# Patient Record
Sex: MALE | Race: WHITE | NOT HISPANIC OR LATINO | Employment: OTHER | URBAN - NONMETROPOLITAN AREA
[De-identification: names, ages, dates, MRNs, and addresses within clinical notes are randomized per-mention and may not be internally consistent; named-entity substitution may affect disease eponyms.]

---

## 2017-02-20 ENCOUNTER — DOCUMENTATION (OUTPATIENT)
Dept: GASTROENTEROLOGY | Facility: CLINIC | Age: 77
End: 2017-02-20

## 2017-02-20 RX ORDER — SULFASALAZINE 500 MG/1
1000 TABLET ORAL 3 TIMES DAILY
Qty: 180 TABLET | Refills: 3 | Status: SHIPPED | OUTPATIENT
Start: 2017-02-20 | End: 2017-05-30 | Stop reason: SDUPTHER

## 2017-02-20 NOTE — PROGRESS NOTES
Patient prior clincal appointment with Dr. Liu 11/29/2016 with 6 month clinical appointment scheduled Tuesday, May 30, 2017 at 1:45 p.m.  Patient requested prescription medication renewal e-scribed [facility] [place], [place], per this staff member - Azulfidine 500 mg, 2 tablets by mouth 3 times per day, #180, 3 refills.

## 2017-05-30 ENCOUNTER — OFFICE VISIT (OUTPATIENT)
Dept: GASTROENTEROLOGY | Facility: CLINIC | Age: 77
End: 2017-05-30

## 2017-05-30 VITALS
SYSTOLIC BLOOD PRESSURE: 179 MMHG | BODY MASS INDEX: 26.49 KG/M2 | DIASTOLIC BLOOD PRESSURE: 74 MMHG | HEIGHT: 67 IN | HEART RATE: 111 BPM | WEIGHT: 168.8 LBS

## 2017-05-30 DIAGNOSIS — K51.30 ULCERATIVE RECTOSIGMOIDITIS WITHOUT COMPLICATION (HCC): Primary | ICD-10-CM

## 2017-05-30 DIAGNOSIS — K21.9 GASTROESOPHAGEAL REFLUX DISEASE WITHOUT ESOPHAGITIS: ICD-10-CM

## 2017-05-30 PROCEDURE — 99213 OFFICE O/P EST LOW 20 MIN: CPT | Performed by: INTERNAL MEDICINE

## 2017-05-30 RX ORDER — OMEPRAZOLE 20 MG/1
20 CAPSULE, DELAYED RELEASE ORAL 2 TIMES DAILY
Qty: 30 CAPSULE | Refills: 5 | Status: SHIPPED | OUTPATIENT
Start: 2017-05-30 | End: 2017-06-29 | Stop reason: SDUPTHER

## 2017-05-30 RX ORDER — SULFASALAZINE 500 MG/1
1000 TABLET ORAL 3 TIMES DAILY
Qty: 180 TABLET | Refills: 3 | Status: SHIPPED | OUTPATIENT
Start: 2017-05-30 | End: 2017-11-30 | Stop reason: SDUPTHER

## 2017-05-30 RX ORDER — GLYBURIDE 2.5 MG/1
TABLET ORAL
COMMUNITY
End: 2017-05-30

## 2017-06-04 NOTE — PROGRESS NOTES
Chief Complaint   Patient presents with   • 6 Month Clinical Appointment   • Chronic, Ulcerative Colitis Without Complications        Gage Ken is a  76 y.o. male here for a follow up visit for Ulcerative colitis     HPI:  Patient returns for follow-up.  States early in the winter he had an upper respiratory infection was treated with antibiotics.  He had brief diarrhea following this, otherwise is had no further diarrhea.  Bowel movements are generally twice daily no rectal bleeding.  Reflux symptoms are controlled with use of omeprazole 20 mg at at bedtime.  Last colonoscopy September 2016.  States recent blood work was normal      Past Medical History:   Diagnosis Date   • Acute gastritis    • Blood in feces    • Colitis, ulcerative chronic     LEFT-SIDED   • Diverticular disease of colon    • Epigastric pain    • GERD (gastroesophageal reflux disease)    • History of colon polyps    • Nonspecific ulcerative proctitis    • Rectal hemorrhage        Current Outpatient Prescriptions   Medication Sig Dispense Refill   • aspirin 81 MG EC tablet Take 81 mg by mouth Daily.     • folic acid (FOLVITE) 1 MG tablet Take 1 mg by mouth Daily.     • glipiZIDE (GLUCOTROL) 10 MG tablet Take 1 tablet by mouth 2 (Two) Times a Day.  3   • lisinopril (PRINIVIL,ZESTRIL) 20 MG tablet Take 1 tablet by mouth 2 (Two) Times a Day.     • omeprazole (priLOSEC) 20 MG capsule Take 1 capsule by mouth 2 (Two) Times a Day. 30 capsule 5   • pravastatin (PRAVACHOL) 20 MG tablet Take 1 tablet by mouth Every Night.  2   • sulfaSALAzine (AZULFIDINE) 500 MG tablet Take 2 tablets by mouth 3 (Three) Times a Day. 180 tablet 3   • terazosin (HYTRIN) 10 MG capsule Take 1 capsule by mouth Every Night.  2   • metFORMIN (GLUCOPHAGE) 1000 MG tablet TAKE 1 TABLET BY MOUTH TWICE A DAY WITH MORNING AND EVENING MEALS  3     No current facility-administered medications for this visit.        PRN Meds:.    No Known Allergies    Social History     Social  History   • Marital status:      Spouse name: N/A   • Number of children: N/A   • Years of education: N/A     Occupational History   • Not on file.     Social History Main Topics   • Smoking status: Former Smoker   • Smokeless tobacco: Never Used      Comment: Patient States He Ceased Smoking 12 Years Prior To Clinical Appointment Date Of 11/29/2016.   • Alcohol use 2.4 oz/week     4 Cans of beer per week   • Drug use: No   • Sexual activity: Defer     Other Topics Concern   • Not on file     Social History Narrative       Family History   Problem Relation Age of Onset   • Diabetes Other    • Hypertension Other        Review of Systems   Constitutional: Negative for activity change, chills, diaphoresis and unexpected weight change.   Cardiovascular: Negative for chest pain.   Gastrointestinal: Negative for abdominal distention, abdominal pain, anal bleeding, blood in stool, constipation, diarrhea, nausea, rectal pain and vomiting.   Musculoskeletal: Negative for arthralgias and myalgias.       Vitals:    05/30/17 1426   BP: 179/74   Pulse: 111       Physical Exam   Constitutional: He appears well-developed and well-nourished.   Cardiovascular: Normal rate, regular rhythm and normal heart sounds.  Exam reveals no gallop and no friction rub.    No murmur heard.  Pulmonary/Chest: Effort normal and breath sounds normal. No respiratory distress. He has no rales.   Abdominal: Soft. Normal appearance and bowel sounds are normal. He exhibits no distension and no ascites. There is no hepatosplenomegaly, splenomegaly or hepatomegaly. There is no tenderness. No hernia.   Nursing note and vitals reviewed.      ASSESSMENT AND PLAN      ICD-10-CM ICD-9-CM   1. 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 Gastroesophageal reflux disease without esophagitis K21.9 530.81      Plan :  Continue current medical regimen   Return to clinic in 6 months   Medication refilled         This document has been  electronically signed by Shant Liu MD on June 4, 2017 2:00 PM                                   “EMR Dragon/Transcription disclaimer:   Much of this encounter note is an electronic transcription/translation of spoken language to printed text. The electronic translation of spoken language may permit erroneous, or at times, nonsensical words or phrases to be inadvertently transcribed; Although I have reviewed the note for such errors, some may still exist.”

## 2017-06-28 DIAGNOSIS — K21.9 GASTROESOPHAGEAL REFLUX DISEASE WITHOUT ESOPHAGITIS: ICD-10-CM

## 2017-06-28 RX ORDER — OMEPRAZOLE 20 MG/1
CAPSULE, DELAYED RELEASE ORAL
Qty: 60 CAPSULE | Refills: 0 | Status: CANCELLED | OUTPATIENT
Start: 2017-06-28

## 2017-06-29 DIAGNOSIS — K21.9 GASTROESOPHAGEAL REFLUX DISEASE WITHOUT ESOPHAGITIS: ICD-10-CM

## 2017-06-29 RX ORDER — OMEPRAZOLE 20 MG/1
20 CAPSULE, DELAYED RELEASE ORAL 2 TIMES DAILY
Qty: 60 CAPSULE | Refills: 5 | Status: SHIPPED | OUTPATIENT
Start: 2017-06-29 | End: 2017-07-26 | Stop reason: SDUPTHER

## 2017-07-26 DIAGNOSIS — K21.9 GASTROESOPHAGEAL REFLUX DISEASE WITHOUT ESOPHAGITIS: ICD-10-CM

## 2017-07-26 RX ORDER — OMEPRAZOLE 20 MG/1
20 CAPSULE, DELAYED RELEASE ORAL 2 TIMES DAILY
Qty: 60 CAPSULE | Refills: 5 | Status: SHIPPED | OUTPATIENT
Start: 2017-07-26 | End: 2017-12-18 | Stop reason: SDUPTHER

## 2017-09-06 ENCOUNTER — OFFICE VISIT (OUTPATIENT)
Dept: GASTROENTEROLOGY | Facility: CLINIC | Age: 77
End: 2017-09-06

## 2017-09-06 VITALS
BODY MASS INDEX: 26.71 KG/M2 | SYSTOLIC BLOOD PRESSURE: 180 MMHG | DIASTOLIC BLOOD PRESSURE: 83 MMHG | HEART RATE: 91 BPM | HEIGHT: 67 IN | WEIGHT: 170.2 LBS

## 2017-09-06 DIAGNOSIS — K51.90 COLITIS, CHRONIC, ULCERATIVE, WITHOUT COMPLICATIONS (HCC): Primary | ICD-10-CM

## 2017-09-06 PROCEDURE — 99213 OFFICE O/P EST LOW 20 MIN: CPT | Performed by: INTERNAL MEDICINE

## 2017-09-06 RX ORDER — DEXTROSE AND SODIUM CHLORIDE 5; .45 G/100ML; G/100ML
30 INJECTION, SOLUTION INTRAVENOUS CONTINUOUS PRN
Status: CANCELLED | OUTPATIENT
Start: 2017-10-30

## 2017-09-07 ENCOUNTER — DOCUMENTATION (OUTPATIENT)
Dept: GASTROENTEROLOGY | Facility: CLINIC | Age: 77
End: 2017-09-07

## 2017-09-07 NOTE — PROGRESS NOTES
09/07/2017, 0908 - Patient telephoned per this staff member [phone] with notification of Colonoscopy date/time - Monday, October 30, 2017 at 8:30 a.m.  Patient instructed to begin second portion of Suprep Bowel Preparation Monday, October 30, 2017 at 5:00 a.m. with completion time at 7:00 a.m.  Patient verbalized understanding.

## 2017-09-09 NOTE — PROGRESS NOTES
Chief Complaint   Patient presents with   • Colonoscopy Consultation     Prior Colonoscopy Performed 09/06/2016   • Ulcerative Retrosigmoiditis Without Complication   • Gastroesophageal Reflux Disease Without Esophagitis        Gage Ken is a  77 y.o. male here for a follow up visit for chronic ulcerative colitis    HPI :  The patient returns for follow-up.  He is doing well on Azulfidine for his colitis with no recent flare.  He denies rectal bleeding.  Presently is taking 2 Azulfidine tablets 3 times a day along with folic acid.  Reflux symptoms are controlled with use of omeprazole 20 mg once a twice daily.  His only complaint today is pain in his right leg states that he had a similar pain when his colitis flare last time.  Note that his last colon was done 1 year ago.  Previous EGD in 2013.    The patient is aware of my impending harbored in requests follow-up with Dr. Silveira.      Past Medical History:   Diagnosis Date   • Acute gastritis    • Blood in feces    • Colitis, ulcerative chronic     LEFT-SIDED   • Diverticular disease of colon    • Epigastric pain    • GERD (gastroesophageal reflux disease)    • History of colon polyps    • Nonspecific ulcerative proctitis    • Rectal hemorrhage        Current Outpatient Prescriptions   Medication Sig Dispense Refill   • aspirin 81 MG EC tablet Take 81 mg by mouth Daily.     • folic acid (FOLVITE) 1 MG tablet Take 1 mg by mouth Daily.     • glipiZIDE (GLUCOTROL) 10 MG tablet Take 1 tablet by mouth 2 (Two) Times a Day.  3   • lisinopril (PRINIVIL,ZESTRIL) 20 MG tablet Take 1 tablet by mouth 2 (Two) Times a Day.     • metFORMIN (GLUCOPHAGE) 1000 MG tablet TAKE 1 TABLET BY MOUTH TWICE A DAY WITH MORNING AND EVENING MEALS  3   • omeprazole (priLOSEC) 20 MG capsule Take 1 capsule by mouth 2 (Two) Times a Day. 60 capsule 5   • pravastatin (PRAVACHOL) 20 MG tablet Take 1 tablet by mouth Every Night.  2   • sulfaSALAzine (AZULFIDINE) 500 MG tablet Take 2 tablets  by mouth 3 (Three) Times a Day. 180 tablet 3   • terazosin (HYTRIN) 10 MG capsule Take 1 capsule by mouth Every Night.  2     No current facility-administered medications for this visit.        PRN Meds:.    No Known Allergies    Social History     Social History   • Marital status:      Spouse name: N/A   • Number of children: N/A   • Years of education: N/A     Occupational History   • Not on file.     Social History Main Topics   • Smoking status: Former Smoker   • Smokeless tobacco: Never Used      Comment: Patient States He Ceased Smoking 12 Years Prior To Clinical Appointment Date Of 11/29/2016.   • Alcohol use 2.4 oz/week     4 Cans of beer per week   • Drug use: No   • Sexual activity: Defer     Other Topics Concern   • Not on file     Social History Narrative       Family History   Problem Relation Age of Onset   • Diabetes Other    • Hypertension Other        Review of Systems   Constitutional: Negative for activity change, chills, diaphoresis and unexpected weight change.   Cardiovascular: Negative for chest pain.   Gastrointestinal: Negative for abdominal distention, abdominal pain, anal bleeding, blood in stool, constipation, diarrhea, nausea, rectal pain and vomiting.   Musculoskeletal: Negative for arthralgias and myalgias.       Vitals:    09/06/17 1132   BP: 180/83   Pulse: 91       Physical Exam   Constitutional: He appears well-developed and well-nourished.   Cardiovascular: Normal rate, regular rhythm and normal heart sounds.  Exam reveals no gallop and no friction rub.    No murmur heard.  Pulmonary/Chest: Effort normal and breath sounds normal. No respiratory distress. He has no rales.   Abdominal: Soft. Normal appearance and bowel sounds are normal. He exhibits no distension and no ascites. There is no hepatosplenomegaly, splenomegaly or hepatomegaly. There is no tenderness. No hernia.   Nursing note and vitals reviewed.      ASSESSMENT AND PLAN      ICD-10-CM ICD-9-CM   1. Colitis,  chronic, ulcerative, without complications K51.90 556.9      Plan :  Continue current medical regimen   Colonoscopy by Dr. Silveira in 3 months   Follow-up with Dr. Silveira following the above         This document has been electronically signed by Shant Liu MD on September 9, 2017 10:04 AM                                   “EMR Dragon/Transcription disclaimer:   Much of this encounter note is an electronic transcription/translation of spoken language to printed text. The electronic translation of spoken language may permit erroneous, or at times, nonsensical words or phrases to be inadvertently transcribed; Although I have reviewed the note for such errors, some may still exist.”

## 2017-10-30 ENCOUNTER — ANESTHESIA (OUTPATIENT)
Dept: GASTROENTEROLOGY | Facility: HOSPITAL | Age: 77
End: 2017-10-30

## 2017-10-30 ENCOUNTER — HOSPITAL ENCOUNTER (OUTPATIENT)
Facility: HOSPITAL | Age: 77
Setting detail: HOSPITAL OUTPATIENT SURGERY
Discharge: HOME | End: 2017-10-30
Attending: INTERNAL MEDICINE | Admitting: INTERNAL MEDICINE

## 2017-10-30 ENCOUNTER — ANESTHESIA EVENT (OUTPATIENT)
Dept: GASTROENTEROLOGY | Facility: HOSPITAL | Age: 77
End: 2017-10-30

## 2017-10-30 VITALS
HEART RATE: 77 BPM | OXYGEN SATURATION: 98 % | SYSTOLIC BLOOD PRESSURE: 167 MMHG | HEIGHT: 67 IN | DIASTOLIC BLOOD PRESSURE: 67 MMHG | BODY MASS INDEX: 26.06 KG/M2 | RESPIRATION RATE: 19 BRPM | TEMPERATURE: 98.3 F | WEIGHT: 166 LBS

## 2017-10-30 DIAGNOSIS — K51.90 COLITIS, CHRONIC, ULCERATIVE, WITHOUT COMPLICATIONS (HCC): ICD-10-CM

## 2017-10-30 LAB — GLUCOSE BLDC GLUCOMTR-MCNC: 220 MG/DL (ref 70–130)

## 2017-10-30 PROCEDURE — 88305 TISSUE EXAM BY PATHOLOGIST: CPT | Performed by: INTERNAL MEDICINE

## 2017-10-30 PROCEDURE — 45380 COLONOSCOPY AND BIOPSY: CPT | Performed by: INTERNAL MEDICINE

## 2017-10-30 PROCEDURE — 88305 TISSUE EXAM BY PATHOLOGIST: CPT | Performed by: PATHOLOGY

## 2017-10-30 PROCEDURE — 25010000002 PROPOFOL 10 MG/ML EMULSION: Performed by: NURSE ANESTHETIST, CERTIFIED REGISTERED

## 2017-10-30 PROCEDURE — 82962 GLUCOSE BLOOD TEST: CPT

## 2017-10-30 RX ORDER — LIDOCAINE HYDROCHLORIDE 10 MG/ML
INJECTION, SOLUTION INFILTRATION; PERINEURAL AS NEEDED
Status: DISCONTINUED | OUTPATIENT
Start: 2017-10-30 | End: 2017-10-30 | Stop reason: SURG

## 2017-10-30 RX ORDER — DEXTROSE AND SODIUM CHLORIDE 5; .45 G/100ML; G/100ML
30 INJECTION, SOLUTION INTRAVENOUS CONTINUOUS PRN
Status: DISCONTINUED | OUTPATIENT
Start: 2017-10-30 | End: 2017-10-30 | Stop reason: HOSPADM

## 2017-10-30 RX ORDER — PROPOFOL 10 MG/ML
VIAL (ML) INTRAVENOUS AS NEEDED
Status: DISCONTINUED | OUTPATIENT
Start: 2017-10-30 | End: 2017-10-30 | Stop reason: SURG

## 2017-10-30 RX ADMIN — PROPOFOL 20 MG: 10 INJECTION, EMULSION INTRAVENOUS at 10:04

## 2017-10-30 RX ADMIN — PROPOFOL 20 MG: 10 INJECTION, EMULSION INTRAVENOUS at 09:58

## 2017-10-30 RX ADMIN — LIDOCAINE HYDROCHLORIDE 50 MG: 10 INJECTION, SOLUTION INFILTRATION; PERINEURAL at 09:54

## 2017-10-30 RX ADMIN — PROPOFOL 70 MG: 10 INJECTION, EMULSION INTRAVENOUS at 09:54

## 2017-10-30 RX ADMIN — DEXTROSE AND SODIUM CHLORIDE 30 ML/HR: 5; 450 INJECTION, SOLUTION INTRAVENOUS at 08:44

## 2017-10-30 NOTE — ANESTHESIA PREPROCEDURE EVALUATION
Anesthesia Evaluation     Patient summary reviewed and Nursing notes reviewed   NPO Solid Status: > 8 hours  NPO Liquid Status: > 2 hours     Airway   Mallampati: II  TM distance: >3 FB  Neck ROM: full  no difficulty expected  Dental    (+) upper dentures and lower dentures    Pulmonary - negative pulmonary ROS and normal exam   Cardiovascular - normal exam    Rhythm: regular  Rate: abnormal    (+) hypertension well controlled,     ROS comment: Occluded right carotid per patient    Neuro/Psych- negative ROS  GI/Hepatic/Renal/Endo    (+)  diabetes mellitus type 1 well controlled,     Musculoskeletal (-) negative ROS    Abdominal    Substance History - negative use     OB/GYN          Other - negative ROS                                       Anesthesia Plan    ASA 3     MAC     intravenous induction   Anesthetic plan and risks discussed with patient.    Plan discussed with CRNA.

## 2017-10-30 NOTE — ANESTHESIA POSTPROCEDURE EVALUATION
Patient: Gage Ken    Procedure Summary     Date Anesthesia Start Anesthesia Stop Room / Location    10/30/17 0952 1010 [facility] ENDOSCOPY 1 / [facility] ENDOSCOPY       Procedure Diagnosis Surgeon Provider    COLONOSCOPY (N/A ) Colitis, chronic, ulcerative, without complications  (Colitis, chronic, ulcerative, without complications [K51.90]) MD Mychal Dong CRNA          Anesthesia Type: MAC  Last vitals  BP   (!) 188/90 (10/30/17 0822)   Temp   98.3 °F (36.8 °C) (10/30/17 0822)   Pulse   95 (10/30/17 0822)   Resp   18 (10/30/17 0822)     SpO2   96 % (10/30/17 0822)     Post Anesthesia Care and Evaluation    Patient location during evaluation: bedside  Patient participation: complete - patient participated  Level of consciousness: awake and alert  Pain score: 0  Pain management: adequate  Airway patency: patent  Anesthetic complications: No anesthetic complications  PONV Status: none  Cardiovascular status: acceptable  Respiratory status: acceptable  Hydration status: acceptable

## 2017-10-31 LAB
LAB AP CASE REPORT: NORMAL
Lab: NORMAL
PATH REPORT.FINAL DX SPEC: NORMAL
PATH REPORT.GROSS SPEC: NORMAL

## 2017-11-30 ENCOUNTER — OFFICE VISIT (OUTPATIENT)
Dept: GASTROENTEROLOGY | Facility: CLINIC | Age: 77
End: 2017-11-30

## 2017-11-30 VITALS
DIASTOLIC BLOOD PRESSURE: 84 MMHG | OXYGEN SATURATION: 98 % | WEIGHT: 170.2 LBS | SYSTOLIC BLOOD PRESSURE: 168 MMHG | HEART RATE: 83 BPM | BODY MASS INDEX: 26.71 KG/M2 | HEIGHT: 67 IN

## 2017-11-30 DIAGNOSIS — K51.019 CHRONIC ULCERATIVE PANCOLITIS WITH COMPLICATION (HCC): Primary | ICD-10-CM

## 2017-11-30 PROCEDURE — 99213 OFFICE O/P EST LOW 20 MIN: CPT | Performed by: NURSE PRACTITIONER

## 2017-11-30 RX ORDER — CLOTRIMAZOLE AND BETAMETHASONE DIPROPIONATE 10; .64 MG/G; MG/G
CREAM TOPICAL
Refills: 0 | COMMUNITY
Start: 2017-11-10 | End: 2019-10-24

## 2017-11-30 RX ORDER — SULFASALAZINE 500 MG/1
1000 TABLET ORAL 3 TIMES DAILY
Qty: 540 TABLET | Refills: 3 | Status: SHIPPED | OUTPATIENT
Start: 2017-11-30 | End: 2018-05-30 | Stop reason: SDUPTHER

## 2017-12-14 ENCOUNTER — HOSPITAL ENCOUNTER (OUTPATIENT)
Dept: VASCULAR LAB | Age: 77
Discharge: HOME | End: 2017-12-14
Payer: MEDICARE

## 2017-12-14 ENCOUNTER — OFFICE VISIT (OUTPATIENT)
Dept: VASCULAR SURGERY | Age: 77
End: 2017-12-14
Payer: MEDICARE

## 2017-12-14 VITALS
HEART RATE: 106 BPM | SYSTOLIC BLOOD PRESSURE: 138 MMHG | OXYGEN SATURATION: 98 % | RESPIRATION RATE: 18 BRPM | DIASTOLIC BLOOD PRESSURE: 70 MMHG

## 2017-12-14 DIAGNOSIS — I73.9 PVD (PERIPHERAL VASCULAR DISEASE) (HCC): Primary | ICD-10-CM

## 2017-12-14 DIAGNOSIS — I65.23 BILATERAL CAROTID ARTERY STENOSIS: ICD-10-CM

## 2017-12-14 PROCEDURE — 1036F TOBACCO NON-USER: CPT | Performed by: PHYSICIAN ASSISTANT

## 2017-12-14 PROCEDURE — 1123F ACP DISCUSS/DSCN MKR DOCD: CPT | Performed by: PHYSICIAN ASSISTANT

## 2017-12-14 PROCEDURE — G8427 DOCREV CUR MEDS BY ELIG CLIN: HCPCS | Performed by: PHYSICIAN ASSISTANT

## 2017-12-14 PROCEDURE — 3017F COLORECTAL CA SCREEN DOC REV: CPT | Performed by: PHYSICIAN ASSISTANT

## 2017-12-14 PROCEDURE — G8484 FLU IMMUNIZE NO ADMIN: HCPCS | Performed by: PHYSICIAN ASSISTANT

## 2017-12-14 PROCEDURE — 93880 EXTRACRANIAL BILAT STUDY: CPT

## 2017-12-14 PROCEDURE — 4040F PNEUMOC VAC/ADMIN/RCVD: CPT | Performed by: PHYSICIAN ASSISTANT

## 2017-12-14 PROCEDURE — 93923 UPR/LXTR ART STDY 3+ LVLS: CPT

## 2017-12-14 PROCEDURE — G8598 ASA/ANTIPLAT THER USED: HCPCS | Performed by: PHYSICIAN ASSISTANT

## 2017-12-14 PROCEDURE — 99213 OFFICE O/P EST LOW 20 MIN: CPT | Performed by: PHYSICIAN ASSISTANT

## 2017-12-14 PROCEDURE — G8421 BMI NOT CALCULATED: HCPCS | Performed by: PHYSICIAN ASSISTANT

## 2017-12-14 RX ORDER — GLIPIZIDE 10 MG/1
10 TABLET ORAL
COMMUNITY

## 2017-12-14 RX ORDER — FOLIC ACID 1 MG/1
1 TABLET ORAL DAILY
COMMUNITY

## 2017-12-14 NOTE — PROGRESS NOTES
Patient Care Team:  May Viveros MD as PCP - General  NO TEAM MEMBERS      Subjective    Mr. Jigna Rust  presents for follow-up of PVD and carotid artery stenosis. His current treatment includes ASA EC and a statin  daily. He denies a history of CVA. He reports no TIA's, episodes of amaurosis fugax and episodes of lateralizing weakness. He denies any claudication or wounds. Corinne Pitman is a 78 y.o. male with the following history reviewed and recorded in [facility]:  Patient Active Problem List    Diagnosis Date Noted    [facility]2016    Carotid artery stenosis 04/11/2012    Depression     Type II or unspecified type diabetes mellitus without mention of complication, not stated as uncontrolled     Hypertension      Current Outpatient Prescriptions   Medication Sig Dispense Refill    folic acid (FOLVITE) 1 MG tablet Take 1 mg by mouth daily      glipiZIDE (GLUCOTROL) 10 MG tablet Take 10 mg by mouth 2 times daily (before meals)      aspirin 81 MG tablet Take 81 mg by mouth daily      sulfADIAZINE 500 MG tablet Take 500 mg by mouth 3 times daily.  pravastatin (PRAVACHOL) 20 MG tablet Take 20 mg by mouth daily.  metformin (GLUCOPHAGE-XR) 500 MG XR tablet Take 1,000 mg by mouth 2 times daily       omeprazole (PRILOSEC) 20 MG capsule Take 20 mg by mouth daily.  terazosin (HYTRIN) 10 MG capsule Take 10 mg by mouth nightly.  lisinopril (PRINIVIL;ZESTRIL) 20 MG tablet Take 20 mg by mouth daily.  glyBURIDE (DIABETA) 2.5 MG tablet Take 2.5 mg by mouth daily (with breakfast). No current facility-administered medications for this visit. Current Outpatient Prescriptions on File Prior to Visit   Medication Sig Dispense Refill    aspirin 81 MG tablet Take 81 mg by mouth daily      sulfADIAZINE 500 MG tablet Take 500 mg by mouth 3 times daily.  pravastatin (PRAVACHOL) 20 MG tablet Take 20 mg by mouth daily.       metformin palpitations or significant leg swelling. No claudication. Gastrointestinal  no abdominal swelling or pain. No blood in stool. No severe constipation, diarrhea, nausea, or vomiting. Genitourinary  No difficulty urinating, dysuria, frequency, or urgency. No flank pain or hematuria. Musculoskeletal  no back pain, gait disturbance, or myalgia. Skin  no color change, rash, pallor, or new wound. Neurologic  no dizziness, facial asymmetry, or light headedness. No seizures. No speech difficulty or lateralizing weakness. Hematologic  no easy bruising or excessive bleeding. Psychiatric  no severe anxiety or nervousness. No confusion. All other review of systems are negative. Physical Exam    Vitals:    12/14/17 1328 12/14/17 1330   BP: (!) 142/70 138/70   Site: Right Arm Left Arm   Position: Sitting Sitting   Cuff Size: Medium Adult Medium Adult   Pulse: 106 106   Resp: 18    SpO2: 98%          Constitutional  well developed, well nourished. No diaphoresis or acute distress. HENT  head normocephalic. Right external ear canal appears normal.  Left external ear canal appears normal.  Septum appears midline. Eyes  conjunctiva normal.  EOMS normal.  No exudate. No icterus. Neck- ROM appears normal, no tracheal deviation. Cardiovascular  Regular rate and rhythm. Heart sounds are normal.  No murmur, rub, or gallop. Carotid pulses are 2+ to palpation bilaterally without bruit. Right DP and PT pulses are palpable. Left DP and PT pulses are NP. Feet are warm and well perfused. Pulmonary  effort appears normal.  No respiratory distress. Lungs - Breath sounds normal. No wheezes or rales. Extremities - Radial and brachial pulses are 2+ to palpation bilaterally. Neurologic  alert and oriented X 3. Physiologic. Tongue midline. Face symmetric. Skin  warm, dry, and intact. No rash, erythema, or pallor.   Psychiatric  mood, affect, and behavior appear normal.  Judgment and thought processes appear normal.    Risk factors for atherosclerosis of all vascular beds have been reviewed with the patient including:  Family history, tobacco abuse in all forms, elevated cholesterol, hyperlipidemia, and diabetes. Lower extremity arterial study:   Right BARBARA 1.02, Left BARBARA 0.83. Individual films reviewed: Yes. These results were reviewed with the patient. Disease process is stable    Doppler results:    Right CCA/ICA totally occluded (known)  Left CCA/ICA <50% stenotic  Right vertebral artery flow is antegrade  Left vertebral artery flow is antegrade  Individual velocities reviewed: Yes. Test results were reviewed with the patient. Disease process is stable      Options have been discussed with the patient including continued medical management. Patient has opted to proceed with continued medical management. Assessment    1. PVD (peripheral vascular disease) ([address]    2. Bilateral carotid artery stenosis        Plan    Continue ASA EC 81 mg daily  Strongly encourage start/continue statin therapy  Recommend no smoking  Patient instructed to call or proceed to the emergency room with any symptoms of lateralizing weakness, loss of vision in one eye, or episodes slurred speech.     Follow up in  12  Month(s) with a CDU and EDGAR

## 2017-12-18 DIAGNOSIS — K21.9 GASTROESOPHAGEAL REFLUX DISEASE WITHOUT ESOPHAGITIS: ICD-10-CM

## 2017-12-18 RX ORDER — OMEPRAZOLE 20 MG/1
20 CAPSULE, DELAYED RELEASE ORAL 2 TIMES DAILY
Qty: 60 CAPSULE | Refills: 5 | Status: SHIPPED | OUTPATIENT
Start: 2017-12-18 | End: 2018-05-30 | Stop reason: SDUPTHER

## 2017-12-19 ENCOUNTER — TELEPHONE (OUTPATIENT)
Dept: GASTROENTEROLOGY | Facility: CLINIC | Age: 77
End: 2017-12-19

## 2017-12-19 NOTE — TELEPHONE ENCOUNTER
----- Message from CUATE Michelle sent at 12/18/2017  3:48 PM CST -----  Prilosec went to [facility]. Please verify which pharmacy to send meds to. He may need sulfasalazine transferred to [facility].   ----- Message -----     From: Luann Love     Sent: 12/18/2017   3:35 PM       To: CUATE Michelle    Needs refills on omeprazole and sulfasalazine

## 2017-12-19 NOTE — TELEPHONE ENCOUNTER
I notified patient of who I was and were I was calling from. I also explained the reason for calling in regards to to medication that weren't at his pharmacy when he went to pick them up..Patient stated [facility] called [facility] in [place], [place]. [facility] stated that [facility] that both prescription were there and he would called back if he doesn't get prescriptions filled.

## 2018-05-30 ENCOUNTER — OFFICE VISIT (OUTPATIENT)
Dept: GASTROENTEROLOGY | Facility: CLINIC | Age: 78
End: 2018-05-30

## 2018-05-30 VITALS
DIASTOLIC BLOOD PRESSURE: 70 MMHG | WEIGHT: 158 LBS | HEART RATE: 76 BPM | BODY MASS INDEX: 24.8 KG/M2 | HEIGHT: 67 IN | SYSTOLIC BLOOD PRESSURE: 138 MMHG

## 2018-05-30 DIAGNOSIS — K21.9 GASTROESOPHAGEAL REFLUX DISEASE WITHOUT ESOPHAGITIS: ICD-10-CM

## 2018-05-30 DIAGNOSIS — K51.019 CHRONIC ULCERATIVE PANCOLITIS WITH COMPLICATION (HCC): Primary | ICD-10-CM

## 2018-05-30 PROCEDURE — 99214 OFFICE O/P EST MOD 30 MIN: CPT | Performed by: NURSE PRACTITIONER

## 2018-05-30 RX ORDER — SULFASALAZINE 500 MG/1
1000 TABLET ORAL 3 TIMES DAILY
Qty: 540 TABLET | Refills: 3 | Status: SHIPPED | OUTPATIENT
Start: 2018-05-30 | End: 2018-10-02 | Stop reason: SDUPTHER

## 2018-05-30 RX ORDER — SOTALOL HYDROCHLORIDE 80 MG/1
TABLET ORAL
Refills: 2 | Status: ON HOLD | COMMUNITY
Start: 2018-05-15 | End: 2020-01-01

## 2018-05-30 RX ORDER — FUROSEMIDE 40 MG/1
40 TABLET ORAL DAILY
Refills: 2 | COMMUNITY
Start: 2018-05-01 | End: 2021-01-01 | Stop reason: SDUPTHER

## 2018-05-30 RX ORDER — OMEPRAZOLE 20 MG/1
20 CAPSULE, DELAYED RELEASE ORAL DAILY
Qty: 60 CAPSULE | Refills: 5 | Status: SHIPPED | OUTPATIENT
Start: 2018-05-30 | End: 2018-08-27 | Stop reason: SDUPTHER

## 2018-05-30 RX ORDER — POTASSIUM CHLORIDE 20 MEQ/1
TABLET, EXTENDED RELEASE ORAL
COMMUNITY
End: 2019-10-24

## 2018-05-30 NOTE — PATIENT INSTRUCTIONS
Ulcerative Colitis, Adult  Ulcerative colitis is long-lasting (chronic) swelling (inflammation) of the large intestine (colon). Sores (ulcers) may also form on the colon.  Ulcerative colitis is closely related to another condition of inflammation of the intestines that is called Crohn disease. Together, they are frequently referred to as inflammatory bowel disease (IBD).  What are the causes?  Ulcerative colitis is caused by increased activity of the immune system in the intestines. The immune system is the system that protects the body against harmful bacteria, viruses, fungi, and other things that can make you sick. When the immune system overacts, it causes inflammation. The cause of the increased immune system activity is not known.  What increases the risk?  Risk factors of ulcerative colitis include:  · Age. This includes:  ¨ Being 15-30 years old.  ¨ Being older than 60 years old.  · Having a family history of ulcerative colitis.  · Being of [employer]ism descent.  What are the signs or symptoms?  Common symptoms of ulcerative colitis include rectal bleeding and diarrhea. There is a wide range of symptoms, and a person's symptoms depend on how severe the condition is. Additional symptoms may include:  · Pain or cramping in the belly (abdomen).  · Fever.  · Fatigue.  · Weight loss.  · Night sweats.  · Rectal pain.  · Feeling the immediate need to have a bowel movement.  · Nausea.  · Loss of appetite.  · Anemia.  · Joint pain or soreness.  · Eye irritation.  · Certain skin rashes.  How is this diagnosed?  Ulcerative colitis may be diagnosed by:  · Medical history and physical exam.  · Blood tests and stool tests.  · X-rays.  · CT scans.  · Colonoscopy. For this test, a flexible tube is inserted into your anus and your colon is examined.  · Examination of a tissue sample from your colon (biopsy).  How is this treated?  Treatment for ulcerative colitis may include medicines to:  · Decrease inflammation.  · Control your  immune system.  Surgery may also be necessary.  Follow these instructions at home:  Medicines and vitamins   · Take medicines only as directed by your doctor. Do not take aspirin.  · Ask your doctor if you should take any vitamins or supplements.  Lifestyle   · Exercise regularly.  · Limit alcohol intake to no more than 1 drink per day for nonpregnant women and 2 drinks per day for men. One drink equals 12 ounces of beer, 5 ounces of wine, or 1½ ounces of hard liquor.  Eating and drinking   · Drink enough fluid to keep your urine clear or pale yellow.  · Ask your health care provider about the best diet for you. Follow the diet as directed by your health care provider. This may include:  ¨ Avoiding carbonated drinks.  ¨ Avoiding popcorn, vegetable skins, nuts, and other high-fiber foods when you have symptoms of ulcerative colitis.  ¨ Eating smaller meals more often.  ¨ Keeping a food diary. This may help you to find and avoid any foods that make you feel not well.  · Limit your caffeine intake.  General instructions   · Keep all follow-up appointments as directed by your health care provider. This is important.  Contact a health care provider if:  · Your symptoms do not improve or get worse with treatment.  · You continue to lose weight.  · You have constant cramps or loose bowels.  · You develop a new skin rash, skin sores, or eye problems.  · You have a fever or chills.  Get help right away if:  · You have bloody diarrhea.  · You have severe pain in your abdomen.  · You vomit.  This information is not intended to replace advice given to you by your health care provider. Make sure you discuss any questions you have with your health care provider.  Document Released: 09/27/2006 Document Revised: 08/20/2017 Document Reviewed: 04/11/2016  [employer] Patient Education © 2017 [employer]

## 2018-05-30 NOTE — PROGRESS NOTES
Chief Complaint   Patient presents with   • Ulcerative Colitis       Subjective    Gage Ken is a 77 y.o. male. he is here today for follow-up.  77-year-old male presents for 6 month follow-up regarding ulcerative colitis.  States he has been doing very well recently.  Reports he had a flare in February after he was hospitalized January with the flu.  He was seen by his primary care provider and given Flagyl which significantly improved symptoms.  Overall he is well maintained with sulfasalazine.  He states his prior flare to February 2018 was in 2015 again improved with antibiotoc and steroids.  He states Eucerin seemed to worsen his symptoms and did well with prednisone.  Currently he denies any abdominal pain or blood within the stool.  States during the flare he had several bloody bowel movements per day however has returned to normal 1-2 times per day.  Over the years he has tried different regimens and sulfasalazine seems to be only thing that controls his symptoms he does not want to attempt treatment with biologic.  He reports reflux is well controlled PPI daily.    Ulcerative Colitis   This is a chronic problem. The current episode started more than 1 year ago. The problem occurs daily. The problem has been waxing and waning (Last flare in Feb after hospitalized for flu, Given Flagyl per PCP and doing much better now. ). Associated symptoms include abdominal pain (intermittent none today ). Pertinent negatives include no anorexia, arthralgias, change in bowel habit, chest pain, chills, congestion, coughing, diaphoresis, fatigue, fever, headaches, joint swelling, myalgias, nausea, neck pain, numbness, sore throat or vomiting. Treatments tried: Sulfasalazine  The treatment provided moderate relief.   Colonoscopy was completed 10/30/17 and a small polyp was removed from ascending colon. It was a tubular adenoma with mild dysplasia. Biopsy of the cecum, ascending, transverse, descending,sigmoid colon  and rectum showed no abnormality, negative for dysplasia.   Plan; continue PPI daily for GERD and sulfasalazine for CLIFF.  Follow-up in 4 months plan to schedule colonoscopy at that time due to polyp with dysplasia seen on colonoscopy last year.     The following portions of the patient's history were reviewed and updated as appropriate:   Past Medical History:   Diagnosis Date   • Acute gastritis    • Blood in feces    • Colitis, ulcerative chronic     LEFT-SIDED   • Diverticular disease of colon    • Epigastric pain    • GERD (gastroesophageal reflux disease)    • History of colon polyps    • [place] (hard of hearing)    • Nonspecific ulcerative proctitis    • Rectal hemorrhage      Past Surgical History:   Procedure Laterality Date   • COLONOSCOPY  12/02/2013    Hemorrhoids found.   • COLONOSCOPY  09/06/2016   • COLONOSCOPY N/A 10/30/2017    Procedure: COLONOSCOPY;  Surgeon: Alex Silveira MD;  Location: [facility] ENDOSCOPY;  Service:    • COLONOSCOPY W/ POLYPECTOMY  08/30/2015    Single polyp found in the colon;removed by cold snare polypectomy.Proctitis in the rectum.Diverticulosis found in the sigmoid colon.Hemorrhoids found.   • ENDOSCOPY  12/02/2013    Normal hypopharynx, esophagus, duodenum, symmetrical & patent pylorus. Hiatus hernia in GE junction. Normal stomach. Biopsy taken.   • HERNIA REPAIR      umbilical   • UPPER GASTROINTESTINAL ENDOSCOPY  12/02/2013     Family History   Problem Relation Age of Onset   • Diabetes Other    • Hypertension Other        Current Outpatient Prescriptions   Medication Sig Dispense Refill   • aspirin 81 MG EC tablet Take 81 mg by mouth Daily.     • clotrimazole-betamethasone (LOTRISONE) 1-0.05 % cream APPLY CREAM TO THE AFFECTED AND SURROUNDING AREAS OF SKIN IN THE MORNING AND EVENING 2 TIMES A DAY  0   • folic acid (FOLVITE) 1 MG tablet Take 1 mg by mouth Daily.     • furosemide (LASIX) 40 MG tablet Take 40 mg by mouth Daily.  2   • glipiZIDE (GLUCOTROL) 10 MG tablet Take 1  tablet by mouth 2 (Two) Times a Day.  3   • lisinopril (PRINIVIL,ZESTRIL) 20 MG tablet Take 1 tablet by mouth 2 (Two) Times a Day.     • metFORMIN (GLUCOPHAGE) 1000 MG tablet Take 1,000 mg by mouth 2 (Two) Times a Day With Meals.     • omeprazole (priLOSEC) 20 MG capsule Take 1 capsule by mouth Daily. 60 capsule 5   • potassium chloride (KLOR-CON) 20 MEQ CR tablet Klor-Con M20 mEq tablet,extended release   TAKE 1 TABLET (20 MEQ) BY ORAL ROUTE 2 TIMES PER DAY WITH FOOD     • pravastatin (PRAVACHOL) 20 MG tablet Take 1 tablet by mouth Every Night.  2   • sotalol (BETAPACE) 80 MG tablet TAKE 0.5 TABLET BY ORAL ROUTE 2 TIMES PER DAY  2   • sulfaSALAzine (AZULFIDINE) 500 MG tablet Take 2 tablets by mouth 3 (Three) Times a Day. 540 tablet 3   • terazosin (HYTRIN) 10 MG capsule Take 1 capsule by mouth Every Night.  2     No current facility-administered medications for this visit.      No Known Allergies  Social History     Social History   • Marital status: Single     Social History Main Topics   • Smoking status: Former Smoker     Types: Cigarettes   • Smokeless tobacco: Former User     Types: Snuff     Quit date: 2008      Comment: 11/30/2017 - Patient States He Ceased Smoking 13 Years Prior.   • Alcohol use 4.8 oz/week     8 Cans of beer per week      Comment: 11/30/2017 - Patient states he consumes 8 Beers  per week - (during viewing of sports events - Basketball)   • Drug use: No   • Sexual activity: Defer     Other Topics Concern   • Not on file       Review of Systems  Review of Systems   Constitutional: Positive for unexpected weight change (down 12 pounds since flare in February flare and now eating better ). Negative for activity change, appetite change, chills, diaphoresis, fatigue and fever.   HENT: Negative for congestion, sore throat and trouble swallowing.    Respiratory: Negative for cough and shortness of breath.    Cardiovascular: Negative for chest pain.   Gastrointestinal: Positive for abdominal pain  "(intermittent none today ). Negative for abdominal distention, anal bleeding, anorexia, blood in stool, change in bowel habit, constipation, diarrhea, nausea, rectal pain and vomiting.   Musculoskeletal: Negative for arthralgias, joint swelling, myalgias and neck pain.   Skin: Negative for pallor.   Neurological: Negative for light-headedness, numbness and headaches.        /70   Pulse 76   Ht 170.2 cm (67\")   Wt 71.7 kg (158 lb)   BMI 24.75 kg/m²     Objective    Physical Exam   Constitutional: He is oriented to person, place, and time. He appears well-developed and well-nourished. He is cooperative. No distress.   HENT:   Head: Normocephalic and atraumatic.   Neck: Normal range of motion. Neck supple. No thyromegaly present.   Cardiovascular: Normal rate, regular rhythm and normal heart sounds.    Pulmonary/Chest: Effort normal and breath sounds normal. He has no wheezes. He has no rhonchi. He has no rales.   Abdominal: Soft. Normal appearance and bowel sounds are normal. He exhibits no distension. There is no hepatosplenomegaly. There is no tenderness. There is no rigidity and no guarding. No hernia.   Lymphadenopathy:     He has no cervical adenopathy.   Neurological: He is alert and oriented to person, place, and time.   Skin: Skin is warm, dry and intact. No rash noted. No pallor.   Psychiatric: He has a normal mood and affect. His speech is normal.     Admission on 10/30/2017, Discharged on 10/30/2017   Component Date Value Ref Range Status   • Glucose 10/30/2017 220* 70 - 130 mg/dL Final    Meter: [id number]: [id number] AKIKO VARGHESE    • Case Report 10/30/2017    Final                    Value:Surgical Pathology Report                         Case: [id number]                                  Authorizing Provider:  Alex Silveira MD         Collected:           10/30/2017 10:11 AM          Ordering Location:     [facility]             Received:            10/30/2017 11:05 AM        "                          [facility]                                                     Pathologist:           Aldo Langley MD                                                          Specimens:   1) - Large Intestine, Cecum, cecum                                                                  2) - Large Intestine, Right / Ascending Colon                                                       3) - Large Intestine, ascending polyp                                                               4) - Large Intestine, Transverse Colon                                                              5) - Large Intestine, Left / Descending Colon                                                                                 6) - Large Intestine, Rectum                                                                        7) - Large Intestine, Sigmoid Colon                                                       • Final Diagnosis 10/30/2017    Final                    Value:This result contains rich text formatting which cannot be displayed here.   • Gross Description 10/30/2017    Final                    Value:This result contains rich text formatting which cannot be displayed here.     Assessment/Plan      1. Chronic ulcerative pancolitis with complication    2. Gastroesophageal reflux disease without esophagitis    .       Orders placed during this encounter include:  No orders of the defined types were placed in this encounter.      * Surgery not found *    Review and/or summary of lab tests, radiology, procedures, medications. Review and summary of old records and obtaining of history. The risks and benefits of my recommendations, as well as other treatment options were discussed with the patient today. Questions were answered.    New Medications Ordered This Visit   Medications   • omeprazole (priLOSEC) 20 MG capsule     Sig: Take 1 capsule by mouth Daily.     Dispense:  60 capsule     Refill:  5   •  sulfaSALAzine (AZULFIDINE) 500 MG tablet     Sig: Take 2 tablets by mouth 3 (Three) Times a Day.     Dispense:  540 tablet     Refill:  3       Follow-up: Return in about 4 months (around 9/30/2018) for Recheck Repeat colon due in Oct .        This document has been electronically signed by CUATE Allen on November 30, 2017 11:56 AM               Results for orders placed or performed during the hospital encounter of 10/30/17   Tissue Pathology Exam - Tissue, Large Intestine, Cecum   Result Value Ref Range    Case Report       Surgical Pathology Report                         Case: [id number]                                  Authorizing Provider:  Alex Silveira MD         Collected:           10/30/2017 10:11 AM          Ordering Location:     [facility]             Received:            10/30/2017 11:05 AM                                 [facility]                                                     Pathologist:           Aldo Langley MD                                                          Specimens:   1) - Large Intestine, Cecum, cecum                                                                  2) - Large Intestine, Right / Ascending Colon                                                       3) - Large Intestine, ascending polyp                                                               4) - Large Intestine, Transverse Colon                                                              5) - Large Intestine, Left / Descending Colon                                                        6) - Large Intestine, Rectum                                                                        7) - Large Intestine, Sigmoid Colon                                                        Final Diagnosis       1.  MUCOSA, CECUM:  NO SIGNIFICANT HISTOLOGIC ABNORMALITY.  NEGATIVE FOR DYSPLASIA.    2.  MUCOSA, ASCENDING COLON:  NO SIGNIFICANT HISTOLOGIC ABNORMALITY.  NEGATIVE FOR  DYSPLASIA.    3.  POLYP, ASCENDING COLON:  TUBULAR ADENOMA WITH MILD DYSPLASIA.    4.  MUCOSA, TRANSVERSE COLON:  NO SIGNIFICANT HISTOLOGIC ABNORMALITY.  NEGATIVE FOR DYSPLASIA.    5.  MUCOSA, DESCENDING COLON:  NO SIGNIFICANT HISTOLOGIC ABNORMALITY.  NEGATIVE FOR DYSPLASIA.    6.  MUCOSA, RECTUM:  NO SIGNIFICANT HISTOLOGIC ABNORMALITY.  NEGATIVE FOR DYSPLASIA.    7.  MUCOSA, SIGMOID COLON:  NO SIGNIFICANT HISTOLOGIC ABNORMALITY.  NEGATIVE FOR DYSPLASIA.        Gross Description       Received for examination are 7 containers, each of which have nodular bits of white soft tissue measuring 0.3-0.5 cc in aggregate.  All specimens are embedded as labeled:  1A cecum; 2A ascending colon; 3A polyp, ascending colon; 4A transverse colon; 5A descending colon; 6A rectum; 7A sigmoid colon.         Embedded Images     POC Glucose Fingerstick   Result Value Ref Range    Glucose 220 (H) 70 - 130 mg/dL   Results for orders placed or performed in visit on 10/12/16    COLONOSCOPY   Result Value Ref Range     Colonoscopy ORDERED BY DR TIMUR CIFUENTES    Results for orders placed or performed during the hospital encounter of 09/06/16   Converted Surgical Pathology   Result Value Ref Range    Spec Descr 1 SPECIMEN(S): A BIOPSY @ 15 CM     Specimen description 2 SPECIMEN(S): B BIOPSY @ 13 CM     Specimen description 3 SPECIMEN(S): C BIOPSY @ 10 CM    POCT glucose fingerstick   Result Value Ref Range    Glucose 199 (H) 60 - 100 mg/dl   Results for orders placed or performed during the hospital encounter of 08/20/15   Converted Surgical Pathology   Result Value Ref Range    Spec Descr 1 SPECIMEN(S): A POLYP @ 30 CM     Specimen description 2 SPECIMEN(S): B BIOPSY, RECTUM     Preoperative Diagnosis         PREOPERATIVE DIAGNOSIS:  Bleeding (578.9), surveillance of ulcerative colitis      Postoperative Diagnosis         POSTOPERATIVE DIAGNOSIS:  Polyp at 30 cm, proctitis (556.9), diverticulosis of sigmoid colon  (562.10), hemorrhoids    "   Gross Description      Final Diagnosis         FINAL DIAGNOSIS:  A.  COLON, 30 CM, BIOPSY:            TUBULAR ADENOMA.  B.  RECTUM, BIOPSY:            DIFFUSE ACTIVE COLITIS.            A HEAVY LYMPHOID INFILTRATE APPEARS REACTIVE.      Comment         COMMENT:  Dr. Langley has reviewed specimen B and agrees with the diagnosis.      CONVERTED (HISTORICAL) FINAL PATHOLOGIST       Diagnostician:  TRUDY YOON M.D.  Pathologist  Electronically Signed 08/24/2015      Diagnosis Code   DIAGNOSIS CODE:  8      POCT Glucose Fingerstick   Result Value Ref Range    Glucose 202 (H) 60 - 100 mg/dl   Results for orders placed or performed in visit on 03/19/12   Converted Surgical Pathology   Result Value Ref Range    Spec Descr 1 SPECIMEN(S): A RECTUM BIOPSY     Preoperative Diagnosis   PREOPERATIVE DIAGNOSIS:  None Given       Postoperative Diagnosis   POSTOPERATIVE DIAGNOSIS:  CLIFF       Gross Description         GROSS DESCRIPTION:  The specimen is labeled \"#1 rectum\" and consists of 2 tan fragments  measuring 0.5 x 0.5 x 0.2 cm together.  Totally submitted.      Final Diagnosis         FINAL DIAGNOSIS:  RECTUM, BIOPSY:       DIFFUSE ACTIVE COLITIS.      Comment         COMMENT:  The findings are consistent with the clinical history of chronic  ulcerative colitis.  Inflammation does extend into the submucosa and  granulomas are present, raising the question of Crohn's disease.  Clinical correlation is recommended.      CONVERTED (HISTORICAL) FINAL PATHOLOGIST       Diagnostician:  TRUDY YOON M.D.  Pathologist  Electronically Signed 03/20/2012      Diagnosis Code   DIAGNOSIS CODE:  4        "

## 2018-08-27 DIAGNOSIS — K21.9 GASTROESOPHAGEAL REFLUX DISEASE WITHOUT ESOPHAGITIS: ICD-10-CM

## 2018-08-27 RX ORDER — OMEPRAZOLE 20 MG/1
40 CAPSULE, DELAYED RELEASE ORAL DAILY
Qty: 60 CAPSULE | Refills: 5 | Status: SHIPPED | OUTPATIENT
Start: 2018-08-27 | End: 2018-12-12 | Stop reason: DRUGHIGH

## 2018-10-02 ENCOUNTER — OFFICE VISIT (OUTPATIENT)
Dept: GASTROENTEROLOGY | Facility: CLINIC | Age: 78
End: 2018-10-02

## 2018-10-02 VITALS
SYSTOLIC BLOOD PRESSURE: 170 MMHG | HEIGHT: 67 IN | BODY MASS INDEX: 25.55 KG/M2 | WEIGHT: 162.8 LBS | DIASTOLIC BLOOD PRESSURE: 82 MMHG | HEART RATE: 96 BPM

## 2018-10-02 DIAGNOSIS — K21.9 GASTROESOPHAGEAL REFLUX DISEASE WITHOUT ESOPHAGITIS: ICD-10-CM

## 2018-10-02 DIAGNOSIS — K51.00 ULCERATIVE CHRONIC PANCOLITIS WITHOUT COMPLICATIONS (HCC): Primary | ICD-10-CM

## 2018-10-02 PROCEDURE — 99214 OFFICE O/P EST MOD 30 MIN: CPT | Performed by: NURSE PRACTITIONER

## 2018-10-02 RX ORDER — SULFASALAZINE 500 MG/1
1000 TABLET ORAL 3 TIMES DAILY
Qty: 540 TABLET | Refills: 5 | Status: SHIPPED | OUTPATIENT
Start: 2018-10-02 | End: 2019-10-05 | Stop reason: SDUPTHER

## 2018-10-02 RX ORDER — SODIUM, POTASSIUM,MAG SULFATES 17.5-3.13G
1 SOLUTION, RECONSTITUTED, ORAL ORAL EVERY 12 HOURS
Qty: 2 BOTTLE | Refills: 0 | Status: ON HOLD | OUTPATIENT
Start: 2018-10-02 | End: 2018-11-06

## 2018-10-02 RX ORDER — DEXTROSE AND SODIUM CHLORIDE 5; .45 G/100ML; G/100ML
30 INJECTION, SOLUTION INTRAVENOUS CONTINUOUS PRN
Status: CANCELLED | OUTPATIENT
Start: 2018-11-06

## 2018-10-02 NOTE — PATIENT INSTRUCTIONS
Ulcerative Colitis, Adult  Ulcerative colitis is long-lasting (chronic) swelling (inflammation) of the large intestine (colon). Sores (ulcers) may also form on the colon.  Ulcerative colitis is closely related to another condition of inflammation of the intestines that is called Crohn disease. Together, they are frequently referred to as inflammatory bowel disease (IBD).  What are the causes?  Ulcerative colitis is caused by increased activity of the immune system in the intestines. The immune system is the system that protects the body against harmful bacteria, viruses, fungi, and other things that can make you sick. When the immune system overacts, it causes inflammation. The cause of the increased immune system activity is not known.  What increases the risk?  Risk factors of ulcerative colitis include:  · Age. This includes:  ? Being 15-30 years old.  ? Being older than 60 years old.  · Having a family history of ulcerative colitis.  · Being of Jainism descent.    What are the signs or symptoms?  Common symptoms of ulcerative colitis include rectal bleeding and diarrhea. There is a wide range of symptoms, and a person's symptoms depend on how severe the condition is. Additional symptoms may include:  · Pain or cramping in the belly (abdomen).  · Fever.  · Fatigue.  · Weight loss.  · Night sweats.  · Rectal pain.  · Feeling the immediate need to have a bowel movement.  · Nausea.  · Loss of appetite.  · Anemia.  · Joint pain or soreness.  · Eye irritation.  · Certain skin rashes.    How is this diagnosed?  Ulcerative colitis may be diagnosed by:  · Medical history and physical exam.  · Blood tests and stool tests.  · X-rays.  · CT scans.  · Colonoscopy. For this test, a flexible tube is inserted into your anus and your colon is examined.  · Examination of a tissue sample from your colon (biopsy).    How is this treated?  Treatment for ulcerative colitis may include medicines to:  · Decrease inflammation.  · Control  your immune system.    Surgery may also be necessary.  Follow these instructions at home:  Medicines and vitamins  · Take medicines only as directed by your doctor. Do not take aspirin.  · Ask your doctor if you should take any vitamins or supplements.  Lifestyle  · Exercise regularly.  · Limit alcohol intake to no more than 1 drink per day for nonpregnant women and 2 drinks per day for men. One drink equals 12 ounces of beer, 5 ounces of wine, or 1½ ounces of hard liquor.  Eating and drinking  · Drink enough fluid to keep your urine clear or pale yellow.  · Ask your health care provider about the best diet for you. Follow the diet as directed by your health care provider. This may include:  ? Avoiding carbonated drinks.  ? Avoiding popcorn, vegetable skins, nuts, and other high-fiber foods when you have symptoms of ulcerative colitis.  ? Eating smaller meals more often.  ? Keeping a food diary. This may help you to find and avoid any foods that make you feel not well.  · Limit your caffeine intake.  General instructions  · Keep all follow-up appointments as directed by your health care provider. This is important.  Contact a health care provider if:  · Your symptoms do not improve or get worse with treatment.  · You continue to lose weight.  · You have constant cramps or loose bowels.  · You develop a new skin rash, skin sores, or eye problems.  · You have a fever or chills.  Get help right away if:  · You have bloody diarrhea.  · You have severe pain in your abdomen.  · You vomit.  This information is not intended to replace advice given to you by your health care provider. Make sure you discuss any questions you have with your health care provider.  Document Released: 09/27/2006 Document Revised: 08/20/2017 Document Reviewed: 04/11/2016  [employer] Patient Education © 2018 [employer]

## 2018-10-02 NOTE — PROGRESS NOTES
Chief Complaint   Patient presents with   • Ulcerative Colitis   • Pancolitis       Subjective    Gage Ken is a 78 y.o. male. he is here today for follow-up.    History of Present Illness  78-year-old male presents for follow-up for ulcerative colitis.  States he  had no issues or problems in the last 4 months reports occasional very rare abdominal pain/inflammation abdomen. denies any recent blood in the stool.  States bowel movements are normal 1-2 times per day.  Prior flare was February 2018 following hospitalization he was treated with antibiotic and steroids.  Reports over the years he has tried different regimens however sulfasalazine seems to be only way to control his symptoms well.  He did not want to attempt treatment with a biologic.  States reflux is well controlled PPI daily.  His prior colonoscopy was completed 10/30/17 and a small polyp was removed from the ascending colon.  It was adenomatous  With  mild dysplasia.  Biopsy of cecum, ascending, transverse, descending, sigmoid and rectum showed no abnormalities and were negative for dysplasia.  Plan; PPI daily for GERD, sulfasalazine for CLIFF, schedule patient for screening colonoscopy due to chronic ulcerative colitis along with adenomatous polyp with mild dysplasia in ascending colon.       The following portions of the patient's history were reviewed and updated as appropriate:   Past Medical History:   Diagnosis Date   • Acute gastritis    • Blood in feces    • Colitis, ulcerative chronic (CMS/HCC)     LEFT-SIDED   • Diverticular disease of colon    • Epigastric pain    • GERD (gastroesophageal reflux disease)    • History of colon polyps    • [place]hard of hearing)    • Nonspecific ulcerative proctitis (CMS/HCC)    • Rectal hemorrhage      Past Surgical History:   Procedure Laterality Date   • COLONOSCOPY  12/02/2013    Hemorrhoids found.   • COLONOSCOPY  09/06/2016   • COLONOSCOPY N/A 10/30/2017    Procedure: COLONOSCOPY;  Surgeon: Alex  MARI Silveira MD;  Location: [facility] ENDOSCOPY;  Service:    • COLONOSCOPY W/ POLYPECTOMY  08/30/2015    Single polyp found in the colon;removed by cold snare polypectomy.Proctitis in the rectum.Diverticulosis found in the sigmoid colon.Hemorrhoids found.   • ENDOSCOPY  12/02/2013    Normal hypopharynx, esophagus, duodenum, symmetrical & patent pylorus. Hiatus hernia in GE junction. Normal stomach. Biopsy taken.   • HERNIA REPAIR      umbilical   • UPPER GASTROINTESTINAL ENDOSCOPY  12/02/2013     Family History   Problem Relation Age of Onset   • Diabetes Other    • Hypertension Other        Current Outpatient Prescriptions   Medication Sig Dispense Refill   • aspirin 81 MG EC tablet Take 81 mg by mouth Daily.     • clotrimazole-betamethasone (LOTRISONE) 1-0.05 % cream APPLY CREAM TO THE AFFECTED AND SURROUNDING AREAS OF SKIN IN THE MORNING AND EVENING 2 TIMES A DAY  0   • folic acid (FOLVITE) 1 MG tablet Take 1 mg by mouth Daily.     • furosemide (LASIX) 40 MG tablet Take 40 mg by mouth Daily.  2   • glipiZIDE (GLUCOTROL) 10 MG tablet Take 1 tablet by mouth 2 (Two) Times a Day.  3   • lisinopril (PRINIVIL,ZESTRIL) 20 MG tablet Take 1 tablet by mouth 2 (Two) Times a Day.     • metFORMIN (GLUCOPHAGE) 1000 MG tablet Take 1,000 mg by mouth 2 (Two) Times a Day With Meals.     • omeprazole (priLOSEC) 20 MG capsule Take 2 capsules by mouth Daily. 60 capsule 5   • potassium chloride (KLOR-CON) 20 MEQ CR tablet Klor-Con M20 mEq tablet,extended release   TAKE 1 TABLET (20 MEQ) BY ORAL ROUTE 2 TIMES PER DAY WITH FOOD     • pravastatin (PRAVACHOL) 20 MG tablet Take 1 tablet by mouth Every Night.  2   • sotalol (BETAPACE) 80 MG tablet TAKE 0.5 TABLET BY ORAL ROUTE 2 TIMES PER DAY  2   • sulfaSALAzine (AZULFIDINE) 500 MG tablet Take 2 tablets by mouth 3 (Three) Times a Day. 540 tablet 5   • terazosin (HYTRIN) 10 MG capsule Take 1 capsule by mouth Every Night.  2   • sodium-potassium-magnesium sulfates (SUPREP BOWEL PREP KIT)  "17.5-3.13-1.6 GM/180ML solution oral solution Take 1 bottle by mouth Every 12 (Twelve) Hours. 2 bottle 0     No current facility-administered medications for this visit.      No Known Allergies  Social History     Social History   • Marital status: Single     Social History Main Topics   • Smoking status: Former Smoker     Types: Cigarettes   • Smokeless tobacco: Former User     Types: Snuff     Quit date: 2008      Comment: 11/30/2017 - Patient States He Ceased Smoking 13 Years Prior.   • Alcohol use 4.8 oz/week     8 Cans of beer per week      Comment: 11/30/2017 - Patient states he consumes 8 Beers  per week - (during viewing of sports events - Basketball)   • Drug use: No   • Sexual activity: Defer     Other Topics Concern   • Not on file       Review of Systems  Review of Systems   Constitutional: Negative for activity change, appetite change, chills, diaphoresis, fatigue, fever and unexpected weight change.   HENT: Negative for sore throat and trouble swallowing.    Respiratory: Negative for shortness of breath.    Gastrointestinal: Positive for abdominal pain (intermittently no pain recently ). Negative for abdominal distention, anal bleeding, blood in stool, constipation, diarrhea, nausea, rectal pain and vomiting.   Musculoskeletal: Negative for arthralgias.   Skin: Negative for pallor.   Neurological: Negative for light-headedness.        /82 (BP Location: Left arm, Patient Position: Sitting)   Pulse 96   Ht 170.2 cm (67\")   Wt 73.8 kg (162 lb 12.8 oz)   BMI 25.50 kg/m²     Objective    Physical Exam   Constitutional: He is oriented to person, place, and time. He appears well-developed and well-nourished. He is cooperative. No distress.   HENT:   Head: Normocephalic and atraumatic.   Neck: Normal range of motion. Neck supple. No thyromegaly present.   Cardiovascular: Normal rate, regular rhythm and normal heart sounds.    Pulmonary/Chest: Effort normal and breath sounds normal. He has no wheezes. " He has no rhonchi. He has no rales.   Abdominal: Soft. Normal appearance and bowel sounds are normal. He exhibits no shifting dullness, no distension, no fluid wave and no ascites. There is no hepatosplenomegaly. There is no tenderness. There is no rigidity and no guarding. No hernia.   Lymphadenopathy:     He has no cervical adenopathy.   Neurological: He is alert and oriented to person, place, and time.   Skin: Skin is warm, dry and intact. No rash noted. No pallor.   Psychiatric: He has a normal mood and affect. His speech is normal.     Admission on 10/30/2017, Discharged on 10/30/2017   Component Date Value Ref Range Status   • Glucose 10/30/2017 220* 70 - 130 mg/dL Final    Meter: [id number]: [id number] AKIKO VARGHESE    • Case Report 10/30/2017    Final                    Value:Surgical Pathology Report                         Case: [id number]                                  Authorizing Provider:  Alex Silveira MD         Collected:           10/30/2017 10:11 AM          Ordering Location:     [facility]             Received:            10/30/2017 11:05 AM                                 [facility]                                                     Pathologist:           Aldo Langley MD                                                          Specimens:   1) - Large Intestine, Cecum, cecum                                                                  2) - Large Intestine, Right / Ascending Colon                                                       3) - Large Intestine, ascending polyp                                                               4) - Large Intestine, Transverse Colon                                                              5) - Large Intestine, Left / Descending Colon                                                                                 6) - Large Intestine, Rectum                                                                        7) -  Large Intestine, Sigmoid Colon                                                       • Final Diagnosis 10/30/2017    Final                    Value:This result contains rich text formatting which cannot be displayed here.   • Gross Description 10/30/2017    Final                    Value:This result contains rich text formatting which cannot be displayed here.     Assessment/Plan      1. Ulcerative chronic pancolitis without complications (CMS/HCC)    2. Gastroesophageal reflux disease without esophagitis    .       Orders placed during this encounter include:  Orders Placed This Encounter   Procedures   • Follow Anesthesia Guidelines / Standing Orders     Standing Status:   Future       COLONOSCOPY (N/A)    Review and/or summary of lab tests, radiology, procedures, medications. Review and summary of old records and obtaining of history. The risks and benefits of my recommendations, as well as other treatment options were discussed with the patient today. Questions were answered.    New Medications Ordered This Visit   Medications   • sulfaSALAzine (AZULFIDINE) 500 MG tablet     Sig: Take 2 tablets by mouth 3 (Three) Times a Day.     Dispense:  540 tablet     Refill:  5   • sodium-potassium-magnesium sulfates (SUPREP BOWEL PREP KIT) 17.5-3.13-1.6 GM/180ML solution oral solution     Sig: Take 1 bottle by mouth Every 12 (Twelve) Hours.     Dispense:  2 bottle     Refill:  0       Follow-up: Return for Recheck after procedure .          This document has been electronically signed by CUAET Allen on October 2, 2018 10:52 AM             Results for orders placed or performed during the hospital encounter of 10/30/17   Tissue Pathology Exam - Tissue, Large Intestine, Cecum   Result Value Ref Range    Case Report       Surgical Pathology Report                         Case: [id number]                                  Authorizing Provider:  Alex Silveira MD         Collected:           10/30/2017 10:11 AM           Ordering Location:     [facility]             Received:            10/30/2017 11:05 AM                                 [facility]                                                     Pathologist:           Aldo Langley MD                                                          Specimens:   1) - Large Intestine, Cecum, cecum                                                                  2) - Large Intestine, Right / Ascending Colon                                                       3) - Large Intestine, ascending polyp                                                               4) - Large Intestine, Transverse Colon                                                              5) - Large Intestine, Left / Descending Colon                                                        6) - Large Intestine, Rectum                                                                        7) - Large Intestine, Sigmoid Colon                                                        Final Diagnosis       1.  MUCOSA, CECUM:  NO SIGNIFICANT HISTOLOGIC ABNORMALITY.  NEGATIVE FOR DYSPLASIA.    2.  MUCOSA, ASCENDING COLON:  NO SIGNIFICANT HISTOLOGIC ABNORMALITY.  NEGATIVE FOR DYSPLASIA.    3.  POLYP, ASCENDING COLON:  TUBULAR ADENOMA WITH MILD DYSPLASIA.    4.  MUCOSA, TRANSVERSE COLON:  NO SIGNIFICANT HISTOLOGIC ABNORMALITY.  NEGATIVE FOR DYSPLASIA.    5.  MUCOSA, DESCENDING COLON:  NO SIGNIFICANT HISTOLOGIC ABNORMALITY.  NEGATIVE FOR DYSPLASIA.    6.  MUCOSA, RECTUM:  NO SIGNIFICANT HISTOLOGIC ABNORMALITY.  NEGATIVE FOR DYSPLASIA.    7.  MUCOSA, SIGMOID COLON:  NO SIGNIFICANT HISTOLOGIC ABNORMALITY.  NEGATIVE FOR DYSPLASIA.        Gross Description       Received for examination are 7 containers, each of which have nodular bits of white soft tissue measuring 0.3-0.5 cc in aggregate.  All specimens are embedded as labeled:  1A cecum; 2A ascending colon; 3A polyp, ascending colon; 4A transverse colon; 5A  descending colon; 6A rectum; 7A sigmoid colon.         Embedded Images     POC Glucose Fingerstick   Result Value Ref Range    Glucose 220 (H) 70 - 130 mg/dL   Results for orders placed or performed in visit on 10/12/16    COLONOSCOPY   Result Value Ref Range     Colonoscopy ORDERED BY DR TIMUR CIFUENTES    Results for orders placed or performed during the hospital encounter of 09/06/16   Converted Surgical Pathology   Result Value Ref Range    Spec Descr 1 SPECIMEN(S): A BIOPSY @ 15 CM     Specimen description 2 SPECIMEN(S): B BIOPSY @ 13 CM     Specimen description 3 SPECIMEN(S): C BIOPSY @ 10 CM    POCT glucose fingerstick   Result Value Ref Range    Glucose 199 (H) 60 - 100 mg/dl   Results for orders placed or performed during the hospital encounter of 08/20/15   Converted Surgical Pathology   Result Value Ref Range    Spec Descr 1 SPECIMEN(S): A POLYP @ 30 CM     Specimen description 2 SPECIMEN(S): B BIOPSY, RECTUM     Preoperative Diagnosis         PREOPERATIVE DIAGNOSIS:  Bleeding (578.9), surveillance of ulcerative colitis      Postoperative Diagnosis         POSTOPERATIVE DIAGNOSIS:  Polyp at 30 cm, proctitis (556.9), diverticulosis of sigmoid colon  (562.10), hemorrhoids      Gross Description      Final Diagnosis         FINAL DIAGNOSIS:  A.  COLON, 30 CM, BIOPSY:            TUBULAR ADENOMA.  B.  RECTUM, BIOPSY:            DIFFUSE ACTIVE COLITIS.            A HEAVY LYMPHOID INFILTRATE APPEARS REACTIVE.      Comment         COMMENT:  Dr. Langley has reviewed specimen B and agrees with the diagnosis.      CONVERTED (HISTORICAL) FINAL PATHOLOGIST       Diagnostician:  TRUDY YOON M.D.  Pathologist  Electronically Signed 08/24/2015      Diagnosis Code   DIAGNOSIS CODE:  8      POCT Glucose Fingerstick   Result Value Ref Range    Glucose 202 (H) 60 - 100 mg/dl   Results for orders placed or performed in visit on 03/19/12   Converted Surgical Pathology   Result Value Ref Range    Spec Descr 1 SPECIMEN(S):  "A RECTUM BIOPSY     Preoperative Diagnosis   PREOPERATIVE DIAGNOSIS:  None Given       Postoperative Diagnosis   POSTOPERATIVE DIAGNOSIS:  CLIFF       Gross Description         GROSS DESCRIPTION:  The specimen is labeled \"#1 rectum\" and consists of 2 tan fragments  measuring 0.5 x 0.5 x 0.2 cm together.  Totally submitted.      Final Diagnosis         FINAL DIAGNOSIS:  RECTUM, BIOPSY:       DIFFUSE ACTIVE COLITIS.      Comment         COMMENT:  The findings are consistent with the clinical history of chronic  ulcerative colitis.  Inflammation does extend into the submucosa and  granulomas are present, raising the question of Crohn's disease.  Clinical correlation is recommended.      CONVERTED (HISTORICAL) FINAL PATHOLOGIST       Diagnostician:  TRUDY YOON M.D.  Pathologist  Electronically Signed 03/20/2012      Diagnosis Code   DIAGNOSIS CODE:  4        "

## 2018-10-17 ENCOUNTER — OFFICE VISIT (OUTPATIENT)
Dept: OTOLARYNGOLOGY | Facility: CLINIC | Age: 78
End: 2018-10-17

## 2018-10-17 VITALS
DIASTOLIC BLOOD PRESSURE: 88 MMHG | RESPIRATION RATE: 20 BRPM | HEIGHT: 66 IN | BODY MASS INDEX: 25.71 KG/M2 | TEMPERATURE: 98 F | WEIGHT: 160 LBS | HEART RATE: 69 BPM | SYSTOLIC BLOOD PRESSURE: 142 MMHG

## 2018-10-17 DIAGNOSIS — D48.5 NEOPLASM OF UNCERTAIN BEHAVIOR OF SKIN: ICD-10-CM

## 2018-10-17 PROCEDURE — 11100 PR BIOPSY OF SKIN LESION: CPT | Performed by: OTOLARYNGOLOGY

## 2018-10-17 PROCEDURE — 99203 OFFICE O/P NEW LOW 30 MIN: CPT | Performed by: OTOLARYNGOLOGY

## 2018-10-17 PROCEDURE — 88305 TISSUE EXAM BY PATHOLOGIST: CPT | Performed by: OTOLARYNGOLOGY

## 2018-10-17 RX ORDER — VALSARTAN 80 MG/1
80 TABLET ORAL DAILY
COMMUNITY
End: 2018-12-10

## 2018-10-17 NOTE — PROGRESS NOTES
CC:   Chief Complaint   Patient presents with   • Skin Lesion     NOSE LESION       HPI: Gage Ken is a 78 y.o. male who reports a skin lesion on the nose that has been present for approximately 2 years.  This will occasionally scab and bleed.  It waxes and wanes.  No biopsy has been performed.  He denies any prior history of skin cancers.  He has seen Dr. Cheung in dermatology for bilateral foot issues related to his diabetes and venous stasis.  He denies any nasal airway obstruction.      Prior history of skin cancer: None    Dermatologist: Landen Cheung MD    On 81mg ASA.    PFSH:  Past Medical History:   Diagnosis Date   • Acute gastritis    • Blood in feces    • Colitis, ulcerative chronic (CMS/HCC)     LEFT-SIDED   • Diverticular disease of colon    • Epigastric pain    • GERD (gastroesophageal reflux disease)    • History of colon polyps    • [place] (hard of hearing)    • Lesion of nose    • Neoplasm of uncertain behavior    • Nonspecific ulcerative proctitis (CMS/HCC)    • Rectal hemorrhage    • Type 2 diabetes mellitus (CMS/HCC)    • Vitamin B12 deficiency      Past Surgical History:   Procedure Laterality Date   • COLONOSCOPY  12/02/2013    Hemorrhoids found.   • COLONOSCOPY  09/06/2016   • COLONOSCOPY N/A 10/30/2017    Procedure: COLONOSCOPY;  Surgeon: Alex Silveira MD;  Location: [facility] ENDOSCOPY;  Service:    • COLONOSCOPY W/ POLYPECTOMY  08/30/2015    Single polyp found in the colon;removed by cold snare polypectomy.Proctitis in the rectum.Diverticulosis found in the sigmoid colon.Hemorrhoids found.   • ENDOSCOPY  12/02/2013    Normal hypopharynx, esophagus, duodenum, symmetrical & patent pylorus. Hiatus hernia in GE junction. Normal stomach. Biopsy taken.   • HERNIA REPAIR      umbilical   • UPPER GASTROINTESTINAL ENDOSCOPY  12/02/2013     Family History   Problem Relation Age of Onset   • Diabetes Other    • Hypertension Other      Social History   Substance Use Topics   • Smoking status: Former  Smoker     Types: Cigarettes   • Smokeless tobacco: Former User     Types: Snuff     Quit date: 2008      Comment: 11/30/2017 - Patient States He Ceased Smoking 13 Years Prior.   • Alcohol use 4.8 oz/week     8 Cans of beer per week      Comment: 11/30/2017 - Patient states he consumes 8 Beers  per week - (during viewing of sports events - Basketball)     Allergies:  Patient has no known allergies.    Current Outpatient Prescriptions:   •  aspirin 81 MG EC tablet, Take 81 mg by mouth Daily., Disp: , Rfl:   •  clotrimazole-betamethasone (LOTRISONE) 1-0.05 % cream, APPLY CREAM TO THE AFFECTED AND SURROUNDING AREAS OF SKIN IN THE MORNING AND EVENING 2 TIMES A DAY, Disp: , Rfl: 0  •  folic acid (FOLVITE) 1 MG tablet, Take 1 mg by mouth Daily., Disp: , Rfl:   •  furosemide (LASIX) 40 MG tablet, Take 40 mg by mouth Daily., Disp: , Rfl: 2  •  glipiZIDE (GLUCOTROL) 10 MG tablet, Take 1 tablet by mouth 2 (Two) Times a Day., Disp: , Rfl: 3  •  lisinopril (PRINIVIL,ZESTRIL) 20 MG tablet, Take 1 tablet by mouth 2 (Two) Times a Day., Disp: , Rfl:   •  metFORMIN (GLUCOPHAGE) 1000 MG tablet, Take 1,000 mg by mouth 2 (Two) Times a Day With Meals., Disp: , Rfl:   •  omeprazole (priLOSEC) 20 MG capsule, Take 2 capsules by mouth Daily., Disp: 60 capsule, Rfl: 5  •  potassium chloride (KLOR-CON) 20 MEQ CR tablet, Klor-Con M20 mEq tablet,extended release  TAKE 1 TABLET (20 MEQ) BY ORAL ROUTE 2 TIMES PER DAY WITH FOOD, Disp: , Rfl:   •  pravastatin (PRAVACHOL) 20 MG tablet, Take 1 tablet by mouth Every Night., Disp: , Rfl: 2  •  sodium-potassium-magnesium sulfates (SUPREP BOWEL PREP KIT) 17.5-3.13-1.6 GM/180ML solution oral solution, Take 1 bottle by mouth Every 12 (Twelve) Hours., Disp: 2 bottle, Rfl: 0  •  sotalol (BETAPACE) 80 MG tablet, TAKE 0.5 TABLET BY ORAL ROUTE 2 TIMES PER DAY, Disp: , Rfl: 2  •  sulfaSALAzine (AZULFIDINE) 500 MG tablet, Take 2 tablets by mouth 3 (Three) Times a Day., Disp: 540 tablet, Rfl: 5  •  terazosin  "(HYTRIN) 10 MG capsule, Take 1 capsule by mouth Every Night., Disp: , Rfl: 2  •  valsartan (DIOVAN) 80 MG tablet, Take 80 mg by mouth Daily., Disp: , Rfl:     ROS:  Review of Systems   Constitutional: Negative for unexpected weight change.   HENT: Negative for congestion and nosebleeds.    Musculoskeletal: Positive for neck pain.   Hematological: Negative for adenopathy.   All other systems reviewed and are negative.      PE:  /88   Pulse 69   Temp 98 °F (36.7 °C)   Resp 20   Ht 167.6 cm (66\")   Wt 72.6 kg (160 lb)   BMI 25.82 kg/m²   Physical Exam   Constitutional: He is oriented to person, place, and time. He appears well-developed and well-nourished. He is cooperative. No distress.   HENT:   Head: Normocephalic and atraumatic.   Right Ear: External ear normal.   Left Ear: External ear normal.   Nose: Nose normal. No septal deviation.       Mouth/Throat: Oropharynx is clear and moist.   Eyes: Pupils are equal, round, and reactive to light. Conjunctivae and EOM are normal. Right eye exhibits no discharge. Left eye exhibits no discharge. No scleral icterus.   Neck: Normal range of motion. Neck supple. No JVD present. No tracheal deviation present. No thyromegaly present.   Pulmonary/Chest: Effort normal. No stridor.   Musculoskeletal: Normal range of motion. He exhibits no edema or deformity.   Lymphadenopathy:     He has no cervical adenopathy.   Neurological: He is alert and oriented to person, place, and time. He has normal strength. No cranial nerve deficit. Coordination normal.   Skin: Skin is warm and dry. No rash noted. He is not diaphoretic. No erythema. No pallor.   Psychiatric: He has a normal mood and affect. His speech is normal and behavior is normal. Judgment and thought content normal. Cognition and memory are normal.   Nursing note and vitals reviewed.    Assessment:  1. BMI 25.0-25.9,adult    2. Neoplasm of uncertain behavior of skin        Plan:    No orders of the defined types were " placed in this encounter.        1.  I have offered a biopsy.  I will call him with the results and plan.  May consider Mohs versus standard excision with frozen section analysis and likely linear closure.  He is on 81 mg of aspirin.    2. The risks and benefits of my recommendations, as well as other treatment options were discussed with the patient today.   3. Discussion of skin lesion. Discussed risks, benefits, alternatives, and possible complications of excision of the skin lesion with reconstruction utilizing local tissue rearrangement, full-thickness skin grafting, or local interpolated flaps. Risks include, but are not limited too: bleeding, infection, hematoma, recurrence, need for additional procedures, flap failure, cosmetic deformity. Patient understands risks and would like to proceed with surgery.     No Follow-up on file.      Alex Han MD   10/17/2018  11:18 AM

## 2018-10-17 NOTE — PROGRESS NOTES
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  The skin was cleansed with alcohol.  The skin was infiltrated with 1 mL of 1% lidocaine with 1-100,000 epinephrine.  After approximately 10 minutes, a 3 millimeter punch biopsy was taken by placing circular motion on the biopsy tool, the skin was elevated and clipped with iris scissors.  The specimen was sent in formalin.  The skin was closed using a simple 5-0 fast absorbing gut suture.  Antibiotic ointment was placed over the biopsy site.  The patient tolerated the procedure with minimal discomfort.    Alex Han MD  10/17/18  2:13 PM

## 2018-10-17 NOTE — PATIENT INSTRUCTIONS
MyPlate from [employer]  The general, healthful diet is based on the 2010 Dietary Guidelines for Americans. The amount of food you need to eat from each food group depends on your age, sex, and level of physical activity and can be individualized by a dietitian. Go to ChooseMyPlate.gov for more information.  What do I need to know about the MyPlate plan?  · Enjoy your food, but eat less.  · Avoid oversized portions.  ? ½ of your plate should include fruits and vegetables.  ? ¼ of your plate should be grains.  ? ¼ of your plate should be protein.  Grains  · Make at least half of your grains whole grains.  · For a 2,000 calorie daily food plan, eat 6 oz every day.  · 1 oz is about 1 slice bread, 1 cup cereal, or ½ cup cooked rice, cereal, or pasta.  Vegetables  · Make half your plate fruits and vegetables.  · For a 2,000 calorie daily food plan, eat 2½ cups every day.  · 1 cup is about 1 cup raw or cooked vegetables or vegetable juice or 2 cups raw leafy greens.  Fruits  · Make half your plate fruits and vegetables.  · For a 2,000 calorie daily food plan, eat 2 cups every day.  · 1 cup is about 1 cup fruit or 100% fruit juice or ½ cup dried fruit.  Protein  · For a 2,000 calorie daily food plan, eat 5½ oz every day.  · 1 oz is about 1 oz meat, poultry, or fish, ¼ cup cooked beans, 1 egg, 1 Tbsp peanut butter, or ½ oz nuts or seeds.  Dairy  · Switch to fat-free or low-fat (1%) milk.  · For a 2,000 calorie daily food plan, eat 3 cups every day.  · 1 cup is about 1 cup milk or yogurt or soy milk (soy beverage), 1½ oz natural cheese, or 2 oz processed cheese.  Fats, Oils, and Empty Calories  · Only small amounts of oils are recommended.  · Empty calories are calories from solid fats or added sugars.  · Compare sodium in foods like soup, bread, and frozen meals. Choose the foods with lower numbers.  · Drink water instead of sugary drinks.  What foods can I eat?  Grains  Whole grains such as whole wheat, quinoa, millet, and  bulgur. Bread, rolls, and pasta made from whole grains. Brown or wild rice. Hot or cold cereals made from whole grains and without added sugar.  Vegetables  All fresh vegetables, especially fresh red, dark green, or orange vegetables. Peas and beans. Low-sodium frozen or canned vegetables prepared without added salt. Low-sodium vegetable juices.  Fruits  All fresh, frozen, and dried fruits. Canned fruit packed in water or fruit juice without added sugar. Fruit juices without added sugar.  Meats and Other Protein Sources  Boiled, baked, or grilled lean meat trimmed of fat. Skinless poultry. Fresh seafood and shellfish. Canned seafood packed in water. Unsalted nuts and unsalted nut butters. Tofu. Dried beans and pea. Eggs.  Dairy  Low-fat or fat-free milk, yogurt, and cheeses.  Sweets and Desserts  Frozen desserts made from low-fat milk.  Fats and Oils  Olive, peanut, and canola oils and margarine. Salad dressing and mayonnaise made from these oils.  Other  Soups and casseroles made from allowed ingredients and without added fat or salt.  The items listed above may not be a complete list of recommended foods or beverages. Contact your dietitian for more options.  What foods are not recommended?  Grains  Sweetened, low-fiber cereals. Packaged baked goods. Snack crackers and chips. Cheese crackers, butter crackers, and biscuits. Frozen waffles, sweet breads, doughnuts, pastries, packaged baking mixes, pancakes, cakes, and cookies.  Vegetables  Regular canned or frozen vegetables or vegetables prepared with salt. Canned tomatoes. Canned tomato sauce. Fried vegetables. Vegetables in cream sauce or cheese sauce.  Fruits  Fruits packed in syrup or made with added sugar.  Meats and Other Protein Sources  Marbled or fatty meats such as ribs. Poultry with skin. Fried meats, poultry, eggs, or fish. Sausages, hot dogs, and deli meats such as pastrami, bologna, or salami.  Dairy  Whole milk, cream, cheeses made from whole milk,  sour cream. Ice cream or yogurt made from whole milk or with added sugar.  Beverages  For adults, no more than one alcoholic drink per day. Regular soft drinks or other sugary beverages. Juice drinks.  Sweets and Desserts  Sugary or fatty desserts, candy, and other sweets.  Fats and Oils  Solid shortening or partially hydrogenated oils. Solid margarine. Margarine that contains trans fats. Butter.  The items listed above may not be a complete list of foods and beverages to avoid. Contact your dietitian for more information.  This information is not intended to replace advice given to you by your health care provider. Make sure you discuss any questions you have with your health care provider.  Document Released: 01/06/2009 Document Revised: 05/25/2017 Document Reviewed: 11/26/2014  Elsevier Interactive Patient Education © 2018 [employer]

## 2018-10-19 LAB
CYTO UR: NORMAL
LAB AP CASE REPORT: NORMAL
PATH REPORT.FINAL DX SPEC: NORMAL
PATH REPORT.GROSS SPEC: NORMAL

## 2018-10-24 ENCOUNTER — TELEPHONE (OUTPATIENT)
Dept: OTOLARYNGOLOGY | Facility: CLINIC | Age: 78
End: 2018-10-24

## 2018-10-24 NOTE — TELEPHONE ENCOUNTER
I called and discussed the option of excision with frozen section analysis versus Mohs excision.  He is interested in Mohs excision due to the higher cure rate.  We will call and coordinate this with Dr. Ag.  We will likely perform this on a Monday, and I concluded the office under local to same day.  He wants to wait until after deer season-sometime in December    Alex Han MD

## 2018-11-06 ENCOUNTER — ANESTHESIA (OUTPATIENT)
Dept: GASTROENTEROLOGY | Facility: HOSPITAL | Age: 78
End: 2018-11-06

## 2018-11-06 ENCOUNTER — HOSPITAL ENCOUNTER (OUTPATIENT)
Facility: HOSPITAL | Age: 78
Setting detail: HOSPITAL OUTPATIENT SURGERY
Discharge: HOME | End: 2018-11-06
Attending: INTERNAL MEDICINE | Admitting: INTERNAL MEDICINE

## 2018-11-06 ENCOUNTER — ANESTHESIA EVENT (OUTPATIENT)
Dept: GASTROENTEROLOGY | Facility: HOSPITAL | Age: 78
End: 2018-11-06

## 2018-11-06 ENCOUNTER — TELEPHONE (OUTPATIENT)
Dept: OTOLARYNGOLOGY | Facility: CLINIC | Age: 78
End: 2018-11-06

## 2018-11-06 VITALS
HEIGHT: 66 IN | HEART RATE: 75 BPM | OXYGEN SATURATION: 97 % | TEMPERATURE: 97.7 F | RESPIRATION RATE: 16 BRPM | DIASTOLIC BLOOD PRESSURE: 54 MMHG | WEIGHT: 154.54 LBS | BODY MASS INDEX: 24.84 KG/M2 | SYSTOLIC BLOOD PRESSURE: 106 MMHG

## 2018-11-06 DIAGNOSIS — K51.00 ULCERATIVE CHRONIC PANCOLITIS WITHOUT COMPLICATIONS (HCC): ICD-10-CM

## 2018-11-06 LAB — GLUCOSE BLDC GLUCOMTR-MCNC: 199 MG/DL (ref 70–130)

## 2018-11-06 PROCEDURE — 88305 TISSUE EXAM BY PATHOLOGIST: CPT | Performed by: PATHOLOGY

## 2018-11-06 PROCEDURE — 45380 COLONOSCOPY AND BIOPSY: CPT | Performed by: INTERNAL MEDICINE

## 2018-11-06 PROCEDURE — 25010000002 PROPOFOL 10 MG/ML EMULSION: Performed by: NURSE ANESTHETIST, CERTIFIED REGISTERED

## 2018-11-06 PROCEDURE — 88305 TISSUE EXAM BY PATHOLOGIST: CPT | Performed by: INTERNAL MEDICINE

## 2018-11-06 PROCEDURE — 82962 GLUCOSE BLOOD TEST: CPT

## 2018-11-06 RX ORDER — PROPOFOL 10 MG/ML
VIAL (ML) INTRAVENOUS AS NEEDED
Status: DISCONTINUED | OUTPATIENT
Start: 2018-11-06 | End: 2018-11-06 | Stop reason: SURG

## 2018-11-06 RX ORDER — DEXTROSE AND SODIUM CHLORIDE 5; .45 G/100ML; G/100ML
30 INJECTION, SOLUTION INTRAVENOUS CONTINUOUS PRN
Status: DISCONTINUED | OUTPATIENT
Start: 2018-11-06 | End: 2018-11-06 | Stop reason: HOSPADM

## 2018-11-06 RX ADMIN — PROPOFOL 60 MG: 10 INJECTION, EMULSION INTRAVENOUS at 13:08

## 2018-11-06 RX ADMIN — DEXTROSE AND SODIUM CHLORIDE 30 ML/HR: 5; 450 INJECTION, SOLUTION INTRAVENOUS at 12:38

## 2018-11-06 RX ADMIN — PROPOFOL 20 MG: 10 INJECTION, EMULSION INTRAVENOUS at 13:16

## 2018-11-06 RX ADMIN — PROPOFOL 40 MG: 10 INJECTION, EMULSION INTRAVENOUS at 13:12

## 2018-11-06 NOTE — TELEPHONE ENCOUNTER
Left message for patient to call office regarding referral appointment to Dr. Ag for Mohs Procedure. Records faxed     Mohs scheduled for 12/10/2018 @ 8:00 am

## 2018-11-06 NOTE — ANESTHESIA PREPROCEDURE EVALUATION
Anesthesia Evaluation     Patient summary reviewed and Nursing notes reviewed   NPO Solid Status: > 8 hours  NPO Liquid Status: > 4 hours           Airway   Mallampati: II  TM distance: >3 FB  Neck ROM: full  no difficulty expected  Dental    (+) upper dentures and lower dentures    Pulmonary - normal exam   (+) a smoker Former,   Cardiovascular - normal exam    Rhythm: regular  Rate: abnormal    (+) hypertension well controlled,     ROS comment: Occluded right carotid per patient    Neuro/Psych- negative ROS  GI/Hepatic/Renal/Endo    (+)  GERD, GI bleeding, diabetes mellitus type 2 well controlled,     Musculoskeletal (-) negative ROS    Abdominal  - normal exam   Substance History   (+) alcohol use,      OB/GYN negative ob/gyn ROS         Other - negative ROS                         Anesthesia Plan    ASA 3     MAC     intravenous induction   Anesthetic plan, all risks, benefits, and alternatives have been provided, discussed and informed consent has been obtained with: patient.    Plan discussed with CRNA.

## 2018-11-06 NOTE — H&P
Progress Notes  Encounter Date:11/6/2018  Alex geronimo  Gastroenterology   Expand All Collapse All    []Manual[]Template  []Copied      Chief Complaint   Patient presents with   • Ulcerative Colitis   • Pancolitis            Subjective       Gage Ken is a 78 y.o. male. he is here today for follow-up.     History of Present Illness  78-year-old male presents for follow-up for ulcerative colitis.  States he  had no issues or problems in the last 4 months reports occasional very rare abdominal pain/inflammation abdomen. denies any recent blood in the stool.  States bowel movements are normal 1-2 times per day.  Prior flare was February 2018 following hospitalization he was treated with antibiotic and steroids.  Reports over the years he has tried different regimens however sulfasalazine seems to be only way to control his symptoms well.  He did not want to attempt treatment with a biologic.  States reflux is well controlled PPI daily.  His prior colonoscopy was completed 10/30/17 and a small polyp was removed from the ascending colon.  It was adenomatous  With  mild dysplasia.  Biopsy of cecum, ascending, transverse, descending, sigmoid and rectum showed no abnormalities and were negative for dysplasia.  Plan; PPI daily for GERD, sulfasalazine for CLIFF, schedule patient for screening colonoscopy due to chronic ulcerative colitis along with adenomatous polyp with mild dysplasia in ascending colon.        The following portions of the patient's history were reviewed and updated as appropriate:   Medical History        Past Medical History:   Diagnosis Date   • Acute gastritis     • Blood in feces     • Colitis, ulcerative chronic (CMS/HCC)       LEFT-SIDED   • Diverticular disease of colon     • Epigastric pain     • GERD (gastroesophageal reflux disease)     • History of colon polyps     • [place] (hard of hearing)     • Nonspecific ulcerative proctitis (CMS/HCC)     • Rectal hemorrhage           Surgical History          Past Surgical History:   Procedure Laterality Date   • COLONOSCOPY   12/02/2013     Hemorrhoids found.   • COLONOSCOPY   09/06/2016   • COLONOSCOPY N/A 10/30/2017     Procedure: COLONOSCOPY;  Surgeon: Alex Silveira MD;  Location: [facility] ENDOSCOPY;  Service:    • COLONOSCOPY W/ POLYPECTOMY   08/30/2015     Single polyp found in the colon;removed by cold snare polypectomy.Proctitis in the rectum.Diverticulosis found in the sigmoid colon.Hemorrhoids found.   • ENDOSCOPY   12/02/2013     Normal hypopharynx, esophagus, duodenum, symmetrical & patent pylorus. Hiatus hernia in GE junction. Normal stomach. Biopsy taken.   • HERNIA REPAIR         umbilical   • UPPER GASTROINTESTINAL ENDOSCOPY   12/02/2013               Family History   Problem Relation Age of Onset   • Diabetes Other     • Hypertension Other        Current Medications          Current Outpatient Prescriptions   Medication Sig Dispense Refill   • aspirin 81 MG EC tablet Take 81 mg by mouth Daily.       • clotrimazole-betamethasone (LOTRISONE) 1-0.05 % cream APPLY CREAM TO THE AFFECTED AND SURROUNDING AREAS OF SKIN IN THE MORNING AND EVENING 2 TIMES A DAY   0   • folic acid (FOLVITE) 1 MG tablet Take 1 mg by mouth Daily.       • furosemide (LASIX) 40 MG tablet Take 40 mg by mouth Daily.   2   • glipiZIDE (GLUCOTROL) 10 MG tablet Take 1 tablet by mouth 2 (Two) Times a Day.   3   • lisinopril (PRINIVIL,ZESTRIL) 20 MG tablet Take 1 tablet by mouth 2 (Two) Times a Day.       • metFORMIN (GLUCOPHAGE) 1000 MG tablet Take 1,000 mg by mouth 2 (Two) Times a Day With Meals.       • omeprazole (priLOSEC) 20 MG capsule Take 2 capsules by mouth Daily. 60 capsule 5   • potassium chloride (KLOR-CON) 20 MEQ CR tablet Klor-Con M20 mEq tablet,extended release   TAKE 1 TABLET (20 MEQ) BY ORAL ROUTE 2 TIMES PER DAY WITH FOOD       • pravastatin (PRAVACHOL) 20 MG tablet Take 1 tablet by mouth Every Night.   2   • sotalol (BETAPACE) 80 MG tablet TAKE 0.5 TABLET BY ORAL  "ROUTE 2 TIMES PER DAY   2   • sulfaSALAzine (AZULFIDINE) 500 MG tablet Take 2 tablets by mouth 3 (Three) Times a Day. 540 tablet 5   • terazosin (HYTRIN) 10 MG capsule Take 1 capsule by mouth Every Night.   2   • sodium-potassium-magnesium sulfates (SUPREP BOWEL PREP KIT) 17.5-3.13-1.6 GM/180ML solution oral solution Take 1 bottle by mouth Every 12 (Twelve) Hours. 2 bottle 0      No current facility-administered medications for this visit.          No Known Allergies  Social History   Social History           Social History   • Marital status: Single             Social History Main Topics   • Smoking status: Former Smoker       Types: Cigarettes   • Smokeless tobacco: Former User       Types: Snuff       Quit date: 2008         Comment: 11/30/2017 - Patient States He Ceased Smoking 13 Years Prior.   • Alcohol use 4.8 oz/week       8 Cans of beer per week         Comment: 11/30/2017 - Patient states he consumes 8 Beers  per week - (during viewing of sports events - Basketball)   • Drug use: No   • Sexual activity: Defer           Other Topics Concern   • Not on file            Review of Systems  Review of Systems   Constitutional: Negative for activity change, appetite change, chills, diaphoresis, fatigue, fever and unexpected weight change.   HENT: Negative for sore throat and trouble swallowing.    Respiratory: Negative for shortness of breath.    Gastrointestinal: Positive for abdominal pain (intermittently no pain recently ). Negative for abdominal distention, anal bleeding, blood in stool, constipation, diarrhea, nausea, rectal pain and vomiting.   Musculoskeletal: Negative for arthralgias.   Skin: Negative for pallor.   Neurological: Negative for light-headedness.                    /82 (BP Location: Left arm, Patient Position: Sitting)   Pulse 96   Ht 170.2 cm (67\")   Wt 73.8 kg (162 lb 12.8 oz)   BMI 25.50 kg/m²         Objective       Physical Exam   Constitutional: He is oriented to person, place, " and time. He appears well-developed and well-nourished. He is cooperative. No distress.   HENT:   Head: Normocephalic and atraumatic.   Neck: Normal range of motion. Neck supple. No thyromegaly present.   Cardiovascular: Normal rate, regular rhythm and normal heart sounds.    Pulmonary/Chest: Effort normal and breath sounds normal. He has no wheezes. He has no rhonchi. He has no rales.   Abdominal: Soft. Normal appearance and bowel sounds are normal. He exhibits no shifting dullness, no distension, no fluid wave and no ascites. There is no hepatosplenomegaly. There is no tenderness. There is no rigidity and no guarding. No hernia.   Lymphadenopathy:     He has no cervical adenopathy.   Neurological: He is alert and oriented to person, place, and time.   Skin: Skin is warm, dry and intact. No rash noted. No pallor.   Psychiatric: He has a normal mood and affect. His speech is normal.               Admission on 10/30/2017, Discharged on 10/30/2017   Component Date Value Ref Range Status    • Glucose 10/30/2017 220* 70 - 130 mg/dL Final     Meter: [id number]: [id number] AKIKO VARGHESE    • Case Report 10/30/2017     Final                    Value:Surgical Pathology Report                         Case: [id number]                                  Authorizing Provider:  Alex Silveira MD         Collected:           10/30/2017 10:11 AM          Ordering Location:     [facility]             Received:            10/30/2017 11:05 AM                                 [place] [facility]                                                     Pathologist:           Aldo Langley MD                                                          Specimens:   1) - Large Intestine, Cecum, cecum                                                                  2) - Large Intestine, Right / Ascending Colon                                                       3) - Large Intestine, ascending polyp                                                                4) - Large Intestine, Transverse Colon                                                              5) - Large Intestine, Left / Descending Colon                                                                                  6) - Large Intestine, Rectum                                                                        7) - Large Intestine, Sigmoid Colon                                                        • Final Diagnosis 10/30/2017     Final                    Value:This result contains rich text formatting which cannot be displayed here.   • Gross Description 10/30/2017     Final                    Value:This result contains rich text formatting which cannot be displayed here.         Assessment/Plan          1. Ulcerative chronic pancolitis without complications (CMS/HCC)    2. Gastroesophageal reflux disease without esophagitis    .         Orders placed during this encounter include:        Orders Placed This Encounter   Procedures   • Follow Anesthesia Guidelines / Standing Orders       Standing Status:   Future         COLONOSCOPY (N/A)     Review and/or summary of lab tests, radiology, procedures, medications. Review and summary of old records and obtaining of history. The risks and benefits of my recommendations, as well as other treatment options were discussed with the patient today. Questions were answered.          New Medications Ordered This Visit   Medications   • sulfaSALAzine (AZULFIDINE) 500 MG tablet       Sig: Take 2 tablets by mouth 3 (Three) Times a Day.       Dispense:  540 tablet       Refill:  5   • sodium-potassium-magnesium sulfates (SUPREP BOWEL PREP KIT) 17.5-3.13-1.6 GM/180ML solution oral solution       Sig: Take 1 bottle by mouth Every 12 (Twelve) Hours.       Dispense:  2 bottle       Refill:  0               This document has been electronically signed by Alex Silveira MD on November 6, 2018 12:18 PM                      Results for  orders placed or performed during the hospital encounter of 10/30/17   Tissue Pathology Exam - Tissue, Large Intestine, Cecum   Result Value Ref Range     Case Report           Surgical Pathology Report                         Case: [id number]                                  Authorizing Provider:  Alex Silveira MD         Collected:           10/30/2017 10:11 AM          Ordering Location:     [facility]             Received:            10/30/2017 11:05 AM                                 [place] [facility]                                                     Pathologist:           Aldo Langley MD                                                          Specimens:   1) - Large Intestine, Cecum, cecum                                                                  2) - Large Intestine, Right / Ascending Colon                                                       3) - Large Intestine, ascending polyp                                                               4) - Large Intestine, Transverse Colon                                                              5) - Large Intestine, Left / Descending Colon                                                        6) - Large Intestine, Rectum                                                                        7) - Large Intestine, Sigmoid Colon                                                          Final Diagnosis           1.  MUCOSA, CECUM:  NO SIGNIFICANT HISTOLOGIC ABNORMALITY.  NEGATIVE FOR DYSPLASIA.     2.  MUCOSA, ASCENDING COLON:  NO SIGNIFICANT HISTOLOGIC ABNORMALITY.  NEGATIVE FOR DYSPLASIA.     3.  POLYP, ASCENDING COLON:  TUBULAR ADENOMA WITH MILD DYSPLASIA.     4.  MUCOSA, TRANSVERSE COLON:  NO SIGNIFICANT HISTOLOGIC ABNORMALITY.  NEGATIVE FOR DYSPLASIA.     5.  MUCOSA, DESCENDING COLON:  NO SIGNIFICANT HISTOLOGIC ABNORMALITY.  NEGATIVE FOR DYSPLASIA.     6.  MUCOSA, RECTUM:  NO SIGNIFICANT HISTOLOGIC ABNORMALITY.  NEGATIVE FOR  DYSPLASIA.     7.  MUCOSA, SIGMOID COLON:  NO SIGNIFICANT HISTOLOGIC ABNORMALITY.  NEGATIVE FOR DYSPLASIA.           Gross Description           Received for examination are 7 containers, each of which have nodular bits of white soft tissue measuring 0.3-0.5 cc in aggregate.  All specimens are embedded as labeled:  1A cecum; 2A ascending colon; 3A polyp, ascending colon; 4A transverse colon; 5A descending colon; 6A rectum; 7A sigmoid colon.            Embedded Images       POC Glucose Fingerstick   Result Value Ref Range     Glucose 220 (H) 70 - 130 mg/dL   Results for orders placed or performed in visit on 10/12/16    COLONOSCOPY   Result Value Ref Range      Colonoscopy ORDERED BY DR TIMUR CIFUENTES     Results for orders placed or performed during the hospital encounter of 09/06/16   Converted Surgical Pathology   Result Value Ref Range     Spec Descr 1 SPECIMEN(S): A BIOPSY @ 15 CM       Specimen description 2 SPECIMEN(S): B BIOPSY @ 13 CM       Specimen description 3 SPECIMEN(S): C BIOPSY @ 10 CM     POCT glucose fingerstick   Result Value Ref Range     Glucose 199 (H) 60 - 100 mg/dl   Results for orders placed or performed during the hospital encounter of 08/20/15   Converted Surgical Pathology   Result Value Ref Range     Spec Descr 1 SPECIMEN(S): A POLYP @ 30 CM       Specimen description 2 SPECIMEN(S): B BIOPSY, RECTUM       Preoperative Diagnosis              PREOPERATIVE DIAGNOSIS:  Bleeding (578.9), surveillance of ulcerative colitis        Postoperative Diagnosis              POSTOPERATIVE DIAGNOSIS:  Polyp at 30 cm, proctitis (556.9), diverticulosis of sigmoid colon  (562.10), hemorrhoids        Gross Description         Final Diagnosis              FINAL DIAGNOSIS:  A.  COLON, 30 CM, BIOPSY:            TUBULAR ADENOMA.  B.  RECTUM, BIOPSY:            DIFFUSE ACTIVE COLITIS.            A HEAVY LYMPHOID INFILTRATE APPEARS REACTIVE.        Comment              COMMENT:  Dr. Langley has reviewed specimen  "B and agrees with the diagnosis.        CONVERTED (HISTORICAL) FINAL PATHOLOGIST           Diagnostician:  TRUDY YOON M.D.  Pathologist  Electronically Signed 08/24/2015        Diagnosis Code    DIAGNOSIS CODE:  8        POCT Glucose Fingerstick   Result Value Ref Range     Glucose 202 (H) 60 - 100 mg/dl   Results for orders placed or performed in visit on 03/19/12   Converted Surgical Pathology   Result Value Ref Range     Spec Descr 1 SPECIMEN(S): A RECTUM BIOPSY       Preoperative Diagnosis    PREOPERATIVE DIAGNOSIS:  None Given          Postoperative Diagnosis    POSTOPERATIVE DIAGNOSIS:  CLIFF          Gross Description              GROSS DESCRIPTION:  The specimen is labeled \"#1 rectum\" and consists of 2 tan fragments  measuring 0.5 x 0.5 x 0.2 cm together.  Totally submitted.        Final Diagnosis              FINAL DIAGNOSIS:  RECTUM, BIOPSY:       DIFFUSE ACTIVE COLITIS.        Comment              COMMENT:  The findings are consistent with the clinical history of chronic  ulcerative colitis.  Inflammation does extend into the submucosa and  granulomas are present, raising the question of Crohn's disease.  Clinical correlation is recommended.        CONVERTED (HISTORICAL) FINAL PATHOLOGIST           Diagnostician:  TRUDY YOON M.D.  Pathologist  Electronically Signed 03/20/2012        Diagnosis Code    DIAGNOSIS CODE:  4                   Office Visit on 10/2/2018            Detailed Report          "

## 2018-11-06 NOTE — ANESTHESIA POSTPROCEDURE EVALUATION
Patient: Gage Ken    Procedure Summary     Date:  11/06/18 Room / Location:  [facility] ENDOSCOPY 2 / [facility] ENDOSCOPY    Anesthesia Start:  1302 Anesthesia Stop:  1320    Procedure:  COLONOSCOPY (N/A ) Diagnosis:       Ulcerative chronic pancolitis without complications (CMS/HCC)      (Ulcerative chronic pancolitis without complications (CMS/HCC) [K51.00])    Surgeon:  Alex Silveira MD Provider:  Mariella Erickson CRNA    Anesthesia Type:  MAC ASA Status:  3          Anesthesia Type: MAC  Last vitals  BP   134/60 (11/06/18 1228)   Temp   97.2 °F (36.2 °C) (11/06/18 1228)   Pulse   97 (11/06/18 1228)   Resp   18 (11/06/18 1228)     SpO2   99 % (11/06/18 1228)     Post Anesthesia Care and Evaluation    Patient location during evaluation: bedside  Patient participation: complete - patient participated  Level of consciousness: awake and awake and alert  Pain score: 0  Pain management: satisfactory to patient  Airway patency: patent  Anesthetic complications: No anesthetic complications  PONV Status: none  Cardiovascular status: acceptable and stable  Respiratory status: acceptable, room air and spontaneous ventilation  Hydration status: acceptable

## 2018-11-07 LAB
LAB AP CASE REPORT: NORMAL
PATH REPORT.FINAL DX SPEC: NORMAL
PATH REPORT.GROSS SPEC: NORMAL

## 2018-11-16 ENCOUNTER — TELEPHONE (OUTPATIENT)
Dept: OTOLARYNGOLOGY | Facility: CLINIC | Age: 78
End: 2018-11-16

## 2018-11-20 ENCOUNTER — OFFICE VISIT (OUTPATIENT)
Dept: GASTROENTEROLOGY | Facility: CLINIC | Age: 78
End: 2018-11-20

## 2018-11-20 VITALS
HEART RATE: 75 BPM | HEIGHT: 66 IN | WEIGHT: 161.8 LBS | BODY MASS INDEX: 26 KG/M2 | OXYGEN SATURATION: 97 % | SYSTOLIC BLOOD PRESSURE: 162 MMHG | DIASTOLIC BLOOD PRESSURE: 82 MMHG

## 2018-11-20 DIAGNOSIS — K51.00 ULCERATIVE CHRONIC PANCOLITIS WITHOUT COMPLICATIONS (HCC): Primary | ICD-10-CM

## 2018-11-20 PROCEDURE — 99213 OFFICE O/P EST LOW 20 MIN: CPT | Performed by: NURSE PRACTITIONER

## 2018-11-20 NOTE — PROGRESS NOTES
Chief Complaint   Patient presents with   • Ulcerative Colitis   • pancolitis       Subjective    Gage Ken is a 78 y.o. male. he is here today for follow-up.    History of Present Illness  78-year-old male presents to discuss colonoscopy results.  History of ulcerative pancolitis however has never had significant problems with abdominal pain.  Reports bowel movements are typically daily to twice per day occasionally have bouts constipation denies any recent blood in stool states he last noted blood in stool February was treated with Flagyl and has had no problems since.  Colonoscopy was completed 11/60/13 which noted a poor prep.  Several biopsies were obtained throughout the colon, cecum, ascending, transverse, descending, sigmoid, rectum biopsies all noted no significant histologic abnormality negative for dysplasia.  Patient states he did a full prep with no issue.  Reports his last significant flare was in 2015 and he nearly tried biologic however was able to recover with antibiotic and steroid and would prefer not to try biological therapy if possible.  Plan; follow-up in 6 months for recheck continue sulfasalazine daily repeat colonoscopy in 1 year due to poor prep follow-up in GI office sooner if needed.     The following portions of the patient's history were reviewed and updated as appropriate:   Past Medical History:   Diagnosis Date   • Acute gastritis    • Blood in feces    • Colitis, ulcerative chronic (CMS/HCC)     LEFT-SIDED   • Diverticular disease of colon    • Epigastric pain    • GERD (gastroesophageal reflux disease)    • History of colon polyps    • [place] (hard of hearing)    • Hypertension    • Lesion of nose    • Neoplasm of uncertain behavior    • Nonspecific ulcerative proctitis (CMS/HCC)    • Rectal hemorrhage    • Type 2 diabetes mellitus (CMS/HCC)    • Vitamin B12 deficiency      Past Surgical History:   Procedure Laterality Date   • COLONOSCOPY  12/02/2013    Hemorrhoids found.    • COLONOSCOPY  09/06/2016   • COLONOSCOPY W/ POLYPECTOMY  08/30/2015    Single polyp found in the colon;removed by cold snare polypectomy.Proctitis in the rectum.Diverticulosis found in the sigmoid colon.Hemorrhoids found.   • ENDOSCOPY  12/02/2013    Normal hypopharynx, esophagus, duodenum, symmetrical & patent pylorus. Hiatus hernia in GE junction. Normal stomach. Biopsy taken.   • HERNIA REPAIR      umbilical   • UPPER GASTROINTESTINAL ENDOSCOPY  12/02/2013     Family History   Problem Relation Age of Onset   • Diabetes Other    • Hypertension Other        Current Outpatient Medications   Medication Sig Dispense Refill   • aspirin 81 MG EC tablet Take 81 mg by mouth Daily.     • clotrimazole-betamethasone (LOTRISONE) 1-0.05 % cream APPLY CREAM TO THE AFFECTED AND SURROUNDING AREAS OF SKIN IN THE MORNING AND EVENING 2 TIMES A DAY  0   • folic acid (FOLVITE) 1 MG tablet Take 1 mg by mouth Daily.     • furosemide (LASIX) 40 MG tablet Take 40 mg by mouth Daily.  2   • glipiZIDE (GLUCOTROL) 10 MG tablet Take 1 tablet by mouth 2 (Two) Times a Day.  3   • lisinopril (PRINIVIL,ZESTRIL) 20 MG tablet Take 1 tablet by mouth 2 (Two) Times a Day.     • metFORMIN (GLUCOPHAGE) 1000 MG tablet Take 1,000 mg by mouth 2 (Two) Times a Day With Meals.     • omeprazole (priLOSEC) 20 MG capsule Take 2 capsules by mouth Daily. 60 capsule 5   • potassium chloride (KLOR-CON) 20 MEQ CR tablet Klor-Con M20 mEq tablet,extended release   TAKE 1 TABLET (20 MEQ) BY ORAL ROUTE 2 TIMES PER DAY WITH FOOD     • pravastatin (PRAVACHOL) 20 MG tablet Take 1 tablet by mouth Every Night.  2   • sotalol (BETAPACE) 80 MG tablet TAKE 0.5 TABLET BY ORAL ROUTE 2 TIMES PER DAY  2   • sulfaSALAzine (AZULFIDINE) 500 MG tablet Take 2 tablets by mouth 3 (Three) Times a Day. 540 tablet 5   • terazosin (HYTRIN) 10 MG capsule Take 1 capsule by mouth Every Night.  2   • valsartan (DIOVAN) 80 MG tablet Take 80 mg by mouth Daily.       No current  "facility-administered medications for this visit.      No Known Allergies  Social History     Socioeconomic History   • Marital status: Single     Spouse name: Not on file   • Number of children: Not on file   • Years of education: Not on file   • Highest education level: Not on file   Tobacco Use   • Smoking status: Former Smoker     Types: Cigarettes   • Smokeless tobacco: Former User     Types: Snuff     Quit date: 2008   • Tobacco comment: 11/30/2017 - Patient States He Ceased Smoking 13 Years Prior.   Substance and Sexual Activity   • Alcohol use: Yes     Comment: occasional   • Drug use: No   • Sexual activity: Defer       Review of Systems  Review of Systems   Constitutional: Negative for activity change, appetite change, chills, diaphoresis, fatigue, fever and unexpected weight change.   HENT: Negative for sore throat and trouble swallowing.    Respiratory: Negative for shortness of breath.    Gastrointestinal: Positive for diarrhea. Negative for abdominal distention, abdominal pain, anal bleeding, blood in stool, constipation, nausea, rectal pain and vomiting.   Musculoskeletal: Negative for arthralgias.   Skin: Negative for pallor.   Neurological: Negative for light-headedness.        /82 (BP Location: Left arm)   Pulse 75   Ht 167.6 cm (66\")   Wt 73.4 kg (161 lb 12.8 oz)   SpO2 97%   BMI 26.12 kg/m²     Objective    Physical Exam   Constitutional: He is oriented to person, place, and time. He appears well-developed and well-nourished. He is cooperative. No distress.   HENT:   Head: Normocephalic and atraumatic.   Neck: Normal range of motion. Neck supple. No thyromegaly present.   Cardiovascular: Normal rate, regular rhythm and normal heart sounds.   Pulmonary/Chest: Effort normal and breath sounds normal. He has no wheezes. He has no rhonchi. He has no rales.   Abdominal: Soft. Normal appearance and bowel sounds are normal. He exhibits no distension. There is no hepatosplenomegaly. There is no " tenderness. There is no rigidity and no guarding. No hernia.   Lymphadenopathy:     He has no cervical adenopathy.   Neurological: He is alert and oriented to person, place, and time.   Skin: Skin is warm, dry and intact. No rash noted. No pallor.   Psychiatric: He has a normal mood and affect. His speech is normal.     Admission on 11/06/2018, Discharged on 11/06/2018   Component Date Value Ref Range Status   • Glucose 11/06/2018 199* 70 - 130 mg/dL Final    : [id number] TAYLOR DONNELLYMeter ID: [id number]   • Case Report 11/06/2018    Final                    Value:Surgical Pathology Report                         Case: [id number]                                  Authorizing Provider:  Alex Silveira MD        Collected:           11/06/2018 01:22 PM          Ordering Location:     [facility]             Received:            11/06/2018 02:58 PM                                 [facility]                                                     Pathologist:           Aldo Langley MD                                                         Specimens:   1) - Large Intestine, Cecum                                                                         2) - Large Intestine, Right / Ascending Colon                                                       3) - Large Intestine, Transverse Colon                                                              4) - Large Intestine, Left / Descending Colon                                                       5) - Large Intestine, Sigmoid Colon                                                                                           6) - Large Intestine, Rectum                                                              • Final Diagnosis 11/06/2018    Final                    Value:This result contains rich text formatting which cannot be displayed here.   • Gross Description 11/06/2018    Final                    Value:This result contains rich text  formatting which cannot be displayed here.     Assessment/Plan      1. Ulcerative chronic pancolitis without complications (CMS/HCC)    .       Orders placed during this encounter include:  No orders of the defined types were placed in this encounter.      * Surgery not found *    Review and/or summary of lab tests, radiology, procedures, medications. Review and summary of old records and obtaining of history. The risks and benefits of my recommendations, as well as other treatment options were discussed with the patient today. Questions were answered.    No orders of the defined types were placed in this encounter.      Follow-up: Return in about 6 months (around 5/20/2019).          This document has been electronically signed by CUATE Allen on November 20, 2018 9:45 AM             Results for orders placed or performed during the hospital encounter of 11/06/18   Tissue Pathology Exam   Result Value Ref Range    Case Report       Surgical Pathology Report                         Case: [id number]                                  Authorizing Provider:  Alex Silveira MD        Collected:           11/06/2018 01:22 PM          Ordering Location:     [facility]             Received:            11/06/2018 02:58 PM                                 [place] [facility]                                                     Pathologist:           Aldo Langley MD                                                         Specimens:   1) - Large Intestine, Cecum                                                                         2) - Large Intestine, Right / Ascending Colon                                                       3) - Large Intestine, Transverse Colon                                                              4) - Large Intestine, Left / Descending Colon                                                       5) - Large Intestine, Sigmoid Colon                                                                   6) - Large Intestine, Rectum                                                               Final Diagnosis       1.  MUCOSA, CECUM:  NO SIGNIFICANT HISTOLOGIC ABNORMALITY.  NEGATIVE FOR DYSPLASIA.    2.   MUCOSA, ASCENDING COLON:  NO SIGNIFICANT HISTOLOGIC ABNORMALITY.  NEGATIVE FOR DYSPLASIA.    3.  MUCOSA, TRANSVERSE COLON:  NO SIGNIFICANT HISTOLOGIC ABNORMALITY.  NEGATIVE FOR DYSPLASIA.     4.  MUCOSA, DESCENDING COLON:  NO SIGNIFICANT HISTOLOGIC ABNORMALITY.  NEGATIVE FOR DYSPLASIA.    5.  MUCOSA, SIGMOID COLON:  NO SIGNIFICANT HISTOLOGIC ABNORMALITY.  NEGATIVE FOR DYSPLASIA.    6.  MUCOSA, RECTUM:  NO SIGNIFICANT HISTOLOGIC ABNORMALITY.  NEGATIVE FOR DYSPLASIA.      Gross Description       Received for examination are 6 containers, each of which have nodular bits of white soft tissue measuring 0.3-0.5 cc in aggregate.  All specimens are embedded as labeled.  1A cecum; 2A ascending colon; 3A transverse colon; 4A descending colon; 5A sigmoid colon; 6A rectum.     POC Glucose Once   Result Value Ref Range    Glucose 199 (H) 70 - 130 mg/dL   Results for orders placed or performed in visit on 10/17/18   Tissue Pathology Exam   Result Value Ref Range    Case Report       Surgical Pathology Report                         Case: [id number]                                  Authorizing Provider:  Alex Han MD        Collected:           10/17/2018 02:10 PM          Ordering Location:     [facility]     Received:            10/18/2018 11:21 AM                                 [facility]                                                           Pathologist:           Jacob Live MD                                                        Specimen:    Nose, Punch biopsy - suspect basal cell carcinoma of nose                                  Final Diagnosis       Skin, nose, punch biopsy:  Basal cell carcinoma.      Gross Description       Specimen #1 is received in a formalin  "filled container, labeled with the patient's name, date of birth, and \"nose lesion possible BCC\".  The specimen consists of a round to oval punch biopsy measuring 0.2 x 0.2 cm and measures 0.3 cm in depth.  The skin surface has been inked blue by surgeon and appears raised.  The resection margin is inked black and the specimen is totally submitted, intact, in block 1A.      Microscopic Description       Histologic sections show a skin punch with nests of malignant basaloid cells with peripheral palisading.     Results for orders placed or performed during the hospital encounter of 10/30/17   Tissue Pathology Exam - Tissue, Large Intestine, Cecum   Result Value Ref Range    Case Report       Surgical Pathology Report                         Case: [id number]                                  Authorizing Provider:  Alex Silveira MD         Collected:           10/30/2017 10:11 AM          Ordering Location:     [facility]             Received:            10/30/2017 11:05 AM                                 [facility]                                                     Pathologist:           Aldo Langley MD                                                          Specimens:   1) - Large Intestine, Cecum, cecum                                                                  2) - Large Intestine, Right / Ascending Colon                                                       3) - Large Intestine, ascending polyp                                                               4) - Large Intestine, Transverse Colon                                                              5) - Large Intestine, Left / Descending Colon                                                        6) - Large Intestine, Rectum                                                                        7) - Large Intestine, Sigmoid Colon                                                        Final Diagnosis       1.  MUCOSA, CECUM:  NO " SIGNIFICANT HISTOLOGIC ABNORMALITY.  NEGATIVE FOR DYSPLASIA.    2.  MUCOSA, ASCENDING COLON:  NO SIGNIFICANT HISTOLOGIC ABNORMALITY.  NEGATIVE FOR DYSPLASIA.    3.  POLYP, ASCENDING COLON:  TUBULAR ADENOMA WITH MILD DYSPLASIA.    4.  MUCOSA, TRANSVERSE COLON:  NO SIGNIFICANT HISTOLOGIC ABNORMALITY.  NEGATIVE FOR DYSPLASIA.    5.  MUCOSA, DESCENDING COLON:  NO SIGNIFICANT HISTOLOGIC ABNORMALITY.  NEGATIVE FOR DYSPLASIA.    6.  MUCOSA, RECTUM:  NO SIGNIFICANT HISTOLOGIC ABNORMALITY.  NEGATIVE FOR DYSPLASIA.    7.  MUCOSA, SIGMOID COLON:  NO SIGNIFICANT HISTOLOGIC ABNORMALITY.  NEGATIVE FOR DYSPLASIA.        Gross Description       Received for examination are 7 containers, each of which have nodular bits of white soft tissue measuring 0.3-0.5 cc in aggregate.  All specimens are embedded as labeled:  1A cecum; 2A ascending colon; 3A polyp, ascending colon; 4A transverse colon; 5A descending colon; 6A rectum; 7A sigmoid colon.         Embedded Images     POC Glucose Fingerstick   Result Value Ref Range    Glucose 220 (H) 70 - 130 mg/dL   Results for orders placed or performed in visit on 10/12/16    COLONOSCOPY   Result Value Ref Range     Colonoscopy ORDERED BY DR TIMUR CIFUENTES    Results for orders placed or performed during the hospital encounter of 09/06/16   Converted Surgical Pathology   Result Value Ref Range    Spec Descr 1 SPECIMEN(S): A BIOPSY @ 15 CM     Specimen description 2 SPECIMEN(S): B BIOPSY @ 13 CM     Specimen description 3 SPECIMEN(S): C BIOPSY @ 10 CM    POCT glucose fingerstick   Result Value Ref Range    Glucose 199 (H) 60 - 100 mg/dl   Results for orders placed or performed during the hospital encounter of 08/20/15   Converted Surgical Pathology   Result Value Ref Range    Spec Descr 1 SPECIMEN(S): A POLYP @ 30 CM     Specimen description 2 SPECIMEN(S): B BIOPSY, RECTUM     Preoperative Diagnosis         PREOPERATIVE DIAGNOSIS:  Bleeding (578.9), surveillance of ulcerative colitis       "Postoperative Diagnosis         POSTOPERATIVE DIAGNOSIS:  Polyp at 30 cm, proctitis (556.9), diverticulosis of sigmoid colon  (562.10), hemorrhoids      Gross Description      Final Diagnosis         FINAL DIAGNOSIS:  A.  COLON, 30 CM, BIOPSY:            TUBULAR ADENOMA.  B.  RECTUM, BIOPSY:            DIFFUSE ACTIVE COLITIS.            A HEAVY LYMPHOID INFILTRATE APPEARS REACTIVE.      Comment         COMMENT:  Dr. Langley has reviewed specimen B and agrees with the diagnosis.      CONVERTED (HISTORICAL) FINAL PATHOLOGIST       Diagnostician:  TRUDY YOON M.D.  Pathologist  Electronically Signed 08/24/2015      Diagnosis Code   DIAGNOSIS CODE:  8      POCT Glucose Fingerstick   Result Value Ref Range    Glucose 202 (H) 60 - 100 mg/dl   Results for orders placed or performed in visit on 03/19/12   Converted Surgical Pathology   Result Value Ref Range    Spec Descr 1 SPECIMEN(S): A RECTUM BIOPSY     Preoperative Diagnosis   PREOPERATIVE DIAGNOSIS:  None Given       Postoperative Diagnosis   POSTOPERATIVE DIAGNOSIS:  CLIFF       Gross Description         GROSS DESCRIPTION:  The specimen is labeled \"#1 rectum\" and consists of 2 tan fragments  measuring 0.5 x 0.5 x 0.2 cm together.  Totally submitted.      Final Diagnosis         FINAL DIAGNOSIS:  RECTUM, BIOPSY:       DIFFUSE ACTIVE COLITIS.      Comment         COMMENT:  The findings are consistent with the clinical history of chronic  ulcerative colitis.  Inflammation does extend into the submucosa and  granulomas are present, raising the question of Crohn's disease.  Clinical correlation is recommended.      CONVERTED (HISTORICAL) FINAL PATHOLOGIST       Diagnostician:  TRUDY YOON M.D.  Pathologist  Electronically Signed 03/20/2012      Diagnosis Code   DIAGNOSIS CODE:  4        "

## 2018-11-20 NOTE — PATIENT INSTRUCTIONS
Ulcerative Colitis, Adult  Ulcerative colitis is long-lasting (chronic) swelling (inflammation) of the large intestine (colon). Sores (ulcers) may also form on the colon.  Ulcerative colitis is closely related to another condition of inflammation of the intestines that is called Crohn disease. Together, they are frequently referred to as inflammatory bowel disease (IBD).  What are the causes?  Ulcerative colitis is caused by increased activity of the immune system in the intestines. The immune system is the system that protects the body against harmful bacteria, viruses, fungi, and other things that can make you sick. When the immune system overacts, it causes inflammation. The cause of the increased immune system activity is not known.  What increases the risk?  Risk factors of ulcerative colitis include:  · Age. This includes:  ? Being 15-30 years old.  ? Being older than 60 years old.  · Having a family history of ulcerative colitis.  · Being of [place] descent.    What are the signs or symptoms?  Common symptoms of ulcerative colitis include rectal bleeding and diarrhea. There is a wide range of symptoms, and a person's symptoms depend on how severe the condition is. Additional symptoms may include:  · Pain or cramping in the belly (abdomen).  · Fever.  · Fatigue.  · Weight loss.  · Night sweats.  · Rectal pain.  · Feeling the immediate need to have a bowel movement.  · Nausea.  · Loss of appetite.  · Anemia.  · Joint pain or soreness.  · Eye irritation.  · Certain skin rashes.    How is this diagnosed?  Ulcerative colitis may be diagnosed by:  · Medical history and physical exam.  · Blood tests and stool tests.  · X-rays.  · CT scans.  · Colonoscopy. For this test, a flexible tube is inserted into your anus and your colon is examined.  · Examination of a tissue sample from your colon (biopsy).    How is this treated?  Treatment for ulcerative colitis may include medicines to:  · Decrease inflammation.  · Control  your immune system.    Surgery may also be necessary.  Follow these instructions at home:  Medicines and vitamins  · Take medicines only as directed by your doctor. Do not take aspirin.  · Ask your doctor if you should take any vitamins or supplements.  Lifestyle  · Exercise regularly.  · Limit alcohol intake to no more than 1 drink per day for nonpregnant women and 2 drinks per day for men. One drink equals 12 ounces of beer, 5 ounces of wine, or 1½ ounces of hard liquor.  Eating and drinking  · Drink enough fluid to keep your urine clear or pale yellow.  · Ask your health care provider about the best diet for you. Follow the diet as directed by your health care provider. This may include:  ? Avoiding carbonated drinks.  ? Avoiding popcorn, vegetable skins, nuts, and other high-fiber foods when you have symptoms of ulcerative colitis.  ? Eating smaller meals more often.  ? Keeping a food diary. This may help you to find and avoid any foods that make you feel not well.  · Limit your caffeine intake.  General instructions  · Keep all follow-up appointments as directed by your health care provider. This is important.  Contact a health care provider if:  · Your symptoms do not improve or get worse with treatment.  · You continue to lose weight.  · You have constant cramps or loose bowels.  · You develop a new skin rash, skin sores, or eye problems.  · You have a fever or chills.  Get help right away if:  · You have bloody diarrhea.  · You have severe pain in your abdomen.  · You vomit.  This information is not intended to replace advice given to you by your health care provider. Make sure you discuss any questions you have with your health care provider.  Document Released: 09/27/2006 Document Revised: 08/20/2017 Document Reviewed: 04/11/2016  [employer] Interactive Patient Education © 2018 [employer]

## 2018-12-10 ENCOUNTER — OFFICE VISIT (OUTPATIENT)
Dept: OTOLARYNGOLOGY | Facility: CLINIC | Age: 78
End: 2018-12-10

## 2018-12-10 VITALS
DIASTOLIC BLOOD PRESSURE: 78 MMHG | BODY MASS INDEX: 27.06 KG/M2 | WEIGHT: 168.4 LBS | SYSTOLIC BLOOD PRESSURE: 136 MMHG | TEMPERATURE: 97.8 F | HEIGHT: 66 IN

## 2018-12-10 DIAGNOSIS — S01.20XA OPEN WOUND OF NOSE, UNSPECIFIED OPEN WOUND TYPE, INITIAL ENCOUNTER: Primary | ICD-10-CM

## 2018-12-10 PROCEDURE — 13152 CMPLX RPR E/N/E/L 2.6-7.5 CM: CPT | Performed by: OTOLARYNGOLOGY

## 2018-12-10 PROCEDURE — 15004 WOUND PREP F/N/HF/G: CPT | Performed by: OTOLARYNGOLOGY

## 2018-12-10 NOTE — PROGRESS NOTES
Preprocedure diagnosis: Nasal Mohs defect     Post procedure diagnosis: Nasal Mohs defect    Procedure performed: 1) Preparation of wound of nose less than 100 cm2     2) complex closure of skin of nose, 3.5cm    Surgeon: Alex Han M.D.    Anesthesia: Local with 5 cc of 1% lidocaine with 1:100,000 epinephrine    Specimen:  None    Complications: None    Disposition: Home    Details: After patient verification and informed consent was obtained, the skin was prepped with alcohol and infiltrated with 1% lidocaine with 1:100,000 epinephrine.  After approximately 10-15 minutes the skin was tested for anesthesia.  The skin was then sterilely prepped with Hibiclens and the patient was sterilely draped.    The skin surrounding the Mohs defect was incised in fusiform fashion with a 15 blade beveling the incision laterally to facilitate wound eversion.  The skin was grasped and the lesion was removed from the underlying tissue utilizing sharp dissection with the 15 blade.  Hemostasis was obtained with bipolar cautery set at 12.      The surrounding skin was undermined in the subcutaneous plane for approximately 1.5 cm in each direction in the sub-SMAS plane utilizing a fresh 15 blade.  Hemostasis was again obtained with bipolar cautery set at 12.  The tissue was advanced and closed utilizing 4-0 undyed Vicryl suture in buried fashion, followed by 5-0 fast absorbing gut suture in running, locking fashion.  Antibiotic ointment was placed to the incision.    The patient tolerated the procedure without any complication.

## 2018-12-12 RX ORDER — OMEPRAZOLE 40 MG/1
40 CAPSULE, DELAYED RELEASE ORAL DAILY
Qty: 30 CAPSULE | Refills: 11 | Status: SHIPPED | OUTPATIENT
Start: 2018-12-12 | End: 2019-10-24 | Stop reason: DRUGHIGH

## 2018-12-17 ENCOUNTER — OFFICE VISIT (OUTPATIENT)
Dept: OTOLARYNGOLOGY | Facility: CLINIC | Age: 78
End: 2018-12-17

## 2018-12-17 VITALS
TEMPERATURE: 98 F | BODY MASS INDEX: 27 KG/M2 | WEIGHT: 168 LBS | SYSTOLIC BLOOD PRESSURE: 123 MMHG | DIASTOLIC BLOOD PRESSURE: 72 MMHG | HEIGHT: 66 IN | RESPIRATION RATE: 20 BRPM | HEART RATE: 80 BPM

## 2018-12-17 DIAGNOSIS — S01.20XD OPEN WOUND OF NOSE, UNSPECIFIED OPEN WOUND TYPE, SUBSEQUENT ENCOUNTER: Primary | ICD-10-CM

## 2018-12-17 PROCEDURE — 99024 POSTOP FOLLOW-UP VISIT: CPT | Performed by: OTOLARYNGOLOGY

## 2018-12-17 RX ORDER — ACYCLOVIR 400 MG/1
TABLET ORAL
Refills: 0 | COMMUNITY
Start: 2018-12-14 | End: 2019-10-24

## 2018-12-17 RX ORDER — VALSARTAN 80 MG/1
TABLET ORAL
COMMUNITY
End: 2018-12-17

## 2018-12-17 RX ORDER — OMEPRAZOLE 40 MG/1
CAPSULE, DELAYED RELEASE ORAL
COMMUNITY
End: 2018-12-17

## 2018-12-17 RX ORDER — ACYCLOVIR 400 MG/1
TABLET ORAL
COMMUNITY
End: 2018-12-17

## 2018-12-17 NOTE — PROGRESS NOTES
Gage Ken presents for a routine postoperative visit following repair of Mohs defect of the nasal dorsum with linear closure on 12/10/18.     Subjective: Since surgery, he is doing well without complaints.  Symptoms denied postoperatively include fever, chills, bleeding and drainage. The patient states the pain has decreased since surgery.     Objective:   Pathology review: Pathology is not applicable (Mohs reconstruction).    Physical Exam:  Nasal dorsum incision healing well with moderate erythema- sutures removed. No evidence of drainage or infection.    Assessment:  Gage was seen today for post-op.    Diagnoses and all orders for this visit:    Open wound of nose, unspecified open wound type, subsequent encounter      Plan:  Protect the incisions from sunlight. Sunlight to the incisions will cause permanent pigmentation to the incision line and make the incision more noticeable. After the incision has reepithelialized (typically 2-3 weeks after the procedure), you may begin to use sunscreen with an SPF of 15 or greater    I discussed the use of  Vitamin E, Mederma, a high-quality extra virgin olive oil, or a silicone-based wound cream to the incisions to optimize the end result. Apply topically twice daily, or as directed, to help optimize wound healing and decrease erythema.    We discussed the importance of routine skin checks with a dermatologist or your PCP every 6-12 months. He has an appointment with Dr. Ag in June. I will have him follow-up with me PRN. I offered a F/U in 3-6 months to check to make sure everything was healing well versus for him to call PRN - he would like to call PRN.      Return if symptoms worsen or fail to improve, for Recheck.      Alex Han MD  12/17/18  3:36 PM

## 2019-02-18 ENCOUNTER — HOSPITAL ENCOUNTER (OUTPATIENT)
Dept: VASCULAR LAB | Age: 79
Discharge: HOME | End: 2019-02-18
Payer: MEDICARE

## 2019-02-18 ENCOUNTER — OFFICE VISIT (OUTPATIENT)
Dept: VASCULAR SURGERY | Age: 79
End: 2019-02-18
Payer: MEDICARE

## 2019-02-18 VITALS
HEART RATE: 68 BPM | SYSTOLIC BLOOD PRESSURE: 130 MMHG | OXYGEN SATURATION: 98 % | DIASTOLIC BLOOD PRESSURE: 74 MMHG | RESPIRATION RATE: 18 BRPM

## 2019-02-18 DIAGNOSIS — I65.23 BILATERAL CAROTID ARTERY STENOSIS: ICD-10-CM

## 2019-02-18 DIAGNOSIS — I65.23 BILATERAL CAROTID ARTERY STENOSIS: Primary | ICD-10-CM

## 2019-02-18 DIAGNOSIS — I73.9 PVD (PERIPHERAL VASCULAR DISEASE) (HCC): ICD-10-CM

## 2019-02-18 PROCEDURE — 4040F PNEUMOC VAC/ADMIN/RCVD: CPT | Performed by: PHYSICIAN ASSISTANT

## 2019-02-18 PROCEDURE — G8598 ASA/ANTIPLAT THER USED: HCPCS | Performed by: PHYSICIAN ASSISTANT

## 2019-02-18 PROCEDURE — 1101F PT FALLS ASSESS-DOCD LE1/YR: CPT | Performed by: PHYSICIAN ASSISTANT

## 2019-02-18 PROCEDURE — G8421 BMI NOT CALCULATED: HCPCS | Performed by: PHYSICIAN ASSISTANT

## 2019-02-18 PROCEDURE — 99213 OFFICE O/P EST LOW 20 MIN: CPT | Performed by: PHYSICIAN ASSISTANT

## 2019-02-18 PROCEDURE — 93923 UPR/LXTR ART STDY 3+ LVLS: CPT

## 2019-02-18 PROCEDURE — G8484 FLU IMMUNIZE NO ADMIN: HCPCS | Performed by: PHYSICIAN ASSISTANT

## 2019-02-18 PROCEDURE — G8427 DOCREV CUR MEDS BY ELIG CLIN: HCPCS | Performed by: PHYSICIAN ASSISTANT

## 2019-02-18 PROCEDURE — 1123F ACP DISCUSS/DSCN MKR DOCD: CPT | Performed by: PHYSICIAN ASSISTANT

## 2019-02-18 PROCEDURE — 93880 EXTRACRANIAL BILAT STUDY: CPT

## 2019-02-18 PROCEDURE — 1036F TOBACCO NON-USER: CPT | Performed by: PHYSICIAN ASSISTANT

## 2019-02-18 RX ORDER — SOTALOL HYDROCHLORIDE 80 MG/1
80 TABLET ORAL DAILY
Status: ON HOLD | COMMUNITY
End: 2019-04-17 | Stop reason: HOSPADM

## 2019-02-18 RX ORDER — AMLODIPINE BESYLATE 5 MG/1
5 TABLET ORAL DAILY
Status: ON HOLD | COMMUNITY
End: 2019-07-04 | Stop reason: HOSPADM

## 2019-02-18 RX ORDER — POTASSIUM CHLORIDE 20 MEQ/1
20 TABLET, EXTENDED RELEASE ORAL 2 TIMES DAILY
Status: ON HOLD | COMMUNITY
End: 2019-07-04 | Stop reason: HOSPADM

## 2019-04-08 ENCOUNTER — APPOINTMENT (OUTPATIENT)
Dept: CT IMAGING | Age: 79
DRG: 308 | End: 2019-04-08
Attending: INTERNAL MEDICINE
Payer: MEDICARE

## 2019-04-08 ENCOUNTER — APPOINTMENT (OUTPATIENT)
Dept: GENERAL RADIOLOGY | Age: 79
DRG: 308 | End: 2019-04-08
Attending: INTERNAL MEDICINE
Payer: MEDICARE

## 2019-04-08 ENCOUNTER — HOSPITAL ENCOUNTER (INPATIENT)
Age: 79
LOS: 9 days | Discharge: HOME | DRG: 308 | End: 2019-04-17
Attending: INTERNAL MEDICINE | Admitting: INTERNAL MEDICINE
Payer: MEDICARE

## 2019-04-08 PROBLEM — I48.91 ATRIAL FIBRILLATION WITH RVR (HCC): Status: ACTIVE | Noted: 2019-04-08

## 2019-04-08 LAB
ALBUMIN SERPL-MCNC: 3.7 G/DL (ref 3.5–5.2)
ALP BLD-CCNC: 88 U/L (ref 40–130)
ALT SERPL-CCNC: 18 U/L (ref 5–41)
ANION GAP SERPL CALCULATED.3IONS-SCNC: 15 MMOL/L (ref 7–19)
AST SERPL-CCNC: 17 U/L (ref 5–40)
BASE EXCESS ARTERIAL: -3.6 MMOL/L (ref -2–2)
BILIRUB SERPL-MCNC: 0.9 MG/DL (ref 0.2–1.2)
BUN BLDV-MCNC: 21 MG/DL (ref 8–23)
CALCIUM SERPL-MCNC: 8.7 MG/DL (ref 8.8–10.2)
CARBOXYHEMOGLOBIN ARTERIAL: 1.9 % (ref 0–5)
CHLORIDE BLD-SCNC: 104 MMOL/L (ref 98–111)
CO2: 21 MMOL/L (ref 22–29)
CREAT SERPL-MCNC: 1.2 MG/DL (ref 0.5–1.2)
GFR NON-AFRICAN AMERICAN: 59
GLUCOSE BLD-MCNC: 266 MG/DL (ref 74–109)
GLUCOSE BLD-MCNC: 309 MG/DL (ref 70–99)
HBA1C MFR BLD: 10.2 % (ref 4–6)
HCO3 ARTERIAL: 19.2 MMOL/L (ref 22–26)
HEMOGLOBIN, ART, EXTENDED: 10.7 G/DL (ref 14–18)
METHEMOGLOBIN ARTERIAL: 1.1 %
O2 CONTENT ARTERIAL: 13.9 ML/DL
O2 SAT, ARTERIAL: 91.9 %
O2 THERAPY: ABNORMAL
PCO2 ARTERIAL: 27 MMHG (ref 35–45)
PERFORMED ON: ABNORMAL
PH ARTERIAL: 7.46 (ref 7.35–7.45)
PO2 ARTERIAL: 61 MMHG (ref 80–100)
POTASSIUM REFLEX MAGNESIUM: 4.5 MMOL/L (ref 3.5–5)
POTASSIUM, WHOLE BLOOD: 4.4
SODIUM BLD-SCNC: 140 MMOL/L (ref 136–145)
TOTAL PROTEIN: 6.7 G/DL (ref 6.6–8.7)

## 2019-04-08 PROCEDURE — 36600 WITHDRAWAL OF ARTERIAL BLOOD: CPT

## 2019-04-08 PROCEDURE — 99223 1ST HOSP IP/OBS HIGH 75: CPT | Performed by: INTERNAL MEDICINE

## 2019-04-08 PROCEDURE — 2500000003 HC RX 250 WO HCPCS: Performed by: INTERNAL MEDICINE

## 2019-04-08 PROCEDURE — 71045 X-RAY EXAM CHEST 1 VIEW: CPT

## 2019-04-08 PROCEDURE — 80053 COMPREHEN METABOLIC PANEL: CPT

## 2019-04-08 PROCEDURE — 6370000000 HC RX 637 (ALT 250 FOR IP): Performed by: INTERNAL MEDICINE

## 2019-04-08 PROCEDURE — 83036 HEMOGLOBIN GLYCOSYLATED A1C: CPT

## 2019-04-08 PROCEDURE — 6360000002 HC RX W HCPCS: Performed by: INTERNAL MEDICINE

## 2019-04-08 PROCEDURE — 36415 COLL VENOUS BLD VENIPUNCTURE: CPT

## 2019-04-08 PROCEDURE — 82948 REAGENT STRIP/BLOOD GLUCOSE: CPT

## 2019-04-08 PROCEDURE — 82803 BLOOD GASES ANY COMBINATION: CPT

## 2019-04-08 PROCEDURE — 84132 ASSAY OF SERUM POTASSIUM: CPT

## 2019-04-08 PROCEDURE — 2580000003 HC RX 258: Performed by: INTERNAL MEDICINE

## 2019-04-08 PROCEDURE — 71250 CT THORAX DX C-: CPT

## 2019-04-08 PROCEDURE — 2700000000 HC OXYGEN THERAPY PER DAY

## 2019-04-08 PROCEDURE — 99222 1ST HOSP IP/OBS MODERATE 55: CPT | Performed by: INTERNAL MEDICINE

## 2019-04-08 PROCEDURE — 2140000000 HC CCU INTERMEDIATE R&B

## 2019-04-08 PROCEDURE — 94660 CPAP INITIATION&MGMT: CPT

## 2019-04-08 RX ORDER — SODIUM CHLORIDE 0.9 % (FLUSH) 0.9 %
10 SYRINGE (ML) INJECTION PRN
Status: DISCONTINUED | OUTPATIENT
Start: 2019-04-08 | End: 2019-04-17 | Stop reason: HOSPADM

## 2019-04-08 RX ORDER — NICOTINE POLACRILEX 4 MG
15 LOZENGE BUCCAL PRN
Status: DISCONTINUED | OUTPATIENT
Start: 2019-04-08 | End: 2019-04-17 | Stop reason: HOSPADM

## 2019-04-08 RX ORDER — ASPIRIN 81 MG/1
81 TABLET ORAL DAILY
Status: DISCONTINUED | OUTPATIENT
Start: 2019-04-08 | End: 2019-04-17 | Stop reason: HOSPADM

## 2019-04-08 RX ORDER — DEXTROSE MONOHYDRATE 25 G/50ML
12.5 INJECTION, SOLUTION INTRAVENOUS PRN
Status: DISCONTINUED | OUTPATIENT
Start: 2019-04-08 | End: 2019-04-17 | Stop reason: HOSPADM

## 2019-04-08 RX ORDER — AMLODIPINE BESYLATE 5 MG/1
5 TABLET ORAL DAILY
Status: DISCONTINUED | OUTPATIENT
Start: 2019-04-08 | End: 2019-04-08

## 2019-04-08 RX ORDER — DILTIAZEM HYDROCHLORIDE 120 MG/1
120 CAPSULE, COATED, EXTENDED RELEASE ORAL 2 TIMES DAILY
Status: DISCONTINUED | OUTPATIENT
Start: 2019-04-08 | End: 2019-04-10

## 2019-04-08 RX ORDER — SOTALOL HYDROCHLORIDE 80 MG/1
80 TABLET ORAL 2 TIMES DAILY
Status: DISCONTINUED | OUTPATIENT
Start: 2019-04-08 | End: 2019-04-13

## 2019-04-08 RX ORDER — DILTIAZEM HCL/D5W 125 MG/125
5 PLASTIC BAG, INJECTION (ML) INTRAVENOUS CONTINUOUS
Status: DISCONTINUED | OUTPATIENT
Start: 2019-04-08 | End: 2019-04-08

## 2019-04-08 RX ORDER — DOXAZOSIN MESYLATE 4 MG/1
8 TABLET ORAL NIGHTLY
Status: DISCONTINUED | OUTPATIENT
Start: 2019-04-08 | End: 2019-04-08 | Stop reason: SDUPTHER

## 2019-04-08 RX ORDER — DEXTROSE MONOHYDRATE 50 MG/ML
100 INJECTION, SOLUTION INTRAVENOUS PRN
Status: DISCONTINUED | OUTPATIENT
Start: 2019-04-08 | End: 2019-04-17 | Stop reason: HOSPADM

## 2019-04-08 RX ORDER — PANTOPRAZOLE SODIUM 40 MG/1
40 TABLET, DELAYED RELEASE ORAL
Status: DISCONTINUED | OUTPATIENT
Start: 2019-04-09 | End: 2019-04-17 | Stop reason: HOSPADM

## 2019-04-08 RX ORDER — SODIUM CHLORIDE 0.9 % (FLUSH) 0.9 %
10 SYRINGE (ML) INJECTION EVERY 12 HOURS SCHEDULED
Status: DISCONTINUED | OUTPATIENT
Start: 2019-04-08 | End: 2019-04-17 | Stop reason: HOSPADM

## 2019-04-08 RX ORDER — LISINOPRIL 20 MG/1
20 TABLET ORAL DAILY
Status: DISCONTINUED | OUTPATIENT
Start: 2019-04-08 | End: 2019-04-17 | Stop reason: HOSPADM

## 2019-04-08 RX ORDER — INSULIN GLARGINE 100 [IU]/ML
20 INJECTION, SOLUTION SUBCUTANEOUS NIGHTLY
Status: DISCONTINUED | OUTPATIENT
Start: 2019-04-08 | End: 2019-04-17 | Stop reason: HOSPADM

## 2019-04-08 RX ORDER — DOXAZOSIN MESYLATE 4 MG/1
8 TABLET ORAL NIGHTLY
Status: DISCONTINUED | OUTPATIENT
Start: 2019-04-08 | End: 2019-04-17 | Stop reason: HOSPADM

## 2019-04-08 RX ORDER — PRAVASTATIN SODIUM 20 MG
20 TABLET ORAL DAILY
Status: DISCONTINUED | OUTPATIENT
Start: 2019-04-08 | End: 2019-04-17 | Stop reason: HOSPADM

## 2019-04-08 RX ADMIN — ENOXAPARIN SODIUM 80 MG: 80 INJECTION SUBCUTANEOUS at 18:01

## 2019-04-08 RX ADMIN — INSULIN LISPRO 2 UNITS: 100 INJECTION, SOLUTION INTRAVENOUS; SUBCUTANEOUS at 21:00

## 2019-04-08 RX ADMIN — SOTALOL HYDROCHLORIDE 80 MG: 80 TABLET ORAL at 20:59

## 2019-04-08 RX ADMIN — Medication 10 ML: at 21:02

## 2019-04-08 RX ADMIN — INSULIN GLARGINE 20 UNITS: 100 INJECTION, SOLUTION SUBCUTANEOUS at 21:00

## 2019-04-08 RX ADMIN — DOXAZOSIN 8 MG: 4 TABLET ORAL at 18:03

## 2019-04-08 RX ADMIN — DILTIAZEM HYDROCHLORIDE 5 MG/HR: 5 INJECTION INTRAVENOUS at 18:03

## 2019-04-08 RX ADMIN — DILTIAZEM HYDROCHLORIDE 120 MG: 120 CAPSULE, COATED, EXTENDED RELEASE ORAL at 20:59

## 2019-04-08 ASSESSMENT — ENCOUNTER SYMPTOMS
RESPIRATORY NEGATIVE: 1
DIARRHEA: 0
VOMITING: 0
SHORTNESS OF BREATH: 0
EYES NEGATIVE: 1
GASTROINTESTINAL NEGATIVE: 1
NAUSEA: 0

## 2019-04-08 NOTE — PROGRESS NOTES
Procedure Component Value Units Date/Time     Blood Gas, Arterial [[id number]] (Abnormal) Collected: 04/08/19 1449     Specimen: Blood gases Updated: 04/08/19 1451      pH, Arterial 7.460      pCO2, Arterial 27.0 mmHg       pO2, Arterial 61.0 mmHg       HCO3, Arterial 19.2 mmol/L       Base Excess, Arterial -3.6 mmol/L       Hemoglobin, Art, Extended 10.7 g/dL       O2 Sat, Arterial 91.9 %       Carboxyhgb, Arterial 1.9 %       Methemoglobin, Arterial 1.1 %       O2 Content, Arterial 13.9 mL/dL       O2 Therapy Unknown     Potassium, Whole Blood [[id number]] Collected: 04/08/19 1449      Updated: 04/08/19 1451      Potassium, Whole Blood 4.4         3 L/M NC RR16 RB POS ABY

## 2019-04-08 NOTE — CONSULTS
[id number] [facility]  Cardiology Consult      Requesting MD:  Tha Ely MD   Admit Status:  Inpatient [101]       History obtained from:   [] Patient  [] Other (specify):     Patient:  Anjali Quezada  [id number]     Chief Complaint: No chief complaint on file. HPI: Mr. Emilie Frankel is a 70 y.o. male with a history of hypertension diabetes and prior tobacco usage also known carotid artery stenosis size doctor on Friday feeling poorly recently. Apparently he is in atrial fibrillation at that time admitted to the hospital at [place] and evaluated over the weekend transferred here this morning. Echocardiogram at their hospital today showed a normal ejection fraction 56% and moderate mitral and tricuspid regurgitation. He was placed on BiPAP upon arrival here even though his CO2 was only 27. He looks comfortable and feels relaxed and does not appear to be particularly dyspneic at this time. The BiPAP was taken off to complete this interview and he had no difficulty whatsoever off of it on a nasal cannula. No other cardiac history may have had an episode of atrial fibrillation the year ago not really clear about this. There are no records if that help us to differentiate whether or not he's been in atrial fibrillation chronically are intermittently. Denies exertional angina or limiting dyspnea at home. Review of Systems:  Review of Systems   Constitutional: Negative. Negative for chills, fever and unexpected weight change. HENT: Negative. Eyes: Negative. Respiratory: Negative. Negative for shortness of breath. Cardiovascular: Negative. Negative for chest pain. Gastrointestinal: Negative. Negative for diarrhea, nausea and vomiting. Endocrine: Negative. Genitourinary: Negative. Musculoskeletal: Negative. Skin: Negative. Neurological: Negative. All other systems reviewed and are negative.       Cardiac Specific Data:  Specialty Problems        Cardiology Problems Hypertension        Carotid artery stenosis        PVD (peripheral vascular disease) ([facility])        * (Principal) Atrial fibrillation with RVR ([facility])        Carotid artery occlusion              Past Medical History:  Past Medical History:   Diagnosis Date    Atherosclerosis of native arteries of the extremities with intermittent claudication     Blood circulation, collateral     Carotid artery occlusion     Chronic kidney disease     Depression     GERD (gastroesophageal reflux disease)     Hypertension     Type II or unspecified type diabetes mellitus without mention of complication, not stated as uncontrolled         Past Surgical History:  Past Surgical History:   Procedure Laterality Date    HEMORRHOID SURGERY  1973    HERNIA REPAIR         Past Family History:  Family History   Problem Relation Age of Onset    High Blood Pressure Mother     Heart Disease Mother     High Blood Pressure Father     Heart Disease Father     Stroke Father        Past Social History:  Social History     Socioeconomic History    Marital status:      Spouse name: Not on file    Number of children: Not on file    Years of education: Not on file    Highest education level: Not on file   Occupational History    Not on file   Social Needs    Financial resource strain: Not on file    Food insecurity:     Worry: Not on file     Inability: Not on file    Transportation needs:     Medical: Not on file     Non-medical: Not on file   Tobacco Use    Smoking status: Former Smoker     Packs/day: 1.00     Years: 30.00     Pack years: 30.00     Last attempt to quit: 1/10/2005     Years since quittin.2    Smokeless tobacco: Former User     Types: Snuff     Quit date:     Tobacco comment: quit . Substance and Sexual Activity    Alcohol use: Yes     Comment: OCC. BEER    Drug use: No    Sexual activity: Not on file   Lifestyle    Physical activity:     Days per week: Not on file     Minutes per session: Not on file    Stress: Not on file   Relationships    Social connections:     Talks on phone: Not on file     Gets together: Not on file     Attends Alevism service: Not on file     Active member of club or organization: Not on file     Attends meetings of clubs or organizations: Not on file     Relationship status: Not on file    Intimate partner violence:     Fear of current or ex partner: Not on file     Emotionally abused: Not on file     Physically abused: Not on file     Forced sexual activity: Not on file   Other Topics Concern    Not on file   Social History Narrative    Worked on [employer] most of his life     twice and     He has 2 sons and one daughter    Never in the [employer] high school    Attends [address] one pack per day quit in 2006 denies alcohol consumption or substance usage    Physically sedentary       Allergies:  No Known Allergies    Home Meds:  Prior to Admission medications    Medication Sig Start Date End Date Taking? Authorizing Provider   amLODIPine (NORVASC) 5 MG tablet Take 5 mg by mouth daily    Historical Provider, MD   potassium chloride (KLOR-CON M) 20 MEQ extended release tablet Take 20 mEq by mouth daily    Historical Provider, MD   sotalol (BETAPACE) 80 MG tablet Take 80 mg by mouth daily    Historical Provider, MD   folic acid (FOLVITE) 1 MG tablet Take 1 mg by mouth daily    Historical Provider, MD   glipiZIDE (GLUCOTROL) 10 MG tablet Take 10 mg by mouth 2 times daily (before meals)    Historical Provider, MD   aspirin 81 MG tablet Take 81 mg by mouth daily    Historical Provider, MD   sulfADIAZINE 500 MG tablet Take 500 mg by mouth 3 times daily. Historical Provider, MD   pravastatin (PRAVACHOL) 20 MG tablet Take 20 mg by mouth daily.     Historical Provider, MD   metformin (GLUCOPHAGE-XR) 500 MG XR tablet Take 1,000 mg by mouth 2 times daily     Historical Provider, MD   omeprazole (PRILOSEC) 20 MG capsule Take 20 mg by mouth daily. Historical Provider, MD   terazosin (HYTRIN) 10 MG capsule Take 10 mg by mouth nightly. Historical Provider, MD   lisinopril (PRINIVIL;ZESTRIL) 20 MG tablet Take 20 mg by mouth daily. Historical Provider, MD       Current Meds:   sodium chloride flush  10 mL Intravenous 2 times per day    enoxaparin  1 mg/kg Subcutaneous BID    insulin glargine  20 Units Subcutaneous Nightly    insulin lispro  5 Units Subcutaneous TID WC    insulin lispro  0-6 Units Subcutaneous TID WC    insulin lispro  0-3 Units Subcutaneous Nightly    lisinopril  20 mg Oral Daily    [START ON 4/9/2019] pantoprazole  40 mg Oral QAM AC    pravastatin  20 mg Oral Daily    sotalol  80 mg Oral BID    doxazosin  8 mg Oral Nightly    aspirin  81 mg Oral Daily    diltiazem  120 mg Oral BID       Current Infused Meds:   dextrose      diltiazem         Physical Exam:  Vitals:    04/08/19 1425   BP: (!) 142/68   Pulse: 83   Resp: 18   Temp: 98.9 °F (37.2 °C)   SpO2: 93%     No intake or output data in the 24 hours ending 04/08/19 1739  Estimated body mass index is 26.28 kg/m² as calculated from the following:    Height as of this encounter: 5' 7\" (1.702 m). Weight as of this encounter: 167 lb 12.8 oz (76.1 kg). Physical Exam   Constitutional: He is oriented to person, place, and time. He appears well-developed and well-nourished. No distress. HENT:   Head: Normocephalic and atraumatic. Eyes: Pupils are equal, round, and reactive to light. EOM are normal. No scleral icterus. Neck: Normal range of motion. Neck supple. No JVD present. Carotid bruit is not present. No tracheal deviation present. No thyromegaly present. No carotid bruits auscultated   Cardiovascular: Normal rate and normal heart sounds. An irregular rhythm present. Exam reveals no gallop and no friction rub. No murmur heard. Pulmonary/Chest: Effort normal and breath sounds normal. No stridor. No respiratory distress. He has no wheezes. He has no rales. He exhibits no tenderness. Abdominal: Soft. Bowel sounds are normal. He exhibits no distension and no mass. There is no tenderness. There is no rebound and no guarding. No hernia. Musculoskeletal: He exhibits no edema. Lymphadenopathy:     He has no cervical adenopathy. Neurological: He is alert and oriented to person, place, and time. No cranial nerve deficit or sensory deficit. He exhibits normal muscle tone. Coordination normal.   Skin: Skin is warm and dry. He is not diaphoretic. Psychiatric: He has a normal mood and affect. His behavior is normal. Judgment and thought content normal.   Vitals reviewed. Labs:  No results for input(s): WBC, HGB, PLT in the last 72 hours. Recent Labs     04/08/19  1449 04/08/19  1505   NA  --  140   K 4.4 4.5   CL  --  104   CO2  --  21*   BUN  --  21   CREATININE  --  1.2   LABGLOM  --  59*   CALCIUM  --  8.7*       CK, CKMB, Troponin: @LABRCNT (CKTOTAL:3, CKMB:3, TROPONINI:3)@    Last 3 BNP:  No results for input(s): BNP in the last 72 hours. IMAGING:  Xr Chest Portable    Result Date: 4/8/2019  Exam:   XR CHEST PORTABLE  Date:  4/8/2019 History:  Male, age  70 years; shortness of breath COMPARISON:  None. Findings : Small to moderate left and trace right pleural effusion. Mild cardiomegaly and central pulmonary venous congestion. No measurable pneumothorax. No acute osseous pathology. Impression: Overall picture concerning for fluid overload. Clinical correlation to exclude superimposed infection is recommended given the asymmetry of the findings. Signed by Dr Segundo Kim on 4/8/2019 5:09 PM      Assessment:  1. Atrial fibrillation  2. Echocardiogram today normal ejection fraction 66% moderate mitral and tricuspid regurgitation  3. History of tobacco abuse discontinued 2006  4. Hypertension  5. Diabetes  6.  No carotid artery stenosis 100% occlusion on the right per patient  7. Depression        Recommendations:  1. Check a pro BNP level  2. Check a d-dimer  3. Obtain an echocardiogram  4. Recommend Cardizem  mg by mouth twice a day  5. Lovenox 80 mg subcutaneous every 12 hours ordered  6. Continue sotalol 80 by mouth twice a day  7.  Further comments to follow

## 2019-04-08 NOTE — H&P
[facility]      History & Physical    [id number]  PCP: Mello Fountain MD    Date of Admission:4/8/2019    Patient:  Holli Hernandez  MRN: [id number]    Date of Service: Pt seen/examined on4/8/2019 and Admitted to Inpatient with expected LOS greater than two midnights due to medical therapy. CHIEF COMPLAINT:  Shortness of Breath and Tachycardia    History Obtained From:  patient, electronic medical record  Primary Care Physician: Mello Fountain MD    HISTORY OF PRESENT ILLNESS: :    The patient is a 70 y.o. male with a history of HTN, DMT2, A-Fib, [facility] on account of persistent A-fib RVR (despite maximally dosed Cardizem ggt). Patient initially sent from his PCP's office and presented with worsening SOA and palpitation. Patient was reportedly admitted to the OSH from Friday (04/05/2019) and subsequently transferred to [facility] due to no improvement in his HR and reportedly remained in Afib. Started on therapeutic Enoxaparin. Echo from OSH. PAST MEDICAL & SURGICAL HISTORY    Past Medical History:      Diagnosis Date    Atherosclerosis of native arteries of the extremities with intermittent claudication     Carotid artery occlusion     Depression     GERD (gastroesophageal reflux disease)     Hypertension     Type II or unspecified type diabetes mellitus without mention of complication, not stated as uncontrolled          Past Surgical History:      Procedure Laterality Date    HERNIA REPAIR            SOCIAL & FAMILY HISTORY:    Social History:   TOBACCO:   reports that he quit smoking about 17 years ago. He has a 30.00 pack-year smoking history. He quit smokeless tobacco use about 12 years ago. His smokeless tobacco use included snuff. ETOH:   reports that he drinks alcohol.     Family History:       Problem Relation Age of Onset    High Blood Pressure Mother     Heart Disease Mother     High Blood Pressure Father     Heart Disease Father    Deandra Correa Stroke Father         MEDICATIONS:    Medications Prior to Admission:    Prior to Admission medications    Medication Sig Start Date End Date Taking? Authorizing Provider   amLODIPine (NORVASC) 5 MG tablet Take 5 mg by mouth daily    Historical Provider, MD   potassium chloride (KLOR-CON M) 20 MEQ extended release tablet Take 20 mEq by mouth daily    Historical Provider, MD   sotalol (BETAPACE) 80 MG tablet Take 80 mg by mouth daily    Historical Provider, MD   folic acid (FOLVITE) 1 MG tablet Take 1 mg by mouth daily    Historical Provider, MD   glipiZIDE (GLUCOTROL) 10 MG tablet Take 10 mg by mouth 2 times daily (before meals)    Historical Provider, MD   aspirin 81 MG tablet Take 81 mg by mouth daily    Historical Provider, MD   sulfADIAZINE 500 MG tablet Take 500 mg by mouth 3 times daily. Historical Provider, MD   pravastatin (PRAVACHOL) 20 MG tablet Take 20 mg by mouth daily. Historical Provider, MD   metformin (GLUCOPHAGE-XR) 500 MG XR tablet Take 1,000 mg by mouth 2 times daily     Historical Provider, MD   omeprazole (PRILOSEC) 20 MG capsule Take 20 mg by mouth daily. Historical Provider, MD   terazosin (HYTRIN) 10 MG capsule Take 10 mg by mouth nightly. Historical Provider, MD   lisinopril (PRINIVIL;ZESTRIL) 20 MG tablet Take 20 mg by mouth daily. Historical Provider, MD        ALLERGIES:    Allergies:  Patient has no known allergies. REVIEW OF SYSTEMS :    Review of Systems  14 point review of systems is negative except as specifically addressed above. PHYSICAL EXAM:    Physical Exam:    Vitals: There were no vitals taken for this visit. Physical Exam  General appearance: No apparent distress, appears stated age and cooperative. Well developed and well groomed. HEENT: atraumatic, eyes with clear conjunctiva and normal lids, pupils and irises normal, external ears and nose are normal, lips normal. Hearing Intact. Lips with No blisters.   Neck: Supple, with full range of motion. No jugular venous distention. Trachea midline. Thyroid no masses noted. No lympadenopathy or bruit. Lungs: Patient in no acute respiratory distress, speaking in full sentences, however, increased work of breathing with accessory muscle use, \"clear to auscultation bilaterally\" without rales, rhonchi or wheezes  Heart: irregularly irregular rhythm and S1, S2 normal  Abdomen: soft, non-tender; non-distended normal bowel sounds no masses, no organomegaly  Musculoskeletal: ROM Intact in B/L Upper and LE. No joint tenderness or Deformity throughout. Extremities: extremities normal, atraumatic, no cyanosis or edema  Neurologic: No focal neurologic deficits, normal sensation, CN: II-XII intact, grossly non-focal.  Skin: Skin color, texture, turgor normal. No rashes or lesions  Psychiatric: Alert and oriented, affect and mood appropriate, thought content appropriate, normal insight. Intact memory. DIAGNOSTIC STUDIES:    I have reviewedLaboratory and Imaging data report today. No results for input(s): WBC, HGB, PLT in the last 72 hours. Recent Labs     04/08/19  1449   K 4.4     Troponin T: No results for input(s): TROPONINI in the last 72 hours. Pro-BNP: No results found for: BNP  Lactate: No results found for: LACTA      ABGs:   Lab Results   Component Value Date    PHART 7.460 04/08/2019    PO2ART 61.0 04/08/2019    RFH3LQN 27.0 04/08/2019     INR: No results for input(s): INR in the last 72 hours. TSH: No results found for: TSH, TSHREFLEX  URINALYSIS:No results for input(s): NITRITE, COLORU, PHUR, LABCAST, WBCUA, RBCUA, MUCUS, TRICHOMONAS, YEAST, BACTERIA, CLARITYU, SPECGRAV, LEUKOCYTESUR, UROBILINOGEN, BILIRUBINUR, BLOODU, GLUCOSEU, AMORPHOUS in the last 72 hours. Invalid input(s): Francisco Dyers / IMAGING REPORTS:     No results found.              PATIENT SUMMARY      ASSESSMENT / IMPRESSION & PLAN:          Patient Active Problem List   Diagnosis Code    Depression F32.9    Type 2 diabetes mellitus ([facility]) E11.9    Hypertension I10    Carotid artery stenosis I65.29    PVD (peripheral vascular disease) ([facility]) I73.9    Carotid artery occlusion I65.29    Atrial fibrillation with RVR ([facility]) I48.91       Principal Problem:    Atrial fibrillation with RVR ([facility])  Active Problems:    Type 2 diabetes mellitus (HCC)    PVD (peripheral vascular disease) ([facility])    Carotid artery occlusion  Resolved Problems:    * No resolved hospital problems. *          SOB  Atrial fibrillation/flutter with RVR  · -Continue Diltiazem ggt  · --> Cardiology Consult  · --> Continue Therapeutic Enoxaparin     · ABG  · BiPAP to help with work of breathing  · Portable CXR        # History of Type 2 Diabetes Mellitus   · - Holding All Home PO Meds (Metformin and Glipizide) while inpatient   · Inpatient Regimens to include;  · - Insulin Glargine (Lantus) 20 units subcu nightly  · - Insulin Lispro (Humalog) 5 units subcu pre-meal 3 times a day  · - Insulin Lispro (Humalog) on a Low dose sliding scale  · - Monitor POC glucose, and adjusts insulin regimen accordingly based on daily insulin requirement. · - Hemoglobin A1C   ·               Continue management of other chronic medical conditions - Please see orders above         CONSULTS:    IP CONSULT TO SOCIAL WORK  IP CONSULT TO CARDIOLOGY        INPATIENT CHECKLIST:      Nutrition: DIET CARB CONTROL;    Prophylaxis Orders:   VTE - Therapeutic Enoxaparin     CODE STATUS: FULL  ISOLATION:       DISCHARGE PLAN: tbd          Electronically signed by   Clint Doran MD, MPH  Internal Medicine Hospitalist   4/8/2019 2:55 PM      EMR Dragon/Transcription disclaimer:   Much of this encounter note is an electronic transcription/translation of spoken language to printed text.  The electronic translation of spoken language may permit erroneous, or at times, nonsensical words or phrases to be inadvertently transcribed; although attempts have made to review the note for such errors, some may still exist.

## 2019-04-08 NOTE — LETTER
including skilled nursing facilities, home health agencies, inpatient rehabilitation facilities, and long term care hospitals. [facility] is working closely with the doctors and other health care providers that care for you during and following your hospital stay and for a period of time after you leave the hospital. By working together, the health care providers are trying to more efficiently provide well-managed, high quality, patient-centered care as you undergo treatment. Hospitals, doctors, and other health care providers that care for you following a hospital stay may receive an additional payment for providing better, more coordinated health care. Medicare will monitor your care to make sure you and others get high quality care. Your feedback is important     Medicare may also ask you to answer a survey about the services and care you received from [employer] will be mailed to you. Your feedback will improve care for all people with Medicare who receive care from [facility]. Completion of this survey is optional.     Get more information     For more information about the Bundled Payments for [facility], you can:    · Visit the CMS BPCI Advanced Website at http://worley-galvan.net/ initiatives/bpci-advanced   · Call the [facility] team at (814) 560-6885. · Call 1-800-MEDICARE (1-408.800.2023). TTY users can call 0-365.206.1189     If you have concerns or complaints about your care, talk to your health care provider, or contact your Beneficiary and Family Centered Quality Improvement Organization [facility]). To get your formerly [facility]'s phone number, visit Medicare.gov/contacts or call 1-800-MEDICARE. · To find a different hospital, visit www. hospitalcompare.[facility].gov or call 1-800[employer]op MEDICARE (1-128.145.5376). TTY users should call 2-629.589.9257.

## 2019-04-09 ENCOUNTER — APPOINTMENT (OUTPATIENT)
Dept: CT IMAGING | Age: 79
DRG: 308 | End: 2019-04-09
Attending: INTERNAL MEDICINE
Payer: MEDICARE

## 2019-04-09 ENCOUNTER — APPOINTMENT (OUTPATIENT)
Dept: NUCLEAR MEDICINE | Age: 79
DRG: 308 | End: 2019-04-09
Attending: INTERNAL MEDICINE
Payer: MEDICARE

## 2019-04-09 PROBLEM — E83.51 HYPOCALCEMIA: Status: ACTIVE | Noted: 2019-04-09

## 2019-04-09 PROBLEM — D53.9 MACROCYTIC ANEMIA: Status: ACTIVE | Noted: 2019-04-09

## 2019-04-09 PROBLEM — R79.89 ELEVATED D-DIMER: Status: ACTIVE | Noted: 2019-04-09

## 2019-04-09 PROBLEM — J18.9 PNEUMONIA DUE TO INFECTIOUS ORGANISM: Status: ACTIVE | Noted: 2019-04-09

## 2019-04-09 LAB
ANION GAP SERPL CALCULATED.3IONS-SCNC: 12 MMOL/L (ref 7–19)
BASOPHILS ABSOLUTE: 0.1 K/UL (ref 0–0.2)
BASOPHILS RELATIVE PERCENT: 0.4 % (ref 0–1)
BUN BLDV-MCNC: 18 MG/DL (ref 8–23)
CALCIUM SERPL-MCNC: 8.3 MG/DL (ref 8.8–10.2)
CHLORIDE BLD-SCNC: 110 MMOL/L (ref 98–111)
CO2: 20 MMOL/L (ref 22–29)
CREAT SERPL-MCNC: 1.2 MG/DL (ref 0.5–1.2)
D DIMER: 0.71 UG/ML FEU (ref 0–0.48)
EOSINOPHILS ABSOLUTE: 0.1 K/UL (ref 0–0.6)
EOSINOPHILS RELATIVE PERCENT: 0.7 % (ref 0–5)
GFR NON-AFRICAN AMERICAN: 59
GLUCOSE BLD-MCNC: 101 MG/DL (ref 70–99)
GLUCOSE BLD-MCNC: 132 MG/DL (ref 70–99)
GLUCOSE BLD-MCNC: 175 MG/DL (ref 70–99)
GLUCOSE BLD-MCNC: 207 MG/DL (ref 74–109)
HCT VFR BLD CALC: 28.7 % (ref 42–52)
HEMOGLOBIN: 9.5 G/DL (ref 14–18)
LYMPHOCYTES ABSOLUTE: 2.5 K/UL (ref 1.1–4.5)
LYMPHOCYTES RELATIVE PERCENT: 22.1 % (ref 20–40)
MAGNESIUM: 1.8 MG/DL (ref 1.6–2.4)
MCH RBC QN AUTO: 31.8 PG (ref 27–31)
MCHC RBC AUTO-ENTMCNC: 33.1 G/DL (ref 33–37)
MCV RBC AUTO: 96 FL (ref 80–94)
MONOCYTES ABSOLUTE: 1.1 K/UL (ref 0–0.9)
MONOCYTES RELATIVE PERCENT: 9.3 % (ref 0–10)
NEUTROPHILS ABSOLUTE: 7.6 K/UL (ref 1.5–7.5)
NEUTROPHILS RELATIVE PERCENT: 66.5 % (ref 50–65)
PDW BLD-RTO: 12.8 % (ref 11.5–14.5)
PERFORMED ON: ABNORMAL
PLATELET # BLD: 175 K/UL (ref 130–400)
PMV BLD AUTO: 11.6 FL (ref 9.4–12.4)
POTASSIUM REFLEX MAGNESIUM: 3.5 MMOL/L (ref 3.5–5)
RBC # BLD: 2.99 M/UL (ref 4.7–6.1)
SODIUM BLD-SCNC: 142 MMOL/L (ref 136–145)
TROPONIN: <0.01 NG/ML (ref 0–0.03)
TROPONIN: <0.01 NG/ML (ref 0–0.03)
WBC # BLD: 11.4 K/UL (ref 4.8–10.8)

## 2019-04-09 PROCEDURE — 2580000003 HC RX 258: Performed by: FAMILY MEDICINE

## 2019-04-09 PROCEDURE — 36415 COLL VENOUS BLD VENIPUNCTURE: CPT

## 2019-04-09 PROCEDURE — 84484 ASSAY OF TROPONIN QUANT: CPT

## 2019-04-09 PROCEDURE — 2580000003 HC RX 258: Performed by: INTERNAL MEDICINE

## 2019-04-09 PROCEDURE — 6360000004 HC RX CONTRAST MEDICATION: Performed by: INTERNAL MEDICINE

## 2019-04-09 PROCEDURE — 2500000003 HC RX 250 WO HCPCS: Performed by: INTERNAL MEDICINE

## 2019-04-09 PROCEDURE — 3430000000 HC RX DIAGNOSTIC RADIOPHARMACEUTICAL: Performed by: INTERNAL MEDICINE

## 2019-04-09 PROCEDURE — 6360000002 HC RX W HCPCS: Performed by: INTERNAL MEDICINE

## 2019-04-09 PROCEDURE — 93017 CV STRESS TEST TRACING ONLY: CPT

## 2019-04-09 PROCEDURE — 78452 HT MUSCLE IMAGE SPECT MULT: CPT

## 2019-04-09 PROCEDURE — 6370000000 HC RX 637 (ALT 250 FOR IP): Performed by: INTERNAL MEDICINE

## 2019-04-09 PROCEDURE — 83735 ASSAY OF MAGNESIUM: CPT

## 2019-04-09 PROCEDURE — 82948 REAGENT STRIP/BLOOD GLUCOSE: CPT

## 2019-04-09 PROCEDURE — 2140000000 HC CCU INTERMEDIATE R&B

## 2019-04-09 PROCEDURE — 71275 CT ANGIOGRAPHY CHEST: CPT

## 2019-04-09 PROCEDURE — 85025 COMPLETE CBC W/AUTO DIFF WBC: CPT

## 2019-04-09 PROCEDURE — 2700000000 HC OXYGEN THERAPY PER DAY

## 2019-04-09 PROCEDURE — 99231 SBSQ HOSP IP/OBS SF/LOW 25: CPT | Performed by: INTERNAL MEDICINE

## 2019-04-09 PROCEDURE — 99233 SBSQ HOSP IP/OBS HIGH 50: CPT | Performed by: FAMILY MEDICINE

## 2019-04-09 PROCEDURE — 6360000002 HC RX W HCPCS: Performed by: FAMILY MEDICINE

## 2019-04-09 PROCEDURE — A9500 TC99M SESTAMIBI: HCPCS | Performed by: INTERNAL MEDICINE

## 2019-04-09 PROCEDURE — 85379 FIBRIN DEGRADATION QUANT: CPT

## 2019-04-09 PROCEDURE — 2500000003 HC RX 250 WO HCPCS: Performed by: FAMILY MEDICINE

## 2019-04-09 PROCEDURE — 80048 BASIC METABOLIC PNL TOTAL CA: CPT

## 2019-04-09 RX ADMIN — REGADENOSON 0.4 MG: 0.08 INJECTION, SOLUTION INTRAVENOUS at 15:00

## 2019-04-09 RX ADMIN — DOXYCYCLINE 100 MG: 100 INJECTION, POWDER, LYOPHILIZED, FOR SOLUTION INTRAVENOUS at 18:32

## 2019-04-09 RX ADMIN — SOTALOL HYDROCHLORIDE 80 MG: 80 TABLET ORAL at 21:37

## 2019-04-09 RX ADMIN — PANTOPRAZOLE SODIUM 40 MG: 40 TABLET, DELAYED RELEASE ORAL at 06:15

## 2019-04-09 RX ADMIN — DILTIAZEM HYDROCHLORIDE 120 MG: 120 CAPSULE, COATED, EXTENDED RELEASE ORAL at 08:14

## 2019-04-09 RX ADMIN — DILTIAZEM HYDROCHLORIDE 5 MG/HR: 5 INJECTION INTRAVENOUS at 18:29

## 2019-04-09 RX ADMIN — LISINOPRIL 20 MG: 20 TABLET ORAL at 08:14

## 2019-04-09 RX ADMIN — ENOXAPARIN SODIUM 80 MG: 80 INJECTION SUBCUTANEOUS at 17:24

## 2019-04-09 RX ADMIN — TETRAKIS(2-METHOXYISOBUTYLISOCYANIDE)COPPER(I) TETRAFLUOROBORATE 10 MILLICURIE: 1 INJECTION, POWDER, LYOPHILIZED, FOR SOLUTION INTRAVENOUS at 15:50

## 2019-04-09 RX ADMIN — ASPIRIN 81 MG: 81 TABLET, COATED ORAL at 08:14

## 2019-04-09 RX ADMIN — TETRAKIS(2-METHOXYISOBUTYLISOCYANIDE)COPPER(I) TETRAFLUOROBORATE 30 MILLICURIE: 1 INJECTION, POWDER, LYOPHILIZED, FOR SOLUTION INTRAVENOUS at 15:50

## 2019-04-09 RX ADMIN — CEFTRIAXONE 1 G: 1 INJECTION, POWDER, FOR SOLUTION INTRAMUSCULAR; INTRAVENOUS at 17:32

## 2019-04-09 RX ADMIN — DOXAZOSIN 8 MG: 4 TABLET ORAL at 17:24

## 2019-04-09 RX ADMIN — IOPAMIDOL 90 ML: 755 INJECTION, SOLUTION INTRAVENOUS at 10:51

## 2019-04-09 RX ADMIN — PRAVASTATIN SODIUM 20 MG: 20 TABLET ORAL at 08:14

## 2019-04-09 RX ADMIN — Medication 10 ML: at 21:44

## 2019-04-09 RX ADMIN — ENOXAPARIN SODIUM 80 MG: 80 INJECTION SUBCUTANEOUS at 06:15

## 2019-04-09 RX ADMIN — INSULIN LISPRO 5 UNITS: 100 INJECTION, SOLUTION INTRAVENOUS; SUBCUTANEOUS at 18:05

## 2019-04-09 RX ADMIN — INSULIN LISPRO 1 UNITS: 100 INJECTION, SOLUTION INTRAVENOUS; SUBCUTANEOUS at 21:37

## 2019-04-09 RX ADMIN — INSULIN GLARGINE 20 UNITS: 100 INJECTION, SOLUTION SUBCUTANEOUS at 21:37

## 2019-04-09 RX ADMIN — DILTIAZEM HYDROCHLORIDE 120 MG: 120 CAPSULE, COATED, EXTENDED RELEASE ORAL at 21:37

## 2019-04-09 ASSESSMENT — PAIN SCALES - GENERAL
PAINLEVEL_OUTOF10: 0

## 2019-04-09 NOTE — PLAN OF CARE
Problem: Falls - Risk of:  Goal: Will remain free from falls  Description  Will remain free from falls  Outcome: Ongoing  Goal: Absence of physical injury  Description  Absence of physical injury  Outcome: Ongoing   Electronically signed by Audra Rogers RN on 4/9/2019 at 1:48 AM

## 2019-04-09 NOTE — PROGRESS NOTES
Cardiology Daily Note Cheryl Lozada MD      Patient:  Holli Hernandez  [id number]    Patient Active Problem List    Diagnosis Date Noted    Atrial fibrillation with RVR ([facility] 04/08/2019     Priority: Low    Carotid artery occlusion      Priority: Low    PVD (peripheral vascular disease) ([facility] 11/09/2016     Priority: Low    Carotid artery stenosis 04/11/2012     Priority: Low    Depression      Priority: Low    Type 2 diabetes mellitus ([facility]      Priority: Low    Hypertension      Priority: Low       Admit Date:  4/8/2019    Admission Problem List: Present on Admission:   Carotid artery occlusion   Atrial fibrillation with RVR ([facility]   Type 2 [facility] mellitus [facility]   PVD (peripheral vascular disease) ([facility]      Cardiac Specific Data:  Specialty Problems        Cardiology Problems    Hypertension        Carotid artery stenosis        PVD (peripheral vascular disease) ([facility])        * (Principal) Atrial fibrillation with RVR (H[facility])        Carotid artery occlusion              Subjective:  Mr. Benitez Schilling seen today remains in atrial fibrillation. Feels well. No new complaints. Troponins were negative. Objective:   /79   Pulse 96   Temp 98.3 °F (36.8 °C) (Temporal)   Resp 20   Ht 5' 7\" (1.702 m)   Wt 168 lb 2 oz (76.3 kg)   SpO2 92%   BMI 26.33 kg/m²       Intake/Output Summary (Last 24 hours) at 4/9/2019 1310  Last data filed at 4/9/2019 0811  Gross per 24 hour   Intake 390 ml   Output 825 ml   Net -435 ml       Prior to Admission medications    Medication Sig Start Date End Date Taking?  Authorizing Provider   amLODIPine (NORVASC) 5 MG tablet Take 5 mg by mouth daily    Historical Provider, MD   potassium chloride (KLOR-CON M) 20 MEQ extended release tablet Take 20 mEq by mouth daily    Historical Provider, MD   sotalol (BETAPACE) 80 MG tablet Take 80 mg by mouth daily    Historical Provider, MD   folic acid (FOLVITE) 1 MG tablet Take 1 mg by mouth daily    Historical Provider, MD   glipiZIDE (GLUCOTROL) 10 MG tablet Take 10 mg by mouth 2 times daily (before meals)    Historical Provider, MD   aspirin 81 MG tablet Take 81 mg by mouth daily    Historical Provider, MD   sulfADIAZINE 500 MG tablet Take 500 mg by mouth 3 times daily. Historical Provider, MD   pravastatin (PRAVACHOL) 20 MG tablet Take 20 mg by mouth daily. Historical Provider, MD   metformin (GLUCOPHAGE-XR) 500 MG XR tablet Take 1,000 mg by mouth 2 times daily     Historical Provider, MD   omeprazole (PRILOSEC) 20 MG capsule Take 20 mg by mouth daily. Historical Provider, MD   terazosin (HYTRIN) 10 MG capsule Take 10 mg by mouth nightly. Historical Provider, MD   lisinopril (PRINIVIL;ZESTRIL) 20 MG tablet Take 20 mg by mouth daily.     Historical Provider, MD        sodium chloride flush  10 mL Intravenous 2 times per day    enoxaparin  1 mg/kg Subcutaneous BID    insulin glargine  20 Units Subcutaneous Nightly    insulin lispro  5 Units Subcutaneous TID WC    insulin lispro  0-6 Units Subcutaneous TID WC    insulin lispro  0-3 Units Subcutaneous Nightly    lisinopril  20 mg Oral Daily    pantoprazole  40 mg Oral QAM AC    pravastatin  20 mg Oral Daily    sotalol  80 mg Oral BID    aspirin  81 mg Oral Daily    diltiazem  120 mg Oral BID    doxazosin  8 mg Oral Nightly       TELEMETRY: Atrial fibrillation     Physical Exam:      Physical Exam      General:  Awake, alert, NAD  Skin:  Warm and dry  Neck:  no jvd , no carotid bruits  Chest:  Clear to auscultation, no wheezing or rales  Cardiovascular:  iRRR Z2S2 no murmurs, clicks, gallups, or rubs  Abdomen:  Soft nontender, nondistended, bowel sounds present  Extremities:  Edema: none      Lab Data:  CBC:   Recent Labs     04/09/19 0134   WBC 11.4*   HGB 9.5*   HCT 28.7*   MCV 96.0*        BMP:   Recent Labs     04/08/19  1449 04/08/19  1505 04/09/19 0134   NA  --  140 142   K 4.4 4.5 3.5   CL  --  104 110   CO2  --  21* 20*   BUN  --  21 18   CREATININE  --  1.2 1. 2     LIVER PROFILE:   Recent Labs     04/08/19  1505   AST 17   ALT 18   BILITOT 0.9   ALKPHOS 88     PT/INR: No results for input(s): PROTIME, INR in the last 72 hours. APTT: No results for input(s): APTT in the last 72 hours. BNP:  No results for input(s): BNP in the last 72 hours. CK, CKMB, Troponin: @LABRCNT (CKTOTAL:3, CKMB:3, TROPONINI:3)@    IMAGING:  Ct Chest Wo Contrast    Result Date: 4/8/2019  CT CHEST WO CONTRAST 4/8/2019 6:00 PM HISTORY: Dyspnea. Shortness of breath. COMPARISON: None DLP: 847 mGy cm. Automated exposure control was utilized to diminish patient radiation dose. TECHNIQUE: Serial helical tomographic images of the chest were acquired. Bone and soft tissue algorithms were provided. Coronal reformatted images were also provided for review. FINDINGS: Neck base: The imaged portion of the neck and thyroid gland is unremarkable. Lungs: There is mild scarring within the apices. Moderate bilateral pleural effusions are present. There is some compressive atelectasis related to these effusions but there is consolidation within both lower lobes with extensive air bronchograms suggesting bilateral lower lobe pneumonia. Some patchy groundglass disease is noted scattered within both lungs suggesting some localized pneumonitis or interstitial edema. . . The trachea and bronchial tree are patent. Heart: There is mild cardiomegaly. No evidence of pericardial effusion. Coronary calcifications noted in the LAD and circumflex distributions. . . Great vessels: The great vessels are normal in caliber. Lymph nodes: There are multiple lymph nodes within the middle mediastinal hien groups. The majority of these are less than 1 cm in short axis. There is a subcarinal node measuring 16 mm in short axis. A pretracheal node measures 8 mm in short axis. AP window and prevascular nodes are also present all of which measure less than 10 mm short axis. These may be reactive in nature and would warrant follow-up. Sourav Walter Skeletal and soft tissues: The osseous structures of the thorax and surrounding soft tissues demonstrate no worrisome lesions or acute process. Upper abdomen: Multiple hepatic and splenic granulomas are present. . The adrenals are unremarkable. 1. Moderate bilateral pleural effusions are present. There is bilateral lower lobe pneumonia. Patchy groundglass disease is also noted scattered within both lungs suggesting an element of interstitial edema or pneumonitis. There is mild cardiomegaly. 2. Coronary calcifications in the LAD and circumflex distribution. 3. There are multiple lymph nodes within the middle mediastinal hien groups. The majority of these are less than 10 mm in short axis favoring benignity but a few measure more than 10 mm. These may be reactive in nature related to the ongoing bilateral lower lobe pneumonia but would warrant follow-up. There is evidence of remote granulomatous disease. . Signed by Dr Barbara Zhu on 4/8/2019 11:50 PM    Xr Chest Portable    Result Date: 4/8/2019  Exam:   XR CHEST PORTABLE  Date:  4/8/2019 History:  Male, age  70 years; shortness of breath COMPARISON:  None. Findings : Small to moderate left and trace right pleural effusion. Mild cardiomegaly and central pulmonary venous congestion. No measurable pneumothorax. No acute osseous pathology. Impression: Overall picture concerning for fluid overload. Clinical correlation to exclude superimposed infection is recommended given the asymmetry of the findings. Signed by Dr Delano Dawson on 4/8/2019 5:09 PM        Assessment:  1. Atrial fibrillation  2. Echocardiogram today normal ejection fraction 66% moderate mitral and tricuspid regurgitation  3. History of tobacco abuse discontinued 2006  4. Hypertension  5. Diabetes  6. No carotid artery stenosis 100% occlusion on the right per patient  7. Depression          Plan:  1. Continue current treatment  2.  Suggest Lexiscan pharmacologic stress test    Miley Johnson MD 4/9/2019 1:10 PM

## 2019-04-09 NOTE — PROGRESS NOTES
Hospitalist Progress Note  4/9/2019 5:20 PM  Subjective:   Admit Date: 4/8/2019  PCP: Madison Cooper MD    Chief Complaint: shortness of breath    Subjective: Patient is feeling well today, denies new problems. Mittie Habermann reported as showing no ischemia. Remains tachycardic in 120s on diltiazem drip 2.5 mg/hour, sotalol, PO diltiazem. History is otherwise unchanged. Cumulative Hospital History:   4-8: Transferred due to A fib with RVR and shortness of breath. Cardiology consult, placed on diltiazem drip.  4-9: Lexiscan negative. Still tachycardic. On review of CTA report, patient has infiltrates concerning for pneumonia. Azithromycin and doxycycline. ROS: 14 point review of systems is negative except as specifically addressed above. DIET CARB CONTROL; Carb Control: 3 carb choices (45 gms)/meal; No Added Salt (3-4 GM);  No Caffeine    Intake/Output Summary (Last 24 hours) at 4/9/2019 1720  Last data filed at 4/9/2019 2792  Gross per 24 hour   Intake 390 ml   Output 825 ml   Net -435 ml     Medications:   dextrose      diltiazem 2.5 mg/hr (04/09/19 1231)     Current Facility-Administered Medications   Medication Dose Route Frequency Provider Last Rate Last Dose    cefTRIAXone (ROCEPHIN) 1 g in sterile water 10 mL IV syringe  1 g Intravenous Q24H Brent Frost DO        doxycycline (VIBRAMYCIN) 100 mg in dextrose 5 % 100 mL IVPB  100 mg Intravenous Q12H Mickey Farrar DO        sodium chloride flush 0.9 % injection 10 mL  10 mL Intravenous 2 times per day Russel Casas MD   10 mL at 04/08/19 2102    sodium chloride flush 0.9 % injection 10 mL  10 mL Intravenous PRN Russel Casas MD        magnesium hydroxide (MILK OF MAGNESIA) 400 MG/5ML suspension 30 mL  30 mL Oral Daily PRN Russel Casas MD        glucose (GLUTOSE) 40 % oral gel 15 g  15 g Oral PRN Russel Casas MD        dextrose 50 % solution 12.5 g  12.5 g Intravenous PRN Russel Casas MD        glucagon (rDNA) injection 1 mg  1 mg Intramuscular PRN Leisa French MD        dextrose 5 % solution  100 mL/hr Intravenous PRN Leisa French MD        enoxaparin (LOVENOX) injection 80 mg  1 mg/kg Subcutaneous BID Leisa French MD   80 mg at 04/09/19 0615    insulin glargine (LANTUS) injection vial 20 Units  20 Units Subcutaneous Nightly Leisa French MD   20 Units at 04/08/19 2100    insulin lispro (HUMALOG) injection vial 5 Units  5 Units Subcutaneous TID  Leisa French MD        insulin lispro (HUMALOG) injection vial 0-6 Units  0-6 Units Subcutaneous TID  Leisa French MD        insulin lispro (HUMALOG) injection vial 0-3 Units  0-3 Units Subcutaneous Nightly Leisa French MD   2 Units at 04/08/19 2100    lisinopril (PRINIVIL;ZESTRIL) tablet 20 mg  20 mg Oral Daily Leisa French MD   20 mg at 04/09/19 0814    pantoprazole (PROTONIX) tablet 40 mg  40 mg Oral QAM AC Leisa French MD   40 mg at 04/09/19 0615    pravastatin (PRAVACHOL) tablet 20 mg  20 mg Oral Daily Leisa French MD   20 mg at 04/09/19 0814    sotalol (BETAPACE) tablet 80 mg  80 mg Oral BID Leisa French MD   Stopped at 04/09/19 1000    aspirin EC tablet 81 mg  81 mg Oral Daily Leisa French MD   81 mg at 04/09/19 1208    diltiazem (CARDIZEM CD) extended release capsule 120 mg  120 mg Oral BID Marcie Malone MD   120 mg at 04/09/19 0814    diltiazem 125 mg in dextrose 5 % 125 mL infusion  5 mg/hr Intravenous Continuous Marcie Malone MD 2.5 mL/hr at 04/09/19 1231 2.5 mg/hr at 04/09/19 1231    doxazosin (CARDURA) tablet 8 mg  8 mg Oral Nightly Leisa French MD   8 mg at 04/08/19 1803        Labs:     Recent Labs     04/09/19  0134   WBC 11.4*   RBC 2.99*   HGB 9.5*   HCT 28.7*   MCV 96.0*   MCH 31.8*   MCHC 33.1        Recent Labs     04/08/19  1449 04/08/19  1505 04/09/19  0134   NA  --  140 142   K 4.4 4.5 3.5   ANIONGAP  --  15 12   CL  --  104 110   CO2  -- 21* 20*   BUN  --  21 18   CREATININE  --  1.2 1.2   GLUCOSE  --  266* 207*   CALCIUM  --  8.7* 8.3*     Recent Labs     04/09/19  0134   MG 1.8     Recent Labs     04/08/19  1505   AST 17   ALT 18   BILITOT 0.9   ALKPHOS 88     ABGs:No results for input(s): PH, PO2, PCO2, HCO3, BE, O2SAT in the last 72 hours. Troponin T:   Recent Labs     04/09/19  0134 04/09/19  0750   TROPONINI <0.01 <0.01     INR: No results for input(s): INR in the last 72 hours. Lactic Acid: No results for input(s): LACTA in the last 72 hours. Objective:   Vitals: /79   Pulse 96   Temp 98.3 °F (36.8 °C) (Temporal)   Resp 20   Ht 5' 7\" (1.702 m)   Wt 168 lb 2 oz (76.3 kg)   SpO2 92%   BMI 26.33 kg/m²   24HR INTAKE/OUTPUT:      Intake/Output Summary (Last 24 hours) at 4/9/2019 1720  Last data filed at 4/9/2019 3490  Gross per 24 hour   Intake 390 ml   Output 825 ml   Net -435 ml     General appearance: alert and cooperative with exam  HEENT: atraumatic, eyes with clear conjunctiva and normal lids, pupils and irises normal, external ears and nose are normal, lips normal  Neck without masses, lympadenopathy, bruit, thyroid normal  Lungs: no increased work of breathing, diminished breath sounds bilaterally  Heart: irregularly irregular rhythm and tachycardic  Abdomen: soft, non-tender; bowel sounds normal; no masses,  no organomegaly  Extremities: extremities normal, atraumatic, no cyanosis or edema  Neurologic: no focal neurologic deficits, normal sensation, alert and oriented, affect and mood appropriate  Skin: no rashes, nodules    Assessment and Plan:   Principal Problem:    Atrial fibrillation with RVR ([facility])  Active Problems:    Type 2 diabetes mellitus ([facility])    PVD (peripheral vascular disease) ([facility])    Carotid artery occlusion    Elevated d-dimer    Hypocalcemia    Macrocytic anemia    Pneumonia due to infectious organism  Resolved Problems:    * No resolved hospital problems.  *    Day #1 ceftriaxone and doxycycline empiric treatment for pneumonia. Lexiscan negative per report. Still tachycardic on diltiazem drip, oral diltiazem, sotalol. Cardiology on the case, titrate antiarrhythmics to efficacy.     Advance Directive: Full Code    DVT prophylaxis: enoxaparin    Discharge planning: TBD      [facility] SERVICE, DO  Rounding Hospitalist

## 2019-04-10 LAB
ANION GAP SERPL CALCULATED.3IONS-SCNC: 11 MMOL/L (ref 7–19)
BASOPHILS ABSOLUTE: 0.1 K/UL (ref 0–0.2)
BASOPHILS RELATIVE PERCENT: 0.7 % (ref 0–1)
BUN BLDV-MCNC: 13 MG/DL (ref 8–23)
CALCIUM SERPL-MCNC: 8.5 MG/DL (ref 8.8–10.2)
CHLORIDE BLD-SCNC: 110 MMOL/L (ref 98–111)
CO2: 22 MMOL/L (ref 22–29)
CREAT SERPL-MCNC: 1.1 MG/DL (ref 0.5–1.2)
EOSINOPHILS ABSOLUTE: 0.2 K/UL (ref 0–0.6)
EOSINOPHILS RELATIVE PERCENT: 2.4 % (ref 0–5)
GFR NON-AFRICAN AMERICAN: >60
GLUCOSE BLD-MCNC: 257 MG/DL (ref 70–99)
GLUCOSE BLD-MCNC: 264 MG/DL (ref 70–99)
GLUCOSE BLD-MCNC: 285 MG/DL (ref 70–99)
GLUCOSE BLD-MCNC: 89 MG/DL (ref 70–99)
GLUCOSE BLD-MCNC: 94 MG/DL (ref 74–109)
HCT VFR BLD CALC: 27.2 % (ref 42–52)
HEMOGLOBIN: 9.1 G/DL (ref 14–18)
LV EF: 49 %
LVEF MODALITY: NORMAL
LYMPHOCYTES ABSOLUTE: 2.6 K/UL (ref 1.1–4.5)
LYMPHOCYTES RELATIVE PERCENT: 28.7 % (ref 20–40)
MAGNESIUM: 1.8 MG/DL (ref 1.6–2.4)
MCH RBC QN AUTO: 32.4 PG (ref 27–31)
MCHC RBC AUTO-ENTMCNC: 33.5 G/DL (ref 33–37)
MCV RBC AUTO: 96.8 FL (ref 80–94)
MONOCYTES ABSOLUTE: 0.9 K/UL (ref 0–0.9)
MONOCYTES RELATIVE PERCENT: 9.6 % (ref 0–10)
NEUTROPHILS ABSOLUTE: 5.3 K/UL (ref 1.5–7.5)
NEUTROPHILS RELATIVE PERCENT: 57.9 % (ref 50–65)
PDW BLD-RTO: 12.7 % (ref 11.5–14.5)
PERFORMED ON: ABNORMAL
PERFORMED ON: NORMAL
PLATELET # BLD: 182 K/UL (ref 130–400)
PMV BLD AUTO: 11.1 FL (ref 9.4–12.4)
POTASSIUM REFLEX MAGNESIUM: 3.3 MMOL/L (ref 3.5–5)
RBC # BLD: 2.81 M/UL (ref 4.7–6.1)
SODIUM BLD-SCNC: 143 MMOL/L (ref 136–145)
WBC # BLD: 9.1 K/UL (ref 4.8–10.8)

## 2019-04-10 PROCEDURE — 2140000000 HC CCU INTERMEDIATE R&B

## 2019-04-10 PROCEDURE — 82948 REAGENT STRIP/BLOOD GLUCOSE: CPT

## 2019-04-10 PROCEDURE — 6360000002 HC RX W HCPCS: Performed by: FAMILY MEDICINE

## 2019-04-10 PROCEDURE — 83735 ASSAY OF MAGNESIUM: CPT

## 2019-04-10 PROCEDURE — 36415 COLL VENOUS BLD VENIPUNCTURE: CPT

## 2019-04-10 PROCEDURE — 85025 COMPLETE CBC W/AUTO DIFF WBC: CPT

## 2019-04-10 PROCEDURE — 2580000003 HC RX 258: Performed by: FAMILY MEDICINE

## 2019-04-10 PROCEDURE — 99232 SBSQ HOSP IP/OBS MODERATE 35: CPT | Performed by: INTERNAL MEDICINE

## 2019-04-10 PROCEDURE — 6370000000 HC RX 637 (ALT 250 FOR IP): Performed by: INTERNAL MEDICINE

## 2019-04-10 PROCEDURE — 6360000002 HC RX W HCPCS: Performed by: INTERNAL MEDICINE

## 2019-04-10 PROCEDURE — 2500000003 HC RX 250 WO HCPCS: Performed by: FAMILY MEDICINE

## 2019-04-10 PROCEDURE — 80048 BASIC METABOLIC PNL TOTAL CA: CPT

## 2019-04-10 PROCEDURE — 2580000003 HC RX 258: Performed by: INTERNAL MEDICINE

## 2019-04-10 RX ORDER — DILTIAZEM HYDROCHLORIDE 180 MG/1
180 CAPSULE, COATED, EXTENDED RELEASE ORAL 2 TIMES DAILY
Status: DISCONTINUED | OUTPATIENT
Start: 2019-04-10 | End: 2019-04-13

## 2019-04-10 RX ADMIN — DOXYCYCLINE 100 MG: 100 INJECTION, POWDER, LYOPHILIZED, FOR SOLUTION INTRAVENOUS at 17:20

## 2019-04-10 RX ADMIN — METOPROLOL TARTRATE 12.5 MG: 25 TABLET ORAL at 11:04

## 2019-04-10 RX ADMIN — PANTOPRAZOLE SODIUM 40 MG: 40 TABLET, DELAYED RELEASE ORAL at 08:17

## 2019-04-10 RX ADMIN — INSULIN LISPRO 3 UNITS: 100 INJECTION, SOLUTION INTRAVENOUS; SUBCUTANEOUS at 11:02

## 2019-04-10 RX ADMIN — ENOXAPARIN SODIUM 80 MG: 80 INJECTION SUBCUTANEOUS at 08:18

## 2019-04-10 RX ADMIN — DILTIAZEM HYDROCHLORIDE 120 MG: 120 CAPSULE, COATED, EXTENDED RELEASE ORAL at 08:17

## 2019-04-10 RX ADMIN — INSULIN LISPRO 3 UNITS: 100 INJECTION, SOLUTION INTRAVENOUS; SUBCUTANEOUS at 16:20

## 2019-04-10 RX ADMIN — CEFTRIAXONE 1 G: 1 INJECTION, POWDER, FOR SOLUTION INTRAMUSCULAR; INTRAVENOUS at 17:20

## 2019-04-10 RX ADMIN — DOXYCYCLINE 100 MG: 100 INJECTION, POWDER, LYOPHILIZED, FOR SOLUTION INTRAVENOUS at 08:17

## 2019-04-10 RX ADMIN — Medication 10 ML: at 08:18

## 2019-04-10 RX ADMIN — INSULIN LISPRO 5 UNITS: 100 INJECTION, SOLUTION INTRAVENOUS; SUBCUTANEOUS at 11:02

## 2019-04-10 RX ADMIN — PRAVASTATIN SODIUM 20 MG: 20 TABLET ORAL at 08:17

## 2019-04-10 RX ADMIN — SOTALOL HYDROCHLORIDE 80 MG: 80 TABLET ORAL at 08:17

## 2019-04-10 RX ADMIN — LISINOPRIL 20 MG: 20 TABLET ORAL at 08:17

## 2019-04-10 RX ADMIN — DOXAZOSIN 8 MG: 4 TABLET ORAL at 17:19

## 2019-04-10 RX ADMIN — INSULIN LISPRO 5 UNITS: 100 INJECTION, SOLUTION INTRAVENOUS; SUBCUTANEOUS at 16:19

## 2019-04-10 RX ADMIN — ASPIRIN 81 MG: 81 TABLET, COATED ORAL at 08:17

## 2019-04-10 ASSESSMENT — PAIN SCALES - GENERAL
PAINLEVEL_OUTOF10: 0

## 2019-04-10 NOTE — PROGRESS NOTES
Cardiology Daily Note Fabricio Mcnally MD      Patient:  Neri Hudson  [id number]    Patient Active Problem List    Diagnosis Date Noted    Elevated d-dimer 04/09/2019     Priority: Low    Hypocalcemia 04/09/2019     Priority: Low    Macrocytic anemia 04/09/2019     Priority: Low    Pneumonia due to infectious organism 04/09/2019     Priority: Low    Atrial fibrillation with RVR ([facility] 04/08/2019     Priority: Low    Carotid artery occlusion      Priority: Low    PVD (peripheral vascular disease) ([facility] 11/09/2016     Priority: Low    Carotid artery stenosis 04/11/2012     Priority: Low    Depression      Priority: Low    Type 2 diabetes mellitus ([facility]      Priority: Low    Hypertension      Priority: Low       Admit Date:  4/8/2019    Admission Problem List: Present on Admission:   Carotid artery occlusion   Atrial fibrillation with RVR ([facility]   [facility]   [facility]VD [facility]peripheral [facility] disease) [facility]   Elevated d-dimer   Hypocalcemia   Macrocytic anemia   Pneumonia due to infectious organism      Cardiac Specific Data:  Specialty Problems        Cardiology Problems    Hypertension        Carotid artery stenosis        PVD (peripheral vascular disease) (HCC)        * (Principal) Atrial fibrillation with RVR (formerly [facility])        Carotid artery occlusion              Subjective:  Mr. Aquilino Shi seen today resting comfortably heart rate still elevated at times denies chest pain or dyspnea. No new complaints. Objective:   /68   Pulse 96   Temp 98 °F (36.7 °C)   Resp 18   Ht 5' 7\" (1.702 m)   Wt 164 lb 9.6 oz (74.7 kg)   SpO2 91%   BMI 25.78 kg/m²       Intake/Output Summary (Last 24 hours) at 4/10/2019 1314  Last data filed at 4/10/2019 1304  Gross per 24 hour   Intake 660 ml   Output 850 ml   Net -190 ml       Prior to Admission medications    Medication Sig Start Date End Date Taking?  Authorizing Provider   amLODIPine (NORVASC) 5 MG tablet Take 5 mg by mouth daily    Historical Provider, MD   potassium chloride (KLOR-CON M) 20 MEQ extended release tablet Take 20 mEq by mouth daily    Historical Provider, MD   sotalol (BETAPACE) 80 MG tablet Take 80 mg by mouth daily    Historical Provider, MD   folic acid (FOLVITE) 1 MG tablet Take 1 mg by mouth daily    Historical Provider, MD   glipiZIDE (GLUCOTROL) 10 MG tablet Take 10 mg by mouth 2 times daily (before meals)    Historical Provider, MD   aspirin 81 MG tablet Take 81 mg by mouth daily    Historical Provider, MD   sulfADIAZINE 500 MG tablet Take 500 mg by mouth 3 times daily. Historical Provider, MD   pravastatin (PRAVACHOL) 20 MG tablet Take 20 mg by mouth daily. Historical Provider, MD   metformin (GLUCOPHAGE-XR) 500 MG XR tablet Take 1,000 mg by mouth 2 times daily     Historical Provider, MD   omeprazole (PRILOSEC) 20 MG capsule Take 20 mg by mouth daily. Historical Provider, MD   terazosin (HYTRIN) 10 MG capsule Take 10 mg by mouth nightly. Historical Provider, MD   lisinopril (PRINIVIL;ZESTRIL) 20 MG tablet Take 20 mg by mouth daily.     Historical Provider, MD        apixaban  5 mg Oral BID    diltiazem  180 mg Oral BID    metoprolol tartrate  12.5 mg Oral BID    cefTRIAXone (ROCEPHIN) IV  1 g Intravenous Q24H    doxycycline (VIBRAMYCIN) IV  100 mg Intravenous Q12H    sodium chloride flush  10 mL Intravenous 2 times per day    insulin glargine  20 Units Subcutaneous Nightly    insulin lispro  5 Units Subcutaneous TID WC    insulin lispro  0-6 Units Subcutaneous TID WC    insulin lispro  0-3 Units Subcutaneous Nightly    lisinopril  20 mg Oral Daily    pantoprazole  40 mg Oral QAM AC    pravastatin  20 mg Oral Daily    sotalol  80 mg Oral BID    aspirin  81 mg Oral Daily    doxazosin  8 mg Oral Nightly       TELEMETRY: Atrial fibrillation     Physical Exam:      Physical Exam      General:  Awake, alert, NAD  Skin:  Warm and dry  Neck:  no jvd , no carotid bruits  Chest:  Clear to auscultation, no wheezing circumflex distributions. . . Great vessels: The great vessels are normal in caliber. Lymph nodes: There are multiple lymph nodes within the middle mediastinal hien groups. The majority of these are less than 1 cm in short axis. There is a subcarinal node measuring 16 mm in short axis. A pretracheal node measures 8 mm in short axis. AP window and prevascular nodes are also present all of which measure less than 10 mm short axis. These may be reactive in nature and would warrant follow-up. . Skeletal and soft tissues: The osseous structures of the thorax and surrounding soft tissues demonstrate no worrisome lesions or acute process. Upper abdomen: Multiple hepatic and splenic granulomas are present. . The adrenals are unremarkable. 1. Moderate bilateral pleural effusions are present. There is bilateral lower lobe pneumonia. Patchy groundglass disease is also noted scattered within both lungs suggesting an element of interstitial edema or pneumonitis. There is mild cardiomegaly. 2. Coronary calcifications in the LAD and circumflex distribution. 3. There are multiple lymph nodes within the middle mediastinal hien groups. The majority of these are less than 10 mm in short axis favoring benignity but a few measure more than 10 mm. These may be reactive in nature related to the ongoing bilateral lower lobe pneumonia but would warrant follow-up. There is evidence of remote granulomatous disease. . Signed by Dr Vini Garcia on 4/8/2019 11:50 PM    Xr Chest Portable    Result Date: 4/8/2019  Exam:   XR CHEST PORTABLE  Date:  4/8/2019 History:  Male, age  70 years; shortness of breath COMPARISON:  None. Findings : Small to moderate left and trace right pleural effusion. Mild cardiomegaly and central pulmonary venous congestion. No measurable pneumothorax. No acute osseous pathology. Impression: Overall picture concerning for fluid overload.  Clinical correlation to exclude superimposed infection is recommended given the asymmetry of the findings. Signed by Dr Amy Zaldivar on 4/8/2019 5:09 PM    Cta Pulmonary W Contrast    Result Date: 4/9/2019  CTA chest 4/9/2019 9:00 AM HISTORY: Chest pain TECHNIQUE: Axial images of the chest were obtained following IV contrast. . Coronal and sagittal reformatted images are reconstructed and reviewed. Maximal intensity projectional images also reconstructed and reviewed COMPARISON: 4/8/2019. DLP: 734 mGy cm Automated exposure control was utilized to minimize patient radiation dose. FINDINGS: No pulmonary emboli are identified. There is moderate vascular calcification with dense coronary calcification. There is cardiomegaly. Similar trace pericardial fluid. Small to moderate pleural effusions are similar to yesterday's exam. Subcarinal lymph node measures up to 1.6 cm. Remaining mediastinal lymph nodes measure less than a centimeter. There is no pathologic axillary lymphadenopathy. There is mild gynecomastia. Calcified granuloma seen within the liver and the spleen. There is a soft tissue prominence of the head of the pancreas worrisome for possible neoplasm with atrophy of the body and tail of the pancreas. Follow-up dedicated cross-sectional imaging of the abdomen recommended with the pancreatic protocol. There are calcified periportal and portacaval lymph nodes related to granulomatous exposure. Scattered groundglass opacities of the lungs with patchy bilateral lower lobe opacities. There is no pneumothorax. No endobronchial lesions are identified. 1. No pulmonary emboli. 2. Similar size small to moderate pleural effusions with only trace pericardial fluid. 3. Patchy bilateral lower lobe opacities worrisome for pneumonia. Scattered groundglass opacities may relate to inflammation or mild edema. 4. Soft tissue prominence of the head of the pancreas as described above. Follow-up CT abdomen pancreatic protocol recommended for further assessment.  5. Diffuse vascular calcifications with involvement of the coronary arteries. Signed by Dr Adelina Person on 4/9/2019 1:46 PM    Nm Myocardial Spect Rest Exercise Or Rx    Result Date: 4/10/2019  Lexiscan Nuclear Stress Test Report Procedure date: 4/19/2019 Indications: Atrial fibrillation Procedure: Stress was performed with injection of 0.4 mg Lexiscan. Vital signs and EKG were monitored. Technetium-99 sestamibi was injected in divided doses, approximately 10 mCi and 30 mCi respectively for rest and stress imaging. The patient was discharged in stable condition. Results: Patient had symptoms of dyspnea during infusion that resolved in recovery. Baseline EKG showed atrial fibrillation. During stress there were no significant EKG changes or rhythm changes. Baseline and peak blood pressures were 151/81, and 146/79 respectively. Baseline and peak heart rates were 99 and  122 respectively. Radiopharmaceuticals used: Rest images 10.76 mCi technetium 99m sestamibi Stress images 94.52 millicuries technetium 63X sestamibi Review of rest and stress images obtained in a SPECT acquisition protocol along with review of the polar plot revealed: 1. Ejection fraction 49% 2. Wall motion study demonstrates normal wall motion and thickening 3. Myocardial perfusion imaging demonstrates homogeneous uptake of the tracer in all visualized segments. No areas of ischemia or infarction are noted. The sum stress score was 0. Summary impression: Probably normal study borderline ejection fraction normal perfusion study Signed by Dr Regina Castellanos on 4/9/2019 4:27 PM        Assessment:  1. Atrial fibrillation  2. Echocardiogram today normal ejection fraction 66% moderate mitral and tricuspid regurgitation  3. History of tobacco abuse discontinued 2006  4. Hypertension  5. Diabetes  6. No carotid artery stenosis 100% occlusion on the right per patient  7. Depression          Plan:  1. Adjust medications will increase Cardizem CD to 180 mg by mouth twice a day  2.  Add metoprolol 12.5 mg by mouth twice a day  3. Monitor another 24 hours  4. Recommend eliquis 5 mg by mouth twice a day   5. Will arrange office follow-up in 2 weeks duration for cardioversion at that time if persistently in atrial fibrillation.     Jennifer Live MD 4/10/2019 1:14 PM

## 2019-04-10 NOTE — CARE COORDINATION
[address] Transitions Interview     4/10/2019    Patient: Annette Griffin Patient : 1940   MRN: [id number]  Reason for Admission: Aflutter  RARS: Readmission Risk Score: 12         Spoke with: Annette Griffin        Readmission Risk  Patient Active Problem List   Diagnosis    Depression    [facility] mellitus ([facility]    Hypertension    Carotid artery stenosis    PVD (peripheral vascular disease) ([facility]    Carotid artery occlusion    Atrial fibrillation with RVR (HCC)    Elevated d-dimer    Hypocalcemia    Macrocytic anemia    Pneumonia due to infectious organism       Inpatient Assessment  Care Transitions Summary    Care Transitions Inpatient Review  Medication Review  Are you able to afford your medications?:  Yes  How often do you have difficulty taking your medications?:  I always take them as prescribed. Housing Review  Who do you live with?:  Alone  Are you an active caregiver in your home?:  No  Social Support  Do you have a ?:  No  Do you have a [address] [place] [address]?:  No  Durable Medical Equipment  Functional Review  Ability to seek help/take action for Emergent/Urgent situations i.e. fire, crime, inclement weather or health crisis. :  Independent  Ability handle personal hygiene needs (bathing/dressing/grooming): Independent  Ability to manage medications: Independent  Ability to prepare food:  Independent  Ability to maintain home (clean home, laundry): Independent  Ability to drive and/or has transportation:  Independent  Ability to do shopping:  Independent  Ability to manage finances: Independent  Is patient able to live independently?:  Yes  Hearing and Vision  Visual Impairment:  Visual impairment (Glasses/contacts)  Hearing Impairment:  Hard of hearing  Care Transitions Interventions         Follow Up : Met at bedside with Mr. Reg Siddiqui, discussed BPCI-A process and presented the CMS Beneficiary Letter. Contact information given.   Cortez Rojas is agreeable with appropriate follow up after discharge. Patient lives at home alone. He states he is independent of ADL's, medications, and finances. He has no DME in the home and speaks of no immediate need at this time. Will follow as inpatient and at discharge. No future appointments. Health Maintenance  There are no preventive care reminders to display for this patient.     Sandra Pinzon RN

## 2019-04-10 NOTE — PROGRESS NOTES
[facility] Hospitalists Progress Note    Patient:  Rolando Mcelroy  YOB: 1940  Date of Service: 4/10/2019  MRN: [id number]   Acct: [de-identified]   Primary Care Physician: Zenaida Gamboa MD  Advance Directive: Full Code  Admit Date: 4/8/2019       Hospital Day: 2      CHIEF COMPLAINT:  Shortness of breath and tachycardia    4/10/2019 4:13 PM  Subjective / Interval History:   Shortness of breath improved. Denies any other acute complaints or distress at this time. No acute overnight events reported. Cumulative Hospital History:   As per Previous Documentation, quoted below;  \"4-8: Transferred due to A fib with RVR and shortness of breath. Cardiology consult, placed on diltiazem drip.  4-9: Lexiscan negative. Still tachycardic. On review of CTA report, patient has infiltrates concerning for pneumonia. Azithromycin and doxycycline. \"      Review of Systems:   Review of Systems  ROS: 14 point review of systems is negative except as specifically addressed above. DIET CARDIAC; No Added Salt (3-4 GM);  No Caffeine    Intake/Output Summary (Last 24 hours) at 4/10/2019 1613  Last data filed at 4/10/2019 1304  Gross per 24 hour   Intake 660 ml   Output 850 ml   Net -190 ml       Medications:   dextrose      diltiazem 5 mg/hr (04/09/19 1829)     Current Facility-Administered Medications   Medication Dose Route Frequency Provider Last Rate Last Dose    apixaban (ELIQUIS) tablet 5 mg  5 mg Oral BID Aneta Andre MD        diltiazem (CARDIZEM CD) extended release capsule 180 mg  180 mg Oral BID Aneta Andre MD        metoprolol tartrate (LOPRESSOR) tablet 12.5 mg  12.5 mg Oral BID Aneta Andre MD   12.5 mg at 04/10/19 1104    cefTRIAXone (ROCEPHIN) 1 g in sterile water 10 mL IV syringe  1 g Intravenous Q24H Mickey Farrar DO   1 g at 04/09/19 1732    doxycycline (VIBRAMYCIN) 100 mg in dextrose 5 % 100 mL IVPB  100 mg Intravenous Q12H Hector Garay DO   Stopped at 04/10/19 0944    sodium Ayesha Goodwin MD   8 mg at 04/09/19 1724         dextrose      diltiazem 5 mg/hr (04/09/19 1829)      apixaban  5 mg Oral BID    diltiazem  180 mg Oral BID    metoprolol tartrate  12.5 mg Oral BID    cefTRIAXone (ROCEPHIN) IV  1 g Intravenous Q24H    doxycycline (VIBRAMYCIN) IV  100 mg Intravenous Q12H    sodium chloride flush  10 mL Intravenous 2 times per day    insulin glargine  20 Units Subcutaneous Nightly    insulin lispro  5 Units Subcutaneous TID WC    insulin lispro  0-6 Units Subcutaneous TID WC    insulin lispro  0-3 Units Subcutaneous Nightly    lisinopril  20 mg Oral Daily    pantoprazole  40 mg Oral QAM AC    pravastatin  20 mg Oral Daily    sotalol  80 mg Oral BID    aspirin  81 mg Oral Daily    doxazosin  8 mg Oral Nightly     sodium chloride flush, magnesium hydroxide, glucose, dextrose, glucagon (rDNA), dextrose  DIET CARDIAC; No Added Salt (3-4 GM); No Caffeine       Labs:     Recent Labs     04/09/19  0134 04/10/19  0141   WBC 11.4* 9.1   RBC 2.99* 2.81*   HGB 9.5* 9.1*   HCT 28.7* 27.2*   MCV 96.0* 96.8*   MCH 31.8* 32.4*   MCHC 33.1 33.5    182     Recent Labs     04/08/19  1505 04/09/19  0134 04/10/19  0141    142 143   K 4.5 3.5 3.3*   ANIONGAP 15 12 11    110 110   CO2 21* 20* 22   BUN 21 18 13   CREATININE 1.2 1.2 1.1   GLUCOSE 266* 207* 94   CALCIUM 8.7* 8.3* 8.5*     Recent Labs     04/09/19  0134 04/10/19  0141   MG 1.8 1.8     Recent Labs     04/08/19  1505   AST 17   ALT 18   BILITOT 0.9   ALKPHOS 88     HgBA1c:  No components found for: HGBA1C  FLP:  No results found for: TRIG, HDL, LDLCALC, LDLDIRECT, LABVLDL  TSH:  No results found for: TSH  Troponin T:   Recent Labs     04/09/19  0134 04/09/19  0750   TROPONINI <0.01 <0.01     Pro-BNP: No results for input(s): BNP in the last 72 hours. INR: No results for input(s): INR in the last 72 hours.   ABGs:   Lab Results   Component Value Date    PHART 7.460 04/08/2019    PO2ART 61.0 04/08/2019 soft tissues demonstrate no worrisome lesions or acute process. Upper abdomen: Multiple hepatic and splenic granulomas are present. . The adrenals are unremarkable. 1. Moderate bilateral pleural effusions are present. There is bilateral lower lobe pneumonia. Patchy groundglass disease is also noted scattered within both lungs suggesting an element of interstitial edema or pneumonitis. There is mild cardiomegaly. 2. Coronary calcifications in the LAD and circumflex distribution. 3. There are multiple lymph nodes within the middle mediastinal hien groups. The majority of these are less than 10 mm in short axis favoring benignity but a few measure more than 10 mm. These may be reactive in nature related to the ongoing bilateral lower lobe pneumonia but would warrant follow-up. There is evidence of remote granulomatous disease. . Signed by Dr Annamarie Lan on 4/8/2019 11:50 PM    Xr Chest Portable    Result Date: 4/8/2019  Exam:   XR CHEST PORTABLE  Date:  4/8/2019 History:  Male, age  70 years; shortness of breath COMPARISON:  None. Findings : Small to moderate left and trace right pleural effusion. Mild cardiomegaly and central pulmonary venous congestion. No measurable pneumothorax. No acute osseous pathology. Impression: Overall picture concerning for fluid overload. Clinical correlation to exclude superimposed infection is recommended given the asymmetry of the findings. Signed by Dr Dominic Carpenter on 4/8/2019 5:09 PM    Cta Pulmonary W Contrast    Result Date: 4/9/2019  CTA chest 4/9/2019 9:00 AM HISTORY: Chest pain TECHNIQUE: Axial images of the chest were obtained following IV contrast. . Coronal and sagittal reformatted images are reconstructed and reviewed. Maximal intensity projectional images also reconstructed and reviewed COMPARISON: 4/8/2019. DLP: 734 mGy cm Automated exposure control was utilized to minimize patient radiation dose. FINDINGS: No pulmonary emboli are identified.  There is moderate vascular calcification with dense coronary calcification. There is cardiomegaly. Similar trace pericardial fluid. Small to moderate pleural effusions are similar to yesterday's exam. Subcarinal lymph node measures up to 1.6 cm. Remaining mediastinal lymph nodes measure less than a centimeter. There is no pathologic axillary lymphadenopathy. There is mild gynecomastia. Calcified granuloma seen within the liver and the spleen. There is a soft tissue prominence of the head of the pancreas worrisome for possible neoplasm with atrophy of the body and tail of the pancreas. Follow-up dedicated cross-sectional imaging of the abdomen recommended with the pancreatic protocol. There are calcified periportal and portacaval lymph nodes related to granulomatous exposure. Scattered groundglass opacities of the lungs with patchy bilateral lower lobe opacities. There is no pneumothorax. No endobronchial lesions are identified. 1. No pulmonary emboli. 2. Similar size small to moderate pleural effusions with only trace pericardial fluid. 3. Patchy bilateral lower lobe opacities worrisome for pneumonia. Scattered groundglass opacities may relate to inflammation or mild edema. 4. Soft tissue prominence of the head of the pancreas as described above. Follow-up CT abdomen pancreatic protocol recommended for further assessment. 5. Diffuse vascular calcifications with involvement of the coronary arteries. Signed by Dr Kyleigh Taylor on 4/9/2019 1:46 PM    Nm Myocardial Spect Rest Exercise Or Rx    Result Date: 4/10/2019  Lexiscan Nuclear Stress Test Report Procedure date: 4/19/2019 Indications: Atrial fibrillation Procedure: Stress was performed with injection of 0.4 mg Lexiscan. Vital signs and EKG were monitored. Technetium-99 sestamibi was injected in divided doses, approximately 10 mCi and 30 mCi respectively for rest and stress imaging. The patient was discharged in stable condition.  Results: Patient had symptoms of dyspnea during infusion that resolved in recovery. Baseline EKG showed atrial fibrillation. During stress there were no significant EKG changes or rhythm changes. Baseline and peak blood pressures were 151/81, and 146/79 respectively. Baseline and peak heart rates were 99 and  122 respectively. Radiopharmaceuticals used: Rest images 10.76 mCi technetium 99m sestamibi Stress images 38.99 millicuries technetium 62H sestamibi Review of rest and stress images obtained in a SPECT acquisition protocol along with review of the polar plot revealed: 1. Ejection fraction 49% 2. Wall motion study demonstrates normal wall motion and thickening 3. Myocardial perfusion imaging demonstrates homogeneous uptake of the tracer in all visualized segments. No areas of ischemia or infarction are noted. The sum stress score was 0.     Summary impression: Probably normal study borderline ejection fraction normal perfusion study Signed by Dr Valeria Mason on 4/9/2019 4:27 PM          Objective:   Vitals: /63   Pulse 87   Temp 98.1 °F (36.7 °C) (Temporal)   Resp 20   Ht 5' 7\" (1.702 m)   Wt 164 lb 9.6 oz (74.7 kg)   SpO2 93%   BMI 25.78 kg/m²   24HR INTAKE/OUTPUT:      Intake/Output Summary (Last 24 hours) at 4/10/2019 1613  Last data filed at 4/10/2019 1304  Gross per 24 hour   Intake 660 ml   Output 850 ml   Net -190 ml       Physical Exam  General appearance: alert and cooperative with exam  HEENT: atraumatic, eyes with clear conjunctiva and normal lids, pupils and irises normal, external ears and nose are normal, lips normal.  Neck without masses, lympadenopathy, bruit, thyroid normal  Lungs: Patient in no acute respiratory distress, no increased work of breathing, \"clear to auscultation bilaterally\" without rales, rhonchi or wheezes  Heart: regular rate and rhythm, S1, S2 normal, no murmur, click, rub or gallop  Abdomen: soft, non-tender; non-distended normal bowel sounds no masses, no organomegaly  Extremities: extremities normal, atraumatic, no cyanosis or edema  Neurologic: No focal neurologic deficits, normal sensation, alert and oriented, affect and mood appropriate. CN II-XII Intact  Skin: Skin color, texture, turgor normal. No rashes or lesions      Assessment/plan:         Principal Problem:    Atrial fibrillation with RVR ([facility])  Active Problems:    Type 2 diabetes mellitus (HCC)    PVD (peripheral vascular disease) ([facility])    Carotid artery occlusion    Elevated d-dimer    Hypocalcemia    Macrocytic anemia    Pneumonia due to infectious organism  Resolved Problems:    * No resolved hospital problems. *          Atrial fibrillation/flutter with RVR  · -Continue Diltiazem ggt as needed  · -->  cardiology following  · Continue sotalol 80 mg by mouth twice a day  · Continue diltiazem 180 mg by mouth twice a day  · Continue metoprolol Tartrate 12.5 mg by mouth twice a day  · -->  recommend apixaban 5 mg by mouth twice a day    CAP  · Continue antibiotics (ceftriaxone and doxycycline)           # History of Type 2 Diabetes Mellitus   · Uncontrolled  · Hemoglobin A1c (04/08/2019) 10.2%  ·   · - Holding All Home PO Meds (Metformin and Glipizide) while inpatient   · Inpatient Regimens to include;  · Continue Insulin Glargine (Lantus) 20 units subcu nightly  · Continue Insulin Lispro (Humalog) 5 units subcu pre-meal 3 times a day  · Continue Insulin Lispro (Humalog) on a Low dose sliding scale  · - Monitor POC glucose, and adjusts insulin regimen accordingly based on daily insulin requirement.              Continue management of other chronic medical conditions - see above and orders. Advance Directive: Full Code    DIET CARDIAC; No Added Salt (3-4 GM);  No Caffeine     DVT prophylaxis: Apixaban    Consults Made:   IP CONSULT TO SOCIAL WORK  IP CONSULT TO CARDIOLOGY    Discharge planning: tbd       Electronically signed by   Julia Gonzales MD, MPH,   Internal Medicine Hospitalist   4/10/2019 4:13 PM

## 2019-04-11 LAB
ANION GAP SERPL CALCULATED.3IONS-SCNC: 10 MMOL/L (ref 7–19)
BASOPHILS ABSOLUTE: 0.1 K/UL (ref 0–0.2)
BASOPHILS RELATIVE PERCENT: 0.8 % (ref 0–1)
BUN BLDV-MCNC: 18 MG/DL (ref 8–23)
CALCIUM SERPL-MCNC: 8.3 MG/DL (ref 8.8–10.2)
CHLORIDE BLD-SCNC: 107 MMOL/L (ref 98–111)
CO2: 21 MMOL/L (ref 22–29)
CREAT SERPL-MCNC: 1.1 MG/DL (ref 0.5–1.2)
EOSINOPHILS ABSOLUTE: 0.2 K/UL (ref 0–0.6)
EOSINOPHILS RELATIVE PERCENT: 2.2 % (ref 0–5)
GFR NON-AFRICAN AMERICAN: >60
GLUCOSE BLD-MCNC: 141 MG/DL (ref 70–99)
GLUCOSE BLD-MCNC: 170 MG/DL (ref 70–99)
GLUCOSE BLD-MCNC: 211 MG/DL (ref 74–109)
GLUCOSE BLD-MCNC: 241 MG/DL (ref 70–99)
GLUCOSE BLD-MCNC: 396 MG/DL (ref 70–99)
HCT VFR BLD CALC: 26.7 % (ref 42–52)
HEMOGLOBIN: 8.9 G/DL (ref 14–18)
LYMPHOCYTES ABSOLUTE: 2.6 K/UL (ref 1.1–4.5)
LYMPHOCYTES RELATIVE PERCENT: 34.2 % (ref 20–40)
MAGNESIUM: 1.7 MG/DL (ref 1.6–2.4)
MCH RBC QN AUTO: 32.7 PG (ref 27–31)
MCHC RBC AUTO-ENTMCNC: 33.3 G/DL (ref 33–37)
MCV RBC AUTO: 98.2 FL (ref 80–94)
MONOCYTES ABSOLUTE: 0.9 K/UL (ref 0–0.9)
MONOCYTES RELATIVE PERCENT: 11.1 % (ref 0–10)
NEUTROPHILS ABSOLUTE: 3.9 K/UL (ref 1.5–7.5)
NEUTROPHILS RELATIVE PERCENT: 51 % (ref 50–65)
PDW BLD-RTO: 12.6 % (ref 11.5–14.5)
PERFORMED ON: ABNORMAL
PLATELET # BLD: 198 K/UL (ref 130–400)
PMV BLD AUTO: 11 FL (ref 9.4–12.4)
POTASSIUM REFLEX MAGNESIUM: 3.4 MMOL/L (ref 3.5–5)
RBC # BLD: 2.72 M/UL (ref 4.7–6.1)
SODIUM BLD-SCNC: 138 MMOL/L (ref 136–145)
WBC # BLD: 7.7 K/UL (ref 4.8–10.8)

## 2019-04-11 PROCEDURE — 6370000000 HC RX 637 (ALT 250 FOR IP): Performed by: INTERNAL MEDICINE

## 2019-04-11 PROCEDURE — 2580000003 HC RX 258: Performed by: INTERNAL MEDICINE

## 2019-04-11 PROCEDURE — 36415 COLL VENOUS BLD VENIPUNCTURE: CPT

## 2019-04-11 PROCEDURE — 83735 ASSAY OF MAGNESIUM: CPT

## 2019-04-11 PROCEDURE — 6360000002 HC RX W HCPCS: Performed by: FAMILY MEDICINE

## 2019-04-11 PROCEDURE — 2140000000 HC CCU INTERMEDIATE R&B

## 2019-04-11 PROCEDURE — 2500000003 HC RX 250 WO HCPCS: Performed by: FAMILY MEDICINE

## 2019-04-11 PROCEDURE — 2580000003 HC RX 258: Performed by: FAMILY MEDICINE

## 2019-04-11 PROCEDURE — 99232 SBSQ HOSP IP/OBS MODERATE 35: CPT | Performed by: HOSPITALIST

## 2019-04-11 PROCEDURE — 80048 BASIC METABOLIC PNL TOTAL CA: CPT

## 2019-04-11 PROCEDURE — 85025 COMPLETE CBC W/AUTO DIFF WBC: CPT

## 2019-04-11 PROCEDURE — 99231 SBSQ HOSP IP/OBS SF/LOW 25: CPT | Performed by: INTERNAL MEDICINE

## 2019-04-11 PROCEDURE — 82948 REAGENT STRIP/BLOOD GLUCOSE: CPT

## 2019-04-11 RX ADMIN — DOXYCYCLINE 100 MG: 100 INJECTION, POWDER, LYOPHILIZED, FOR SOLUTION INTRAVENOUS at 17:52

## 2019-04-11 RX ADMIN — Medication 10 ML: at 22:34

## 2019-04-11 RX ADMIN — Medication 10 ML: at 09:00

## 2019-04-11 RX ADMIN — SOTALOL HYDROCHLORIDE 80 MG: 80 TABLET ORAL at 08:09

## 2019-04-11 RX ADMIN — METOPROLOL TARTRATE 12.5 MG: 25 TABLET ORAL at 22:15

## 2019-04-11 RX ADMIN — INSULIN LISPRO 2 UNITS: 100 INJECTION, SOLUTION INTRAVENOUS; SUBCUTANEOUS at 00:03

## 2019-04-11 RX ADMIN — PANTOPRAZOLE SODIUM 40 MG: 40 TABLET, DELAYED RELEASE ORAL at 06:02

## 2019-04-11 RX ADMIN — INSULIN GLARGINE 20 UNITS: 100 INJECTION, SOLUTION SUBCUTANEOUS at 22:15

## 2019-04-11 RX ADMIN — CEFTRIAXONE 1 G: 1 INJECTION, POWDER, FOR SOLUTION INTRAMUSCULAR; INTRAVENOUS at 17:52

## 2019-04-11 RX ADMIN — PRAVASTATIN SODIUM 20 MG: 20 TABLET ORAL at 08:08

## 2019-04-11 RX ADMIN — INSULIN GLARGINE 20 UNITS: 100 INJECTION, SOLUTION SUBCUTANEOUS at 00:03

## 2019-04-11 RX ADMIN — DOXYCYCLINE 100 MG: 100 INJECTION, POWDER, LYOPHILIZED, FOR SOLUTION INTRAVENOUS at 06:02

## 2019-04-11 RX ADMIN — INSULIN LISPRO 3 UNITS: 100 INJECTION, SOLUTION INTRAVENOUS; SUBCUTANEOUS at 22:15

## 2019-04-11 RX ADMIN — DILTIAZEM HYDROCHLORIDE 180 MG: 180 CAPSULE, COATED, EXTENDED RELEASE ORAL at 08:08

## 2019-04-11 RX ADMIN — INSULIN LISPRO 5 UNITS: 100 INJECTION, SOLUTION INTRAVENOUS; SUBCUTANEOUS at 09:00

## 2019-04-11 RX ADMIN — ASPIRIN 81 MG: 81 TABLET, COATED ORAL at 08:08

## 2019-04-11 RX ADMIN — APIXABAN 5 MG: 5 TABLET, FILM COATED ORAL at 22:14

## 2019-04-11 RX ADMIN — LISINOPRIL 20 MG: 20 TABLET ORAL at 08:09

## 2019-04-11 RX ADMIN — Medication 10 ML: at 00:10

## 2019-04-11 RX ADMIN — APIXABAN 5 MG: 5 TABLET, FILM COATED ORAL at 08:30

## 2019-04-11 RX ADMIN — INSULIN LISPRO 1 UNITS: 100 INJECTION, SOLUTION INTRAVENOUS; SUBCUTANEOUS at 09:00

## 2019-04-11 RX ADMIN — SOTALOL HYDROCHLORIDE 80 MG: 80 TABLET ORAL at 22:14

## 2019-04-11 RX ADMIN — METOPROLOL TARTRATE 12.5 MG: 25 TABLET ORAL at 00:05

## 2019-04-11 RX ADMIN — INSULIN LISPRO 2 UNITS: 100 INJECTION, SOLUTION INTRAVENOUS; SUBCUTANEOUS at 17:52

## 2019-04-11 RX ADMIN — SOTALOL HYDROCHLORIDE 80 MG: 80 TABLET ORAL at 00:05

## 2019-04-11 RX ADMIN — DILTIAZEM HYDROCHLORIDE 180 MG: 180 CAPSULE, COATED, EXTENDED RELEASE ORAL at 00:05

## 2019-04-11 RX ADMIN — APIXABAN 5 MG: 5 TABLET, FILM COATED ORAL at 00:06

## 2019-04-11 RX ADMIN — INSULIN LISPRO 5 UNITS: 100 INJECTION, SOLUTION INTRAVENOUS; SUBCUTANEOUS at 17:52

## 2019-04-11 RX ADMIN — DILTIAZEM HYDROCHLORIDE 180 MG: 180 CAPSULE, COATED, EXTENDED RELEASE ORAL at 22:14

## 2019-04-11 RX ADMIN — METOPROLOL TARTRATE 12.5 MG: 25 TABLET ORAL at 08:09

## 2019-04-11 RX ADMIN — DOXAZOSIN 8 MG: 4 TABLET ORAL at 17:51

## 2019-04-11 ASSESSMENT — PAIN SCALES - GENERAL
PAINLEVEL_OUTOF10: 0

## 2019-04-11 NOTE — CARE COORDINATION
Completed dc assessment and pt reports residing at home alone without supports and intends to dc to the same location. Pt denies any in home services and denies use/need of DME prior to admission. Pt reports having low copays for current meds and denies further dc needs at this time. SW will continue to follow and assist as necessary.

## 2019-04-11 NOTE — PLAN OF CARE
Problem: Falls - Risk of:  Goal: Will remain free from falls  Description  Will remain free from falls  Outcome: Ongoing  Goal: Absence of physical injury  Description  Absence of physical injury  Outcome: Ongoing     Problem:  Activity:  Goal: Ability to tolerate increased activity will improve  Description  Ability to tolerate increased activity will improve  Outcome: Ongoing  Goal: Expression of feelings of increased energy will increase  Description  Expression of feelings of increased energy will increase  Outcome: Ongoing     Problem: Cardiac:  Goal: Ability to maintain an adequate cardiac output will improve  Description  Ability to maintain an adequate cardiac output will improve  Outcome: Ongoing  Goal: Complications related to the disease process, condition or treatment will be avoided or minimized  Description  Complications related to the disease process, condition or treatment will be avoided or minimized  Outcome: Ongoing     Problem: Safety:  Goal: Ability to remain free from injury will improve  Description  Ability to remain free from injury will improve  Outcome: Ongoing  Goal: Will show no signs and symptoms of excessive bleeding  Description  Will show no signs and symptoms of excessive bleeding  Outcome: Ongoing     Problem: Airway Clearance - Ineffective:  Goal: Clear lung sounds  Description  Clear lung sounds  Outcome: Ongoing  Goal: Ability to maintain a clear airway will improve  Description  Ability to maintain a clear airway will improve  Outcome: Ongoing

## 2019-04-11 NOTE — PROGRESS NOTES
[facility]      Patient:  Alpesh Elam  YOB: 1940  Date of Service: 4/11/2019  MRN: [id number]   Acct: [de-identified]   Primary Care Physician: Heath Anguiano MD  Advance Directive: Full Code  Admit Date: 4/8/2019       Hospital Day: 3    Chief Complaint: No chief complaint on file. Subjective: feeling weak    Objective:   VITALS:  BP (!) 153/80   Pulse 97   Temp 98.7 °F (37.1 °C) (Temporal)   Resp 16   Ht 5' 7\" (1.702 m)   Wt 164 lb 9.6 oz (74.7 kg)   SpO2 91%   BMI 25.78 kg/m²   24HR INTAKE/OUTPUT:      Intake/Output Summary (Last 24 hours) at 4/11/2019 1555  Last data filed at 4/11/2019 0439  Gross per 24 hour   Intake 740 ml   Output 400 ml   Net 340 ml     Constitutional: Oriented to person, place, and time. Well-developed and well-nourished. No distress. HENT:    Head: Normocephalic and atraumatic. Mouth/Throat: Oropharynx is clear and moist. No oropharyngeal exudate. Eyes: Conjunctivae and EOM are normal. Pupils are equal, round, and reactive to light. No scleral icterus. Neck: Normal range of motion. Neck supple. No JVD present. No tracheal deviation present. No thyromegaly present. Cardiovascular: Normal rate, regular rhythm and normal heart sounds. Exam reveals no gallop and no friction rub. Pulmonary/Chest: Effort normal and breath sounds normal. No stridor. No respiratory distress. No wheezes. No rales. Abdominal: Soft. Bowel sounds are normal. No distension and no mass. There is no tenderness. There is no rebound and no guarding. Musculoskeletal: Normal range of motion. There is no edema or tenderness. Extremities: No edema  Neurological: Alert and oriented to person, place, and time. No cranial nerve deficit. Skin: Skin is warm and dry. No rash noted. Not diaphoretic. No erythema. No pallor. Psychiatric: Normal mood and affect.  Behavior is normal. Judgment and thought content normal.     Medications:      dextrose      diltiazem 5 mg/hr (04/11/19 1427)      apixaban  5 mg Oral BID    diltiazem  180 mg Oral BID    metoprolol tartrate  12.5 mg Oral BID    cefTRIAXone (ROCEPHIN) IV  1 g Intravenous Q24H    doxycycline (VIBRAMYCIN) IV  100 mg Intravenous Q12H    sodium chloride flush  10 mL Intravenous 2 times per day    insulin glargine  20 Units Subcutaneous Nightly    insulin lispro  5 Units Subcutaneous TID WC    insulin lispro  0-6 Units Subcutaneous TID WC    insulin lispro  0-3 Units Subcutaneous Nightly    lisinopril  20 mg Oral Daily    pantoprazole  40 mg Oral QAM AC    pravastatin  20 mg Oral Daily    sotalol  80 mg Oral BID    aspirin  81 mg Oral Daily    doxazosin  8 mg Oral Nightly     sodium chloride flush, magnesium hydroxide, glucose, dextrose, glucagon (rDNA), dextrose  DIET CARDIAC; No Added Salt (3-4 GM); No Caffeine         Lab and other Data:     Recent Labs     04/09/19  0134 04/10/19  0141 04/11/19  0148   WBC 11.4* 9.1 7.7   HGB 9.5* 9.1* 8.9*    182 198     Recent Labs     04/09/19  0134 04/10/19  0141 04/11/19  0148    143 138   K 3.5 3.3* 3.4*    110 107   CO2 20* 22 21*   BUN 18 13 18   CREATININE 1.2 1.1 1.1   GLUCOSE 207* 94 211*     No results for input(s): AST, ALT, ALB, BILITOT, ALKPHOS in the last 72 hours. Troponin T:   Recent Labs     04/09/19 0134 04/09/19  0750   TROPONINI <0.01 <0.01     Pro-BNP: No results for input(s): BNP in the last 72 hours. INR: No results for input(s): INR in the last 72 hours. ABGs:   Lab Results   Component Value Date    PHART 7.460 04/08/2019    PO2ART 61.0 04/08/2019    WQH7LTC 27.0 04/08/2019     UA:No results for input(s): NITRITE, COLORU, PHUR, LABCAST, WBCUA, RBCUA, MUCUS, TRICHOMONAS, YEAST, BACTERIA, CLARITYU, SPECGRAV, LEUKOCYTESUR, UROBILINOGEN, BILIRUBINUR, BLOODU, GLUCOSEU, AMORPHOUS in the last 72 hours.     Invalid input(s): KETONESU    Imaging:   NM MYOCARDIAL SPECT REST EXERCISE OR RX   Final Result   Summary impression:   Probably normal study borderline ejection fraction normal perfusion   study   Signed by Dr Robert Ortiz on 4/9/2019 4:27 PM      CTA PULMONARY W CONTRAST   Final Result   1. No pulmonary emboli. 2. Similar size small to moderate pleural effusions with only trace   pericardial fluid. 3. Patchy bilateral lower lobe opacities worrisome for pneumonia. Scattered groundglass opacities may relate to inflammation or mild   edema. 4. Soft tissue prominence of the head of the pancreas as described   above. Follow-up CT abdomen pancreatic protocol recommended for   further assessment. 5. Diffuse vascular calcifications with involvement of the coronary   arteries. Signed by Dr Isabell James on 4/9/2019 1:46 PM      CT Chest WO Contrast   Final Result   1. Moderate bilateral pleural effusions are present. There is   bilateral lower lobe pneumonia. Patchy groundglass disease is also   noted scattered within both lungs suggesting an element of   interstitial edema or pneumonitis. There is mild cardiomegaly. 2. Coronary calcifications in the LAD and circumflex distribution. 3. There are multiple lymph nodes within the middle mediastinal hien   groups. The majority of these are less than 10 mm in short axis   favoring benignity but a few measure more than 10 mm. These may be   reactive in nature related to the ongoing bilateral lower lobe   pneumonia but would warrant follow-up. There is evidence of remote   granulomatous disease. .   Signed by Dr Annamarie Carrillo on 4/8/2019 11:50 PM      XR CHEST PORTABLE   Final Result   Impression:   Overall picture concerning for fluid overload. Clinical correlation to   exclude superimposed infection is recommended given the asymmetry of   the findings.    Signed by Dr Tima Vaughn on 4/8/2019 5:09 PM        Micro: No results found for: BC, No components found for: LABURINE  Patient Active Problem List    Diagnosis Date Noted    Elevated d-dimer 04/09/2019    Hypocalcemia 04/09/2019    Macrocytic anemia 04/09/2019    Pneumonia due to infectious organism 04/09/2019    Atrial fibrillation with RVR ([facility] 04/08/2019    Carotid artery occlusion     PVD (peripheral vascular disease) ([facility] 11/09/2016    Carotid artery stenosis 04/11/2012    Depression     Type 2 diabetes mellitus (HCC)     Hypertension        Assessment/plan:   Principal Problem:    Atrial fibrillation with RVR ([facility])  Active Problems:    Type 2 diabetes mellitus (HCC)    PVD (peripheral vascular disease) ([facility])    Carotid artery occlusion    Elevated d-dimer    Hypocalcemia    Macrocytic anemia    Pneumonia due to infectious organism  Resolved Problems:    * No resolved hospital problems.  *      Plan:   - wean off of cardizem gtt, cont rate control meds per cardiology who are following, likely home tmrw if can tolerate being off of gtt for extended time  - cont IV abx  - cont Lantus and SSI  - trend labs, replace electrolytes    DVT Prophylaxis: best Eric MD  Hospitalist Service  4/11/2019  3:55 PM

## 2019-04-11 NOTE — PROGRESS NOTES
Cardiology Daily Note Cory Blackwood MD      Patient:  Annette Griffin  [id number]    Patient Active Problem List    Diagnosis Date Noted    Elevated d-dimer 04/09/2019     Priority: Low    Hypocalcemia 04/09/2019     Priority: Low    Macrocytic anemia 04/09/2019     Priority: Low    Pneumonia due to infectious organism 04/09/2019     Priority: Low    Atrial fibrillation with RVR ([facility] 04/08/2019     Priority: Low    Carotid artery occlusion      Priority: Low    PVD (peripheral vascular disease) ([facility] 11/09/2016     Priority: Low    Carotid artery stenosis 04/11/2012     Priority: Low    Depression      Priority: Low    Type 2 diabetes mellitus ([facility]      Priority: Low    Hypertension      Priority: Low       Admit Date:  4/8/2019    Admission Problem List: Present on Admission:   Carotid artery occlusion   Atrial fibrillation with RVR ([facility]   Type 2 diabetes mellitus ([facility]   PVD (peripheral vascular disease) ([facility]   Elevated d-dimer   Hypocalcemia   Macrocytic anemia   Pneumonia due to infectious organism      Cardiac Specific Data:  Specialty Problems        Cardiology Problems    Hypertension        Carotid artery stenosis        PVD (peripheral vascular disease) (HCC)        * (Principal) Atrial fibrillation with RVR ([facility])        Carotid artery occlusion              Subjective:  Mr. Reg Siddiqui seen today resting comfortably heart rate improved denies chest pain or dyspnea no new complaints. Objective:   BP (!) 145/83   Pulse 96   Temp 98.5 °F (36.9 °C) (Temporal)   Resp 16   Ht 5' 7\" (1.702 m)   Wt 164 lb 9.6 oz (74.7 kg)   SpO2 94%   BMI 25.78 kg/m²       Intake/Output Summary (Last 24 hours) at 4/11/2019 1231  Last data filed at 4/11/2019 0439  Gross per 24 hour   Intake 860 ml   Output 400 ml   Net 460 ml       Prior to Admission medications    Medication Sig Start Date End Date Taking?  Authorizing Provider   amLODIPine (NORVASC) 5 MG tablet Take 5 mg by mouth daily    Historical Provider, MD   potassium chloride (KLOR-CON M) 20 MEQ extended release tablet Take 20 mEq by mouth daily    Historical Provider, MD   sotalol (BETAPACE) 80 MG tablet Take 80 mg by mouth daily    Historical Provider, MD   folic acid (FOLVITE) 1 MG tablet Take 1 mg by mouth daily    Historical Provider, MD   glipiZIDE (GLUCOTROL) 10 MG tablet Take 10 mg by mouth 2 times daily (before meals)    Historical Provider, MD   aspirin 81 MG tablet Take 81 mg by mouth daily    Historical Provider, MD   sulfADIAZINE 500 MG tablet Take 500 mg by mouth 3 times daily. Historical Provider, MD   pravastatin (PRAVACHOL) 20 MG tablet Take 20 mg by mouth daily. Historical Provider, MD   metformin (GLUCOPHAGE-XR) 500 MG XR tablet Take 1,000 mg by mouth 2 times daily     Historical Provider, MD   omeprazole (PRILOSEC) 20 MG capsule Take 20 mg by mouth daily. Historical Provider, MD   terazosin (HYTRIN) 10 MG capsule Take 10 mg by mouth nightly. Historical Provider, MD   lisinopril (PRINIVIL;ZESTRIL) 20 MG tablet Take 20 mg by mouth daily.     Historical Provider, MD        apixaban  5 mg Oral BID    diltiazem  180 mg Oral BID    metoprolol tartrate  12.5 mg Oral BID    cefTRIAXone (ROCEPHIN) IV  1 g Intravenous Q24H    doxycycline (VIBRAMYCIN) IV  100 mg Intravenous Q12H    sodium chloride flush  10 mL Intravenous 2 times per day    insulin glargine  20 Units Subcutaneous Nightly    insulin lispro  5 Units Subcutaneous TID WC    insulin lispro  0-6 Units Subcutaneous TID WC    insulin lispro  0-3 Units Subcutaneous Nightly    lisinopril  20 mg Oral Daily    pantoprazole  40 mg Oral QAM AC    pravastatin  20 mg Oral Daily    sotalol  80 mg Oral BID    aspirin  81 mg Oral Daily    doxazosin  8 mg Oral Nightly       TELEMETRY: Atrial fibrillation     Physical Exam:      Physical Exam      General:  Awake, alert, NAD  Skin:  Warm and dry  Neck:  no jvd , no carotid bruits  Chest:  Clear to auscultation, no wheezing or rales  Cardiovascular:  IRRR X8V7 no murmurs, clicks, gallups, or rubs  Abdomen:  Soft nontender, nondistended, bowel sounds present  Extremities:  Edema: none        Lab Data:  CBC:   Recent Labs     04/09/19  0134 04/10/19  0141 04/11/19 0148   WBC 11.4* 9.1 7.7   HGB 9.5* 9.1* 8.9*   HCT 28.7* 27.2* 26.7*   MCV 96.0* 96.8* 98.2*    182 198     BMP:   Recent Labs     04/09/19  0134 04/10/19  0141 04/11/19  0148    143 138   K 3.5 3.3* 3.4*    110 107   CO2 20* 22 21*   BUN 18 13 18   CREATININE 1.2 1.1 1.1     LIVER PROFILE:   Recent Labs     04/08/19  1505   AST 17   ALT 18   BILITOT 0.9   ALKPHOS 88     PT/INR: No results for input(s): PROTIME, INR in the last 72 hours. APTT: No results for input(s): APTT in the last 72 hours. BNP:  No results for input(s): BNP in the last 72 hours. CK, CKMB, Troponin: @LABRCNT (CKTOTAL:3, CKMB:3, TROPONINI:3)@    IMAGING:  Ct Chest Wo Contrast    Result Date: 4/8/2019  CT CHEST WO CONTRAST 4/8/2019 6:00 PM HISTORY: Dyspnea. Shortness of breath. COMPARISON: None DLP: 847 mGy cm. Automated exposure control was utilized to diminish patient radiation dose. TECHNIQUE: Serial helical tomographic images of the chest were acquired. Bone and soft tissue algorithms were provided. Coronal reformatted images were also provided for review. FINDINGS: Neck base: The imaged portion of the neck and thyroid gland is unremarkable. Lungs: There is mild scarring within the apices. Moderate bilateral pleural effusions are present. There is some compressive atelectasis related to these effusions but there is consolidation within both lower lobes with extensive air bronchograms suggesting bilateral lower lobe pneumonia. Some patchy groundglass disease is noted scattered within both lungs suggesting some localized pneumonitis or interstitial edema. . . The trachea and bronchial tree are patent. Heart: There is mild cardiomegaly. No evidence of pericardial effusion.  Coronary calcifications noted in the LAD and circumflex distributions. . . Great vessels: The great vessels are normal in caliber. Lymph nodes: There are multiple lymph nodes within the middle mediastinal hien groups. The majority of these are less than 1 cm in short axis. There is a subcarinal node measuring 16 mm in short axis. A pretracheal node measures 8 mm in short axis. AP window and prevascular nodes are also present all of which measure less than 10 mm short axis. These may be reactive in nature and would warrant follow-up. . Skeletal and soft tissues: The osseous structures of the thorax and surrounding soft tissues demonstrate no worrisome lesions or acute process. Upper abdomen: Multiple hepatic and splenic granulomas are present. . The adrenals are unremarkable. 1. Moderate bilateral pleural effusions are present. There is bilateral lower lobe pneumonia. Patchy groundglass disease is also noted scattered within both lungs suggesting an element of interstitial edema or pneumonitis. There is mild cardiomegaly. 2. Coronary calcifications in the LAD and circumflex distribution. 3. There are multiple lymph nodes within the middle mediastinal hien groups. The majority of these are less than 10 mm in short axis favoring benignity but a few measure more than 10 mm. These may be reactive in nature related to the ongoing bilateral lower lobe pneumonia but would warrant follow-up. There is evidence of remote granulomatous disease. . Signed by Dr Barbara Zhu on 4/8/2019 11:50 PM    Xr Chest Portable    Result Date: 4/8/2019  Exam:   XR CHEST PORTABLE  Date:  4/8/2019 History:  Male, age  70 years; shortness of breath COMPARISON:  None. Findings : Small to moderate left and trace right pleural effusion. Mild cardiomegaly and central pulmonary venous congestion. No measurable pneumothorax. No acute osseous pathology. Impression: Overall picture concerning for fluid overload.  Clinical correlation to exclude superimposed infection is recommended given the asymmetry of the findings. Signed by Dr Tammy Mckenna on 4/8/2019 5:09 PM    Cta Pulmonary W Contrast    Result Date: 4/9/2019  CTA chest 4/9/2019 9:00 AM HISTORY: Chest pain TECHNIQUE: Axial images of the chest were obtained following IV contrast. . Coronal and sagittal reformatted images are reconstructed and reviewed. Maximal intensity projectional images also reconstructed and reviewed COMPARISON: 4/8/2019. DLP: 734 mGy cm Automated exposure control was utilized to minimize patient radiation dose. FINDINGS: No pulmonary emboli are identified. There is moderate vascular calcification with dense coronary calcification. There is cardiomegaly. Similar trace pericardial fluid. Small to moderate pleural effusions are similar to yesterday's exam. Subcarinal lymph node measures up to 1.6 cm. Remaining mediastinal lymph nodes measure less than a centimeter. There is no pathologic axillary lymphadenopathy. There is mild gynecomastia. Calcified granuloma seen within the liver and the spleen. There is a soft tissue prominence of the head of the pancreas worrisome for possible neoplasm with atrophy of the body and tail of the pancreas. Follow-up dedicated cross-sectional imaging of the abdomen recommended with the pancreatic protocol. There are calcified periportal and portacaval lymph nodes related to granulomatous exposure. Scattered groundglass opacities of the lungs with patchy bilateral lower lobe opacities. There is no pneumothorax. No endobronchial lesions are identified. 1. No pulmonary emboli. 2. Similar size small to moderate pleural effusions with only trace pericardial fluid. 3. Patchy bilateral lower lobe opacities worrisome for pneumonia. Scattered groundglass opacities may relate to inflammation or mild edema. 4. Soft tissue prominence of the head of the pancreas as described above. Follow-up CT abdomen pancreatic protocol recommended for further assessment.  5. Diffuse is improved today I believe he could be discharged today    Lula Waddell MD 4/11/2019 12:31 PM

## 2019-04-12 LAB
ANION GAP SERPL CALCULATED.3IONS-SCNC: 14 MMOL/L (ref 7–19)
BASOPHILS ABSOLUTE: 0.1 K/UL (ref 0–0.2)
BASOPHILS RELATIVE PERCENT: 0.7 % (ref 0–1)
BUN BLDV-MCNC: 17 MG/DL (ref 8–23)
CALCIUM SERPL-MCNC: 8.6 MG/DL (ref 8.8–10.2)
CHLORIDE BLD-SCNC: 107 MMOL/L (ref 98–111)
CO2: 21 MMOL/L (ref 22–29)
CREAT SERPL-MCNC: 1 MG/DL (ref 0.5–1.2)
EOSINOPHILS ABSOLUTE: 0.3 K/UL (ref 0–0.6)
EOSINOPHILS RELATIVE PERCENT: 3.5 % (ref 0–5)
GFR NON-AFRICAN AMERICAN: >60
GLUCOSE BLD-MCNC: 124 MG/DL (ref 74–109)
GLUCOSE BLD-MCNC: 195 MG/DL (ref 70–99)
GLUCOSE BLD-MCNC: 244 MG/DL (ref 70–99)
GLUCOSE BLD-MCNC: 283 MG/DL (ref 70–99)
GLUCOSE BLD-MCNC: 82 MG/DL (ref 70–99)
HCT VFR BLD CALC: 27.8 % (ref 42–52)
HEMOGLOBIN: 9 G/DL (ref 14–18)
LYMPHOCYTES ABSOLUTE: 2.3 K/UL (ref 1.1–4.5)
LYMPHOCYTES RELATIVE PERCENT: 31.1 % (ref 20–40)
MCH RBC QN AUTO: 31.4 PG (ref 27–31)
MCHC RBC AUTO-ENTMCNC: 32.4 G/DL (ref 33–37)
MCV RBC AUTO: 96.9 FL (ref 80–94)
MONOCYTES ABSOLUTE: 0.9 K/UL (ref 0–0.9)
MONOCYTES RELATIVE PERCENT: 12.4 % (ref 0–10)
NEUTROPHILS ABSOLUTE: 3.8 K/UL (ref 1.5–7.5)
NEUTROPHILS RELATIVE PERCENT: 51.3 % (ref 50–65)
PDW BLD-RTO: 12.7 % (ref 11.5–14.5)
PERFORMED ON: ABNORMAL
PERFORMED ON: NORMAL
PLATELET # BLD: 210 K/UL (ref 130–400)
PMV BLD AUTO: 10.9 FL (ref 9.4–12.4)
POTASSIUM SERPL-SCNC: 3.6 MMOL/L (ref 3.5–5)
RBC # BLD: 2.87 M/UL (ref 4.7–6.1)
SODIUM BLD-SCNC: 142 MMOL/L (ref 136–145)
WBC # BLD: 7.3 K/UL (ref 4.8–10.8)

## 2019-04-12 PROCEDURE — 6370000000 HC RX 637 (ALT 250 FOR IP): Performed by: INTERNAL MEDICINE

## 2019-04-12 PROCEDURE — 82948 REAGENT STRIP/BLOOD GLUCOSE: CPT

## 2019-04-12 PROCEDURE — 2500000003 HC RX 250 WO HCPCS: Performed by: FAMILY MEDICINE

## 2019-04-12 PROCEDURE — 2500000003 HC RX 250 WO HCPCS: Performed by: INTERNAL MEDICINE

## 2019-04-12 PROCEDURE — 6370000000 HC RX 637 (ALT 250 FOR IP): Performed by: HOSPITALIST

## 2019-04-12 PROCEDURE — 99232 SBSQ HOSP IP/OBS MODERATE 35: CPT | Performed by: HOSPITALIST

## 2019-04-12 PROCEDURE — 36415 COLL VENOUS BLD VENIPUNCTURE: CPT

## 2019-04-12 PROCEDURE — 6360000002 HC RX W HCPCS: Performed by: FAMILY MEDICINE

## 2019-04-12 PROCEDURE — 2580000003 HC RX 258: Performed by: INTERNAL MEDICINE

## 2019-04-12 PROCEDURE — 80048 BASIC METABOLIC PNL TOTAL CA: CPT

## 2019-04-12 PROCEDURE — 2580000003 HC RX 258: Performed by: FAMILY MEDICINE

## 2019-04-12 PROCEDURE — 2140000000 HC CCU INTERMEDIATE R&B

## 2019-04-12 PROCEDURE — 85025 COMPLETE CBC W/AUTO DIFF WBC: CPT

## 2019-04-12 RX ORDER — FUROSEMIDE 40 MG/1
40 TABLET ORAL DAILY
COMMUNITY

## 2019-04-12 RX ORDER — SULFASALAZINE 500 MG/1
500 TABLET ORAL 3 TIMES DAILY
Status: DISCONTINUED | OUTPATIENT
Start: 2019-04-12 | End: 2019-04-13

## 2019-04-12 RX ADMIN — PANTOPRAZOLE SODIUM 40 MG: 40 TABLET, DELAYED RELEASE ORAL at 07:53

## 2019-04-12 RX ADMIN — METOPROLOL TARTRATE 12.5 MG: 25 TABLET ORAL at 22:12

## 2019-04-12 RX ADMIN — DOXAZOSIN 8 MG: 4 TABLET ORAL at 18:12

## 2019-04-12 RX ADMIN — APIXABAN 5 MG: 5 TABLET, FILM COATED ORAL at 09:34

## 2019-04-12 RX ADMIN — SOTALOL HYDROCHLORIDE 80 MG: 80 TABLET ORAL at 09:34

## 2019-04-12 RX ADMIN — METOPROLOL TARTRATE 12.5 MG: 25 TABLET ORAL at 09:34

## 2019-04-12 RX ADMIN — INSULIN LISPRO 5 UNITS: 100 INJECTION, SOLUTION INTRAVENOUS; SUBCUTANEOUS at 09:48

## 2019-04-12 RX ADMIN — PRAVASTATIN SODIUM 20 MG: 20 TABLET ORAL at 09:34

## 2019-04-12 RX ADMIN — DOXYCYCLINE 100 MG: 100 INJECTION, POWDER, LYOPHILIZED, FOR SOLUTION INTRAVENOUS at 07:53

## 2019-04-12 RX ADMIN — INSULIN LISPRO 2 UNITS: 100 INJECTION, SOLUTION INTRAVENOUS; SUBCUTANEOUS at 18:15

## 2019-04-12 RX ADMIN — LISINOPRIL 20 MG: 20 TABLET ORAL at 09:34

## 2019-04-12 RX ADMIN — DILTIAZEM HYDROCHLORIDE 180 MG: 180 CAPSULE, COATED, EXTENDED RELEASE ORAL at 09:34

## 2019-04-12 RX ADMIN — SULFASALAZINE 500 MG: 500 TABLET ORAL at 22:12

## 2019-04-12 RX ADMIN — Medication 10 ML: at 22:23

## 2019-04-12 RX ADMIN — Medication 10 ML: at 09:27

## 2019-04-12 RX ADMIN — INSULIN LISPRO 2 UNITS: 100 INJECTION, SOLUTION INTRAVENOUS; SUBCUTANEOUS at 22:14

## 2019-04-12 RX ADMIN — APIXABAN 5 MG: 5 TABLET, FILM COATED ORAL at 22:12

## 2019-04-12 RX ADMIN — INSULIN GLARGINE 20 UNITS: 100 INJECTION, SOLUTION SUBCUTANEOUS at 22:14

## 2019-04-12 RX ADMIN — INSULIN LISPRO 1 UNITS: 100 INJECTION, SOLUTION INTRAVENOUS; SUBCUTANEOUS at 13:47

## 2019-04-12 RX ADMIN — CEFTRIAXONE 1 G: 1 INJECTION, POWDER, FOR SOLUTION INTRAMUSCULAR; INTRAVENOUS at 18:11

## 2019-04-12 RX ADMIN — INSULIN LISPRO 5 UNITS: 100 INJECTION, SOLUTION INTRAVENOUS; SUBCUTANEOUS at 18:14

## 2019-04-12 RX ADMIN — SOTALOL HYDROCHLORIDE 80 MG: 80 TABLET ORAL at 22:11

## 2019-04-12 RX ADMIN — INSULIN LISPRO 5 UNITS: 100 INJECTION, SOLUTION INTRAVENOUS; SUBCUTANEOUS at 13:44

## 2019-04-12 RX ADMIN — DILTIAZEM HYDROCHLORIDE 5 MG/HR: 5 INJECTION INTRAVENOUS at 00:17

## 2019-04-12 RX ADMIN — DOXYCYCLINE 100 MG: 100 INJECTION, POWDER, LYOPHILIZED, FOR SOLUTION INTRAVENOUS at 18:13

## 2019-04-12 RX ADMIN — DILTIAZEM HYDROCHLORIDE 180 MG: 180 CAPSULE, COATED, EXTENDED RELEASE ORAL at 22:13

## 2019-04-12 RX ADMIN — ASPIRIN 81 MG: 81 TABLET, COATED ORAL at 09:34

## 2019-04-12 ASSESSMENT — PAIN SCALES - GENERAL
PAINLEVEL_OUTOF10: 0
PAINLEVEL_OUTOF10: 0

## 2019-04-12 NOTE — PROGRESS NOTES
[facility] [place]      Patient:  Keegan Law  YOB: 1940  Date of Service: 4/12/2019  MRN: [id number]   Acct: [de-identified]   Primary Care Physician: Milton Bui MD  Advance Directive: Full Code  Admit Date: 4/8/2019       Hospital Day: 4    Chief Complaint: No chief complaint on file. Subjective: doing ok    Objective:   VITALS:  /74   Pulse 109   Temp 98.6 °F (37 °C) (Temporal)   Resp 18   Ht 5' 7\" (1.702 m)   Wt 170 lb 4 oz (77.2 kg)   SpO2 95%   BMI 26.66 kg/m²   24HR INTAKE/OUTPUT:      Intake/Output Summary (Last 24 hours) at 4/12/2019 1430  Last data filed at 4/12/2019 1305  Gross per 24 hour   Intake 1510 ml   Output --   Net 1510 ml     Constitutional: Oriented to person, place, and time. Well-developed and well-nourished. No distress. HENT:    Head: Normocephalic and atraumatic. Mouth/Throat: Oropharynx is clear and moist. No oropharyngeal exudate. Eyes: Conjunctivae and EOM are normal. Pupils are equal, round, and reactive to light. No scleral icterus. Neck: Normal range of motion. Neck supple. No JVD present. No tracheal deviation present. No thyromegaly present. Cardiovascular: Normal rate, regular rhythm and normal heart sounds. Exam reveals no gallop and no friction rub. Pulmonary/Chest: Effort normal and breath sounds normal. No stridor. No respiratory distress. No wheezes. No rales. Abdominal: Soft. Bowel sounds are normal. No distension and no mass. There is no tenderness. There is no rebound and no guarding. Musculoskeletal: Normal range of motion. There is no edema or tenderness. Extremities: No edema  Neurological: Alert and oriented to person, place, and time. No cranial nerve deficit. Skin: Skin is warm and dry. No rash noted. Not diaphoretic. No erythema. No pallor. Psychiatric: Normal mood and affect.  Behavior is normal. Judgment and thought content normal.     Medications:      dextrose      diltiazem 2.5 mg/hr (04/12/19 0234)  apixaban  5 mg Oral BID    diltiazem  180 mg Oral BID    metoprolol tartrate  12.5 mg Oral BID    cefTRIAXone (ROCEPHIN) IV  1 g Intravenous Q24H    doxycycline (VIBRAMYCIN) IV  100 mg Intravenous Q12H    sodium chloride flush  10 mL Intravenous 2 times per day    insulin glargine  20 Units Subcutaneous Nightly    insulin lispro  5 Units Subcutaneous TID WC    insulin lispro  0-6 Units Subcutaneous TID WC    insulin lispro  0-3 Units Subcutaneous Nightly    lisinopril  20 mg Oral Daily    pantoprazole  40 mg Oral QAM AC    pravastatin  20 mg Oral Daily    sotalol  80 mg Oral BID    aspirin  81 mg Oral Daily    doxazosin  8 mg Oral Nightly     sodium chloride flush, magnesium hydroxide, glucose, dextrose, glucagon (rDNA), dextrose  DIET CARDIAC; No Added Salt (3-4 GM); No Caffeine         Lab and other Data:     Recent Labs     04/10/19  0141 04/11/19  0148 04/12/19  0210   WBC 9.1 7.7 7.3   HGB 9.1* 8.9* 9.0*    198 210     Recent Labs     04/10/19  0141 04/11/19  0148 04/12/19  0210    138 142   K 3.3* 3.4* 3.6    107 107   CO2 22 21* 21*   BUN 13 18 17   CREATININE 1.1 1.1 1.0   GLUCOSE 94 211* 124*     No results for input(s): AST, ALT, ALB, BILITOT, ALKPHOS in the last 72 hours. Troponin T:   No results for input(s): TROPONINI in the last 72 hours. Pro-BNP: No results for input(s): BNP in the last 72 hours. INR: No results for input(s): INR in the last 72 hours. ABGs:   Lab Results   Component Value Date    PHART 7.460 04/08/2019    PO2ART 61.0 04/08/2019    JPP5BTU 27.0 04/08/2019     UA:No results for input(s): NITRITE, COLORU, PHUR, LABCAST, WBCUA, RBCUA, MUCUS, TRICHOMONAS, YEAST, BACTERIA, CLARITYU, SPECGRAV, LEUKOCYTESUR, UROBILINOGEN, BILIRUBINUR, BLOODU, GLUCOSEU, AMORPHOUS in the last 72 hours.     Invalid input(s): KETONESU    Imaging:   NM MYOCARDIAL SPECT REST EXERCISE OR RX   Final Result   Summary impression:   Probably normal study borderline ejection fraction normal perfusion   study   Signed by Dr Jong Mcmanus on 4/9/2019 4:27 PM      CTA PULMONARY W CONTRAST   Final Result   1. No pulmonary emboli. 2. Similar size small to moderate pleural effusions with only trace   pericardial fluid. 3. Patchy bilateral lower lobe opacities worrisome for pneumonia. Scattered groundglass opacities may relate to inflammation or mild   edema. 4. Soft tissue prominence of the head of the pancreas as described   above. Follow-up CT abdomen pancreatic protocol recommended for   further assessment. 5. Diffuse vascular calcifications with involvement of the coronary   arteries. Signed by Dr Alvaro Gloria on 4/9/2019 1:46 PM      CT Chest WO Contrast   Final Result   1. Moderate bilateral pleural effusions are present. There is   bilateral lower lobe pneumonia. Patchy groundglass disease is also   noted scattered within both lungs suggesting an element of   interstitial edema or pneumonitis. There is mild cardiomegaly. 2. Coronary calcifications in the LAD and circumflex distribution. 3. There are multiple lymph nodes within the middle mediastinal hien   groups. The majority of these are less than 10 mm in short axis   favoring benignity but a few measure more than 10 mm. These may be   reactive in nature related to the ongoing bilateral lower lobe   pneumonia but would warrant follow-up. There is evidence of remote   granulomatous disease. .   Signed by Dr Eb Tamez on 4/8/2019 11:50 PM      XR CHEST PORTABLE   Final Result   Impression:   Overall picture concerning for fluid overload. Clinical correlation to   exclude superimposed infection is recommended given the asymmetry of   the findings.    Signed by Dr Cortney Tolbert on 4/8/2019 5:09 PM        Micro: No results found for: BC, No components found for: Haily Hernandez  Patient Active Problem List    Diagnosis Date Noted    Elevated d-dimer 04/09/2019    Hypocalcemia 04/09/2019    Macrocytic anemia 04/09/2019    Pneumonia due to infectious organism 04/09/2019    At[facility] fibrillation [facility] ([facility] 04/08/2019    Carotid artery occlusion     PVD (peripheral vascular disease) ([facility] 11/09/2016    Carotid artery stenosis 04/11/2012    Depression     Type 2 diabetes mellitus ([facility])     Hypertension        Assessment/plan:   Principal Problem:    Atrial fibrillation with RVR ([facility])  Active Problems:    Type 2 diabetes mellitus (HCC)    PVD (peripheral vascular disease) ([facility])    Carotid artery occlusion    Elevated d-dimer    Hypocalcemia    Macrocytic anemia    Pneumonia due to infectious organism  Resolved Problems:    * No resolved hospital problems.  *      Plan:   - attempting wean off of cardizem gtt, cont rate control meds per cardiology who are following, can go home once off of gtt and on PO meds only  - cont IV abx  - cont Lantus and SSI  - trend labs, replace electrolytes    DVT Prophylaxis: Payton Glez MD  Hospitalist Service  4/12/2019  2:30 PM

## 2019-04-12 NOTE — PROGRESS NOTES
Monitor room reported heart rate in the low 70s. Decreased Cardizem gtt to 2.5. Will continue to monitor closely.  Electronically signed by Praveen Leyva RN on 4/12/2019 at 3:02 AM

## 2019-04-13 PROBLEM — I48.0 PAF (PAROXYSMAL ATRIAL FIBRILLATION) (HCC): Status: ACTIVE | Noted: 2019-04-13

## 2019-04-13 LAB
ANION GAP SERPL CALCULATED.3IONS-SCNC: 11 MMOL/L (ref 7–19)
BASOPHILS ABSOLUTE: 0.1 K/UL (ref 0–0.2)
BASOPHILS RELATIVE PERCENT: 0.5 % (ref 0–1)
BUN BLDV-MCNC: 17 MG/DL (ref 8–23)
CALCIUM SERPL-MCNC: 8.9 MG/DL (ref 8.8–10.2)
CHLORIDE BLD-SCNC: 109 MMOL/L (ref 98–111)
CO2: 24 MMOL/L (ref 22–29)
CREAT SERPL-MCNC: 1 MG/DL (ref 0.5–1.2)
EOSINOPHILS ABSOLUTE: 0.3 K/UL (ref 0–0.6)
EOSINOPHILS RELATIVE PERCENT: 3.3 % (ref 0–5)
GFR NON-AFRICAN AMERICAN: >60
GLUCOSE BLD-MCNC: 127 MG/DL (ref 74–109)
GLUCOSE BLD-MCNC: 215 MG/DL (ref 70–99)
GLUCOSE BLD-MCNC: 217 MG/DL (ref 70–99)
GLUCOSE BLD-MCNC: 242 MG/DL (ref 70–99)
GLUCOSE BLD-MCNC: 81 MG/DL (ref 70–99)
HCT VFR BLD CALC: 31.4 % (ref 42–52)
HEMOGLOBIN: 10 G/DL (ref 14–18)
LYMPHOCYTES ABSOLUTE: 2.6 K/UL (ref 1.1–4.5)
LYMPHOCYTES RELATIVE PERCENT: 24.5 % (ref 20–40)
MCH RBC QN AUTO: 31.5 PG (ref 27–31)
MCHC RBC AUTO-ENTMCNC: 31.8 G/DL (ref 33–37)
MCV RBC AUTO: 99.1 FL (ref 80–94)
MONOCYTES ABSOLUTE: 1.2 K/UL (ref 0–0.9)
MONOCYTES RELATIVE PERCENT: 11.8 % (ref 0–10)
NEUTROPHILS ABSOLUTE: 6.2 K/UL (ref 1.5–7.5)
NEUTROPHILS RELATIVE PERCENT: 59 % (ref 50–65)
PDW BLD-RTO: 12.6 % (ref 11.5–14.5)
PERFORMED ON: ABNORMAL
PERFORMED ON: NORMAL
PLATELET # BLD: 264 K/UL (ref 130–400)
PMV BLD AUTO: 10.7 FL (ref 9.4–12.4)
POTASSIUM SERPL-SCNC: 4 MMOL/L (ref 3.5–5)
RBC # BLD: 3.17 M/UL (ref 4.7–6.1)
SODIUM BLD-SCNC: 144 MMOL/L (ref 136–145)
WBC # BLD: 10.4 K/UL (ref 4.8–10.8)

## 2019-04-13 PROCEDURE — 2500000003 HC RX 250 WO HCPCS: Performed by: FAMILY MEDICINE

## 2019-04-13 PROCEDURE — 99233 SBSQ HOSP IP/OBS HIGH 50: CPT | Performed by: INTERNAL MEDICINE

## 2019-04-13 PROCEDURE — 6370000000 HC RX 637 (ALT 250 FOR IP): Performed by: INTERNAL MEDICINE

## 2019-04-13 PROCEDURE — 85025 COMPLETE CBC W/AUTO DIFF WBC: CPT

## 2019-04-13 PROCEDURE — 36415 COLL VENOUS BLD VENIPUNCTURE: CPT

## 2019-04-13 PROCEDURE — 2580000003 HC RX 258: Performed by: FAMILY MEDICINE

## 2019-04-13 PROCEDURE — 82948 REAGENT STRIP/BLOOD GLUCOSE: CPT

## 2019-04-13 PROCEDURE — 2140000000 HC CCU INTERMEDIATE R&B

## 2019-04-13 PROCEDURE — 2580000003 HC RX 258: Performed by: INTERNAL MEDICINE

## 2019-04-13 PROCEDURE — 80048 BASIC METABOLIC PNL TOTAL CA: CPT

## 2019-04-13 PROCEDURE — 6370000000 HC RX 637 (ALT 250 FOR IP): Performed by: HOSPITALIST

## 2019-04-13 PROCEDURE — 99232 SBSQ HOSP IP/OBS MODERATE 35: CPT | Performed by: HOSPITALIST

## 2019-04-13 RX ORDER — METOPROLOL TARTRATE 50 MG/1
50 TABLET, FILM COATED ORAL 2 TIMES DAILY
Status: DISCONTINUED | OUTPATIENT
Start: 2019-04-13 | End: 2019-04-14

## 2019-04-13 RX ORDER — SULFASALAZINE 500 MG/1
1000 TABLET ORAL 3 TIMES DAILY
COMMUNITY

## 2019-04-13 RX ORDER — ZOLPIDEM TARTRATE 5 MG/1
5 TABLET ORAL NIGHTLY PRN
Status: DISCONTINUED | OUTPATIENT
Start: 2019-04-13 | End: 2019-04-17 | Stop reason: HOSPADM

## 2019-04-13 RX ORDER — FUROSEMIDE 40 MG/1
40 TABLET ORAL DAILY
Status: DISCONTINUED | OUTPATIENT
Start: 2019-04-14 | End: 2019-04-17 | Stop reason: HOSPADM

## 2019-04-13 RX ORDER — DILTIAZEM HYDROCHLORIDE 240 MG/1
240 CAPSULE, COATED, EXTENDED RELEASE ORAL 2 TIMES DAILY
Status: DISCONTINUED | OUTPATIENT
Start: 2019-04-14 | End: 2019-04-15

## 2019-04-13 RX ORDER — SULFASALAZINE 500 MG/1
1000 TABLET ORAL 3 TIMES DAILY
Status: DISCONTINUED | OUTPATIENT
Start: 2019-04-13 | End: 2019-04-17 | Stop reason: HOSPADM

## 2019-04-13 RX ORDER — DIGOXIN 250 MCG
250 TABLET ORAL DAILY
Status: DISCONTINUED | OUTPATIENT
Start: 2019-04-14 | End: 2019-04-17 | Stop reason: HOSPADM

## 2019-04-13 RX ADMIN — INSULIN GLARGINE 20 UNITS: 100 INJECTION, SOLUTION SUBCUTANEOUS at 21:30

## 2019-04-13 RX ADMIN — METOPROLOL TARTRATE 12.5 MG: 25 TABLET ORAL at 08:46

## 2019-04-13 RX ADMIN — SULFASALAZINE 1000 MG: 500 TABLET ORAL at 21:29

## 2019-04-13 RX ADMIN — DOXYCYCLINE 100 MG: 100 INJECTION, POWDER, LYOPHILIZED, FOR SOLUTION INTRAVENOUS at 08:00

## 2019-04-13 RX ADMIN — APIXABAN 5 MG: 5 TABLET, FILM COATED ORAL at 21:30

## 2019-04-13 RX ADMIN — SULFASALAZINE 500 MG: 500 TABLET ORAL at 08:46

## 2019-04-13 RX ADMIN — METOPROLOL TARTRATE 50 MG: 50 TABLET ORAL at 21:30

## 2019-04-13 RX ADMIN — PRAVASTATIN SODIUM 20 MG: 20 TABLET ORAL at 08:46

## 2019-04-13 RX ADMIN — SULFASALAZINE 1000 MG: 500 TABLET ORAL at 15:03

## 2019-04-13 RX ADMIN — PANTOPRAZOLE SODIUM 40 MG: 40 TABLET, DELAYED RELEASE ORAL at 08:00

## 2019-04-13 RX ADMIN — SOTALOL HYDROCHLORIDE 80 MG: 80 TABLET ORAL at 08:46

## 2019-04-13 RX ADMIN — DILTIAZEM HYDROCHLORIDE 180 MG: 180 CAPSULE, COATED, EXTENDED RELEASE ORAL at 08:46

## 2019-04-13 RX ADMIN — DOXAZOSIN 8 MG: 4 TABLET ORAL at 21:30

## 2019-04-13 RX ADMIN — ASPIRIN 81 MG: 81 TABLET, COATED ORAL at 08:46

## 2019-04-13 RX ADMIN — DILTIAZEM HYDROCHLORIDE 180 MG: 180 CAPSULE, COATED, EXTENDED RELEASE ORAL at 21:30

## 2019-04-13 RX ADMIN — INSULIN LISPRO 2 UNITS: 100 INJECTION, SOLUTION INTRAVENOUS; SUBCUTANEOUS at 17:53

## 2019-04-13 RX ADMIN — INSULIN LISPRO 5 UNITS: 100 INJECTION, SOLUTION INTRAVENOUS; SUBCUTANEOUS at 12:00

## 2019-04-13 RX ADMIN — LISINOPRIL 20 MG: 20 TABLET ORAL at 08:46

## 2019-04-13 RX ADMIN — INSULIN LISPRO 2 UNITS: 100 INJECTION, SOLUTION INTRAVENOUS; SUBCUTANEOUS at 12:00

## 2019-04-13 RX ADMIN — INSULIN LISPRO 5 UNITS: 100 INJECTION, SOLUTION INTRAVENOUS; SUBCUTANEOUS at 17:53

## 2019-04-13 RX ADMIN — INSULIN LISPRO 1 UNITS: 100 INJECTION, SOLUTION INTRAVENOUS; SUBCUTANEOUS at 21:30

## 2019-04-13 RX ADMIN — Medication 10 ML: at 08:46

## 2019-04-13 RX ADMIN — Medication 10 ML: at 21:53

## 2019-04-13 RX ADMIN — APIXABAN 5 MG: 5 TABLET, FILM COATED ORAL at 08:46

## 2019-04-13 NOTE — PROGRESS NOTES
[facility]      Patient:  Luna Bhagat  YOB: 1940  Date of Service: 4/13/2019  MRN: [id number]   Acct: [de-identified]   Primary Care Physician: Peter Chavez MD  Advance Directive: Full Code  Admit Date: 4/8/2019       Hospital Day: 5    Chief Complaint: No chief complaint on file. Subjective: wants to go home    Objective:   VITALS:  BP (!) 151/85   Pulse 78   Temp 98.1 °F (36.7 °C) (Temporal)   Resp 16   Ht 5' 7\" (1.702 m)   Wt 168 lb 9.6 oz (76.5 kg)   SpO2 97%   BMI 26.41 kg/m²   24HR INTAKE/OUTPUT:      Intake/Output Summary (Last 24 hours) at 4/13/2019 1345  Last data filed at 4/13/2019 1214  Gross per 24 hour   Intake 810 ml   Output 600 ml   Net 210 ml     Constitutional: Oriented to person, place, and time. Well-developed and well-nourished. No distress. HENT:    Head: Normocephalic and atraumatic. Mouth/Throat: Oropharynx is clear and moist. No oropharyngeal exudate. Eyes: Conjunctivae and EOM are normal. Pupils are equal, round, and reactive to light. No scleral icterus. Neck: Normal range of motion. Neck supple. No JVD present. No tracheal deviation present. No thyromegaly present. Cardiovascular: tachycardic  Pulmonary/Chest: Effort normal and breath sounds normal. No stridor. No respiratory distress. No wheezes. No rales. Abdominal: Soft. Bowel sounds are normal. No distension and no mass. There is no tenderness. There is no rebound and no guarding. Musculoskeletal: Normal range of motion. There is no edema or tenderness. Extremities: No edema  Neurological: Alert and oriented to person, place, and time. No cranial nerve deficit. Skin: Skin is warm and dry. No rash noted. Not diaphoretic. No erythema. No pallor. Psychiatric: Normal mood and affect.  Behavior is normal. Judgment and thought content normal.     Medications:      dextrose      diltiazem Stopped (04/12/19 1910)      sulfaSALAzine  500 mg Oral TID    apixaban  5 mg Oral BID    Result   1. No pulmonary emboli. 2. Similar size small to moderate pleural effusions with only trace   pericardial fluid. 3. Patchy bilateral lower lobe opacities worrisome for pneumonia. Scattered groundglass opacities may relate to inflammation or mild   edema. 4. Soft tissue prominence of the head of the pancreas as described   above. Follow-up CT abdomen pancreatic protocol recommended for   further assessment. 5. Diffuse vascular calcifications with involvement of the coronary   arteries. Signed by Dr Humberto Byers on 4/9/2019 1:46 PM      CT Chest WO Contrast   Final Result   1. Moderate bilateral pleural effusions are present. There is   bilateral lower lobe pneumonia. Patchy groundglass disease is also   noted scattered within both lungs suggesting an element of   interstitial edema or pneumonitis. There is mild cardiomegaly. 2. Coronary calcifications in the LAD and circumflex distribution. 3. There are multiple lymph nodes within the middle mediastinal hien   groups. The majority of these are less than 10 mm in short axis   favoring benignity but a few measure more than 10 mm. These may be   reactive in nature related to the ongoing bilateral lower lobe   pneumonia but would warrant follow-up. There is evidence of remote   granulomatous disease. .   Signed by Dr Paco Bingham on 4/8/2019 11:50 PM      XR CHEST PORTABLE   Final Result   Impression:   Overall picture concerning for fluid overload. Clinical correlation to   exclude superimposed infection is recommended given the asymmetry of   the findings.    Signed by Dr Brendon Patrick on 4/8/2019 5:09 PM        Micro: No results found for: BC, No components found for: Esteban White  Patient Active Problem List    Diagnosis Date Noted    Elevated d-dimer 04/09/2019    Hypocalcemia 04/09/2019    Macrocytic anemia 04/09/2019    Pneumonia due to infectious organism 04/09/2019    Atrial fibrillation with RVR ([address] 04/08/2019    Carotid artery occlusion     PVD (peripheral vascular disease) ([facility] 11/09/2016    Carotid artery stenosis 04/11/2012    Depression     Type 2 diabetes mellitus (HCC)     Hypertension        Assessment/plan:   Principal Problem:    Atrial fibrillation with RVR ([facility])  Active Problems:    Type 2 diabetes mellitus (HCC)    PVD (peripheral vascular disease) ([facility])    Carotid artery occlusion    Elevated d-dimer    Hypocalcemia    Macrocytic anemia    Pneumonia due to infectious organism  Resolved Problems:    * No resolved hospital problems.  *      Plan:   - had to resume cardizem gtt, cont rate control meds per cardiology who are following, cannot discharge until off of gtt for a period of time showing rate control  - stop IV abx  - cont Lantus and SSI  - trend labs, replace electrolytes    DVT Prophylaxis: Lainey Black MD  Hospitalist Service  4/13/2019  1:45 PM

## 2019-04-14 LAB
ANION GAP SERPL CALCULATED.3IONS-SCNC: 14 MMOL/L (ref 7–19)
BASOPHILS ABSOLUTE: 0.1 K/UL (ref 0–0.2)
BASOPHILS RELATIVE PERCENT: 0.8 % (ref 0–1)
BUN BLDV-MCNC: 16 MG/DL (ref 8–23)
CALCIUM SERPL-MCNC: 8.9 MG/DL (ref 8.8–10.2)
CHLORIDE BLD-SCNC: 108 MMOL/L (ref 98–111)
CO2: 22 MMOL/L (ref 22–29)
CREAT SERPL-MCNC: 0.9 MG/DL (ref 0.5–1.2)
EOSINOPHILS ABSOLUTE: 0.3 K/UL (ref 0–0.6)
EOSINOPHILS RELATIVE PERCENT: 3.4 % (ref 0–5)
GFR NON-AFRICAN AMERICAN: >60
GLUCOSE BLD-MCNC: 229 MG/DL (ref 70–99)
GLUCOSE BLD-MCNC: 240 MG/DL (ref 70–99)
GLUCOSE BLD-MCNC: 346 MG/DL (ref 70–99)
GLUCOSE BLD-MCNC: 71 MG/DL (ref 74–109)
GLUCOSE BLD-MCNC: 93 MG/DL (ref 70–99)
HCT VFR BLD CALC: 29.6 % (ref 42–52)
HEMOGLOBIN: 9.5 G/DL (ref 14–18)
LYMPHOCYTES ABSOLUTE: 2.6 K/UL (ref 1.1–4.5)
LYMPHOCYTES RELATIVE PERCENT: 26 % (ref 20–40)
MCH RBC QN AUTO: 31.4 PG (ref 27–31)
MCHC RBC AUTO-ENTMCNC: 32.1 G/DL (ref 33–37)
MCV RBC AUTO: 97.7 FL (ref 80–94)
MONOCYTES ABSOLUTE: 1.2 K/UL (ref 0–0.9)
MONOCYTES RELATIVE PERCENT: 12.2 % (ref 0–10)
NEUTROPHILS ABSOLUTE: 5.7 K/UL (ref 1.5–7.5)
NEUTROPHILS RELATIVE PERCENT: 56.6 % (ref 50–65)
PDW BLD-RTO: 12.8 % (ref 11.5–14.5)
PERFORMED ON: ABNORMAL
PERFORMED ON: NORMAL
PLATELET # BLD: 249 K/UL (ref 130–400)
PMV BLD AUTO: 10.1 FL (ref 9.4–12.4)
POTASSIUM SERPL-SCNC: 3.6 MMOL/L (ref 3.5–5)
RBC # BLD: 3.03 M/UL (ref 4.7–6.1)
SODIUM BLD-SCNC: 144 MMOL/L (ref 136–145)
T4 TOTAL: 6.5 UG/DL (ref 4.5–11.7)
TSH SERPL DL<=0.05 MIU/L-ACNC: 4.06 UIU/ML (ref 0.27–4.2)
WBC # BLD: 10 K/UL (ref 4.8–10.8)

## 2019-04-14 PROCEDURE — 99232 SBSQ HOSP IP/OBS MODERATE 35: CPT | Performed by: HOSPITALIST

## 2019-04-14 PROCEDURE — 2580000003 HC RX 258: Performed by: INTERNAL MEDICINE

## 2019-04-14 PROCEDURE — 6370000000 HC RX 637 (ALT 250 FOR IP): Performed by: INTERNAL MEDICINE

## 2019-04-14 PROCEDURE — 36415 COLL VENOUS BLD VENIPUNCTURE: CPT

## 2019-04-14 PROCEDURE — 85025 COMPLETE CBC W/AUTO DIFF WBC: CPT

## 2019-04-14 PROCEDURE — 80048 BASIC METABOLIC PNL TOTAL CA: CPT

## 2019-04-14 PROCEDURE — 99233 SBSQ HOSP IP/OBS HIGH 50: CPT | Performed by: INTERNAL MEDICINE

## 2019-04-14 PROCEDURE — 82948 REAGENT STRIP/BLOOD GLUCOSE: CPT

## 2019-04-14 PROCEDURE — 84443 ASSAY THYROID STIM HORMONE: CPT

## 2019-04-14 PROCEDURE — 2140000000 HC CCU INTERMEDIATE R&B

## 2019-04-14 PROCEDURE — 84436 ASSAY OF TOTAL THYROXINE: CPT

## 2019-04-14 PROCEDURE — 6370000000 HC RX 637 (ALT 250 FOR IP): Performed by: HOSPITALIST

## 2019-04-14 RX ORDER — METOPROLOL TARTRATE 50 MG/1
100 TABLET, FILM COATED ORAL 2 TIMES DAILY
Status: DISCONTINUED | OUTPATIENT
Start: 2019-04-14 | End: 2019-04-14

## 2019-04-14 RX ORDER — METOPROLOL TARTRATE 50 MG/1
50 TABLET, FILM COATED ORAL ONCE
Status: COMPLETED | OUTPATIENT
Start: 2019-04-14 | End: 2019-04-14

## 2019-04-14 RX ORDER — METOPROLOL SUCCINATE 50 MG/1
200 TABLET, EXTENDED RELEASE ORAL 2 TIMES DAILY
Status: DISCONTINUED | OUTPATIENT
Start: 2019-04-14 | End: 2019-04-17 | Stop reason: HOSPADM

## 2019-04-14 RX ADMIN — SULFASALAZINE 1000 MG: 500 TABLET ORAL at 20:15

## 2019-04-14 RX ADMIN — INSULIN LISPRO 5 UNITS: 100 INJECTION, SOLUTION INTRAVENOUS; SUBCUTANEOUS at 08:18

## 2019-04-14 RX ADMIN — INSULIN LISPRO 2 UNITS: 100 INJECTION, SOLUTION INTRAVENOUS; SUBCUTANEOUS at 18:05

## 2019-04-14 RX ADMIN — METOPROLOL SUCCINATE 200 MG: 50 TABLET, EXTENDED RELEASE ORAL at 20:15

## 2019-04-14 RX ADMIN — PANTOPRAZOLE SODIUM 40 MG: 40 TABLET, DELAYED RELEASE ORAL at 07:31

## 2019-04-14 RX ADMIN — INSULIN LISPRO 2 UNITS: 100 INJECTION, SOLUTION INTRAVENOUS; SUBCUTANEOUS at 13:26

## 2019-04-14 RX ADMIN — ASPIRIN 81 MG: 81 TABLET, COATED ORAL at 07:30

## 2019-04-14 RX ADMIN — SULFASALAZINE 1000 MG: 500 TABLET ORAL at 07:31

## 2019-04-14 RX ADMIN — ZOLPIDEM TARTRATE 5 MG: 5 TABLET ORAL at 20:16

## 2019-04-14 RX ADMIN — PRAVASTATIN SODIUM 20 MG: 20 TABLET ORAL at 07:31

## 2019-04-14 RX ADMIN — INSULIN LISPRO 5 UNITS: 100 INJECTION, SOLUTION INTRAVENOUS; SUBCUTANEOUS at 18:04

## 2019-04-14 RX ADMIN — METOPROLOL TARTRATE 50 MG: 50 TABLET ORAL at 09:27

## 2019-04-14 RX ADMIN — INSULIN GLARGINE 20 UNITS: 100 INJECTION, SOLUTION SUBCUTANEOUS at 20:21

## 2019-04-14 RX ADMIN — DILTIAZEM HYDROCHLORIDE 240 MG: 240 CAPSULE, COATED, EXTENDED RELEASE ORAL at 07:30

## 2019-04-14 RX ADMIN — INSULIN LISPRO 5 UNITS: 100 INJECTION, SOLUTION INTRAVENOUS; SUBCUTANEOUS at 13:27

## 2019-04-14 RX ADMIN — FUROSEMIDE 40 MG: 40 TABLET ORAL at 07:30

## 2019-04-14 RX ADMIN — DOXAZOSIN 8 MG: 4 TABLET ORAL at 18:01

## 2019-04-14 RX ADMIN — APIXABAN 5 MG: 5 TABLET, FILM COATED ORAL at 07:31

## 2019-04-14 RX ADMIN — Medication 10 ML: at 07:31

## 2019-04-14 RX ADMIN — DILTIAZEM HYDROCHLORIDE 240 MG: 240 CAPSULE, COATED, EXTENDED RELEASE ORAL at 20:15

## 2019-04-14 RX ADMIN — LISINOPRIL 20 MG: 20 TABLET ORAL at 07:31

## 2019-04-14 RX ADMIN — INSULIN LISPRO 2 UNITS: 100 INJECTION, SOLUTION INTRAVENOUS; SUBCUTANEOUS at 20:21

## 2019-04-14 RX ADMIN — DIGOXIN 250 MCG: 250 TABLET ORAL at 07:31

## 2019-04-14 RX ADMIN — METOPROLOL TARTRATE 50 MG: 50 TABLET ORAL at 07:30

## 2019-04-14 RX ADMIN — Medication 10 ML: at 20:18

## 2019-04-14 RX ADMIN — ZOLPIDEM TARTRATE 5 MG: 5 TABLET ORAL at 00:19

## 2019-04-14 RX ADMIN — APIXABAN 5 MG: 5 TABLET, FILM COATED ORAL at 20:15

## 2019-04-14 RX ADMIN — SULFASALAZINE 1000 MG: 500 TABLET ORAL at 13:28

## 2019-04-14 NOTE — PROGRESS NOTES
 Hypocalcemia 04/09/2019    Macrocytic anemia 04/09/2019    Pneumonia due to infectious organism 04/09/2019    [facility] 04/08/2019    Carotid artery occlusion     PVD (peripheral vascular disease) ([facility] 11/09/2016    Carotid artery stenosis 04/11/2012    Depression     Type 2 diabetes mellitus (HCC)     Hypertension        Assessment/plan:   Principal Problem:    Atrial fibrillation with RVR ([facility])  Active Problems:    Type 2 diabetes mellitus (HCC)    PVD (peripheral vascular disease) ([facility])    Carotid artery occlusion    Elevated d-dimer    Hypocalcemia    Macrocytic anemia    Pneumonia due to infectious organism    PAF (paroxysmal atrial fibrillation) ([facility]  Resolved Problems:    * No resolved hospital problems.  *      Plan:   - again off of cardizem gtt, cont rate control meds per cardiology who are following and managing, cannot discharge until off of gtt for a period of time showing rate control, had long discussion with patient explaining we are adjusting meds as best we can in a safe manner, currently on cardizem/digoxin/metoprolol  - off of abx  - cont Lantus and SSI  - trend labs, replace electrolytes    DVT Prophylaxis: Randy Don MD  Hospitalist Service  4/14/2019  1:51 PM

## 2019-04-14 NOTE — PROGRESS NOTES
[address]    Daily HOSPITAL Progress Note            I personally saw the patient and rounded with:  Jodi SAENZ, on  4/13/19      The observations documented in this note, including the assessment and plan are mine                                  Date:  4/13/19  Patient: Rolando Mcelroy  Admission:  4/8/2019  1:50 PM  Admit DX: Atrial flutter with rapid ventricular response ([address] [I48.92]  Atrial fibrillation with RVR ([address] [I48.91]  Age:  70 y.o., 1940     LOS: 5 days       Reason for initial evaluation or the patient's initial complaint    Atrial fibrillation      SUBJECTIVE:      The patient was seen and examined. All daily progress notes were reviewed. Pertinent laboratory and imaging studies were critiqued. Family present and in room during examination:  No     There were no new complications over night. History of the Present Illness / Chief Complaint / current status today:       According to the patient:  \"ok\"    Subjective:  Mr. Rolando Mcelroy is:  unchanged. Anxious to go home. Complaint / Symptom Yes / No and Description if Yes       Chest Pain No   Shortness of Air No   Presyncope / Syncope No   Palpitations No       The patient has the following cardiac specific problems listed in the problem list:      Specialty Problems        Cardiology Problems    Hypertension        Carotid artery stenosis        PVD (peripheral vascular disease) ([facility])        * (Principal) Atrial fibrillation with RVR ([facility])        Carotid artery occlusion                PFSH:  New information:  no        Any Change: no          ROS:    System Any new information? Any change?         Cardiovascular No No   Pulmonary / Respiratory No No         OBJECTIVE:      BP (!) 159/88   Pulse 92   Temp 99 °F (37.2 °C) (Temporal)   Resp 16   Ht 5' 7\" (1.702 m)   Wt 168 lb 9.6 oz (76.5 kg)   SpO2 96%   BMI 26.41 kg/m²     Intake/Output Summary (Last 24 hours) at 4/13/2019 2120  Last data filed at 4/13/2019 2102  Gross per 24 hour   Intake 1040 ml   Output 600 ml   Net 440 ml       Physical Examination:    BP (!) 159/88   Pulse 92   Temp 99 °F (37.2 °C) (Temporal)   Resp 16   Ht 5' 7\" (1.702 m)   Wt 168 lb 9.6 oz (76.5 kg)   SpO2 96%   BMI 26.41 kg/m²     GENERAL - well developed and well nourished; is an active participant in this examination  HEENT -  PERRLA, Hearing appears normal, conjunctiva and lids are normal, ears and nose appear normal  NECK - no thyromegaly, no JVD, trachea is in the midline  CARDIOVASCULAR - PMI is in the left mid line clavicular position, Variable S1, normal S2, without obvious murmur or gallop   PULMONARY - No respiratory distress. scattered wheezes and rales. Breath sounds in both  lung fields are Decreased  ABDOMEN  - soft, non tender, no rebound, no hepatomegaly or splenomegaly  MUSCULOSKELETAL  - Prone/Supine, digitals and nails are without clubbing or cyanosis  EXTREMITIES - trace edema  NEUROLOGIC - cranial nerves, II-XII, are normal  SKIN - turgor is normal, no rash  PSYCHIATRIC - normal mood and affect, alert and orientated x 3, judgement and insight appear appropriate      LABORATORY EVALUATION & TESTING:    I have personally reviewed and interpreted the results of the following diagnostic endeavors     CBC:   Recent Labs     04/12/19 0210 04/13/19 0144   WBC 7.3 10.4   HGB 9.0* 10.0*   HCT 27.8* 31.4*    264     BMP:   Recent Labs     04/12/19 0210 04/13/19 0144    144   K 3.6 4.0   CO2 21* 24   BUN 17 17   CREATININE 1.0 1.0   LABGLOM >60 >60   GLUCOSE 124* 127*     BNP: No results for input(s): BNP in the last 72 hours. PT/INR: No results for input(s): PROTIME, INR in the last 72 hours. APTT:No results for input(s): APTT in the last 72 hours. CARDIAC ENZYMES:No results for input(s): CKTOTAL, CKMB, CKMBINDEX, TROPONINI in the last 72 hours.   FASTING LIPID PANEL:No results found for: HDL, LDLDIRECT, LDLCALC, TRIG  LIVER PROFILE:No

## 2019-04-15 LAB
ANION GAP SERPL CALCULATED.3IONS-SCNC: 14 MMOL/L (ref 7–19)
BACTERIA: NEGATIVE /HPF
BASOPHILS ABSOLUTE: 0.1 K/UL (ref 0–0.2)
BASOPHILS RELATIVE PERCENT: 0.7 % (ref 0–1)
BILIRUBIN URINE: NEGATIVE
BLOOD, URINE: NEGATIVE
BUN BLDV-MCNC: 17 MG/DL (ref 8–23)
CALCIUM SERPL-MCNC: 8.6 MG/DL (ref 8.8–10.2)
CHLORIDE BLD-SCNC: 105 MMOL/L (ref 98–111)
CLARITY: CLEAR
CO2: 24 MMOL/L (ref 22–29)
COLOR: YELLOW
CREAT SERPL-MCNC: 1 MG/DL (ref 0.5–1.2)
EOSINOPHILS ABSOLUTE: 0.3 K/UL (ref 0–0.6)
EOSINOPHILS RELATIVE PERCENT: 2.8 % (ref 0–5)
EPITHELIAL CELLS, UA: 0 /HPF (ref 0–5)
FOLATE: >20 NG/ML (ref 4.5–32.2)
GFR NON-AFRICAN AMERICAN: >60
GLUCOSE BLD-MCNC: 132 MG/DL (ref 70–99)
GLUCOSE BLD-MCNC: 137 MG/DL (ref 74–109)
GLUCOSE BLD-MCNC: 147 MG/DL (ref 70–99)
GLUCOSE BLD-MCNC: 345 MG/DL (ref 70–99)
GLUCOSE BLD-MCNC: 95 MG/DL (ref 70–99)
GLUCOSE URINE: >=1000 MG/DL
HAPTOGLOBIN: 189 MG/DL (ref 30–200)
HCT VFR BLD CALC: 29.1 % (ref 42–52)
HCT VFR BLD CALC: 32.3 % (ref 42–52)
HEMOGLOBIN: 9.6 G/DL (ref 14–18)
HYALINE CASTS: 0 /HPF (ref 0–8)
IRON SATURATION: 15 % (ref 14–50)
IRON: 38 UG/DL (ref 59–158)
KETONES, URINE: NEGATIVE MG/DL
LEUKOCYTE ESTERASE, URINE: NEGATIVE
LYMPHOCYTES ABSOLUTE: 2.5 K/UL (ref 1.1–4.5)
LYMPHOCYTES RELATIVE PERCENT: 25.3 % (ref 20–40)
MCH RBC QN AUTO: 32.1 PG (ref 27–31)
MCHC RBC AUTO-ENTMCNC: 33 G/DL (ref 33–37)
MCV RBC AUTO: 97.3 FL (ref 80–94)
MONOCYTES ABSOLUTE: 1.1 K/UL (ref 0–0.9)
MONOCYTES RELATIVE PERCENT: 11.1 % (ref 0–10)
NEUTROPHILS ABSOLUTE: 5.8 K/UL (ref 1.5–7.5)
NEUTROPHILS RELATIVE PERCENT: 59.1 % (ref 50–65)
NITRITE, URINE: NEGATIVE
PDW BLD-RTO: 12.5 % (ref 11.5–14.5)
PERFORMED ON: ABNORMAL
PERFORMED ON: NORMAL
PH UA: 5.5 (ref 5–8)
PLATELET # BLD: 265 K/UL (ref 130–400)
PMV BLD AUTO: 10.3 FL (ref 9.4–12.4)
POTASSIUM SERPL-SCNC: 3.5 MMOL/L (ref 3.5–5)
PROTEIN UA: 30 MG/DL
RBC # BLD: 2.99 M/UL (ref 4.7–6.1)
RBC UA: 0 /HPF (ref 0–4)
RETICULOCYTE ABSOLUTE COUNT: 0.11 M/UL (ref 0.03–0.12)
RETICULOCYTE COUNT PCT: 3.25 % (ref 0.5–1.5)
SODIUM BLD-SCNC: 143 MMOL/L (ref 136–145)
SPECIFIC GRAVITY UA: 1.03 (ref 1–1.03)
TOTAL IRON BINDING CAPACITY: 246 UG/DL (ref 250–400)
UROBILINOGEN, URINE: 0.2 E.U./DL
VITAMIN B-12: 1355 PG/ML (ref 211–946)
WBC # BLD: 9.8 K/UL (ref 4.8–10.8)
WBC UA: 1 /HPF (ref 0–5)

## 2019-04-15 PROCEDURE — 80048 BASIC METABOLIC PNL TOTAL CA: CPT

## 2019-04-15 PROCEDURE — 83010 ASSAY OF HAPTOGLOBIN QUANT: CPT

## 2019-04-15 PROCEDURE — 2140000000 HC CCU INTERMEDIATE R&B

## 2019-04-15 PROCEDURE — 82746 ASSAY OF FOLIC ACID SERUM: CPT

## 2019-04-15 PROCEDURE — 81001 URINALYSIS AUTO W/SCOPE: CPT

## 2019-04-15 PROCEDURE — 82607 VITAMIN B-12: CPT

## 2019-04-15 PROCEDURE — 85025 COMPLETE CBC W/AUTO DIFF WBC: CPT

## 2019-04-15 PROCEDURE — 99233 SBSQ HOSP IP/OBS HIGH 50: CPT | Performed by: INTERNAL MEDICINE

## 2019-04-15 PROCEDURE — 99232 SBSQ HOSP IP/OBS MODERATE 35: CPT | Performed by: INTERNAL MEDICINE

## 2019-04-15 PROCEDURE — 6370000000 HC RX 637 (ALT 250 FOR IP): Performed by: HOSPITALIST

## 2019-04-15 PROCEDURE — 83550 IRON BINDING TEST: CPT

## 2019-04-15 PROCEDURE — 2580000003 HC RX 258: Performed by: INTERNAL MEDICINE

## 2019-04-15 PROCEDURE — 83540 ASSAY OF IRON: CPT

## 2019-04-15 PROCEDURE — 6370000000 HC RX 637 (ALT 250 FOR IP): Performed by: INTERNAL MEDICINE

## 2019-04-15 PROCEDURE — 85045 AUTOMATED RETICULOCYTE COUNT: CPT

## 2019-04-15 PROCEDURE — 82948 REAGENT STRIP/BLOOD GLUCOSE: CPT

## 2019-04-15 PROCEDURE — 36415 COLL VENOUS BLD VENIPUNCTURE: CPT

## 2019-04-15 RX ADMIN — DILTIAZEM HYDROCHLORIDE 300 MG: 180 CAPSULE, COATED, EXTENDED RELEASE ORAL at 22:16

## 2019-04-15 RX ADMIN — Medication 10 ML: at 22:17

## 2019-04-15 RX ADMIN — ASPIRIN 81 MG: 81 TABLET, COATED ORAL at 10:27

## 2019-04-15 RX ADMIN — APIXABAN 5 MG: 5 TABLET, FILM COATED ORAL at 10:24

## 2019-04-15 RX ADMIN — INSULIN GLARGINE 20 UNITS: 100 INJECTION, SOLUTION SUBCUTANEOUS at 22:17

## 2019-04-15 RX ADMIN — INSULIN LISPRO 4 UNITS: 100 INJECTION, SOLUTION INTRAVENOUS; SUBCUTANEOUS at 13:17

## 2019-04-15 RX ADMIN — LISINOPRIL 20 MG: 20 TABLET ORAL at 10:24

## 2019-04-15 RX ADMIN — ZOLPIDEM TARTRATE 5 MG: 5 TABLET ORAL at 22:17

## 2019-04-15 RX ADMIN — PRAVASTATIN SODIUM 20 MG: 20 TABLET ORAL at 10:24

## 2019-04-15 RX ADMIN — PANTOPRAZOLE SODIUM 40 MG: 40 TABLET, DELAYED RELEASE ORAL at 07:00

## 2019-04-15 RX ADMIN — INSULIN LISPRO 5 UNITS: 100 INJECTION, SOLUTION INTRAVENOUS; SUBCUTANEOUS at 10:24

## 2019-04-15 RX ADMIN — APIXABAN 5 MG: 5 TABLET, FILM COATED ORAL at 22:16

## 2019-04-15 RX ADMIN — DILTIAZEM HYDROCHLORIDE 240 MG: 240 CAPSULE, COATED, EXTENDED RELEASE ORAL at 10:24

## 2019-04-15 RX ADMIN — INSULIN LISPRO 5 UNITS: 100 INJECTION, SOLUTION INTRAVENOUS; SUBCUTANEOUS at 18:15

## 2019-04-15 RX ADMIN — SULFASALAZINE 1000 MG: 500 TABLET ORAL at 10:26

## 2019-04-15 RX ADMIN — DOXAZOSIN 8 MG: 4 TABLET ORAL at 18:14

## 2019-04-15 RX ADMIN — FUROSEMIDE 40 MG: 40 TABLET ORAL at 10:27

## 2019-04-15 RX ADMIN — SULFASALAZINE 1000 MG: 500 TABLET ORAL at 22:16

## 2019-04-15 RX ADMIN — SULFASALAZINE 1000 MG: 500 TABLET ORAL at 13:16

## 2019-04-15 RX ADMIN — Medication 10 ML: at 10:27

## 2019-04-15 RX ADMIN — DIGOXIN 250 MCG: 250 TABLET ORAL at 10:24

## 2019-04-15 RX ADMIN — METOPROLOL SUCCINATE 200 MG: 50 TABLET, EXTENDED RELEASE ORAL at 22:16

## 2019-04-15 RX ADMIN — METOPROLOL SUCCINATE 200 MG: 50 TABLET, EXTENDED RELEASE ORAL at 10:27

## 2019-04-15 RX ADMIN — INSULIN LISPRO 5 UNITS: 100 INJECTION, SOLUTION INTRAVENOUS; SUBCUTANEOUS at 13:16

## 2019-04-15 ASSESSMENT — ENCOUNTER SYMPTOMS
BLOOD IN STOOL: 0
CHEST TIGHTNESS: 0
CONSTIPATION: 0
ABDOMINAL PAIN: 0
SHORTNESS OF BREATH: 0

## 2019-04-15 NOTE — PROGRESS NOTES
Nutrition Assessment    Type and Reason for Visit: Initial    Nutrition Recommendations: continue current POC    Nutrition Assessment: Following for LOS x 7 days. Modified current diet with addition of Carb Control due to hx=-DM, elevated accuchek's and insulin. Pt not at nutritional risk at this time    Malnutrition Assessment:  · Malnutrition Status: No malnutrition  · Context: Acute illness or injury  · Findings of the 6 clinical characteristics of malnutrition (Minimum of 2 out of 6 clinical characteristics is required to make the diagnosis of moderate or severe Protein Calorie Malnutrition based on AND/ASPEN Guidelines):  1. Energy Intake- , Unable to assess    2. Weight Loss-No significant weight loss,    3. Fat Loss-No significant subcutaneous fat loss,    4. Muscle Loss-No significant muscle mass loss,    5. Fluid Accumulation-No significant fluid accumulation,    6.  Strength-Not measured    Nutrition Risk Level: Low    Nutrition Diagnosis:   · Problem: Altered nutrition-related lab values  · Etiology: related to Endocrine dysfunction     Signs and symptoms:  as evidenced by Lab values    Objective Information:  · Nutrition-Focused Physical Findings:    · Wound Type: None  · Current Nutrition Therapies:  · Oral Diet Orders: Cardiac, No Added Salt (3-4gm)(No Caffeine)   · Oral Diet intake: %  · Oral Nutrition Supplement (ONS) Orders: None    · Anthropometric Measures:  · Ht: 5' 7\" (170.2 cm)   · Current Body Wt: 169 lb 14 oz (77.1 kg)  · Admission Body Wt: 167 lb 12 oz (76.1 kg)  · Ideal Body Wt: 148 lb (67.1 kg), % Ideal Body 114.2%  · BMI Classification: BMI 25.0 - 29.9 Overweight    Nutrition Interventions:   Modify current diet  Continued Inpatient Monitoring    Nutrition Evaluation:   · Evaluation: Goals set   · Goals: po intake 75% or greater.   Accuchek's 200 or less    · Monitoring: Meal Intake, Diet Tolerance, Skin Integrity, Weight, Pertinent Labs      Electronically signed by Lacey Marina Farida Perrin, MS, RD, LD on 4/15/19 at 1:49 PM    Contact Number: [phone]

## 2019-04-15 NOTE — PROGRESS NOTES
Cardiology Daily Note Cheryl Lozada MD      Patient:  Holli Hernandez  [id number]    Patient Active Problem List    Diagnosis Date Noted    PAF (paroxysmal atrial fibrillation) ([facility] 04/13/2019     Priority: Low    Elevated d-dimer 04/09/2019     Priority: Low    Hypocalcemia 04/09/2019     Priority: Low    Macrocytic anemia 04/09/2019     Priority: Low    Pneumonia due to infectious organism 04/09/2019     Priority: Low    Atrial fibrillation with RVR ([facility] 04/08/2019     Priority: Low    Carotid artery occlusion      Priority: Low    PVD (peripheral vascular disease) ([facility] 11/09/2016     Priority: Low    Carotid artery stenosis 04/11/2012     Priority: Low    Depression      Priority: Low    Type 2 diabetes mellitus ([facility]      Priority: Low    Hypertension      Priority: Low       Admit Date:  4/8/2019    Admission Problem List: Present on Admission:   Carotid artery occlusion   Atrial fibrillation with RVR ([facility])   Type 2 diabetes mellitus ([facility]   PVD (peripheral vascular disease) ([facility]   Elevated d-dimer   Hypocalcemia   Macrocytic anemia   Pneumonia due to infectious organism   PAF (paroxysmal atrial fibrillation) ([facility]      Cardiac Specific Data:  Specialty Problems        Cardiology Problems    Hypertension        Carotid artery stenosis        PVD (peripheral vascular disease) ([facility])        * (Principal) Atrial fibrillation with RVR ([facility])        Carotid artery occlusion        PAF (paroxysmal atrial fibrillation) ([facility])              Subjective:  Mr. Benitez Schilling seen today resting comfortably. Denies chest pain palpitations dizziness or dyspnea. He is quite frustrated because he can't get heart rate under control despite multiple medications. I had a lengthy conversation with him and will be increasing his diltiazem. He seems satisfied with that. No new complaints otherwise noted.     Objective:   BP (!) 151/82   Pulse 89   Temp 98 °F (36.7 °C) (Temporal)   Resp 20   Ht 5' 7\" (1.702 m)   Wt 169 lb 14.4 oz (77.1 kg)   SpO2 96%   BMI 26.61 kg/m²       Intake/Output Summary (Last 24 hours) at 4/15/2019 1152  Last data filed at 4/15/2019 0854  Gross per 24 hour   Intake 840 ml   Output --   Net 840 ml       Prior to Admission medications    Medication Sig Start Date End Date Taking? Authorizing Provider   sulfaSALAzine (AZULFIDINE) 500 MG tablet Take 1,000 mg by mouth 3 times daily   Yes Historical Provider, MD   furosemide (LASIX) 40 MG tablet Take 40 mg by mouth daily   Yes Historical Provider, MD   amLODIPine (NORVASC) 5 MG tablet Take 5 mg by mouth daily    Historical Provider, MD   potassium chloride (KLOR-CON M) 20 MEQ extended release tablet Take 20 mEq by mouth daily    Historical Provider, MD   sotalol (BETAPACE) 80 MG tablet Take 80 mg by mouth daily    Historical Provider, MD   folic acid (FOLVITE) 1 MG tablet Take 1 mg by mouth daily    Historical Provider, MD   glipiZIDE (GLUCOTROL) 10 MG tablet Take 10 mg by mouth 2 times daily (before meals)    Historical Provider, MD   aspirin 81 MG tablet Take 81 mg by mouth daily    Historical Provider, MD   sulfADIAZINE 500 MG tablet Take 1,000 mg by mouth 3 times daily     Historical Provider, MD   pravastatin (PRAVACHOL) 20 MG tablet Take 20 mg by mouth daily. Historical Provider, MD   metformin (GLUCOPHAGE-XR) 500 MG XR tablet Take 1,000 mg by mouth 2 times daily     Historical Provider, MD   omeprazole (PRILOSEC) 20 MG capsule Take 20 mg by mouth daily. Historical Provider, MD   terazosin (HYTRIN) 10 MG capsule Take 10 mg by mouth nightly. Historical Provider, MD   lisinopril (PRINIVIL;ZESTRIL) 20 MG tablet Take 20 mg by mouth daily.     Historical Provider, MD        diltiazem  300 mg Oral BID    metoprolol succinate  200 mg Oral BID    sulfaSALAzine  1,000 mg Oral TID    digoxin  250 mcg Oral Daily    furosemide  40 mg Oral Daily    apixaban  5 mg Oral BID    sodium chloride flush  10 mL Intravenous 2 times per day    insulin bilateral lower lobe pneumonia but would warrant follow-up. There is evidence of remote granulomatous disease. . Signed by Dr Ellie Parker on 4/8/2019 11:50 PM    Xr Chest Portable    Result Date: 4/8/2019  Exam:   XR CHEST PORTABLE  Date:  4/8/2019 History:  Male, age  70 years; shortness of breath COMPARISON:  None. Findings : Small to moderate left and trace right pleural effusion. Mild cardiomegaly and central pulmonary venous congestion. No measurable pneumothorax. No acute osseous pathology. Impression: Overall picture concerning for fluid overload. Clinical correlation to exclude superimposed infection is recommended given the asymmetry of the findings. Signed by Dr Demarco Golden on 4/8/2019 5:09 PM    Cta Pulmonary W Contrast    Result Date: 4/9/2019  CTA chest 4/9/2019 9:00 AM HISTORY: Chest pain TECHNIQUE: Axial images of the chest were obtained following IV contrast. . Coronal and sagittal reformatted images are reconstructed and reviewed. Maximal intensity projectional images also reconstructed and reviewed COMPARISON: 4/8/2019. DLP: 734 mGy cm Automated exposure control was utilized to minimize patient radiation dose. FINDINGS: No pulmonary emboli are identified. There is moderate vascular calcification with dense coronary calcification. There is cardiomegaly. Similar trace pericardial fluid. Small to moderate pleural effusions are similar to yesterday's exam. Subcarinal lymph node measures up to 1.6 cm. Remaining mediastinal lymph nodes measure less than a centimeter. There is no pathologic axillary lymphadenopathy. There is mild gynecomastia. Calcified granuloma seen within the liver and the spleen. There is a soft tissue prominence of the head of the pancreas worrisome for possible neoplasm with atrophy of the body and tail of the pancreas. Follow-up dedicated cross-sectional imaging of the abdomen recommended with the pancreatic protocol.  There are calcified periportal and portacaval lymph nodes related to granulomatous exposure. Scattered groundglass opacities of the lungs with patchy bilateral lower lobe opacities. There is no pneumothorax. No endobronchial lesions are identified. 1. No pulmonary emboli. 2. Similar size small to moderate pleural effusions with only trace pericardial fluid. 3. Patchy bilateral lower lobe opacities worrisome for pneumonia. Scattered groundglass opacities may relate to inflammation or mild edema. 4. Soft tissue prominence of the head of the pancreas as described above. Follow-up CT abdomen pancreatic protocol recommended for further assessment. 5. Diffuse vascular calcifications with involvement of the coronary arteries. Signed by Dr Lieutenant Hernandez on 4/9/2019 1:46 PM    Nm Myocardial Spect Rest Exercise Or Rx    Result Date: 4/10/2019  Lexiscan Nuclear Stress Test Report Procedure date: 4/19/2019 Indications: Atrial fibrillation Procedure: Stress was performed with injection of 0.4 mg Lexiscan. Vital signs and EKG were monitored. Technetium-99 sestamibi was injected in divided doses, approximately 10 mCi and 30 mCi respectively for rest and stress imaging. The patient was discharged in stable condition. Results: Patient had symptoms of dyspnea during infusion that resolved in recovery. Baseline EKG showed atrial fibrillation. During stress there were no significant EKG changes or rhythm changes. Baseline and peak blood pressures were 151/81, and 146/79 respectively. Baseline and peak heart rates were 99 and  122 respectively. Radiopharmaceuticals used: Rest images 10.76 mCi technetium 99m sestamibi Stress images 27.07 millicuries technetium 34W sestamibi Review of rest and stress images obtained in a SPECT acquisition protocol along with review of the polar plot revealed: 1. Ejection fraction 49% 2. Wall motion study demonstrates normal wall motion and thickening 3. Myocardial perfusion imaging demonstrates homogeneous uptake of the tracer in all visualized segments.  No areas of ischemia or infarction are noted. The sum stress score was 0. Summary impression: Probably normal study borderline ejection fraction normal perfusion study Signed by Dr Alex Watson on 4/9/2019 4:27 PM        Assessment:  1. Atrial fibrillation rate occasional heart rates up to 140s  2. Echocardiogram today normal ejection fraction 66% moderate mitral and tricuspid regurgitation  3. History of tobacco abuse discontinued 2006  4. Hypertension  5. Diabetes  6. No carotid artery stenosis 100% occlusion on the right per patient  7. Depression          Plan:  1. Recommend increasing Cardizem CD to 300 mg by mouth twice a day  2.  Recommend digoxin level    Alex Watson MD 4/15/2019 11:52 AM

## 2019-04-15 NOTE — PROGRESS NOTES
[address]    Daily HOSPITAL Progress Note                            Date:  4/14/19  Patient: Marcy Sanchez  Admission:  4/8/2019  1:50 PM  Admit DX: Atrial flutter with rapid ventricular response (HCC) [I48.92]  Atrial fibrillation with RVR ([facility] [I48.91]  Age:  70 y.o., 1940     LOS: 6 days           I personally saw the patient and rounded with:  Luis SAENZ on 4/14/19      The observations documented in this note, including the assessment and plan are mine         Reason for initial evaluation or the patient's initial complaint    PAF      SUBJECTIVE:      4/8/2019    Chief Complaint / Reason for the Visit   Follow up of:  PaF and vague chest discomfort and systemic arterial hypertension    Family present and in room during examination:  No      Specialty Problems        Cardiology Problems    PAF (paroxysmal atrial fibrillation) ([facility]        Hypertension        Carotid artery stenosis        PVD (peripheral vascular disease) ([facility]        * (Principal) Atrial fibrillation with RVR ([facility]        Carotid artery occlusion              Current Status Today According to the patient:  \"ok\"    Subjective:  Mr. Marcy Sanchez is generally feeling unchanged. Rate still goes up    Mr. Marcy Sanchez has the following cardiac complaints / symptoms today:    1. PAF, rate difficult to control, meds adjusted    2. Chest discomfort, probably related to palpitations    3.  Hypertension    The blood pressure for the lastr 36 hours has been:  Systolic (42NNJ), AQI:043 , Min:132 , JGD:507    Diastolic (54SUI), URQ:92, Min:72, Max:89        Marcy Sanchez is a 70 y.o. male with the [facility]:    Patient Active Problem List    Diagnosis Date Noted    PAF (paroxysmal atrial fibrillation) ([facility] 04/13/2019     Priority: High    Elevated d-dimer 04/09/2019     Priority: Low    Hypocalcemia 04/09/2019     Priority: Low    Macrocytic anemia 04/09/2019     Priority: Low  Pneumonia due to infectious organism 04/09/2019     Priority: Low    Atrial fibrillation with RVR ([facility] 04/08/2019     Priority: Low    Carotid artery occlusion      Priority: Low    PVD (peripheral vascular disease) ([facility] 11/09/2016     Priority: Low    Carotid artery stenosis 04/11/2012     Priority: Low    Depression      Priority: Low    Type 2 diabetes mellitus ([facility]      Priority: Low    Hypertension      Priority: Low     Current Facility-Administered Medications   Medication Dose Route Frequency Provider Last Rate Last Dose    metoprolol succinate (TOPROL XL) extended release tablet 200 mg  200 mg Oral BID Madison Cruz MD   200 mg at 04/14/19 2015    sodium chloride (OCEAN, BABY AYR) 0.65 % nasal spray 2 spray  2 spray Each Nare PRN Hayley Tobias MD        sulfaSALAzine (AZULFIDINE) tablet 1,000 mg  1,000 mg Oral TID Man Price MD   1,000 mg at 04/14/19 2015    diltiazem (CARDIZEM CD) extended release capsule 240 mg  240 mg Oral BID Madison Cruz MD   240 mg at 04/14/19 2015    digoxin (LANOXIN) tablet 250 mcg  250 mcg Oral Daily Madison Cruz MD   250 mcg at 04/14/19 0731    furosemide (LASIX) tablet 40 mg  40 mg Oral Daily Madison Cruz MD   40 mg at 04/14/19 0730    zolpidem (AMBIEN) tablet 5 mg  5 mg Oral Nightly PRN Madison Cruz MD   5 mg at 04/14/19 2016    apixaban (ELIQUIS) tablet 5 mg  5 mg Oral BID Hayley Tobias MD   5 mg at 04/14/19 2015    sodium chloride flush 0.9 % injection 10 mL  10 mL Intravenous 2 times per day Ofelia Buitrago MD   10 mL at 04/14/19 2018    sodium chloride flush 0.9 % injection 10 mL  10 mL Intravenous PRN Ofelia Buitrago MD        magnesium hydroxide (MILK OF MAGNESIA) 400 MG/5ML suspension 30 mL  30 mL Oral Daily PRN Ofelia Buitrago MD        glucose (GLUTOSE) 40 % oral gel 15 g  15 g Oral PRN Ofelia Buitrago MD        dextrose 50 % solution 12.5 g  12.5 g Intravenous PRN Ofelia Buitrago MD       [facility] glucagon (rDNA) injection 1 mg  1 mg Intramuscular PRN Jose Eduardo Martinez MD        dextrose 5 % solution  100 mL/hr Intravenous PRN Jose Eduardo Martinez MD        insulin glargine (LANTUS) injection vial 20 Units  20 Units Subcutaneous Nightly Jose Eduardo Martinez MD   20 Units at 04/14/19 2021    insulin lispro (HUMALOG) injection vial 5 Units  5 Units Subcutaneous TID  Jose Eduardo Martinez MD   5 Units at 04/14/19 1804    insulin lispro (HUMALOG) injection vial 0-6 Units  0-6 Units Subcutaneous TID  Jose Eduardo Martinez MD   2 Units at 04/14/19 1805    insulin lispro (HUMALOG) injection vial 0-3 Units  0-3 Units Subcutaneous Nightly Jose Eduardo Martinez MD   2 Units at 04/14/19 2021    lisinopril (PRINIVIL;ZESTRIL) tablet 20 mg  20 mg Oral Daily Jose Eduardo Martinez MD   20 mg at 04/14/19 0731    pantoprazole (PROTONIX) tablet 40 mg  40 mg Oral QAM AC Jose Eduardo Martinez MD   40 mg at 04/14/19 0731    pravastatin (PRAVACHOL) tablet 20 mg  20 mg Oral Daily Jose Eduardo Martinez MD   20 mg at 04/14/19 0731    aspirin EC tablet 81 mg  81 mg Oral Daily Jose Eduardo Martinez MD   81 mg at 04/14/19 0730    diltiazem 125 mg in dextrose 5 % 125 mL infusion  5 mg/hr Intravenous Continuous Rudi Rodriguez MD   Stopped at 04/12/19 1910    doxazosin (CARDURA) tablet 8 mg  8 mg Oral Nightly Jose Eduardo Martinez MD   8 mg at 04/14/19 1801     Allergies: Patient has no known allergies.   Past Medical History:   Diagnosis Date    Atherosclerosis of native arteries of the extremities with intermittent claudication     Blood circulation, collateral     Carotid artery occlusion     Chronic kidney disease     Depression     GERD (gastroesophageal reflux disease)     Hypertension     Pneumonia due to infectious organism 4/9/2019    Type II or unspecified type diabetes mellitus without mention of complication, not stated as uncontrolled      Past Surgical History:   Procedure Laterality Date    HEMORRHOID SURGERY 1973    HERNIA REPAIR       Family History   Problem Relation Age of Onset    High Blood Pressure Mother     Heart Disease Mother     High Blood Pressure Father     Heart Disease Father     Stroke Father      Social History     Tobacco Use    Smoking status: Former Smoker     Packs/day: 1.00     Years: 30.00     Pack years: 30.00     Last attempt to quit: 1/10/2005     Years since quittin.2    Smokeless tobacco: Former User     Types: Snuff     Quit date:     Tobacco comment: quit . Substance Use Topics    Alcohol use: Yes     Comment: OCC. BEER          Review of Systems:    General:      Complaint / Symptom Yes / No / Description if Yes       Fatigue Yes:  chronic   Weight gain NA   Insomnia NA       Respiratory:        Complaint / Symptom Yes / No / Description if Yes       Cough No   Horseness NA       Cardiovascular:    Complaint / Symptom Yes / No / Description if Yes       Chest Pain No   Shortness of Air / Orthopnea Yes: chronic and stable   Presyncope / Syncope No   Palpitations No         Objective:    BP (!) 162/84   Pulse 92   Temp 98.8 °F (37.1 °C) (Temporal)   Resp 16   Ht 5' 7\" (1.702 m)   Wt 169 lb 6.4 oz (76.8 kg)   SpO2 95%   BMI 26.53 kg/m² ,     Intake/Output Summary (Last 24 hours) at 2019  Last data filed at 2019 7261  Gross per 24 hour   Intake 720 ml   Output --   Net 720 ml       GENERAL - well developed and well nourished, is an active participant in this examination  HEENT -  PERRLA, Hearing appears normal, conjunctiva and lids are normal, ears and nose appear normal  NECK - no thyromegaly, no JVD, trachea is in the midline  CARDIOVASCULAR - PMI is in the left mid line clavicular position, Variable S1, normal S2, without obvious murmur or gallop   PULMONARY - No respiratory distress. scattered wheezes and rales.   Breath sounds in both  lung fields are Decreased  ABDOMEN  - soft, non tender, no rebound, no hepatomegaly or splenomegaly  MUSCULOSKELETAL  - Prone/Supine, digitals and nails are without clubbing or cyanosis  EXTREMITIES - trace edema  NEUROLOGIC - cranial nerves, II-XII, are normal  SKIN - turgor is normal, no rash  PSYCHIATRIC - normal mood and affect, alert and orientated x 3, judgement and insight appear appropriate      ASSESSMENT:    ALL THE CARDIOLOGY PROBLEMS ARE LISTED ABOVE; HOWEVER, THE FOLLOWING SPECIFIC CARDIAC PROBLEMS / CONDITIONS WERE ADDRESSED AND TREATED DURING THE OFFICE VISIT TODAY:                                                                                            MEDICAL DECISION MAKING                   Cardiac Specific Problem / Diagnosis   Discussion and Data Reviewed Diagnostic Procedures Ordered Management Options Selected                 1. Presenting problem / symptom     PAF  show no change    meds adjusted for rate control & anticoagulated with eliquis No Continue current medications:      Yes: with meds adjusted                 2. Vague chest discomfort Initial presentation during this evaluation    Review and summation of old records:     4/9/2019  lexiscan negative for myocardial ischemia, EF 49  %    No Continue current medications:     Yes:                  3. Systemic arterial hypertension Initial presentation during this evaluation The blood pressure for the lastr 36 hours has been:  Systolic (54CBD), VVU:838 , Min:132 , CCO:241    Diastolic (41QXB), MJF:16, Min:72, Max:89       No Continue current medications:        yes         CONSIDERATIONS, THOUGHTS, AND PLANS:    1. Continue present medications except for changes as noted above  2. Continue to monitor rhythm  3. Further orders per clinical course. 4. meds adjusted           Discussed with patient and nursing.     Electronically signed by Adis Jarrell MD on 4/14/19        [facility] [place]

## 2019-04-15 NOTE — PROGRESS NOTES
Hospitalist Progress Note    Patient:  Minh Langford  YOB: 1940  Date of Service: 4/15/2019  MRN: [id number]   Acct: [de-identified]   Primary Care Physician: Army Chucky MD  Advance Directive: Full Code  Admit Date: 4/8/2019       Hospital Day: 7  Referring Provider: Manuel Choudhury DO    Patient Seen, Chart, Consults, Notes, Labs, Radiology studies reviewed. Subjective:  Minh Langford is a 70 y.o. male  whom we were are following for atrial fibrillation with rapid ventricular response. I am taking over his care today on service. I have reviewed his chart extensively. He has now been off of his intravenous Cardizem greater than 24 hours. His base rate is still under 100. He remains in atrial fibrillation. He denies any dyspnea. He has been started back on sulfasalazine for his ulcerative colitis. He denies any bowel symptoms. In review of his records, he does show evidence of a macrocytic anemia. This has not been worked up as of yet. His blood pressure has been stable. I believe if he remains rate controlled, we should be able to anticipate discharge within the next 24-48 hours. Allergies:  Patient has no known allergies.     Medicines:  Current Facility-Administered Medications   Medication Dose Route Frequency Provider Last Rate Last Dose    metoprolol succinate (TOPROL XL) extended release tablet 200 mg  200 mg Oral BID Jojo San MD   200 mg at 04/14/19 2015    sodium chloride (OCEAN, BABY AYR) 0.65 % nasal spray 2 spray  2 spray Each Nare KATELYNN Bahena MD        sulfaSALAzine (AZULFIDINE) tablet 1,000 mg  1,000 mg Oral TID Aziza Escobar MD   1,000 mg at 04/14/19 2015    diltiazem (CARDIZEM CD) extended release capsule 240 mg  240 mg Oral BID Jojo San MD   240 mg at 04/14/19 2015    digoxin (LANOXIN) tablet 250 mcg  250 mcg Oral Daily Jojo San MD   250 mcg at 04/14/19 0731    furosemide (LASIX) tablet 40 mg  40 mg Oral Daily Jojo San Continuous Rudi Rodriguez MD   Stopped at 19 1910    doxazosin (CARDURA) tablet 8 mg  8 mg Oral Nightly Jose Eduardo Martinez MD   8 mg at 19 1801       Past Medical History:  Past Medical History:   Diagnosis Date    Atherosclerosis of native arteries of the extremities with intermittent claudication     Blood circulation, collateral     Carotid artery occlusion     Chronic kidney disease     Depression     GERD (gastroesophageal reflux disease)     Hypertension     Pneumonia due to infectious organism 2019    Type II or unspecified type diabetes mellitus without mention of complication, not stated as uncontrolled        Past Surgical History:  Past Surgical History:   Procedure Laterality Date    HEMORRHOID SURGERY  1973    HERNIA REPAIR         Family History  Family History   Problem Relation Age of Onset    High Blood Pressure Mother     Heart Disease Mother     High Blood Pressure Father     Heart Disease Father     Stroke Father        Social History  Social History     Socioeconomic History    Marital status:      Spouse name: Not on file    Number of children: Not on file    Years of education: Not on file    Highest education level: Not on file   Occupational History    Not on file   Social Needs    Financial resource strain: Not on file    Food insecurity:     Worry: Not on file     Inability: Not on file    Transportation needs:     Medical: Not on file     Non-medical: Not on file   Tobacco Use    Smoking status: Former Smoker     Packs/day: 1.00     Years: 30.00     Pack years: 30.00     Last attempt to quit: 1/10/2005     Years since quittin.2    Smokeless tobacco: Former User     Types: Snuff     Quit date:     Tobacco comment: quit . Substance and Sexual Activity    Alcohol use: Yes     Comment: OCC. BEER    Drug use: No    Sexual activity: Not on file   Lifestyle    Physical activity:     Days per week: Not on file Minutes per session: Not on file    Stress: Not on file   Relationships    Social connections:     Talks on phone: Not on file     Gets together: Not on file     Attends [employer] service: Not on file     Active member of club or organization: Not on file     Attends meetings of clubs or organizations: Not on file     Relationship status: Not on file    Intimate partner violence:     Fear of current or ex partner: Not on file     Emotionally abused: Not on file     Physically abused: Not on file     Forced sexual activity: Not on file   Other Topics Concern    Not on file   Social History Narrative    Worked on [employer] most of his life     twice and     He has 2 sons and one daughter    Never in the [employer]    Attends [address] one pack per day quit in 2006 denies alcohol consumption or substance usage    Physically sedentary         Review of Systems:    Review of Systems   Constitutional: Negative for activity change. Respiratory: Negative for chest tightness and shortness of breath. Cardiovascular: Positive for leg swelling. Negative for palpitations. Gastrointestinal: Negative for abdominal pain, blood in stool and constipation. Neurological: Negative for dizziness. Psychiatric/Behavioral: Negative for sleep disturbance. Objective:  Blood pressure (!) 154/78, pulse 93, temperature 97.8 °F (36.6 °C), temperature source Temporal, resp. rate 16, height 5' 7\" (1.702 m), weight 169 lb 14.4 oz (77.1 kg), SpO2 96 %. Intake/Output Summary (Last 24 hours) at 4/15/2019 0749  Last data filed at 4/15/2019 0050  Gross per 24 hour   Intake 820 ml   Output --   Net 820 ml       Physical Exam   Constitutional: He is oriented to person, place, and time. He appears well-developed and well-nourished. HENT:   Head: Normocephalic and atraumatic.    Mouth/Throat: Oropharynx is clear and moist.   Eyes: Pupils are equal, round, and reactive to light. Conjunctivae are normal.   Cardiovascular: Normal rate. Murmur (2/6 systolic murmur LUSB) heard. Irregular rhythm   Pulmonary/Chest: Effort normal and breath sounds normal.   Abdominal: Soft. There is no tenderness. Neurological: He is alert and oriented to person, place, and time. Skin: Skin is warm and dry. Vitals reviewed. Labs:  BMP:   Recent Labs     04/13/19  0144 04/14/19  0156 04/15/19  0306    144 143   K 4.0 3.6 3.5    108 105   CO2 24 22 24   BUN 17 16 17   CREATININE 1.0 0.9 1.0   CALCIUM 8.9 8.9 8.6*     CBC:   Recent Labs     04/13/19  0144 04/14/19  0156 04/15/19  0306   WBC 10.4 10.0 9.8   HGB 10.0* 9.5* 9.6*   HCT 31.4* 29.6* 29.1*   MCV 99.1* 97.7* 97.3*    249 265     LIVER PROFILE: No results for input(s): AST, ALT, LIPASE, BILIDIR, BILITOT, ALKPHOS in the last 72 hours. Invalid input(s): AMYLASE,  ALB  PT/INR: No results for input(s): PROTIME, INR in the last 72 hours. APTT: No results for input(s): APTT in the last 72 hours. BNP:  No results for input(s): BNP in the last 72 hours. Ionized Calcium:No results for input(s): IONCA in the last 72 hours. Magnesium:No results for input(s): MG in the last 72 hours. Phosphorus:No results for input(s): PHOS in the last 72 hours. HgbA1C: No results for input(s): LABA1C in the last 72 hours. Hepatic: No results for input(s): ALKPHOS, ALT, AST, PROT, BILITOT, BILIDIR, LABALBU in the last 72 hours. Lactic Acid: No results for input(s): LACTA in the last 72 hours. Troponin: No results for input(s): CKTOTAL, CKMB, TROPONINT in the last 72 hours. ABGs: No results for input(s): PH, PCO2, PO2, HCO3, O2SAT in the last 72 hours. CRP:  No results for input(s): CRP in the last 72 hours. Sed Rate:  No results for input(s): SEDRATE in the last 72 hours. Cultures:   No results for input(s): CULTURE in the last 72 hours. No results for input(s): BC, [place] Carnes in the last 72 hours.   No results for input(s): CXSURG in the last 72 hours. Radiology reports as per the Radiologist  Radiology: Ct Chest Wo Contrast    Result Date: 4/8/2019  CT CHEST WO CONTRAST 4/8/2019 6:00 PM HISTORY: Dyspnea. Shortness of breath. COMPARISON: None DLP: 847 mGy cm. Automated exposure control was utilized to diminish patient radiation dose. TECHNIQUE: Serial helical tomographic images of the chest were acquired. Bone and soft tissue algorithms were provided. Coronal reformatted images were also provided for review. FINDINGS: Neck base: The imaged portion of the neck and thyroid gland is unremarkable. Lungs: There is mild scarring within the apices. Moderate bilateral pleural effusions are present. There is some compressive atelectasis related to these effusions but there is consolidation within both lower lobes with extensive air bronchograms suggesting bilateral lower lobe pneumonia. Some patchy groundglass disease is noted scattered within both lungs suggesting some localized pneumonitis or interstitial edema. . . The trachea and bronchial tree are patent. Heart: There is mild cardiomegaly. No evidence of pericardial effusion. Coronary calcifications noted in the LAD and circumflex distributions. . . Great vessels: The great vessels are normal in caliber. Lymph nodes: There are multiple lymph nodes within the middle mediastinal hien groups. The majority of these are less than 1 cm in short axis. There is a subcarinal node measuring 16 mm in short axis. A pretracheal node measures 8 mm in short axis. AP window and prevascular nodes are also present all of which measure less than 10 mm short axis. These may be reactive in nature and would warrant follow-up. . Skeletal and soft tissues: The osseous structures of the thorax and surrounding soft tissues demonstrate no worrisome lesions or acute process. Upper abdomen: Multiple hepatic and splenic granulomas are present. . The adrenals are unremarkable.     1. Moderate bilateral pleural effusions are present. There is bilateral lower lobe pneumonia. Patchy groundglass disease is also noted scattered within both lungs suggesting an element of interstitial edema or pneumonitis. There is mild cardiomegaly. 2. Coronary calcifications in the LAD and circumflex distribution. 3. There are multiple lymph nodes within the middle mediastinal hien groups. The majority of these are less than 10 mm in short axis favoring benignity but a few measure more than 10 mm. These may be reactive in nature related to the ongoing bilateral lower lobe pneumonia but would warrant follow-up. There is evidence of remote granulomatous disease. . Signed by Dr Ellie Parker on 4/8/2019 11:50 PM    Xr Chest Portable    Result Date: 4/8/2019  Exam:   XR CHEST PORTABLE  Date:  4/8/2019 History:  Male, age  70 years; shortness of breath COMPARISON:  None. Findings : Small to moderate left and trace right pleural effusion. Mild cardiomegaly and central pulmonary venous congestion. No measurable pneumothorax. No acute osseous pathology. Impression: Overall picture concerning for fluid overload. Clinical correlation to exclude superimposed infection is recommended given the asymmetry of the findings. Signed by Dr Demarco Golden on 4/8/2019 5:09 PM    Cta Pulmonary W Contrast    Result Date: 4/9/2019  CTA chest 4/9/2019 9:00 AM HISTORY: Chest pain TECHNIQUE: Axial images of the chest were obtained following IV contrast. . Coronal and sagittal reformatted images are reconstructed and reviewed. Maximal intensity projectional images also reconstructed and reviewed COMPARISON: 4/8/2019. DLP: 734 mGy cm Automated exposure control was utilized to minimize patient radiation dose. FINDINGS: No pulmonary emboli are identified. There is moderate vascular calcification with dense coronary calcification. There is cardiomegaly. Similar trace pericardial fluid.  Small to moderate pleural effusions are similar to yesterday's exam. Subcarinal and 146/79 respectively. Baseline and peak heart rates were 99 and  122 respectively. Radiopharmaceuticals used: Rest images 10.76 mCi technetium 99m sestamibi Stress images 76.09 millicuries technetium 07P sestamibi Review of rest and stress images obtained in a SPECT acquisition protocol along with review of the polar plot revealed: 1. Ejection fraction 49% 2. Wall motion study demonstrates normal wall motion and thickening 3. Myocardial perfusion imaging demonstrates homogeneous uptake of the tracer in all visualized segments. No areas of ischemia or infarction are noted. The sum stress score was 0. Summary impression: Probably normal study borderline ejection fraction normal perfusion study Signed by Dr David Martinez on 4/9/2019 4:27 PM       Assessment   Atrial fibrillation with controlled ventricular response at this point. Ulcerative colitis. Macrocytic anemia. Hypertension      Plan:  Evaluation of anemia. Continue to monitor heart rate. Tentative discharge for 4/16.     Fabian Fernandez DO

## 2019-04-16 LAB
ANION GAP SERPL CALCULATED.3IONS-SCNC: 12 MMOL/L (ref 7–19)
BASOPHILS ABSOLUTE: 0.1 K/UL (ref 0–0.2)
BASOPHILS RELATIVE PERCENT: 0.6 % (ref 0–1)
BUN BLDV-MCNC: 12 MG/DL (ref 8–23)
CALCIUM SERPL-MCNC: 8.6 MG/DL (ref 8.8–10.2)
CHLORIDE BLD-SCNC: 103 MMOL/L (ref 98–111)
CO2: 28 MMOL/L (ref 22–29)
CREAT SERPL-MCNC: 1 MG/DL (ref 0.5–1.2)
DIGOXIN LEVEL: 0.5 NG/ML (ref 0.6–1.2)
DIGOXIN LEVEL: <0.3 NG/ML (ref 0.6–1.2)
EOSINOPHILS ABSOLUTE: 0.3 K/UL (ref 0–0.6)
EOSINOPHILS RELATIVE PERCENT: 2.6 % (ref 0–5)
GFR NON-AFRICAN AMERICAN: >60
GLUCOSE BLD-MCNC: 104 MG/DL (ref 74–109)
GLUCOSE BLD-MCNC: 159 MG/DL (ref 70–99)
GLUCOSE BLD-MCNC: 208 MG/DL (ref 70–99)
GLUCOSE BLD-MCNC: 215 MG/DL (ref 70–99)
GLUCOSE BLD-MCNC: 78 MG/DL (ref 70–99)
HCT VFR BLD CALC: 28.1 % (ref 42–52)
HEMOGLOBIN: 9.3 G/DL (ref 14–18)
LYMPHOCYTES ABSOLUTE: 2.1 K/UL (ref 1.1–4.5)
LYMPHOCYTES RELATIVE PERCENT: 19.6 % (ref 20–40)
MCH RBC QN AUTO: 32 PG (ref 27–31)
MCHC RBC AUTO-ENTMCNC: 33.1 G/DL (ref 33–37)
MCV RBC AUTO: 96.6 FL (ref 80–94)
MONOCYTES ABSOLUTE: 1.3 K/UL (ref 0–0.9)
MONOCYTES RELATIVE PERCENT: 12 % (ref 0–10)
NEUTROPHILS ABSOLUTE: 6.9 K/UL (ref 1.5–7.5)
NEUTROPHILS RELATIVE PERCENT: 64.6 % (ref 50–65)
PDW BLD-RTO: 12.5 % (ref 11.5–14.5)
PERFORMED ON: ABNORMAL
PERFORMED ON: NORMAL
PLATELET # BLD: 292 K/UL (ref 130–400)
PMV BLD AUTO: 10.2 FL (ref 9.4–12.4)
POTASSIUM SERPL-SCNC: 3.2 MMOL/L (ref 3.5–5)
RBC # BLD: 2.91 M/UL (ref 4.7–6.1)
SODIUM BLD-SCNC: 143 MMOL/L (ref 136–145)
WBC # BLD: 10.6 K/UL (ref 4.8–10.8)

## 2019-04-16 PROCEDURE — 2580000003 HC RX 258: Performed by: INTERNAL MEDICINE

## 2019-04-16 PROCEDURE — 99232 SBSQ HOSP IP/OBS MODERATE 35: CPT | Performed by: INTERNAL MEDICINE

## 2019-04-16 PROCEDURE — 82948 REAGENT STRIP/BLOOD GLUCOSE: CPT

## 2019-04-16 PROCEDURE — 6370000000 HC RX 637 (ALT 250 FOR IP): Performed by: INTERNAL MEDICINE

## 2019-04-16 PROCEDURE — 36415 COLL VENOUS BLD VENIPUNCTURE: CPT

## 2019-04-16 PROCEDURE — 80162 ASSAY OF DIGOXIN TOTAL: CPT

## 2019-04-16 PROCEDURE — 6360000002 HC RX W HCPCS: Performed by: INTERNAL MEDICINE

## 2019-04-16 PROCEDURE — 2140000000 HC CCU INTERMEDIATE R&B

## 2019-04-16 PROCEDURE — 85025 COMPLETE CBC W/AUTO DIFF WBC: CPT

## 2019-04-16 PROCEDURE — 80048 BASIC METABOLIC PNL TOTAL CA: CPT

## 2019-04-16 PROCEDURE — 6370000000 HC RX 637 (ALT 250 FOR IP): Performed by: HOSPITALIST

## 2019-04-16 RX ORDER — DILTIAZEM HYDROCHLORIDE 180 MG/1
360 CAPSULE, COATED, EXTENDED RELEASE ORAL 2 TIMES DAILY
Status: DISCONTINUED | OUTPATIENT
Start: 2019-04-16 | End: 2019-04-17 | Stop reason: HOSPADM

## 2019-04-16 RX ADMIN — APIXABAN 5 MG: 5 TABLET, FILM COATED ORAL at 21:38

## 2019-04-16 RX ADMIN — DILTIAZEM HYDROCHLORIDE 300 MG: 180 CAPSULE, COATED, EXTENDED RELEASE ORAL at 09:18

## 2019-04-16 RX ADMIN — SULFASALAZINE 1000 MG: 500 TABLET ORAL at 09:18

## 2019-04-16 RX ADMIN — SULFASALAZINE 1000 MG: 500 TABLET ORAL at 15:44

## 2019-04-16 RX ADMIN — Medication 10 ML: at 21:47

## 2019-04-16 RX ADMIN — ASPIRIN 81 MG: 81 TABLET, COATED ORAL at 09:18

## 2019-04-16 RX ADMIN — SULFASALAZINE 1000 MG: 500 TABLET ORAL at 21:39

## 2019-04-16 RX ADMIN — INSULIN LISPRO 5 UNITS: 100 INJECTION, SOLUTION INTRAVENOUS; SUBCUTANEOUS at 13:46

## 2019-04-16 RX ADMIN — LISINOPRIL 20 MG: 20 TABLET ORAL at 09:18

## 2019-04-16 RX ADMIN — INSULIN LISPRO 1 UNITS: 100 INJECTION, SOLUTION INTRAVENOUS; SUBCUTANEOUS at 21:39

## 2019-04-16 RX ADMIN — APIXABAN 5 MG: 5 TABLET, FILM COATED ORAL at 10:26

## 2019-04-16 RX ADMIN — Medication 10 ML: at 09:18

## 2019-04-16 RX ADMIN — INSULIN LISPRO 5 UNITS: 100 INJECTION, SOLUTION INTRAVENOUS; SUBCUTANEOUS at 17:29

## 2019-04-16 RX ADMIN — INSULIN LISPRO 5 UNITS: 100 INJECTION, SOLUTION INTRAVENOUS; SUBCUTANEOUS at 09:21

## 2019-04-16 RX ADMIN — METOPROLOL SUCCINATE 200 MG: 50 TABLET, EXTENDED RELEASE ORAL at 09:18

## 2019-04-16 RX ADMIN — INSULIN GLARGINE 20 UNITS: 100 INJECTION, SOLUTION SUBCUTANEOUS at 21:39

## 2019-04-16 RX ADMIN — DIGOXIN 250 MCG: 250 TABLET ORAL at 09:31

## 2019-04-16 RX ADMIN — PRAVASTATIN SODIUM 20 MG: 20 TABLET ORAL at 09:18

## 2019-04-16 RX ADMIN — PANTOPRAZOLE SODIUM 40 MG: 40 TABLET, DELAYED RELEASE ORAL at 09:24

## 2019-04-16 RX ADMIN — INSULIN LISPRO 2 UNITS: 100 INJECTION, SOLUTION INTRAVENOUS; SUBCUTANEOUS at 13:23

## 2019-04-16 RX ADMIN — DILTIAZEM HYDROCHLORIDE 360 MG: 180 CAPSULE, COATED, EXTENDED RELEASE ORAL at 21:38

## 2019-04-16 RX ADMIN — FERUMOXYTOL 510 MG: 510 INJECTION INTRAVENOUS at 10:27

## 2019-04-16 RX ADMIN — INSULIN LISPRO 2 UNITS: 100 INJECTION, SOLUTION INTRAVENOUS; SUBCUTANEOUS at 17:26

## 2019-04-16 RX ADMIN — ZOLPIDEM TARTRATE 5 MG: 5 TABLET ORAL at 21:47

## 2019-04-16 RX ADMIN — METOPROLOL SUCCINATE 200 MG: 50 TABLET, EXTENDED RELEASE ORAL at 21:38

## 2019-04-16 RX ADMIN — DOXAZOSIN 8 MG: 4 TABLET ORAL at 17:26

## 2019-04-16 RX ADMIN — FUROSEMIDE 40 MG: 40 TABLET ORAL at 09:18

## 2019-04-16 ASSESSMENT — ENCOUNTER SYMPTOMS
SHORTNESS OF BREATH: 0
CONSTIPATION: 0
BACK PAIN: 0
VOMITING: 0
NAUSEA: 0
DIARRHEA: 0

## 2019-04-16 ASSESSMENT — PAIN SCALES - GENERAL
PAINLEVEL_OUTOF10: 0
PAINLEVEL_OUTOF10: 0

## 2019-04-16 NOTE — PROGRESS NOTES
Hospitalist Progress Note    Patient:  Christopher Lord  YOB: 1940  Date of Service: 4/16/2019  MRN: [id number]   Acct: [de-identified]   Primary Care Physician: Madison Cooper MD  Advance Directive: Full Code  Admit Date: 4/8/2019       Hospital Day: 8  Referring Provider: Lorena Zapata DO    Patient Seen, Chart, Consults, Notes, Labs, Radiology studies reviewed. Subjective:  Christopher Lord is a 70 y.o. male  whom we are following for persistent atrial fibrillation with paroxysms of rapid ventricular response. I appreciate cardiology's input yesterday. His diltiazem was increased to 300 mg twice a day. Overnight, he had several runs where his heart rate got into the 150s. Blood pressure has remained stable. Digoxin level is pending. He is also shown to be iron deficient. Otherwise he denies any new complaints. Allergies:  Patient has no known allergies.     Medicines:  Current Facility-Administered Medications   Medication Dose Route Frequency Provider Last Rate Last Dose    diltiazem (CARDIZEM CD) extended release capsule 300 mg  300 mg Oral BID Arabella Dillard MD   300 mg at 04/15/19 2216    metoprolol succinate (TOPROL XL) extended release tablet 200 mg  200 mg Oral BID Leander Flowers MD   200 mg at 04/15/19 2216    sodium chloride (OCEAN, BABY AYR) 0.65 % nasal spray 2 spray  2 spray Each Nare PRN Arabella Dillard MD        sulfaSALAzine (AZULFIDINE) tablet 1,000 mg  1,000 mg Oral TID Perla Oneill MD   1,000 mg at 04/15/19 2216    digoxin (LANOXIN) tablet 250 mcg  250 mcg Oral Daily Leander Flowers MD   250 mcg at 04/15/19 1024    furosemide (LASIX) tablet 40 mg  40 mg Oral Daily Leander Flowers MD   40 mg at 04/15/19 1027    zolpidem (AMBIEN) tablet 5 mg  5 mg Oral Nightly PRN Leander Flowers MD   5 mg at 04/15/19 2217    apixaban (ELIQUIS) tablet 5 mg  5 mg Oral BID Arabella Dillard MD   5 mg at 04/15/19 2216    sodium chloride flush 0.9 % injection 10 mL  10 mL Intravenous 2 times per day Jean-Pierre Fowler MD   10 mL at 04/15/19 2217    sodium chloride flush 0.9 % injection 10 mL  10 mL Intravenous PRN Jean-Pierre Fowler MD        magnesium hydroxide (MILK OF MAGNESIA) 400 MG/5ML suspension 30 mL  30 mL Oral Daily PRN Jean-Pierre Fowler MD        glucose (GLUTOSE) 40 % oral gel 15 g  15 g Oral PRN Jean-Pierre Fowler MD        dextrose 50 % solution 12.5 g  12.5 g Intravenous PRN Jean-Pierre Fowler MD        glucagon (rDNA) injection 1 mg  1 mg Intramuscular PRN Jean-Pierre Fowler MD        dextrose 5 % solution  100 mL/hr Intravenous PRN Jean-Pierre Fowler MD        insulin glargine (LANTUS) injection vial 20 Units  20 Units Subcutaneous Nightly Jean-Pierre Fowler MD   20 Units at 04/15/19 2217    insulin lispro (HUMALOG) injection vial 5 Units  5 Units Subcutaneous TID  Jean-Pierre Fowler MD   5 Units at 04/15/19 1815    insulin lispro (HUMALOG) injection vial 0-6 Units  0-6 Units Subcutaneous TID  Jean-Pierre Fowler MD   4 Units at 04/15/19 1317    insulin lispro (HUMALOG) injection vial 0-3 Units  0-3 Units Subcutaneous Nightly Jean-Pierre Fowler MD   2 Units at 04/14/19 2021    lisinopril (PRINIVIL;ZESTRIL) tablet 20 mg  20 mg Oral Daily Jean-Pierre Fowler MD   20 mg at 04/15/19 1024    pantoprazole (PROTONIX) tablet 40 mg  40 mg Oral QAM AC Jean-Pierre Fwoler MD   40 mg at 04/15/19 0700    pravastatin (PRAVACHOL) tablet 20 mg  20 mg Oral Daily Jean-Pierre Fowler MD   20 mg at 04/15/19 1024    aspirin EC tablet 81 mg  81 mg Oral Daily Jean-Pierre Fowler MD   81 mg at 04/15/19 1027    diltiazem 125 mg in dextrose 5 % 125 mL infusion  5 mg/hr Intravenous Continuous Eric Harper MD   Stopped at 04/12/19 1910    doxazosin (CARDURA) tablet 8 mg  8 mg Oral Nightly Jean-Pierre Fowler MD   8 mg at 04/15/19 1814       Past Medical History:  Past Medical History:   Diagnosis Date    Atherosclerosis of native arteries of the extremities with intermittent claudication     Blood circulation, collateral     Carotid artery occlusion     Chronic kidney disease     Depression     GERD (gastroesophageal reflux disease)     Hypertension     Pneumonia due to infectious organism 2019    Type II or unspecified type diabetes mellitus without mention of complication, not stated as uncontrolled        Past Surgical History:  Past Surgical History:   Procedure Laterality Date    HEMORRHOID SURGERY  1973    HERNIA REPAIR         Family History  Family History   Problem Relation Age of Onset    High Blood Pressure Mother     Heart Disease Mother     High Blood Pressure Father     Heart Disease Father     Stroke Father        Social History  Social History     Socioeconomic History    Marital status:      Spouse name: Not on file    Number of children: Not on file    Years of education: Not on file    Highest education level: Not on file   Occupational History    Not on file   Social Needs    Financial resource strain: Not on file    Food insecurity:     Worry: Not on file     Inability: Not on file    Transportation needs:     Medical: Not on file     Non-medical: Not on file   Tobacco Use    Smoking status: Former Smoker     Packs/day: 1.00     Years: 30.00     Pack years: 30.00     Last attempt to quit: 1/10/2005     Years since quittin.2    Smokeless tobacco: Former User     Types: Snuff     Quit date:     Tobacco comment: quit . Substance and Sexual Activity    Alcohol use: Yes     Comment: OCC. BEER    Drug use: No    Sexual activity: Not on file   Lifestyle    Physical activity:     Days per week: Not on file     Minutes per session: Not on file    Stress: Not on file   Relationships    Social connections:     Talks on phone: Not on file     Gets together: Not on file     Attends Orthodox service: Not on file     Active member of club or organization: Not on file     Attends meetings of clubs or organizations: Not on file     Relationship status: Not on file    Intimate partner violence:     Fear of current or ex partner: Not on file     Emotionally abused: Not on file     Physically abused: Not on file     Forced sexual activity: Not on file   Other Topics Concern    Not on file   Social History Narrative    Worked on [employer] most of his life     twice and     He has 2 sons and one daughter    Never in the [employer]    Attends [employer]    Smoked one pack per day quit in 2006 denies alcohol consumption or substance usage    Physically sedentary         Review of Systems:    Review of Systems   Constitutional: Negative for activity change and fever. Respiratory: Negative for shortness of breath. Cardiovascular: Negative for chest pain and leg swelling. Gastrointestinal: Negative for constipation, diarrhea, nausea and vomiting. Genitourinary: Negative for difficulty urinating and dysuria. Musculoskeletal: Negative for back pain and neck pain. Neurological: Negative for dizziness and light-headedness. Objective:  Blood pressure 138/64, pulse 96, temperature 98.9 °F (37.2 °C), temperature source Temporal, resp. rate 18, height 5' 7\" (1.702 m), weight 162 lb 14.4 oz (73.9 kg), SpO2 93 %. Intake/Output Summary (Last 24 hours) at 4/16/2019 0750  Last data filed at 4/15/2019 1816  Gross per 24 hour   Intake 1100 ml   Output 1050 ml   Net 50 ml       Physical Exam   Constitutional: He is oriented to person, place, and time. He appears well-developed and well-nourished. HENT:   Head: Normocephalic and atraumatic. Mouth/Throat: Oropharynx is clear and moist.   Eyes: Pupils are equal, round, and reactive to light. Conjunctivae are normal.   Cardiovascular: Normal rate and regular rhythm. Murmur (2/6 systolic LUSB) heard.   Pulmonary/Chest: Effort normal and breath sounds normal.   Neurological: He is alert and oriented to person, place, and time. Skin: Skin is warm and dry. Vitals reviewed. Labs:  BMP:   Recent Labs     04/14/19  0156 04/15/19  0306 04/16/19 0224    143 143   K 3.6 3.5 3.2*    105 103   CO2 22 24 28   BUN 16 17 12   CREATININE 0.9 1.0 1.0   CALCIUM 8.9 8.6* 8.6*     CBC:   Recent Labs     04/14/19  0156 04/15/19  0306 04/15/19  0809 04/16/19 0224   WBC 10.0 9.8  --  10.6   HGB 9.5* 9.6*  --  9.3*   HCT 29.6* 29.1* 32.3* 28.1*   MCV 97.7* 97.3*  --  96.6*    265  --  292     LIVER PROFILE: No results for input(s): AST, ALT, LIPASE, BILIDIR, BILITOT, ALKPHOS in the last 72 hours. Invalid input(s): AMYLASE,  ALB  PT/INR: No results for input(s): PROTIME, INR in the last 72 hours. APTT: No results for input(s): APTT in the last 72 hours. BNP:  No results for input(s): BNP in the last 72 hours. Ionized Calcium:No results for input(s): IONCA in the last 72 hours. Magnesium:No results for input(s): MG in the last 72 hours. Phosphorus:No results for input(s): PHOS in the last 72 hours. HgbA1C: No results for input(s): LABA1C in the last 72 hours. Hepatic: No results for input(s): ALKPHOS, ALT, AST, PROT, BILITOT, BILIDIR, LABALBU in the last 72 hours. Lactic Acid: No results for input(s): LACTA in the last 72 hours. Troponin: No results for input(s): CKTOTAL, CKMB, TROPONINT in the last 72 hours. ABGs: No results for input(s): PH, PCO2, PO2, HCO3, O2SAT in the last 72 hours. CRP:  No results for input(s): CRP in the last 72 hours. Sed Rate:  No results for input(s): SEDRATE in the last 72 hours. Cultures:   No results for input(s): CULTURE in the last 72 hours. No results for input(s): BC, [place] Dural in the last 72 hours. No results for input(s): CXSURG in the last 72 hours. Radiology reports as per the Radiologist  Radiology: Ct Chest Wo Contrast    Result Date: 4/8/2019  CT CHEST WO CONTRAST 4/8/2019 6:00 PM HISTORY: Dyspnea. Shortness of breath. COMPARISON: None DLP: 847 mGy cm. Automated exposure control was utilized to diminish patient radiation dose. TECHNIQUE: Serial helical tomographic images of the chest were acquired. Bone and soft tissue algorithms were provided. Coronal reformatted images were also provided for review. FINDINGS: Neck base: The imaged portion of the neck and thyroid gland is unremarkable. Lungs: There is mild scarring within the apices. Moderate bilateral pleural effusions are present. There is some compressive atelectasis related to these effusions but there is consolidation within both lower lobes with extensive air bronchograms suggesting bilateral lower lobe pneumonia. Some patchy groundglass disease is noted scattered within both lungs suggesting some localized pneumonitis or interstitial edema. . . The trachea and bronchial tree are patent. Heart: There is mild cardiomegaly. No evidence of pericardial effusion. Coronary calcifications noted in the LAD and circumflex distributions. . . Great vessels: The great vessels are normal in caliber. Lymph nodes: There are multiple lymph nodes within the middle mediastinal hien groups. The majority of these are less than 1 cm in short axis. There is a subcarinal node measuring 16 mm in short axis. A pretracheal node measures 8 mm in short axis. AP window and prevascular nodes are also present all of which measure less than 10 mm short axis. These may be reactive in nature and would warrant follow-up. . Skeletal and soft tissues: The osseous structures of the thorax and surrounding soft tissues demonstrate no worrisome lesions or acute process. Upper abdomen: Multiple hepatic and splenic granulomas are present. . The adrenals are unremarkable. 1. Moderate bilateral pleural effusions are present. There is bilateral lower lobe pneumonia. Patchy groundglass disease is also noted scattered within both lungs suggesting an element of interstitial edema or pneumonitis.  There is mild cardiomegaly. 2. Coronary calcifications in the LAD and circumflex distribution. 3. There are multiple lymph nodes within the middle mediastinal hien groups. The majority of these are less than 10 mm in short axis favoring benignity but a few measure more than 10 mm. These may be reactive in nature related to the ongoing bilateral lower lobe pneumonia but would warrant follow-up. There is evidence of remote granulomatous disease. . Signed by Dr Olayinka Mckeon on 4/8/2019 11:50 PM    Xr Chest Portable    Result Date: 4/8/2019  Exam:   XR CHEST PORTABLE  Date:  4/8/2019 History:  Male, age  70 years; shortness of breath COMPARISON:  None. Findings : Small to moderate left and trace right pleural effusion. Mild cardiomegaly and central pulmonary venous congestion. No measurable pneumothorax. No acute osseous pathology. Impression: Overall picture concerning for fluid overload. Clinical correlation to exclude superimposed infection is recommended given the asymmetry of the findings. Signed by Dr Jose C Nguyen on 4/8/2019 5:09 PM    Cta Pulmonary W Contrast    Result Date: 4/9/2019  CTA chest 4/9/2019 9:00 AM HISTORY: Chest pain TECHNIQUE: Axial images of the chest were obtained following IV contrast. . Coronal and sagittal reformatted images are reconstructed and reviewed. Maximal intensity projectional images also reconstructed and reviewed COMPARISON: 4/8/2019. DLP: 734 mGy cm Automated exposure control was utilized to minimize patient radiation dose. FINDINGS: No pulmonary emboli are identified. There is moderate vascular calcification with dense coronary calcification. There is cardiomegaly. Similar trace pericardial fluid. Small to moderate pleural effusions are similar to yesterday's exam. Subcarinal lymph node measures up to 1.6 cm. Remaining mediastinal lymph nodes measure less than a centimeter. There is no pathologic axillary lymphadenopathy. There is mild gynecomastia.  Calcified granuloma seen within the sestamibi Review of rest and stress images obtained in a SPECT acquisition protocol along with review of the polar plot revealed: 1. Ejection fraction 49% 2. Wall motion study demonstrates normal wall motion and thickening 3. Myocardial perfusion imaging demonstrates homogeneous uptake of the tracer in all visualized segments. No areas of ischemia or infarction are noted. The sum stress score was 0. Summary impression: Probably normal study borderline ejection fraction normal perfusion study Signed by Dr Marietta Cranker on 4/9/2019 4:27 PM       Assessment   Persistent atrial fibrillation with paroxysms of rapid ventricular response. Iron deficiency anemia. Hypertension. Plan:  Ferriheme infusion. Continue current medications. ? Add Amiodarone? Will defer to cardiology.     Alexus Gracia, DO

## 2019-04-16 NOTE — PROGRESS NOTES
Cardiology Daily Note Roman Crawford MD      Patient:  Veronica Bella  [id number]    Patient Active Problem List    Diagnosis Date Noted    PAF (paroxysmal atrial fibrillation) ([facility] 04/13/2019     Priority: Low    Elevated d-dimer 04/09/2019     Priority: Low    Hypocalcemia 04/09/2019     Priority: Low    Macrocytic anemia 04/09/2019     Priority: Low    Pneumonia due to infectious organism 04/09/2019     Priority: Low    Atrial fibrillation with RVR ([facility] 04/08/2019     Priority: Low    Carotid artery occlusion      Priority: Low    PVD (peripheral vascular disease) ([facility] 11/09/2016     Priority: Low    Carotid artery stenosis 04/11/2012     Priority: Low    Depression      Priority: Low    Type 2 diabetes mellitus ([facility]      Priority: Low    Hypertension      Priority: Low       Admit Date:  4/8/2019    Admission Problem List: Present on Admission:   Carotid artery occlusion   Atrial fibrillation with RVR (HCC)   Type 2 diabetes mellitus ([facility]   PVD (peripheral vascular disease) ([facility]   Elevated d-dimer   Hypocalcemia   Macrocytic anemia   Pneumonia due to infectious organism   [facility]      Cardiac Specific Data:  Specialty Problems        Cardiology Problems    Hypertension        Carotid artery stenosis        PVD (peripheral vascular disease) ([facility])        * (Principal) Atrial fibrillation with RVR (HCC)        Carotid artery occlusion        PAF (paroxysmal atrial fibrillation) ([facility])              Subjective:  Mr. Anette Wolff seen today resting comfortably no specific complaints heart rate improved but still 100-105 at rest. No other complaints.     Objective:   /64   Pulse 96   Temp 98.9 °F (37.2 °C) (Temporal)   Resp 18   Ht 5' 7\" (1.702 m)   Wt 162 lb 14.4 oz (73.9 kg)   SpO2 93%   BMI 25.51 kg/m²       Intake/Output Summary (Last 24 hours) at 4/16/2019 1148  Last data filed at 4/16/2019 0800  Gross per 24 hour   Intake 1080 ml   Output 1050 ml Net 30 ml       Prior to Admission medications    Medication Sig Start Date End Date Taking? Authorizing Provider   sulfaSALAzine (AZULFIDINE) 500 MG tablet Take 1,000 mg by mouth 3 times daily   Yes Historical Provider, MD   furosemide (LASIX) 40 MG tablet Take 40 mg by mouth daily   Yes Historical Provider, MD   amLODIPine (NORVASC) 5 MG tablet Take 5 mg by mouth daily    Historical Provider, MD   potassium chloride (KLOR-CON M) 20 MEQ extended release tablet Take 20 mEq by mouth daily    Historical Provider, MD   sotalol (BETAPACE) 80 MG tablet Take 80 mg by mouth daily    Historical Provider, MD   folic acid (FOLVITE) 1 MG tablet Take 1 mg by mouth daily    Historical Provider, MD   glipiZIDE (GLUCOTROL) 10 MG tablet Take 10 mg by mouth 2 times daily (before meals)    Historical Provider, MD   aspirin 81 MG tablet Take 81 mg by mouth daily    Historical Provider, MD   sulfADIAZINE 500 MG tablet Take 1,000 mg by mouth 3 times daily     Historical Provider, MD   pravastatin (PRAVACHOL) 20 MG tablet Take 20 mg by mouth daily. Historical Provider, MD   metformin (GLUCOPHAGE-XR) 500 MG XR tablet Take 1,000 mg by mouth 2 times daily     Historical Provider, MD   omeprazole (PRILOSEC) 20 MG capsule Take 20 mg by mouth daily. Historical Provider, MD   terazosin (HYTRIN) 10 MG capsule Take 10 mg by mouth nightly. Historical Provider, MD   lisinopril (PRINIVIL;ZESTRIL) 20 MG tablet Take 20 mg by mouth daily.     Historical Provider, MD        diltiazem  360 mg Oral BID    metoprolol succinate  200 mg Oral BID    sulfaSALAzine  1,000 mg Oral TID    digoxin  250 mcg Oral Daily    furosemide  40 mg Oral Daily    apixaban  5 mg Oral BID    sodium chloride flush  10 mL Intravenous 2 times per day    insulin glargine  20 Units Subcutaneous Nightly    insulin lispro  5 Units Subcutaneous TID WC    insulin lispro  0-6 Units Subcutaneous TID WC    insulin lispro  0-3 Units Subcutaneous Nightly    lisinopril 20 mg Oral Daily    pantoprazole  40 mg Oral QAM AC    pravastatin  20 mg Oral Daily    aspirin  81 mg Oral Daily    doxazosin  8 mg Oral Nightly       TELEMETRY: Atrial fibrillation     Physical Exam:      Physical Exam      General:  Awake, alert, NAD  Skin:  Warm and dry  Neck:  no jvd , no carotid bruits  Chest:  Clear to auscultation, no wheezing or rales  Cardiovascular:  IRRR E7K3 no murmurs, clicks, gallups, or rubs  Abdomen:  Soft nontender, nondistended, bowel sounds present  Extremities:  Edema: none     Lab Data:  CBC:   Recent Labs     04/14/19  0156 04/15/19  0306 04/15/19  0809 04/16/19  0224   WBC 10.0 9.8  --  10.6   HGB 9.5* 9.6*  --  9.3*   HCT 29.6* 29.1* 32.3* 28.1*   MCV 97.7* 97.3*  --  96.6*    265  --  292     BMP:   Recent Labs     04/14/19  0156 04/15/19  0306 04/16/19 0224    143 143   K 3.6 3.5 3.2*    105 103   CO2 22 24 28   BUN 16 17 12   CREATININE 0.9 1.0 1.0     LIVER PROFILE: No results for input(s): AST, ALT, LIPASE, BILIDIR, BILITOT, ALKPHOS in the last 72 hours. Invalid input(s): AMYLASE,  ALB  PT/INR: No results for input(s): PROTIME, INR in the last 72 hours. APTT: No results for input(s): APTT in the last 72 hours. BNP:  No results for input(s): BNP in the last 72 hours. CK, CKMB, Troponin: @LABRCNT (CKTOTAL:3, CKMB:3, TROPONINI:3)@    IMAGING:  Ct Chest Wo Contrast    Result Date: 4/8/2019  CT CHEST WO CONTRAST 4/8/2019 6:00 PM HISTORY: Dyspnea. Shortness of breath. COMPARISON: None DLP: 847 mGy cm. Automated exposure control was utilized to diminish patient radiation dose. TECHNIQUE: Serial helical tomographic images of the chest were acquired. Bone and soft tissue algorithms were provided. Coronal reformatted images were also provided for review. FINDINGS: Neck base: The imaged portion of the neck and thyroid gland is unremarkable. Lungs: There is mild scarring within the apices. Moderate bilateral pleural effusions are present.  There is some compressive atelectasis related to these effusions but there is consolidation within both lower lobes with extensive air bronchograms suggesting bilateral lower lobe pneumonia. Some patchy groundglass disease is noted scattered within both lungs suggesting some localized pneumonitis or interstitial edema. . . The trachea and bronchial tree are patent. Heart: There is mild cardiomegaly. No evidence of pericardial effusion. Coronary calcifications noted in the LAD and circumflex distributions. . . Great vessels: The great vessels are normal in caliber. Lymph nodes: There are multiple lymph nodes within the middle mediastinal hien groups. The majority of these are less than 1 cm in short axis. There is a subcarinal node measuring 16 mm in short axis. A pretracheal node measures 8 mm in short axis. AP window and prevascular nodes are also present all of which measure less than 10 mm short axis. These may be reactive in nature and would warrant follow-up. . Skeletal and soft tissues: The osseous structures of the thorax and surrounding soft tissues demonstrate no worrisome lesions or acute process. Upper abdomen: Multiple hepatic and splenic granulomas are present. . The adrenals are unremarkable. 1. Moderate bilateral pleural effusions are present. There is bilateral lower lobe pneumonia. Patchy groundglass disease is also noted scattered within both lungs suggesting an element of interstitial edema or pneumonitis. There is mild cardiomegaly. 2. Coronary calcifications in the LAD and circumflex distribution. 3. There are multiple lymph nodes within the middle mediastinal hien groups. The majority of these are less than 10 mm in short axis favoring benignity but a few measure more than 10 mm. These may be reactive in nature related to the ongoing bilateral lower lobe pneumonia but would warrant follow-up. There is evidence of remote granulomatous disease. . Signed by Dr Irene Du on 4/8/2019 11:50 PM    Xr Chest Portable    Result Date: 4/8/2019  Exam:   XR CHEST PORTABLE  Date:  4/8/2019 History:  Male, age  70 years; shortness of breath COMPARISON:  None. Findings : Small to moderate left and trace right pleural effusion. Mild cardiomegaly and central pulmonary venous congestion. No measurable pneumothorax. No acute osseous pathology. Impression: Overall picture concerning for fluid overload. Clinical correlation to exclude superimposed infection is recommended given the asymmetry of the findings. Signed by Dr Kate Tobin on 4/8/2019 5:09 PM    Cta Pulmonary W Contrast    Result Date: 4/9/2019  CTA chest 4/9/2019 9:00 AM HISTORY: Chest pain TECHNIQUE: Axial images of the chest were obtained following IV contrast. . Coronal and sagittal reformatted images are reconstructed and reviewed. Maximal intensity projectional images also reconstructed and reviewed COMPARISON: 4/8/2019. DLP: 734 mGy cm Automated exposure control was utilized to minimize patient radiation dose. FINDINGS: No pulmonary emboli are identified. There is moderate vascular calcification with dense coronary calcification. There is cardiomegaly. Similar trace pericardial fluid. Small to moderate pleural effusions are similar to yesterday's exam. Subcarinal lymph node measures up to 1.6 cm. Remaining mediastinal lymph nodes measure less than a centimeter. There is no pathologic axillary lymphadenopathy. There is mild gynecomastia. Calcified granuloma seen within the liver and the spleen. There is a soft tissue prominence of the head of the pancreas worrisome for possible neoplasm with atrophy of the body and tail of the pancreas. Follow-up dedicated cross-sectional imaging of the abdomen recommended with the pancreatic protocol. There are calcified periportal and portacaval lymph nodes related to granulomatous exposure. Scattered groundglass opacities of the lungs with patchy bilateral lower lobe opacities. There is no pneumothorax.  No endobronchial lesions are identified. 1. No pulmonary emboli. 2. Similar size small to moderate pleural effusions with only trace pericardial fluid. 3. Patchy bilateral lower lobe opacities worrisome for pneumonia. Scattered groundglass opacities may relate to inflammation or mild edema. 4. Soft tissue prominence of the head of the pancreas as described above. Follow-up CT abdomen pancreatic protocol recommended for further assessment. 5. Diffuse vascular calcifications with involvement of the coronary arteries. Signed by Dr Bria Hernandez on 4/9/2019 1:46 PM    Nm Myocardial Spect Rest Exercise Or Rx    Result Date: 4/10/2019  Lexiscan Nuclear Stress Test Report Procedure date: 4/19/2019 Indications: Atrial fibrillation Procedure: Stress was performed with injection of 0.4 mg Lexiscan. Vital signs and EKG were monitored. Technetium-99 sestamibi was injected in divided doses, approximately 10 mCi and 30 mCi respectively for rest and stress imaging. The patient was discharged in stable condition. Results: Patient had symptoms of dyspnea during infusion that resolved in recovery. Baseline EKG showed atrial fibrillation. During stress there were no significant EKG changes or rhythm changes. Baseline and peak blood pressures were 151/81, and 146/79 respectively. Baseline and peak heart rates were 99 and  122 respectively. Radiopharmaceuticals used: Rest images 10.76 mCi technetium 99m sestamibi Stress images 52.52 millicuries technetium 28S sestamibi Review of rest and stress images obtained in a SPECT acquisition protocol along with review of the polar plot revealed: 1. Ejection fraction 49% 2. Wall motion study demonstrates normal wall motion and thickening 3. Myocardial perfusion imaging demonstrates homogeneous uptake of the tracer in all visualized segments. No areas of ischemia or infarction are noted. The sum stress score was 0.     Summary impression: Probably normal study borderline ejection fraction normal perfusion study Signed by Dr Fernanda Siddiqi on 4/9/2019 4:27 PM        Assessment:  1. Atrial fibrillation rate occasional heart rates up to 140s  2. Echocardiogram today normal ejection fraction 66% moderate mitral and tricuspid regurgitation  3. History of tobacco abuse discontinued 2006  4. Hypertension  5. Diabetes  6. No carotid artery stenosis 100% occlusion on the right per patient  7. Depression           Plan:  1.  Increase Cardizem to 360 mg by mouth twice a day and continue to monitor    Fernanda Siddiqi MD 4/16/2019 11:48 AM

## 2019-04-17 VITALS
WEIGHT: 162 LBS | HEART RATE: 97 BPM | HEIGHT: 67 IN | TEMPERATURE: 99.1 F | DIASTOLIC BLOOD PRESSURE: 68 MMHG | RESPIRATION RATE: 20 BRPM | SYSTOLIC BLOOD PRESSURE: 131 MMHG | OXYGEN SATURATION: 92 % | BODY MASS INDEX: 25.43 KG/M2

## 2019-04-17 LAB
ANION GAP SERPL CALCULATED.3IONS-SCNC: 13 MMOL/L (ref 7–19)
BASOPHILS ABSOLUTE: 0.1 K/UL (ref 0–0.2)
BASOPHILS RELATIVE PERCENT: 0.7 % (ref 0–1)
BUN BLDV-MCNC: 19 MG/DL (ref 8–23)
CALCIUM SERPL-MCNC: 8.5 MG/DL (ref 8.8–10.2)
CHLORIDE BLD-SCNC: 102 MMOL/L (ref 98–111)
CO2: 26 MMOL/L (ref 22–29)
CREAT SERPL-MCNC: 1.3 MG/DL (ref 0.5–1.2)
EOSINOPHILS ABSOLUTE: 0.6 K/UL (ref 0–0.6)
EOSINOPHILS RELATIVE PERCENT: 5.2 % (ref 0–5)
GFR NON-AFRICAN AMERICAN: 54
GLUCOSE BLD-MCNC: 322 MG/DL (ref 70–99)
GLUCOSE BLD-MCNC: 88 MG/DL (ref 70–99)
GLUCOSE BLD-MCNC: 94 MG/DL (ref 74–109)
HCT VFR BLD CALC: 27.6 % (ref 42–52)
HEMOGLOBIN: 9.2 G/DL (ref 14–18)
LYMPHOCYTES ABSOLUTE: 3 K/UL (ref 1.1–4.5)
LYMPHOCYTES RELATIVE PERCENT: 25.2 % (ref 20–40)
MCH RBC QN AUTO: 32.1 PG (ref 27–31)
MCHC RBC AUTO-ENTMCNC: 33.3 G/DL (ref 33–37)
MCV RBC AUTO: 96.2 FL (ref 80–94)
MONOCYTES ABSOLUTE: 1.3 K/UL (ref 0–0.9)
MONOCYTES RELATIVE PERCENT: 11 % (ref 0–10)
NEUTROPHILS ABSOLUTE: 6.8 K/UL (ref 1.5–7.5)
NEUTROPHILS RELATIVE PERCENT: 57.2 % (ref 50–65)
PDW BLD-RTO: 12.3 % (ref 11.5–14.5)
PERFORMED ON: ABNORMAL
PERFORMED ON: NORMAL
PLATELET # BLD: 310 K/UL (ref 130–400)
PMV BLD AUTO: 10.7 FL (ref 9.4–12.4)
POTASSIUM SERPL-SCNC: 3.2 MMOL/L (ref 3.5–5)
RBC # BLD: 2.87 M/UL (ref 4.7–6.1)
SODIUM BLD-SCNC: 141 MMOL/L (ref 136–145)
WBC # BLD: 11.9 K/UL (ref 4.8–10.8)

## 2019-04-17 PROCEDURE — 99232 SBSQ HOSP IP/OBS MODERATE 35: CPT | Performed by: INTERNAL MEDICINE

## 2019-04-17 PROCEDURE — 36415 COLL VENOUS BLD VENIPUNCTURE: CPT

## 2019-04-17 PROCEDURE — 85025 COMPLETE CBC W/AUTO DIFF WBC: CPT

## 2019-04-17 PROCEDURE — 99239 HOSP IP/OBS DSCHRG MGMT >30: CPT | Performed by: INTERNAL MEDICINE

## 2019-04-17 PROCEDURE — 6370000000 HC RX 637 (ALT 250 FOR IP): Performed by: INTERNAL MEDICINE

## 2019-04-17 PROCEDURE — 2580000003 HC RX 258: Performed by: INTERNAL MEDICINE

## 2019-04-17 PROCEDURE — 0296T PR EXT ECG > 48HR TO 21 DAY RCRD W/CONECT INTL RCRD: CPT | Performed by: INTERNAL MEDICINE

## 2019-04-17 PROCEDURE — 93229 REMOTE 30 DAY ECG TECH SUPP: CPT

## 2019-04-17 PROCEDURE — 6370000000 HC RX 637 (ALT 250 FOR IP): Performed by: HOSPITALIST

## 2019-04-17 PROCEDURE — 82948 REAGENT STRIP/BLOOD GLUCOSE: CPT

## 2019-04-17 PROCEDURE — 80048 BASIC METABOLIC PNL TOTAL CA: CPT

## 2019-04-17 RX ORDER — POTASSIUM CHLORIDE 20 MEQ/1
40 TABLET, EXTENDED RELEASE ORAL ONCE
Status: COMPLETED | OUTPATIENT
Start: 2019-04-17 | End: 2019-04-17

## 2019-04-17 RX ORDER — DIGOXIN 250 MCG
250 TABLET ORAL DAILY
Qty: 30 TABLET | Refills: 3 | Status: ON HOLD | OUTPATIENT
Start: 2019-04-18 | End: 2019-05-15 | Stop reason: SDUPTHER

## 2019-04-17 RX ORDER — DILTIAZEM HYDROCHLORIDE 360 MG/1
360 CAPSULE, EXTENDED RELEASE ORAL 2 TIMES DAILY
Qty: 30 CAPSULE | Refills: 3 | Status: SHIPPED | OUTPATIENT
Start: 2019-04-17 | End: 2019-04-19 | Stop reason: ALTCHOICE

## 2019-04-17 RX ORDER — METOPROLOL SUCCINATE 200 MG/1
200 TABLET, EXTENDED RELEASE ORAL 2 TIMES DAILY
Qty: 30 TABLET | Refills: 3 | Status: ON HOLD | OUTPATIENT
Start: 2019-04-17 | End: 2019-07-04 | Stop reason: HOSPADM

## 2019-04-17 RX ADMIN — INSULIN LISPRO 4 UNITS: 100 INJECTION, SOLUTION INTRAVENOUS; SUBCUTANEOUS at 12:56

## 2019-04-17 RX ADMIN — DILTIAZEM HYDROCHLORIDE 360 MG: 180 CAPSULE, COATED, EXTENDED RELEASE ORAL at 08:26

## 2019-04-17 RX ADMIN — PRAVASTATIN SODIUM 20 MG: 20 TABLET ORAL at 08:26

## 2019-04-17 RX ADMIN — DIGOXIN 250 MCG: 250 TABLET ORAL at 08:27

## 2019-04-17 RX ADMIN — LISINOPRIL 20 MG: 20 TABLET ORAL at 08:27

## 2019-04-17 RX ADMIN — PANTOPRAZOLE SODIUM 40 MG: 40 TABLET, DELAYED RELEASE ORAL at 06:21

## 2019-04-17 RX ADMIN — APIXABAN 5 MG: 5 TABLET, FILM COATED ORAL at 08:26

## 2019-04-17 RX ADMIN — Medication 10 ML: at 08:27

## 2019-04-17 RX ADMIN — SULFASALAZINE 1000 MG: 500 TABLET ORAL at 08:26

## 2019-04-17 RX ADMIN — FUROSEMIDE 40 MG: 40 TABLET ORAL at 08:26

## 2019-04-17 RX ADMIN — METOPROLOL SUCCINATE 200 MG: 50 TABLET, EXTENDED RELEASE ORAL at 08:26

## 2019-04-17 RX ADMIN — POTASSIUM CHLORIDE 40 MEQ: 20 TABLET, EXTENDED RELEASE ORAL at 12:52

## 2019-04-17 RX ADMIN — INSULIN LISPRO 5 UNITS: 100 INJECTION, SOLUTION INTRAVENOUS; SUBCUTANEOUS at 12:59

## 2019-04-17 RX ADMIN — ASPIRIN 81 MG: 81 TABLET, COATED ORAL at 08:26

## 2019-04-17 ASSESSMENT — PAIN SCALES - GENERAL: PAINLEVEL_OUTOF10: 0

## 2019-04-17 NOTE — DISCHARGE SUMMARY
Bennie Ordoñez  :  1940  MRN:  [id number]    Admit date:  2019  Discharge date:   2019       Admitting Physician:  Clint Doran MD    Advance Directive: Full Code    Consults Made:   IP CONSULT TO SOCIAL WORK  IP CONSULT TO CARDIOLOGY      Primary Care Physician:  Tracey White MD    Discharge Diagnoses:  Principal Problem:    Atrial fibrillation with RVR ([address]  Active Problems:    Type 2 diabetes mellitus ([address]    PVD (peripheral vascular disease) ([address]    Carotid artery occlusion    Elevated d-dimer    Hypocalcemia    Macrocytic anemia    Pneumonia due to infectious organism    PAF (paroxysmal atrial fibrillation) ([address]  Resolved Problems:    * No resolved hospital problems. *      Significant Diagnostic Studies:   Ct Chest Wo Contrast    Result Date: 2019  CT CHEST WO CONTRAST 2019 6:00 PM HISTORY: Dyspnea. Shortness of breath. COMPARISON: None DLP: 847 mGy cm. Automated exposure control was utilized to diminish patient radiation dose. TECHNIQUE: Serial helical tomographic images of the chest were acquired. Bone and soft tissue algorithms were provided. Coronal reformatted images were also provided for review. FINDINGS: Neck base: The imaged portion of the neck and thyroid gland is unremarkable. Lungs: There is mild scarring within the apices. Moderate bilateral pleural effusions are present. There is some compressive atelectasis related to these effusions but there is consolidation within both lower lobes with extensive air bronchograms suggesting bilateral lower lobe pneumonia. Some patchy groundglass disease is noted scattered within both lungs suggesting some localized pneumonitis or interstitial edema. . . The trachea and bronchial tree are patent. Heart: There is mild cardiomegaly. No evidence of pericardial effusion. Coronary calcifications noted in the LAD and circumflex distributions. . . Great vessels: The great vessels are normal in caliber. Lymph nodes:  There are multiple 4/9/2019  CTA chest 4/9/2019 9:00 AM HISTORY: Chest pain TECHNIQUE: Axial images of the chest were obtained following IV contrast. . Coronal and sagittal reformatted images are reconstructed and reviewed. Maximal intensity projectional images also reconstructed and reviewed COMPARISON: 4/8/2019. DLP: 734 mGy cm Automated exposure control was utilized to minimize patient radiation dose. FINDINGS: No pulmonary emboli are identified. There is moderate vascular calcification with dense coronary calcification. There is cardiomegaly. Similar trace pericardial fluid. Small to moderate pleural effusions are similar to yesterday's exam. Subcarinal lymph node measures up to 1.6 cm. Remaining mediastinal lymph nodes measure less than a centimeter. There is no pathologic axillary lymphadenopathy. There is mild gynecomastia. Calcified granuloma seen within the liver and the spleen. There is a soft tissue prominence of the head of the pancreas worrisome for possible neoplasm with atrophy of the body and tail of the pancreas. Follow-up dedicated cross-sectional imaging of the abdomen recommended with the pancreatic protocol. There are calcified periportal and portacaval lymph nodes related to granulomatous exposure. Scattered groundglass opacities of the lungs with patchy bilateral lower lobe opacities. There is no pneumothorax. No endobronchial lesions are identified. 1. No pulmonary emboli. 2. Similar size small to moderate pleural effusions with only trace pericardial fluid. 3. Patchy bilateral lower lobe opacities worrisome for pneumonia. Scattered groundglass opacities may relate to inflammation or mild edema. 4. Soft tissue prominence of the head of the pancreas as described above. Follow-up CT abdomen pancreatic protocol recommended for further assessment. 5. Diffuse vascular calcifications with involvement of the coronary arteries.  Signed by Dr Rafael Burden on 4/9/2019 1:46 PM    Nm Myocardial Spect Rest Exercise Or Rx    Result Date: 4/10/2019  Lexiscan Nuclear Stress Test Report Procedure date: 4/19/2019 Indications: Atrial fibrillation Procedure: Stress was performed with injection of 0.4 mg Lexiscan. Vital signs and EKG were monitored. Technetium-99 sestamibi was injected in divided doses, approximately 10 mCi and 30 mCi respectively for rest and stress imaging. The patient was discharged in stable condition. Results: Patient had symptoms of dyspnea during infusion that resolved in recovery. Baseline EKG showed atrial fibrillation. During stress there were no significant EKG changes or rhythm changes. Baseline and peak blood pressures were 151/81, and 146/79 respectively. Baseline and peak heart rates were 99 and  122 respectively. Radiopharmaceuticals used: Rest images 10.76 mCi technetium 99m sestamibi Stress images 74.27 millicuries technetium 58K sestamibi Review of rest and stress images obtained in a SPECT acquisition protocol along with review of the polar plot revealed: 1. Ejection fraction 49% 2. Wall motion study demonstrates normal wall motion and thickening 3. Myocardial perfusion imaging demonstrates homogeneous uptake of the tracer in all visualized segments. No areas of ischemia or infarction are noted. The sum stress score was 0. Summary impression: Probably normal study borderline ejection fraction normal perfusion study Signed by Dr Miley Johnson on 4/9/2019 4:27 PM      Pertinent Labs:   CBC:   Recent Labs     04/15/19  0306 04/16/19 0224 04/17/19  0125   WBC 9.8 10.6 11.9*   HGB 9.6* 9.3* 9.2*    292 310     BMP:    Recent Labs     04/15/19  0306 04/16/19  0224 04/17/19  0125    143 141   K 3.5 3.2* 3.2*    103 102   CO2 24 28 26   BUN 17 12 19   CREATININE 1.0 1.0 1.3*   GLUCOSE 137* 104 94     INR: No results for input(s): INR in the last 72 hours. Lipids: No results for input(s): CHOL, HDL in the last 72 hours.     Invalid input(s): LDLCALCU  ABGs:No results for input(s): extremities  Neurologic: Mental status: Alert, oriented, thought content appropriate  Skin: Skin Warm, dry with , color, texture, turgor normal. No rashes or lesions or nodules. Discharge Medications:       Jules Sprague   Home Medication Instructions [facility]:[id number]    Printed on:04/17/19 1300   Medication Information                      amLODIPine (NORVASC) 5 MG tablet  Take 5 mg by mouth daily             apixaban (ELIQUIS) 5 MG TABS tablet  Take 1 tablet by mouth 2 times daily             aspirin 81 MG tablet  Take 81 mg by mouth daily             digoxin (LANOXIN) 250 MCG tablet  Take 1 tablet by mouth daily             diltiazem (CARDIZEM CD) 360 MG extended release capsule  Take 1 capsule by mouth 2 times daily             folic acid (FOLVITE) 1 MG tablet  Take 1 mg by mouth daily             furosemide (LASIX) 40 MG tablet  Take 40 mg by mouth daily             glipiZIDE (GLUCOTROL) 10 MG tablet  Take 10 mg by mouth 2 times daily (before meals)             lisinopril (PRINIVIL;ZESTRIL) 20 MG tablet  Take 20 mg by mouth daily. metformin (GLUCOPHAGE-XR) 500 MG XR tablet  Take 1,000 mg by mouth 2 times daily              metoprolol succinate (TOPROL XL) 200 MG extended release tablet  Take 1 tablet by mouth 2 times daily             omeprazole (PRILOSEC) 20 MG capsule  Take 20 mg by mouth daily. potassium chloride (KLOR-CON M) 20 MEQ extended release tablet  Take 20 mEq by mouth daily             pravastatin (PRAVACHOL) 20 MG tablet  Take 20 mg by mouth daily. sulfaSALAzine (AZULFIDINE) 500 MG tablet  Take 1,000 mg by mouth 3 times daily             terazosin (HYTRIN) 10 MG capsule  Take 10 mg by mouth nightly. Discharge Instructions: Follow up with Ludivina Hoyos MD in 7 days. Take medications as directed. Resume activity as tolerated. Diet: DIET CARB CONTROL; No Added Salt (3-4 GM);  No Caffeine, Cardiac        DISCHARGE STATUS:    Condition: Good  Disposition: Patient is medically stable and will be discharged Home    Extended Emergency Contact Information  Primary Emergency Contact: [address] [place], [place] [address]e of [address] [place] [address] St Phone: [phone]  Relation: Child       Time Spent on discharge is more than 33 in the examination, evaluation, counseling and review of medications and discharge plan. Electronically signed by   Guanakito Jurado MD, MPH,   Internal Medicine Hospitalist   4/17/2019 1:00 PM      Thank you Mohit Thomason MD for the opportunity to be involved in this patient's care. If you have any questions or concerns please feel free to contact me at [phone]        EMR Dragon/Transcription disclaimer:   Much of this encounter note is an electronic transcription/translation of spoken language to printed text.  The electronic translation of spoken language may permit erroneous, or at times, nonsensical words or phrases to be inadvertently transcribed; although attempts have made to review the note for such errors, some may still exist.

## 2019-04-17 NOTE — PROGRESS NOTES
Nightly Meds passed Toprol 200 mg & diltiazem 360mg as well as Ambien, PT heart rate is still Afib in the 70s-90s.  PT is up ad elaine to and from to the bathroom using toilet and not urinal. Electronically signed by Ramirez Kilpatrick RN on 4/17/2019 at 1:57 AM

## 2019-04-17 NOTE — PLAN OF CARE
Problem:  Activity:  Goal: Ability to tolerate increased activity will improve  Description  Ability to tolerate increased activity will improve  Outcome: Ongoing  Goal: Expression of feelings of increased energy will increase  Description  Expression of feelings of increased energy will increase  Outcome: Ongoing     Problem: Cardiac:  Goal: Ability to maintain an adequate cardiac output will improve  Description  Ability to maintain an adequate cardiac output will improve  Outcome: Ongoing

## 2019-04-17 NOTE — PROGRESS NOTES
Cardiology Daily Note Zac Iniguez MD      Patient:  Bradly Iraheta  [id number]    Patient Active Problem List    Diagnosis Date Noted    PAF (paroxysmal atrial fibrillation) ([facility] 04/13/2019     Priority: Low    Elevated d-dimer 04/09/2019     Priority: Low    Hypocalcemia 04/09/2019     Priority: Low    Macrocytic anemia 04/09/2019     Priority: Low    Pneumonia due to infectious organism 04/09/2019     Priority: Low    Atrial fibrillation with RVR ([facility] 04/08/2019     Priority: Low    Carotid artery occlusion      Priority: Low    PVD (peripheral vascular disease) ([facility] 11/09/2016     Priority: Low    Carotid artery stenosis 04/11/2012     Priority: Low    Depression      Priority: Low    Type 2 diabetes mellitus ([facility]      Priority: Low    Hypertension      Priority: Low       Admit Date:  4/8/2019    Admission Problem List: Present on Admission:   Carotid artery occlusion   Atrial fibrillation with RVR (HCC)   Type 2 diabetes mellitus ([facility]   PVD (peripheral vascular disease) ([facility]   Elevated d-dimer   Hypocalcemia   Macrocytic anemia   Pneumonia due to infectious organism   PAF (paroxysmal atrial fibrillation) ([facility]      Cardiac Specific Data:  Specialty Problems        Cardiology Problems    Hypertension        Carotid artery stenosis        PVD (peripheral vascular disease) ([facility]        * (Principal) Atrial fibrillation with RVR (HCC)        Carotid artery occlusion        PAF (paroxysmal atrial fibrillation) ([facility])              Subjective:  Mr. Ihsan Armas seen today resting comfortably. Heart rate improved in the 90s no symptoms tolerating meds well. No other complaints.     Objective:   /68   Pulse 97   Temp 99.1 °F (37.3 °C) (Temporal)   Resp 20   Ht 5' 7\" (1.702 m)   Wt 162 lb (73.5 kg)   SpO2 92%   BMI 25.37 kg/m²       Intake/Output Summary (Last 24 hours) at 4/17/2019 1034  Last data filed at 4/16/2019 2035  Gross per 24 hour   Intake 850 ml   Output --   Net 850 ml Prior to Admission medications    Medication Sig Start Date End Date Taking? Authorizing Provider   sulfaSALAzine (AZULFIDINE) 500 MG tablet Take 1,000 mg by mouth 3 times daily   Yes Historical Provider, MD   furosemide (LASIX) 40 MG tablet Take 40 mg by mouth daily   Yes Historical Provider, MD   amLODIPine (NORVASC) 5 MG tablet Take 5 mg by mouth daily    Historical Provider, MD   potassium chloride (KLOR-CON M) 20 MEQ extended release tablet Take 20 mEq by mouth daily    Historical Provider, MD   sotalol (BETAPACE) 80 MG tablet Take 80 mg by mouth daily    Historical Provider, MD   folic acid (FOLVITE) 1 MG tablet Take 1 mg by mouth daily    Historical Provider, MD   glipiZIDE (GLUCOTROL) 10 MG tablet Take 10 mg by mouth 2 times daily (before meals)    Historical Provider, MD   aspirin 81 MG tablet Take 81 mg by mouth daily    Historical Provider, MD   sulfADIAZINE 500 MG tablet Take 1,000 mg by mouth 3 times daily     Historical Provider, MD   pravastatin (PRAVACHOL) 20 MG tablet Take 20 mg by mouth daily. Historical Provider, MD   metformin (GLUCOPHAGE-XR) 500 MG XR tablet Take 1,000 mg by mouth 2 times daily     Historical Provider, MD   omeprazole (PRILOSEC) 20 MG capsule Take 20 mg by mouth daily. Historical Provider, MD   terazosin (HYTRIN) 10 MG capsule Take 10 mg by mouth nightly. Historical Provider, MD   lisinopril (PRINIVIL;ZESTRIL) 20 MG tablet Take 20 mg by mouth daily.     Historical Provider, MD        diltiazem  360 mg Oral BID    metoprolol succinate  200 mg Oral BID    sulfaSALAzine  1,000 mg Oral TID    digoxin  250 mcg Oral Daily    furosemide  40 mg Oral Daily    apixaban  5 mg Oral BID    sodium chloride flush  10 mL Intravenous 2 times per day    insulin glargine  20 Units Subcutaneous Nightly    insulin lispro  5 Units Subcutaneous TID WC    insulin lispro  0-6 Units Subcutaneous TID WC    insulin lispro  0-3 Units Subcutaneous Nightly    lisinopril  20 mg Oral Daily    pantoprazole  40 mg Oral QAM AC    pravastatin  20 mg Oral Daily    aspirin  81 mg Oral Daily    doxazosin  8 mg Oral Nightly       TELEMETRY: Atrial fibrillation     Physical Exam:      Physical Exam      General:  Awake, alert, NAD  Skin:  Warm and dry  Neck:  no jvd , no carotid bruits  Chest:  Clear to auscultation, no wheezing or rales  Cardiovascular:  RRR B7O2 no murmurs, clicks, gallups, or rubs  Abdomen:  Soft nontender, nondistended, bowel sounds present  Extremities:  Edema: none     Lab Data:  CBC:   Recent Labs     04/15/19  0306 04/15/19  0809 04/16/19  0224 04/17/19  0125   WBC 9.8  --  10.6 11.9*   HGB 9.6*  --  9.3* 9.2*   HCT 29.1* 32.3* 28.1* 27.6*   MCV 97.3*  --  96.6* 96.2*     --  292 310     BMP:   Recent Labs     04/15/19  0306 04/16/19  0224 04/17/19  0125    143 141   K 3.5 3.2* 3.2*    103 102   CO2 24 28 26   BUN 17 12 19   CREATININE 1.0 1.0 1.3*     LIVER PROFILE: No results for input(s): AST, ALT, LIPASE, BILIDIR, BILITOT, ALKPHOS in the last 72 hours. Invalid input(s): AMYLASE,  ALB  PT/INR: No results for input(s): PROTIME, INR in the last 72 hours. APTT: No results for input(s): APTT in the last 72 hours. BNP:  No results for input(s): BNP in the last 72 hours. CK, CKMB, Troponin: @LABRCNT (CKTOTAL:3, CKMB:3, TROPONINI:3)@    IMAGING:  Ct Chest Wo Contrast    Result Date: 4/8/2019  CT CHEST WO CONTRAST 4/8/2019 6:00 PM HISTORY: Dyspnea. Shortness of breath. COMPARISON: None DLP: 847 mGy cm. Automated exposure control was utilized to diminish patient radiation dose. TECHNIQUE: Serial helical tomographic images of the chest were acquired. Bone and soft tissue algorithms were provided. Coronal reformatted images were also provided for review. FINDINGS: Neck base: The imaged portion of the neck and thyroid gland is unremarkable. Lungs: There is mild scarring within the apices. Moderate bilateral pleural effusions are present.  There is some Portable    Result Date: 4/8/2019  Exam:   XR CHEST PORTABLE  Date:  4/8/2019 History:  Male, age  70 years; shortness of breath COMPARISON:  None. Findings : Small to moderate left and trace right pleural effusion. Mild cardiomegaly and central pulmonary venous congestion. No measurable pneumothorax. No acute osseous pathology. Impression: Overall picture concerning for fluid overload. Clinical correlation to exclude superimposed infection is recommended given the asymmetry of the findings. Signed by Dr Kate Tobin on 4/8/2019 5:09 PM    Cta Pulmonary W Contrast    Result Date: 4/9/2019  CTA chest 4/9/2019 9:00 AM HISTORY: Chest pain TECHNIQUE: Axial images of the chest were obtained following IV contrast. . Coronal and sagittal reformatted images are reconstructed and reviewed. Maximal intensity projectional images also reconstructed and reviewed COMPARISON: 4/8/2019. DLP: 734 mGy cm Automated exposure control was utilized to minimize patient radiation dose. FINDINGS: No pulmonary emboli are identified. There is moderate vascular calcification with dense coronary calcification. There is cardiomegaly. Similar trace pericardial fluid. Small to moderate pleural effusions are similar to yesterday's exam. Subcarinal lymph node measures up to 1.6 cm. Remaining mediastinal lymph nodes measure less than a centimeter. There is no pathologic axillary lymphadenopathy. There is mild gynecomastia. Calcified granuloma seen within the liver and the spleen. There is a soft tissue prominence of the head of the pancreas worrisome for possible neoplasm with atrophy of the body and tail of the pancreas. Follow-up dedicated cross-sectional imaging of the abdomen recommended with the pancreatic protocol. There are calcified periportal and portacaval lymph nodes related to granulomatous exposure. Scattered groundglass opacities of the lungs with patchy bilateral lower lobe opacities. There is no pneumothorax.  No endobronchial lesions are identified. 1. No pulmonary emboli. 2. Similar size small to moderate pleural effusions with only trace pericardial fluid. 3. Patchy bilateral lower lobe opacities worrisome for pneumonia. Scattered groundglass opacities may relate to inflammation or mild edema. 4. Soft tissue prominence of the head of the pancreas as described above. Follow-up CT abdomen pancreatic protocol recommended for further assessment. 5. Diffuse vascular calcifications with involvement of the coronary arteries. Signed by Dr Maxim Huston on 4/9/2019 1:46 PM    Nm Myocardial Spect Rest Exercise Or Rx    Result Date: 4/10/2019  Lexiscan Nuclear Stress Test Report Procedure date: 4/19/2019 Indications: Atrial fibrillation Procedure: Stress was performed with injection of 0.4 mg Lexiscan. Vital signs and EKG were monitored. Technetium-99 sestamibi was injected in divided doses, approximately 10 mCi and 30 mCi respectively for rest and stress imaging. The patient was discharged in stable condition. Results: Patient had symptoms of dyspnea during infusion that resolved in recovery. Baseline EKG showed atrial fibrillation. During stress there were no significant EKG changes or rhythm changes. Baseline and peak blood pressures were 151/81, and 146/79 respectively. Baseline and peak heart rates were 99 and  122 respectively. Radiopharmaceuticals used: Rest images 10.76 mCi technetium 99m sestamibi Stress images 41.78 millicuries technetium 86K sestamibi Review of rest and stress images obtained in a SPECT acquisition protocol along with review of the polar plot revealed: 1. Ejection fraction 49% 2. Wall motion study demonstrates normal wall motion and thickening 3. Myocardial perfusion imaging demonstrates homogeneous uptake of the tracer in all visualized segments. No areas of ischemia or infarction are noted. The sum stress score was 0.     Summary impression: Probably normal study borderline ejection fraction normal perfusion study Signed by Dr Rosalino Davis on 4/9/2019 4:27 PM        Assessment:  1. Atrial fibrillation rate occasional heart rates up to 140s  2. Echocardiogram today normal ejection fraction 66% moderate mitral and tricuspid regurgitation  3. History of tobacco abuse discontinued 2006  4. Hypertension  5. Diabetes  6. No carotid artery stenosis 100% occlusion on the right per patient  7. Depression                 Plan:  1.  Continue current treatment heart rate appears to be adequately controlled at this time could be discharged from a cardiac perspective on current medications    Rosalino Davis MD 4/17/2019 10:34 AM

## 2019-04-18 ENCOUNTER — CARE COORDINATION (OUTPATIENT)
Dept: CASE MANAGEMENT | Age: 79
End: 2019-04-18

## 2019-04-18 NOTE — CARE COORDINATION
Spoke with LISANDRO Gutierrez to have her fax discharge summary on patient to Halle at Dr. Lolis Fowler office [phone]. She faxed it to her.

## 2019-04-18 NOTE — CARE COORDINATION
Spoke with Halle at Dr. Wade Garay office in [place] to check on status of referral to home care. She states she has not received the discharge summary yet. Will check with [facility] floor  and have her fax it to her at [phone].

## 2019-04-18 NOTE — CARE COORDINATION
[facility] Transitions Initial Follow Up Call    Call within 2 business days of discharge: Yes    Patient: Charlie Calvin Patient : 1940   MRN: [id number]  Reason for Admission:   Discharge Date: 19 RARS: Readmission Risk Score: 15      Last Discharge [address] [address]       Complaint Diagnosis Description Type Department Provider    19   Admission (Discharged) SUZY Jurado MD           Spoke with: Jakub Carter, patient's son    Facility: [facility]      Non-face-to-face services provided:  Reviewed encounter information for continuity of care prior to follow up phone call - chart notes, consults, progress notes, test results, med list, appointments, AVS, other information. Care Transitions 24 Hour Call    Do you have any ongoing symptoms?:  No  Do you have a copy of your discharge instructions?:  Yes  Do you have all of your prescriptions and are they filled?:  No  Have you been contacted by a [facility] Pharmacist?:  No  Have you scheduled your follow up appointment?:  Yes  How are you going to get to your appointment?:  Car - family or friend to transport  Were you discharged with any Home Care or Post Acute Services:  No  Do you have support at home?:  Alone  Do you feel like you have everything you need to keep you well at home?:  No  Are you an active caregiver in your home?:  No  Care Transitions Interventions         Follow Up : Spoke with patient's son for follow up after discharge. He states that his dad only got to get one of the four meds that were prescribed. He was not able to afford the Eliquis, Digoxin, or Diltiazem. He was able to get the Metoprolol. Advised son that these were important medicines, and patient needed to be on them. Patient is getting around poorly per son, and that he himself lived in [facility] [place] and was the closest family. Patient has a follow up with Dr. Fara Holland on  and with Dr. Javier Tobin on .  Patient's son states patient takes care of his own medications, and he did not know what they were for sure. Will follow up with Cardio, PCP, and pharmacy to see what can be done to obtain medicines and services.    Future Appointments   Date Time Provider Armin Cervantes   6/6/2019  3:45 PM Ophelia Cerda MD [facility]       Deondre Fagan RN

## 2019-04-18 NOTE — CARE COORDINATION
Spoke to Eb at Dr. Oly Egan office in [place], [place]. Advised her that patient was recently released from [facility] in [place] [facility], and he was weak, not back up to par. Stated that he would benefit from PT and [facility] [employer] from [address]. Nurse currently with a patient, and she took [facility] name and number for her to return call.

## 2019-04-18 NOTE — CARE COORDINATION
Spoke with Rebeca De L aRosa at [facility] to check on patient's medicines, and see how much they cost. Patient was able to get Metoprolol, but not the Eliquis, Digoxin, or Cardizem. Per Rebeca De La Rosa Digoxin needs PA but cash pay is $13, the Cardizem was out of stock, and should be in today, and he is filling both of them for patient. He states the Eliquis is $400+, and patient could not afford to fill.

## 2019-04-18 NOTE — CARE COORDINATION
[facility] Transitions Follow Up Call    2019    Patient: Vilma Smiley  Patient : 1940   MRN: [id number]  Reason for Admission:   Discharge Date: 19 RARS: Readmission Risk Score: 13         Spoke with: Rick Forrester, patient son    Care Transitions Subsequent and Final Call    Subsequent and Final Calls  Care Transitions Interventions  Other Interventions: Follow Up : Patient son returned call stating he had been to Dr. Faust's office, and they had picked up 2 weeks of Eliquis samples, and asked them to PA the Digoxin.    Future Appointments   Date Time Provider Armin Cervantes   2019  3:45 PM Ophelia Cerda MD [facility]       Deondre Fagan RN

## 2019-04-19 ENCOUNTER — CARE COORDINATION (OUTPATIENT)
Dept: CASE MANAGEMENT | Age: 79
End: 2019-04-19

## 2019-04-19 ENCOUNTER — TELEPHONE (OUTPATIENT)
Dept: CARDIOLOGY | Age: 79
End: 2019-04-19

## 2019-04-19 RX ORDER — DILTIAZEM HYDROCHLORIDE 120 MG/1
120 CAPSULE, COATED, EXTENDED RELEASE ORAL DAILY
Qty: 180 CAPSULE | Refills: 3 | Status: SHIPPED | OUTPATIENT
Start: 2019-04-19 | End: 2019-04-22 | Stop reason: DRUGHIGH

## 2019-04-19 NOTE — CARE COORDINATION
Spoke with Halle at Dr. Kiera Curtis office to make sure they received the discharge summary for home care order. She states they did receive it, and Dr. Dali Jon comes in at one today and she will discuss it with him. Did suggest to have Cedric Iniguez, PT, and SW to assess patient if possible.

## 2019-04-19 NOTE — TELEPHONE ENCOUNTER
Pt son called stating while pt was in hospital Dr. Fina Portillo put pt on eliquis and cardizem 360 mg. Pt can't afford either med. Can pt take Cardizem 120 mg take 3 pills in the am and 3 pills in the pm. Pt insurance will pay for the 120 mg dose but not the 360 mg. Pt wants to know if Eliquis can be changed. He lives in [place] and can't drive here to get samples. Tawanna Arzate will try to get him on a help program but that takes time. Pls advise what you would like to do. Fina Portillo is in [place] today and this pt really needs help with this, can you help pls.

## 2019-04-19 NOTE — CARE COORDINATION
Left message with [facility] nurses line, stating that patient was having trouble affording his Cardizem. It is over $200 at [facility]. Asked what they wanted to do, possibly change medication or something else. Left [facility] contact information.

## 2019-04-19 NOTE — TELEPHONE ENCOUNTER
That is a lot of Cardizem to take. Do 120 mg capsules. Take 2 in the morning, one at noon, 2 at bedtime- need to split up because it's not sustained release    As far as anticoagulation. We can get him some samples to hold him until we can get him on a plan. Switching him to one of the other medications will be probably just as costly and less they know for sure Xarelto is more affordable. Switching him to Coumadin will take days before his therapeutic.

## 2019-04-19 NOTE — CARE COORDINATION
[facility] Transitions Follow Up Call    2019    Patient: Neri Hudson  Patient : 1940   MRN: [id number]  Reason for Admission:   Discharge Date: 19 RARS: Readmission Risk Score: 15         Spoke with: [address] Transitions Subsequent and Final Call    Subsequent and Final Calls  Care Transitions Interventions  Other Interventions: Follow Up : Spoke with Judi Pineda, patient's son today about getting home care evaluation, and let him know it is in the works, and have spoken to Aleisha at Dr. Sun Douglass office. Also gave info about [address] and getting patient assistance started for patient. Told him about leaving a message with [facility] about the cost of his Cardizem, and to contact [facility] or patient with what the next step to do was. Son was very thankful for all the information, and calls to help his Dad. Will follow up with patient at a later time.   Future Appointments   Date Time Provider Armin Cervantes   2019  3:45 PM Gabrielle Maza MD [facility]       Ramone Wade RN

## 2019-04-19 NOTE — TELEPHONE ENCOUNTER
Patient son needs to speak to office about his dad medication, Patient said he came to the office yesterday and left a message about issue . Please Call

## 2019-04-22 RX ORDER — DILTIAZEM HYDROCHLORIDE 360 MG/1
360 CAPSULE, EXTENDED RELEASE ORAL 2 TIMES DAILY
Qty: 60 CAPSULE | Refills: 3 | Status: ON HOLD | OUTPATIENT
Start: 2019-04-22 | End: 2019-07-04 | Stop reason: HOSPADM

## 2019-04-22 NOTE — TELEPHONE ENCOUNTER
Patient was discharged from the hospital on 360 mg BID but his insurance would not cover this drug. We had to alter his script in the meantime where he was taking 120 mg 5 capsules a day. Now this is approved he needs script back to 360 BID.

## 2019-04-28 ENCOUNTER — CARE COORDINATION (OUTPATIENT)
Dept: CARE COORDINATION | Age: 79
End: 2019-04-28

## 2019-04-28 NOTE — CARE COORDINATION
[facility] Transitions Follow Up Call    2019    Patient: Jnaelle Mccall  Patient : 1940   MRN: [id number]  Reason for Admission:   Discharge Date: 19 RARS: Readmission Risk Score: 15         Spoke with: Patient. Patient state he is doing fair. Feeling weak and not able to do much. Gets SOB when his heart is in A-fib. Taking medications as directed. Will Call cardiologist for sooner appointment than . Seeing PCP next week. Patient has no needs or concerns for me at this time. Will Follow up at later time. Care Transitions Subsequent and Final Call    Subsequent and Final Calls  Do you have any ongoing symptoms?:  No  Have your medications changed?:  No  Do you have any questions related to your medications?:  No  Do you currently have any active services?:  No  Do you have any needs or concerns that I can assist you with?:  No  Identified Barriers:  None  Care Transitions Interventions  Other Interventions:             Follow Up  Future Appointments   Date Time Provider Armin Cervantes   2019  3:45 PM Roseanna Agarwal MD [facility]       Mika Olmedo LPN

## 2019-05-07 ENCOUNTER — TELEPHONE (OUTPATIENT)
Dept: CARDIOLOGY | Age: 79
End: 2019-05-07

## 2019-05-07 NOTE — TELEPHONE ENCOUNTER
Per Dr. Ada Luna patient needs to come into the office this Thursday to discuss zio report. Spoke with patient scheduled him for Thursday at 1:30PM with MD and advised him to bring all his medications in the original bottle so we know exactly what he is taking. He voiced understanding.

## 2019-05-08 ENCOUNTER — CARE COORDINATION (OUTPATIENT)
Dept: CASE MANAGEMENT | Age: 79
End: 2019-05-08

## 2019-05-08 NOTE — CARE COORDINATION
BPCI F/U . Spoke with patient. He states that he has not has energy since he got out of the hospital. He has no shortness of breath. Or other symptoms. He states that he has no appetite and he has had watery stools. He saw his PCP 5/7/19. He states that his PCP had patient get a stool sample and the MD has sent it off to have it tested. He states that he has ulcerative colitis and he was not given his Rx the whole time he was in the hospital. He has an appointment to see his heart MD 5/9/19. Patient was on a monitor at home for a week and is going in for the results of that. He had no other questions or concerns. Naveed GARCIAN RN.

## 2019-05-08 NOTE — PROCEDURES
[facility]    BLANCA Report      Roxannann Stefany    : 1940      Test Date:  2019    Analysis Date:  2019    Date Interpreted: 2019        1. Nearly 14 days and 0 hours of rhythm are processed. 2.  The underlying rhythm is atrial fibrillation at a mean heart rate of 73 bpm, with a range of 30 to 207 bpm.    3.  The were rarre extra ventricular beats. The majority of these were single isolated PVC's, there was no VT. 5.  Prolonged pauses or high degree AV block were noted. There were 19 pauses the longest of which was 3.5 seconds, heart rate of 17 beats per minue    6. Triggered Events or Diary Enteries were associated with atrial fibrillation / flutter and PVCs      Impression: This ZIO Patch shows continuous atrial fibrillation and flutter with prolonged pauses. Consider pacemaker therapy.        Electronically signed by Faith Lama MD on 19

## 2019-05-09 ENCOUNTER — TELEPHONE (OUTPATIENT)
Dept: CARDIOLOGY | Age: 79
End: 2019-05-09

## 2019-05-09 ENCOUNTER — OFFICE VISIT (OUTPATIENT)
Dept: CARDIOLOGY | Age: 79
End: 2019-05-09
Payer: MEDICARE

## 2019-05-09 VITALS
HEART RATE: 87 BPM | HEIGHT: 66 IN | WEIGHT: 154 LBS | BODY MASS INDEX: 24.75 KG/M2 | SYSTOLIC BLOOD PRESSURE: 108 MMHG | DIASTOLIC BLOOD PRESSURE: 68 MMHG

## 2019-05-09 DIAGNOSIS — I48.0 PAROXYSMAL ATRIAL FIBRILLATION (HCC): Primary | ICD-10-CM

## 2019-05-09 DIAGNOSIS — I10 ESSENTIAL HYPERTENSION: ICD-10-CM

## 2019-05-09 DIAGNOSIS — Z79.01 CHRONIC ANTICOAGULATION: ICD-10-CM

## 2019-05-09 DIAGNOSIS — I48.0 PAF (PAROXYSMAL ATRIAL FIBRILLATION) (HCC): ICD-10-CM

## 2019-05-09 PROCEDURE — 1111F DSCHRG MED/CURRENT MED MERGE: CPT | Performed by: INTERNAL MEDICINE

## 2019-05-09 PROCEDURE — 1123F ACP DISCUSS/DSCN MKR DOCD: CPT | Performed by: INTERNAL MEDICINE

## 2019-05-09 PROCEDURE — 99214 OFFICE O/P EST MOD 30 MIN: CPT | Performed by: INTERNAL MEDICINE

## 2019-05-09 PROCEDURE — 1036F TOBACCO NON-USER: CPT | Performed by: INTERNAL MEDICINE

## 2019-05-09 PROCEDURE — 93000 ELECTROCARDIOGRAM COMPLETE: CPT | Performed by: INTERNAL MEDICINE

## 2019-05-09 PROCEDURE — G8427 DOCREV CUR MEDS BY ELIG CLIN: HCPCS | Performed by: INTERNAL MEDICINE

## 2019-05-09 PROCEDURE — 4040F PNEUMOC VAC/ADMIN/RCVD: CPT | Performed by: INTERNAL MEDICINE

## 2019-05-09 PROCEDURE — G8598 ASA/ANTIPLAT THER USED: HCPCS | Performed by: INTERNAL MEDICINE

## 2019-05-09 PROCEDURE — G8420 CALC BMI NORM PARAMETERS: HCPCS | Performed by: INTERNAL MEDICINE

## 2019-05-09 RX ORDER — M-VIT,TX,IRON,MINS/CALC/FOLIC 27MG-0.4MG
1 TABLET ORAL DAILY
COMMUNITY

## 2019-05-09 RX ORDER — OMEPRAZOLE 40 MG/1
40 CAPSULE, DELAYED RELEASE ORAL DAILY
COMMUNITY
End: 2020-01-01

## 2019-05-09 ASSESSMENT — ENCOUNTER SYMPTOMS
SHORTNESS OF BREATH: 0
RESPIRATORY NEGATIVE: 1
GASTROINTESTINAL NEGATIVE: 1
NAUSEA: 0
EYES NEGATIVE: 1
DIARRHEA: 0
VOMITING: 0

## 2019-05-09 NOTE — PATIENT INSTRUCTIONS
[facility] [place] at the [facility] and [address] [facility] located on the first floor of [facility].       Patient's contact number:  [phone] (home)     Date/Time: 5/15/19 arrive at 9AM for 11AM procedure    Order:  BMP  (Basic metabolic profile). Out-Patient -  Cardioversion    Cardioversion is a procedure that uses an electrical current to help normalize the heart rhythm. You will be coming into our facility as an outpatient and will go home following this procedure. Cardioversion Instructions:  · Do not eat or drink anything after midnight the night before your procedure. May take morning medications with a sip of water unless otherwise directed not to. · You should arrange to have someone take you home rather than drive yourself. · Further plans will depend upon the result of your procedure. If for any reason you are unable to keep this appointment, please contact [facility], [phone], as soon as possible to reschedule.

## 2019-05-09 NOTE — PROGRESS NOTES
Past Medical History:   Diagnosis Date    Atherosclerosis of native arteries of the extremities with intermittent claudication     Blood circulation, collateral     Carotid artery occlusion     Chronic kidney disease     Depression     GERD (gastroesophageal reflux disease)     Hypertension     Pneumonia due to infectious organism 4/9/2019    Type II or unspecified type diabetes mellitus without mention of complication, not stated as uncontrolled      Past Surgical History:   Procedure Laterality Date    HEMORRHOID SURGERY  1973    HERNIA REPAIR       Family History   Problem Relation Age of Onset    High Blood Pressure Mother     Heart Disease Mother     High Blood Pressure Father     Heart Disease Father     Stroke Father      No Known Allergies  Current Outpatient Medications   Medication Sig Dispense Refill    omeprazole (PRILOSEC) 40 MG delayed release capsule Take 40 mg by mouth daily      Multiple Vitamins-Minerals (THERAPEUTIC MULTIVITAMIN-MINERALS) tablet Take 1 tablet by mouth daily      Multiple Vitamins-Minerals (PRESERVISION AREDS PO) Take by mouth daily      diltiazem (CARDIZEM CD) 360 MG extended release capsule Take 1 capsule by mouth 2 times daily (Patient taking differently: Take 120 mg by mouth 3 times daily Take 2 tablets in the morning, one tablet at noon, and one tablet at bedtime.) 60 capsule 3    apixaban (ELIQUIS) 5 MG TABS tablet Take 1 tablet by mouth 2 times daily 60 tablet 0    metoprolol succinate (TOPROL XL) 200 MG extended release tablet Take 1 tablet by mouth 2 times daily 30 tablet 3    digoxin (LANOXIN) 250 MCG tablet Take 1 tablet by mouth daily 30 tablet 3    sulfaSALAzine (AZULFIDINE) 500 MG tablet Take 1,000 mg by mouth 3 times daily      furosemide (LASIX) 40 MG tablet Take 40 mg by mouth daily      amLODIPine (NORVASC) 5 MG tablet Take 5 mg by mouth daily      potassium chloride (KLOR-CON M) 20 MEQ extended release tablet Take 20 mEq by mouth 2 1:50 PM    [id number] [facility]      Thisdictation was generated by voice recognition computer software. Although all attempts are made to edit the dictation for accuracy, there may be errors in the transcription that are not intended.

## 2019-05-09 NOTE — TELEPHONE ENCOUNTER
Patient in office with MD. He was scheduled for cardioversion on 5/15/19 he will arrive at [facility] for 11AM procedure. Instructions given in office verbally. Scheduled with Ryland Nichols. Referral made.

## 2019-05-13 NOTE — TELEPHONE ENCOUNTER
Spoke with son. Advised as of now his father is down for a cardioversion and how this goes all depends on how the procedure goes. He is wanting to try and get intermittent FMLA advised if he wanted to get it started to have them send me papers and we will fill out what we can the day of procedure. He voiced understanding.

## 2019-05-15 ENCOUNTER — HOSPITAL ENCOUNTER (OUTPATIENT)
Dept: CARDIAC CATH/INVASIVE PROCEDURES | Age: 79
Discharge: HOME | End: 2019-05-15
Attending: INTERNAL MEDICINE | Admitting: INTERNAL MEDICINE
Payer: MEDICARE

## 2019-05-15 VITALS
BODY MASS INDEX: 24.75 KG/M2 | TEMPERATURE: 98.2 F | HEART RATE: 70 BPM | RESPIRATION RATE: 10 BRPM | OXYGEN SATURATION: 97 % | HEIGHT: 66 IN | WEIGHT: 154 LBS | DIASTOLIC BLOOD PRESSURE: 62 MMHG | SYSTOLIC BLOOD PRESSURE: 131 MMHG

## 2019-05-15 LAB
ANION GAP SERPL CALCULATED.3IONS-SCNC: 14 MMOL/L (ref 7–19)
BUN BLDV-MCNC: 33 MG/DL (ref 8–23)
CALCIUM SERPL-MCNC: 8.6 MG/DL (ref 8.8–10.2)
CHLORIDE BLD-SCNC: 106 MMOL/L (ref 98–111)
CO2: 24 MMOL/L (ref 22–29)
CREAT SERPL-MCNC: 1.8 MG/DL (ref 0.5–1.2)
DIGOXIN LEVEL: 2.1 NG/ML (ref 0.6–1.2)
EKG P AXIS: NORMAL DEGREES
EKG P-R INTERVAL: NORMAL MS
EKG Q-T INTERVAL: 358 MS
EKG QRS DURATION: 106 MS
EKG QTC CALCULATION (BAZETT): 365 MS
EKG T AXIS: -161 DEGREES
GFR NON-AFRICAN AMERICAN: 37
GLUCOSE BLD-MCNC: 192 MG/DL (ref 74–109)
POTASSIUM REFLEX MAGNESIUM: 5.2 MMOL/L (ref 3.5–5)
SODIUM BLD-SCNC: 144 MMOL/L (ref 136–145)

## 2019-05-15 PROCEDURE — 93005 ELECTROCARDIOGRAM TRACING: CPT

## 2019-05-15 PROCEDURE — 2580000003 HC RX 258: Performed by: INTERNAL MEDICINE

## 2019-05-15 PROCEDURE — 6360000002 HC RX W HCPCS

## 2019-05-15 PROCEDURE — 80162 ASSAY OF DIGOXIN TOTAL: CPT

## 2019-05-15 PROCEDURE — 36415 COLL VENOUS BLD VENIPUNCTURE: CPT

## 2019-05-15 PROCEDURE — 80048 BASIC METABOLIC PNL TOTAL CA: CPT

## 2019-05-15 PROCEDURE — 92960 CARDIOVERSION ELECTRIC EXT: CPT | Performed by: INTERNAL MEDICINE

## 2019-05-15 PROCEDURE — 92960 CARDIOVERSION ELECTRIC EXT: CPT

## 2019-05-15 RX ORDER — SODIUM CHLORIDE 9 MG/ML
INJECTION, SOLUTION INTRAVENOUS CONTINUOUS
Status: DISCONTINUED | OUTPATIENT
Start: 2019-05-15 | End: 2019-05-15 | Stop reason: HOSPADM

## 2019-05-15 RX ORDER — SODIUM CHLORIDE 0.9 % (FLUSH) 0.9 %
10 SYRINGE (ML) INJECTION PRN
Status: DISCONTINUED | OUTPATIENT
Start: 2019-05-15 | End: 2019-05-15 | Stop reason: HOSPADM

## 2019-05-15 RX ORDER — DIGOXIN 250 MCG
250 TABLET ORAL EVERY OTHER DAY
Qty: 30 TABLET | Refills: 3 | Status: ON HOLD | OUTPATIENT
Start: 2019-05-15 | End: 2020-02-26

## 2019-05-15 RX ORDER — SODIUM CHLORIDE 0.9 % (FLUSH) 0.9 %
10 SYRINGE (ML) INJECTION EVERY 12 HOURS SCHEDULED
Status: DISCONTINUED | OUTPATIENT
Start: 2019-05-15 | End: 2019-05-15 | Stop reason: HOSPADM

## 2019-05-15 RX ADMIN — SODIUM CHLORIDE: 9 INJECTION, SOLUTION INTRAVENOUS at 09:54

## 2019-05-15 ASSESSMENT — PAIN SCALES - GENERAL: PAINLEVEL_OUTOF10: 0

## 2019-05-15 NOTE — PROCEDURES
Cardioversion Procedure Note    Patient name:  Eulalia Johnson   :  1940  Med Rec No:  [id number]   Room:  [facility] CATH POOL/NONE  Admission date:  5/15/2019  Physician:  Jennifer Live MD    Date of procedure:  05/15/19    Indication: atrial fibrillation    Postoperative diagnosis: same    Complications: none    Anesthesia: IV sedation provided by  cardiac Cath Lab nursing staff    Procedure:  Direct current cardioversion. Description:  After informed consent was obtained and after the patient was adequately sedated, the patient was cardioverted from atrial fibrillation to sinus rhythm with 360 joules, using the biphasic defibrillator. 1 shocks were delivered. The patient tolerated the procedure well and awakened from sedation without difficulty. The patient was continuously monitored with respect to blood pressure, oxygen saturation, and telemetry as well as direct observation by myself throughout the case. Anesthesia start time:  Anesthesia start time:      Impression:  Successful cardioversion from atrial fibrillation to sinus rhythm, as described above.     Jennifer Live MD5/15/872517:47 AM

## 2019-05-15 NOTE — PROGRESS NOTES
Consulted to do AD/LW for pt. Pt was ready to complete and made his wishes known for a healthcare surrogate.  provided two witnesses who watched pt sign and AD/LW was completed. Provided spiritual care with sustaining presence, nurtured hope, prayer, and assistance with AD/LW. Pt expressed gratitude for spiritual care.     Electronically signed by Darvin Leo on 5/15/2019 at 10:37 AM

## 2019-05-15 NOTE — H&P
Office Visit     5/9/2019  [facility] MD       Interventional Cardiology   Paroxysmal atrial fibrillation [facility]) +3 more       Dx   Follow-Up from Hospital   , Results ; Referred by Sudheer Haas MD       Reason for Visit        Progress Notes     Expand All Collapse All    [facility] Progress Note                              Date:                5/9/2019  Patient:            Florence Almaraz  Age:                 70 y.o., 1940        Reason for evaluation:            SUBJECTIVE:    Seen today recently hospitalized discharged about 2 weeks ago on anticoagulation since then. His Holter monitor showed continuous atrial fibrillation duration of his atrial fibrillation not entirely clear he thinks about a month. Complains of excessive fatigue denies chest pain does have mild dyspnea. Denies bleeding issues. Ultra monitor showed 19 pauses up to 3.5 seconds in duration not particularly having dizziness etc. He does drive an automobile also rides a motorcycle but hasn't ridden since he went home.     Review of Systems   Constitutional: Negative. Negative for chills, fever and unexpected weight change. HENT: Negative. Eyes: Negative. Respiratory: Negative. Negative for shortness of breath. Cardiovascular: Negative. Negative for chest pain. Gastrointestinal: Negative. Negative for diarrhea, nausea and vomiting. Endocrine: Negative. Genitourinary: Negative. Musculoskeletal: Negative. Skin: Negative. Neurological: Negative. All other systems reviewed and are negative.           OBJECTIVE:     /68   Pulse 87   Ht 5' 6\" (1.676 m)   Wt 154 lb (69.9 kg)   BMI 24.86 kg/m²      Labs:   CBC: No results for input(s): WBC, HGB, HCT, PLT in the last 72 hours. BMP:No results for input(s): NA, K, CO2, BUN, CREATININE, LABGLOM, GLUCOSE in the last 72 hours. BNP: No results for input(s): BNP in the last 72 hours.   PT/INR: No results for input(s): PROTIME, INR in the last 72 hours. APTT:No results for input(s): APTT in the last 72 hours. CARDIAC ENZYMES:No results for input(s): CKTOTAL, CKMB, CKMBINDEX, TROPONINI in the last 72 hours.   FASTING LIPID PANEL:No results found for: HDL, LDLDIRECT, LDLCALC, TRIG  LIVER PROFILE:No results for input(s): AST, ALT, LABALBU in the last 72 hours.         Past Medical History        Past Medical History:   Diagnosis Date    Atherosclerosis of native arteries of the extremities with intermittent claudication      Blood circulation, collateral      Carotid artery occlusion      Chronic kidney disease      Depression      GERD (gastroesophageal reflux disease)      Hypertension      Pneumonia due to infectious organism 4/9/2019    Type II or unspecified type diabetes mellitus without mention of complication, not stated as uncontrolled           Past Surgical History   Past Surgical History:   Procedure Laterality Date    HEMORRHOID SURGERY   1973    HERNIA REPAIR             Family History         Family History   Problem Relation Age of Onset    High Blood Pressure Mother      Heart Disease Mother      High Blood Pressure Father      Heart Disease Father      Stroke Father           No Known Allergies  Current Facility-Administered Medications          Current Outpatient Medications   Medication Sig Dispense Refill    omeprazole (PRILOSEC) 40 MG delayed release capsule Take 40 mg by mouth daily        Multiple Vitamins-Minerals (THERAPEUTIC MULTIVITAMIN-MINERALS) tablet Take 1 tablet by mouth daily        Multiple Vitamins-Minerals (PRESERVISION AREDS PO) Take by mouth daily        diltiazem (CARDIZEM CD) 360 MG extended release capsule Take 1 capsule by mouth 2 times daily (Patient taking differently: Take 120 mg by mouth 3 times daily Take 2 tablets in the morning, one tablet at noon, and one tablet at bedtime.) 60 capsule 3    apixaban (ELIQUIS) 5 MG TABS tablet Take 1 tablet by mouth 2 times daily 60 tablet 0    metoprolol succinate (TOPROL XL) 200 MG extended release tablet Take 1 tablet by mouth 2 times daily 30 tablet 3    digoxin (LANOXIN) 250 MCG tablet Take 1 tablet by mouth daily 30 tablet 3    sulfaSALAzine (AZULFIDINE) 500 MG tablet Take 1,000 mg by mouth 3 times daily        furosemide (LASIX) 40 MG tablet Take 40 mg by mouth daily        amLODIPine (NORVASC) 5 MG tablet Take 5 mg by mouth daily        potassium chloride (KLOR-CON M) 20 MEQ extended release tablet Take 20 mEq by mouth 2 times daily         folic acid (FOLVITE) 1 MG tablet Take 1 mg by mouth daily        glipiZIDE (GLUCOTROL) 10 MG tablet Take 10 mg by mouth 2 times daily (before meals)        pravastatin (PRAVACHOL) 20 MG tablet Take 20 mg by mouth daily.        metformin (GLUCOPHAGE-XR) 500 MG XR tablet Take 1,000 mg by mouth 2 times daily         terazosin (HYTRIN) 10 MG capsule Take 10 mg by mouth nightly.        lisinopril (PRINIVIL;ZESTRIL) 20 MG tablet Take 20 mg by mouth 2 times daily           No current facility-administered medications for this visit.          Social History               Socioeconomic History    Marital status:        Spouse name: Not on file    Number of children: Not on file    Years of education: Not on file    Highest education level: Not on file   Occupational History    Not on file   Social Needs    Financial resource strain: Not on file    Food insecurity:       Worry: Not on file       Inability: Not on file    Transportation needs:       Medical: Not on file       Non-medical: Not on file   Tobacco Use    Smoking status: Former Smoker       Packs/day: 1.00       Years: 30.00       Pack years: 30.00       Last attempt to quit: 1/10/2005       Years since quittin.3    Smokeless tobacco: Former User       Types: Snuff       Quit date:     Tobacco comment: quit . Substance and Sexual Activity    Alcohol use:  Yes       Comment: [email or web address]    Drug use: No    Sexual activity: Not on file   Lifestyle    Physical activity:       Days per week: Not on file       Minutes per session: Not on file    Stress: Not on file   Relationships    Social connections:       Talks on phone: Not on file       Gets together: Not on file       Attends Yarsani service: Not on file       Active member of club or organization: Not on file       Attends meetings of clubs or organizations: Not on file       Relationship status: Not on file    Intimate partner violence:       Fear of current or ex partner: Not on file       Emotionally abused: Not on file       Physically abused: Not on file       Forced sexual activity: Not on file   Other Topics Concern    Not on file   Social History Narrative     Worked on [employer] most of his life      twice and      He has 2 sons and one daughter     Never in the [employer]     Attends [employer] one pack per day quit in 2006 denies alcohol consumption or substance usage     Physically sedentary            Physical Examination:  /68   Pulse 87   Ht 5' 6\" (1.676 m)   Wt 154 lb (69.9 kg)   BMI 24.86 kg/m²   Physical Exam   Constitutional: He appears well-developed and well-nourished. Neck: No JVD present. Carotid bruit is not present. Cardiovascular: Normal rate, regular rhythm and normal heart sounds. Exam reveals no gallop and no friction rub. No murmur heard. Pulmonary/Chest: Effort normal and breath sounds normal. No respiratory distress. He has no wheezes. He has no rales. Abdominal: He exhibits no distension. There is no tenderness. Musculoskeletal: He exhibits no edema. Lymphadenopathy:     He has no cervical adenopathy. Skin: Skin is warm and dry. Vitals reviewed.              ASSESSMENT:       Diagnosis Orders   1. Paroxysmal atrial fibrillation (HCC)  EKG 12 lead   2. Essential hypertension      3.  PAF

## 2019-05-15 NOTE — PROGRESS NOTES
Pt discharged. Pt verbalized understanding of discharge instructions. IV DC'd. Pt ambulated out of hospital with steady gait with son at side. All belongings with patient.

## 2019-05-21 LAB
EKG P AXIS: 64 DEGREES
EKG P-R INTERVAL: 170 MS
EKG Q-T INTERVAL: 464 MS
EKG QRS DURATION: 76 MS
EKG QTC CALCULATION (BAZETT): 478 MS
EKG T AXIS: 114 DEGREES

## 2019-05-22 ENCOUNTER — CARE COORDINATION (OUTPATIENT)
Dept: CASE MANAGEMENT | Age: 79
End: 2019-05-22

## 2019-05-22 NOTE — CARE COORDINATION
[facility] Transitions Follow Up Call    2019    Patient: Charlie Calvin  Patient : 1940   MRN: <[id number]>  Reason for Admission:   Discharge Date: 5/15/19 RARS: Readmission Risk Score: 13         Spoke with: 3535 SMariaa Yipe. Transitions Subsequent and Final Call    Subsequent and Final Calls  Do you have any ongoing symptoms?:  Yes  Onset of Patient-reported symptoms:  Other  Patient-reported symptoms:  Other  Interventions for patient-reported symptoms:  Notified PCP/Physician  Do you have any questions related to your medications?:  No  Do you currently have any active services?:  No  Do you have any needs or concerns that I can assist you with?:  No  Identified Barriers:  None  Care Transitions Interventions  Other Interventions: Follow Up : Spoke with patient who said he is doing really well except still having loose stools about 2x per day. Pt said he got a stool sample the doctor asked for and he never heard back so he assumes everything is ok. Pt said he really thinks it is some of the new medicine he was started on in the hospital. Pt said he has a follow up with his pcp on Friday and he is definitely going to talk to the doctor about his loose stools. Pt said he has not had any heart issues and said the doctor told him his heart is back in rhythm. Will continue to follow patient status.    Future Appointments   Date Time Provider Armin Cervantes   2019  1:15 PM RED Valero [facility] Atkins RN

## 2019-05-22 NOTE — CARE COORDINATION
5/22/19  Patient states that he continues to have C/O \"diarrhea\" since hospital admission 5/8/19. Patient states that he has a appointment to see PCP this Friday. Writer looked up side effects of patients Rx .digoxin, diltiazem, Metformin, Sulfasalazine and Lanoxin can cause diarrhea. Educated Advised patient to talk with MD this Friday and tell him about the diarrhea and the back pain that is new since his hospital D/C on 5/8/19. Patient denies palpitations, chest pain shortness of breath or dizziness. He states  that he does not have as much energy but believes it is D/T the diarrhea. Educated patient that he could call the pharmacy and ask the pharmacist about drug interactions. Patient stated that it was pretty hard to get \"anything out of the pharmacist\"  Patient had no other  Concerns or questions. Will F/U at a later date. Latrice GARCIAN RN.

## 2019-06-03 RX ORDER — APIXABAN 5 MG/1
TABLET, FILM COATED ORAL
Qty: 60 TABLET | Refills: 5 | Status: ON HOLD | OUTPATIENT
Start: 2019-06-03 | End: 2020-02-27 | Stop reason: HOSPADM

## 2019-06-24 ENCOUNTER — CARE COORDINATION (OUTPATIENT)
Dept: CASE MANAGEMENT | Age: 79
End: 2019-06-24

## 2019-06-24 NOTE — CARE COORDINATION
[facility] Transitions Follow Up Call    2019    Patient: Mina Shelley  Patient : 1940   MRN: <[id number]>  Reason for Admission:   Discharge Date: 5/15/19 RARS: Readmission Risk Score: 13         Spoke with: [id number] SMariaa Bernard. Transitions Subsequent and Final Call    Subsequent and Final Calls  Do you have any ongoing symptoms?:  No  Have your medications changed?:  No  Do you have any questions related to your medications?:  No  Do you currently have any active services?:  No  Do you have any needs or concerns that I can assist you with?:  No  Identified Barriers:  None  Care Transitions Interventions  Other Interventions: Follow Up : Spoke with patient who said is doing well. Nic Leung he is feeling well. Pt said he has all his medications and is back to doing most everything he did before. Pt said he is planning to get out and start riding his motorcycle as soon as the weather clears. Pt denies any cardiac symptoms at all and said he has a follow up with his cardiologist next week.  Will continue to follow patient status   Future Appointments   Date Time Provider Armin Cervantes   2019  1:15 PM RED Shetty [facility]       Martha Carlson RN

## 2019-06-27 ENCOUNTER — OFFICE VISIT (OUTPATIENT)
Dept: CARDIOLOGY | Age: 79
End: 2019-06-27
Payer: MEDICARE

## 2019-06-27 VITALS
WEIGHT: 154 LBS | SYSTOLIC BLOOD PRESSURE: 132 MMHG | HEIGHT: 66 IN | BODY MASS INDEX: 24.75 KG/M2 | HEART RATE: 69 BPM | DIASTOLIC BLOOD PRESSURE: 80 MMHG

## 2019-06-27 DIAGNOSIS — R01.1 HEART MURMUR: ICD-10-CM

## 2019-06-27 DIAGNOSIS — I48.0 PAF (PAROXYSMAL ATRIAL FIBRILLATION) (HCC): Primary | ICD-10-CM

## 2019-06-27 DIAGNOSIS — I10 ESSENTIAL HYPERTENSION: ICD-10-CM

## 2019-06-27 DIAGNOSIS — R00.1 BRADYCARDIA: ICD-10-CM

## 2019-06-27 PROCEDURE — 4040F PNEUMOC VAC/ADMIN/RCVD: CPT | Performed by: CLINICAL NURSE SPECIALIST

## 2019-06-27 PROCEDURE — 1123F ACP DISCUSS/DSCN MKR DOCD: CPT | Performed by: CLINICAL NURSE SPECIALIST

## 2019-06-27 PROCEDURE — 0296T PR EXT ECG > 48HR TO 21 DAY RCRD W/CONECT INTL RCRD: CPT | Performed by: CLINICAL NURSE SPECIALIST

## 2019-06-27 PROCEDURE — G8420 CALC BMI NORM PARAMETERS: HCPCS | Performed by: CLINICAL NURSE SPECIALIST

## 2019-06-27 PROCEDURE — 93000 ELECTROCARDIOGRAM COMPLETE: CPT | Performed by: CLINICAL NURSE SPECIALIST

## 2019-06-27 PROCEDURE — G8427 DOCREV CUR MEDS BY ELIG CLIN: HCPCS | Performed by: CLINICAL NURSE SPECIALIST

## 2019-06-27 PROCEDURE — 99214 OFFICE O/P EST MOD 30 MIN: CPT | Performed by: CLINICAL NURSE SPECIALIST

## 2019-06-27 PROCEDURE — G8598 ASA/ANTIPLAT THER USED: HCPCS | Performed by: CLINICAL NURSE SPECIALIST

## 2019-06-27 PROCEDURE — 1036F TOBACCO NON-USER: CPT | Performed by: CLINICAL NURSE SPECIALIST

## 2019-06-27 RX ORDER — DILTIAZEM HYDROCHLORIDE 120 MG/1
CAPSULE, COATED, EXTENDED RELEASE ORAL
Qty: 180 CAPSULE | Refills: 3 | Status: SHIPPED | OUTPATIENT
Start: 2019-06-27 | End: 2019-06-27 | Stop reason: DRUGHIGH

## 2019-06-27 ASSESSMENT — ENCOUNTER SYMPTOMS
ABDOMINAL PAIN: 0
FACIAL SWELLING: 0
NAUSEA: 0
WHEEZING: 0
EYE REDNESS: 0
COUGH: 0
VOMITING: 0
CHEST TIGHTNESS: 0
SHORTNESS OF BREATH: 0

## 2019-06-27 NOTE — PROGRESS NOTES
[facility], [address], [address] [address]  Phone: [phone]  Fax: [phone]    OFFICE VISIT:  2019    [id number]    Reason For Visit:  Dank Dejesus is a 70 y.o. male who is here for Atrial Fibrillation (No cardiac symptoms today.)      HPI  Patient is here for follow-up after recent cardioversion for paroxysmal atrial fibrillation. A pacemaker in the future was also discussed due to pauses on a Holter monitor. He is feeling well overall. He states he was really unable to detect that he was in atrial fibrillation. He denies any palpitations or fast heart rate, chest pain, unusual dyspnea. Zenaida Gamboa MD is PCP.   Rolando Mcelroy has the following history as recorded in [facility]:    Patient Active Problem List    Diagnosis Date Noted    Chronic anticoagulation 2019     Priority: High    PAF (paroxysmal atrial fibrillation) (HCC) 2019    Elevated d-dimer 2019    Hypocalcemia 2019    Macrocytic anemia 2019    Pneumonia due to infectious organism 2019    Carotid artery occlusion     PVD [facility]peripheral vascular disease) ([facility] [address] 2016    Carotid artery stenosis 2012    Depression     Type 2 diabetes mellitus (HCC)     Hypertension      Past Medical History:   Diagnosis Date    Atherosclerosis of native arteries of the extremities with intermittent claudication     Blood circulation, collateral     Carotid artery occlusion     Chronic kidney disease     Depression     GERD (gastroesophageal reflux disease)     Hypertension     Pneumonia due to infectious organism 2019    Type II or unspecified type diabetes mellitus without mention of complication, not stated as uncontrolled      Past Surgical History:   Procedure Laterality Date    HEMORRHOID SURGERY  1973    HERNIA REPAIR       Family History   Problem Relation Age of Onset    High Blood Pressure Mother     Heart Disease Mother     High Blood Pressure Father     Heart Disease Father     Stroke Father      Social History     Tobacco Use    Smoking status: Former Smoker     Packs/day: 1.00     Years: 30.00     Pack years: 30.00     Last attempt to quit: 1/10/2005     Years since quittin.4    Smokeless tobacco: Former User     Types: Snuff     Quit date:     Tobacco comment: quit . Substance Use Topics    Alcohol use: Yes     Comment: OCC. BEER      Current Outpatient Medications   Medication Sig Dispense Refill    ELIQUIS 5 MG TABS tablet TAKE 1 TABLET BY MOUTH TWICE A DAY 60 tablet 5    digoxin (LANOXIN) 250 MCG tablet Take 1 tablet by mouth every other day 30 tablet 3    omeprazole (PRILOSEC) 40 MG delayed release capsule Take 40 mg by mouth daily      Multiple Vitamins-Minerals (THERAPEUTIC MULTIVITAMIN-MINERALS) tablet Take 1 tablet by mouth daily      Multiple Vitamins-Minerals (PRESERVISION AREDS PO) Take by mouth daily      diltiazem (CARDIZEM CD) 360 MG extended release capsule Take 1 capsule by mouth 2 times daily (Patient taking differently: Take 120 mg by mouth 3 times daily Take 2 tablets in the morning, one tablet at noon, and one tablet at bedtime.) 60 capsule 3    metoprolol succinate (TOPROL XL) 200 MG extended release tablet Take 1 tablet by mouth 2 times daily 30 tablet 3    sulfaSALAzine (AZULFIDINE) 500 MG tablet Take 1,000 mg by mouth 3 times daily      furosemide (LASIX) 40 MG tablet Take 40 mg by mouth daily      amLODIPine (NORVASC) 5 MG tablet Take 5 mg by mouth daily      potassium chloride (KLOR-CON M) 20 MEQ extended release tablet Take 20 mEq by mouth 2 times daily       folic acid (FOLVITE) 1 MG tablet Take 1 mg by mouth daily      glipiZIDE (GLUCOTROL) 10 MG tablet Take 10 mg by mouth 2 times daily (before meals)      pravastatin (PRAVACHOL) 20 MG tablet Take 20 mg by mouth nightly       metformin (GLUCOPHAGE-XR) 500 MG XR tablet Take 1,000 mg by mouth 2 times daily       terazosin (HYTRIN) 10 MG capsule Take 10 mg by mouth nightly.  lisinopril (PRINIVIL;ZESTRIL) 20 MG tablet Take 20 mg by mouth 2 times daily        No current facility-administered medications for this visit. Allergies: Patient has no known allergies. Review of Systems  Review of Systems   Constitutional: Negative for activity change, diaphoresis, fatigue, fever and unexpected weight change. HENT: Negative for facial swelling and nosebleeds. Eyes: Negative for redness and visual disturbance. Respiratory: Negative for cough, chest tightness, shortness of breath and wheezing. Cardiovascular: Negative for chest pain, palpitations and leg swelling. Gastrointestinal: Negative for abdominal pain, nausea and vomiting. Endocrine: Negative for cold intolerance and heat intolerance. Genitourinary: Negative for dysuria and hematuria. Musculoskeletal: Negative for arthralgias and myalgias. Skin: Negative for pallor and rash. Neurological: Negative for dizziness, seizures, syncope, weakness and light-headedness. Hematological: Does not bruise/bleed easily. Psychiatric/Behavioral: Negative for agitation. The patient is not nervous/anxious. Objective  Vital Signs - /80   Pulse 69   Ht 5' 6\" (1.676 m)   Wt 154 lb (69.9 kg)   BMI 24.86 kg/m²   Physical Exam   Constitutional: He is oriented to person, place, and time. He appears well-developed and well-nourished. HENT:   Head: Normocephalic and atraumatic. Right Ear: Hearing and external ear normal.   Left Ear: Hearing and external ear normal.   Nose: Nose normal.   Eyes: Pupils are equal, round, and reactive to light. Right eye exhibits no discharge. Left eye exhibits no discharge. Neck: No JVD present. No tracheal deviation present. No thyromegaly present. Cardiovascular: Normal rate, regular rhythm and intact distal pulses. Exam reveals no gallop and no friction rub. Murmur heard.   Left carotid bruit Pulmonary/Chest: Effort normal and breath sounds normal. No respiratory distress. He has no wheezes. He has no rales. Abdominal: Soft. There is no tenderness. Musculoskeletal: He exhibits no edema. Normal gait and station   Neurological: He is alert and oriented to person, place, and time. No cranial nerve deficit. Skin: Skin is warm and dry. No rash noted. Psychiatric: He has a normal mood and affect. His behavior is normal. Judgment normal.   Nursing note and vitals reviewed. Data:  EKG showed normal sinus rhythm at rate 69 QTC interval of 0.381ms    Lexiscan 4/9/19  Summary impression:   Probably normal study borderline ejection fraction normal perfusion   study       Assessment:     Diagnosis Orders   1. PAF (paroxysmal atrial fibrillation) ([facility])  EKG 12 lead    MS EXT ECG > 48HR TO 21 DAY RCRD W/CONECT INTL RCRD   2. Essential hypertension     3. Heart murmur  ECHO Complete 2D W Doppler W Color   4. Bradycardia  MS EXT ECG > 48HR TO 21 DAY RCRD W/CONECT INTL RCRD     Records reviewed    PAF-well-controlled and maintaining sinus rhythm with diltiazem, digoxin, metoprolol. Patient is anticoagulated. Hypertension-well-controlled on current regimen    Heart murmur-noted on exam check 2D echocardiogram    Bradycardia/ pauses-while patient was in A. fib. Repeat 48-hour heart monitor, pacemaker    Stable cardiovascular status. No evidence of overt heart failure,angina or dysrhythmia. Plan    Orders Placed This Encounter   Procedures    EKG 12 lead     Order Specific Question:   Reason for Exam?     Answer:   Irregular heart rate    ECHO Complete 2D W Doppler W Color     Standing Status:   Future     Standing Expiration Date:   6/27/2020     Order Specific Question:   Reason for exam:     Answer:   heart murmur    MS EXT ECG > 48HR TO 21 DAY RCRD W/CONECT INTL RCRD     Return in about 3 months (around 9/27/2019) for APRN.    Work on patient assistance for Eliquis  Repeat 48hr Zio to assess for slow heart rate and pauses  Echocardiogram  Call for palpitations or fast heart rates    Call with any questionsor concerns  Follow up with Jeana Ferrer MD for non cardiac problems  Report any new problems  Cardiovascular Fitness-Exercise as tolerated. Strive for 15 minutes of exercise most days of the week. Cardiac / HealthyDiet  Continue current medications as directed  Continue plan of treatment  It is always recommended that you bring your medicationsbottles with you to each visit - this is for your safety!        RED Madden

## 2019-06-30 ENCOUNTER — HOSPITAL ENCOUNTER (INPATIENT)
Age: 79
LOS: 4 days | Discharge: HOME | DRG: 309 | End: 2019-07-04
Attending: FAMILY MEDICINE | Admitting: INTERNAL MEDICINE
Payer: MEDICARE

## 2019-06-30 PROBLEM — R00.1 BRADYCARDIA: Status: ACTIVE | Noted: 2019-06-30

## 2019-06-30 PROBLEM — E87.5 HYPERKALEMIA: Status: ACTIVE | Noted: 2019-06-30

## 2019-06-30 PROBLEM — K51.90 ULCERATIVE COLITIS (HCC): Chronic | Status: ACTIVE | Noted: 2019-06-30

## 2019-06-30 PROBLEM — J18.9 PNEUMONIA DUE TO INFECTIOUS ORGANISM: Status: RESOLVED | Noted: 2019-04-09 | Resolved: 2019-06-30

## 2019-06-30 LAB
ALBUMIN SERPL-MCNC: 3.8 G/DL (ref 3.5–5.2)
ALP BLD-CCNC: 122 U/L (ref 40–130)
ALT SERPL-CCNC: 65 U/L (ref 5–41)
ANION GAP SERPL CALCULATED.3IONS-SCNC: 14 MMOL/L (ref 7–19)
ANION GAP SERPL CALCULATED.3IONS-SCNC: 15 MMOL/L (ref 7–19)
ANION GAP SERPL CALCULATED.3IONS-SCNC: 16 MMOL/L (ref 7–19)
APTT: 30.3 SEC (ref 26–36.2)
AST SERPL-CCNC: 90 U/L (ref 5–40)
BASOPHILS ABSOLUTE: 0.1 K/UL (ref 0–0.2)
BASOPHILS RELATIVE PERCENT: 0.7 % (ref 0–1)
BILIRUB SERPL-MCNC: 0.5 MG/DL (ref 0.2–1.2)
BUN BLDV-MCNC: 19 MG/DL (ref 8–23)
BUN BLDV-MCNC: 20 MG/DL (ref 8–23)
BUN BLDV-MCNC: 22 MG/DL (ref 8–23)
CALCIUM SERPL-MCNC: 9.4 MG/DL (ref 8.8–10.2)
CALCIUM SERPL-MCNC: 9.4 MG/DL (ref 8.8–10.2)
CALCIUM SERPL-MCNC: 9.5 MG/DL (ref 8.8–10.2)
CHLORIDE BLD-SCNC: 100 MMOL/L (ref 98–111)
CHLORIDE BLD-SCNC: 102 MMOL/L (ref 98–111)
CHLORIDE BLD-SCNC: 104 MMOL/L (ref 98–111)
CHOLESTEROL, TOTAL: 132 MG/DL (ref 160–199)
CO2: 22 MMOL/L (ref 22–29)
CO2: 23 MMOL/L (ref 22–29)
CO2: 24 MMOL/L (ref 22–29)
CREAT SERPL-MCNC: 1.6 MG/DL (ref 0.5–1.2)
CREAT SERPL-MCNC: 1.7 MG/DL (ref 0.5–1.2)
CREAT SERPL-MCNC: 2.1 MG/DL (ref 0.5–1.2)
DIGOXIN LEVEL: 1.5 NG/ML (ref 0.6–1.2)
EOSINOPHILS ABSOLUTE: 0.1 K/UL (ref 0–0.6)
EOSINOPHILS RELATIVE PERCENT: 0.7 % (ref 0–5)
GFR NON-AFRICAN AMERICAN: 31
GFR NON-AFRICAN AMERICAN: 39
GFR NON-AFRICAN AMERICAN: 42
GLUCOSE BLD-MCNC: 149 MG/DL (ref 70–99)
GLUCOSE BLD-MCNC: 301 MG/DL (ref 74–109)
GLUCOSE BLD-MCNC: 339 MG/DL (ref 70–99)
GLUCOSE BLD-MCNC: 387 MG/DL (ref 70–99)
GLUCOSE BLD-MCNC: 387 MG/DL (ref 70–99)
GLUCOSE BLD-MCNC: 423 MG/DL (ref 74–109)
GLUCOSE BLD-MCNC: 489 MG/DL (ref 70–99)
GLUCOSE BLD-MCNC: 92 MG/DL (ref 70–99)
GLUCOSE BLD-MCNC: 97 MG/DL (ref 74–109)
HBA1C MFR BLD: 8.7 % (ref 4–6)
HCT VFR BLD CALC: 28.2 % (ref 42–52)
HDLC SERPL-MCNC: 28 MG/DL (ref 55–121)
HEMOGLOBIN: 9.4 G/DL (ref 14–18)
INR BLD: 1.49 (ref 0.88–1.18)
LACTIC ACID: 2.5 MMOL/L (ref 0.5–1.9)
LACTIC ACID: 3.3 MMOL/L (ref 0.5–1.9)
LACTIC ACID: 4.5 MMOL/L (ref 0.5–1.9)
LDL CHOLESTEROL CALCULATED: 82 MG/DL
LYMPHOCYTES ABSOLUTE: 1.3 K/UL (ref 1.1–4.5)
LYMPHOCYTES RELATIVE PERCENT: 9.8 % (ref 20–40)
MAGNESIUM: 1.4 MG/DL (ref 1.6–2.4)
MCH RBC QN AUTO: 32 PG (ref 27–31)
MCHC RBC AUTO-ENTMCNC: 33.3 G/DL (ref 33–37)
MCV RBC AUTO: 95.9 FL (ref 80–94)
MONOCYTES ABSOLUTE: 0.5 K/UL (ref 0–0.9)
MONOCYTES RELATIVE PERCENT: 3.8 % (ref 0–10)
NEUTROPHILS ABSOLUTE: 10.7 K/UL (ref 1.5–7.5)
NEUTROPHILS RELATIVE PERCENT: 80.3 % (ref 50–65)
PDW BLD-RTO: 13.3 % (ref 11.5–14.5)
PERFORMED ON: ABNORMAL
PERFORMED ON: NORMAL
PHOSPHORUS: 2.3 MG/DL (ref 2.5–4.5)
PLATELET # BLD: 196 K/UL (ref 130–400)
PMV BLD AUTO: 10.9 FL (ref 9.4–12.4)
POTASSIUM REFLEX MAGNESIUM: 5.2 MMOL/L (ref 3.5–5)
POTASSIUM SERPL-SCNC: 4.8 MMOL/L (ref 3.5–5)
POTASSIUM SERPL-SCNC: 5.4 MMOL/L (ref 3.5–5)
PRO-BNP: 1862 PG/ML (ref 0–1800)
PROTHROMBIN TIME: 17.3 SEC (ref 12–14.6)
RBC # BLD: 2.94 M/UL (ref 4.7–6.1)
SODIUM BLD-SCNC: 139 MMOL/L (ref 136–145)
SODIUM BLD-SCNC: 139 MMOL/L (ref 136–145)
SODIUM BLD-SCNC: 142 MMOL/L (ref 136–145)
T4 FREE: 1.2 NG/DL (ref 0.9–1.7)
TOTAL PROTEIN: 6.6 G/DL (ref 6.6–8.7)
TRIGL SERPL-MCNC: 110 MG/DL (ref 0–149)
TROPONIN: <0.01 NG/ML (ref 0–0.03)
TSH REFLEX FT4: 5.14 UIU/ML (ref 0.35–5.5)
WBC # BLD: 13.3 K/UL (ref 4.8–10.8)

## 2019-06-30 PROCEDURE — 85025 COMPLETE CBC W/AUTO DIFF WBC: CPT

## 2019-06-30 PROCEDURE — 80053 COMPREHEN METABOLIC PANEL: CPT

## 2019-06-30 PROCEDURE — 84439 ASSAY OF FREE THYROXINE: CPT

## 2019-06-30 PROCEDURE — 80162 ASSAY OF DIGOXIN TOTAL: CPT

## 2019-06-30 PROCEDURE — 84443 ASSAY THYROID STIM HORMONE: CPT

## 2019-06-30 PROCEDURE — 99223 1ST HOSP IP/OBS HIGH 75: CPT | Performed by: INTERNAL MEDICINE

## 2019-06-30 PROCEDURE — 6370000000 HC RX 637 (ALT 250 FOR IP): Performed by: HOSPITALIST

## 2019-06-30 PROCEDURE — 84484 ASSAY OF TROPONIN QUANT: CPT

## 2019-06-30 PROCEDURE — 36415 COLL VENOUS BLD VENIPUNCTURE: CPT

## 2019-06-30 PROCEDURE — 83036 HEMOGLOBIN GLYCOSYLATED A1C: CPT

## 2019-06-30 PROCEDURE — 83880 ASSAY OF NATRIURETIC PEPTIDE: CPT

## 2019-06-30 PROCEDURE — 83735 ASSAY OF MAGNESIUM: CPT

## 2019-06-30 PROCEDURE — 99291 CRITICAL CARE FIRST HOUR: CPT | Performed by: HOSPITALIST

## 2019-06-30 PROCEDURE — 51798 US URINE CAPACITY MEASURE: CPT

## 2019-06-30 PROCEDURE — 6360000002 HC RX W HCPCS: Performed by: HOSPITALIST

## 2019-06-30 PROCEDURE — 85730 THROMBOPLASTIN TIME PARTIAL: CPT

## 2019-06-30 PROCEDURE — 51701 INSERT BLADDER CATHETER: CPT

## 2019-06-30 PROCEDURE — 2100000000 HC CCU R&B

## 2019-06-30 PROCEDURE — 84100 ASSAY OF PHOSPHORUS: CPT

## 2019-06-30 PROCEDURE — 83605 ASSAY OF LACTIC ACID: CPT

## 2019-06-30 PROCEDURE — 2580000003 HC RX 258: Performed by: HOSPITALIST

## 2019-06-30 PROCEDURE — 80061 LIPID PANEL: CPT

## 2019-06-30 PROCEDURE — 85610 PROTHROMBIN TIME: CPT

## 2019-06-30 PROCEDURE — 82948 REAGENT STRIP/BLOOD GLUCOSE: CPT

## 2019-06-30 RX ORDER — TAMSULOSIN HYDROCHLORIDE 0.4 MG/1
0.4 CAPSULE ORAL DAILY
Status: DISCONTINUED | OUTPATIENT
Start: 2019-06-30 | End: 2019-07-04 | Stop reason: HOSPADM

## 2019-06-30 RX ORDER — NICOTINE POLACRILEX 4 MG
15 LOZENGE BUCCAL PRN
Status: DISCONTINUED | OUTPATIENT
Start: 2019-06-30 | End: 2019-07-04 | Stop reason: HOSPADM

## 2019-06-30 RX ORDER — DEXTROSE MONOHYDRATE 25 G/50ML
12.5 INJECTION, SOLUTION INTRAVENOUS PRN
Status: DISCONTINUED | OUTPATIENT
Start: 2019-06-30 | End: 2019-07-04 | Stop reason: HOSPADM

## 2019-06-30 RX ORDER — M-VIT,TX,IRON,MINS/CALC/FOLIC 27MG-0.4MG
1 TABLET ORAL DAILY
Status: DISCONTINUED | OUTPATIENT
Start: 2019-06-30 | End: 2019-07-04 | Stop reason: HOSPADM

## 2019-06-30 RX ORDER — SODIUM CHLORIDE 0.9 % (FLUSH) 0.9 %
10 SYRINGE (ML) INJECTION PRN
Status: DISCONTINUED | OUTPATIENT
Start: 2019-06-30 | End: 2019-07-04 | Stop reason: HOSPADM

## 2019-06-30 RX ORDER — INSULIN GLARGINE 100 [IU]/ML
0.15 INJECTION, SOLUTION SUBCUTANEOUS NIGHTLY
Status: DISCONTINUED | OUTPATIENT
Start: 2019-06-30 | End: 2019-07-01

## 2019-06-30 RX ORDER — SULFASALAZINE 500 MG/1
1000 TABLET ORAL 3 TIMES DAILY
Status: DISCONTINUED | OUTPATIENT
Start: 2019-06-30 | End: 2019-07-04 | Stop reason: HOSPADM

## 2019-06-30 RX ORDER — ONDANSETRON 2 MG/ML
4 INJECTION INTRAMUSCULAR; INTRAVENOUS EVERY 6 HOURS PRN
Status: DISCONTINUED | OUTPATIENT
Start: 2019-06-30 | End: 2019-07-04 | Stop reason: HOSPADM

## 2019-06-30 RX ORDER — ATROPINE SULFATE 0.1 MG/ML
INJECTION INTRAVENOUS
Status: DISPENSED
Start: 2019-06-30 | End: 2019-07-01

## 2019-06-30 RX ORDER — HYDRALAZINE HYDROCHLORIDE 20 MG/ML
10 INJECTION INTRAMUSCULAR; INTRAVENOUS EVERY 4 HOURS PRN
Status: DISCONTINUED | OUTPATIENT
Start: 2019-06-30 | End: 2019-07-04 | Stop reason: HOSPADM

## 2019-06-30 RX ORDER — SODIUM CHLORIDE 9 MG/ML
INJECTION, SOLUTION INTRAVENOUS CONTINUOUS
Status: DISCONTINUED | OUTPATIENT
Start: 2019-06-30 | End: 2019-07-02

## 2019-06-30 RX ORDER — 0.9 % SODIUM CHLORIDE 0.9 %
1000 INTRAVENOUS SOLUTION INTRAVENOUS ONCE
Status: COMPLETED | OUTPATIENT
Start: 2019-06-30 | End: 2019-06-30

## 2019-06-30 RX ORDER — POTASSIUM CHLORIDE 20 MEQ/1
20 TABLET, EXTENDED RELEASE ORAL 2 TIMES DAILY
Status: DISCONTINUED | OUTPATIENT
Start: 2019-06-30 | End: 2019-06-30

## 2019-06-30 RX ORDER — DEXTROSE MONOHYDRATE 50 MG/ML
100 INJECTION, SOLUTION INTRAVENOUS PRN
Status: DISCONTINUED | OUTPATIENT
Start: 2019-06-30 | End: 2019-07-04 | Stop reason: HOSPADM

## 2019-06-30 RX ORDER — PANTOPRAZOLE SODIUM 40 MG/1
40 TABLET, DELAYED RELEASE ORAL
Status: DISCONTINUED | OUTPATIENT
Start: 2019-07-01 | End: 2019-07-02

## 2019-06-30 RX ORDER — VIT A/VIT C/VIT E/ZINC/COPPER 4296-226
1 CAPSULE ORAL DAILY
Status: DISCONTINUED | OUTPATIENT
Start: 2019-06-30 | End: 2019-06-30 | Stop reason: CLARIF

## 2019-06-30 RX ORDER — SODIUM CHLORIDE 0.9 % (FLUSH) 0.9 %
10 SYRINGE (ML) INJECTION EVERY 12 HOURS SCHEDULED
Status: DISCONTINUED | OUTPATIENT
Start: 2019-06-30 | End: 2019-07-04 | Stop reason: HOSPADM

## 2019-06-30 RX ORDER — FOLIC ACID 1 MG/1
1 TABLET ORAL DAILY
Status: DISCONTINUED | OUTPATIENT
Start: 2019-06-30 | End: 2019-07-04 | Stop reason: HOSPADM

## 2019-06-30 RX ORDER — SODIUM CHLORIDE 9 MG/ML
INJECTION, SOLUTION INTRAVENOUS CONTINUOUS
Status: DISCONTINUED | OUTPATIENT
Start: 2019-06-30 | End: 2019-06-30

## 2019-06-30 RX ORDER — DEXTROSE AND SODIUM CHLORIDE 5; .45 G/100ML; G/100ML
INJECTION, SOLUTION INTRAVENOUS CONTINUOUS PRN
Status: DISCONTINUED | OUTPATIENT
Start: 2019-06-30 | End: 2019-06-30

## 2019-06-30 RX ORDER — POLYETHYLENE GLYCOL 3350 17 G/17G
17 POWDER, FOR SOLUTION ORAL DAILY PRN
Status: DISCONTINUED | OUTPATIENT
Start: 2019-06-30 | End: 2019-07-04 | Stop reason: HOSPADM

## 2019-06-30 RX ORDER — FINASTERIDE 5 MG/1
5 TABLET, FILM COATED ORAL DAILY
Status: DISCONTINUED | OUTPATIENT
Start: 2019-06-30 | End: 2019-07-04 | Stop reason: HOSPADM

## 2019-06-30 RX ORDER — NITROGLYCERIN 0.4 MG/1
0.4 TABLET SUBLINGUAL EVERY 5 MIN PRN
Status: DISCONTINUED | OUTPATIENT
Start: 2019-06-30 | End: 2019-07-04 | Stop reason: HOSPADM

## 2019-06-30 RX ORDER — PRAVASTATIN SODIUM 20 MG
20 TABLET ORAL NIGHTLY
Status: DISCONTINUED | OUTPATIENT
Start: 2019-06-30 | End: 2019-07-04 | Stop reason: HOSPADM

## 2019-06-30 RX ADMIN — SULFASALAZINE 1000 MG: 500 TABLET ORAL at 20:05

## 2019-06-30 RX ADMIN — INSULIN GLARGINE 11 UNITS: 100 INJECTION, SOLUTION SUBCUTANEOUS at 20:04

## 2019-06-30 RX ADMIN — TAMSULOSIN HYDROCHLORIDE 0.4 MG: 0.4 CAPSULE ORAL at 18:04

## 2019-06-30 RX ADMIN — INSULIN HUMAN 5 UNITS: 100 INJECTION, SOLUTION PARENTERAL at 21:06

## 2019-06-30 RX ADMIN — SODIUM CHLORIDE 1000 ML: 9 INJECTION, SOLUTION INTRAVENOUS at 14:47

## 2019-06-30 RX ADMIN — PRAVASTATIN SODIUM 20 MG: 20 TABLET ORAL at 20:05

## 2019-06-30 RX ADMIN — Medication 10 UNITS: at 14:25

## 2019-06-30 RX ADMIN — APIXABAN 5 MG: 5 TABLET, FILM COATED ORAL at 20:40

## 2019-06-30 RX ADMIN — INSULIN LISPRO 10 UNITS: 100 INJECTION, SOLUTION INTRAVENOUS; SUBCUTANEOUS at 17:34

## 2019-06-30 RX ADMIN — SODIUM CHLORIDE: 9 INJECTION, SOLUTION INTRAVENOUS at 16:14

## 2019-06-30 RX ADMIN — SULFASALAZINE 1000 MG: 500 TABLET ORAL at 16:18

## 2019-06-30 RX ADMIN — HYDRALAZINE HYDROCHLORIDE 10 MG: 20 INJECTION INTRAMUSCULAR; INTRAVENOUS at 18:05

## 2019-06-30 RX ADMIN — MULTIPLE VITAMINS W/ MINERALS TAB 1 TABLET: TAB at 16:18

## 2019-06-30 RX ADMIN — FINASTERIDE 5 MG: 5 TABLET, FILM COATED ORAL at 18:04

## 2019-06-30 RX ADMIN — INSULIN LISPRO 4 UNITS: 100 INJECTION, SOLUTION INTRAVENOUS; SUBCUTANEOUS at 20:05

## 2019-06-30 RX ADMIN — FOLIC ACID 1 MG: 1 TABLET ORAL at 16:18

## 2019-06-30 ASSESSMENT — ENCOUNTER SYMPTOMS
NAUSEA: 0
SHORTNESS OF BREATH: 0
EYES NEGATIVE: 1
DIARRHEA: 0
GASTROINTESTINAL NEGATIVE: 1
VOMITING: 0
RESPIRATORY NEGATIVE: 1

## 2019-06-30 ASSESSMENT — PAIN SCALES - GENERAL: PAINLEVEL_OUTOF10: 0

## 2019-06-30 NOTE — CONSULTS
Physically abused: Not on file     Forced sexual activity: Not on file   Other Topics Concern    Not on file   Social History Narrative    Worked on a [employer] most of his life     twice and     He has 2 sons and one daughter    Never in the [employer]    Education high school    Attends [address] one pack per day quit in 2006 denies alcohol consumption or substance usage    Physically sedentary    /////////////////////////////////////////////////////////////////    CODE STATUS: Full Code    HEALTH CARE PROXY: His oldest son, Jassi Alvarez, [phone]    DOMICILED: Lives alone in a private home with one step in to home but no steps within, has no pets    AMBULATES: independantly       Allergies:  No Known Allergies    Home Meds:  Prior to Admission medications    Medication Sig Start Date End Date Taking? Authorizing Provider   ELIQUIS 5 MG TABS tablet TAKE 1 TABLET BY MOUTH TWICE A DAY 6/3/19   RED Iqabl NP   digoxin (LANOXIN) 250 MCG tablet Take 1 tablet by mouth every other day 5/15/19   Roberto Shannon MD   omeprazole (PRILOSEC) 40 MG delayed release capsule Take 40 mg by mouth daily    Historical Provider, MD   Multiple Vitamins-Minerals (THERAPEUTIC MULTIVITAMIN-MINERALS) tablet Take 1 tablet by mouth daily    Historical Provider, MD   Multiple Vitamins-Minerals (PRESERVISION AREDS PO) Take by mouth daily    Historical Provider, MD   diltiazem (CARDIZEM CD) 360 MG extended release capsule Take 1 capsule by mouth 2 times daily  Patient taking differently: Take 120 mg by mouth 3 times daily Take 2 tablets in the morning, one tablet at noon, and one tablet at bedtime.  4/22/19   RED Almeida   metoprolol succinate (TOPROL XL) 200 MG extended release tablet Take 1 tablet by mouth 2 times daily 4/17/19   Nikko Martino MD   sulfaSALAzine (AZULFIDINE) 500 MG tablet Take 1,000 mg by mouth 3 times daily    Historical Provider, MD

## 2019-07-01 LAB
ANION GAP SERPL CALCULATED.3IONS-SCNC: 16 MMOL/L (ref 7–19)
BASOPHILS ABSOLUTE: 0.1 K/UL (ref 0–0.2)
BASOPHILS RELATIVE PERCENT: 0.5 % (ref 0–1)
BUN BLDV-MCNC: 18 MG/DL (ref 8–23)
CALCIUM SERPL-MCNC: 8.9 MG/DL (ref 8.8–10.2)
CHLORIDE BLD-SCNC: 105 MMOL/L (ref 98–111)
CO2: 22 MMOL/L (ref 22–29)
CREAT SERPL-MCNC: 1.5 MG/DL (ref 0.5–1.2)
EKG P AXIS: NORMAL DEGREES
EKG P-R INTERVAL: NORMAL MS
EKG Q-T INTERVAL: 486 MS
EKG QRS DURATION: 88 MS
EKG QTC CALCULATION (BAZETT): 441 MS
EKG T AXIS: 50 DEGREES
EOSINOPHILS ABSOLUTE: 0.5 K/UL (ref 0–0.6)
EOSINOPHILS RELATIVE PERCENT: 3.1 % (ref 0–5)
GFR NON-AFRICAN AMERICAN: 45
GLUCOSE BLD-MCNC: 100 MG/DL (ref 70–99)
GLUCOSE BLD-MCNC: 158 MG/DL (ref 70–99)
GLUCOSE BLD-MCNC: 188 MG/DL (ref 74–109)
GLUCOSE BLD-MCNC: 230 MG/DL (ref 70–99)
GLUCOSE BLD-MCNC: 296 MG/DL (ref 70–99)
GLUCOSE BLD-MCNC: 334 MG/DL (ref 70–99)
GLUCOSE BLD-MCNC: 97 MG/DL (ref 70–99)
GLUCOSE BLD-MCNC: 98 MG/DL (ref 70–99)
HCT VFR BLD CALC: 26.8 % (ref 42–52)
HEMOGLOBIN: 8.9 G/DL (ref 14–18)
LYMPHOCYTES ABSOLUTE: 3.5 K/UL (ref 1.1–4.5)
LYMPHOCYTES RELATIVE PERCENT: 21 % (ref 20–40)
MCH RBC QN AUTO: 31.1 PG (ref 27–31)
MCHC RBC AUTO-ENTMCNC: 33.2 G/DL (ref 33–37)
MCV RBC AUTO: 93.7 FL (ref 80–94)
MONOCYTES ABSOLUTE: 1 K/UL (ref 0–0.9)
MONOCYTES RELATIVE PERCENT: 6.3 % (ref 0–10)
NEUTROPHILS ABSOLUTE: 11.1 K/UL (ref 1.5–7.5)
NEUTROPHILS RELATIVE PERCENT: 66.8 % (ref 50–65)
PDW BLD-RTO: 13.6 % (ref 11.5–14.5)
PERFORMED ON: ABNORMAL
PERFORMED ON: NORMAL
PERFORMED ON: NORMAL
PLATELET # BLD: 226 K/UL (ref 130–400)
PMV BLD AUTO: 11.3 FL (ref 9.4–12.4)
POTASSIUM REFLEX MAGNESIUM: 4.4 MMOL/L (ref 3.5–5)
RBC # BLD: 2.86 M/UL (ref 4.7–6.1)
SODIUM BLD-SCNC: 143 MMOL/L (ref 136–145)
TROPONIN: <0.01 NG/ML (ref 0–0.03)
WBC # BLD: 16.6 K/UL (ref 4.8–10.8)

## 2019-07-01 PROCEDURE — 82948 REAGENT STRIP/BLOOD GLUCOSE: CPT

## 2019-07-01 PROCEDURE — 6360000002 HC RX W HCPCS: Performed by: HOSPITALIST

## 2019-07-01 PROCEDURE — 99232 SBSQ HOSP IP/OBS MODERATE 35: CPT | Performed by: INTERNAL MEDICINE

## 2019-07-01 PROCEDURE — 36415 COLL VENOUS BLD VENIPUNCTURE: CPT

## 2019-07-01 PROCEDURE — 6370000000 HC RX 637 (ALT 250 FOR IP): Performed by: HOSPITALIST

## 2019-07-01 PROCEDURE — 99233 SBSQ HOSP IP/OBS HIGH 50: CPT | Performed by: INTERNAL MEDICINE

## 2019-07-01 PROCEDURE — 80048 BASIC METABOLIC PNL TOTAL CA: CPT

## 2019-07-01 PROCEDURE — 2100000000 HC CCU R&B

## 2019-07-01 PROCEDURE — 84484 ASSAY OF TROPONIN QUANT: CPT

## 2019-07-01 PROCEDURE — 2580000003 HC RX 258: Performed by: HOSPITALIST

## 2019-07-01 PROCEDURE — 83605 ASSAY OF LACTIC ACID: CPT

## 2019-07-01 PROCEDURE — 2580000003 HC RX 258: Performed by: INTERNAL MEDICINE

## 2019-07-01 PROCEDURE — 6370000000 HC RX 637 (ALT 250 FOR IP): Performed by: INTERNAL MEDICINE

## 2019-07-01 PROCEDURE — 93005 ELECTROCARDIOGRAM TRACING: CPT

## 2019-07-01 PROCEDURE — 2500000003 HC RX 250 WO HCPCS: Performed by: INTERNAL MEDICINE

## 2019-07-01 PROCEDURE — 85025 COMPLETE CBC W/AUTO DIFF WBC: CPT

## 2019-07-01 PROCEDURE — 51798 US URINE CAPACITY MEASURE: CPT

## 2019-07-01 RX ORDER — METOPROLOL SUCCINATE 50 MG/1
50 TABLET, EXTENDED RELEASE ORAL 2 TIMES DAILY
Status: DISCONTINUED | OUTPATIENT
Start: 2019-07-01 | End: 2019-07-03

## 2019-07-01 RX ORDER — CALCIUM CARBONATE 200(500)MG
500 TABLET,CHEWABLE ORAL 3 TIMES DAILY PRN
Status: DISCONTINUED | OUTPATIENT
Start: 2019-07-01 | End: 2019-07-04 | Stop reason: HOSPADM

## 2019-07-01 RX ORDER — DILTIAZEM HYDROCHLORIDE 5 MG/ML
10 INJECTION INTRAVENOUS ONCE
Status: COMPLETED | OUTPATIENT
Start: 2019-07-02 | End: 2019-07-01

## 2019-07-01 RX ORDER — INSULIN GLARGINE 100 [IU]/ML
20 INJECTION, SOLUTION SUBCUTANEOUS NIGHTLY
Status: DISCONTINUED | OUTPATIENT
Start: 2019-07-01 | End: 2019-07-04 | Stop reason: HOSPADM

## 2019-07-01 RX ORDER — AMLODIPINE BESYLATE 5 MG/1
5 TABLET ORAL DAILY
Status: DISCONTINUED | OUTPATIENT
Start: 2019-07-01 | End: 2019-07-02

## 2019-07-01 RX ORDER — DOXAZOSIN 2 MG/1
8 TABLET ORAL DAILY
Status: DISCONTINUED | OUTPATIENT
Start: 2019-07-01 | End: 2019-07-02

## 2019-07-01 RX ADMIN — SODIUM CHLORIDE: 9 INJECTION, SOLUTION INTRAVENOUS at 21:02

## 2019-07-01 RX ADMIN — TAMSULOSIN HYDROCHLORIDE 0.4 MG: 0.4 CAPSULE ORAL at 08:03

## 2019-07-01 RX ADMIN — HYDRALAZINE HYDROCHLORIDE 10 MG: 20 INJECTION INTRAMUSCULAR; INTRAVENOUS at 07:06

## 2019-07-01 RX ADMIN — AMLODIPINE BESYLATE 5 MG: 5 TABLET ORAL at 11:20

## 2019-07-01 RX ADMIN — ANTACID TABLETS 500 MG: 500 TABLET, CHEWABLE ORAL at 23:22

## 2019-07-01 RX ADMIN — Medication 20 UNITS: at 21:00

## 2019-07-01 RX ADMIN — MULTIPLE VITAMINS W/ MINERALS TAB 1 TABLET: TAB at 08:03

## 2019-07-01 RX ADMIN — PANTOPRAZOLE SODIUM 40 MG: 40 TABLET, DELAYED RELEASE ORAL at 06:04

## 2019-07-01 RX ADMIN — FOLIC ACID 1 MG: 1 TABLET ORAL at 08:03

## 2019-07-01 RX ADMIN — INSULIN LISPRO 8 UNITS: 100 INJECTION, SOLUTION INTRAVENOUS; SUBCUTANEOUS at 12:55

## 2019-07-01 RX ADMIN — INSULIN LISPRO 6 UNITS: 100 INJECTION, SOLUTION INTRAVENOUS; SUBCUTANEOUS at 18:17

## 2019-07-01 RX ADMIN — DILTIAZEM HYDROCHLORIDE 10 MG: 5 INJECTION INTRAVENOUS at 23:45

## 2019-07-01 RX ADMIN — DILTIAZEM HYDROCHLORIDE 5 MG/HR: 5 INJECTION INTRAVENOUS at 23:45

## 2019-07-01 RX ADMIN — METOPROLOL SUCCINATE 50 MG: 50 TABLET, EXTENDED RELEASE ORAL at 21:02

## 2019-07-01 RX ADMIN — Medication 10 ML: at 21:02

## 2019-07-01 RX ADMIN — INSULIN LISPRO 4 UNITS: 100 INJECTION, SOLUTION INTRAVENOUS; SUBCUTANEOUS at 08:04

## 2019-07-01 RX ADMIN — DOXAZOSIN 8 MG: 2 TABLET ORAL at 11:20

## 2019-07-01 RX ADMIN — APIXABAN 5 MG: 5 TABLET, FILM COATED ORAL at 21:02

## 2019-07-01 RX ADMIN — SULFASALAZINE 1000 MG: 500 TABLET ORAL at 08:03

## 2019-07-01 RX ADMIN — APIXABAN 5 MG: 5 TABLET, FILM COATED ORAL at 08:03

## 2019-07-01 RX ADMIN — SULFASALAZINE 1000 MG: 500 TABLET ORAL at 21:02

## 2019-07-01 RX ADMIN — SULFASALAZINE 1000 MG: 500 TABLET ORAL at 14:18

## 2019-07-01 RX ADMIN — PRAVASTATIN SODIUM 20 MG: 20 TABLET ORAL at 21:02

## 2019-07-01 RX ADMIN — METOPROLOL SUCCINATE 50 MG: 50 TABLET, EXTENDED RELEASE ORAL at 11:20

## 2019-07-01 RX ADMIN — HYDRALAZINE HYDROCHLORIDE 10 MG: 20 INJECTION INTRAMUSCULAR; INTRAVENOUS at 21:11

## 2019-07-01 RX ADMIN — INSULIN LISPRO 1 UNITS: 100 INJECTION, SOLUTION INTRAVENOUS; SUBCUTANEOUS at 21:01

## 2019-07-01 RX ADMIN — HYDRALAZINE HYDROCHLORIDE 10 MG: 20 INJECTION INTRAMUSCULAR; INTRAVENOUS at 17:06

## 2019-07-01 RX ADMIN — FINASTERIDE 5 MG: 5 TABLET, FILM COATED ORAL at 08:03

## 2019-07-01 ASSESSMENT — ENCOUNTER SYMPTOMS
BACK PAIN: 0
CONSTIPATION: 0
DIARRHEA: 0
NAUSEA: 0
VOMITING: 0
SHORTNESS OF BREATH: 0

## 2019-07-01 ASSESSMENT — PAIN SCALES - GENERAL: PAINLEVEL_OUTOF10: 0

## 2019-07-01 NOTE — PROGRESS NOTES
file   Social History Narrative    Worked on a [employer] most of his life     twice and     He has 2 sons and one daughter    Never in the [employer] high school    Attends [address] one pack per day quit in 2006 denies alcohol consumption or substance usage    Physically sedentary    /////////////////////////////////////////////////////////////////    CODE STATUS: Full Code    HEALTH CARE PROXY: His oldest son, Karel Gutiérrez, [phone]    DOMICILED: Lives alone in a private home with one step in to home but no steps within, has no pets    AMBULATES: independantly         Review of Systems:    Review of Systems   Constitutional: Negative for activity change and fever. Respiratory: Negative for shortness of breath. Cardiovascular: Negative for chest pain and leg swelling. Gastrointestinal: Negative for constipation, diarrhea, nausea and vomiting. Genitourinary: Negative for difficulty urinating and dysuria. Musculoskeletal: Negative for back pain and neck pain. Neurological: Negative for dizziness and light-headedness. Objective:  Blood pressure (!) 182/60, pulse 84, temperature 98.8 °F (37.1 °C), resp. rate 21, height 5' 6\" (1.676 m), weight 159 lb (72.1 kg), SpO2 95 %. Intake/Output Summary (Last 24 hours) at 7/1/2019 1058  Last data filed at 7/1/2019 0915  Gross per 24 hour   Intake 3212.5 ml   Output 2800 ml   Net 412.5 ml       Physical Exam   Constitutional: He is oriented to person, place, and time. He appears well-developed and well-nourished. HENT:   Head: Normocephalic and atraumatic. Mouth/Throat: Oropharynx is clear and moist.   Eyes: Pupils are equal, round, and reactive to light. Conjunctivae are normal.   Neck: No JVD present. Cardiovascular: Normal rate and regular rhythm. Murmur heard. Pulmonary/Chest: Effort normal and breath sounds normal.   Abdominal: Soft.    Musculoskeletal: He exhibits no edema. Neurological: He is alert and oriented to person, place, and time. Skin: Skin is warm and dry. Vitals reviewed. Labs:  BMP:   Recent Labs     06/30/19  1447 06/30/19 1958 06/30/19  2315 07/01/19  0602    139 142 143   K 4.8 5.4* 5.2* 4.4    100 104 105   CO2 22 23 24 22   PHOS 2.3*  --   --   --    BUN 22 20 19 18   CREATININE 2.1* 1.7* 1.6* 1.5*   CALCIUM 9.4 9.5 9.4 8.9     CBC:   Recent Labs     06/30/19  1447 07/01/19  0602   WBC 13.3* 16.6*   HGB 9.4* 8.9*   HCT 28.2* 26.8*   MCV 95.9* 93.7    226     LIVER PROFILE:   Recent Labs     06/30/19  1447   AST 90*   ALT 65*   BILITOT 0.5   ALKPHOS 122     PT/INR:   Recent Labs     06/30/19  1447   PROTIME 17.3*   INR 1.49*     APTT:   Recent Labs     06/30/19  1447   APTT 30.3     BNP:  No results for input(s): BNP in the last 72 hours. Ionized Calcium:No results for input(s): IONCA in the last 72 hours. Magnesium:  Recent Labs     06/30/19  1447   MG 1.4*     Phosphorus:  Recent Labs     06/30/19  1447   PHOS 2.3*     HgbA1C:   Recent Labs     06/30/19  1447   LABA1C 8.7*     Hepatic:   Recent Labs     06/30/19  1447   ALKPHOS 122   ALT 65*   AST 90*   PROT 6.6   BILITOT 0.5   LABALBU 3.8     Lactic Acid:   Recent Labs     06/30/19  2315   LACTA 2.5*     Troponin: No results for input(s): CKTOTAL, CKMB, TROPONINT in the last 72 hours. ABGs: No results for input(s): PH, PCO2, PO2, HCO3, O2SAT in the last 72 hours. CRP:  No results for input(s): CRP in the last 72 hours. Sed Rate:  No results for input(s): SEDRATE in the last 72 hours. Cultures:   No results for input(s): CULTURE in the last 72 hours. No results for input(s): BC, Nimisha Spindle in the last 72 hours. No results for input(s): CXSURG in the last 72 hours. Radiology reports as per the Radiologist  Radiology: No results found. Assessment   Hyperkalemia-resolved. Atrial fibrillation with controlled ventricular response.       Plan:  Continue supportive care.  Transfer to PCU.     Ryan Monique,

## 2019-07-01 NOTE — CARE COORDINATION
Independent  Is patient able to live independently?:  Yes  Hearing and Vision  Visual Impairment:  Visual impairment (Glasses/contacts)  Hearing Impairment:  Hard of hearing  Care Transitions Interventions         Follow Up: Met at bedside with patient and discussed BPCI-A process. Patient is known to the team from previous hospitalizations and CTN follow. He reported that he had just been to see his doctor on Thursday and heard from the [address] [place] [address] Drive from [place] on Friday. He said this all started acting up with his heart over the weekend out of the blue. Reported that he had been doing fine. He did say that his blood sugar was up and that was his \"own fault. \"  He is not aware of any needs at home at this time. Will follow along while here and after discharge as indicated. Future Appointments   Date Time Provider Armin Cervantes   10/1/2019  2:30 PM RED Arora [facility]       Health Maintenance  There are no preventive care reminders to display for this patient.     Yvonne Flynn RN

## 2019-07-01 NOTE — PROGRESS NOTES
06/30/19  1447   APTT 30.3     BNP:  No results for input(s): BNP in the last 72 hours. CK, CKMB, Troponin: @LABRCNT (CKTOTAL:3, CKMB:3, TROPONINI:3)@    IMAGING:  No results found. Assessment:  1. Atrial fibrillation recurrent  2. Bradycardia  3. Hyperkalemia  4. Cardioversion 5/15/2019  5. Lexiscan stress test 4/8/2019 probably normal perfusion study ejection fraction 49%  6. Chronic anticoagulation Eliquis  7. Chronic kidney disease BUN 22 creatinine 2.1  8. Leukocytosis 13.3  9. Anemia hemoglobin 9.4  10. Event recorder 4/8/2019 showed underlying atrial fibrillation rate of 73 ranging from 30-2 07 19 pauses longest of which was 3.5 seconds educations adjusted thereafter              Plan:  1. Continue ICU monitoring  2.  Continue to hold medications    Olesya Paulson MD 7/1/2019 11:55 AM

## 2019-07-02 LAB
ANION GAP SERPL CALCULATED.3IONS-SCNC: 17 MMOL/L (ref 7–19)
BACTERIA: NEGATIVE /HPF
BASOPHILS ABSOLUTE: 0.1 K/UL (ref 0–0.2)
BASOPHILS RELATIVE PERCENT: 0.5 % (ref 0–1)
BILIRUBIN URINE: NEGATIVE
BLOOD, URINE: NEGATIVE
BUN BLDV-MCNC: 19 MG/DL (ref 8–23)
CALCIUM SERPL-MCNC: 8.8 MG/DL (ref 8.8–10.2)
CHLORIDE BLD-SCNC: 102 MMOL/L (ref 98–111)
CLARITY: CLEAR
CO2: 22 MMOL/L (ref 22–29)
COLOR: YELLOW
CREAT SERPL-MCNC: 1.9 MG/DL (ref 0.5–1.2)
CREATININE URINE: 65.6 MG/DL (ref 4.2–622)
EKG P AXIS: NORMAL DEGREES
EKG P-R INTERVAL: NORMAL MS
EKG Q-T INTERVAL: 270 MS
EKG QRS DURATION: 76 MS
EKG QTC CALCULATION (BAZETT): 399 MS
EKG T AXIS: -171 DEGREES
EOSINOPHILS ABSOLUTE: 0.4 K/UL (ref 0–0.6)
EOSINOPHILS RELATIVE PERCENT: 2.5 % (ref 0–5)
EPITHELIAL CELLS, UA: 0 /HPF (ref 0–5)
FOLATE: 11.9 NG/ML (ref 4.5–32.2)
GFR NON-AFRICAN AMERICAN: 35
GLUCOSE BLD-MCNC: 286 MG/DL (ref 74–109)
GLUCOSE BLD-MCNC: 366 MG/DL (ref 70–99)
GLUCOSE BLD-MCNC: 381 MG/DL (ref 70–99)
GLUCOSE URINE: >=1000 MG/DL
HCT VFR BLD CALC: 28.2 % (ref 42–52)
HEMOGLOBIN: 9.5 G/DL (ref 14–18)
HYALINE CASTS: 0 /HPF (ref 0–8)
IRON SATURATION: 40 % (ref 14–50)
IRON: 66 UG/DL (ref 59–158)
KETONES, URINE: NEGATIVE MG/DL
LEUKOCYTE ESTERASE, URINE: NEGATIVE
LYMPHOCYTES ABSOLUTE: 2.8 K/UL (ref 1.1–4.5)
LYMPHOCYTES RELATIVE PERCENT: 17.7 % (ref 20–40)
MCH RBC QN AUTO: 31.4 PG (ref 27–31)
MCHC RBC AUTO-ENTMCNC: 33.7 G/DL (ref 33–37)
MCV RBC AUTO: 93.1 FL (ref 80–94)
MONOCYTES ABSOLUTE: 0.9 K/UL (ref 0–0.9)
MONOCYTES RELATIVE PERCENT: 6 % (ref 0–10)
NEUTROPHILS ABSOLUTE: 11.2 K/UL (ref 1.5–7.5)
NEUTROPHILS RELATIVE PERCENT: 71.3 % (ref 50–65)
NITRITE, URINE: NEGATIVE
PARATHYROID HORMONE INTACT: 41.6 PG/ML (ref 15–65)
PDW BLD-RTO: 13.6 % (ref 11.5–14.5)
PERFORMED ON: ABNORMAL
PERFORMED ON: ABNORMAL
PH UA: 6.5 (ref 5–8)
PLATELET # BLD: 239 K/UL (ref 130–400)
PMV BLD AUTO: 11.1 FL (ref 9.4–12.4)
POTASSIUM REFLEX MAGNESIUM: 3.7 MMOL/L (ref 3.5–5)
PROTEIN PROTEIN: 29 MG/DL (ref 15–45)
PROTEIN UA: 30 MG/DL
RBC # BLD: 3.03 M/UL (ref 4.7–6.1)
RBC UA: 1 /HPF (ref 0–4)
SODIUM BLD-SCNC: 141 MMOL/L (ref 136–145)
SODIUM URINE: 79 MMOL/L
SPECIFIC GRAVITY UA: 1.02 (ref 1–1.03)
TOTAL IRON BINDING CAPACITY: 164 UG/DL (ref 250–400)
URIC ACID, SERUM: 7.8 MG/DL (ref 3.4–7)
UROBILINOGEN, URINE: 0.2 E.U./DL
VITAMIN B-12: 479 PG/ML (ref 211–946)
VITAMIN D 25-HYDROXY: 27.7 NG/ML
WBC # BLD: 15.7 K/UL (ref 4.8–10.8)
WBC UA: 2 /HPF (ref 0–5)

## 2019-07-02 PROCEDURE — 85025 COMPLETE CBC W/AUTO DIFF WBC: CPT

## 2019-07-02 PROCEDURE — 36415 COLL VENOUS BLD VENIPUNCTURE: CPT

## 2019-07-02 PROCEDURE — 2100000000 HC CCU R&B

## 2019-07-02 PROCEDURE — 82607 VITAMIN B-12: CPT

## 2019-07-02 PROCEDURE — 83970 ASSAY OF PARATHORMONE: CPT

## 2019-07-02 PROCEDURE — 81001 URINALYSIS AUTO W/SCOPE: CPT

## 2019-07-02 PROCEDURE — 84300 ASSAY OF URINE SODIUM: CPT

## 2019-07-02 PROCEDURE — 2580000003 HC RX 258: Performed by: HOSPITALIST

## 2019-07-02 PROCEDURE — 80048 BASIC METABOLIC PNL TOTAL CA: CPT

## 2019-07-02 PROCEDURE — 83550 IRON BINDING TEST: CPT

## 2019-07-02 PROCEDURE — 6370000000 HC RX 637 (ALT 250 FOR IP): Performed by: INTERNAL MEDICINE

## 2019-07-02 PROCEDURE — 82570 ASSAY OF URINE CREATININE: CPT

## 2019-07-02 PROCEDURE — 99233 SBSQ HOSP IP/OBS HIGH 50: CPT | Performed by: INTERNAL MEDICINE

## 2019-07-02 PROCEDURE — 83540 ASSAY OF IRON: CPT

## 2019-07-02 PROCEDURE — 82746 ASSAY OF FOLIC ACID SERUM: CPT

## 2019-07-02 PROCEDURE — 82948 REAGENT STRIP/BLOOD GLUCOSE: CPT

## 2019-07-02 PROCEDURE — 84156 ASSAY OF PROTEIN URINE: CPT

## 2019-07-02 PROCEDURE — 99232 SBSQ HOSP IP/OBS MODERATE 35: CPT | Performed by: INTERNAL MEDICINE

## 2019-07-02 PROCEDURE — 6360000002 HC RX W HCPCS: Performed by: INTERNAL MEDICINE

## 2019-07-02 PROCEDURE — 84550 ASSAY OF BLOOD/URIC ACID: CPT

## 2019-07-02 PROCEDURE — 6370000000 HC RX 637 (ALT 250 FOR IP): Performed by: HOSPITALIST

## 2019-07-02 PROCEDURE — 82306 VITAMIN D 25 HYDROXY: CPT

## 2019-07-02 RX ORDER — SODIUM CHLORIDE, SODIUM LACTATE, POTASSIUM CHLORIDE, CALCIUM CHLORIDE 600; 310; 30; 20 MG/100ML; MG/100ML; MG/100ML; MG/100ML
INJECTION, SOLUTION INTRAVENOUS CONTINUOUS
Status: DISCONTINUED | OUTPATIENT
Start: 2019-07-02 | End: 2019-07-04 | Stop reason: HOSPADM

## 2019-07-02 RX ORDER — AMLODIPINE BESYLATE 10 MG/1
10 TABLET ORAL DAILY
Status: DISCONTINUED | OUTPATIENT
Start: 2019-07-02 | End: 2019-07-04 | Stop reason: HOSPADM

## 2019-07-02 RX ORDER — TERAZOSIN 10 MG/1
10 CAPSULE ORAL NIGHTLY
Status: DISCONTINUED | OUTPATIENT
Start: 2019-07-02 | End: 2019-07-04 | Stop reason: HOSPADM

## 2019-07-02 RX ORDER — MAGNESIUM SULFATE IN WATER 40 MG/ML
4 INJECTION, SOLUTION INTRAVENOUS ONCE
Status: COMPLETED | OUTPATIENT
Start: 2019-07-02 | End: 2019-07-02

## 2019-07-02 RX ORDER — DILTIAZEM HYDROCHLORIDE 180 MG/1
180 CAPSULE, COATED, EXTENDED RELEASE ORAL DAILY
Status: DISCONTINUED | OUTPATIENT
Start: 2019-07-02 | End: 2019-07-03

## 2019-07-02 RX ORDER — OMEPRAZOLE 40 MG/1
40 CAPSULE, DELAYED RELEASE ORAL NIGHTLY
Status: DISCONTINUED | OUTPATIENT
Start: 2019-07-02 | End: 2019-07-04 | Stop reason: HOSPADM

## 2019-07-02 RX ADMIN — OMEPRAZOLE 40 MG: 40 CAPSULE, DELAYED RELEASE ORAL at 00:44

## 2019-07-02 RX ADMIN — FINASTERIDE 5 MG: 5 TABLET, FILM COATED ORAL at 09:10

## 2019-07-02 RX ADMIN — SULFASALAZINE 1000 MG: 500 TABLET ORAL at 13:25

## 2019-07-02 RX ADMIN — AMLODIPINE BESYLATE 10 MG: 10 TABLET ORAL at 09:32

## 2019-07-02 RX ADMIN — SULFASALAZINE 1000 MG: 500 TABLET ORAL at 09:10

## 2019-07-02 RX ADMIN — FOLIC ACID 1 MG: 1 TABLET ORAL at 09:10

## 2019-07-02 RX ADMIN — METOPROLOL SUCCINATE 50 MG: 50 TABLET, EXTENDED RELEASE ORAL at 19:42

## 2019-07-02 RX ADMIN — INSULIN LISPRO 6 UNITS: 100 INJECTION, SOLUTION INTRAVENOUS; SUBCUTANEOUS at 13:25

## 2019-07-02 RX ADMIN — INSULIN LISPRO 4 UNITS: 100 INJECTION, SOLUTION INTRAVENOUS; SUBCUTANEOUS at 21:05

## 2019-07-02 RX ADMIN — SODIUM CHLORIDE, POTASSIUM CHLORIDE, SODIUM LACTATE AND CALCIUM CHLORIDE: 600; 310; 30; 20 INJECTION, SOLUTION INTRAVENOUS at 04:08

## 2019-07-02 RX ADMIN — MULTIPLE VITAMINS W/ MINERALS TAB 1 TABLET: TAB at 09:10

## 2019-07-02 RX ADMIN — MAGNESIUM SULFATE HEPTAHYDRATE 4 G: 40 INJECTION, SOLUTION INTRAVENOUS at 19:07

## 2019-07-02 RX ADMIN — SULFASALAZINE 1000 MG: 500 TABLET ORAL at 19:42

## 2019-07-02 RX ADMIN — APIXABAN 5 MG: 5 TABLET, FILM COATED ORAL at 19:42

## 2019-07-02 RX ADMIN — TAMSULOSIN HYDROCHLORIDE 0.4 MG: 0.4 CAPSULE ORAL at 19:52

## 2019-07-02 RX ADMIN — OMEPRAZOLE 40 MG: 40 CAPSULE, DELAYED RELEASE ORAL at 19:43

## 2019-07-02 RX ADMIN — Medication 20 UNITS: at 21:05

## 2019-07-02 RX ADMIN — METOPROLOL SUCCINATE 50 MG: 50 TABLET, EXTENDED RELEASE ORAL at 09:10

## 2019-07-02 RX ADMIN — APIXABAN 5 MG: 5 TABLET, FILM COATED ORAL at 09:14

## 2019-07-02 RX ADMIN — PRAVASTATIN SODIUM 20 MG: 20 TABLET ORAL at 19:42

## 2019-07-02 RX ADMIN — DILTIAZEM HYDROCHLORIDE 180 MG: 180 CAPSULE, COATED, EXTENDED RELEASE ORAL at 09:32

## 2019-07-02 ASSESSMENT — ENCOUNTER SYMPTOMS
SHORTNESS OF BREATH: 0
DIARRHEA: 0
BACK PAIN: 0
NAUSEA: 0
VOMITING: 0
CONSTIPATION: 0

## 2019-07-02 ASSESSMENT — PAIN SCALES - GENERAL
PAINLEVEL_OUTOF10: 0

## 2019-07-02 NOTE — PROGRESS NOTES
Oral TID PRN Mp Morejon MD   500 mg at 07/01/19 2322    diltiazem 125 mg in dextrose 5 % 125 mL infusion  5 mg/hr Intravenous Continuous Lisa Patterson MD 15 mL/hr at 07/02/19 0045 15 mg/hr at 07/02/19 0045    sodium chloride flush 0.9 % injection 10 mL  10 mL Intravenous 2 times per day Mp Morejon MD   10 mL at 07/01/19 2102    sodium chloride flush 0.9 % injection 10 mL  10 mL Intravenous PRN Mp Morejon MD        ondansetron [facility]) injection 4 mg  4 mg Intravenous Q6H PRN Mp Morejon MD        polyethylene glycol [facility]) packet 17 g  17 g Oral Daily PRN Mp Morejon MD        nitroGLYCERIN (NITROSTAT) SL tablet 0.4 mg  0.4 mg Sublingual Q5 Min PRN Mp Morejon MD        apixaban Lucinda Clutter) tablet 5 mg  5 mg Oral BID Mp Morejon MD   5 mg at 59/75/80 8345    folic acid (FOLVITE) tablet 1 mg  1 mg Oral Daily Mp Morejon MD   1 mg at 07/01/19 0803    pravastatin (PRAVACHOL) tablet 20 mg  20 mg Oral Nightly Mp Morejon MD   20 mg at 07/01/19 2102    sulfaSALAzine (AZULFIDINE) tablet 1,000 mg  1,000 mg Oral TID Mp Morejon MD   1,000 mg at 07/01/19 2102    therapeutic multivitamin-minerals 1 tablet  1 tablet Oral Daily Mp Morejon MD   1 tablet at 07/01/19 0803    dextrose 50 % IV solution  12.5 g Intravenous PRN Mp Morejon MD        insulin lispro (HUMALOG) injection vial 0-12 Units  0-12 Units Subcutaneous TID [facility] Mp Morejon MD   6 Units at 07/01/19 1817    insulin lispro (HUMALOG) injection vial 0-6 Units  0-6 Units Subcutaneous Nightly Mp Morejon MD   1 Units at 07/01/19 2101    glucose (GLUTOSE) 40 % oral gel 15 g  15 g Oral PRN Mp Morejon MD        dextrose 50 % IV solution  12.5 g Intravenous PRN Mp Morejon MD        glucagon (rDNA) injection 1 mg  1 mg Intramuscular PRN Mp Morejon MD        dextrose 5 % solution  100 mL/hr Intravenous PRN Mp Morejon MD        hydrALAZINE (APRESOLINE) injection 10 mg  10 mg Intravenous Q4H PRN Gladystine Diana Pauline Flanagan MD   10 mg at 07/01/19 2111    tamsulosin (FLOMAX) capsule 0.4 mg  0.4 mg Oral Daily Chelsy Duncan MD   0.4 mg at 07/01/19 4276    finasteride (PROSCAR) tablet 5 mg  5 mg Oral Daily Chelsy Duncan MD   5 mg at 07/01/19 0803       Past Medical History:  Past Medical History:   Diagnosis Date    Atherosclerosis of native arteries of the extremities with intermittent claudication     Blood circulation, collateral     Carotid artery occlusion, right     Chronic kidney disease     Depression     GERD (gastroesophageal reflux disease)     Hypertension     Pneumonia due to infectious organism 4/9/2019    Type II or unspecified type diabetes mellitus without mention of complication, not stated as uncontrolled     Ulcerative colitis ([facility] [address] 6/30/2019       Past Surgical History:  Past Surgical History:   Procedure Laterality Date    HEMORRHOID SURGERY  1973    HERNIA REPAIR      umbilical hernia       Family History  Family History   Problem Relation Age of Onset    High Blood Pressure Mother     Heart Disease Mother     Stroke Mother         at 48    High Blood Pressure Father     Heart Disease Father     Stroke Father         at 59    No Known Problems Brother     Alzheimer's Disease Brother         onset at 80, then rapidly progressed to death       Social History  Social History     Socioeconomic History    Marital status:      Spouse name: Not on file    Number of children: 3    Years of education: Not on file    Highest education level: Not on file   Occupational History    Occupation: Printer   Social Needs    Financial resource strain: Not on file    Food insecurity:     Worry: Not on file     Inability: Not on file    Transportation needs:     Medical: Not on file     Non-medical: Not on file   Tobacco Use    Smoking status: Former Smoker     Packs/day: 1.00     Years: 54.00     Pack years: 54.00     Types: Cigarettes     Last attempt to quit: 2006     Years since dysuria. Musculoskeletal: Negative for back pain and neck pain. Neurological: Negative for dizziness and light-headedness. Objective:  Blood pressure (!) 143/63, pulse 74, temperature 97.5 °F (36.4 °C), temperature source Temporal, resp. rate 11, height 5' 6\" (1.676 m), weight 159 lb 4.8 oz (72.3 kg), SpO2 94 %. Intake/Output Summary (Last 24 hours) at 7/2/2019 0906  Last data filed at 7/2/2019 0600  Gross per 24 hour   Intake 1417.08 ml   Output 2275 ml   Net -857.92 ml       Physical Exam   Constitutional: He is oriented to person, place, and time. He appears well-developed and well-nourished. HENT:   Head: Normocephalic and atraumatic. Mouth/Throat: Oropharynx is clear and moist.   Eyes: Pupils are equal, round, and reactive to light. Conjunctivae are normal.   Neck: No JVD present. Cardiovascular: Normal rate and normal heart sounds. Pulmonary/Chest: Effort normal and breath sounds normal.   Abdominal: Soft. Musculoskeletal: He exhibits no edema. Neurological: He is alert and oriented to person, place, and time. Skin: Skin is warm and dry. Vitals reviewed. Labs:  BMP:   Recent Labs     06/30/19  1447  06/30/19  2315 07/01/19  0602 07/02/19  0211      < > 142 143 141   K 4.8   < > 5.2* 4.4 3.7      < > 104 105 102   CO2 22   < > 24 22 22   PHOS 2.3*  --   --   --   --    BUN 22   < > 19 18 19   CREATININE 2.1*   < > 1.6* 1.5* 1.9*   CALCIUM 9.4   < > 9.4 8.9 8.8    < > = values in this interval not displayed.      CBC:   Recent Labs     06/30/19  1447 07/01/19  0602 07/02/19  0211   WBC 13.3* 16.6* 15.7*   HGB 9.4* 8.9* 9.5*   HCT 28.2* 26.8* 28.2*   MCV 95.9* 93.7 93.1    226 239     LIVER PROFILE:   Recent Labs     06/30/19  1447   AST 90*   ALT 65*   BILITOT 0.5   ALKPHOS 122     PT/INR:   Recent Labs     06/30/19  1447   PROTIME 17.3*   INR 1.49*     APTT:   Recent Labs     06/30/19  1447   APTT 30.3     BNP:  No results for input(s): BNP in the last 72

## 2019-07-02 NOTE — PROGRESS NOTES
Sitting on side of bed for breakfast. Dr. Germain Richardson and Dr. Lai Rosas both have been here. Will restart Cardizem and wean IV drip.

## 2019-07-02 NOTE — PROGRESS NOTES
Patient had complaints of heartburn around 2300, got a hold of  and got patient some Tums. Before I could administer Tums patient complained of chest tightness and was noted to be Afib RVR on monitor with heart rate between 140-160, an EKG was obtained. Spoke with  and a cardizem drip was started.

## 2019-07-02 NOTE — PROGRESS NOTES
Cardiology Daily Note Roseline Bah MD      Patient:  Jared Geller  [id number]    Patient Active Problem List    Diagnosis Date Noted    Chronic anticoagulation 05/09/2019     Priority: High    Bradycardia, 32bpm upon arrival 06/30/2019     Priority: Low    Hyperkalemia, 7.6 with tanscellular shift agents but NOT any binding agent adminstered at transport to  06/30/2019     Priority: Low    Ulcerative colitis ([facility] [address] 06/30/2019     Priority: Low    [facility] 04/13/2019     Priority: Low    Elevated d-dimer 04/09/2019     Priority: Low    Hypocalcemia 04/09/2019     Priority: Low    Macrocytic anemia 04/09/2019     Priority: Low    Carotid artery occlusion, right      Priority: Low    PVD (peripheral vascular disease) ([facility] [address] 11/09/2016     Priority: Low    Carotid artery stenosis 04/11/2012     Priority: Low    Depression      Priority: Low    Type 2 diabetes mellitus with ketoacidosis without coma, without long-term current use of insulin ([facility])      Priority: Low    Hypertension      Priority: Low       Admit Date:  6/30/2019    Admission Problem List: Present on Admission:   Bradycardia, 32bpm upon arrival   Type 2 diabetes mellitus with ketoacidosis without coma, without long-term current use of insulin ([facility])   Hyperkalemia, 7.6 with tanscellular shift agents but NOT any binding agent adminstered at transport to    PAF (paroxysmal atrial fibrillation) ([facility])   Chronic anticoagulation      Cardiac Specific Data:  Specialty Problems        Cardiology Problems    Hypertension        Carotid artery stenosis        PVD (peripheral vascular disease) ([facility])        Carotid artery occlusion, right        PAF (paroxysmal atrial fibrillation) ([facility])              Subjective:  Mr. Bravo Sees seen today became tachycardic again last evening placed back on intravenous diltiazem started on metoprolol 50 twice daily. Remains in atrial fibrillation no other complaints.     Objective: BUN 22   < > 19 18 19   CREATININE 2.1*   < > 1.6* 1.5* 1.9*    < > = values in this interval not displayed. LIVER PROFILE:   Recent Labs     06/30/19  1447   AST 90*   ALT 65*   BILITOT 0.5   ALKPHOS 122     PT/INR:   Recent Labs     06/30/19  1447   PROTIME 17.3*   INR 1.49*     APTT:   Recent Labs     06/30/19  1447   APTT 30.3     BNP:  No results for input(s): BNP in the last 72 hours. CK, CKMB, Troponin: @LABRCNT (CKTOTAL:3, CKMB:3, TROPONINI:3)@    IMAGING:  No results found. Assessment:  1. Atrial fibrillation recurrent  2. Bradycardia  3. Hyperkalemia  4. Cardioversion 5/15/2019  5. Lexiscan stress test 4/8/2019 probably normal perfusion study ejection fraction 49%  6. Chronic anticoagulation Eliquis  7. Chronic kidney disease BUN 22 creatinine 2.1  8. Leukocytosis 13.3  9. Anemia hemoglobin 9.4  10. Event recorder 4/8/2019 showed underlying atrial fibrillation rate of 73 ranging from 30-2 07 19 pauses longest of which was 3.5 seconds educations adjusted thereafter           Plan:  1.  Restart diltiazem 180 twice daily wean off drip continue to monitor closely    Yue Bailey MD 7/2/2019 9:53 AM

## 2019-07-03 LAB
ANION GAP SERPL CALCULATED.3IONS-SCNC: 13 MMOL/L (ref 7–19)
BASOPHILS ABSOLUTE: 0.1 K/UL (ref 0–0.2)
BASOPHILS RELATIVE PERCENT: 0.9 % (ref 0–1)
BUN BLDV-MCNC: 18 MG/DL (ref 8–23)
CALCIUM SERPL-MCNC: 8.5 MG/DL (ref 8.8–10.2)
CHLORIDE BLD-SCNC: 104 MMOL/L (ref 98–111)
CO2: 26 MMOL/L (ref 22–29)
CREAT SERPL-MCNC: 1.4 MG/DL (ref 0.5–1.2)
EOSINOPHILS ABSOLUTE: 0.3 K/UL (ref 0–0.6)
EOSINOPHILS RELATIVE PERCENT: 3 % (ref 0–5)
GFR NON-AFRICAN AMERICAN: 49
GLUCOSE BLD-MCNC: 194 MG/DL (ref 70–99)
GLUCOSE BLD-MCNC: 242 MG/DL (ref 74–109)
GLUCOSE BLD-MCNC: 263 MG/DL (ref 70–99)
GLUCOSE BLD-MCNC: 285 MG/DL (ref 70–99)
GLUCOSE BLD-MCNC: 295 MG/DL (ref 70–99)
HCT VFR BLD CALC: 26.9 % (ref 42–52)
HEMOGLOBIN: 9 G/DL (ref 14–18)
LYMPHOCYTES ABSOLUTE: 3.4 K/UL (ref 1.1–4.5)
LYMPHOCYTES RELATIVE PERCENT: 33.2 % (ref 20–40)
MAGNESIUM: 2.7 MG/DL (ref 1.6–2.4)
MCH RBC QN AUTO: 31.7 PG (ref 27–31)
MCHC RBC AUTO-ENTMCNC: 33.5 G/DL (ref 33–37)
MCV RBC AUTO: 94.7 FL (ref 80–94)
MONOCYTES ABSOLUTE: 0.9 K/UL (ref 0–0.9)
MONOCYTES RELATIVE PERCENT: 8.5 % (ref 0–10)
NEUTROPHILS ABSOLUTE: 5.3 K/UL (ref 1.5–7.5)
NEUTROPHILS RELATIVE PERCENT: 52.2 % (ref 50–65)
PDW BLD-RTO: 13.5 % (ref 11.5–14.5)
PERFORMED ON: ABNORMAL
PLATELET # BLD: 212 K/UL (ref 130–400)
PMV BLD AUTO: 10.9 FL (ref 9.4–12.4)
POTASSIUM REFLEX MAGNESIUM: 3.5 MMOL/L (ref 3.5–5)
RBC # BLD: 2.84 M/UL (ref 4.7–6.1)
SODIUM BLD-SCNC: 143 MMOL/L (ref 136–145)
WBC # BLD: 10.2 K/UL (ref 4.8–10.8)

## 2019-07-03 PROCEDURE — 6370000000 HC RX 637 (ALT 250 FOR IP): Performed by: INTERNAL MEDICINE

## 2019-07-03 PROCEDURE — 99232 SBSQ HOSP IP/OBS MODERATE 35: CPT | Performed by: INTERNAL MEDICINE

## 2019-07-03 PROCEDURE — 82948 REAGENT STRIP/BLOOD GLUCOSE: CPT

## 2019-07-03 PROCEDURE — 2580000003 HC RX 258: Performed by: HOSPITALIST

## 2019-07-03 PROCEDURE — 6370000000 HC RX 637 (ALT 250 FOR IP): Performed by: HOSPITALIST

## 2019-07-03 PROCEDURE — 80048 BASIC METABOLIC PNL TOTAL CA: CPT

## 2019-07-03 PROCEDURE — 2140000000 HC CCU INTERMEDIATE R&B

## 2019-07-03 PROCEDURE — 36415 COLL VENOUS BLD VENIPUNCTURE: CPT

## 2019-07-03 PROCEDURE — 99233 SBSQ HOSP IP/OBS HIGH 50: CPT | Performed by: INTERNAL MEDICINE

## 2019-07-03 PROCEDURE — 83735 ASSAY OF MAGNESIUM: CPT

## 2019-07-03 PROCEDURE — 85025 COMPLETE CBC W/AUTO DIFF WBC: CPT

## 2019-07-03 RX ORDER — LOSARTAN POTASSIUM 25 MG/1
25 TABLET ORAL DAILY
Status: DISCONTINUED | OUTPATIENT
Start: 2019-07-03 | End: 2019-07-04 | Stop reason: HOSPADM

## 2019-07-03 RX ORDER — DILTIAZEM HYDROCHLORIDE 120 MG/1
240 CAPSULE, COATED, EXTENDED RELEASE ORAL 2 TIMES DAILY
Status: DISCONTINUED | OUTPATIENT
Start: 2019-07-03 | End: 2019-07-04 | Stop reason: HOSPADM

## 2019-07-03 RX ORDER — DILTIAZEM HYDROCHLORIDE 120 MG/1
240 CAPSULE, COATED, EXTENDED RELEASE ORAL DAILY
Status: DISCONTINUED | OUTPATIENT
Start: 2019-07-03 | End: 2019-07-03

## 2019-07-03 RX ORDER — METOPROLOL SUCCINATE 50 MG/1
100 TABLET, EXTENDED RELEASE ORAL 2 TIMES DAILY
Status: DISCONTINUED | OUTPATIENT
Start: 2019-07-03 | End: 2019-07-04 | Stop reason: HOSPADM

## 2019-07-03 RX ADMIN — INSULIN LISPRO 6 UNITS: 100 INJECTION, SOLUTION INTRAVENOUS; SUBCUTANEOUS at 13:08

## 2019-07-03 RX ADMIN — AMLODIPINE BESYLATE 10 MG: 10 TABLET ORAL at 09:30

## 2019-07-03 RX ADMIN — SULFASALAZINE 1000 MG: 500 TABLET ORAL at 09:30

## 2019-07-03 RX ADMIN — TAMSULOSIN HYDROCHLORIDE 0.4 MG: 0.4 CAPSULE ORAL at 21:03

## 2019-07-03 RX ADMIN — TERAZOSIN 10 MG: 10 CAPSULE ORAL at 17:38

## 2019-07-03 RX ADMIN — INSULIN LISPRO 3 UNITS: 100 INJECTION, SOLUTION INTRAVENOUS; SUBCUTANEOUS at 21:00

## 2019-07-03 RX ADMIN — SULFASALAZINE 1000 MG: 500 TABLET ORAL at 13:08

## 2019-07-03 RX ADMIN — APIXABAN 5 MG: 5 TABLET, FILM COATED ORAL at 20:56

## 2019-07-03 RX ADMIN — FINASTERIDE 5 MG: 5 TABLET, FILM COATED ORAL at 09:30

## 2019-07-03 RX ADMIN — PRAVASTATIN SODIUM 20 MG: 20 TABLET ORAL at 20:56

## 2019-07-03 RX ADMIN — SULFASALAZINE 1000 MG: 500 TABLET ORAL at 20:55

## 2019-07-03 RX ADMIN — INSULIN LISPRO 6 UNITS: 100 INJECTION, SOLUTION INTRAVENOUS; SUBCUTANEOUS at 17:39

## 2019-07-03 RX ADMIN — DILTIAZEM HYDROCHLORIDE 240 MG: 120 CAPSULE, COATED, EXTENDED RELEASE ORAL at 21:03

## 2019-07-03 RX ADMIN — FOLIC ACID 1 MG: 1 TABLET ORAL at 09:30

## 2019-07-03 RX ADMIN — DILTIAZEM HYDROCHLORIDE 120 MG: 120 CAPSULE, COATED, EXTENDED RELEASE ORAL at 09:31

## 2019-07-03 RX ADMIN — Medication 20 UNITS: at 20:56

## 2019-07-03 RX ADMIN — LOSARTAN POTASSIUM 25 MG: 25 TABLET ORAL at 13:07

## 2019-07-03 RX ADMIN — Medication 10 ML: at 20:56

## 2019-07-03 RX ADMIN — MULTIPLE VITAMINS W/ MINERALS TAB 1 TABLET: TAB at 09:30

## 2019-07-03 RX ADMIN — OMEPRAZOLE 40 MG: 40 CAPSULE, DELAYED RELEASE ORAL at 17:38

## 2019-07-03 RX ADMIN — Medication 10 ML: at 09:31

## 2019-07-03 RX ADMIN — METOPROLOL SUCCINATE 100 MG: 50 TABLET, EXTENDED RELEASE ORAL at 09:31

## 2019-07-03 RX ADMIN — METOPROLOL SUCCINATE 100 MG: 50 TABLET, EXTENDED RELEASE ORAL at 20:56

## 2019-07-03 ASSESSMENT — ENCOUNTER SYMPTOMS
SHORTNESS OF BREATH: 0
CONSTIPATION: 0
BACK PAIN: 0
VOMITING: 0
NAUSEA: 0
DIARRHEA: 0

## 2019-07-03 ASSESSMENT — PAIN SCALES - GENERAL
PAINLEVEL_OUTOF10: 0
PAINLEVEL_OUTOF10: 0

## 2019-07-03 NOTE — PROGRESS NOTES
Report called to Corie Hernández in PCU. Tele monitor for 716-2 placed on patient. jennifer gathered at bedside.

## 2019-07-03 NOTE — PROGRESS NOTES
Talked with keyona SAENZ about pacemaker in am for patient. Talked with Dr. Real trinidad to restart Eliquist and let eat ing morning will do pacemaker on Monday.

## 2019-07-03 NOTE — PROGRESS NOTES
this interval not displayed. LIVER PROFILE:   Recent Labs     06/30/19  1447   AST 90*   ALT 65*   BILITOT 0.5   ALKPHOS 122     PT/INR:   Recent Labs     06/30/19  1447   PROTIME 17.3*   INR 1.49*     APTT:   Recent Labs     06/30/19  1447   APTT 30.3     BNP:  No results for input(s): BNP in the last 72 hours. CK, CKMB, Troponin: @LABRCNT (CKTOTAL:3, CKMB:3, TROPONINI:3)@    IMAGING:  No results found. Assessment:  1. Atrial fibrillation recurrent with tachybradycardia syndrome  2. Bradycardia  3. Hyperkalemia  4. Cardioversion 5/15/2019  5. Lexiscan stress test 4/8/2019 probably normal perfusion study ejection fraction 49%  6. Chronic anticoagulation Eliquis  7. Chronic kidney disease BUN 22 creatinine 2.1  8. Leukocytosis 13.3  9. Anemia hemoglobin 9.4  10. Event recorder 4/8/2019 showed underlying atrial fibrillation rate of 73 ranging from 30-2 07 19 pauses longest of which was 3.5 seconds educations adjusted thereafter          Plan:  1. Increase diltiazem to 240 mg p.o. twice daily  2. Increase metoprolol 200 mg p.o. twice daily  3. Continue to monitor on telemetry  4.  May possibly need permanent pacemaker implantation at some point further comments to follow    Nicole Sequeira MD 7/3/2019 9:46 AM

## 2019-07-03 NOTE — PROGRESS NOTES
Dr. Paulita Bence here examined pt increased Toprol XL and Cardizem cd.  Will hold eliquis for possible pacemaker in am.

## 2019-07-03 NOTE — PROGRESS NOTES
Smokeless tobacco: Former User     Types: Snuff     Quit date: 2007    Tobacco comment: age [age] (2006), smoked a PD on avrg for 54 years   Substance and Sexual Activity    Alcohol use: Yes     Comment: OCC. BEER    Drug use: Never    Sexual activity: Not on file     Comment: has kids   Lifestyle    Physical activity:     Days per week: Not on file     Minutes per session: Not on file    Stress: Not on file   Relationships    Social connections:     Talks on phone: Not on file     Gets together: Not on file     Attends Gnosticism service: Not on file     Active member of club or organization: Not on file     Attends meetings of clubs or organizations: Not on file     Relationship status: Not on file    Intimate partner violence:     Fear of current or ex partner: Not on file     Emotionally abused: Not on file     Physically abused: Not on file     Forced sexual activity: Not on file   Other Topics Concern    Not on file   Social History Narrative    Worked on [employer] most of his life     twice and     He has 2 sons and one daughter    Never in the [employer] high school    Attends [address] one pack per day quit in 2006 denies alcohol consumption or substance usage    Physically sedentary    /////////////////////////////////////////////////////////////////    CODE STATUS: Full Code    HEALTH CARE PROXY: His oldest son, Marilin Gamboa, [phone]    DOMICILED: Lives alone in a private home with one step in to home but no steps within, has no pets    AMBULATES: independantly         Review of Systems:    Review of Systems   Constitutional: Negative for activity change and fever. Respiratory: Negative for shortness of breath. Cardiovascular: Positive for palpitations. Negative for chest pain and leg swelling. Gastrointestinal: Negative for constipation, diarrhea, nausea and vomiting.    Genitourinary: Negative for difficulty urinating

## 2019-07-04 VITALS
BODY MASS INDEX: 24.49 KG/M2 | SYSTOLIC BLOOD PRESSURE: 108 MMHG | WEIGHT: 152.4 LBS | TEMPERATURE: 97 F | RESPIRATION RATE: 16 BRPM | DIASTOLIC BLOOD PRESSURE: 61 MMHG | HEART RATE: 91 BPM | OXYGEN SATURATION: 98 % | HEIGHT: 66 IN

## 2019-07-04 PROBLEM — R00.1 BRADYCARDIA: Status: RESOLVED | Noted: 2019-06-30 | Resolved: 2019-07-04

## 2019-07-04 PROBLEM — E87.5 HYPERKALEMIA: Status: RESOLVED | Noted: 2019-06-30 | Resolved: 2019-07-04

## 2019-07-04 LAB
ANION GAP SERPL CALCULATED.3IONS-SCNC: 12 MMOL/L (ref 7–19)
BASOPHILS ABSOLUTE: 0.1 K/UL (ref 0–0.2)
BASOPHILS RELATIVE PERCENT: 0.9 % (ref 0–1)
BUN BLDV-MCNC: 15 MG/DL (ref 8–23)
CALCIUM SERPL-MCNC: 8.5 MG/DL (ref 8.8–10.2)
CHLORIDE BLD-SCNC: 108 MMOL/L (ref 98–111)
CO2: 25 MMOL/L (ref 22–29)
CREAT SERPL-MCNC: 1.3 MG/DL (ref 0.5–1.2)
EOSINOPHILS ABSOLUTE: 0.4 K/UL (ref 0–0.6)
EOSINOPHILS RELATIVE PERCENT: 3.3 % (ref 0–5)
GFR NON-AFRICAN AMERICAN: 54
GLUCOSE BLD-MCNC: 109 MG/DL (ref 70–99)
GLUCOSE BLD-MCNC: 114 MG/DL (ref 74–109)
GLUCOSE BLD-MCNC: 303 MG/DL (ref 70–99)
HCT VFR BLD CALC: 27.6 % (ref 42–52)
HEMOGLOBIN: 9.1 G/DL (ref 14–18)
LYMPHOCYTES ABSOLUTE: 3 K/UL (ref 1.1–4.5)
LYMPHOCYTES RELATIVE PERCENT: 28.1 % (ref 20–40)
MAGNESIUM: 2.1 MG/DL (ref 1.6–2.4)
MCH RBC QN AUTO: 30.5 PG (ref 27–31)
MCHC RBC AUTO-ENTMCNC: 33 G/DL (ref 33–37)
MCV RBC AUTO: 92.6 FL (ref 80–94)
MONOCYTES ABSOLUTE: 0.8 K/UL (ref 0–0.9)
MONOCYTES RELATIVE PERCENT: 7.1 % (ref 0–10)
NEUTROPHILS ABSOLUTE: 6.3 K/UL (ref 1.5–7.5)
NEUTROPHILS RELATIVE PERCENT: 59.1 % (ref 50–65)
PDW BLD-RTO: 13.5 % (ref 11.5–14.5)
PERFORMED ON: ABNORMAL
PERFORMED ON: ABNORMAL
PLATELET # BLD: 214 K/UL (ref 130–400)
PMV BLD AUTO: 10.7 FL (ref 9.4–12.4)
POTASSIUM REFLEX MAGNESIUM: 3.5 MMOL/L (ref 3.5–5)
RBC # BLD: 2.98 M/UL (ref 4.7–6.1)
SODIUM BLD-SCNC: 145 MMOL/L (ref 136–145)
WBC # BLD: 10.6 K/UL (ref 4.8–10.8)

## 2019-07-04 PROCEDURE — 36415 COLL VENOUS BLD VENIPUNCTURE: CPT

## 2019-07-04 PROCEDURE — 80048 BASIC METABOLIC PNL TOTAL CA: CPT

## 2019-07-04 PROCEDURE — 2580000003 HC RX 258: Performed by: HOSPITALIST

## 2019-07-04 PROCEDURE — 83735 ASSAY OF MAGNESIUM: CPT

## 2019-07-04 PROCEDURE — 82948 REAGENT STRIP/BLOOD GLUCOSE: CPT

## 2019-07-04 PROCEDURE — 6370000000 HC RX 637 (ALT 250 FOR IP): Performed by: HOSPITALIST

## 2019-07-04 PROCEDURE — 6370000000 HC RX 637 (ALT 250 FOR IP): Performed by: INTERNAL MEDICINE

## 2019-07-04 PROCEDURE — 99239 HOSP IP/OBS DSCHRG MGMT >30: CPT | Performed by: INTERNAL MEDICINE

## 2019-07-04 PROCEDURE — 99232 SBSQ HOSP IP/OBS MODERATE 35: CPT | Performed by: INTERNAL MEDICINE

## 2019-07-04 PROCEDURE — 85025 COMPLETE CBC W/AUTO DIFF WBC: CPT

## 2019-07-04 PROCEDURE — 97161 PT EVAL LOW COMPLEX 20 MIN: CPT

## 2019-07-04 RX ORDER — AMLODIPINE BESYLATE 10 MG/1
10 TABLET ORAL DAILY
Qty: 30 TABLET | Refills: 3 | Status: SHIPPED | OUTPATIENT
Start: 2019-07-05 | End: 2019-07-18 | Stop reason: ALTCHOICE

## 2019-07-04 RX ORDER — DILTIAZEM HYDROCHLORIDE 240 MG/1
240 CAPSULE, COATED, EXTENDED RELEASE ORAL 2 TIMES DAILY
Qty: 30 CAPSULE | Refills: 3 | Status: SHIPPED | OUTPATIENT
Start: 2019-07-04 | End: 2019-08-12 | Stop reason: ALTCHOICE

## 2019-07-04 RX ORDER — METOPROLOL SUCCINATE 100 MG/1
100 TABLET, EXTENDED RELEASE ORAL 2 TIMES DAILY
Qty: 30 TABLET | Refills: 3 | Status: SHIPPED | OUTPATIENT
Start: 2019-07-04 | End: 2019-07-18

## 2019-07-04 RX ADMIN — MULTIPLE VITAMINS W/ MINERALS TAB 1 TABLET: TAB at 08:08

## 2019-07-04 RX ADMIN — FINASTERIDE 5 MG: 5 TABLET, FILM COATED ORAL at 08:07

## 2019-07-04 RX ADMIN — LOSARTAN POTASSIUM 25 MG: 25 TABLET ORAL at 08:08

## 2019-07-04 RX ADMIN — APIXABAN 5 MG: 5 TABLET, FILM COATED ORAL at 08:07

## 2019-07-04 RX ADMIN — AMLODIPINE BESYLATE 10 MG: 10 TABLET ORAL at 08:07

## 2019-07-04 RX ADMIN — FOLIC ACID 1 MG: 1 TABLET ORAL at 08:07

## 2019-07-04 RX ADMIN — METOPROLOL SUCCINATE 100 MG: 50 TABLET, EXTENDED RELEASE ORAL at 08:07

## 2019-07-04 RX ADMIN — Medication 10 ML: at 08:06

## 2019-07-04 RX ADMIN — SULFASALAZINE 1000 MG: 500 TABLET ORAL at 08:08

## 2019-07-04 RX ADMIN — INSULIN LISPRO 8 UNITS: 100 INJECTION, SOLUTION INTRAVENOUS; SUBCUTANEOUS at 13:32

## 2019-07-04 RX ADMIN — DILTIAZEM HYDROCHLORIDE 240 MG: 120 CAPSULE, COATED, EXTENDED RELEASE ORAL at 08:07

## 2019-07-04 RX ADMIN — SULFASALAZINE 1000 MG: 500 TABLET ORAL at 13:32

## 2019-07-04 NOTE — PROGRESS NOTES
Cardiology Daily Note Mitzi Adams MD      Patient:  Renette Runner  [id number]    Patient Active Problem List    Diagnosis Date Noted    Chronic anticoagulation 05/09/2019     Priority: High    Bradycardia, 32bpm upon arrival 06/30/2019     Priority: Low    Hyperkalemia, 7.6 with tanscellular shift agents but NOT any binding agent adminstered at transport to  06/30/2019     Priority: Low    Ulcerative colitis ([facility] [address] 06/30/2019     Priority: Low    PAF (paroxysmal atrial fibrillation) ([facility] 04/13/2019     Priority: Low    Elevated d-dimer 04/09/2019     Priority: Low    Hypocalcemia 04/09/2019     Priority: Low    Macrocytic anemia 04/09/2019     Priority: Low    Carotid artery occlusion, right      Priority: Low    PVD (peripheral vascular disease) ([facility] 11/09/2016     Priority: Low    Carotid artery stenosis 04/11/2012     Priority: Low    Depression      Priority: Low    Type 2 diabetes mellitus with ketoacidosis without coma, without long-term current use of insulin ([facility])      Priority: Low    Hypertension      Priority: Low       Admit Date:  6/30/2019    Admission Problem List: Present on Admission:   Bradycardia, 32bpm upon arrival   Type 2 diabetes mellitus with ketoacidosis without coma, without long-term current use of insulin ([facility])   Hyperkalemia, 7.6 with tanscellular shift agents but NOT any binding agent adminstered at transport to    PAF (paroxysmal atrial fibrillation) ([facility])   Chronic anticoagulation      Cardiac Specific Data:  Specialty Problems        Cardiology Problems    Hypertension        Carotid artery stenosis        PVD (peripheral vascular disease) ([facility])        Carotid artery occlusion, right        PAF (paroxysmal atrial fibrillation) ([facility])              Subjective:  Mr. Nikita De Santiago seen today now out on progressive care unit. Had a reasonably good night heart rate 70s and 80s occasionally 100-110 no complaints. Eliquis resumed.     Objective:   BP (!) 162/76   Pulse 105   Temp 97.8 °F (36.6 °C) (Temporal)   Resp 16   Ht 5' 6\" (1.676 m)   Wt 152 lb 6.4 oz (69.1 kg)   SpO2 96%   BMI 24.60 kg/m²       Intake/Output Summary (Last 24 hours) at 7/4/2019 5070  Last data filed at 7/4/2019 9785  Gross per 24 hour   Intake 875 ml   Output 1250 ml   Net -375 ml       Prior to Admission medications    Medication Sig Start Date End Date Taking? Authorizing Provider   ELIQUIS 5 MG TABS tablet TAKE 1 TABLET BY MOUTH TWICE A DAY 6/3/19   RED Leong - NP   digoxin (LANOXIN) 250 MCG tablet Take 1 tablet by mouth every other day 5/15/19   Cuauhtemoc Long MD   omeprazole (PRILOSEC) 40 MG delayed release capsule Take 40 mg by mouth daily    Historical Provider, MD   Multiple Vitamins-Minerals (THERAPEUTIC MULTIVITAMIN-MINERALS) tablet Take 1 tablet by mouth daily    Historical Provider, MD   Multiple Vitamins-Minerals (PRESERVISION AREDS PO) Take by mouth daily    Historical Provider, MD   diltiazem (CARDIZEM CD) 360 MG extended release capsule Take 1 capsule by mouth 2 times daily  Patient taking differently: Take 120 mg by mouth 3 times daily Take 2 tablets in the morning, one tablet at noon, and one tablet at bedtime.  4/22/19   RED Landrum   metoprolol succinate (TOPROL XL) 200 MG extended release tablet Take 1 tablet by mouth 2 times daily 4/17/19   Kendall Mcintyre MD   sulfaSALAzine (AZULFIDINE) 500 MG tablet Take 1,000 mg by mouth 3 times daily    Historical Provider, MD   furosemide (LASIX) 40 MG tablet Take 40 mg by mouth daily    Historical Provider, MD   amLODIPine (NORVASC) 5 MG tablet Take 5 mg by mouth daily    Historical Provider, MD   potassium chloride (KLOR-CON M) 20 MEQ extended release tablet Take 20 mEq by mouth 2 times daily     Historical Provider, MD   folic acid (FOLVITE) 1 MG tablet Take 1 mg by mouth daily    Historical Provider, MD   glipiZIDE (GLUCOTROL) 10 MG tablet Take 10 mg by mouth 2 times daily (before meals)    Historical

## 2019-07-04 NOTE — DISCHARGE SUMMARY
tablet  Take 20 mg by mouth nightly              sulfaSALAzine (AZULFIDINE) 500 MG tablet  Take 1,000 mg by mouth 3 times daily             terazosin (HYTRIN) 10 MG capsule  Take 10 mg by mouth nightly. Discharge Instructions: Follow up with Lilly Sepulveda MD in 3-5 days. Take medications as directed. Resume activity as tolerated. Diet: DIET CARDIAC; Carb Control: 4 carb choices (60 gms)/meal; No Caffeine     Disposition: Patient is medically stable and will be discharged to home. Time spent on discharge > 30 minutes.     Signed:  Antoni Jay DO

## 2019-07-04 NOTE — PROGRESS NOTES
Ambulated in matta with PT. HR briefly in the 140s, quickly recovered to 110s-120s. Telemetry strip saved. Patient asymptomatic.    Electronically signed by Gaurang Pop RN on 7/4/2019 at 8:01 AM

## 2019-07-05 ENCOUNTER — CARE COORDINATION (OUTPATIENT)
Dept: CASE MANAGEMENT | Age: 79
End: 2019-07-05

## 2019-07-05 NOTE — CARE COORDINATION
[facility] Transitions Initial Follow Up Call    Call within 2 business days of discharge: Yes    Patient: Alison Patton Patient : 1940   MRN: [id number]  Reason for Admission: PAF  Discharge Date: 19 RARS: Readmission Risk Score: 18      Last Discharge [facility]       Complaint Diagnosis Description Type Department Provider    19   Admission (Discharged) Topher 66, DO           Spoke with: N/A    Facility: [place] [id number]      Non-face-to-face services provided:  Reviewed encounter information for continuity of care prior to follow up phone call - chart notes, consults, progress notes, test results, med list, appointments, AVS, other information. Care Transitions 24 Hour Call    Do you have all of your prescriptions and are they filled?:  No  Do you have support at home?:  Alone  Are you an active caregiver in your home?:  No  Care Transitions Interventions         Follow Up : Attempted to contact patient for initial follow up after discharge call. No answer, unable to leave message or CTC contact information. Will try again next business day.   Future Appointments   Date Time Provider Armin Cervantes   10/1/2019  2:30 PM RED Whipple [id number]       Austin Martinez RN

## 2019-07-08 ENCOUNTER — CARE COORDINATION (OUTPATIENT)
Dept: CASE MANAGEMENT | Age: 79
End: 2019-07-08

## 2019-07-08 ENCOUNTER — TELEPHONE (OUTPATIENT)
Dept: CARDIOLOGY | Age: 79
End: 2019-07-08

## 2019-07-12 ENCOUNTER — TELEPHONE (OUTPATIENT)
Dept: CARDIOLOGY | Age: 79
End: 2019-07-12

## 2019-07-14 ENCOUNTER — CARE COORDINATION (OUTPATIENT)
Dept: CARE COORDINATION | Age: 79
End: 2019-07-14

## 2019-07-14 NOTE — CARE COORDINATION
[facility] Transitions Follow Up Call    2019    Patient: Teto Avila  Patient : 1940   MRN: [id number]  Reason for Admission: A-Fib  Discharge Date: 19 RARS: Readmission Risk Score: 18         Spoke with: Patient, Deann Painter. Patient states he is doing well. No further episodes of A-Fib at this time. Has appointment with cardiologist 19. He is C/O pain in his lower back. Taking medications as directed with no questions. Will Follow up at later time. Care Transitions Subsequent and Final Call    Subsequent and Final Calls  Do you have any ongoing symptoms?:  No  Have your medications changed?:  No  Do you have any questions related to your medications?:  No  Do you currently have any active services?:  No  Do you have any needs or concerns that I can assist you with?:  No  Care Transitions Interventions  Other Interventions:             Follow Up  Future Appointments   Date Time Provider Armin Cervantes   2019  2:00 PM RED [facility]   10/1/2019  2:30 PM RED [facility] Abigail Goldberg LPN

## 2019-07-18 ENCOUNTER — OFFICE VISIT (OUTPATIENT)
Dept: CARDIOLOGY | Age: 79
End: 2019-07-18
Payer: MEDICARE

## 2019-07-18 VITALS
WEIGHT: 159 LBS | HEART RATE: 59 BPM | DIASTOLIC BLOOD PRESSURE: 70 MMHG | BODY MASS INDEX: 25.55 KG/M2 | HEIGHT: 66 IN | SYSTOLIC BLOOD PRESSURE: 138 MMHG

## 2019-07-18 DIAGNOSIS — R00.1 BRADYCARDIA: ICD-10-CM

## 2019-07-18 DIAGNOSIS — R60.0 EDEMA EXTREMITIES: ICD-10-CM

## 2019-07-18 DIAGNOSIS — R01.1 HEART MURMUR: ICD-10-CM

## 2019-07-18 DIAGNOSIS — E87.5 HYPERKALEMIA: ICD-10-CM

## 2019-07-18 DIAGNOSIS — R19.7 DIARRHEA, UNSPECIFIED TYPE: ICD-10-CM

## 2019-07-18 DIAGNOSIS — I48.0 PAF (PAROXYSMAL ATRIAL FIBRILLATION) (HCC): ICD-10-CM

## 2019-07-18 DIAGNOSIS — I48.0 PAF (PAROXYSMAL ATRIAL FIBRILLATION) (HCC): Primary | ICD-10-CM

## 2019-07-18 LAB
ANION GAP SERPL CALCULATED.3IONS-SCNC: 13 MMOL/L (ref 7–19)
BUN BLDV-MCNC: 24 MG/DL (ref 8–23)
CALCIUM SERPL-MCNC: 9.1 MG/DL (ref 8.8–10.2)
CHLORIDE BLD-SCNC: 105 MMOL/L (ref 98–111)
CO2: 25 MMOL/L (ref 22–29)
CREAT SERPL-MCNC: 1.6 MG/DL (ref 0.5–1.2)
GFR NON-AFRICAN AMERICAN: 42
GLUCOSE BLD-MCNC: 176 MG/DL (ref 74–109)
POTASSIUM SERPL-SCNC: 4.2 MMOL/L (ref 3.5–5)
SODIUM BLD-SCNC: 143 MMOL/L (ref 136–145)

## 2019-07-18 PROCEDURE — 99214 OFFICE O/P EST MOD 30 MIN: CPT | Performed by: CLINICAL NURSE SPECIALIST

## 2019-07-18 PROCEDURE — 1111F DSCHRG MED/CURRENT MED MERGE: CPT | Performed by: CLINICAL NURSE SPECIALIST

## 2019-07-18 PROCEDURE — G8598 ASA/ANTIPLAT THER USED: HCPCS | Performed by: CLINICAL NURSE SPECIALIST

## 2019-07-18 PROCEDURE — G8427 DOCREV CUR MEDS BY ELIG CLIN: HCPCS | Performed by: CLINICAL NURSE SPECIALIST

## 2019-07-18 PROCEDURE — 93000 ELECTROCARDIOGRAM COMPLETE: CPT | Performed by: CLINICAL NURSE SPECIALIST

## 2019-07-18 PROCEDURE — G8419 CALC BMI OUT NRM PARAM NOF/U: HCPCS | Performed by: CLINICAL NURSE SPECIALIST

## 2019-07-18 PROCEDURE — 4040F PNEUMOC VAC/ADMIN/RCVD: CPT | Performed by: CLINICAL NURSE SPECIALIST

## 2019-07-18 PROCEDURE — 1036F TOBACCO NON-USER: CPT | Performed by: CLINICAL NURSE SPECIALIST

## 2019-07-18 PROCEDURE — 0296T PR EXT ECG > 48HR TO 21 DAY RCRD W/CONECT INTL RCRD: CPT | Performed by: CLINICAL NURSE SPECIALIST

## 2019-07-18 PROCEDURE — 1123F ACP DISCUSS/DSCN MKR DOCD: CPT | Performed by: CLINICAL NURSE SPECIALIST

## 2019-07-18 RX ORDER — METOPROLOL SUCCINATE 25 MG/1
25 TABLET, EXTENDED RELEASE ORAL 2 TIMES DAILY
Qty: 60 TABLET | Refills: 5 | Status: SHIPPED | OUTPATIENT
Start: 2019-07-18 | End: 2020-01-10

## 2019-07-18 RX ORDER — METOPROLOL SUCCINATE 100 MG/1
50 TABLET, EXTENDED RELEASE ORAL 2 TIMES DAILY
Qty: 30 TABLET | Refills: 3 | Status: SHIPPED | OUTPATIENT
Start: 2019-07-18 | End: 2019-07-18

## 2019-07-18 ASSESSMENT — ENCOUNTER SYMPTOMS
DIARRHEA: 1
CHEST TIGHTNESS: 0
VOMITING: 0
FACIAL SWELLING: 0
SHORTNESS OF BREATH: 0
WHEEZING: 0
NAUSEA: 0
COUGH: 0
EYE REDNESS: 0
ABDOMINAL PAIN: 0

## 2019-07-18 NOTE — PROGRESS NOTES
[facility], [address]  [id number]  Phone: [phone]  Fax: [phone]    OFFICE VISIT:  2019    [facility] 1940    Reason For Visit:  Gamaliel Danielle is a 70 y.o. male who is here for Follow-Up from Hospital (pt has leg edema, SOB at times) and Atrial Fibrillation      HPI  Patient is here for follow-up after recent cardioversion for paroxysmal atrial fibrillation. A pacemaker in the future was also discussed due to pauses on a Holter monitor in May. He was seen for hospital follow-up on 2019  and continued in normal sinus rhythm. A 48-hour Holter monitor was placed to assess for A. fib, pauses or heart block. Pt completed wearing the monitor and was admitted to the hospital due to hyperkalemia and bradycardia. Medications were adjusted. Pacemaker was again considered, but  Not recommended. He is here in the office today for follow-up. Pt reports significant edema in his legs and feet since hospital discharge. Denies orthopnea, PND, or unusual dyspnea. Denies dizziness, lightheadedness, or syncope. He is wondering if he needs a pacemaker     José Corrales MD is PCP.   Brown Salter has the following history as recorded in [facility]:    Patient Active Problem List    Diagnosis Date Noted    Chronic anticoagulation 2019     Priority: High    [facility]    [facility]    Elevated d-dimer 2019    Hypocalcemia 2019    Macrocytic anemia 2019    Carotid artery occlusion, right     PVD (peripheral vascular disease) ([facility] 2016    Carotid artery stenosis 2012    Depression     Hypertension      Past Medical History:   Diagnosis Date    Atherosclerosis of native arteries of the extremities with intermittent claudication     Blood circulation, collateral     Carotid artery occlusion, right     Chronic kidney disease     Depression     GERD (gastroesophageal reflux disease)     Hypertension     Pneumonia due to infectious organism 2019    Type II or unspecified type diabetes mellitus without mention of complication, not stated as uncontrolled     Ulcerative colitis ([facility] 2019     Past Surgical History:   Procedure Laterality Date    HEMORRHOID SURGERY  1973    HERNIA REPAIR      umbilical hernia     Family History   Problem Relation Age of Onset    High Blood Pressure Mother     Heart Disease Mother     Stroke Mother         at 48    High Blood Pressure Father     Heart Disease Father     Stroke Father         at 59    No Known Problems Brother     Alzheimer's Disease Brother         onset at 80, then rapidly progressed to death     Social History     Tobacco Use    Smoking status: Former Smoker     Packs/day: 1.00     Years: 54.00     Pack years: 54.00     Types: Cigarettes     Last attempt to quit:      Years since quittin.5    Smokeless tobacco: Former User     Types: Snuff     Quit date:     Tobacco comment: age [age] (), smoked a PD on avrg for 54 years   Substance Use Topics    Alcohol use: Yes     Comment: OCC. BEER      Current Outpatient Medications   Medication Sig Dispense Refill    metoprolol succinate (TOPROL XL) 25 MG extended release tablet Take 1 tablet by mouth 2 times daily 60 tablet 5    diltiazem (CARDIZEM CD) 240 MG extended release capsule Take 1 capsule by mouth 2 times daily 30 capsule 3    ELIQUIS 5 MG TABS tablet TAKE 1 TABLET BY MOUTH TWICE A DAY 60 tablet 5    digoxin (LANOXIN) 250 MCG tablet Take 1 tablet by mouth every other day 30 tablet 3    omeprazole (PRILOSEC) 40 MG delayed release capsule Take 40 mg by mouth daily      Multiple Vitamins-Minerals (THERAPEUTIC MULTIVITAMIN-MINERALS) tablet Take 1 tablet by mouth daily      Multiple Vitamins-Minerals (PRESERVISION AREDS PO) Take by mouth daily      sulfaSALAzine (AZULFIDINE) 500 MG tablet Take 1,000 plan of treatment  It is always recommended that you bring your medicationsbottles with you to each visit - this is for your safety!        Lawrence Null, APRN

## 2019-07-31 ENCOUNTER — TELEPHONE (OUTPATIENT)
Dept: CARDIOLOGY | Age: 79
End: 2019-07-31

## 2019-08-02 ENCOUNTER — TELEPHONE (OUTPATIENT)
Dept: CARDIOLOGY | Age: 79
End: 2019-08-02

## 2019-08-09 ENCOUNTER — HOSPITAL ENCOUNTER (OUTPATIENT)
Dept: NON INVASIVE DIAGNOSTICS | Age: 79
Discharge: HOME | End: 2019-08-09
Payer: MEDICARE

## 2019-08-09 DIAGNOSIS — R01.1 HEART MURMUR: ICD-10-CM

## 2019-08-09 DIAGNOSIS — I48.0 PAF (PAROXYSMAL ATRIAL FIBRILLATION) (HCC): ICD-10-CM

## 2019-08-09 LAB
LV EF: 63 %
LVEF MODALITY: NORMAL

## 2019-08-09 PROCEDURE — 93306 TTE W/DOPPLER COMPLETE: CPT

## 2019-08-12 ENCOUNTER — OFFICE VISIT (OUTPATIENT)
Dept: CARDIOLOGY | Age: 79
End: 2019-08-12
Payer: MEDICARE

## 2019-08-12 VITALS
SYSTOLIC BLOOD PRESSURE: 140 MMHG | DIASTOLIC BLOOD PRESSURE: 70 MMHG | HEART RATE: 63 BPM | BODY MASS INDEX: 24.11 KG/M2 | WEIGHT: 150 LBS | HEIGHT: 66 IN

## 2019-08-12 DIAGNOSIS — I49.5 SINUS NODE DYSFUNCTION (HCC): ICD-10-CM

## 2019-08-12 DIAGNOSIS — Z79.01 CHRONIC ANTICOAGULATION: ICD-10-CM

## 2019-08-12 DIAGNOSIS — I48.0 PAF (PAROXYSMAL ATRIAL FIBRILLATION) (HCC): Primary | ICD-10-CM

## 2019-08-12 DIAGNOSIS — I10 ESSENTIAL HYPERTENSION: ICD-10-CM

## 2019-08-12 PROCEDURE — 1123F ACP DISCUSS/DSCN MKR DOCD: CPT | Performed by: INTERNAL MEDICINE

## 2019-08-12 PROCEDURE — G8420 CALC BMI NORM PARAMETERS: HCPCS | Performed by: INTERNAL MEDICINE

## 2019-08-12 PROCEDURE — 99213 OFFICE O/P EST LOW 20 MIN: CPT | Performed by: INTERNAL MEDICINE

## 2019-08-12 PROCEDURE — 93000 ELECTROCARDIOGRAM COMPLETE: CPT | Performed by: INTERNAL MEDICINE

## 2019-08-12 PROCEDURE — 4040F PNEUMOC VAC/ADMIN/RCVD: CPT | Performed by: INTERNAL MEDICINE

## 2019-08-12 PROCEDURE — G8427 DOCREV CUR MEDS BY ELIG CLIN: HCPCS | Performed by: INTERNAL MEDICINE

## 2019-08-12 PROCEDURE — 1036F TOBACCO NON-USER: CPT | Performed by: INTERNAL MEDICINE

## 2019-08-12 PROCEDURE — G8598 ASA/ANTIPLAT THER USED: HCPCS | Performed by: INTERNAL MEDICINE

## 2019-08-12 RX ORDER — DILTIAZEM HYDROCHLORIDE 120 MG/1
CAPSULE, COATED, EXTENDED RELEASE ORAL
Qty: 180 CAPSULE | Refills: 3 | Status: SHIPPED | OUTPATIENT
Start: 2019-08-12 | End: 2019-11-15 | Stop reason: SDUPTHER

## 2019-08-12 ASSESSMENT — ENCOUNTER SYMPTOMS
EYES NEGATIVE: 1
DIARRHEA: 0
SHORTNESS OF BREATH: 0
GASTROINTESTINAL NEGATIVE: 1
NAUSEA: 0
RESPIRATORY NEGATIVE: 1
VOMITING: 0

## 2019-08-12 NOTE — PROGRESS NOTES
hours.  CARDIAC ENZYMES:No results for input(s): CKTOTAL, CKMB, CKMBINDEX, TROPONINI in the last 72 hours. FASTING LIPID PANEL:  Lab Results   Component Value Date    HDL 28 06/30/2019    LDLCALC 82 06/30/2019    TRIG 110 06/30/2019     LIVER PROFILE:No results for input(s): AST, ALT, LABALBU in the last 72 hours.         Past Medical History:   Diagnosis Date    Atherosclerosis of native arteries of the extremities with intermittent claudication     Blood circulation, collateral     Carotid artery occlusion, right     Chronic kidney disease     Depression     GERD (gastroesophageal reflux disease)     Hypertension     Pneumonia due to infectious organism 4/9/2019    Type II or unspecified type diabetes mellitus without mention of complication, not stated as uncontrolled     Ulcerative colitis ([facility] [address] 6/30/2019     Past Surgical History:   Procedure Laterality Date    HEMORRHOID SURGERY  1973    HERNIA REPAIR      umbilical hernia     Family History   Problem Relation Age of Onset    High Blood Pressure Mother     Heart Disease Mother     Stroke Mother         at 48    High Blood Pressure Father     Heart Disease Father     Stroke Father         at 59    No Known Problems Brother     Alzheimer's Disease Brother         onset at 80, then rapidly progressed to death     No Known Allergies  Current Outpatient Medications   Medication Sig Dispense Refill    diltiazem (CARDIZEM CD) 120 MG extended release capsule TAKE 2 TABLETS BY MOUTH IN THE MORNING, ONE TAB AT NOON, AND ONE TAB AT BEDTIME 180 capsule 3    metoprolol succinate (TOPROL XL) 25 MG extended release tablet Take 1 tablet by mouth 2 times daily 60 tablet 5    ELIQUIS 5 MG TABS tablet TAKE 1 TABLET BY MOUTH TWICE A DAY 60 tablet 5    digoxin (LANOXIN) 250 MCG tablet Take 1 tablet by mouth every other day 30 tablet 3    omeprazole (PRILOSEC) 40 MG delayed release capsule Take 40 mg by mouth daily      Multiple Vitamins-Minerals hypertension     3. Chronic anticoagulation     4. Sinus node dysfunction (HCC)         PLAN:  Orders Placed This Encounter   Procedures    EKG 12 lead     No orders of the defined types were placed in this encounter. 1. Continue present medications  2. Recommend follow-up assessment in 3 months  3. Recommend discontinuation of digoxin  4. Report any increase in palpitations to me    Return in about 3 months (around 11/12/2019) for return to Dr. Get Loo only. Roseline Bah MD 8/12/2019 2:45 PM    [id number] [facility]      Thisdictation was generated by voice recognition computer software. Although all attempts are made to edit the dictation for accuracy, there may be errors in the transcription that are not intended.

## 2019-10-14 RX ORDER — SULFASALAZINE 500 MG/1
1000 TABLET ORAL 3 TIMES DAILY
Qty: 540 TABLET | Refills: 5 | Status: SHIPPED | OUTPATIENT
Start: 2019-10-14 | End: 2019-11-11

## 2019-10-24 ENCOUNTER — OFFICE VISIT (OUTPATIENT)
Dept: GASTROENTEROLOGY | Facility: CLINIC | Age: 79
End: 2019-10-24

## 2019-10-24 VITALS
WEIGHT: 150.2 LBS | DIASTOLIC BLOOD PRESSURE: 44 MMHG | OXYGEN SATURATION: 98 % | BODY MASS INDEX: 24.14 KG/M2 | HEART RATE: 68 BPM | HEIGHT: 66 IN | SYSTOLIC BLOOD PRESSURE: 128 MMHG

## 2019-10-24 DIAGNOSIS — K21.00 GASTROESOPHAGEAL REFLUX DISEASE WITH ESOPHAGITIS: ICD-10-CM

## 2019-10-24 DIAGNOSIS — K51.00 ULCERATIVE CHRONIC PANCOLITIS WITHOUT COMPLICATIONS (HCC): Primary | ICD-10-CM

## 2019-10-24 PROCEDURE — 99213 OFFICE O/P EST LOW 20 MIN: CPT | Performed by: NURSE PRACTITIONER

## 2019-10-24 RX ORDER — OMEPRAZOLE 20 MG/1
20 CAPSULE, DELAYED RELEASE ORAL 2 TIMES DAILY
Qty: 60 CAPSULE | Refills: 11 | Status: SHIPPED | OUTPATIENT
Start: 2019-10-24 | End: 2019-10-24 | Stop reason: DRUGHIGH

## 2019-10-24 RX ORDER — SODIUM CHLORIDE 0.9 % (FLUSH) 0.9 %
3 SYRINGE (ML) INJECTION EVERY 12 HOURS SCHEDULED
Status: CANCELLED | OUTPATIENT
Start: 2019-11-21

## 2019-10-24 RX ORDER — OMEPRAZOLE 20 MG/1
20 CAPSULE, DELAYED RELEASE ORAL 2 TIMES DAILY
Qty: 180 CAPSULE | Refills: 3 | Status: ON HOLD | OUTPATIENT
Start: 2019-10-24 | End: 2020-01-01

## 2019-10-24 RX ORDER — SODIUM, POTASSIUM,MAG SULFATES 17.5-3.13G
1 SOLUTION, RECONSTITUTED, ORAL ORAL EVERY 12 HOURS
Qty: 2 BOTTLE | Refills: 0 | Status: ON HOLD | OUTPATIENT
Start: 2019-10-24 | End: 2019-11-21

## 2019-10-24 RX ORDER — DEXTROSE AND SODIUM CHLORIDE 5; .45 G/100ML; G/100ML
30 INJECTION, SOLUTION INTRAVENOUS CONTINUOUS PRN
Status: CANCELLED | OUTPATIENT
Start: 2019-11-21

## 2019-10-24 RX ORDER — DILTIAZEM HYDROCHLORIDE 120 MG/1
120 TABLET, FILM COATED ORAL 4 TIMES DAILY
Status: ON HOLD | COMMUNITY
End: 2020-01-01

## 2019-10-24 RX ORDER — SODIUM CHLORIDE 0.9 % (FLUSH) 0.9 %
10 SYRINGE (ML) INJECTION AS NEEDED
Status: CANCELLED | OUTPATIENT
Start: 2019-11-21

## 2019-10-24 NOTE — PROGRESS NOTES
Chief Complaint   Patient presents with   • Ulcerative Colitis   • Pancolitis       Subjective    Gage Ken is a 79 y.o. male. he is here today for follow-up.  79-year-old male presents for follow-up regarding chronic ulcerative colitis.  States in April and June he was hospitalized due to cardiac issues in [place] and has been cleared from cardiac standpoint.  He is down 16 pounds states anticoagulation was changed and he was placed on Eliquis and now cannot drink his beer daily.  Reports he has had no flare of ulcerative colitis with use of sulfasalazine last flare was around 2015 or 2016 was given Entocort and had great improvement in symptoms.    Ulcerative Colitis   This is a chronic problem. The current episode started more than 1 year ago. The problem occurs daily. The problem has been waxing and waning. Associated symptoms include abdominal pain and fatigue. Pertinent negatives include no anorexia, arthralgias, change in bowel habit, chest pain, chills, congestion, coughing, diaphoresis, fever, headaches, joint swelling, myalgias, nausea, neck pain, numbness or vomiting. The symptoms are aggravated by eating. The treatment provided moderate relief.   Colonoscopy 11/6/2018 noted poor prep biopsies were all normal with no dysplasia.  Plan; we will schedule patient for colonoscopy for surveillance of ulcerative colitis continue sulfasalazine.  Continue PPI daily for reflux     The following portions of the patient's history were reviewed and updated as appropriate:   Past Medical History:   Diagnosis Date   • Acute gastritis    • Blood in feces    • Colitis, ulcerative chronic (CMS/HCC)     LEFT-SIDED   • Diverticular disease of colon    • Epigastric pain    • GERD (gastroesophageal reflux disease)    • History of colon polyps    • [place] (hard of hearing)    • Hypertension    • Lesion of nose    • Neoplasm of uncertain behavior    • Nonspecific ulcerative proctitis (CMS/HCC)    • Rectal hemorrhage    •  Type 2 diabetes mellitus (CMS/HCC)    • Vitamin B12 deficiency      Past Surgical History:   Procedure Laterality Date   • COLONOSCOPY  12/02/2013    Hemorrhoids found.   • COLONOSCOPY  09/06/2016   • COLONOSCOPY N/A 10/30/2017    Procedure: COLONOSCOPY;  Surgeon: Alex Silveira MD;  Location: [facility] ENDOSCOPY;  Service:    • COLONOSCOPY N/A 11/6/2018    Procedure: COLONOSCOPY;  Surgeon: Alex Silveira MD;  Location: [facility] ENDOSCOPY;  Service: Gastroenterology   • COLONOSCOPY W/ POLYPECTOMY  08/30/2015    Single polyp found in the colon;removed by cold snare polypectomy.Proctitis in the rectum.Diverticulosis found in the sigmoid colon.Hemorrhoids found.   • ENDOSCOPY  12/02/2013    Normal hypopharynx, esophagus, duodenum, symmetrical & patent pylorus. Hiatus hernia in GE junction. Normal stomach. Biopsy taken.   • HERNIA REPAIR      umbilical   • UPPER GASTROINTESTINAL ENDOSCOPY  12/02/2013     Family History   Problem Relation Age of Onset   • Diabetes Other    • Hypertension Other        Prior to Admission medications    Medication Sig Start Date End Date Taking? Authorizing Provider   apixaban (ELIQUIS) 5 MG tablet tablet Take 5 mg by mouth 2 (Two) Times a Day.   Yes Geo Benítez MD   dilTIAZem (CARDIZEM) 120 MG tablet Take 120 mg by mouth 4 (Four) Times a Day. 2 tablets by mouth in A.M., 1 tablet by mouth at Noon, 1 tablet by mouth at bedtime   Yes Geo Benítez MD   folic acid (FOLVITE) 1 MG tablet Take 1 mg by mouth Daily.   Yes Geo Benítez MD   furosemide (LASIX) 40 MG tablet Take 40 mg by mouth Daily. 5/1/18  Yes Geo Benítez MD   glipiZIDE (GLUCOTROL) 10 MG tablet Take 1 tablet by mouth 2 (Two) Times a Day. 8/4/16  Yes Geo Benítez MD   lisinopril (PRINIVIL,ZESTRIL) 20 MG tablet Take 1 tablet by mouth 2 (Two) Times a Day. 9/4/16  Yes Geo Benítez MD   metFORMIN (GLUCOPHAGE) 1000 MG tablet Take 1,000 mg by mouth 2 (Two) Times a Day With Meals.   Yes  Geo Benítez MD   metoprolol tartrate (LOPRESSOR) 25 MG tablet Take 25 mg by mouth 2 (Two) Times a Day.   Yes Geo Benítez MD   omeprazole (priLOSEC) 40 MG capsule Take 1 capsule by mouth Daily. 12/12/18  Yes Haley Lane APRN   pravastatin (PRAVACHOL) 20 MG tablet Take 1 tablet by mouth Every Night. 8/13/16  Yes Geo Benítez MD   sulfaSALAzine (AZULFIDINE) 500 MG tablet TAKE 2 TABLETS BY MOUTH 3 (THREE) TIMES A DAY. 10/14/19  Yes Haley Lane APRN   terazosin (HYTRIN) 10 MG capsule Take 1 capsule by mouth Every Night. 8/30/16  Yes Geo Benítez MD   sotalol (BETAPACE) 80 MG tablet TAKE 0.5 TABLET BY ORAL ROUTE 2 TIMES PER DAY 5/15/18   Geo Benítez MD   acyclovir (ZOVIRAX) 400 MG tablet TAKE 1 TABLET(S) 5 TIMES A DAY BY ORAL ROUTE. 12/14/18 10/24/19  Geo Benítez MD   aspirin 81 MG EC tablet Take 81 mg by mouth Daily.  10/24/19  Geo Benítez MD   clotrimazole-betamethasone (LOTRISONE) 1-0.05 % cream APPLY CREAM TO THE AFFECTED AND SURROUNDING AREAS OF SKIN IN THE MORNING AND EVENING 2 TIMES A DAY 11/10/17 10/24/19  Geo Benítez MD   potassium chloride (KLOR-CON) 20 MEQ CR tablet Klor-Con M20 mEq tablet,extended release   TAKE 1 TABLET (20 MEQ) BY ORAL ROUTE 2 TIMES PER DAY WITH FOOD  10/24/19  Geo Benítez MD     No Known Allergies  Social History     Socioeconomic History   • Marital status: Single     Spouse name: Not on file   • Number of children: Not on file   • Years of education: Not on file   • Highest education level: Not on file   Tobacco Use   • Smoking status: Former Smoker     Types: Cigarettes   • Smokeless tobacco: Former User     Types: Snuff     Quit date: 2008   • Tobacco comment: 11/30/2017 - Patient States He Ceased Smoking 13 Years Prior.   Substance and Sexual Activity   • Alcohol use: Yes     Comment: occasional   • Drug use: No   • Sexual activity: Defer       Review of Systems  Review of Systems  "  Constitutional: Positive for fatigue. Negative for activity change, appetite change, chills, diaphoresis, fever and unexpected weight change.   HENT: Negative for congestion.    Respiratory: Negative for cough.    Cardiovascular: Negative for chest pain.   Gastrointestinal: Positive for abdominal pain. Negative for abdominal distention, anal bleeding, anorexia, blood in stool, change in bowel habit, constipation, diarrhea, nausea, rectal pain and vomiting.   Musculoskeletal: Negative for arthralgias, joint swelling, myalgias and neck pain.   Neurological: Negative for numbness and headaches.        /44 (BP Location: Left arm, Patient Position: Sitting, Cuff Size: Large Adult)   Pulse 68   Ht 167.6 cm (66\")   Wt 68.1 kg (150 lb 3.2 oz)   SpO2 98%   BMI 24.24 kg/m²     Objective    Physical Exam   Constitutional: Vital signs are normal. He appears well-developed and well-nourished. He is cooperative.   Cardiovascular: Normal rate and regular rhythm.   Pulmonary/Chest: Effort normal and breath sounds normal.   Abdominal: Normal appearance and bowel sounds are normal. There is no tenderness.     Admission on 11/06/2018, Discharged on 11/06/2018   Component Date Value Ref Range Status   • Glucose 11/06/2018 199* 70 - 130 mg/dL Final    : [id number] [place] TRACYMeter ID: [id number]   • Case Report 11/06/2018    Final                    Value:Surgical Pathology Report                         Case: [id number]                                  Authorizing Provider:  Alex Silveira MD        Collected:           11/06/2018 01:22 PM          Ordering Location:     [facility]             Received:            11/06/2018 02:58 PM                                 [facility]                                                     Pathologist:           Aldo Langley MD                                                         Specimens:   1) - Large Intestine, Cecum                            "                                              2) - Large Intestine, Right / Ascending Colon                                                       3) - Large Intestine, Transverse Colon                                                              4) - Large Intestine, Left / Descending Colon                                                       5) - Large Intestine, Sigmoid Colon                                                                                           6) - Large Intestine, Rectum                                                              • Final Diagnosis 11/06/2018    Final                    Value:This result contains rich text formatting which cannot be displayed here.   • Gross Description 11/06/2018    Final                    Value:This result contains rich text formatting which cannot be displayed here.     Assessment/Plan      1. Ulcerative chronic pancolitis without complications (CMS/HCC)    2. Gastroesophageal reflux disease with esophagitis    .       Orders placed during this encounter include:  Orders Placed This Encounter   Procedures   • Follow Anesthesia Guidelines / Standing Orders     Standing Status:   Future   • Obtain Informed Consent     Standing Status:   Future     Order Specific Question:   Informed Consent Given For     Answer:   COLONOSCOPY       COLONOSCOPY (N/A)    Review and/or summary of lab tests, radiology, procedures, medications. Review and summary of old records and obtaining of history. The risks and benefits of my recommendations, as well as other treatment options were discussed with the patient today. Questions were answered.    New Medications Ordered This Visit   Medications   • sodium-potassium-magnesium sulfates (SUPREP BOWEL PREP KIT) 17.5-3.13-1.6 GM/177ML solution oral solution     Sig: Take 1 bottle by mouth Every 12 (Twelve) Hours.     Dispense:  2 bottle     Refill:  0   • omeprazole (priLOSEC) 20 MG capsule     Sig: Take 1 capsule by mouth 2 (Two)  Times a Day.     Dispense:  180 capsule     Refill:  3       Follow-up: Return in about 4 weeks (around 11/21/2019) for After test.          This document has been electronically signed by CAUTE Allen on October 24, 2019 2:28 PM             Results for orders placed or performed during the hospital encounter of 11/06/18   Tissue Pathology Exam   Result Value Ref Range    Case Report       Surgical Pathology Report                         Case: [id number]                                  Authorizing Provider:  Alex Silveira MD        Collected:           11/06/2018 01:22 PM          Ordering Location:     [facility]             Received:            11/06/2018 02:58 PM                                 [facility]                                                     Pathologist:           Aldo Langley MD                                                         Specimens:   1) - Large Intestine, Cecum                                                                         2) - Large Intestine, Right / Ascending Colon                                                       3) - Large Intestine, Transverse Colon                                                              4) - Large Intestine, Left / Descending Colon                                                       5) - Large Intestine, Sigmoid Colon                                                                  6) - Large Intestine, Rectum                                                               Final Diagnosis       1.  MUCOSA, CECUM:  NO SIGNIFICANT HISTOLOGIC ABNORMALITY.  NEGATIVE FOR DYSPLASIA.    2.   MUCOSA, ASCENDING COLON:  NO SIGNIFICANT HISTOLOGIC ABNORMALITY.  NEGATIVE FOR DYSPLASIA.    3.  MUCOSA, TRANSVERSE COLON:  NO SIGNIFICANT HISTOLOGIC ABNORMALITY.  NEGATIVE FOR DYSPLASIA.     4.  MUCOSA, DESCENDING COLON:  NO SIGNIFICANT HISTOLOGIC ABNORMALITY.  NEGATIVE FOR DYSPLASIA.    5.  MUCOSA, SIGMOID COLON:  NO  "SIGNIFICANT HISTOLOGIC ABNORMALITY.  NEGATIVE FOR DYSPLASIA.    6.  MUCOSA, RECTUM:  NO SIGNIFICANT HISTOLOGIC ABNORMALITY.  NEGATIVE FOR DYSPLASIA.      Gross Description       Received for examination are 6 containers, each of which have nodular bits of white soft tissue measuring 0.3-0.5 cc in aggregate.  All specimens are embedded as labeled.  1A cecum; 2A ascending colon; 3A transverse colon; 4A descending colon; 5A sigmoid colon; 6A rectum.     POC Glucose Once   Result Value Ref Range    Glucose 199 (H) 70 - 130 mg/dL   Results for orders placed or performed in visit on 10/17/18   Tissue Pathology Exam   Result Value Ref Range    Case Report       Surgical Pathology Report                         Case: [id number]                                  Authorizing Provider:  Alex Han MD        Collected:           10/17/2018 02:10 PM          Ordering Location:     [facility]     Received:            10/18/2018 11:21 AM                                 [facility]                                                           Pathologist:           Jacob Live MD                                                        Specimen:    Nose, Punch biopsy - suspect basal cell carcinoma of nose                                  Final Diagnosis       Skin, nose, punch biopsy:  Basal cell carcinoma.      Gross Description       Specimen #1 is received in a formalin filled container, labeled with the patient's name, date of birth, and \"nose lesion possible BCC\".  The specimen consists of a round to oval punch biopsy measuring 0.2 x 0.2 cm and measures 0.3 cm in depth.  The skin surface has been inked blue by surgeon and appears raised.  The resection margin is inked black and the specimen is totally submitted, intact, in block 1A.      Microscopic Description       Histologic sections show a skin punch with nests of malignant basaloid cells with peripheral palisading.     Results for orders placed or " performed during the hospital encounter of 10/30/17   Tissue Pathology Exam - Tissue, Large Intestine, Cecum   Result Value Ref Range    Case Report       Surgical Pathology Report                         Case: [id number]                                  Authorizing Provider:  Alex Silveira MD         Collected:           10/30/2017 10:11 AM          Ordering Location:     [facility]             Received:            10/30/2017 11:05 AM                                 [facility]                                                     Pathologist:           Aldo Langley MD                                                          Specimens:   1) - Large Intestine, Cecum, cecum                                                                  2) - Large Intestine, Right / Ascending Colon                                                       3) - Large Intestine, ascending polyp                                                               4) - Large Intestine, Transverse Colon                                                              5) - Large Intestine, Left / Descending Colon                                                        6) - Large Intestine, Rectum                                                                        7) - Large Intestine, Sigmoid Colon                                                        Final Diagnosis       1.  MUCOSA, CECUM:  NO SIGNIFICANT HISTOLOGIC ABNORMALITY.  NEGATIVE FOR DYSPLASIA.    2.  MUCOSA, ASCENDING COLON:  NO SIGNIFICANT HISTOLOGIC ABNORMALITY.  NEGATIVE FOR DYSPLASIA.    3.  POLYP, ASCENDING COLON:  TUBULAR ADENOMA WITH MILD DYSPLASIA.    4.  MUCOSA, TRANSVERSE COLON:  NO SIGNIFICANT HISTOLOGIC ABNORMALITY.  NEGATIVE FOR DYSPLASIA.    5.  MUCOSA, DESCENDING COLON:  NO SIGNIFICANT HISTOLOGIC ABNORMALITY.  NEGATIVE FOR DYSPLASIA.    6.  MUCOSA, RECTUM:  NO SIGNIFICANT HISTOLOGIC ABNORMALITY.  NEGATIVE FOR DYSPLASIA.    7.  MUCOSA, SIGMOID COLON:  NO  SIGNIFICANT HISTOLOGIC ABNORMALITY.  NEGATIVE FOR DYSPLASIA.        Gross Description       Received for examination are 7 containers, each of which have nodular bits of white soft tissue measuring 0.3-0.5 cc in aggregate.  All specimens are embedded as labeled:  1A cecum; 2A ascending colon; 3A polyp, ascending colon; 4A transverse colon; 5A descending colon; 6A rectum; 7A sigmoid colon.         Embedded Images     POC Glucose Fingerstick   Result Value Ref Range    Glucose 220 (H) 70 - 130 mg/dL   Results for orders placed or performed in visit on 10/12/16    COLONOSCOPY   Result Value Ref Range     Colonoscopy ORDERED BY DR TIMUR CIFUENTES    Results for orders placed or performed during the hospital encounter of 09/06/16   Converted Surgical Pathology   Result Value Ref Range    Spec Descr 1 SPECIMEN(S): A BIOPSY @ 15 CM     Specimen description 2 SPECIMEN(S): B BIOPSY @ 13 CM     Specimen description 3 SPECIMEN(S): C BIOPSY @ 10 CM    POCT glucose fingerstick   Result Value Ref Range    Glucose 199 (H) 60 - 100 mg/dl   Results for orders placed or performed during the hospital encounter of 08/20/15   Converted Surgical Pathology   Result Value Ref Range    Spec Descr 1 SPECIMEN(S): A POLYP @ 30 CM     Specimen description 2 SPECIMEN(S): B BIOPSY, RECTUM     Preoperative Diagnosis         PREOPERATIVE DIAGNOSIS:  Bleeding (578.9), surveillance of ulcerative colitis      Postoperative Diagnosis         POSTOPERATIVE DIAGNOSIS:  Polyp at 30 cm, proctitis (556.9), diverticulosis of sigmoid colon  (562.10), hemorrhoids      Gross Description      Final Diagnosis         FINAL DIAGNOSIS:  A.  COLON, 30 CM, BIOPSY:            TUBULAR ADENOMA.  B.  RECTUM, BIOPSY:            DIFFUSE ACTIVE COLITIS.            A HEAVY LYMPHOID INFILTRATE APPEARS REACTIVE.      Comment         COMMENT:  Dr. Langley has reviewed specimen B and agrees with the diagnosis.      CONVERTED (HISTORICAL) FINAL PATHOLOGIST       Diagnostician:   "TRUDY YOON M.D.  Pathologist  Electronically Signed 08/24/2015      Diagnosis Code   DIAGNOSIS CODE:  8      POCT Glucose Fingerstick   Result Value Ref Range    Glucose 202 (H) 60 - 100 mg/dl   Results for orders placed or performed in visit on 03/19/12   Converted Surgical Pathology   Result Value Ref Range    Spec Descr 1 SPECIMEN(S): A RECTUM BIOPSY     Preoperative Diagnosis   PREOPERATIVE DIAGNOSIS:  None Given       Postoperative Diagnosis   POSTOPERATIVE DIAGNOSIS:  CLIFF       Gross Description         GROSS DESCRIPTION:  The specimen is labeled \"#1 rectum\" and consists of 2 tan fragments  measuring 0.5 x 0.5 x 0.2 cm together.  Totally submitted.      Final Diagnosis         FINAL DIAGNOSIS:  RECTUM, BIOPSY:       DIFFUSE ACTIVE COLITIS.      Comment         COMMENT:  The findings are consistent with the clinical history of chronic  ulcerative colitis.  Inflammation does extend into the submucosa and  granulomas are present, raising the question of Crohn's disease.  Clinical correlation is recommended.      CONVERTED (HISTORICAL) FINAL PATHOLOGIST       Diagnostician:  TRUDY YOON M.D.  Pathologist  Electronically Signed 03/20/2012      Diagnosis Code   DIAGNOSIS CODE:  4        "

## 2019-10-24 NOTE — PATIENT INSTRUCTIONS
Ulcerative Colitis, Adult    Ulcerative colitis is long-lasting (chronic) swelling (inflammation) of the large intestine (colon). Sores (ulcers) may also form on the colon.  Ulcerative colitis is closely related to another condition of inflammation of the intestines that is called Crohn disease. Together, they are frequently referred to as inflammatory bowel disease (IBD).  What are the causes?  Ulcerative colitis is caused by increased activity of the immune system in the intestines. The immune system is the system that protects the body against harmful bacteria, viruses, fungi, and other things that can make you sick. When the immune system overacts, it causes inflammation. The cause of the increased immune system activity is not known.  What increases the risk?  Risk factors of ulcerative colitis include:  · Age. This includes:  ? Being 15-30 years old.  ? Being older than 60 years old.  · Having a family history of ulcerative colitis.  · Being of Denominational descent.  What are the signs or symptoms?  Common symptoms of ulcerative colitis include rectal bleeding and diarrhea. There is a wide range of symptoms, and a person's symptoms depend on how severe the condition is. Additional symptoms may include:  · Pain or cramping in the belly (abdomen).  · Fever.  · Fatigue.  · Weight loss.  · Night sweats.  · Rectal pain.  · Feeling the immediate need to have a bowel movement.  · Nausea.  · Loss of appetite.  · Anemia.  · Joint pain or soreness.  · Eye irritation.  · Certain skin rashes.  How is this diagnosed?  Ulcerative colitis may be diagnosed by:  · Medical history and physical exam.  · Blood tests and stool tests.  · X-rays.  · CT scans.  · Colonoscopy. For this test, a flexible tube is inserted into your anus and your colon is examined.  · Examination of a tissue sample from your colon (biopsy).  How is this treated?  Treatment for ulcerative colitis may include medicines to:  · Decrease inflammation.  · Control  your immune system.  Surgery may also be necessary.  Follow these instructions at home:  Medicines and vitamins  · Take medicines only as directed by your doctor. Do not take aspirin.  · Ask your doctor if you should take any vitamins or supplements.  Lifestyle  · Exercise regularly.  · Limit alcohol intake to no more than 1 drink per day for nonpregnant women and 2 drinks per day for men. One drink equals 12 ounces of beer, 5 ounces of wine, or 1½ ounces of hard liquor.  Eating and drinking  · Drink enough fluid to keep your urine clear or pale yellow.  · Ask your health care provider about the best diet for you. Follow the diet as directed by your health care provider. This may include:  ? Avoiding carbonated drinks.  ? Avoiding popcorn, vegetable skins, nuts, and other high-fiber foods when you have symptoms of ulcerative colitis.  ? Eating smaller meals more often.  ? Keeping a food diary. This may help you to find and avoid any foods that make you feel not well.  · Limit your caffeine intake.  General instructions  · Keep all follow-up appointments as directed by your health care provider. This is important.  Contact a health care provider if:  · Your symptoms do not improve or get worse with treatment.  · You continue to lose weight.  · You have constant cramps or loose bowels.  · You develop a new skin rash, skin sores, or eye problems.  · You have a fever or chills.  Get help right away if:  · You have bloody diarrhea.  · You have severe pain in your abdomen.  · You vomit.  This information is not intended to replace advice given to you by your health care provider. Make sure you discuss any questions you have with your health care provider.  Document Released: 09/27/2006 Document Revised: 08/20/2017 Document Reviewed: 04/11/2016  [employer] Patient Education © 2019 [employer]

## 2019-11-11 ENCOUNTER — TELEPHONE (OUTPATIENT)
Dept: GASTROENTEROLOGY | Facility: CLINIC | Age: 79
End: 2019-11-11

## 2019-11-11 NOTE — TELEPHONE ENCOUNTER
11/11/2019, 1311 - Patient telephoned per this staff member [phone] with notification of verbal order received per Dr. Alex Matthew M.D. in regards to statement received per [facility] Same Day Surgery Department stating patient has not ceased utilization of Eliquis 5 MG Tablet, 1 tablet by mouth 2 times per day X 5 days.  Per Dr. Alex Matthew M.D. - Patient to cancel Colonoscopy scheduled for completion tomorrow, Tuesday, November 12, 2019 with an arrival time of 1330 and reschedule once patient has ceased utilization of prescription medication X 5 days.  Per Abby Godinez with [facility] Endoscopy Department, patient procedure rescheduled Thursday, November 21, 2019 with an arrival time of 2:00 P.M.  Patient in agreement with procedure rescheduled date/time.

## 2019-11-11 NOTE — TELEPHONE ENCOUNTER
----- Message from Adilene Gil MA sent at 11/11/2019 10:17 AM CST -----  Same Day Surgery called stating that they were making calls for endo tomorrow and the patient stated he had not stopped his Eliquis yet that he took a dose yesterday. Check with Dr. Silveira to see if he wants to reschedule his endo.

## 2019-11-15 RX ORDER — DILTIAZEM HYDROCHLORIDE 120 MG/1
CAPSULE, COATED, EXTENDED RELEASE ORAL
Qty: 180 CAPSULE | Refills: 3 | Status: ON HOLD | OUTPATIENT
Start: 2019-11-15 | End: 2020-02-27 | Stop reason: HOSPADM

## 2019-11-21 ENCOUNTER — ANESTHESIA (OUTPATIENT)
Dept: GASTROENTEROLOGY | Facility: HOSPITAL | Age: 79
End: 2019-11-21

## 2019-11-21 ENCOUNTER — ANESTHESIA EVENT (OUTPATIENT)
Dept: GASTROENTEROLOGY | Facility: HOSPITAL | Age: 79
End: 2019-11-21

## 2019-11-21 ENCOUNTER — HOSPITAL ENCOUNTER (OUTPATIENT)
Facility: HOSPITAL | Age: 79
Setting detail: HOSPITAL OUTPATIENT SURGERY
Discharge: HOME | End: 2019-11-21
Attending: INTERNAL MEDICINE | Admitting: INTERNAL MEDICINE

## 2019-11-21 VITALS
SYSTOLIC BLOOD PRESSURE: 145 MMHG | DIASTOLIC BLOOD PRESSURE: 63 MMHG | WEIGHT: 147.8 LBS | RESPIRATION RATE: 20 BRPM | TEMPERATURE: 97.2 F | HEIGHT: 66 IN | OXYGEN SATURATION: 95 % | HEART RATE: 80 BPM | BODY MASS INDEX: 23.75 KG/M2

## 2019-11-21 DIAGNOSIS — K51.00 ULCERATIVE CHRONIC PANCOLITIS WITHOUT COMPLICATIONS (HCC): ICD-10-CM

## 2019-11-21 LAB — GLUCOSE BLDC GLUCOMTR-MCNC: 119 MG/DL (ref 70–130)

## 2019-11-21 PROCEDURE — 45380 COLONOSCOPY AND BIOPSY: CPT | Performed by: INTERNAL MEDICINE

## 2019-11-21 PROCEDURE — 88305 TISSUE EXAM BY PATHOLOGIST: CPT | Performed by: PATHOLOGY

## 2019-11-21 PROCEDURE — 88305 TISSUE EXAM BY PATHOLOGIST: CPT | Performed by: INTERNAL MEDICINE

## 2019-11-21 PROCEDURE — 25010000002 PROPOFOL 10 MG/ML EMULSION: Performed by: NURSE ANESTHETIST, CERTIFIED REGISTERED

## 2019-11-21 PROCEDURE — 82962 GLUCOSE BLOOD TEST: CPT

## 2019-11-21 RX ORDER — PROPOFOL 10 MG/ML
VIAL (ML) INTRAVENOUS AS NEEDED
Status: DISCONTINUED | OUTPATIENT
Start: 2019-11-21 | End: 2019-11-21 | Stop reason: SURG

## 2019-11-21 RX ORDER — PROMETHAZINE HYDROCHLORIDE 25 MG/1
25 SUPPOSITORY RECTAL ONCE AS NEEDED
Status: DISCONTINUED | OUTPATIENT
Start: 2019-11-21 | End: 2019-11-21 | Stop reason: HOSPADM

## 2019-11-21 RX ORDER — DEXTROSE AND SODIUM CHLORIDE 5; .45 G/100ML; G/100ML
30 INJECTION, SOLUTION INTRAVENOUS CONTINUOUS PRN
Status: DISCONTINUED | OUTPATIENT
Start: 2019-11-21 | End: 2019-11-21 | Stop reason: HOSPADM

## 2019-11-21 RX ORDER — PROMETHAZINE HYDROCHLORIDE 25 MG/ML
12.5 INJECTION, SOLUTION INTRAMUSCULAR; INTRAVENOUS ONCE AS NEEDED
Status: DISCONTINUED | OUTPATIENT
Start: 2019-11-21 | End: 2019-11-21 | Stop reason: HOSPADM

## 2019-11-21 RX ORDER — ONDANSETRON 2 MG/ML
4 INJECTION INTRAMUSCULAR; INTRAVENOUS ONCE AS NEEDED
Status: DISCONTINUED | OUTPATIENT
Start: 2019-11-21 | End: 2019-11-21 | Stop reason: HOSPADM

## 2019-11-21 RX ORDER — PROMETHAZINE HYDROCHLORIDE 25 MG/1
25 TABLET ORAL ONCE AS NEEDED
Status: DISCONTINUED | OUTPATIENT
Start: 2019-11-21 | End: 2019-11-21 | Stop reason: HOSPADM

## 2019-11-21 RX ORDER — LIDOCAINE HYDROCHLORIDE 20 MG/ML
INJECTION, SOLUTION INTRAVENOUS AS NEEDED
Status: DISCONTINUED | OUTPATIENT
Start: 2019-11-21 | End: 2019-11-21 | Stop reason: SURG

## 2019-11-21 RX ADMIN — PROPOFOL 50 MG: 10 INJECTION, EMULSION INTRAVENOUS at 14:54

## 2019-11-21 RX ADMIN — LIDOCAINE HYDROCHLORIDE 50 MG: 20 INJECTION, SOLUTION INTRAVENOUS at 14:43

## 2019-11-21 RX ADMIN — DEXTROSE AND SODIUM CHLORIDE 30 ML/HR: 5; 450 INJECTION, SOLUTION INTRAVENOUS at 14:05

## 2019-11-21 RX ADMIN — PROPOFOL 50 MG: 10 INJECTION, EMULSION INTRAVENOUS at 14:49

## 2019-11-21 RX ADMIN — PROPOFOL 50 MG: 10 INJECTION, EMULSION INTRAVENOUS at 14:43

## 2019-11-21 NOTE — ANESTHESIA POSTPROCEDURE EVALUATION
Patient: Gage Ken    Procedure Summary     Date:  11/21/19 Room / Location:  [facility] ENDOSCOPY 2 / [facility] ENDOSCOPY    Anesthesia Start:  1442 Anesthesia Stop:  1459    Procedure:  COLONOSCOPY (N/A ) Diagnosis:       Ulcerative chronic pancolitis without complications (CMS/HCC)      (Ulcerative chronic pancolitis without complications (CMS/HCC) [K51.00])    Surgeon:  Alex Silveira MD Provider:  Mike Pearson CRNA    Anesthesia Type:  MAC ASA Status:  3          Anesthesia Type: MAC  Last vitals  BP   (!) 202/82 (11/21/19 1341)   Temp   98.3 °F (36.8 °C) (11/21/19 1341)   Pulse   96 (11/21/19 1341)   Resp   18 (11/21/19 1341)     SpO2   98 % (11/21/19 1341)     Post Anesthesia Care and Evaluation    Patient location during evaluation: bedside  Patient participation: complete - patient cannot participate  Level of consciousness: awake  Pain score: 0  Pain management: adequate  Airway patency: patent  Anesthetic complications: No anesthetic complications  PONV Status: none  Cardiovascular status: acceptable  Respiratory status: acceptable  Hydration status: acceptable

## 2019-11-25 ENCOUNTER — OFFICE VISIT (OUTPATIENT)
Dept: CARDIOLOGY | Age: 79
End: 2019-11-25
Payer: MEDICARE

## 2019-11-25 VITALS
SYSTOLIC BLOOD PRESSURE: 138 MMHG | DIASTOLIC BLOOD PRESSURE: 64 MMHG | WEIGHT: 157 LBS | BODY MASS INDEX: 25.23 KG/M2 | HEIGHT: 66 IN | HEART RATE: 72 BPM

## 2019-11-25 DIAGNOSIS — I48.0 PAF (PAROXYSMAL ATRIAL FIBRILLATION) (HCC): ICD-10-CM

## 2019-11-25 DIAGNOSIS — I49.5 SINUS NODE DYSFUNCTION (HCC): Primary | ICD-10-CM

## 2019-11-25 DIAGNOSIS — Z79.01 CHRONIC ANTICOAGULATION: ICD-10-CM

## 2019-11-25 DIAGNOSIS — I10 ESSENTIAL HYPERTENSION: ICD-10-CM

## 2019-11-25 LAB
LAB AP CASE REPORT: NORMAL
PATH REPORT.FINAL DX SPEC: NORMAL
PATH REPORT.GROSS SPEC: NORMAL

## 2019-11-25 PROCEDURE — 1036F TOBACCO NON-USER: CPT | Performed by: INTERNAL MEDICINE

## 2019-11-25 PROCEDURE — G8598 ASA/ANTIPLAT THER USED: HCPCS | Performed by: INTERNAL MEDICINE

## 2019-11-25 PROCEDURE — 4040F PNEUMOC VAC/ADMIN/RCVD: CPT | Performed by: INTERNAL MEDICINE

## 2019-11-25 PROCEDURE — G8427 DOCREV CUR MEDS BY ELIG CLIN: HCPCS | Performed by: INTERNAL MEDICINE

## 2019-11-25 PROCEDURE — 99213 OFFICE O/P EST LOW 20 MIN: CPT | Performed by: INTERNAL MEDICINE

## 2019-11-25 PROCEDURE — G8484 FLU IMMUNIZE NO ADMIN: HCPCS | Performed by: INTERNAL MEDICINE

## 2019-11-25 PROCEDURE — 1123F ACP DISCUSS/DSCN MKR DOCD: CPT | Performed by: INTERNAL MEDICINE

## 2019-11-25 PROCEDURE — G8417 CALC BMI ABV UP PARAM F/U: HCPCS | Performed by: INTERNAL MEDICINE

## 2019-11-25 ASSESSMENT — ENCOUNTER SYMPTOMS
RESPIRATORY NEGATIVE: 1
VOMITING: 0
DIARRHEA: 0
NAUSEA: 0
EYES NEGATIVE: 1
GASTROINTESTINAL NEGATIVE: 1
SHORTNESS OF BREATH: 0

## 2019-12-02 ENCOUNTER — OFFICE VISIT (OUTPATIENT)
Dept: GASTROENTEROLOGY | Facility: CLINIC | Age: 79
End: 2019-12-02

## 2019-12-02 VITALS
WEIGHT: 157.2 LBS | HEIGHT: 66 IN | DIASTOLIC BLOOD PRESSURE: 70 MMHG | SYSTOLIC BLOOD PRESSURE: 152 MMHG | HEART RATE: 82 BPM | BODY MASS INDEX: 25.26 KG/M2

## 2019-12-02 DIAGNOSIS — K51.00 ULCERATIVE CHRONIC PANCOLITIS WITHOUT COMPLICATIONS (HCC): Primary | ICD-10-CM

## 2019-12-02 DIAGNOSIS — K58.0 IRRITABLE BOWEL SYNDROME WITH DIARRHEA: ICD-10-CM

## 2019-12-02 PROBLEM — I10 HYPERTENSION: Status: ACTIVE | Noted: 2018-10-17

## 2019-12-02 PROBLEM — N40.0 BENIGN PROSTATIC HYPERPLASIA: Status: ACTIVE | Noted: 2018-07-27

## 2019-12-02 PROBLEM — Z79.01 CHRONIC ANTICOAGULATION: Status: ACTIVE | Noted: 2019-05-09

## 2019-12-02 PROBLEM — G47.00 INSOMNIA: Status: ACTIVE | Noted: 2019-03-08

## 2019-12-02 PROBLEM — E11.9 TYPE 2 DIABETES MELLITUS (HCC): Status: ACTIVE | Noted: 2018-05-08

## 2019-12-02 PROBLEM — E83.51 HYPOCALCEMIA: Status: ACTIVE | Noted: 2019-04-09

## 2019-12-02 PROBLEM — I65.29 CAROTID ARTERY OCCLUSION: Status: ACTIVE | Noted: 2019-12-02

## 2019-12-02 PROBLEM — F32.A DEPRESSION: Status: ACTIVE | Noted: 2019-12-02

## 2019-12-02 PROBLEM — N18.9 CHRONIC RENAL FAILURE: Status: ACTIVE | Noted: 2019-07-13

## 2019-12-02 PROBLEM — D53.9 MACROCYTIC ANEMIA: Status: ACTIVE | Noted: 2019-04-09

## 2019-12-02 PROBLEM — E53.9 VITAMIN B DEFICIENCY: Status: ACTIVE | Noted: 2019-02-08

## 2019-12-02 PROCEDURE — 99214 OFFICE O/P EST MOD 30 MIN: CPT | Performed by: NURSE PRACTITIONER

## 2019-12-02 RX ORDER — SULFASALAZINE 500 MG/1
1000 TABLET ORAL 3 TIMES DAILY
Qty: 360 TABLET | Refills: 1 | Status: SHIPPED | OUTPATIENT
Start: 2019-12-02 | End: 2020-01-01

## 2019-12-02 RX ORDER — DICYCLOMINE HYDROCHLORIDE 10 MG/1
10 CAPSULE ORAL
Qty: 120 CAPSULE | Refills: 3 | Status: ON HOLD | OUTPATIENT
Start: 2019-12-02 | End: 2020-01-01

## 2019-12-02 NOTE — PROGRESS NOTES
Chief Complaint   Patient presents with   • Ulcerative Colitis       Subjective    Gage Ken is a 79 y.o. male. he is here today for follow-up.  Patient reports he has been doing very well though has noted diarrhea intermittently over the last few months.  He was recently hospitalized and started on 3 different medications for his heart and feels like her bowel habits is related to medication changes.  States he will have days of diarrhea about 3-4 times per day with no blood or mucus within the stool and days of normal bowel movements which for him is about 1-2 times per day.  Denies any abdominal pain.  Has been well maintained on sulfasalazine for several years.    Ulcerative Colitis   This is a chronic problem. The current episode started more than 1 year ago. The problem occurs intermittently. The problem has been waxing and waning. Pertinent negatives include no abdominal pain, anorexia, arthralgias, change in bowel habit, chest pain, chills, congestion, coughing, diaphoresis, fatigue, fever, nausea, sore throat or vomiting.   Colonoscopy 11/21/2019 noted adequate prep normal perianal and digital rectal exam was noted.  Cecal, ascending, transverse, descending, sigmoid, rectal biopsy noted no significant histologic abnormality.  Will need repeat colonoscopy in 1 year for surveillance.         The following portions of the patient's history were reviewed and updated as appropriate:   Past Medical History:   Diagnosis Date   • Acute gastritis    • Blood in feces    • Carotid artery occlusion 12/2/2019   • Colitis, ulcerative chronic (CMS/HCC)     LEFT-SIDED   • Diverticular disease of colon    • Epigastric pain    • GERD (gastroesophageal reflux disease)    • History of colon polyps    • [place] (hard of hearing)    • Hypertension    • Lesion of nose    • Neoplasm of uncertain behavior    • Nonspecific ulcerative proctitis (CMS/HCC)    • Rectal hemorrhage    • Type 2 diabetes mellitus (CMS/HCC)    •  Vitamin B12 deficiency      Past Surgical History:   Procedure Laterality Date   • COLONOSCOPY  12/02/2013    Hemorrhoids found.   • COLONOSCOPY  09/06/2016   • COLONOSCOPY N/A 10/30/2017    Procedure: COLONOSCOPY;  Surgeon: Alex iSlveira MD;  Location: [facility] ENDOSCOPY;  Service:    • COLONOSCOPY N/A 11/6/2018    Procedure: COLONOSCOPY;  Surgeon: Alex Silveira MD;  Location: [facility] ENDOSCOPY;  Service: Gastroenterology   • COLONOSCOPY N/A 11/21/2019    Procedure: COLONOSCOPY;  Surgeon: Alex Silveira MD;  Location: [facility] ENDOSCOPY;  Service: Gastroenterology   • COLONOSCOPY W/ POLYPECTOMY  08/30/2015    Single polyp found in the colon;removed by cold snare polypectomy.Proctitis in the rectum.Diverticulosis found in the sigmoid colon.Hemorrhoids found.   • ENDOSCOPY  12/02/2013    Normal hypopharynx, esophagus, duodenum, symmetrical & patent pylorus. Hiatus hernia in GE junction. Normal stomach. Biopsy taken.   • HERNIA REPAIR      umbilical   • UPPER GASTROINTESTINAL ENDOSCOPY  12/02/2013     Family History   Problem Relation Age of Onset   • Diabetes Other    • Hypertension Other        Prior to Admission medications    Medication Sig Start Date End Date Taking? Authorizing Provider   apixaban (ELIQUIS) 5 MG tablet tablet Take 5 mg by mouth 2 (Two) Times a Day.   Yes Geo Benítez MD   dilTIAZem (CARDIZEM) 120 MG tablet Take 120 mg by mouth 4 (Four) Times a Day. 2 tablets by mouth in A.M., 1 tablet by mouth at Noon, 1 tablet by mouth at bedtime   Yes Geo Benítez MD   folic acid (FOLVITE) 1 MG tablet Take 1 mg by mouth Daily.   Yes Geo Benítez MD   furosemide (LASIX) 40 MG tablet Take 40 mg by mouth Daily. 5/1/18  Yes Geo Benítez MD   glipiZIDE (GLUCOTROL) 10 MG tablet Take 1 tablet by mouth 2 (Two) Times a Day. 8/4/16  Yes Geo Benítez MD   lisinopril (PRINIVIL,ZESTRIL) 20 MG tablet Take 1 tablet by mouth 2 (Two) Times a Day. 9/4/16  Yes Geo Benítez  MD   metFORMIN (GLUCOPHAGE) 1000 MG tablet Take 1,000 mg by mouth 2 (Two) Times a Day With Meals.   Yes Provider, MD Geo   metoprolol tartrate (LOPRESSOR) 25 MG tablet Take 25 mg by mouth 2 (Two) Times a Day.   Yes Provider, MD Geo   omeprazole (priLOSEC) 20 MG capsule Take 1 capsule by mouth 2 (Two) Times a Day. 10/24/19  Yes Haley Lane APRN   pravastatin (PRAVACHOL) 20 MG tablet Take 1 tablet by mouth Every Night. 8/13/16  Yes ProviderGeo MD   sotalol (BETAPACE) 80 MG tablet TAKE 0.5 TABLET BY ORAL ROUTE 2 TIMES PER DAY 5/15/18  Yes Geo Benítez MD   terazosin (HYTRIN) 10 MG capsule Take 1 capsule by mouth Every Night. 8/30/16  Yes ProviderGeo MD     No Known Allergies  Social History     Socioeconomic History   • Marital status: Single     Spouse name: Not on file   • Number of children: Not on file   • Years of education: Not on file   • Highest education level: Not on file   Tobacco Use   • Smoking status: Former Smoker     Types: Cigarettes   • Smokeless tobacco: Former User     Types: Snuff     Quit date: 2008   • Tobacco comment: 11/30/2017 - Patient States He Ceased Smoking 13 Years Prior.   Substance and Sexual Activity   • Alcohol use: Yes     Comment: occasional   • Drug use: No   • Sexual activity: Defer       Review of Systems  Review of Systems   Constitutional: Negative for activity change, appetite change, chills, diaphoresis, fatigue, fever and unexpected weight change.   HENT: Negative for congestion, sore throat and trouble swallowing.    Respiratory: Negative for cough and shortness of breath.    Cardiovascular: Negative for chest pain.   Gastrointestinal: Positive for diarrhea. Negative for abdominal distention, abdominal pain, anal bleeding, anorexia, blood in stool, change in bowel habit, constipation, nausea, rectal pain and vomiting.   Musculoskeletal: Negative for arthralgias.   Skin: Negative for pallor.   Neurological: Negative for  "light-headedness.        /70 (BP Location: Left arm)   Pulse 82   Ht 167.6 cm (66\")   Wt 71.3 kg (157 lb 3.2 oz)   BMI 25.37 kg/m²     Objective    Physical Exam   Constitutional: He is oriented to person, place, and time. He appears well-developed and well-nourished. He is cooperative. No distress.   HENT:   Head: Normocephalic and atraumatic.   Neck: Normal range of motion. Neck supple. No thyromegaly present.   Cardiovascular: Normal rate, regular rhythm and normal heart sounds.   Pulmonary/Chest: Effort normal and breath sounds normal. He has no wheezes. He has no rhonchi. He has no rales.   Abdominal: Soft. Normal appearance and bowel sounds are normal. He exhibits no distension. There is no hepatosplenomegaly. There is no tenderness. There is no rigidity and no guarding. No hernia.   Lymphadenopathy:     He has no cervical adenopathy.   Neurological: He is alert and oriented to person, place, and time.   Skin: Skin is warm, dry and intact. No rash noted. No pallor.   Psychiatric: He has a normal mood and affect. His speech is normal.     Admission on 11/21/2019, Discharged on 11/21/2019   Component Date Value Ref Range Status   • Glucose 11/21/2019 119  70 - 130 mg/dL Final    : [id number] SAMIA LOPEZMeter ID: [id number]   • Case Report 11/21/2019    Final                    Value:Surgical Pathology Report                         Case: [id number]                                  Authorizing Provider:  Alex Silveira MD        Collected:           11/21/2019 03:00 PM          [facility]             Received:            11/22/2019 08:16 AM                                 [place] [facility]                                                     Pathologist:           Sang Glover MD                                                           Specimens:   1) - Large Intestine, Cecum, cecum bx                                                               " 2) - Large Intestine, Right / Ascending Colon, ascending colon bx                                   3) - Large Intestine, Transverse Colon, transverse colon bx                                         4) - Large Intestine, Left / Descending Colon, descending colon bx                                  5) - Large Intestine, Sigmoid Colon, sigmoid colon bx                                                                         6) - Large Intestine, Rectum, rectum bx                                                   • Final Diagnosis 11/21/2019    Final                    Value:This result contains rich text formatting which cannot be displayed here.   • Gross Description 11/21/2019    Final                    Value:This result contains rich text formatting which cannot be displayed here.     Assessment/Plan      1. Ulcerative chronic pancolitis without complications (CMS/HCC)    2. Irritable bowel syndrome with diarrhea    .   We will add Bentyl for irritable bowel symptoms.  Monitor diet to see if intake is worsening symptoms started bentyl at 10 mg if able to tolerate we will increase to 20 mg if needed.  Continue sulfasalazine for ulcerative colitis will need repeat colonoscopy in 1 year will obtain lab work from primary care provider which was completed last month.  Follow-up in 6 months for recheck sooner if needed    Orders placed during this encounter include:  No orders of the defined types were placed in this encounter.      * Surgery not found *    Review and/or summary of lab tests, radiology, procedures, medications. Review and summary of old records and obtaining of history. The risks and benefits of my recommendations, as well as other treatment options were discussed with the patient today. Questions were answered.    New Medications Ordered This Visit   Medications   • dicyclomine (BENTYL) 10 MG capsule     Sig: Take 1 capsule by mouth 4 (Four) Times a Day Before Meals & at Bedtime.     Dispense:  120  capsule     Refill:  3   • sulfaSALAzine (AZULFIDINE) 500 MG tablet     Sig: Take 2 tablets by mouth 3 (Three) Times a Day.     Dispense:  360 tablet     Refill:  1       Follow-up: Return in about 6 months (around 6/2/2020).          This document has been electronically signed by CUATE Allen on December 2, 2019 10:53 AM             Results for orders placed or performed during the hospital encounter of 11/21/19   Tissue Pathology Exam   Result Value Ref Range    Case Report       Surgical Pathology Report                         Case: [id number]                                  Authorizing Provider:  Alex Silveira MD        Collected:           11/21/2019 03:00 PM          Ordering Location[facility]     [facility]             Received:            11/22/2019 08:16 AM                                 [place] [facility]                                                     Pathologist:           Sang Glover MD                                                           Specimens:   1) - Large Intestine, Cecum, cecum bx                                                               2) - Large Intestine, Right / Ascending Colon, ascending colon bx                                   3) - Large Intestine, Transverse Colon, transverse colon bx                                         4) - Large Intestine, Left / Descending Colon, descending colon bx                                  5) - Large Intestine, Sigmoid Colon, sigmoid colon bx                                                6) - Large Intestine, Rectum, rectum bx                                                    Final Diagnosis       1.  CECUM, BIOPSY:  NO SIGNIFICANT HISTOLOGIC ABNORMALITY.    2.  ASCENDING COLON, BIOPSY:  NO SIGNIFICANT HISTOLOGIC ABNORMALITY.    3.  TRANSVERSE COLON, BIOPSY:  NO SIGNIFICANT HISTOLOGIC ABNORMALITY.    4.  DESCENDING COLON, BIOPSY:  NO SIGNIFICANT HISTOLOGIC ABNORMALITY.    5.  SIGMOID COLON, BIOPSY:  NO SIGNIFICANT  "HISTOLOGIC ABNORMALITY.    6.  RECTUM, BIOPSY:  NO SIGNIFICANT HISTOLOGIC ABNORMALITY.      Gross Description       1.  Specimen #1 is labeled \"cecum\".  Two tan fragments measure 0.7 x 0.5 x 0.1 cm together.  Totally submitted.    2.  Specimen #2 is labeled \"A colon\".  Three tan fragments measure 0.5 x 0.6 x 0.2 cm together.  Totally submitted.    3.  Specimen #3 is labeled \"Transverse\".  Two tan fragments measure 0.8 x 0.4 x 0.2 cm together.  Totally submitted.    4.  Specimen #4 is labeled \"Christian colon\".  A tan fragment measures 0.6 x 0.4 x 0.2 cm.  Totally submitted.    5.  Specimen #5 is labeled \"sigmoid\".  A tan fragment measures 0.6 x 0.3 x 0.3 cm.  Totally submitted.    6.  Specimen #6 is labeled \"rectum\".  Two tan fragments measure 0.5 x 0.3 x 0.3 cm together.  Totally submitted.     POC Glucose Once   Result Value Ref Range    Glucose 119 70 - 130 mg/dL   Results for orders placed or performed during the hospital encounter of 11/06/18   Tissue Pathology Exam   Result Value Ref Range    Case Report       Surgical Pathology Report                         Case: [id number]                                  Authorizing Provider:  Alex Silveira MD        Collected:           11/06/2018 01:22 PM          Ordering Location:     [facility]             Received:            11/06/2018 02:58 PM                                 [place] [facility]                                                     Pathologist:           Aldo Langley MD                                                         Specimens:   1) - Large Intestine, Cecum                                                                         2) - Large Intestine, Right / Ascending Colon                                                       3) - Large Intestine, Transverse Colon                                                              4) - Large Intestine, Left / Descending Colon                                                       5) - Large " Intestine, Sigmoid Colon                                                                  6) - Large Intestine, Rectum                                                               Final Diagnosis       1.  MUCOSA, CECUM:  NO SIGNIFICANT HISTOLOGIC ABNORMALITY.  NEGATIVE FOR DYSPLASIA.    2.   MUCOSA, ASCENDING COLON:  NO SIGNIFICANT HISTOLOGIC ABNORMALITY.  NEGATIVE FOR DYSPLASIA.    3.  MUCOSA, TRANSVERSE COLON:  NO SIGNIFICANT HISTOLOGIC ABNORMALITY.  NEGATIVE FOR DYSPLASIA.     4.  MUCOSA, DESCENDING COLON:  NO SIGNIFICANT HISTOLOGIC ABNORMALITY.  NEGATIVE FOR DYSPLASIA.    5.  MUCOSA, SIGMOID COLON:  NO SIGNIFICANT HISTOLOGIC ABNORMALITY.  NEGATIVE FOR DYSPLASIA.    6.  MUCOSA, RECTUM:  NO SIGNIFICANT HISTOLOGIC ABNORMALITY.  NEGATIVE FOR DYSPLASIA.      Gross Description       Received for examination are 6 containers, each of which have nodular bits of white soft tissue measuring 0.3-0.5 cc in aggregate.  All specimens are embedded as labeled.  1A cecum; 2A ascending colon; 3A transverse colon; 4A descending colon; 5A sigmoid colon; 6A rectum.     POC Glucose Once   Result Value Ref Range    Glucose 199 (H) 70 - 130 mg/dL   Results for orders placed or performed in visit on 10/17/18   Tissue Pathology Exam   Result Value Ref Range    Case Report       Surgical Pathology Report                         Case: [id number]                                  Authorizing Provider:  Alex aHn MD        Collected:           10/17/2018 02:10 PM          Ordering Location:     [facility]     Received:            10/18/2018 11:21 AM                                 [facility]                                                           Pathologist:           Jacob Live MD                                                        Specimen:    Nose, Punch biopsy - suspect basal cell carcinoma of nose                                  Final Diagnosis       Skin, nose, punch biopsy:  Basal cell  "carcinoma.      Gross Description       Specimen #1 is received in a formalin filled container, labeled with the patient's name, date of birth, and \"nose lesion possible BCC\".  The specimen consists of a round to oval punch biopsy measuring 0.2 x 0.2 cm and measures 0.3 cm in depth.  The skin surface has been inked blue by surgeon and appears raised.  The resection margin is inked black and the specimen is totally submitted, intact, in block 1A.      Microscopic Description       Histologic sections show a skin punch with nests of malignant basaloid cells with peripheral palisading.     Results for orders placed or performed during the hospital encounter of 10/30/17   Tissue Pathology Exam - Tissue, Large Intestine, Cecum   Result Value Ref Range    Case Report       Surgical Pathology Report                         Case: [id number]                                  Authorizing Provider:  Alex Silveira MD         Collected:           10/30/2017 10:11 AM          Ordering Location:     [facility]             Received:            10/30/2017 11:05 AM                                 [place] [facility]                                                     Pathologist:           Aldo Langley MD                                                          Specimens:   1) - Large Intestine, Cecum, cecum                                                                  2) - Large Intestine, Right / Ascending Colon                                                       3) - Large Intestine, ascending polyp                                                               4) - Large Intestine, Transverse Colon                                                              5) - Large Intestine, Left / Descending Colon                                                        6) - Large Intestine, Rectum                                                                        7) - Large Intestine, Sigmoid Colon                       "                                  Final Diagnosis       1.  MUCOSA, CECUM:  NO SIGNIFICANT HISTOLOGIC ABNORMALITY.  NEGATIVE FOR DYSPLASIA.    2.  MUCOSA, ASCENDING COLON:  NO SIGNIFICANT HISTOLOGIC ABNORMALITY.  NEGATIVE FOR DYSPLASIA.    3.  POLYP, ASCENDING COLON:  TUBULAR ADENOMA WITH MILD DYSPLASIA.    4.  MUCOSA, TRANSVERSE COLON:  NO SIGNIFICANT HISTOLOGIC ABNORMALITY.  NEGATIVE FOR DYSPLASIA.    5.  MUCOSA, DESCENDING COLON:  NO SIGNIFICANT HISTOLOGIC ABNORMALITY.  NEGATIVE FOR DYSPLASIA.    6.  MUCOSA, RECTUM:  NO SIGNIFICANT HISTOLOGIC ABNORMALITY.  NEGATIVE FOR DYSPLASIA.    7.  MUCOSA, SIGMOID COLON:  NO SIGNIFICANT HISTOLOGIC ABNORMALITY.  NEGATIVE FOR DYSPLASIA.        Gross Description       Received for examination are 7 containers, each of which have nodular bits of white soft tissue measuring 0.3-0.5 cc in aggregate.  All specimens are embedded as labeled:  1A cecum; 2A ascending colon; 3A polyp, ascending colon; 4A transverse colon; 5A descending colon; 6A rectum; 7A sigmoid colon.         Embedded Images     POC Glucose Fingerstick   Result Value Ref Range    Glucose 220 (H) 70 - 130 mg/dL   Results for orders placed or performed in visit on 10/12/16    COLONOSCOPY   Result Value Ref Range     Colonoscopy ORDERED BY DR TIMUR CIFUENTES    Results for orders placed or performed during the hospital encounter of 09/06/16   Converted Surgical Pathology   Result Value Ref Range    Spec Descr 1 SPECIMEN(S): A BIOPSY @ 15 CM     Specimen description 2 SPECIMEN(S): B BIOPSY @ 13 CM     Specimen description 3 SPECIMEN(S): C BIOPSY @ 10 CM    POCT glucose fingerstick   Result Value Ref Range    Glucose 199 (H) 60 - 100 mg/dl   Results for orders placed or performed during the hospital encounter of 08/20/15   Converted Surgical Pathology   Result Value Ref Range    Spec Descr 1 SPECIMEN(S): A POLYP @ 30 CM     Specimen description 2 SPECIMEN(S): B BIOPSY, RECTUM     Preoperative Diagnosis        "  PREOPERATIVE DIAGNOSIS:  Bleeding (578.9), surveillance of ulcerative colitis      Postoperative Diagnosis         POSTOPERATIVE DIAGNOSIS:  Polyp at 30 cm, proctitis (556.9), diverticulosis of sigmoid colon  (562.10), hemorrhoids      Gross Description      Final Diagnosis         FINAL DIAGNOSIS:  A.  COLON, 30 CM, BIOPSY:            TUBULAR ADENOMA.  B.  RECTUM, BIOPSY:            DIFFUSE ACTIVE COLITIS.            A HEAVY LYMPHOID INFILTRATE APPEARS REACTIVE.      Comment         COMMENT:  Dr. Langley has reviewed specimen B and agrees with the diagnosis.      CONVERTED (HISTORICAL) FINAL PATHOLOGIST       Diagnostician:  TRUDY YOON M.D.  Pathologist  Electronically Signed 08/24/2015      Diagnosis Code   DIAGNOSIS CODE:  8      POCT Glucose Fingerstick   Result Value Ref Range    Glucose 202 (H) 60 - 100 mg/dl   Results for orders placed or performed in visit on 03/19/12   Converted Surgical Pathology   Result Value Ref Range    Spec Descr 1 SPECIMEN(S): A RECTUM BIOPSY     Preoperative Diagnosis   PREOPERATIVE DIAGNOSIS:  None Given       Postoperative Diagnosis   POSTOPERATIVE DIAGNOSIS:  CLIFF       Gross Description         GROSS DESCRIPTION:  The specimen is labeled \"#1 rectum\" and consists of 2 tan fragments  measuring 0.5 x 0.5 x 0.2 cm together.  Totally submitted.      Final Diagnosis         FINAL DIAGNOSIS:  RECTUM, BIOPSY:       DIFFUSE ACTIVE COLITIS.      Comment         COMMENT:  The findings are consistent with the clinical history of chronic  ulcerative colitis.  Inflammation does extend into the submucosa and  granulomas are present, raising the question of Crohn's disease.  Clinical correlation is recommended.      CONVERTED (HISTORICAL) FINAL PATHOLOGIST       Diagnostician:  TRUDY YOON M.D.  Pathologist  Electronically Signed 03/20/2012      Diagnosis Code   DIAGNOSIS CODE:  4        "

## 2019-12-02 NOTE — PATIENT INSTRUCTIONS

## 2020-01-01 ENCOUNTER — HOSPITAL ENCOUNTER (OUTPATIENT)
Facility: HOSPITAL | Age: 80
Setting detail: HOSPITAL OUTPATIENT SURGERY
End: 2020-01-01
Attending: INTERNAL MEDICINE | Admitting: INTERNAL MEDICINE

## 2020-01-01 ENCOUNTER — CLINICAL SUPPORT NO REQUIREMENTS (OUTPATIENT)
Dept: CARDIOLOGY | Facility: CLINIC | Age: 80
End: 2020-01-01

## 2020-01-01 ENCOUNTER — READMISSION MANAGEMENT (OUTPATIENT)
Dept: CALL CENTER | Facility: HOSPITAL | Age: 80
End: 2020-01-01

## 2020-01-01 ENCOUNTER — HOSPITAL ENCOUNTER (OUTPATIENT)
Dept: VASCULAR LAB | Age: 80
Discharge: HOME | End: 2020-09-15
Payer: MEDICARE

## 2020-01-01 ENCOUNTER — TELEPHONE (OUTPATIENT)
Dept: VASCULAR SURGERY | Age: 80
End: 2020-01-01

## 2020-01-01 ENCOUNTER — PREP FOR PROCEDURE (OUTPATIENT)
Dept: VASCULAR SURGERY | Age: 80
End: 2020-01-01

## 2020-01-01 ENCOUNTER — HOSPITAL ENCOUNTER (EMERGENCY)
Age: 80
Discharge: HOME | End: 2020-11-13
Attending: EMERGENCY MEDICINE
Payer: MEDICARE

## 2020-01-01 ENCOUNTER — APPOINTMENT (OUTPATIENT)
Dept: ULTRASOUND IMAGING | Age: 80
DRG: 640 | End: 2020-01-01
Payer: MEDICARE

## 2020-01-01 ENCOUNTER — OFFICE VISIT (OUTPATIENT)
Dept: VASCULAR SURGERY | Age: 80
End: 2020-01-01
Payer: MEDICARE

## 2020-01-01 ENCOUNTER — TELEPHONE (OUTPATIENT)
Dept: CARDIOLOGY | Age: 80
End: 2020-01-01

## 2020-01-01 ENCOUNTER — APPOINTMENT (OUTPATIENT)
Dept: CARDIOLOGY | Facility: HOSPITAL | Age: 80
End: 2020-01-01

## 2020-01-01 ENCOUNTER — HOSPITAL ENCOUNTER (OUTPATIENT)
Dept: INTERVENTIONAL RADIOLOGY/VASCULAR | Age: 80
Discharge: HOME | End: 2020-11-02
Payer: MEDICARE

## 2020-01-01 ENCOUNTER — ANESTHESIA (OUTPATIENT)
Dept: OPERATING ROOM | Age: 80
End: 2020-01-01
Payer: MEDICARE

## 2020-01-01 ENCOUNTER — HOSPITAL ENCOUNTER (OUTPATIENT)
Age: 80
Setting detail: OUTPATIENT SURGERY
Discharge: HOME | End: 2020-10-16
Attending: SURGERY | Admitting: SURGERY
Payer: MEDICARE

## 2020-01-01 ENCOUNTER — OFFICE VISIT (OUTPATIENT)
Dept: CARDIOLOGY | Facility: CLINIC | Age: 80
End: 2020-01-01

## 2020-01-01 ENCOUNTER — HOSPITAL ENCOUNTER (OUTPATIENT)
Dept: VASCULAR LAB | Age: 80
Discharge: HOME | End: 2020-10-12
Payer: MEDICARE

## 2020-01-01 ENCOUNTER — HOSPITAL ENCOUNTER (INPATIENT)
Age: 80
LOS: 5 days | Discharge: HOME | DRG: 640 | End: 2020-11-30
Attending: EMERGENCY MEDICINE | Admitting: FAMILY MEDICINE
Payer: MEDICARE

## 2020-01-01 ENCOUNTER — APPOINTMENT (OUTPATIENT)
Dept: GENERAL RADIOLOGY | Facility: HOSPITAL | Age: 80
End: 2020-01-01

## 2020-01-01 ENCOUNTER — APPOINTMENT (OUTPATIENT)
Dept: CT IMAGING | Facility: HOSPITAL | Age: 80
End: 2020-01-01

## 2020-01-01 ENCOUNTER — HOSPITAL ENCOUNTER (OUTPATIENT)
Facility: HOSPITAL | Age: 80
Setting detail: OBSERVATION
Discharge: HOME | End: 2020-11-09
Attending: EMERGENCY MEDICINE | Admitting: INTERNAL MEDICINE

## 2020-01-01 ENCOUNTER — HOSPITAL ENCOUNTER (OUTPATIENT)
Dept: CT IMAGING | Facility: HOSPITAL | Age: 80
Discharge: HOME | End: 2020-12-31

## 2020-01-01 ENCOUNTER — OFFICE VISIT (OUTPATIENT)
Dept: GASTROENTEROLOGY | Facility: CLINIC | Age: 80
End: 2020-01-01

## 2020-01-01 ENCOUNTER — HOSPITAL ENCOUNTER (OUTPATIENT)
Dept: PREADMISSION TESTING | Age: 80
Discharge: HOME | End: 2020-10-16
Payer: MEDICARE

## 2020-01-01 ENCOUNTER — APPOINTMENT (OUTPATIENT)
Dept: GENERAL RADIOLOGY | Age: 80
End: 2020-01-01
Payer: MEDICARE

## 2020-01-01 ENCOUNTER — APPOINTMENT (OUTPATIENT)
Dept: ULTRASOUND IMAGING | Facility: HOSPITAL | Age: 80
End: 2020-01-01

## 2020-01-01 ENCOUNTER — HOSPITAL ENCOUNTER (INPATIENT)
Facility: HOSPITAL | Age: 80
LOS: 7 days | Discharge: HOME | End: 2020-07-16
Attending: FAMILY MEDICINE | Admitting: FAMILY MEDICINE

## 2020-01-01 ENCOUNTER — APPOINTMENT (OUTPATIENT)
Dept: GENERAL RADIOLOGY | Age: 80
DRG: 640 | End: 2020-01-01
Payer: MEDICARE

## 2020-01-01 ENCOUNTER — HOSPITAL ENCOUNTER (OUTPATIENT)
Dept: GENERAL RADIOLOGY | Age: 80
Discharge: HOME | End: 2020-10-12
Payer: MEDICARE

## 2020-01-01 ENCOUNTER — ANESTHESIA EVENT (OUTPATIENT)
Dept: OPERATING ROOM | Age: 80
End: 2020-01-01
Payer: MEDICARE

## 2020-01-01 ENCOUNTER — TELEPHONE (OUTPATIENT)
Dept: CARDIOLOGY | Facility: CLINIC | Age: 80
End: 2020-01-01

## 2020-01-01 VITALS
DIASTOLIC BLOOD PRESSURE: 74 MMHG | SYSTOLIC BLOOD PRESSURE: 133 MMHG | BODY MASS INDEX: 25.49 KG/M2 | TEMPERATURE: 98.6 F | WEIGHT: 158.6 LBS | OXYGEN SATURATION: 96 % | HEART RATE: 90 BPM | HEIGHT: 66 IN | RESPIRATION RATE: 18 BRPM

## 2020-01-01 VITALS
HEIGHT: 67 IN | WEIGHT: 152.8 LBS | RESPIRATION RATE: 16 BRPM | BODY MASS INDEX: 23.98 KG/M2 | DIASTOLIC BLOOD PRESSURE: 57 MMHG | OXYGEN SATURATION: 97 % | SYSTOLIC BLOOD PRESSURE: 145 MMHG | HEART RATE: 62 BPM | TEMPERATURE: 97.4 F

## 2020-01-01 VITALS
OXYGEN SATURATION: 98 % | TEMPERATURE: 96.7 F | BODY MASS INDEX: 24.6 KG/M2 | RESPIRATION RATE: 16 BRPM | WEIGHT: 156.7 LBS | DIASTOLIC BLOOD PRESSURE: 70 MMHG | HEART RATE: 63 BPM | SYSTOLIC BLOOD PRESSURE: 157 MMHG | HEIGHT: 67 IN

## 2020-01-01 VITALS
BODY MASS INDEX: 23.54 KG/M2 | HEIGHT: 67 IN | DIASTOLIC BLOOD PRESSURE: 60 MMHG | HEART RATE: 64 BPM | OXYGEN SATURATION: 99 % | WEIGHT: 150 LBS | SYSTOLIC BLOOD PRESSURE: 100 MMHG

## 2020-01-01 VITALS
WEIGHT: 145 LBS | TEMPERATURE: 97.1 F | HEIGHT: 67 IN | HEART RATE: 62 BPM | RESPIRATION RATE: 19 BRPM | BODY MASS INDEX: 22.76 KG/M2 | DIASTOLIC BLOOD PRESSURE: 105 MMHG | SYSTOLIC BLOOD PRESSURE: 128 MMHG | OXYGEN SATURATION: 96 %

## 2020-01-01 VITALS
DIASTOLIC BLOOD PRESSURE: 53 MMHG | RESPIRATION RATE: 13 BRPM | SYSTOLIC BLOOD PRESSURE: 111 MMHG | OXYGEN SATURATION: 99 % | TEMPERATURE: 96.5 F

## 2020-01-01 VITALS
HEART RATE: 61 BPM | DIASTOLIC BLOOD PRESSURE: 58 MMHG | SYSTOLIC BLOOD PRESSURE: 122 MMHG | WEIGHT: 143 LBS | BODY MASS INDEX: 22.98 KG/M2 | HEIGHT: 66 IN

## 2020-01-01 VITALS
SYSTOLIC BLOOD PRESSURE: 137 MMHG | TEMPERATURE: 97.9 F | HEART RATE: 67 BPM | DIASTOLIC BLOOD PRESSURE: 64 MMHG | BODY MASS INDEX: 25.44 KG/M2 | WEIGHT: 162.4 LBS | OXYGEN SATURATION: 97 % | RESPIRATION RATE: 21 BRPM

## 2020-01-01 VITALS
DIASTOLIC BLOOD PRESSURE: 75 MMHG | RESPIRATION RATE: 18 BRPM | HEIGHT: 66 IN | BODY MASS INDEX: 24.11 KG/M2 | HEART RATE: 69 BPM | WEIGHT: 150 LBS | OXYGEN SATURATION: 98 % | SYSTOLIC BLOOD PRESSURE: 120 MMHG

## 2020-01-01 VITALS
SYSTOLIC BLOOD PRESSURE: 138 MMHG | HEART RATE: 61 BPM | RESPIRATION RATE: 16 BRPM | WEIGHT: 145 LBS | HEIGHT: 66 IN | DIASTOLIC BLOOD PRESSURE: 51 MMHG | OXYGEN SATURATION: 98 % | BODY MASS INDEX: 23.3 KG/M2 | TEMPERATURE: 96.8 F

## 2020-01-01 VITALS
SYSTOLIC BLOOD PRESSURE: 132 MMHG | RESPIRATION RATE: 16 BRPM | DIASTOLIC BLOOD PRESSURE: 66 MMHG | HEART RATE: 65 BPM | OXYGEN SATURATION: 98 % | WEIGHT: 150 LBS | BODY MASS INDEX: 23.54 KG/M2 | TEMPERATURE: 97.8 F | HEIGHT: 67 IN

## 2020-01-01 VITALS — WEIGHT: 150 LBS | BODY MASS INDEX: 23.54 KG/M2 | HEIGHT: 67 IN

## 2020-01-01 VITALS
HEART RATE: 72 BPM | HEIGHT: 66 IN | DIASTOLIC BLOOD PRESSURE: 65 MMHG | WEIGHT: 140.8 LBS | SYSTOLIC BLOOD PRESSURE: 100 MMHG | BODY MASS INDEX: 22.63 KG/M2

## 2020-01-01 DIAGNOSIS — I10 ESSENTIAL HYPERTENSION: Chronic | ICD-10-CM

## 2020-01-01 DIAGNOSIS — R07.9 CHEST PAIN, UNSPECIFIED TYPE: ICD-10-CM

## 2020-01-01 DIAGNOSIS — I73.9 PVD (PERIPHERAL VASCULAR DISEASE) (HCC): ICD-10-CM

## 2020-01-01 DIAGNOSIS — N18.5 CKD (CHRONIC KIDNEY DISEASE) STAGE 5, GFR LESS THAN 15 ML/MIN (HCC): Chronic | ICD-10-CM

## 2020-01-01 DIAGNOSIS — K51.019 ULCERATIVE PANCOLITIS WITH COMPLICATION (HCC): Primary | ICD-10-CM

## 2020-01-01 DIAGNOSIS — N18.5 CKD (CHRONIC KIDNEY DISEASE) STAGE 5, GFR LESS THAN 15 ML/MIN (HCC): ICD-10-CM

## 2020-01-01 DIAGNOSIS — N18.5 TYPE 2 DIABETES MELLITUS WITH STAGE 5 CHRONIC KIDNEY DISEASE NOT ON CHRONIC DIALYSIS, WITHOUT LONG-TERM CURRENT USE OF INSULIN (HCC): ICD-10-CM

## 2020-01-01 DIAGNOSIS — I10 ESSENTIAL HYPERTENSION: Primary | Chronic | ICD-10-CM

## 2020-01-01 DIAGNOSIS — I48.21 PERMANENT ATRIAL FIBRILLATION (HCC): Primary | ICD-10-CM

## 2020-01-01 DIAGNOSIS — I48.20 CHRONIC ATRIAL FIBRILLATION (HCC): Primary | ICD-10-CM

## 2020-01-01 DIAGNOSIS — Z95.0 S/P PLACEMENT OF CARDIAC PACEMAKER: ICD-10-CM

## 2020-01-01 DIAGNOSIS — I50.32 CHRONIC DIASTOLIC CONGESTIVE HEART FAILURE (HCC): ICD-10-CM

## 2020-01-01 DIAGNOSIS — I48.21 PERMANENT ATRIAL FIBRILLATION (HCC): ICD-10-CM

## 2020-01-01 DIAGNOSIS — Z95.0 S/P PLACEMENT OF CARDIAC PACEMAKER: Primary | ICD-10-CM

## 2020-01-01 DIAGNOSIS — E11.22 TYPE 2 DIABETES MELLITUS WITH STAGE 5 CHRONIC KIDNEY DISEASE NOT ON CHRONIC DIALYSIS, WITHOUT LONG-TERM CURRENT USE OF INSULIN (HCC): ICD-10-CM

## 2020-01-01 DIAGNOSIS — I35.0 AORTIC STENOSIS, MODERATE: ICD-10-CM

## 2020-01-01 DIAGNOSIS — I50.9 ACUTE ON CHRONIC CONGESTIVE HEART FAILURE, UNSPECIFIED HEART FAILURE TYPE (HCC): Primary | ICD-10-CM

## 2020-01-01 DIAGNOSIS — I50.32 CHRONIC DIASTOLIC CONGESTIVE HEART FAILURE (HCC): Chronic | ICD-10-CM

## 2020-01-01 DIAGNOSIS — I21.4 NSTEMI (NON-ST ELEVATED MYOCARDIAL INFARCTION) (HCC): ICD-10-CM

## 2020-01-01 DIAGNOSIS — Z74.09 IMPAIRED FUNCTIONAL MOBILITY AND ENDURANCE: ICD-10-CM

## 2020-01-01 DIAGNOSIS — I48.20 CHRONIC ATRIAL FIBRILLATION (HCC): Chronic | ICD-10-CM

## 2020-01-01 DIAGNOSIS — I10 ESSENTIAL HYPERTENSION: ICD-10-CM

## 2020-01-01 DIAGNOSIS — R03.0 FINDING OF ABOVE NORMAL BLOOD PRESSURE: ICD-10-CM

## 2020-01-01 DIAGNOSIS — K51.00 ULCERATIVE CHRONIC PANCOLITIS WITHOUT COMPLICATIONS (HCC): ICD-10-CM

## 2020-01-01 LAB
25(OH)D3 SERPL-MCNC: 31.1 NG/ML (ref 30–100)
ABO GROUP BLD: NORMAL
ALBUMIN SERPL-MCNC: 3.3 G/DL (ref 2.9–4.4)
ALBUMIN SERPL-MCNC: 3.5 G/DL (ref 3.5–5.2)
ALBUMIN SERPL-MCNC: 3.6 G/DL (ref 3.5–5.2)
ALBUMIN SERPL-MCNC: 3.7 G/DL (ref 3.5–5.2)
ALBUMIN SERPL-MCNC: 3.7 G/DL (ref 3.5–5.2)
ALBUMIN SERPL-MCNC: 3.8 G/DL (ref 3.5–5.2)
ALBUMIN SERPL-MCNC: 3.8 G/DL (ref 3.5–5.2)
ALBUMIN SERPL-MCNC: 3.9 G/DL (ref 3.5–5.2)
ALBUMIN SERPL-MCNC: 3.9 G/DL (ref 3.5–5.2)
ALBUMIN SERPL-MCNC: 4 G/DL (ref 3.5–5.2)
ALBUMIN SERPL-MCNC: 4.1 G/DL (ref 3.5–5.2)
ALBUMIN SERPL-MCNC: 4.1 G/DL (ref 3.5–5.2)
ALBUMIN SERPL-MCNC: 4.2 G/DL (ref 3.5–5.2)
ALBUMIN/GLOB SERPL: 1.3 {RATIO} (ref 0.7–1.7)
ALBUMIN/GLOB SERPL: 1.4 G/DL
ALBUMIN/GLOB SERPL: 1.4 G/DL
ALBUMIN/GLOB SERPL: 1.6 G/DL
ALBUMIN/GLOB SERPL: 1.7 G/DL
ALBUMIN/GLOB SERPL: 1.7 G/DL
ALP BLD-CCNC: 265 U/L (ref 40–130)
ALP BLD-CCNC: 289 U/L (ref 40–130)
ALP BLD-CCNC: 305 U/L (ref 40–130)
ALP SERPL-CCNC: 101 U/L (ref 39–117)
ALP SERPL-CCNC: 108 U/L (ref 39–117)
ALP SERPL-CCNC: 111 U/L (ref 39–117)
ALP SERPL-CCNC: 113 U/L (ref 39–117)
ALP SERPL-CCNC: 226 U/L (ref 39–117)
ALP SERPL-CCNC: 92 U/L (ref 39–117)
ALP SERPL-CCNC: 93 U/L (ref 39–117)
ALPHA1 GLOB FLD ELPH-MCNC: 0.2 G/DL (ref 0–0.4)
ALPHA2 GLOB SERPL ELPH-MCNC: 0.7 G/DL (ref 0.4–1)
ALT SERPL W P-5'-P-CCNC: 14 U/L (ref 1–41)
ALT SERPL W P-5'-P-CCNC: 14 U/L (ref 1–41)
ALT SERPL W P-5'-P-CCNC: 15 U/L (ref 1–41)
ALT SERPL W P-5'-P-CCNC: 17 U/L (ref 1–41)
ALT SERPL W P-5'-P-CCNC: 18 U/L (ref 1–41)
ALT SERPL W P-5'-P-CCNC: 21 U/L (ref 1–41)
ALT SERPL W P-5'-P-CCNC: 25 U/L (ref 1–41)
ALT SERPL W P-5'-P-CCNC: 26 U/L (ref 1–41)
ALT SERPL W P-5'-P-CCNC: 27 U/L (ref 1–41)
ALT SERPL-CCNC: 42 U/L (ref 5–41)
ALT SERPL-CCNC: 66 U/L (ref 5–41)
ALT SERPL-CCNC: 84 U/L (ref 5–41)
ANION GAP SERPL CALCULATED.3IONS-SCNC: 11 MMOL/L (ref 5–15)
ANION GAP SERPL CALCULATED.3IONS-SCNC: 11 MMOL/L (ref 5–15)
ANION GAP SERPL CALCULATED.3IONS-SCNC: 12 MMOL/L (ref 5–15)
ANION GAP SERPL CALCULATED.3IONS-SCNC: 12 MMOL/L (ref 7–19)
ANION GAP SERPL CALCULATED.3IONS-SCNC: 12 MMOL/L (ref 7–19)
ANION GAP SERPL CALCULATED.3IONS-SCNC: 13 MMOL/L (ref 5–15)
ANION GAP SERPL CALCULATED.3IONS-SCNC: 13 MMOL/L (ref 7–19)
ANION GAP SERPL CALCULATED.3IONS-SCNC: 14 MMOL/L (ref 5–15)
ANION GAP SERPL CALCULATED.3IONS-SCNC: 15 MMOL/L (ref 7–19)
ANION GAP SERPL CALCULATED.3IONS-SCNC: 16 MMOL/L (ref 5–15)
ANION GAP SERPL CALCULATED.3IONS-SCNC: 16 MMOL/L (ref 5–15)
ANION GAP SERPL CALCULATED.3IONS-SCNC: 17 MMOL/L (ref 5–15)
APTT PPP: 33.7 SECONDS (ref 24.1–35)
APTT: 32 SEC (ref 26–36.2)
APTT: 32.6 SEC (ref 26–36.2)
APTT: 35.1 SEC (ref 26–36.2)
AST SERPL-CCNC: 11 U/L (ref 1–40)
AST SERPL-CCNC: 11 U/L (ref 1–40)
AST SERPL-CCNC: 14 U/L (ref 1–40)
AST SERPL-CCNC: 15 U/L (ref 1–40)
AST SERPL-CCNC: 21 U/L (ref 1–40)
AST SERPL-CCNC: 21 U/L (ref 5–40)
AST SERPL-CCNC: 23 U/L (ref 1–40)
AST SERPL-CCNC: 24 U/L (ref 1–40)
AST SERPL-CCNC: 26 U/L (ref 1–40)
AST SERPL-CCNC: 26 U/L (ref 1–40)
AST SERPL-CCNC: 33 U/L (ref 5–40)
AST SERPL-CCNC: 60 U/L (ref 5–40)
B-GLOBULIN SERPL ELPH-MCNC: 0.7 G/DL (ref 0.7–1.3)
BACTERIA UR QL AUTO: ABNORMAL /HPF
BACTERIA: NEGATIVE /HPF
BASOPHILS # BLD AUTO: 0.03 10*3/MM3 (ref 0–0.2)
BASOPHILS # BLD AUTO: 0.04 10*3/MM3 (ref 0–0.2)
BASOPHILS # BLD AUTO: 0.04 10*3/MM3 (ref 0–0.2)
BASOPHILS # BLD AUTO: 0.05 10*3/MM3 (ref 0–0.2)
BASOPHILS # BLD AUTO: 0.06 10*3/MM3 (ref 0–0.2)
BASOPHILS ABSOLUTE: 0 K/UL (ref 0–0.2)
BASOPHILS ABSOLUTE: 0.1 K/UL (ref 0–0.2)
BASOPHILS NFR BLD AUTO: 0.4 % (ref 0–1.5)
BASOPHILS NFR BLD AUTO: 0.5 % (ref 0–1.5)
BASOPHILS NFR BLD AUTO: 0.6 % (ref 0–1.5)
BASOPHILS NFR BLD AUTO: 0.6 % (ref 0–1.5)
BASOPHILS NFR BLD AUTO: 0.7 % (ref 0–1.5)
BASOPHILS NFR BLD AUTO: 0.7 % (ref 0–1.5)
BASOPHILS NFR BLD AUTO: 0.8 % (ref 0–1.5)
BASOPHILS RELATIVE PERCENT: 0.6 % (ref 0–1)
BASOPHILS RELATIVE PERCENT: 0.8 % (ref 0–1)
BH BB BLOOD EXPIRATION DATE: NORMAL
BH BB BLOOD TYPE BARCODE: 9500
BH BB DISPENSE STATUS: NORMAL
BH BB PRODUCT CODE: NORMAL
BH BB UNIT NUMBER: NORMAL
BH CV ECHO MEAS - AO MAX PG (FULL): 27.7 MMHG
BH CV ECHO MEAS - AO MAX PG: 31.8 MMHG
BH CV ECHO MEAS - AO MEAN PG (FULL): 10 MMHG
BH CV ECHO MEAS - AO MEAN PG: 12 MMHG
BH CV ECHO MEAS - AO ROOT AREA (BSA CORRECTED): 1.6
BH CV ECHO MEAS - AO ROOT AREA: 6.2 CM^2
BH CV ECHO MEAS - AO ROOT DIAM: 2.8 CM
BH CV ECHO MEAS - AO V2 MAX: 282 CM/SEC
BH CV ECHO MEAS - AO V2 MEAN: 163.8 CM/SEC
BH CV ECHO MEAS - AO V2 VTI: 51.9 CM
BH CV ECHO MEAS - AVA(I,A): 1 CM^2
BH CV ECHO MEAS - AVA(I,D): 1 CM^2
BH CV ECHO MEAS - AVA(V,A): 1.1 CM^2
BH CV ECHO MEAS - AVA(V,D): 1.1 CM^2
BH CV ECHO MEAS - BSA(HAYCOCK): 1.8 M^2
BH CV ECHO MEAS - BSA: 1.8 M^2
BH CV ECHO MEAS - BZI_BMI: 25.2 KILOGRAMS/M^2
BH CV ECHO MEAS - BZI_METRIC_HEIGHT: 167.6 CM
BH CV ECHO MEAS - BZI_METRIC_WEIGHT: 70.8 KG
BH CV ECHO MEAS - EDV(CUBED): 128 ML
BH CV ECHO MEAS - EDV(MOD-SP4): 72.4 ML
BH CV ECHO MEAS - EDV(TEICH): 120.5 ML
BH CV ECHO MEAS - EF(CUBED): 65.1 %
BH CV ECHO MEAS - EF(MOD-SP4): 58 %
BH CV ECHO MEAS - EF(TEICH): 56.3 %
BH CV ECHO MEAS - ESV(CUBED): 44.7 ML
BH CV ECHO MEAS - ESV(MOD-SP4): 30.4 ML
BH CV ECHO MEAS - ESV(TEICH): 52.6 ML
BH CV ECHO MEAS - FS: 29.6 %
BH CV ECHO MEAS - IVS/LVPW: 1.6
BH CV ECHO MEAS - IVSD: 1.2 CM
BH CV ECHO MEAS - LA DIMENSION: 5 CM
BH CV ECHO MEAS - LA/AO: 1.8
BH CV ECHO MEAS - LAT PEAK E' VEL: 9.6 CM/SEC
BH CV ECHO MEAS - LV DIASTOLIC VOL/BSA (35-75): 40.2 ML/M^2
BH CV ECHO MEAS - LV MASS(C)D: 185.9 GRAMS
BH CV ECHO MEAS - LV MASS(C)DI: 103.3 GRAMS/M^2
BH CV ECHO MEAS - LV MAX PG: 4.1 MMHG
BH CV ECHO MEAS - LV MEAN PG: 2 MMHG
BH CV ECHO MEAS - LV SYSTOLIC VOL/BSA (12-30): 16.9 ML/M^2
BH CV ECHO MEAS - LV V1 MAX: 100.6 CM/SEC
BH CV ECHO MEAS - LV V1 MEAN: 59.4 CM/SEC
BH CV ECHO MEAS - LV V1 VTI: 17 CM
BH CV ECHO MEAS - LVIDD: 5 CM
BH CV ECHO MEAS - LVIDS: 3.6 CM
BH CV ECHO MEAS - LVLD AP4: 8.3 CM
BH CV ECHO MEAS - LVLS AP4: 6.5 CM
BH CV ECHO MEAS - LVOT AREA (M): 3.1 CM^2
BH CV ECHO MEAS - LVOT AREA: 3.1 CM^2
BH CV ECHO MEAS - LVOT DIAM: 2 CM
BH CV ECHO MEAS - LVPWD: 0.78 CM
BH CV ECHO MEAS - MED PEAK E' VEL: 6.64 CM/SEC
BH CV ECHO MEAS - MR MAX PG: 143 MMHG
BH CV ECHO MEAS - MR MAX VEL: 598 CM/SEC
BH CV ECHO MEAS - MR MEAN PG: 101 MMHG
BH CV ECHO MEAS - MR MEAN VEL: 479 CM/SEC
BH CV ECHO MEAS - MR VTI: 201 CM
BH CV ECHO MEAS - MV DEC TIME: 0.25 SEC
BH CV ECHO MEAS - MV E MAX VEL: 139.4 CM/SEC
BH CV ECHO MEAS - PA MAX PG: 2 MMHG
BH CV ECHO MEAS - PA V2 MAX: 70.8 CM/SEC
BH CV ECHO MEAS - RAP SYSTOLE: 10 MMHG
BH CV ECHO MEAS - RVSP: 52.8 MMHG
BH CV ECHO MEAS - SI(AO): 177.6 ML/M^2
BH CV ECHO MEAS - SI(CUBED): 46.3 ML/M^2
BH CV ECHO MEAS - SI(LVOT): 29.7 ML/M^2
BH CV ECHO MEAS - SI(MOD-SP4): 23.3 ML/M^2
BH CV ECHO MEAS - SI(TEICH): 37.7 ML/M^2
BH CV ECHO MEAS - SV(AO): 319.7 ML
BH CV ECHO MEAS - SV(CUBED): 83.3 ML
BH CV ECHO MEAS - SV(LVOT): 53.4 ML
BH CV ECHO MEAS - SV(MOD-SP4): 42 ML
BH CV ECHO MEAS - SV(TEICH): 67.8 ML
BH CV ECHO MEAS - TR MAX VEL: 327 CM/SEC
BH CV ECHO MEASUREMENTS AVERAGE E/E' RATIO: 17.17
BILIRUB SERPL-MCNC: 0.2 MG/DL (ref 0–1.2)
BILIRUB SERPL-MCNC: 0.2 MG/DL (ref 0–1.2)
BILIRUB SERPL-MCNC: 0.3 MG/DL (ref 0.2–1.2)
BILIRUB SERPL-MCNC: 0.3 MG/DL (ref 0–1.2)
BILIRUB SERPL-MCNC: 0.4 MG/DL (ref 0.2–1.2)
BILIRUB SERPL-MCNC: 0.4 MG/DL (ref 0–1.2)
BILIRUB SERPL-MCNC: 0.4 MG/DL (ref 0–1.2)
BILIRUB SERPL-MCNC: 0.5 MG/DL (ref 0.2–1.2)
BILIRUB SERPL-MCNC: 0.5 MG/DL (ref 0–1.2)
BILIRUB UR QL STRIP: NEGATIVE
BILIRUB UR QL STRIP: NEGATIVE
BILIRUBIN URINE: NEGATIVE
BLD GP AB SCN SERPL QL: NEGATIVE
BLOOD, URINE: NEGATIVE
BUN BLDV-MCNC: 26 MG/DL (ref 8–23)
BUN BLDV-MCNC: 32 MG/DL (ref 8–23)
BUN BLDV-MCNC: 35 MG/DL (ref 8–23)
BUN BLDV-MCNC: 42 MG/DL (ref 8–23)
BUN BLDV-MCNC: 52 MG/DL (ref 8–23)
BUN BLDV-MCNC: 54 MG/DL (ref 8–23)
BUN BLDV-MCNC: 55 MG/DL (ref 8–23)
BUN BLDV-MCNC: 56 MG/DL (ref 8–23)
BUN SERPL-MCNC: 32 MG/DL (ref 8–23)
BUN SERPL-MCNC: 33 MG/DL (ref 8–23)
BUN SERPL-MCNC: 35 MG/DL (ref 8–23)
BUN SERPL-MCNC: 36 MG/DL (ref 8–23)
BUN SERPL-MCNC: 36 MG/DL (ref 8–23)
BUN SERPL-MCNC: 37 MG/DL (ref 8–23)
BUN SERPL-MCNC: 42 MG/DL (ref 8–23)
BUN SERPL-MCNC: 43 MG/DL (ref 8–23)
BUN SERPL-MCNC: 44 MG/DL (ref 8–23)
BUN SERPL-MCNC: 46 MG/DL (ref 8–23)
BUN SERPL-MCNC: 48 MG/DL (ref 8–23)
BUN SERPL-MCNC: 49 MG/DL (ref 8–23)
BUN SERPL-MCNC: 50 MG/DL (ref 8–23)
BUN SERPL-MCNC: 51 MG/DL (ref 8–23)
BUN/CREAT SERPL: 10.6 (ref 7–25)
BUN/CREAT SERPL: 10.8 (ref 7–25)
BUN/CREAT SERPL: 10.9 (ref 7–25)
BUN/CREAT SERPL: 11.2 (ref 7–25)
BUN/CREAT SERPL: 11.3 (ref 7–25)
BUN/CREAT SERPL: 11.4 (ref 7–25)
BUN/CREAT SERPL: 11.5 (ref 7–25)
BUN/CREAT SERPL: 11.6 (ref 7–25)
BUN/CREAT SERPL: 11.6 (ref 7–25)
BUN/CREAT SERPL: 11.9 (ref 7–25)
BUN/CREAT SERPL: 8.8 (ref 7–25)
BUN/CREAT SERPL: 8.9 (ref 7–25)
CALCIUM SERPL-MCNC: 8.4 MG/DL (ref 8.8–10.2)
CALCIUM SERPL-MCNC: 8.4 MG/DL (ref 8.8–10.2)
CALCIUM SERPL-MCNC: 8.5 MG/DL (ref 8.8–10.2)
CALCIUM SERPL-MCNC: 8.7 MG/DL (ref 8.8–10.2)
CALCIUM SERPL-MCNC: 8.8 MG/DL (ref 8.8–10.2)
CALCIUM SERPL-MCNC: 9 MG/DL (ref 8.8–10.2)
CALCIUM SPEC-SCNC: 8.3 MG/DL (ref 8.6–10.5)
CALCIUM SPEC-SCNC: 8.4 MG/DL (ref 8.6–10.5)
CALCIUM SPEC-SCNC: 8.5 MG/DL (ref 8.6–10.5)
CALCIUM SPEC-SCNC: 8.6 MG/DL (ref 8.6–10.5)
CALCIUM SPEC-SCNC: 8.8 MG/DL (ref 8.6–10.5)
CALCIUM SPEC-SCNC: 8.9 MG/DL (ref 8.6–10.5)
CALCIUM SPEC-SCNC: 9 MG/DL (ref 8.6–10.5)
CHLORIDE BLD-SCNC: 100 MMOL/L (ref 98–111)
CHLORIDE BLD-SCNC: 103 MMOL/L (ref 98–111)
CHLORIDE BLD-SCNC: 105 MMOL/L (ref 98–111)
CHLORIDE BLD-SCNC: 97 MMOL/L (ref 98–111)
CHLORIDE BLD-SCNC: 98 MMOL/L (ref 98–111)
CHLORIDE BLD-SCNC: 99 MMOL/L (ref 98–111)
CHLORIDE SERPL-SCNC: 100 MMOL/L (ref 98–107)
CHLORIDE SERPL-SCNC: 101 MMOL/L (ref 98–107)
CHLORIDE SERPL-SCNC: 103 MMOL/L (ref 98–107)
CHLORIDE SERPL-SCNC: 104 MMOL/L (ref 98–107)
CHLORIDE SERPL-SCNC: 105 MMOL/L (ref 98–107)
CHLORIDE SERPL-SCNC: 106 MMOL/L (ref 98–107)
CHLORIDE SERPL-SCNC: 107 MMOL/L (ref 98–107)
CHLORIDE SERPL-SCNC: 108 MMOL/L (ref 98–107)
CHOLEST SERPL-MCNC: 122 MG/DL (ref 0–200)
CK SERPL-CCNC: 37 U/L (ref 20–200)
CLARITY UR: CLEAR
CLARITY UR: CLEAR
CLARITY: CLEAR
CO2 SERPL-SCNC: 21 MMOL/L (ref 22–29)
CO2 SERPL-SCNC: 22 MMOL/L (ref 22–29)
CO2 SERPL-SCNC: 23 MMOL/L (ref 22–29)
CO2 SERPL-SCNC: 24 MMOL/L (ref 22–29)
CO2 SERPL-SCNC: 25 MMOL/L (ref 22–29)
CO2 SERPL-SCNC: 25 MMOL/L (ref 22–29)
CO2 SERPL-SCNC: 26 MMOL/L (ref 22–29)
CO2 SERPL-SCNC: 26 MMOL/L (ref 22–29)
CO2: 22 MMOL/L (ref 22–29)
CO2: 23 MMOL/L (ref 22–29)
CO2: 24 MMOL/L (ref 22–29)
CO2: 24 MMOL/L (ref 22–29)
CO2: 25 MMOL/L (ref 22–29)
CO2: 26 MMOL/L (ref 22–29)
COLOR UR: YELLOW
COLOR UR: YELLOW
COLOR: YELLOW
CREAT BLDA-MCNC: 3 MG/DL (ref 0.6–1.3)
CREAT SERPL-MCNC: 2.6 MG/DL (ref 0.5–1.2)
CREAT SERPL-MCNC: 2.7 MG/DL (ref 0.5–1.2)
CREAT SERPL-MCNC: 3.1 MG/DL (ref 0.5–1.2)
CREAT SERPL-MCNC: 3.11 MG/DL (ref 0.76–1.27)
CREAT SERPL-MCNC: 3.19 MG/DL (ref 0.76–1.27)
CREAT SERPL-MCNC: 3.19 MG/DL (ref 0.76–1.27)
CREAT SERPL-MCNC: 3.33 MG/DL (ref 0.76–1.27)
CREAT SERPL-MCNC: 3.6 MG/DL (ref 0.76–1.27)
CREAT SERPL-MCNC: 3.68 MG/DL (ref 0.76–1.27)
CREAT SERPL-MCNC: 3.73 MG/DL (ref 0.76–1.27)
CREAT SERPL-MCNC: 3.8 MG/DL (ref 0.5–1.2)
CREAT SERPL-MCNC: 3.8 MG/DL (ref 0.76–1.27)
CREAT SERPL-MCNC: 3.84 MG/DL (ref 0.76–1.27)
CREAT SERPL-MCNC: 3.9 MG/DL (ref 0.5–1.2)
CREAT SERPL-MCNC: 4 MG/DL (ref 0.5–1.2)
CREAT SERPL-MCNC: 4.22 MG/DL (ref 0.76–1.27)
CREAT SERPL-MCNC: 4.3 MG/DL (ref 0.76–1.27)
CREAT SERPL-MCNC: 4.34 MG/DL (ref 0.76–1.27)
CREAT SERPL-MCNC: 4.4 MG/DL (ref 0.76–1.27)
CREAT SERPL-MCNC: 4.46 MG/DL (ref 0.76–1.27)
CREAT UR-MCNC: 48.3 MG/DL
CROSSMATCH INTERPRETATION: NORMAL
CRYSTALS, UA: ABNORMAL /HPF
DEPRECATED RDW RBC AUTO: 43.8 FL (ref 37–54)
DEPRECATED RDW RBC AUTO: 44.5 FL (ref 37–54)
DEPRECATED RDW RBC AUTO: 44.6 FL (ref 37–54)
DEPRECATED RDW RBC AUTO: 45.1 FL (ref 37–54)
DEPRECATED RDW RBC AUTO: 45.7 FL (ref 37–54)
DEPRECATED RDW RBC AUTO: 46.1 FL (ref 37–54)
DEPRECATED RDW RBC AUTO: 46.3 FL (ref 37–54)
DEPRECATED RDW RBC AUTO: 47 FL (ref 37–54)
DEPRECATED RDW RBC AUTO: 49.1 FL (ref 37–54)
DEPRECATED RDW RBC AUTO: 49.1 FL (ref 37–54)
DEPRECATED RDW RBC AUTO: 51.6 FL (ref 37–54)
DEPRECATED RDW RBC AUTO: 52 FL (ref 37–54)
DEPRECATED RDW RBC AUTO: 54.1 FL (ref 37–54)
DIGOXIN LEVEL: 1.4 NG/ML (ref 0.6–1.2)
DIGOXIN SERPL-MCNC: 0.9 NG/ML (ref 0.6–1.2)
DIGOXIN SERPL-MCNC: 1.6 NG/ML (ref 0.6–1.2)
DIGOXIN SERPL-MCNC: 1.9 NG/ML (ref 0.6–1.2)
EKG P AXIS: NORMAL DEGREES
EKG P-R INTERVAL: NORMAL MS
EKG Q-T INTERVAL: 366 MS
EKG Q-T INTERVAL: 372 MS
EKG Q-T INTERVAL: 390 MS
EKG QRS DURATION: 134 MS
EKG QRS DURATION: 66 MS
EKG QRS DURATION: 92 MS
EKG QTC CALCULATION (BAZETT): 363 MS
EKG QTC CALCULATION (BAZETT): 401 MS
EKG QTC CALCULATION (BAZETT): 423 MS
EKG T AXIS: -104 DEGREES
EKG T AXIS: -120 DEGREES
EKG T AXIS: 176 DEGREES
EOSINOPHIL # BLD AUTO: 0.03 10*3/MM3 (ref 0–0.4)
EOSINOPHIL # BLD AUTO: 0.05 10*3/MM3 (ref 0–0.4)
EOSINOPHIL # BLD AUTO: 0.19 10*3/MM3 (ref 0–0.4)
EOSINOPHIL # BLD AUTO: 0.21 10*3/MM3 (ref 0–0.4)
EOSINOPHIL # BLD AUTO: 0.22 10*3/MM3 (ref 0–0.4)
EOSINOPHIL # BLD AUTO: 0.26 10*3/MM3 (ref 0–0.4)
EOSINOPHIL # BLD AUTO: 0.27 10*3/MM3 (ref 0–0.4)
EOSINOPHIL # BLD AUTO: 0.27 10*3/MM3 (ref 0–0.4)
EOSINOPHIL # BLD AUTO: 0.28 10*3/MM3 (ref 0–0.4)
EOSINOPHIL # BLD AUTO: 0.31 10*3/MM3 (ref 0–0.4)
EOSINOPHIL NFR BLD AUTO: 0.3 % (ref 0.3–6.2)
EOSINOPHIL NFR BLD AUTO: 0.5 % (ref 0.3–6.2)
EOSINOPHIL NFR BLD AUTO: 2.6 % (ref 0.3–6.2)
EOSINOPHIL NFR BLD AUTO: 2.8 % (ref 0.3–6.2)
EOSINOPHIL NFR BLD AUTO: 3.1 % (ref 0.3–6.2)
EOSINOPHIL NFR BLD AUTO: 3.6 % (ref 0.3–6.2)
EOSINOPHIL NFR BLD AUTO: 3.8 % (ref 0.3–6.2)
EOSINOPHIL NFR BLD AUTO: 4.1 % (ref 0.3–6.2)
EOSINOPHIL NFR BLD AUTO: 4.5 % (ref 0.3–6.2)
EOSINOPHIL NFR BLD AUTO: 5.1 % (ref 0.3–6.2)
EOSINOPHIL SPEC QL MICRO: 0 % EOS/100 CELLS (ref 0–0)
EOSINOPHILS ABSOLUTE: 0.1 K/UL (ref 0–0.6)
EOSINOPHILS ABSOLUTE: 0.1 K/UL (ref 0–0.6)
EOSINOPHILS RELATIVE PERCENT: 1.7 % (ref 0–5)
EOSINOPHILS RELATIVE PERCENT: 1.8 % (ref 0–5)
EPITHELIAL CELLS, UA: 0 /HPF (ref 0–5)
ERYTHROCYTE [DISTWIDTH] IN BLOOD BY AUTOMATED COUNT: 13.2 % (ref 12.3–15.4)
ERYTHROCYTE [DISTWIDTH] IN BLOOD BY AUTOMATED COUNT: 13.3 % (ref 12.3–15.4)
ERYTHROCYTE [DISTWIDTH] IN BLOOD BY AUTOMATED COUNT: 13.5 % (ref 12.3–15.4)
ERYTHROCYTE [DISTWIDTH] IN BLOOD BY AUTOMATED COUNT: 13.5 % (ref 12.3–15.4)
ERYTHROCYTE [DISTWIDTH] IN BLOOD BY AUTOMATED COUNT: 13.6 % (ref 12.3–15.4)
ERYTHROCYTE [DISTWIDTH] IN BLOOD BY AUTOMATED COUNT: 13.6 % (ref 12.3–15.4)
ERYTHROCYTE [DISTWIDTH] IN BLOOD BY AUTOMATED COUNT: 13.7 % (ref 12.3–15.4)
ERYTHROCYTE [DISTWIDTH] IN BLOOD BY AUTOMATED COUNT: 13.7 % (ref 12.3–15.4)
ERYTHROCYTE [DISTWIDTH] IN BLOOD BY AUTOMATED COUNT: 13.9 % (ref 12.3–15.4)
ERYTHROCYTE [DISTWIDTH] IN BLOOD BY AUTOMATED COUNT: 13.9 % (ref 12.3–15.4)
ERYTHROCYTE [DISTWIDTH] IN BLOOD BY AUTOMATED COUNT: 14 % (ref 12.3–15.4)
ERYTHROCYTE [DISTWIDTH] IN BLOOD BY AUTOMATED COUNT: 14.7 % (ref 12.3–15.4)
ERYTHROCYTE [DISTWIDTH] IN BLOOD BY AUTOMATED COUNT: 15.1 % (ref 12.3–15.4)
FERRITIN: 592.7 NG/ML (ref 30–400)
GAMMA GLOB SERPL ELPH-MCNC: 0.9 G/DL (ref 0.4–1.8)
GFR AFRICAN AMERICAN: 18
GFR AFRICAN AMERICAN: 18
GFR AFRICAN AMERICAN: 19
GFR AFRICAN AMERICAN: 24
GFR AFRICAN AMERICAN: 28
GFR AFRICAN AMERICAN: 29
GFR NON-AFRICAN AMERICAN: 15
GFR NON-AFRICAN AMERICAN: 20
GFR NON-AFRICAN AMERICAN: 23
GFR NON-AFRICAN AMERICAN: 24
GFR SERPL CREATININE-BSD FRML MDRD: 13 ML/MIN/1.73
GFR SERPL CREATININE-BSD FRML MDRD: 14 ML/MIN/1.73
GFR SERPL CREATININE-BSD FRML MDRD: 15 ML/MIN/1.73
GFR SERPL CREATININE-BSD FRML MDRD: 16 ML/MIN/1.73
GFR SERPL CREATININE-BSD FRML MDRD: 18 ML/MIN/1.73
GFR SERPL CREATININE-BSD FRML MDRD: 19 ML/MIN/1.73
GFR SERPL CREATININE-BSD FRML MDRD: 19 ML/MIN/1.73
GFR SERPL CREATININE-BSD FRML MDRD: 20 ML/MIN/1.73
GFR SERPL CREATININE-BSD FRML MDRD: ABNORMAL ML/MIN/{1.73_M2}
GLOBULIN SER CALC-MCNC: 2.6 G/DL (ref 2.2–3.9)
GLOBULIN UR ELPH-MCNC: 2.2 GM/DL
GLOBULIN UR ELPH-MCNC: 2.3 GM/DL
GLOBULIN UR ELPH-MCNC: 2.4 GM/DL
GLOBULIN UR ELPH-MCNC: 2.5 GM/DL
GLOBULIN UR ELPH-MCNC: 2.7 GM/DL
GLUCOSE BLD-MCNC: 114 MG/DL (ref 74–109)
GLUCOSE BLD-MCNC: 118 MG/DL (ref 70–99)
GLUCOSE BLD-MCNC: 136 MG/DL (ref 70–99)
GLUCOSE BLD-MCNC: 137 MG/DL (ref 74–109)
GLUCOSE BLD-MCNC: 142 MG/DL (ref 70–99)
GLUCOSE BLD-MCNC: 147 MG/DL (ref 74–109)
GLUCOSE BLD-MCNC: 169 MG/DL (ref 70–99)
GLUCOSE BLD-MCNC: 174 MG/DL (ref 70–99)
GLUCOSE BLD-MCNC: 189 MG/DL (ref 70–99)
GLUCOSE BLD-MCNC: 191 MG/DL (ref 70–99)
GLUCOSE BLD-MCNC: 206 MG/DL (ref 74–109)
GLUCOSE BLD-MCNC: 214 MG/DL (ref 70–99)
GLUCOSE BLD-MCNC: 217 MG/DL (ref 70–99)
GLUCOSE BLD-MCNC: 231 MG/DL (ref 70–99)
GLUCOSE BLD-MCNC: 262 MG/DL (ref 70–99)
GLUCOSE BLD-MCNC: 270 MG/DL (ref 74–109)
GLUCOSE BLD-MCNC: 274 MG/DL (ref 74–109)
GLUCOSE BLD-MCNC: 277 MG/DL (ref 74–109)
GLUCOSE BLD-MCNC: 289 MG/DL (ref 70–99)
GLUCOSE BLD-MCNC: 331 MG/DL (ref 70–99)
GLUCOSE BLD-MCNC: 356 MG/DL (ref 70–99)
GLUCOSE BLD-MCNC: 356 MG/DL (ref 70–99)
GLUCOSE BLD-MCNC: 390 MG/DL (ref 70–99)
GLUCOSE BLD-MCNC: 86 MG/DL (ref 74–109)
GLUCOSE BLDC GLUCOMTR-MCNC: 121 MG/DL (ref 70–130)
GLUCOSE BLDC GLUCOMTR-MCNC: 122 MG/DL (ref 70–130)
GLUCOSE BLDC GLUCOMTR-MCNC: 128 MG/DL (ref 70–130)
GLUCOSE BLDC GLUCOMTR-MCNC: 132 MG/DL (ref 70–130)
GLUCOSE BLDC GLUCOMTR-MCNC: 147 MG/DL (ref 70–130)
GLUCOSE BLDC GLUCOMTR-MCNC: 159 MG/DL (ref 70–130)
GLUCOSE BLDC GLUCOMTR-MCNC: 167 MG/DL (ref 70–130)
GLUCOSE BLDC GLUCOMTR-MCNC: 176 MG/DL (ref 70–130)
GLUCOSE BLDC GLUCOMTR-MCNC: 178 MG/DL (ref 70–130)
GLUCOSE BLDC GLUCOMTR-MCNC: 179 MG/DL (ref 70–130)
GLUCOSE BLDC GLUCOMTR-MCNC: 186 MG/DL (ref 70–130)
GLUCOSE BLDC GLUCOMTR-MCNC: 190 MG/DL (ref 70–130)
GLUCOSE BLDC GLUCOMTR-MCNC: 194 MG/DL (ref 70–130)
GLUCOSE BLDC GLUCOMTR-MCNC: 195 MG/DL (ref 70–130)
GLUCOSE BLDC GLUCOMTR-MCNC: 197 MG/DL (ref 70–130)
GLUCOSE BLDC GLUCOMTR-MCNC: 207 MG/DL (ref 70–130)
GLUCOSE BLDC GLUCOMTR-MCNC: 210 MG/DL (ref 70–130)
GLUCOSE BLDC GLUCOMTR-MCNC: 212 MG/DL (ref 70–130)
GLUCOSE BLDC GLUCOMTR-MCNC: 213 MG/DL (ref 70–130)
GLUCOSE BLDC GLUCOMTR-MCNC: 213 MG/DL (ref 70–130)
GLUCOSE BLDC GLUCOMTR-MCNC: 220 MG/DL (ref 70–130)
GLUCOSE BLDC GLUCOMTR-MCNC: 240 MG/DL (ref 70–130)
GLUCOSE BLDC GLUCOMTR-MCNC: 243 MG/DL (ref 70–130)
GLUCOSE BLDC GLUCOMTR-MCNC: 249 MG/DL (ref 70–130)
GLUCOSE BLDC GLUCOMTR-MCNC: 249 MG/DL (ref 70–130)
GLUCOSE BLDC GLUCOMTR-MCNC: 252 MG/DL (ref 70–130)
GLUCOSE BLDC GLUCOMTR-MCNC: 257 MG/DL (ref 70–130)
GLUCOSE BLDC GLUCOMTR-MCNC: 261 MG/DL (ref 70–130)
GLUCOSE BLDC GLUCOMTR-MCNC: 281 MG/DL (ref 70–130)
GLUCOSE BLDC GLUCOMTR-MCNC: 283 MG/DL (ref 70–130)
GLUCOSE BLDC GLUCOMTR-MCNC: 61 MG/DL (ref 70–130)
GLUCOSE BLDC GLUCOMTR-MCNC: 63 MG/DL (ref 70–130)
GLUCOSE BLDC GLUCOMTR-MCNC: 64 MG/DL (ref 70–130)
GLUCOSE BLDC GLUCOMTR-MCNC: 65 MG/DL (ref 70–130)
GLUCOSE BLDC GLUCOMTR-MCNC: 67 MG/DL (ref 70–130)
GLUCOSE BLDC GLUCOMTR-MCNC: 71 MG/DL (ref 70–130)
GLUCOSE BLDC GLUCOMTR-MCNC: 83 MG/DL (ref 70–130)
GLUCOSE BLDC GLUCOMTR-MCNC: 90 MG/DL (ref 70–130)
GLUCOSE SERPL-MCNC: 126 MG/DL (ref 65–99)
GLUCOSE SERPL-MCNC: 128 MG/DL (ref 65–99)
GLUCOSE SERPL-MCNC: 162 MG/DL (ref 65–99)
GLUCOSE SERPL-MCNC: 185 MG/DL (ref 65–99)
GLUCOSE SERPL-MCNC: 193 MG/DL (ref 65–99)
GLUCOSE SERPL-MCNC: 210 MG/DL (ref 65–99)
GLUCOSE SERPL-MCNC: 242 MG/DL (ref 65–99)
GLUCOSE SERPL-MCNC: 253 MG/DL (ref 65–99)
GLUCOSE SERPL-MCNC: 275 MG/DL (ref 65–99)
GLUCOSE SERPL-MCNC: 56 MG/DL (ref 65–99)
GLUCOSE SERPL-MCNC: 63 MG/DL (ref 65–99)
GLUCOSE SERPL-MCNC: 65 MG/DL (ref 65–99)
GLUCOSE SERPL-MCNC: 76 MG/DL (ref 65–99)
GLUCOSE SERPL-MCNC: 94 MG/DL (ref 65–99)
GLUCOSE UR STRIP-MCNC: ABNORMAL MG/DL
GLUCOSE UR STRIP-MCNC: ABNORMAL MG/DL
GLUCOSE URINE: 250 MG/DL
HAV IGM SER IA-ACNC: NORMAL
HBA1C MFR BLD: 8.5 % (ref 4.8–5.6)
HBV SURFACE AB TITR SER: NORMAL {TITER}
HCT VFR BLD AUTO: 20.7 % (ref 37.5–51)
HCT VFR BLD AUTO: 21.3 % (ref 37.5–51)
HCT VFR BLD AUTO: 21.8 % (ref 37.5–51)
HCT VFR BLD AUTO: 21.8 % (ref 37.5–51)
HCT VFR BLD AUTO: 22 % (ref 37.5–51)
HCT VFR BLD AUTO: 22.2 % (ref 37.5–51)
HCT VFR BLD AUTO: 22.3 % (ref 37.5–51)
HCT VFR BLD AUTO: 22.8 % (ref 37.5–51)
HCT VFR BLD AUTO: 23.1 % (ref 37.5–51)
HCT VFR BLD AUTO: 23.2 % (ref 37.5–51)
HCT VFR BLD AUTO: 23.4 % (ref 37.5–51)
HCT VFR BLD AUTO: 23.6 % (ref 37.5–51)
HCT VFR BLD AUTO: 24.3 % (ref 37.5–51)
HCT VFR BLD AUTO: 24.6 % (ref 37.5–51)
HCT VFR BLD AUTO: 25.1 % (ref 37.5–51)
HCT VFR BLD AUTO: 25.4 % (ref 37.5–51)
HCT VFR BLD CALC: 25.7 % (ref 42–52)
HCT VFR BLD CALC: 26.1 % (ref 42–52)
HCT VFR BLD CALC: 26.4 % (ref 42–52)
HCT VFR BLD CALC: 27.2 % (ref 42–52)
HCT VFR BLD CALC: 27.4 % (ref 42–52)
HCT VFR BLD CALC: 28.1 % (ref 42–52)
HCT VFR BLD CALC: 28.3 % (ref 42–52)
HCT VFR BLD CALC: 29.1 % (ref 42–52)
HDLC SERPL-MCNC: 35 MG/DL (ref 40–60)
HEMOCCULT STL QL: NEGATIVE
HEMOGLOBIN: 8.4 G/DL (ref 14–18)
HEMOGLOBIN: 8.6 G/DL (ref 14–18)
HEMOGLOBIN: 8.8 G/DL (ref 14–18)
HEMOGLOBIN: 8.8 G/DL (ref 14–18)
HEMOGLOBIN: 8.9 G/DL (ref 14–18)
HEMOGLOBIN: 9.2 G/DL (ref 14–18)
HEMOGLOBIN: 9.3 G/DL (ref 14–18)
HEMOGLOBIN: 9.4 G/DL (ref 14–18)
HEPATITIS B CORE IGM ANTIBODY: NORMAL
HEPATITIS B SURFACE ANTIGEN INTERPRETATION: NORMAL
HEPATITIS C ANTIBODY INTERPRETATION: NORMAL
HGB BLD-MCNC: 6.6 G/DL (ref 13–17.7)
HGB BLD-MCNC: 7.4 G/DL (ref 13–17.7)
HGB BLD-MCNC: 7.5 G/DL (ref 13–17.7)
HGB BLD-MCNC: 7.6 G/DL (ref 13–17.7)
HGB BLD-MCNC: 7.8 G/DL (ref 13–17.7)
HGB BLD-MCNC: 8.1 G/DL (ref 13–17.7)
HGB BLD-MCNC: 8.3 G/DL (ref 13–17.7)
HGB BLD-MCNC: 8.3 G/DL (ref 13–17.7)
HGB BLD-MCNC: 8.4 G/DL (ref 13–17.7)
HGB BLD-MCNC: 8.5 G/DL (ref 13–17.7)
HGB UR QL STRIP.AUTO: NEGATIVE
HGB UR QL STRIP.AUTO: NEGATIVE
HOLD SPECIMEN: NORMAL
HOLD SPECIMEN: NORMAL
HYALINE CASTS UR QL AUTO: ABNORMAL /LPF
HYALINE CASTS: 3 /HPF (ref 0–8)
IMM GRANULOCYTES # BLD AUTO: 0.03 10*3/MM3 (ref 0–0.05)
IMM GRANULOCYTES # BLD AUTO: 0.04 10*3/MM3 (ref 0–0.05)
IMM GRANULOCYTES # BLD AUTO: 0.05 10*3/MM3 (ref 0–0.05)
IMM GRANULOCYTES # BLD AUTO: 0.07 10*3/MM3 (ref 0–0.05)
IMM GRANULOCYTES # BLD AUTO: 0.09 10*3/MM3 (ref 0–0.05)
IMM GRANULOCYTES NFR BLD AUTO: 0.4 % (ref 0–0.5)
IMM GRANULOCYTES NFR BLD AUTO: 0.4 % (ref 0–0.5)
IMM GRANULOCYTES NFR BLD AUTO: 0.5 % (ref 0–0.5)
IMM GRANULOCYTES NFR BLD AUTO: 0.5 % (ref 0–0.5)
IMM GRANULOCYTES NFR BLD AUTO: 0.6 % (ref 0–0.5)
IMM GRANULOCYTES NFR BLD AUTO: 0.7 % (ref 0–0.5)
IMM GRANULOCYTES NFR BLD AUTO: 0.7 % (ref 0–0.5)
IMM GRANULOCYTES NFR BLD AUTO: 0.9 % (ref 0–0.5)
IMMATURE GRANULOCYTES #: 0 K/UL
IMMATURE GRANULOCYTES #: 0.1 K/UL
INR BLD: 1.54 (ref 0.88–1.18)
INR BLD: 1.55 (ref 0.88–1.18)
INR BLD: 1.56 (ref 0.88–1.18)
INR PPP: 1.71 (ref 0.91–1.09)
IRON 24H UR-MRATE: 55 MCG/DL (ref 59–158)
IRON 24H UR-MRATE: 63 MCG/DL (ref 59–158)
IRON SATN MFR SERPL: 26 % (ref 20–50)
IRON SATN MFR SERPL: 32 % (ref 20–50)
IRON SATURATION: 39 % (ref 14–50)
IRON: 67 UG/DL (ref 59–158)
KETONES UR QL STRIP: NEGATIVE
KETONES UR QL STRIP: NEGATIVE
KETONES, URINE: NEGATIVE MG/DL
LDLC SERPL CALC-MCNC: 77 MG/DL (ref 0–100)
LDLC/HDLC SERPL: 2.2 {RATIO}
LEFT ATRIUM VOLUME INDEX: 39.8 ML/M2
LEFT ATRIUM VOLUME: 71.6 CM3
LEUKOCYTE ESTERASE UR QL STRIP.AUTO: NEGATIVE
LEUKOCYTE ESTERASE UR QL STRIP.AUTO: NEGATIVE
LEUKOCYTE ESTERASE, URINE: NEGATIVE
LV EF: 55 %
LVEF MODALITY: NORMAL
LYMPHOCYTES # BLD AUTO: 0.61 10*3/MM3 (ref 0.7–3.1)
LYMPHOCYTES # BLD AUTO: 1.19 10*3/MM3 (ref 0.7–3.1)
LYMPHOCYTES # BLD AUTO: 1.22 10*3/MM3 (ref 0.7–3.1)
LYMPHOCYTES # BLD AUTO: 1.52 10*3/MM3 (ref 0.7–3.1)
LYMPHOCYTES # BLD AUTO: 1.57 10*3/MM3 (ref 0.7–3.1)
LYMPHOCYTES # BLD AUTO: 1.63 10*3/MM3 (ref 0.7–3.1)
LYMPHOCYTES # BLD AUTO: 1.66 10*3/MM3 (ref 0.7–3.1)
LYMPHOCYTES # BLD AUTO: 1.77 10*3/MM3 (ref 0.7–3.1)
LYMPHOCYTES # BLD AUTO: 1.78 10*3/MM3 (ref 0.7–3.1)
LYMPHOCYTES # BLD AUTO: 1.94 10*3/MM3 (ref 0.7–3.1)
LYMPHOCYTES ABSOLUTE: 1.1 K/UL (ref 1.1–4.5)
LYMPHOCYTES ABSOLUTE: 1.2 K/UL (ref 1.1–4.5)
LYMPHOCYTES NFR BLD AUTO: 11.3 % (ref 19.6–45.3)
LYMPHOCYTES NFR BLD AUTO: 16.4 % (ref 19.6–45.3)
LYMPHOCYTES NFR BLD AUTO: 22.6 % (ref 19.6–45.3)
LYMPHOCYTES NFR BLD AUTO: 22.7 % (ref 19.6–45.3)
LYMPHOCYTES NFR BLD AUTO: 23.1 % (ref 19.6–45.3)
LYMPHOCYTES NFR BLD AUTO: 24.3 % (ref 19.6–45.3)
LYMPHOCYTES NFR BLD AUTO: 24.8 % (ref 19.6–45.3)
LYMPHOCYTES NFR BLD AUTO: 27.2 % (ref 19.6–45.3)
LYMPHOCYTES NFR BLD AUTO: 32 % (ref 19.6–45.3)
LYMPHOCYTES NFR BLD AUTO: 5.7 % (ref 19.6–45.3)
LYMPHOCYTES RELATIVE PERCENT: 15.5 % (ref 20–40)
LYMPHOCYTES RELATIVE PERCENT: 16.6 % (ref 20–40)
Lab: ABNORMAL
M-SPIKE: ABNORMAL G/DL
MAGNESIUM SERPL-MCNC: 1.3 MG/DL (ref 1.6–2.4)
MAGNESIUM SERPL-MCNC: 1.7 MG/DL (ref 1.6–2.4)
MAGNESIUM SERPL-MCNC: 1.7 MG/DL (ref 1.6–2.4)
MAGNESIUM SERPL-MCNC: 1.8 MG/DL (ref 1.6–2.4)
MAGNESIUM SERPL-MCNC: 2.4 MG/DL (ref 1.6–2.4)
MAGNESIUM SERPL-MCNC: 2.5 MG/DL (ref 1.6–2.4)
MAGNESIUM: 1.9 MG/DL (ref 1.6–2.4)
MAGNESIUM: 1.9 MG/DL (ref 1.6–2.4)
MAGNESIUM: 2 MG/DL (ref 1.6–2.4)
MAGNESIUM: 2.1 MG/DL (ref 1.6–2.4)
MAXIMAL PREDICTED HEART RATE: 143 BPM
MCH RBC QN AUTO: 30.7 PG (ref 26.6–33)
MCH RBC QN AUTO: 30.9 PG (ref 26.6–33)
MCH RBC QN AUTO: 31 PG (ref 26.6–33)
MCH RBC QN AUTO: 31.3 PG (ref 26.6–33)
MCH RBC QN AUTO: 31.3 PG (ref 26.6–33)
MCH RBC QN AUTO: 31.4 PG (ref 26.6–33)
MCH RBC QN AUTO: 31.5 PG (ref 26.6–33)
MCH RBC QN AUTO: 31.6 PG (ref 26.6–33)
MCH RBC QN AUTO: 31.6 PG (ref 26.6–33)
MCH RBC QN AUTO: 31.8 PG (ref 26.6–33)
MCH RBC QN AUTO: 32 PG (ref 26.6–33)
MCH RBC QN AUTO: 32.2 PG (ref 27–31)
MCH RBC QN AUTO: 32.3 PG (ref 27–31)
MCH RBC QN AUTO: 32.5 PG (ref 26.6–33)
MCH RBC QN AUTO: 32.5 PG (ref 27–31)
MCH RBC QN AUTO: 32.6 PG (ref 27–31)
MCH RBC QN AUTO: 32.6 PG (ref 27–31)
MCH RBC QN AUTO: 33 PG (ref 27–31)
MCH RBC QN AUTO: 33.1 PG (ref 27–31)
MCH RBC QN AUTO: 33.1 PG (ref 27–31)
MCH RBC QN AUTO: 33.2 PG (ref 26.6–33)
MCHC RBC AUTO-ENTMCNC: 31.9 G/DL (ref 31.5–35.7)
MCHC RBC AUTO-ENTMCNC: 32.1 G/DL (ref 33–37)
MCHC RBC AUTO-ENTMCNC: 32.3 G/DL (ref 33–37)
MCHC RBC AUTO-ENTMCNC: 32.5 G/DL (ref 33–37)
MCHC RBC AUTO-ENTMCNC: 32.7 G/DL (ref 33–37)
MCHC RBC AUTO-ENTMCNC: 32.7 G/DL (ref 33–37)
MCHC RBC AUTO-ENTMCNC: 32.9 G/DL (ref 31.5–35.7)
MCHC RBC AUTO-ENTMCNC: 33 G/DL (ref 33–37)
MCHC RBC AUTO-ENTMCNC: 33.1 G/DL (ref 31.5–35.7)
MCHC RBC AUTO-ENTMCNC: 33.1 G/DL (ref 31.5–35.7)
MCHC RBC AUTO-ENTMCNC: 33.1 G/DL (ref 33–37)
MCHC RBC AUTO-ENTMCNC: 33.3 G/DL (ref 31.5–35.7)
MCHC RBC AUTO-ENTMCNC: 33.3 G/DL (ref 33–37)
MCHC RBC AUTO-ENTMCNC: 33.6 G/DL (ref 31.5–35.7)
MCHC RBC AUTO-ENTMCNC: 33.8 G/DL (ref 31.5–35.7)
MCHC RBC AUTO-ENTMCNC: 33.8 G/DL (ref 31.5–35.7)
MCHC RBC AUTO-ENTMCNC: 33.9 G/DL (ref 31.5–35.7)
MCHC RBC AUTO-ENTMCNC: 34.1 G/DL (ref 31.5–35.7)
MCHC RBC AUTO-ENTMCNC: 34.2 G/DL (ref 31.5–35.7)
MCHC RBC AUTO-ENTMCNC: 34.4 G/DL (ref 31.5–35.7)
MCHC RBC AUTO-ENTMCNC: 34.7 G/DL (ref 31.5–35.7)
MCV RBC AUTO: 100 FL (ref 80–94)
MCV RBC AUTO: 100.4 FL (ref 80–94)
MCV RBC AUTO: 100.5 FL (ref 79–97)
MCV RBC AUTO: 100.7 FL (ref 80–94)
MCV RBC AUTO: 101.1 FL (ref 80–94)
MCV RBC AUTO: 101.5 FL (ref 80–94)
MCV RBC AUTO: 90.6 FL (ref 79–97)
MCV RBC AUTO: 91.2 FL (ref 79–97)
MCV RBC AUTO: 91.4 FL (ref 79–97)
MCV RBC AUTO: 91.7 FL (ref 79–97)
MCV RBC AUTO: 91.8 FL (ref 79–97)
MCV RBC AUTO: 92.1 FL (ref 79–97)
MCV RBC AUTO: 92.1 FL (ref 79–97)
MCV RBC AUTO: 93.2 FL (ref 79–97)
MCV RBC AUTO: 95.5 FL (ref 79–97)
MCV RBC AUTO: 96.2 FL (ref 79–97)
MCV RBC AUTO: 97.6 FL (ref 80–94)
MCV RBC AUTO: 98.3 FL (ref 79–97)
MCV RBC AUTO: 98.5 FL (ref 80–94)
MCV RBC AUTO: 98.7 FL (ref 79–97)
MCV RBC AUTO: 99.2 FL (ref 80–94)
MONOCYTES # BLD AUTO: 0.55 10*3/MM3 (ref 0.1–0.9)
MONOCYTES # BLD AUTO: 0.57 10*3/MM3 (ref 0.1–0.9)
MONOCYTES # BLD AUTO: 0.57 10*3/MM3 (ref 0.1–0.9)
MONOCYTES # BLD AUTO: 0.58 10*3/MM3 (ref 0.1–0.9)
MONOCYTES # BLD AUTO: 0.58 10*3/MM3 (ref 0.1–0.9)
MONOCYTES # BLD AUTO: 0.59 10*3/MM3 (ref 0.1–0.9)
MONOCYTES # BLD AUTO: 0.64 10*3/MM3 (ref 0.1–0.9)
MONOCYTES # BLD AUTO: 0.68 10*3/MM3 (ref 0.1–0.9)
MONOCYTES # BLD AUTO: 0.76 10*3/MM3 (ref 0.1–0.9)
MONOCYTES # BLD AUTO: 0.77 10*3/MM3 (ref 0.1–0.9)
MONOCYTES ABSOLUTE: 0.6 K/UL (ref 0–0.9)
MONOCYTES ABSOLUTE: 0.8 K/UL (ref 0–0.9)
MONOCYTES NFR BLD AUTO: 10 % (ref 5–12)
MONOCYTES NFR BLD AUTO: 11.1 % (ref 5–12)
MONOCYTES NFR BLD AUTO: 5.4 % (ref 5–12)
MONOCYTES NFR BLD AUTO: 7.3 % (ref 5–12)
MONOCYTES NFR BLD AUTO: 7.7 % (ref 5–12)
MONOCYTES NFR BLD AUTO: 8.1 % (ref 5–12)
MONOCYTES NFR BLD AUTO: 8.2 % (ref 5–12)
MONOCYTES NFR BLD AUTO: 8.8 % (ref 5–12)
MONOCYTES NFR BLD AUTO: 9.2 % (ref 5–12)
MONOCYTES NFR BLD AUTO: 9.4 % (ref 5–12)
MONOCYTES RELATIVE PERCENT: 10.5 % (ref 0–10)
MONOCYTES RELATIVE PERCENT: 8.5 % (ref 0–10)
MRSA CULTURE ONLY: NORMAL
NEUTROPHILS ABSOLUTE: 4.8 K/UL (ref 1.5–7.5)
NEUTROPHILS ABSOLUTE: 5.5 K/UL (ref 1.5–7.5)
NEUTROPHILS NFR BLD AUTO: 3.16 10*3/MM3 (ref 1.7–7)
NEUTROPHILS NFR BLD AUTO: 3.47 10*3/MM3 (ref 1.7–7)
NEUTROPHILS NFR BLD AUTO: 4.07 10*3/MM3 (ref 1.7–7)
NEUTROPHILS NFR BLD AUTO: 4.19 10*3/MM3 (ref 1.7–7)
NEUTROPHILS NFR BLD AUTO: 4.33 10*3/MM3 (ref 1.7–7)
NEUTROPHILS NFR BLD AUTO: 4.55 10*3/MM3 (ref 1.7–7)
NEUTROPHILS NFR BLD AUTO: 5.11 10*3/MM3 (ref 1.7–7)
NEUTROPHILS NFR BLD AUTO: 5.29 10*3/MM3 (ref 1.7–7)
NEUTROPHILS NFR BLD AUTO: 52.2 % (ref 42.7–76)
NEUTROPHILS NFR BLD AUTO: 57.7 % (ref 42.7–76)
NEUTROPHILS NFR BLD AUTO: 59.8 % (ref 42.7–76)
NEUTROPHILS NFR BLD AUTO: 61.5 % (ref 42.7–76)
NEUTROPHILS NFR BLD AUTO: 63.5 % (ref 42.7–76)
NEUTROPHILS NFR BLD AUTO: 64.6 % (ref 42.7–76)
NEUTROPHILS NFR BLD AUTO: 65.4 % (ref 42.7–76)
NEUTROPHILS NFR BLD AUTO: 71.4 % (ref 42.7–76)
NEUTROPHILS NFR BLD AUTO: 79.5 % (ref 42.7–76)
NEUTROPHILS NFR BLD AUTO: 8.35 10*3/MM3 (ref 1.7–7)
NEUTROPHILS NFR BLD AUTO: 87.3 % (ref 42.7–76)
NEUTROPHILS NFR BLD AUTO: 9.36 10*3/MM3 (ref 1.7–7)
NEUTROPHILS RELATIVE PERCENT: 70.5 % (ref 50–65)
NEUTROPHILS RELATIVE PERCENT: 72 % (ref 50–65)
NITRITE UR QL STRIP: NEGATIVE
NITRITE UR QL STRIP: NEGATIVE
NITRITE, URINE: NEGATIVE
NRBC BLD AUTO-RTO: 0 /100 WBC (ref 0–0.2)
NT-PROBNP SERPL-MCNC: ABNORMAL PG/ML (ref 0–1800)
PARATHYROID HORMONE INTACT: 180 PG/ML (ref 15–65)
PDW BLD-RTO: 13.2 % (ref 11.5–14.5)
PDW BLD-RTO: 13.4 % (ref 11.5–14.5)
PDW BLD-RTO: 13.5 % (ref 11.5–14.5)
PDW BLD-RTO: 13.6 % (ref 11.5–14.5)
PDW BLD-RTO: 15 % (ref 11.5–14.5)
PDW BLD-RTO: 15.8 % (ref 11.5–14.5)
PERFORMED ON: ABNORMAL
PH UA: 5 (ref 5–8)
PH UR STRIP.AUTO: <=5 [PH] (ref 5–8)
PH UR STRIP.AUTO: <=5 [PH] (ref 5–8)
PHOSPHATE SERPL-MCNC: 3.2 MG/DL (ref 2.5–4.5)
PHOSPHATE SERPL-MCNC: 3.6 MG/DL (ref 2.5–4.5)
PLATELET # BLD AUTO: 169 10*3/MM3 (ref 140–450)
PLATELET # BLD AUTO: 173 10*3/MM3 (ref 140–450)
PLATELET # BLD AUTO: 177 10*3/MM3 (ref 140–450)
PLATELET # BLD AUTO: 179 10*3/MM3 (ref 140–450)
PLATELET # BLD AUTO: 181 10*3/MM3 (ref 140–450)
PLATELET # BLD AUTO: 188 10*3/MM3 (ref 140–450)
PLATELET # BLD AUTO: 195 10*3/MM3 (ref 140–450)
PLATELET # BLD AUTO: 197 10*3/MM3 (ref 140–450)
PLATELET # BLD AUTO: 200 10*3/MM3 (ref 140–450)
PLATELET # BLD AUTO: 209 10*3/MM3 (ref 140–450)
PLATELET # BLD AUTO: 209 10*3/MM3 (ref 140–450)
PLATELET # BLD AUTO: 229 10*3/MM3 (ref 140–450)
PLATELET # BLD AUTO: 231 10*3/MM3 (ref 140–450)
PLATELET # BLD: 167 K/UL (ref 130–400)
PLATELET # BLD: 172 K/UL (ref 130–400)
PLATELET # BLD: 174 K/UL (ref 130–400)
PLATELET # BLD: 181 K/UL (ref 130–400)
PLATELET # BLD: 193 K/UL (ref 130–400)
PLATELET # BLD: 195 K/UL (ref 130–400)
PLATELET # BLD: 197 K/UL (ref 130–400)
PLATELET # BLD: 243 K/UL (ref 130–400)
PMV BLD AUTO: 10 FL (ref 6–12)
PMV BLD AUTO: 10.2 FL (ref 6–12)
PMV BLD AUTO: 10.3 FL (ref 6–12)
PMV BLD AUTO: 10.3 FL (ref 6–12)
PMV BLD AUTO: 10.5 FL (ref 6–12)
PMV BLD AUTO: 10.5 FL (ref 9.4–12.4)
PMV BLD AUTO: 10.6 FL (ref 9.4–12.4)
PMV BLD AUTO: 10.7 FL (ref 6–12)
PMV BLD AUTO: 10.7 FL (ref 6–12)
PMV BLD AUTO: 10.7 FL (ref 9.4–12.4)
PMV BLD AUTO: 10.8 FL (ref 6–12)
PMV BLD AUTO: 10.8 FL (ref 9.4–12.4)
PMV BLD AUTO: 10.9 FL (ref 9.4–12.4)
PMV BLD AUTO: 11 FL (ref 6–12)
PMV BLD AUTO: 11.2 FL (ref 6–12)
PMV BLD AUTO: 11.4 FL (ref 6–12)
PMV BLD AUTO: 11.5 FL (ref 9.4–12.4)
POTASSIUM REFLEX MAGNESIUM: 3.2 MMOL/L (ref 3.5–5)
POTASSIUM REFLEX MAGNESIUM: 3.3 MMOL/L (ref 3.5–5)
POTASSIUM REFLEX MAGNESIUM: 3.4 MMOL/L (ref 3.5–5)
POTASSIUM REFLEX MAGNESIUM: 3.4 MMOL/L (ref 3.5–5)
POTASSIUM REFLEX MAGNESIUM: 3.6 MMOL/L (ref 3.5–5)
POTASSIUM REFLEX MAGNESIUM: 3.8 MMOL/L (ref 3.5–5)
POTASSIUM SERPL-SCNC: 3.2 MMOL/L (ref 3.5–5.2)
POTASSIUM SERPL-SCNC: 3.2 MMOL/L (ref 3.5–5.2)
POTASSIUM SERPL-SCNC: 3.3 MMOL/L (ref 3.5–5)
POTASSIUM SERPL-SCNC: 3.4 MMOL/L (ref 3.5–5.2)
POTASSIUM SERPL-SCNC: 3.5 MMOL/L (ref 3.5–5.2)
POTASSIUM SERPL-SCNC: 3.5 MMOL/L (ref 3.5–5.2)
POTASSIUM SERPL-SCNC: 3.6 MMOL/L (ref 3.5–5)
POTASSIUM SERPL-SCNC: 3.6 MMOL/L (ref 3.5–5.2)
POTASSIUM SERPL-SCNC: 3.7 MMOL/L (ref 3.5–5.2)
POTASSIUM SERPL-SCNC: 3.8 MMOL/L (ref 3.5–5.2)
POTASSIUM SERPL-SCNC: 3.9 MMOL/L (ref 3.5–5.2)
POTASSIUM SERPL-SCNC: 4 MMOL/L (ref 3.5–5.2)
POTASSIUM SERPL-SCNC: 4.2 MMOL/L (ref 3.5–5.2)
POTASSIUM SERPL-SCNC: 4.4 MMOL/L (ref 3.5–5.2)
POTASSIUM SERPL-SCNC: 5 MMOL/L (ref 3.5–5)
PRO-BNP: ABNORMAL PG/ML (ref 0–1800)
PRO-BNP: ABNORMAL PG/ML (ref 0–1800)
PROT PATTERN SERPL ELPH-IMP: ABNORMAL
PROT SERPL-MCNC: 5.9 G/DL (ref 6–8.5)
PROT SERPL-MCNC: 5.9 G/DL (ref 6–8.5)
PROT SERPL-MCNC: 6 G/DL (ref 6–8.5)
PROT SERPL-MCNC: 6.1 G/DL (ref 6–8.5)
PROT SERPL-MCNC: 6.2 G/DL (ref 6–8.5)
PROT SERPL-MCNC: 6.3 G/DL (ref 6–8.5)
PROT SERPL-MCNC: 6.3 G/DL (ref 6–8.5)
PROT SERPL-MCNC: 6.5 G/DL (ref 6–8.5)
PROT SERPL-MCNC: 6.5 G/DL (ref 6–8.5)
PROT SERPL-MCNC: 6.7 G/DL (ref 6–8.5)
PROT UR QL STRIP: ABNORMAL
PROT UR QL STRIP: ABNORMAL
PROT UR-MCNC: 25.9 MG/DL
PROTEIN UA: 100 MG/DL
PROTHROMBIN TIME: 18.6 SEC (ref 12–14.6)
PROTHROMBIN TIME: 18.7 SEC (ref 12–14.6)
PROTHROMBIN TIME: 18.8 SEC (ref 12–14.6)
PROTHROMBIN TIME: 19.8 SECONDS (ref 11.9–14.6)
PTH-INTACT SERPL-MCNC: 100.9 PG/ML (ref 15–65)
PTH-INTACT SERPL-MCNC: 89.3 PG/ML (ref 15–65)
QT INTERVAL: 292 MS
QT INTERVAL: 392 MS
QTC INTERVAL: 385 MS
QTC INTERVAL: 420 MS
RBC # BLD AUTO: 2.06 10*6/MM3 (ref 4.14–5.8)
RBC # BLD AUTO: 2.31 10*6/MM3 (ref 4.14–5.8)
RBC # BLD AUTO: 2.34 10*6/MM3 (ref 4.14–5.8)
RBC # BLD AUTO: 2.35 10*6/MM3 (ref 4.14–5.8)
RBC # BLD AUTO: 2.35 10*6/MM3 (ref 4.14–5.8)
RBC # BLD AUTO: 2.39 10*6/MM3 (ref 4.14–5.8)
RBC # BLD AUTO: 2.39 10*6/MM3 (ref 4.14–5.8)
RBC # BLD AUTO: 2.43 10*6/MM3 (ref 4.14–5.8)
RBC # BLD AUTO: 2.43 10*6/MM3 (ref 4.14–5.8)
RBC # BLD AUTO: 2.47 10*6/MM3 (ref 4.14–5.8)
RBC # BLD AUTO: 2.54 10*6/MM3 (ref 4.14–5.8)
RBC # BLD AUTO: 2.61 10*6/MM3 (ref 4.14–5.8)
RBC # BLD AUTO: 2.65 10*6/MM3 (ref 4.14–5.8)
RBC # BLD: 2.61 M/UL (ref 4.7–6.1)
RBC # BLD: 2.61 M/UL (ref 4.7–6.1)
RBC # BLD: 2.66 M/UL (ref 4.7–6.1)
RBC # BLD: 2.69 M/UL (ref 4.7–6.1)
RBC # BLD: 2.7 M/UL (ref 4.7–6.1)
RBC # BLD: 2.82 M/UL (ref 4.7–6.1)
RBC # BLD: 2.88 M/UL (ref 4.7–6.1)
RBC # BLD: 2.89 M/UL (ref 4.7–6.1)
RBC # UR: ABNORMAL /HPF
RBC UA: 2 /HPF (ref 0–4)
REF LAB TEST METHOD: ABNORMAL
RH BLD: NEGATIVE
SARS-COV-2 RDRP RESP QL NAA+PROBE: NOT DETECTED
SARS-COV-2 RDRP RESP QL NAA+PROBE: NOT DETECTED
SARS-COV-2, NAA: NOT DETECTED
SODIUM BLD-SCNC: 135 MMOL/L (ref 136–145)
SODIUM BLD-SCNC: 136 MMOL/L (ref 136–145)
SODIUM BLD-SCNC: 138 MMOL/L (ref 136–145)
SODIUM BLD-SCNC: 139 MMOL/L (ref 136–145)
SODIUM BLD-SCNC: 140 MMOL/L (ref 136–145)
SODIUM BLD-SCNC: 140 MMOL/L (ref 136–145)
SODIUM BLD-SCNC: 141 MMOL/L (ref 136–145)
SODIUM BLD-SCNC: 143 MMOL/L (ref 136–145)
SODIUM SERPL-SCNC: 138 MMOL/L (ref 136–145)
SODIUM SERPL-SCNC: 139 MMOL/L (ref 136–145)
SODIUM SERPL-SCNC: 140 MMOL/L (ref 136–145)
SODIUM SERPL-SCNC: 140 MMOL/L (ref 136–145)
SODIUM SERPL-SCNC: 141 MMOL/L (ref 136–145)
SODIUM SERPL-SCNC: 142 MMOL/L (ref 136–145)
SODIUM SERPL-SCNC: 142 MMOL/L (ref 136–145)
SODIUM SERPL-SCNC: 144 MMOL/L (ref 136–145)
SODIUM SERPL-SCNC: 146 MMOL/L (ref 136–145)
SODIUM SERPL-SCNC: 146 MMOL/L (ref 136–145)
SODIUM UR-SCNC: 100 MMOL/L
SP GR UR STRIP: 1.01 (ref 1–1.03)
SP GR UR STRIP: 1.01 (ref 1–1.03)
SPECIFIC GRAVITY UA: 1.01 (ref 1–1.03)
SQUAMOUS #/AREA URNS HPF: ABNORMAL /HPF
STRESS TARGET HR: 122 BPM
T&S EXPIRATION DATE: NORMAL
T4 FREE SERPL-MCNC: 1.07 NG/DL (ref 0.93–1.7)
T4 FREE SERPL-MCNC: 1.09 NG/DL (ref 0.93–1.7)
TIBC SERPL-MCNC: 197 MCG/DL (ref 298–536)
TIBC SERPL-MCNC: 213 MCG/DL (ref 298–536)
TOTAL IRON BINDING CAPACITY: 171 UG/DL (ref 250–400)
TOTAL PROTEIN: 5.4 G/DL (ref 6.6–8.7)
TOTAL PROTEIN: 6.8 G/DL (ref 6.6–8.7)
TOTAL PROTEIN: 6.9 G/DL (ref 6.6–8.7)
TRANSFERRIN SERPL-MCNC: 132 MG/DL (ref 200–360)
TRANSFERRIN SERPL-MCNC: 143 MG/DL (ref 200–360)
TRIGL SERPL-MCNC: 50 MG/DL (ref 0–150)
TROPONIN T SERPL-MCNC: 0.04 NG/ML (ref 0–0.03)
TROPONIN T SERPL-MCNC: 0.07 NG/ML (ref 0–0.03)
TROPONIN T SERPL-MCNC: 0.17 NG/ML (ref 0–0.03)
TROPONIN T SERPL-MCNC: 0.3 NG/ML (ref 0–0.03)
TROPONIN T SERPL-MCNC: 0.31 NG/ML (ref 0–0.03)
TROPONIN T SERPL-MCNC: 0.32 NG/ML (ref 0–0.03)
TROPONIN: 0.14 NG/ML (ref 0–0.03)
TROPONIN: 0.14 NG/ML (ref 0–0.03)
TSH SERPL DL<=0.05 MIU/L-ACNC: 4.21 UIU/ML (ref 0.27–4.2)
TSH SERPL DL<=0.05 MIU/L-ACNC: 4.39 UIU/ML (ref 0.27–4.2)
UNIT  ABO: NORMAL
UNIT  RH: NORMAL
URATE SERPL-MCNC: 10.4 MG/DL (ref 3.4–7)
UROBILINOGEN UR QL STRIP: ABNORMAL
UROBILINOGEN UR QL STRIP: ABNORMAL
UROBILINOGEN, URINE: 0.2 E.U./DL
VITAMIN D 25-HYDROXY: 30.9 NG/ML
VLDLC SERPL-MCNC: 10 MG/DL
WBC # BLD AUTO: 10.5 10*3/MM3 (ref 3.4–10.8)
WBC # BLD AUTO: 10.71 10*3/MM3 (ref 3.4–10.8)
WBC # BLD AUTO: 5.44 10*3/MM3 (ref 3.4–10.8)
WBC # BLD AUTO: 5.79 10*3/MM3 (ref 3.4–10.8)
WBC # BLD AUTO: 6 10*3/MM3 (ref 3.4–10.8)
WBC # BLD AUTO: 6.06 10*3/MM3 (ref 3.4–10.8)
WBC # BLD AUTO: 6.71 10*3/MM3 (ref 3.4–10.8)
WBC # BLD AUTO: 6.81 10*3/MM3 (ref 3.4–10.8)
WBC # BLD AUTO: 6.82 10*3/MM3 (ref 3.4–10.8)
WBC # BLD AUTO: 7 10*3/MM3 (ref 3.4–10.8)
WBC # BLD AUTO: 7.17 10*3/MM3 (ref 3.4–10.8)
WBC # BLD AUTO: 7.42 10*3/MM3 (ref 3.4–10.8)
WBC # BLD AUTO: 7.82 10*3/MM3 (ref 3.4–10.8)
WBC # BLD: 5.1 K/UL (ref 4.8–10.8)
WBC # BLD: 6.6 K/UL (ref 4.8–10.8)
WBC # BLD: 6.6 K/UL (ref 4.8–10.8)
WBC # BLD: 6.7 K/UL (ref 4.8–10.8)
WBC # BLD: 7.5 K/UL (ref 4.8–10.8)
WBC # BLD: 7.8 K/UL (ref 4.8–10.8)
WBC # BLD: 7.9 K/UL (ref 4.8–10.8)
WBC # BLD: 9.4 K/UL (ref 4.8–10.8)
WBC UA: 0 /HPF (ref 0–5)
WBC UR QL AUTO: ABNORMAL /HPF
WHOLE BLOOD HOLD SPECIMEN: NORMAL
WHOLE BLOOD HOLD SPECIMEN: NORMAL

## 2020-01-01 PROCEDURE — 85025 COMPLETE CBC W/AUTO DIFF WBC: CPT | Performed by: FAMILY MEDICINE

## 2020-01-01 PROCEDURE — 96374 THER/PROPH/DIAG INJ IV PUSH: CPT

## 2020-01-01 PROCEDURE — 99214 OFFICE O/P EST MOD 30 MIN: CPT | Performed by: PHYSICIAN ASSISTANT

## 2020-01-01 PROCEDURE — 82947 ASSAY GLUCOSE BLOOD QUANT: CPT

## 2020-01-01 PROCEDURE — 82570 ASSAY OF URINE CREATININE: CPT | Performed by: NURSE PRACTITIONER

## 2020-01-01 PROCEDURE — 80048 BASIC METABOLIC PNL TOTAL CA: CPT | Performed by: INTERNAL MEDICINE

## 2020-01-01 PROCEDURE — 83880 ASSAY OF NATRIURETIC PEPTIDE: CPT

## 2020-01-01 PROCEDURE — 7100000000 HC PACU RECOVERY - FIRST 15 MIN: Performed by: SURGERY

## 2020-01-01 PROCEDURE — 93000 ELECTROCARDIOGRAM COMPLETE: CPT | Performed by: INTERNAL MEDICINE

## 2020-01-01 PROCEDURE — 99222 1ST HOSP IP/OBS MODERATE 55: CPT | Performed by: INTERNAL MEDICINE

## 2020-01-01 PROCEDURE — 85027 COMPLETE CBC AUTOMATED: CPT

## 2020-01-01 PROCEDURE — 93306 TTE W/DOPPLER COMPLETE: CPT | Performed by: INTERNAL MEDICINE

## 2020-01-01 PROCEDURE — 63710000001 INSULIN LISPRO (HUMAN) PER 5 UNITS: Performed by: FAMILY MEDICINE

## 2020-01-01 PROCEDURE — 2580000003 HC RX 258: Performed by: FAMILY MEDICINE

## 2020-01-01 PROCEDURE — 86706 HEP B SURFACE ANTIBODY: CPT

## 2020-01-01 PROCEDURE — 36415 COLL VENOUS BLD VENIPUNCTURE: CPT

## 2020-01-01 PROCEDURE — 83735 ASSAY OF MAGNESIUM: CPT

## 2020-01-01 PROCEDURE — 6360000002 HC RX W HCPCS: Performed by: FAMILY MEDICINE

## 2020-01-01 PROCEDURE — 1210000000 HC MED SURG R&B

## 2020-01-01 PROCEDURE — 87635 SARS-COV-2 COVID-19 AMP PRB: CPT | Performed by: FAMILY MEDICINE

## 2020-01-01 PROCEDURE — 83970 ASSAY OF PARATHORMONE: CPT

## 2020-01-01 PROCEDURE — 7100000001 HC PACU RECOVERY - ADDTL 15 MIN: Performed by: SURGERY

## 2020-01-01 PROCEDURE — 81001 URINALYSIS AUTO W/SCOPE: CPT | Performed by: INTERNAL MEDICINE

## 2020-01-01 PROCEDURE — 7100000011 HC PHASE II RECOVERY - ADDTL 15 MIN: Performed by: SURGERY

## 2020-01-01 PROCEDURE — 3600000014 HC SURGERY LEVEL 4 ADDTL 15MIN: Performed by: SURGERY

## 2020-01-01 PROCEDURE — 85018 HEMOGLOBIN: CPT | Performed by: INTERNAL MEDICINE

## 2020-01-01 PROCEDURE — 93010 ELECTROCARDIOGRAM REPORT: CPT | Performed by: INTERNAL MEDICINE

## 2020-01-01 PROCEDURE — 83540 ASSAY OF IRON: CPT

## 2020-01-01 PROCEDURE — 87635 SARS-COV-2 COVID-19 AMP PRB: CPT | Performed by: EMERGENCY MEDICINE

## 2020-01-01 PROCEDURE — 80162 ASSAY OF DIGOXIN TOTAL: CPT | Performed by: NURSE PRACTITIONER

## 2020-01-01 PROCEDURE — 93005 ELECTROCARDIOGRAM TRACING: CPT | Performed by: EMERGENCY MEDICINE

## 2020-01-01 PROCEDURE — 93005 ELECTROCARDIOGRAM TRACING: CPT | Performed by: PHYSICIAN ASSISTANT

## 2020-01-01 PROCEDURE — 86901 BLOOD TYPING SEROLOGIC RH(D): CPT | Performed by: INTERNAL MEDICINE

## 2020-01-01 PROCEDURE — 85027 COMPLETE CBC AUTOMATED: CPT | Performed by: INTERNAL MEDICINE

## 2020-01-01 PROCEDURE — 83970 ASSAY OF PARATHORMONE: CPT | Performed by: INTERNAL MEDICINE

## 2020-01-01 PROCEDURE — 82962 GLUCOSE BLOOD TEST: CPT

## 2020-01-01 PROCEDURE — G0378 HOSPITAL OBSERVATION PER HR: HCPCS

## 2020-01-01 PROCEDURE — 99214 OFFICE O/P EST MOD 30 MIN: CPT | Performed by: NURSE PRACTITIONER

## 2020-01-01 PROCEDURE — 3600000004 HC SURGERY LEVEL 4 BASE: Performed by: SURGERY

## 2020-01-01 PROCEDURE — 80053 COMPREHEN METABOLIC PANEL: CPT | Performed by: EMERGENCY MEDICINE

## 2020-01-01 PROCEDURE — 84443 ASSAY THYROID STIM HORMONE: CPT | Performed by: FAMILY MEDICINE

## 2020-01-01 PROCEDURE — 36415 COLL VENOUS BLD VENIPUNCTURE: CPT | Performed by: EMERGENCY MEDICINE

## 2020-01-01 PROCEDURE — 97161 PT EVAL LOW COMPLEX 20 MIN: CPT

## 2020-01-01 PROCEDURE — 2709999900 IR GUIDED INTRO CATH DIALYSIS CIRCUIT

## 2020-01-01 PROCEDURE — 96376 TX/PRO/DX INJ SAME DRUG ADON: CPT

## 2020-01-01 PROCEDURE — 80074 ACUTE HEPATITIS PANEL: CPT

## 2020-01-01 PROCEDURE — 80162 ASSAY OF DIGOXIN TOTAL: CPT | Performed by: EMERGENCY MEDICINE

## 2020-01-01 PROCEDURE — 6370000000 HC RX 637 (ALT 250 FOR IP): Performed by: INTERNAL MEDICINE

## 2020-01-01 PROCEDURE — 99285 EMERGENCY DEPT VISIT HI MDM: CPT

## 2020-01-01 PROCEDURE — 71046 X-RAY EXAM CHEST 2 VIEWS: CPT

## 2020-01-01 PROCEDURE — 6360000002 HC RX W HCPCS: Performed by: ANESTHESIOLOGY

## 2020-01-01 PROCEDURE — 80048 BASIC METABOLIC PNL TOTAL CA: CPT

## 2020-01-01 PROCEDURE — 94761 N-INVAS EAR/PLS OXIMETRY MLT: CPT

## 2020-01-01 PROCEDURE — 84439 ASSAY OF FREE THYROXINE: CPT | Performed by: FAMILY MEDICINE

## 2020-01-01 PROCEDURE — 85025 COMPLETE CBC W/AUTO DIFF WBC: CPT

## 2020-01-01 PROCEDURE — 80053 COMPREHEN METABOLIC PANEL: CPT | Performed by: FAMILY MEDICINE

## 2020-01-01 PROCEDURE — 82306 VITAMIN D 25 HYDROXY: CPT | Performed by: INTERNAL MEDICINE

## 2020-01-01 PROCEDURE — 25010000002 DIGOXIN PER 500 MCG: Performed by: PHYSICIAN ASSISTANT

## 2020-01-01 PROCEDURE — 6370000000 HC RX 637 (ALT 250 FOR IP): Performed by: FAMILY MEDICINE

## 2020-01-01 PROCEDURE — 85014 HEMATOCRIT: CPT | Performed by: INTERNAL MEDICINE

## 2020-01-01 PROCEDURE — 99214 OFFICE O/P EST MOD 30 MIN: CPT | Performed by: INTERNAL MEDICINE

## 2020-01-01 PROCEDURE — 85730 THROMBOPLASTIN TIME PARTIAL: CPT

## 2020-01-01 PROCEDURE — 80061 LIPID PANEL: CPT | Performed by: FAMILY MEDICINE

## 2020-01-01 PROCEDURE — 25010000002 IRON SUCROSE PER 1 MG: Performed by: INTERNAL MEDICINE

## 2020-01-01 PROCEDURE — 84484 ASSAY OF TROPONIN QUANT: CPT | Performed by: INTERNAL MEDICINE

## 2020-01-01 PROCEDURE — 85610 PROTHROMBIN TIME: CPT

## 2020-01-01 PROCEDURE — 75572 CT HRT W/3D IMAGE: CPT

## 2020-01-01 PROCEDURE — 93880 EXTRACRANIAL BILAT STUDY: CPT

## 2020-01-01 PROCEDURE — 84484 ASSAY OF TROPONIN QUANT: CPT

## 2020-01-01 PROCEDURE — 83880 ASSAY OF NATRIURETIC PEPTIDE: CPT | Performed by: EMERGENCY MEDICINE

## 2020-01-01 PROCEDURE — 93306 TTE W/DOPPLER COMPLETE: CPT

## 2020-01-01 PROCEDURE — 97110 THERAPEUTIC EXERCISES: CPT

## 2020-01-01 PROCEDURE — 93005 ELECTROCARDIOGRAM TRACING: CPT | Performed by: INTERNAL MEDICINE

## 2020-01-01 PROCEDURE — 86850 RBC ANTIBODY SCREEN: CPT | Performed by: INTERNAL MEDICINE

## 2020-01-01 PROCEDURE — 81003 URINALYSIS AUTO W/O SCOPE: CPT | Performed by: FAMILY MEDICINE

## 2020-01-01 PROCEDURE — 74176 CT ABD & PELVIS W/O CONTRAST: CPT

## 2020-01-01 PROCEDURE — 83735 ASSAY OF MAGNESIUM: CPT | Performed by: NURSE PRACTITIONER

## 2020-01-01 PROCEDURE — 83036 HEMOGLOBIN GLYCOSYLATED A1C: CPT | Performed by: INTERNAL MEDICINE

## 2020-01-01 PROCEDURE — 83735 ASSAY OF MAGNESIUM: CPT | Performed by: FAMILY MEDICINE

## 2020-01-01 PROCEDURE — 99232 SBSQ HOSP IP/OBS MODERATE 35: CPT | Performed by: INTERNAL MEDICINE

## 2020-01-01 PROCEDURE — 81001 URINALYSIS AUTO W/SCOPE: CPT

## 2020-01-01 PROCEDURE — 93005 ELECTROCARDIOGRAM TRACING: CPT

## 2020-01-01 PROCEDURE — 2580000003 HC RX 258: Performed by: SURGERY

## 2020-01-01 PROCEDURE — 84550 ASSAY OF BLOOD/URIC ACID: CPT | Performed by: NURSE PRACTITIONER

## 2020-01-01 PROCEDURE — 8010000000 HC HEMODIALYSIS ACUTE INPT

## 2020-01-01 PROCEDURE — 25010000002 EPOETIN ALFA-EPBX 2000 UNIT/ML SOLUTION 1 ML VIAL: Performed by: INTERNAL MEDICINE

## 2020-01-01 PROCEDURE — 97116 GAIT TRAINING THERAPY: CPT

## 2020-01-01 PROCEDURE — 83550 IRON BINDING TEST: CPT

## 2020-01-01 PROCEDURE — P9016 RBC LEUKOCYTES REDUCED: HCPCS

## 2020-01-01 PROCEDURE — 71045 X-RAY EXAM CHEST 1 VIEW: CPT

## 2020-01-01 PROCEDURE — 96372 THER/PROPH/DIAG INJ SC/IM: CPT

## 2020-01-01 PROCEDURE — 83540 ASSAY OF IRON: CPT | Performed by: INTERNAL MEDICINE

## 2020-01-01 PROCEDURE — 99213 OFFICE O/P EST LOW 20 MIN: CPT | Performed by: NURSE PRACTITIONER

## 2020-01-01 PROCEDURE — 99284 EMERGENCY DEPT VISIT MOD MDM: CPT

## 2020-01-01 PROCEDURE — 0 IOPAMIDOL PER 1 ML: Performed by: INTERNAL MEDICINE

## 2020-01-01 PROCEDURE — 6360000002 HC RX W HCPCS: Performed by: PHYSICIAN ASSISTANT

## 2020-01-01 PROCEDURE — 99153 MOD SED SAME PHYS/QHP EA: CPT | Performed by: SURGERY

## 2020-01-01 PROCEDURE — 63710000001 INSULIN LISPRO (HUMAN) PER 5 UNITS: Performed by: INTERNAL MEDICINE

## 2020-01-01 PROCEDURE — 36902 INTRO CATH DIALYSIS CIRCUIT: CPT | Performed by: SURGERY

## 2020-01-01 PROCEDURE — 84484 ASSAY OF TROPONIN QUANT: CPT | Performed by: FAMILY MEDICINE

## 2020-01-01 PROCEDURE — 85730 THROMBOPLASTIN TIME PARTIAL: CPT | Performed by: EMERGENCY MEDICINE

## 2020-01-01 PROCEDURE — 85025 COMPLETE CBC W/AUTO DIFF WBC: CPT | Performed by: EMERGENCY MEDICINE

## 2020-01-01 PROCEDURE — 2500000003 HC RX 250 WO HCPCS: Performed by: NURSE ANESTHETIST, CERTIFIED REGISTERED

## 2020-01-01 PROCEDURE — 2709999900 HC NON-CHARGEABLE SUPPLY: Performed by: SURGERY

## 2020-01-01 PROCEDURE — 99999 PR OFFICE/OUTPT VISIT,PROCEDURE ONLY: CPT | Performed by: PHYSICIAN ASSISTANT

## 2020-01-01 PROCEDURE — 6360000004 HC RX CONTRAST MEDICATION: Performed by: SURGERY

## 2020-01-01 PROCEDURE — 80053 COMPREHEN METABOLIC PANEL: CPT

## 2020-01-01 PROCEDURE — G8427 DOCREV CUR MEDS BY ELIG CLIN: HCPCS | Performed by: PHYSICIAN ASSISTANT

## 2020-01-01 PROCEDURE — 3700000001 HC ADD 15 MINUTES (ANESTHESIA): Performed by: SURGERY

## 2020-01-01 PROCEDURE — 64415 NJX AA&/STRD BRCH PLXS IMG: CPT | Performed by: NURSE ANESTHETIST, CERTIFIED REGISTERED

## 2020-01-01 PROCEDURE — 93294 REM INTERROG EVL PM/LDLS PM: CPT | Performed by: CLINICAL NURSE SPECIALIST

## 2020-01-01 PROCEDURE — 82306 VITAMIN D 25 HYDROXY: CPT

## 2020-01-01 PROCEDURE — 82272 OCCULT BLD FECES 1-3 TESTS: CPT | Performed by: FAMILY MEDICINE

## 2020-01-01 PROCEDURE — 93296 REM INTERROG EVL PM/IDS: CPT | Performed by: CLINICAL NURSE SPECIALIST

## 2020-01-01 PROCEDURE — 85025 COMPLETE CBC W/AUTO DIFF WBC: CPT | Performed by: INTERNAL MEDICINE

## 2020-01-01 PROCEDURE — 6360000002 HC RX W HCPCS: Performed by: SURGERY

## 2020-01-01 PROCEDURE — 36430 TRANSFUSION BLD/BLD COMPNT: CPT

## 2020-01-01 PROCEDURE — 93000 ELECTROCARDIOGRAM COMPLETE: CPT | Performed by: NURSE PRACTITIONER

## 2020-01-01 PROCEDURE — 82565 ASSAY OF CREATININE: CPT

## 2020-01-01 PROCEDURE — 6370000000 HC RX 637 (ALT 250 FOR IP): Performed by: PHYSICIAN ASSISTANT

## 2020-01-01 PROCEDURE — 97165 OT EVAL LOW COMPLEX 30 MIN: CPT | Performed by: OCCUPATIONAL THERAPIST

## 2020-01-01 PROCEDURE — 36821 AV FUSION DIRECT ANY SITE: CPT | Performed by: SURGERY

## 2020-01-01 PROCEDURE — 25010000002 EPOETIN ALFA-EPBX 10000 UNIT/ML SOLUTION: Performed by: INTERNAL MEDICINE

## 2020-01-01 PROCEDURE — 84466 ASSAY OF TRANSFERRIN: CPT | Performed by: INTERNAL MEDICINE

## 2020-01-01 PROCEDURE — 86920 COMPATIBILITY TEST SPIN: CPT

## 2020-01-01 PROCEDURE — 25010000002 EPOETIN ALFA-EPBX 3000 UNIT/ML SOLUTION 1 ML VIAL: Performed by: INTERNAL MEDICINE

## 2020-01-01 PROCEDURE — 2500000003 HC RX 250 WO HCPCS: Performed by: ANESTHESIOLOGY

## 2020-01-01 PROCEDURE — 76775 US EXAM ABDO BACK WALL LIM: CPT

## 2020-01-01 PROCEDURE — 71250 CT THORAX DX C-: CPT

## 2020-01-01 PROCEDURE — 83735 ASSAY OF MAGNESIUM: CPT | Performed by: INTERNAL MEDICINE

## 2020-01-01 PROCEDURE — 84300 ASSAY OF URINE SODIUM: CPT | Performed by: NURSE PRACTITIONER

## 2020-01-01 PROCEDURE — 85610 PROTHROMBIN TIME: CPT | Performed by: EMERGENCY MEDICINE

## 2020-01-01 PROCEDURE — 93279 PRGRMG DEV EVAL PM/LDLS PM: CPT | Performed by: INTERNAL MEDICINE

## 2020-01-01 PROCEDURE — 5A1D70Z PERFORMANCE OF URINARY FILTRATION, INTERMITTENT, LESS THAN 6 HOURS PER DAY: ICD-10-PCS | Performed by: INTERNAL MEDICINE

## 2020-01-01 PROCEDURE — 99232 SBSQ HOSP IP/OBS MODERATE 35: CPT | Performed by: NURSE PRACTITIONER

## 2020-01-01 PROCEDURE — 4040F PNEUMOC VAC/ADMIN/RCVD: CPT | Performed by: PHYSICIAN ASSISTANT

## 2020-01-01 PROCEDURE — 82728 ASSAY OF FERRITIN: CPT

## 2020-01-01 PROCEDURE — 2500000003 HC RX 250 WO HCPCS: Performed by: SURGERY

## 2020-01-01 PROCEDURE — 80162 ASSAY OF DIGOXIN TOTAL: CPT

## 2020-01-01 PROCEDURE — 1123F ACP DISCUSS/DSCN MKR DOCD: CPT | Performed by: PHYSICIAN ASSISTANT

## 2020-01-01 PROCEDURE — 2580000003 HC RX 258: Performed by: NURSE ANESTHETIST, CERTIFIED REGISTERED

## 2020-01-01 PROCEDURE — 84156 ASSAY OF PROTEIN URINE: CPT | Performed by: NURSE PRACTITIONER

## 2020-01-01 PROCEDURE — G8420 CALC BMI NORM PARAMETERS: HCPCS | Performed by: PHYSICIAN ASSISTANT

## 2020-01-01 PROCEDURE — 84100 ASSAY OF PHOSPHORUS: CPT | Performed by: INTERNAL MEDICINE

## 2020-01-01 PROCEDURE — 25010000002 FUROSEMIDE PER 20 MG: Performed by: EMERGENCY MEDICINE

## 2020-01-01 PROCEDURE — 84165 PROTEIN E-PHORESIS SERUM: CPT | Performed by: INTERNAL MEDICINE

## 2020-01-01 PROCEDURE — 99152 MOD SED SAME PHYS/QHP 5/>YRS: CPT | Performed by: SURGERY

## 2020-01-01 PROCEDURE — 51798 US URINE CAPACITY MEASURE: CPT

## 2020-01-01 PROCEDURE — 1036F TOBACCO NON-USER: CPT | Performed by: PHYSICIAN ASSISTANT

## 2020-01-01 PROCEDURE — 6360000002 HC RX W HCPCS: Performed by: NURSE ANESTHETIST, CERTIFIED REGISTERED

## 2020-01-01 PROCEDURE — 80162 ASSAY OF DIGOXIN TOTAL: CPT | Performed by: INTERNAL MEDICINE

## 2020-01-01 PROCEDURE — 84484 ASSAY OF TROPONIN QUANT: CPT | Performed by: EMERGENCY MEDICINE

## 2020-01-01 PROCEDURE — 93985 DUP-SCAN HEMO COMPL BI STD: CPT

## 2020-01-01 PROCEDURE — 87205 SMEAR GRAM STAIN: CPT | Performed by: INTERNAL MEDICINE

## 2020-01-01 PROCEDURE — 82550 ASSAY OF CK (CPK): CPT | Performed by: INTERNAL MEDICINE

## 2020-01-01 PROCEDURE — 76770 US EXAM ABDO BACK WALL COMP: CPT

## 2020-01-01 PROCEDURE — 36909 DIALYSIS CIRCUIT EMBOLJ: CPT | Performed by: SURGERY

## 2020-01-01 PROCEDURE — 7100000010 HC PHASE II RECOVERY - FIRST 15 MIN: Performed by: SURGERY

## 2020-01-01 PROCEDURE — 3700000000 HC ANESTHESIA ATTENDED CARE: Performed by: SURGERY

## 2020-01-01 PROCEDURE — 75572 CT HRT W/3D IMAGE: CPT | Performed by: INTERNAL MEDICINE

## 2020-01-01 PROCEDURE — 99283 EMERGENCY DEPT VISIT LOW MDM: CPT

## 2020-01-01 PROCEDURE — 84443 ASSAY THYROID STIM HORMONE: CPT | Performed by: INTERNAL MEDICINE

## 2020-01-01 PROCEDURE — 25010000002 FUROSEMIDE PER 20 MG: Performed by: INTERNAL MEDICINE

## 2020-01-01 PROCEDURE — 94799 UNLISTED PULMONARY SVC/PX: CPT

## 2020-01-01 PROCEDURE — 86900 BLOOD TYPING SEROLOGIC ABO: CPT | Performed by: INTERNAL MEDICINE

## 2020-01-01 PROCEDURE — 6360000002 HC RX W HCPCS: Performed by: INTERNAL MEDICINE

## 2020-01-01 PROCEDURE — 2580000003 HC RX 258: Performed by: PHYSICIAN ASSISTANT

## 2020-01-01 PROCEDURE — 86900 BLOOD TYPING SEROLOGIC ABO: CPT

## 2020-01-01 PROCEDURE — 84439 ASSAY OF FREE THYROXINE: CPT | Performed by: INTERNAL MEDICINE

## 2020-01-01 PROCEDURE — C9803 HOPD COVID-19 SPEC COLLECT: HCPCS

## 2020-01-01 PROCEDURE — 25010000002 MAGNESIUM SULFATE 2 GM/50ML SOLUTION: Performed by: NURSE PRACTITIONER

## 2020-01-01 RX ORDER — SODIUM CHLORIDE 0.9 % (FLUSH) 0.9 %
10 SYRINGE (ML) INJECTION EVERY 12 HOURS SCHEDULED
Status: CANCELLED | OUTPATIENT
Start: 2020-01-01

## 2020-01-01 RX ORDER — HEPARIN SODIUM 1000 [USP'U]/ML
INJECTION, SOLUTION INTRAVENOUS; SUBCUTANEOUS PRN
Status: DISCONTINUED | OUTPATIENT
Start: 2020-01-01 | End: 2020-01-01 | Stop reason: SDUPTHER

## 2020-01-01 RX ORDER — ISOSORBIDE MONONITRATE 30 MG/1
30 TABLET, EXTENDED RELEASE ORAL
Qty: 30 TABLET | Refills: 0 | Status: SHIPPED | OUTPATIENT
Start: 2020-01-01 | End: 2020-01-01 | Stop reason: SDDI

## 2020-01-01 RX ORDER — ACETAMINOPHEN 650 MG/1
650 SUPPOSITORY RECTAL EVERY 6 HOURS PRN
Status: DISCONTINUED | OUTPATIENT
Start: 2020-01-01 | End: 2020-01-01 | Stop reason: HOSPADM

## 2020-01-01 RX ORDER — HEPARIN SODIUM 5000 [USP'U]/ML
INJECTION, SOLUTION INTRAVENOUS; SUBCUTANEOUS
Status: COMPLETED | OUTPATIENT
Start: 2020-01-01 | End: 2020-01-01

## 2020-01-01 RX ORDER — PROMETHAZINE HYDROCHLORIDE 25 MG/1
12.5 TABLET ORAL EVERY 6 HOURS PRN
Status: DISCONTINUED | OUTPATIENT
Start: 2020-01-01 | End: 2020-01-01 | Stop reason: HOSPADM

## 2020-01-01 RX ORDER — ASPIRIN 81 MG/1
81 TABLET ORAL ONCE
Status: COMPLETED | OUTPATIENT
Start: 2020-01-01 | End: 2020-01-01

## 2020-01-01 RX ORDER — SODIUM CHLORIDE 0.9 % (FLUSH) 0.9 %
10 SYRINGE (ML) INJECTION PRN
Status: CANCELLED | OUTPATIENT
Start: 2020-01-01

## 2020-01-01 RX ORDER — NICOTINE POLACRILEX 4 MG
15 LOZENGE BUCCAL
Status: DISCONTINUED | OUTPATIENT
Start: 2020-01-01 | End: 2020-01-01 | Stop reason: HOSPADM

## 2020-01-01 RX ORDER — DEXAMETHASONE SODIUM PHOSPHATE 4 MG/ML
INJECTION, SOLUTION INTRA-ARTICULAR; INTRALESIONAL; INTRAMUSCULAR; INTRAVENOUS; SOFT TISSUE PRN
Status: DISCONTINUED | OUTPATIENT
Start: 2020-01-01 | End: 2020-01-01 | Stop reason: SDUPTHER

## 2020-01-01 RX ORDER — HYDROCODONE BITARTRATE AND ACETAMINOPHEN 5; 325 MG/1; MG/1
1 TABLET ORAL EVERY 6 HOURS PRN
Status: DISCONTINUED | OUTPATIENT
Start: 2020-01-01 | End: 2020-01-01 | Stop reason: HOSPADM

## 2020-01-01 RX ORDER — ACETAMINOPHEN 650 MG/1
650 SUPPOSITORY RECTAL EVERY 4 HOURS PRN
Status: DISCONTINUED | OUTPATIENT
Start: 2020-01-01 | End: 2020-01-01 | Stop reason: HOSPADM

## 2020-01-01 RX ORDER — SODIUM CHLORIDE 9 MG/ML
75 INJECTION, SOLUTION INTRAVENOUS CONTINUOUS
Status: DISCONTINUED | OUTPATIENT
Start: 2020-01-01 | End: 2020-01-01

## 2020-01-01 RX ORDER — LORAZEPAM 0.5 MG/1
0.5 TABLET ORAL NIGHTLY PRN
Status: DISCONTINUED | OUTPATIENT
Start: 2020-01-01 | End: 2020-01-01 | Stop reason: HOSPADM

## 2020-01-01 RX ORDER — SITAGLIPTIN 50 MG/1
50 TABLET, FILM COATED ORAL DAILY
Status: ON HOLD | COMMUNITY
Start: 2020-01-01 | End: 2020-01-01

## 2020-01-01 RX ORDER — ACETAMINOPHEN 325 MG/1
650 TABLET ORAL EVERY 4 HOURS PRN
Status: DISCONTINUED | OUTPATIENT
Start: 2020-01-01 | End: 2020-01-01 | Stop reason: HOSPADM

## 2020-01-01 RX ORDER — DIGOXIN 125 MCG
125 TABLET ORAL DAILY
Status: DISCONTINUED | OUTPATIENT
Start: 2020-01-01 | End: 2020-01-01

## 2020-01-01 RX ORDER — ONDANSETRON 2 MG/ML
4 INJECTION INTRAMUSCULAR; INTRAVENOUS EVERY 6 HOURS PRN
Status: DISCONTINUED | OUTPATIENT
Start: 2020-01-01 | End: 2020-01-01 | Stop reason: SDUPTHER

## 2020-01-01 RX ORDER — HYDRALAZINE HYDROCHLORIDE 50 MG/1
50 TABLET, FILM COATED ORAL EVERY 8 HOURS SCHEDULED
Status: DISCONTINUED | OUTPATIENT
Start: 2020-01-01 | End: 2020-01-01

## 2020-01-01 RX ORDER — SODIUM CHLORIDE 0.9 % (FLUSH) 0.9 %
10 SYRINGE (ML) INJECTION EVERY 12 HOURS SCHEDULED
Status: DISCONTINUED | OUTPATIENT
Start: 2020-01-01 | End: 2020-01-01 | Stop reason: HOSPADM

## 2020-01-01 RX ORDER — FUROSEMIDE 10 MG/ML
60 INJECTION INTRAMUSCULAR; INTRAVENOUS ONCE
Status: DISCONTINUED | OUTPATIENT
Start: 2020-01-01 | End: 2020-01-01

## 2020-01-01 RX ORDER — MAGNESIUM SULFATE HEPTAHYDRATE 40 MG/ML
2 INJECTION, SOLUTION INTRAVENOUS ONCE
Status: COMPLETED | OUTPATIENT
Start: 2020-01-01 | End: 2020-01-01

## 2020-01-01 RX ORDER — FINASTERIDE 5 MG/1
5 TABLET, FILM COATED ORAL DAILY
Status: DISCONTINUED | OUTPATIENT
Start: 2020-01-01 | End: 2020-01-01 | Stop reason: HOSPADM

## 2020-01-01 RX ORDER — DIGOXIN 125 MCG
125 TABLET ORAL
Status: DISCONTINUED | OUTPATIENT
Start: 2020-01-01 | End: 2020-01-01 | Stop reason: HOSPADM

## 2020-01-01 RX ORDER — ACETAMINOPHEN 160 MG/5ML
650 SOLUTION ORAL EVERY 4 HOURS PRN
Status: DISCONTINUED | OUTPATIENT
Start: 2020-01-01 | End: 2020-01-01 | Stop reason: HOSPADM

## 2020-01-01 RX ORDER — OMEPRAZOLE 20 MG/1
CAPSULE, DELAYED RELEASE ORAL
Qty: 180 CAPSULE | Refills: 3 | Status: SHIPPED | OUTPATIENT
Start: 2020-01-01

## 2020-01-01 RX ORDER — ONDANSETRON 4 MG/1
4 TABLET, FILM COATED ORAL EVERY 6 HOURS PRN
Status: DISCONTINUED | OUTPATIENT
Start: 2020-01-01 | End: 2020-01-01 | Stop reason: HOSPADM

## 2020-01-01 RX ORDER — HYDRALAZINE HYDROCHLORIDE 50 MG/1
50 TABLET, FILM COATED ORAL 3 TIMES DAILY
COMMUNITY

## 2020-01-01 RX ORDER — DILTIAZEM HYDROCHLORIDE 240 MG/1
240 CAPSULE, COATED, EXTENDED RELEASE ORAL DAILY
Status: DISCONTINUED | OUTPATIENT
Start: 2020-01-01 | End: 2020-01-01

## 2020-01-01 RX ORDER — METOPROLOL SUCCINATE 50 MG/1
100 TABLET, EXTENDED RELEASE ORAL 2 TIMES DAILY
Status: DISCONTINUED | OUTPATIENT
Start: 2020-01-01 | End: 2020-01-01 | Stop reason: HOSPADM

## 2020-01-01 RX ORDER — HYDROCODONE BITARTRATE AND ACETAMINOPHEN 5; 325 MG/1; MG/1
1 TABLET ORAL EVERY 4 HOURS PRN
Status: DISCONTINUED | OUTPATIENT
Start: 2020-01-01 | End: 2020-01-01 | Stop reason: HOSPADM

## 2020-01-01 RX ORDER — NITROGLYCERIN 0.4 MG/1
0.4 TABLET SUBLINGUAL
Qty: 30 TABLET | Refills: 0 | Status: SHIPPED | OUTPATIENT
Start: 2020-01-01

## 2020-01-01 RX ORDER — HYDRALAZINE HYDROCHLORIDE 50 MG/1
50 TABLET, FILM COATED ORAL 3 TIMES DAILY
Status: DISCONTINUED | OUTPATIENT
Start: 2020-01-01 | End: 2020-01-01 | Stop reason: HOSPADM

## 2020-01-01 RX ORDER — OMEPRAZOLE 20 MG/1
20 CAPSULE, DELAYED RELEASE ORAL 2 TIMES DAILY
COMMUNITY

## 2020-01-01 RX ORDER — NICOTINE POLACRILEX 4 MG
15 LOZENGE BUCCAL PRN
Status: DISCONTINUED | OUTPATIENT
Start: 2020-01-01 | End: 2020-01-01 | Stop reason: HOSPADM

## 2020-01-01 RX ORDER — ALLOPURINOL 100 MG/1
200 TABLET ORAL DAILY
Qty: 30 TABLET | Refills: 0 | Status: SHIPPED | OUTPATIENT
Start: 2020-01-01 | End: 2020-01-01

## 2020-01-01 RX ORDER — HYDRALAZINE HYDROCHLORIDE 25 MG/1
25 TABLET, FILM COATED ORAL EVERY 8 HOURS SCHEDULED
Status: DISCONTINUED | OUTPATIENT
Start: 2020-01-01 | End: 2020-01-01

## 2020-01-01 RX ORDER — DEXTROSE MONOHYDRATE 50 MG/ML
100 INJECTION, SOLUTION INTRAVENOUS PRN
Status: DISCONTINUED | OUTPATIENT
Start: 2020-01-01 | End: 2020-01-01 | Stop reason: HOSPADM

## 2020-01-01 RX ORDER — HEPARIN SODIUM 5000 [USP'U]/ML
5000 INJECTION, SOLUTION INTRAVENOUS; SUBCUTANEOUS EVERY 8 HOURS SCHEDULED
Status: DISCONTINUED | OUTPATIENT
Start: 2020-01-01 | End: 2020-01-01

## 2020-01-01 RX ORDER — CALCITRIOL 0.25 UG/1
0.25 CAPSULE, LIQUID FILLED ORAL DAILY
Qty: 30 CAPSULE | Refills: 0 | Status: SHIPPED | OUTPATIENT
Start: 2020-01-01

## 2020-01-01 RX ORDER — ALLOPURINOL 100 MG/1
100 TABLET ORAL DAILY
Status: DISCONTINUED | OUTPATIENT
Start: 2020-01-01 | End: 2020-01-01

## 2020-01-01 RX ORDER — FENTANYL CITRATE 50 UG/ML
INJECTION, SOLUTION INTRAMUSCULAR; INTRAVENOUS PRN
Status: DISCONTINUED | OUTPATIENT
Start: 2020-01-01 | End: 2020-01-01 | Stop reason: SDUPTHER

## 2020-01-01 RX ORDER — HYDRALAZINE HYDROCHLORIDE 50 MG/1
50 TABLET, FILM COATED ORAL EVERY 8 HOURS SCHEDULED
Status: DISCONTINUED | OUTPATIENT
Start: 2020-01-01 | End: 2020-01-01 | Stop reason: HOSPADM

## 2020-01-01 RX ORDER — ASPIRIN 81 MG/1
81 TABLET ORAL DAILY
Status: DISCONTINUED | OUTPATIENT
Start: 2020-01-01 | End: 2020-01-01 | Stop reason: HOSPADM

## 2020-01-01 RX ORDER — FUROSEMIDE 10 MG/ML
60 INJECTION INTRAMUSCULAR; INTRAVENOUS ONCE
Status: COMPLETED | OUTPATIENT
Start: 2020-01-01 | End: 2020-01-01

## 2020-01-01 RX ORDER — ONDANSETRON 2 MG/ML
4 INJECTION INTRAMUSCULAR; INTRAVENOUS EVERY 8 HOURS PRN
Status: DISCONTINUED | OUTPATIENT
Start: 2020-01-01 | End: 2020-01-01 | Stop reason: HOSPADM

## 2020-01-01 RX ORDER — SODIUM CHLORIDE 0.9 % (FLUSH) 0.9 %
10 SYRINGE (ML) INJECTION PRN
Status: DISCONTINUED | OUTPATIENT
Start: 2020-01-01 | End: 2020-01-01 | Stop reason: HOSPADM

## 2020-01-01 RX ORDER — TERAZOSIN 5 MG/1
10 CAPSULE ORAL NIGHTLY
Status: DISCONTINUED | OUTPATIENT
Start: 2020-01-01 | End: 2020-01-01 | Stop reason: HOSPADM

## 2020-01-01 RX ORDER — ONDANSETRON 2 MG/ML
4 INJECTION INTRAMUSCULAR; INTRAVENOUS EVERY 6 HOURS PRN
Status: DISCONTINUED | OUTPATIENT
Start: 2020-01-01 | End: 2020-01-01 | Stop reason: HOSPADM

## 2020-01-01 RX ORDER — HYDRALAZINE HYDROCHLORIDE 10 MG/1
10 TABLET, FILM COATED ORAL EVERY 8 HOURS SCHEDULED
Status: DISCONTINUED | OUTPATIENT
Start: 2020-01-01 | End: 2020-01-01

## 2020-01-01 RX ORDER — ROPIVACAINE HYDROCHLORIDE 5 MG/ML
INJECTION, SOLUTION EPIDURAL; INFILTRATION; PERINEURAL
Status: COMPLETED | OUTPATIENT
Start: 2020-01-01 | End: 2020-01-01

## 2020-01-01 RX ORDER — DILTIAZEM HYDROCHLORIDE 240 MG/1
240 CAPSULE, COATED, EXTENDED RELEASE ORAL 2 TIMES DAILY
COMMUNITY

## 2020-01-01 RX ORDER — GLIPIZIDE 10 MG/1
5 TABLET ORAL
Qty: 30 TABLET | Refills: 0 | Status: SHIPPED | OUTPATIENT
Start: 2020-01-01

## 2020-01-01 RX ORDER — TEMAZEPAM 15 MG/1
CAPSULE ORAL NIGHTLY
COMMUNITY
Start: 2020-01-01

## 2020-01-01 RX ORDER — MIDAZOLAM HYDROCHLORIDE 1 MG/ML
INJECTION INTRAMUSCULAR; INTRAVENOUS
Status: COMPLETED | OUTPATIENT
Start: 2020-01-01 | End: 2020-01-01

## 2020-01-01 RX ORDER — OMEPRAZOLE 20 MG/1
20 CAPSULE, DELAYED RELEASE ORAL 2 TIMES DAILY
COMMUNITY
End: 2020-01-01

## 2020-01-01 RX ORDER — SODIUM CHLORIDE, SODIUM LACTATE, POTASSIUM CHLORIDE, CALCIUM CHLORIDE 600; 310; 30; 20 MG/100ML; MG/100ML; MG/100ML; MG/100ML
INJECTION, SOLUTION INTRAVENOUS CONTINUOUS PRN
Status: DISCONTINUED | OUTPATIENT
Start: 2020-01-01 | End: 2020-01-01 | Stop reason: SDUPTHER

## 2020-01-01 RX ORDER — METFORMIN HYDROCHLORIDE 500 MG/1
500 TABLET, EXTENDED RELEASE ORAL 2 TIMES DAILY
COMMUNITY
End: 2020-01-01 | Stop reason: HOSPADM

## 2020-01-01 RX ORDER — ASPIRIN 81 MG/1
81 TABLET ORAL DAILY
Qty: 30 TABLET | Refills: 0 | Status: SHIPPED | OUTPATIENT
Start: 2020-01-01 | End: 2021-01-01

## 2020-01-01 RX ORDER — CALCITRIOL 0.25 UG/1
0.25 CAPSULE, LIQUID FILLED ORAL DAILY
Status: DISCONTINUED | OUTPATIENT
Start: 2020-01-01 | End: 2020-01-01 | Stop reason: HOSPADM

## 2020-01-01 RX ORDER — PROPOFOL 10 MG/ML
INJECTION, EMULSION INTRAVENOUS PRN
Status: DISCONTINUED | OUTPATIENT
Start: 2020-01-01 | End: 2020-01-01 | Stop reason: SDUPTHER

## 2020-01-01 RX ORDER — BETAMETHASONE DIPROPIONATE 0.5 MG/G
CREAM TOPICAL
Qty: 15 G | Refills: 0 | Status: SHIPPED | OUTPATIENT
Start: 2020-01-01

## 2020-01-01 RX ORDER — SODIUM CHLORIDE 0.9 % (FLUSH) 0.9 %
3 SYRINGE (ML) INJECTION EVERY 12 HOURS SCHEDULED
Status: CANCELLED | OUTPATIENT
Start: 2020-01-01

## 2020-01-01 RX ORDER — MIDAZOLAM HYDROCHLORIDE 1 MG/ML
2 INJECTION INTRAMUSCULAR; INTRAVENOUS
Status: COMPLETED | OUTPATIENT
Start: 2020-01-01 | End: 2020-01-01

## 2020-01-01 RX ORDER — FINASTERIDE 5 MG/1
5 TABLET, FILM COATED ORAL DAILY
Qty: 30 TABLET | Refills: 0 | Status: SHIPPED | OUTPATIENT
Start: 2020-01-01 | End: 2020-01-01

## 2020-01-01 RX ORDER — FUROSEMIDE 10 MG/ML
80 INJECTION INTRAMUSCULAR; INTRAVENOUS EVERY 12 HOURS
Status: DISCONTINUED | OUTPATIENT
Start: 2020-01-01 | End: 2020-01-01

## 2020-01-01 RX ORDER — SPIRONOLACTONE 25 MG/1
25 TABLET ORAL DAILY
COMMUNITY
Start: 2020-01-01 | End: 2020-01-01 | Stop reason: HOSPADM

## 2020-01-01 RX ORDER — PANTOPRAZOLE SODIUM 40 MG/1
40 TABLET, DELAYED RELEASE ORAL EVERY MORNING
Status: DISCONTINUED | OUTPATIENT
Start: 2020-01-01 | End: 2020-01-01 | Stop reason: HOSPADM

## 2020-01-01 RX ORDER — ASPIRIN 81 MG/1
81 TABLET ORAL ONCE
Status: CANCELLED | OUTPATIENT
Start: 2020-01-01 | End: 2020-01-01

## 2020-01-01 RX ORDER — DIGOXIN 0.25 MG/ML
500 INJECTION INTRAMUSCULAR; INTRAVENOUS ONCE
Status: COMPLETED | OUTPATIENT
Start: 2020-01-01 | End: 2020-01-01

## 2020-01-01 RX ORDER — FUROSEMIDE 10 MG/ML
40 INJECTION INTRAMUSCULAR; INTRAVENOUS DAILY
Status: DISCONTINUED | OUTPATIENT
Start: 2020-01-01 | End: 2020-01-01

## 2020-01-01 RX ORDER — SODIUM CHLORIDE 9 MG/ML
INJECTION, SOLUTION INTRAVENOUS CONTINUOUS
Status: CANCELLED | OUTPATIENT
Start: 2020-01-01

## 2020-01-01 RX ORDER — GLIPIZIDE 10 MG/1
10 TABLET ORAL
Status: DISCONTINUED | OUTPATIENT
Start: 2020-01-01 | End: 2020-01-01 | Stop reason: HOSPADM

## 2020-01-01 RX ORDER — POLYETHYLENE GLYCOL 3350, SODIUM CHLORIDE, POTASSIUM CHLORIDE, SODIUM BICARBONATE, AND SODIUM SULFATE 240; 5.84; 2.98; 6.72; 22.72 G/4L; G/4L; G/4L; G/4L; G/4L
240 POWDER, FOR SOLUTION ORAL
Qty: 240 ML | Refills: 0 | Status: SHIPPED | OUTPATIENT
Start: 2020-01-01 | End: 2020-01-01

## 2020-01-01 RX ORDER — PRAVASTATIN SODIUM 20 MG
20 TABLET ORAL NIGHTLY
Status: DISCONTINUED | OUTPATIENT
Start: 2020-01-01 | End: 2020-01-01 | Stop reason: HOSPADM

## 2020-01-01 RX ORDER — TAMSULOSIN HYDROCHLORIDE 0.4 MG/1
0.4 CAPSULE ORAL DAILY
Status: DISCONTINUED | OUTPATIENT
Start: 2020-01-01 | End: 2020-01-01 | Stop reason: HOSPADM

## 2020-01-01 RX ORDER — HYDROCODONE BITARTRATE AND ACETAMINOPHEN 5; 325 MG/1; MG/1
1 TABLET ORAL EVERY 8 HOURS PRN
Qty: 20 TABLET | Refills: 0 | Status: SHIPPED | OUTPATIENT
Start: 2020-01-01 | End: 2020-01-01

## 2020-01-01 RX ORDER — MORPHINE SULFATE 4 MG/ML
4 INJECTION, SOLUTION INTRAMUSCULAR; INTRAVENOUS
Status: DISCONTINUED | OUTPATIENT
Start: 2020-01-01 | End: 2020-01-01 | Stop reason: HOSPADM

## 2020-01-01 RX ORDER — ONDANSETRON 2 MG/ML
4 INJECTION INTRAMUSCULAR; INTRAVENOUS EVERY 6 HOURS PRN
Status: CANCELLED | OUTPATIENT
Start: 2020-01-01

## 2020-01-01 RX ORDER — METOPROLOL SUCCINATE 100 MG/1
100 TABLET, EXTENDED RELEASE ORAL 2 TIMES DAILY
COMMUNITY

## 2020-01-01 RX ORDER — DIGOXIN 125 MCG
125 TABLET ORAL
Qty: 30 TABLET | Refills: 0 | Status: SHIPPED | OUTPATIENT
Start: 2020-01-01 | End: 2020-01-01 | Stop reason: HOSPADM

## 2020-01-01 RX ORDER — SULFASALAZINE 500 MG/1
1000 TABLET ORAL 3 TIMES DAILY
Status: DISCONTINUED | OUTPATIENT
Start: 2020-01-01 | End: 2020-01-01 | Stop reason: HOSPADM

## 2020-01-01 RX ORDER — METOPROLOL SUCCINATE 100 MG/1
100 TABLET, EXTENDED RELEASE ORAL DAILY
Status: DISCONTINUED | OUTPATIENT
Start: 2020-01-01 | End: 2020-01-01

## 2020-01-01 RX ORDER — ALLOPURINOL 100 MG/1
200 TABLET ORAL DAILY
Status: DISCONTINUED | OUTPATIENT
Start: 2020-01-01 | End: 2020-01-01 | Stop reason: HOSPADM

## 2020-01-01 RX ORDER — DILTIAZEM HYDROCHLORIDE 240 MG/1
240 CAPSULE, COATED, EXTENDED RELEASE ORAL 2 TIMES DAILY
Status: DISCONTINUED | OUTPATIENT
Start: 2020-01-01 | End: 2020-01-01 | Stop reason: HOSPADM

## 2020-01-01 RX ORDER — DEXTROSE MONOHYDRATE 25 G/50ML
12.5 INJECTION, SOLUTION INTRAVENOUS PRN
Status: DISCONTINUED | OUTPATIENT
Start: 2020-01-01 | End: 2020-01-01 | Stop reason: HOSPADM

## 2020-01-01 RX ORDER — LIDOCAINE HYDROCHLORIDE 10 MG/ML
INJECTION, SOLUTION EPIDURAL; INFILTRATION; INTRACAUDAL; PERINEURAL PRN
Status: DISCONTINUED | OUTPATIENT
Start: 2020-01-01 | End: 2020-01-01 | Stop reason: SDUPTHER

## 2020-01-01 RX ORDER — SODIUM CHLORIDE 0.9 % (FLUSH) 0.9 %
10 SYRINGE (ML) INJECTION AS NEEDED
Status: DISCONTINUED | OUTPATIENT
Start: 2020-01-01 | End: 2020-01-01 | Stop reason: HOSPADM

## 2020-01-01 RX ORDER — NITROGLYCERIN 0.4 MG/1
0.4 TABLET SUBLINGUAL
Status: DISCONTINUED | OUTPATIENT
Start: 2020-01-01 | End: 2020-01-01 | Stop reason: HOSPADM

## 2020-01-01 RX ORDER — PANTOPRAZOLE SODIUM 40 MG/1
40 TABLET, DELAYED RELEASE ORAL
Status: DISCONTINUED | OUTPATIENT
Start: 2020-01-01 | End: 2020-01-01 | Stop reason: HOSPADM

## 2020-01-01 RX ORDER — DEXTROSE MONOHYDRATE 25 G/50ML
25 INJECTION, SOLUTION INTRAVENOUS
Status: DISCONTINUED | OUTPATIENT
Start: 2020-01-01 | End: 2020-01-01 | Stop reason: HOSPADM

## 2020-01-01 RX ORDER — SCOLOPAMINE TRANSDERMAL SYSTEM 1 MG/1
1 PATCH, EXTENDED RELEASE TRANSDERMAL ONCE
Status: DISCONTINUED | OUTPATIENT
Start: 2020-01-01 | End: 2020-01-01 | Stop reason: HOSPADM

## 2020-01-01 RX ORDER — HYDROCODONE BITARTRATE AND ACETAMINOPHEN 5; 325 MG/1; MG/1
2 TABLET ORAL EVERY 4 HOURS PRN
Status: DISCONTINUED | OUTPATIENT
Start: 2020-01-01 | End: 2020-01-01 | Stop reason: HOSPADM

## 2020-01-01 RX ORDER — FENTANYL CITRATE 50 UG/ML
50 INJECTION, SOLUTION INTRAMUSCULAR; INTRAVENOUS
Status: DISCONTINUED | OUTPATIENT
Start: 2020-01-01 | End: 2020-01-01 | Stop reason: HOSPADM

## 2020-01-01 RX ORDER — LIDOCAINE HYDROCHLORIDE 10 MG/ML
1 INJECTION, SOLUTION EPIDURAL; INFILTRATION; INTRACAUDAL; PERINEURAL
Status: DISCONTINUED | OUTPATIENT
Start: 2020-01-01 | End: 2020-01-01 | Stop reason: HOSPADM

## 2020-01-01 RX ORDER — POTASSIUM CHLORIDE 750 MG/1
40 CAPSULE, EXTENDED RELEASE ORAL ONCE
Status: COMPLETED | OUTPATIENT
Start: 2020-01-01 | End: 2020-01-01

## 2020-01-01 RX ORDER — M-VIT,TX,IRON,MINS/CALC/FOLIC 27MG-0.4MG
1 TABLET ORAL DAILY
Status: DISCONTINUED | OUTPATIENT
Start: 2020-01-01 | End: 2020-01-01 | Stop reason: HOSPADM

## 2020-01-01 RX ORDER — ACETAMINOPHEN 325 MG/1
650 TABLET ORAL EVERY 6 HOURS PRN
Status: DISCONTINUED | OUTPATIENT
Start: 2020-01-01 | End: 2020-01-01 | Stop reason: HOSPADM

## 2020-01-01 RX ORDER — HYDRALAZINE HYDROCHLORIDE 50 MG/1
50 TABLET, FILM COATED ORAL 2 TIMES DAILY
COMMUNITY

## 2020-01-01 RX ORDER — METOPROLOL SUCCINATE 100 MG/1
100 TABLET, EXTENDED RELEASE ORAL 2 TIMES DAILY
Status: DISCONTINUED | OUTPATIENT
Start: 2020-01-01 | End: 2020-01-01 | Stop reason: HOSPADM

## 2020-01-01 RX ORDER — SODIUM CHLORIDE, SODIUM LACTATE, POTASSIUM CHLORIDE, CALCIUM CHLORIDE 600; 310; 30; 20 MG/100ML; MG/100ML; MG/100ML; MG/100ML
INJECTION, SOLUTION INTRAVENOUS CONTINUOUS
Status: DISCONTINUED | OUTPATIENT
Start: 2020-01-01 | End: 2020-01-01 | Stop reason: HOSPADM

## 2020-01-01 RX ORDER — SODIUM CHLORIDE 0.9 % (FLUSH) 0.9 %
10 SYRINGE (ML) INJECTION AS NEEDED
Status: CANCELLED | OUTPATIENT
Start: 2020-01-01

## 2020-01-01 RX ORDER — HYDRALAZINE HYDROCHLORIDE 50 MG/1
50 TABLET, FILM COATED ORAL EVERY 8 HOURS SCHEDULED
Qty: 30 TABLET | Refills: 0 | Status: ON HOLD | OUTPATIENT
Start: 2020-01-01 | End: 2020-01-01

## 2020-01-01 RX ORDER — FUROSEMIDE 40 MG/1
40 TABLET ORAL DAILY
Status: DISCONTINUED | OUTPATIENT
Start: 2020-01-01 | End: 2020-01-01 | Stop reason: HOSPADM

## 2020-01-01 RX ORDER — ASPIRIN 81 MG/1
81 TABLET, CHEWABLE ORAL DAILY
Status: DISCONTINUED | OUTPATIENT
Start: 2020-01-01 | End: 2020-01-01

## 2020-01-01 RX ORDER — FOLIC ACID 1 MG/1
1 TABLET ORAL DAILY
Status: DISCONTINUED | OUTPATIENT
Start: 2020-01-01 | End: 2020-01-01 | Stop reason: HOSPADM

## 2020-01-01 RX ORDER — SODIUM CHLORIDE 450 MG/100ML
75 INJECTION, SOLUTION INTRAVENOUS CONTINUOUS
Status: DISCONTINUED | OUTPATIENT
Start: 2020-01-01 | End: 2020-01-01

## 2020-01-01 RX ORDER — BUMETANIDE 1 MG/1
1 TABLET ORAL DAILY
Status: DISCONTINUED | OUTPATIENT
Start: 2020-01-01 | End: 2020-01-01

## 2020-01-01 RX ORDER — ASPIRIN 81 MG/1
81 TABLET, CHEWABLE ORAL DAILY
Status: ON HOLD | COMMUNITY
End: 2020-01-01

## 2020-01-01 RX ORDER — DEXTROSE AND SODIUM CHLORIDE 5; .45 G/100ML; G/100ML
30 INJECTION, SOLUTION INTRAVENOUS CONTINUOUS PRN
Status: CANCELLED | OUTPATIENT
Start: 2020-01-01

## 2020-01-01 RX ORDER — LIDOCAINE HYDROCHLORIDE 20 MG/ML
INJECTION, SOLUTION INFILTRATION; PERINEURAL
Status: COMPLETED | OUTPATIENT
Start: 2020-01-01 | End: 2020-01-01

## 2020-01-01 RX ORDER — HYDRALAZINE HYDROCHLORIDE 50 MG/1
50 TABLET, FILM COATED ORAL EVERY 12 HOURS SCHEDULED
Status: DISCONTINUED | OUTPATIENT
Start: 2020-01-01 | End: 2020-01-01 | Stop reason: HOSPADM

## 2020-01-01 RX ORDER — DOXAZOSIN MESYLATE 4 MG/1
4 TABLET ORAL DAILY
Status: DISCONTINUED | OUTPATIENT
Start: 2020-01-01 | End: 2020-01-01 | Stop reason: HOSPADM

## 2020-01-01 RX ORDER — FUROSEMIDE 10 MG/ML
80 INJECTION INTRAMUSCULAR; INTRAVENOUS ONCE
Status: COMPLETED | OUTPATIENT
Start: 2020-01-01 | End: 2020-01-01

## 2020-01-01 RX ORDER — ONDANSETRON 4 MG/1
4 TABLET, ORALLY DISINTEGRATING ORAL EVERY 6 HOURS PRN
COMMUNITY
End: 2020-01-01 | Stop reason: HOSPADM

## 2020-01-01 RX ORDER — POTASSIUM CHLORIDE 750 MG/1
40 CAPSULE, EXTENDED RELEASE ORAL 2 TIMES DAILY WITH MEALS
Status: COMPLETED | OUTPATIENT
Start: 2020-01-01 | End: 2020-01-01

## 2020-01-01 RX ORDER — DIGOXIN 125 MCG
125 TABLET ORAL
COMMUNITY

## 2020-01-01 RX ORDER — HYDROCODONE BITARTRATE AND ACETAMINOPHEN 5; 325 MG/1; MG/1
TABLET ORAL
Status: ON HOLD | COMMUNITY
End: 2020-01-01

## 2020-01-01 RX ORDER — FENTANYL CITRATE 50 UG/ML
INJECTION, SOLUTION INTRAMUSCULAR; INTRAVENOUS
Status: COMPLETED | OUTPATIENT
Start: 2020-01-01 | End: 2020-01-01

## 2020-01-01 RX ORDER — DIGOXIN 125 MCG
125 TABLET ORAL DAILY
COMMUNITY

## 2020-01-01 RX ORDER — ISOSORBIDE MONONITRATE 30 MG/1
30 TABLET, EXTENDED RELEASE ORAL
Status: DISCONTINUED | OUTPATIENT
Start: 2020-01-01 | End: 2020-01-01 | Stop reason: HOSPADM

## 2020-01-01 RX ORDER — NICOTINE POLACRILEX 4 MG
15 LOZENGE BUCCAL
Qty: 3 EACH | Refills: 0 | Status: SHIPPED | OUTPATIENT
Start: 2020-01-01 | End: 2021-01-01

## 2020-01-01 RX ORDER — METOPROLOL SUCCINATE 100 MG/1
TABLET, EXTENDED RELEASE ORAL
Qty: 180 TABLET | Refills: 3 | Status: SHIPPED | OUTPATIENT
Start: 2020-01-01

## 2020-01-01 RX ORDER — BETAMETHASONE DIPROPIONATE 0.5 MG/G
CREAM TOPICAL 2 TIMES DAILY
Status: DISCONTINUED | OUTPATIENT
Start: 2020-01-01 | End: 2020-01-01 | Stop reason: HOSPADM

## 2020-01-01 RX ORDER — SODIUM CHLORIDE 9 MG/ML
INJECTION, SOLUTION INTRAVENOUS CONTINUOUS
Status: DISCONTINUED | OUTPATIENT
Start: 2020-01-01 | End: 2020-01-01 | Stop reason: HOSPADM

## 2020-01-01 RX ORDER — SULFASALAZINE 500 MG/1
1000 TABLET ORAL 3 TIMES DAILY
Qty: 540 TABLET | Refills: 1 | Status: SHIPPED | OUTPATIENT
Start: 2020-01-01

## 2020-01-01 RX ORDER — LIDOCAINE HYDROCHLORIDE 10 MG/ML
INJECTION, SOLUTION INFILTRATION; PERINEURAL
Status: COMPLETED | OUTPATIENT
Start: 2020-01-01 | End: 2020-01-01

## 2020-01-01 RX ORDER — ONDANSETRON 4 MG/1
TABLET, ORALLY DISINTEGRATING ORAL
COMMUNITY

## 2020-01-01 RX ORDER — ONDANSETRON 2 MG/ML
INJECTION INTRAMUSCULAR; INTRAVENOUS PRN
Status: DISCONTINUED | OUTPATIENT
Start: 2020-01-01 | End: 2020-01-01 | Stop reason: SDUPTHER

## 2020-01-01 RX ORDER — ZOLPIDEM TARTRATE 5 MG/1
5 TABLET ORAL NIGHTLY PRN
Status: DISCONTINUED | OUTPATIENT
Start: 2020-01-01 | End: 2020-01-01 | Stop reason: HOSPADM

## 2020-01-01 RX ADMIN — APIXABAN 5 MG: 5 TABLET, FILM COATED ORAL at 08:23

## 2020-01-01 RX ADMIN — INSULIN LISPRO 3 UNITS: 100 INJECTION, SOLUTION INTRAVENOUS; SUBCUTANEOUS at 21:06

## 2020-01-01 RX ADMIN — TERAZOSIN HYDROCHLORIDE 10 MG: 5 CAPSULE ORAL at 17:01

## 2020-01-01 RX ADMIN — GLIPIZIDE 10 MG: 10 TABLET ORAL at 09:11

## 2020-01-01 RX ADMIN — HYDRALAZINE HYDROCHLORIDE 50 MG: 50 TABLET, FILM COATED ORAL at 16:19

## 2020-01-01 RX ADMIN — INSULIN LISPRO 3 UNITS: 100 INJECTION, SOLUTION INTRAVENOUS; SUBCUTANEOUS at 13:20

## 2020-01-01 RX ADMIN — SULFASALAZINE 1000 MG: 500 TABLET ORAL at 09:40

## 2020-01-01 RX ADMIN — HYDRALAZINE HYDROCHLORIDE 10 MG: 10 TABLET, FILM COATED ORAL at 06:31

## 2020-01-01 RX ADMIN — SODIUM CHLORIDE, PRESERVATIVE FREE 10 ML: 5 INJECTION INTRAVENOUS at 08:38

## 2020-01-01 RX ADMIN — DILTIAZEM HYDROCHLORIDE 240 MG: 240 CAPSULE, EXTENDED RELEASE ORAL at 08:34

## 2020-01-01 RX ADMIN — PRAVASTATIN SODIUM 20 MG: 20 TABLET ORAL at 20:16

## 2020-01-01 RX ADMIN — INSULIN LISPRO 3 UNITS: 100 INJECTION, SOLUTION INTRAVENOUS; SUBCUTANEOUS at 11:43

## 2020-01-01 RX ADMIN — PANTOPRAZOLE SODIUM 40 MG: 40 TABLET, DELAYED RELEASE ORAL at 06:18

## 2020-01-01 RX ADMIN — METOPROLOL SUCCINATE 100 MG: 100 TABLET, EXTENDED RELEASE ORAL at 20:08

## 2020-01-01 RX ADMIN — DILTIAZEM HYDROCHLORIDE 240 MG: 240 CAPSULE, COATED, EXTENDED RELEASE ORAL at 22:33

## 2020-01-01 RX ADMIN — SODIUM CHLORIDE, PRESERVATIVE FREE 10 ML: 5 INJECTION INTRAVENOUS at 19:50

## 2020-01-01 RX ADMIN — TERAZOSIN HYDROCHLORIDE 10 MG: 5 CAPSULE ORAL at 06:30

## 2020-01-01 RX ADMIN — SODIUM CHLORIDE, PRESERVATIVE FREE 10 ML: 5 INJECTION INTRAVENOUS at 11:58

## 2020-01-01 RX ADMIN — TERAZOSIN HYDROCHLORIDE 10 MG: 5 CAPSULE ORAL at 22:38

## 2020-01-01 RX ADMIN — EPOETIN ALFA-EPBX 5000 UNITS: 3000 INJECTION, SOLUTION INTRAVENOUS; SUBCUTANEOUS at 09:12

## 2020-01-01 RX ADMIN — ASPIRIN 81 MG: 81 TABLET ORAL at 09:11

## 2020-01-01 RX ADMIN — GLIPIZIDE 10 MG: 10 TABLET ORAL at 09:27

## 2020-01-01 RX ADMIN — TERAZOSIN HYDROCHLORIDE 10 MG: 5 CAPSULE ORAL at 17:19

## 2020-01-01 RX ADMIN — FOLIC ACID 1 MG: 1 TABLET ORAL at 11:38

## 2020-01-01 RX ADMIN — PANTOPRAZOLE SODIUM 40 MG: 40 TABLET, DELAYED RELEASE ORAL at 09:30

## 2020-01-01 RX ADMIN — METOPROLOL SUCCINATE 100 MG: 50 TABLET, EXTENDED RELEASE ORAL at 21:28

## 2020-01-01 RX ADMIN — PANTOPRAZOLE SODIUM 40 MG: 40 TABLET, DELAYED RELEASE ORAL at 05:48

## 2020-01-01 RX ADMIN — DIGOXIN 125 MCG: 125 TABLET ORAL at 12:36

## 2020-01-01 RX ADMIN — SODIUM CHLORIDE, PRESERVATIVE FREE 10 ML: 5 INJECTION INTRAVENOUS at 09:28

## 2020-01-01 RX ADMIN — FUROSEMIDE 80 MG: 10 INJECTION, SOLUTION INTRAMUSCULAR; INTRAVENOUS at 17:26

## 2020-01-01 RX ADMIN — SODIUM CHLORIDE, PRESERVATIVE FREE 10 ML: 5 INJECTION INTRAVENOUS at 09:12

## 2020-01-01 RX ADMIN — GLIPIZIDE 10 MG: 10 TABLET ORAL at 08:47

## 2020-01-01 RX ADMIN — DILTIAZEM HYDROCHLORIDE 240 MG: 240 CAPSULE, EXTENDED RELEASE ORAL at 20:43

## 2020-01-01 RX ADMIN — GLIPIZIDE 10 MG: 10 TABLET ORAL at 08:38

## 2020-01-01 RX ADMIN — SODIUM CHLORIDE, PRESERVATIVE FREE 10 ML: 5 INJECTION INTRAVENOUS at 08:59

## 2020-01-01 RX ADMIN — SODIUM CHLORIDE, PRESERVATIVE FREE 10 ML: 5 INJECTION INTRAVENOUS at 21:52

## 2020-01-01 RX ADMIN — HEPARIN SODIUM 3000 UNITS: 1000 INJECTION, SOLUTION INTRAVENOUS; SUBCUTANEOUS at 12:16

## 2020-01-01 RX ADMIN — LORAZEPAM 0.5 MG: 0.5 TABLET ORAL at 21:38

## 2020-01-01 RX ADMIN — FUROSEMIDE 80 MG: 10 INJECTION, SOLUTION INTRAMUSCULAR; INTRAVENOUS at 05:49

## 2020-01-01 RX ADMIN — ALLOPURINOL 200 MG: 100 TABLET ORAL at 08:47

## 2020-01-01 RX ADMIN — DILTIAZEM HYDROCHLORIDE 240 MG: 240 CAPSULE, EXTENDED RELEASE ORAL at 08:23

## 2020-01-01 RX ADMIN — PRAVASTATIN SODIUM 20 MG: 20 TABLET ORAL at 22:33

## 2020-01-01 RX ADMIN — FINASTERIDE 5 MG: 5 TABLET, FILM COATED ORAL at 08:23

## 2020-01-01 RX ADMIN — SULFASALAZINE 1000 MG: 500 TABLET ORAL at 16:57

## 2020-01-01 RX ADMIN — APIXABAN 5 MG: 5 TABLET, FILM COATED ORAL at 20:54

## 2020-01-01 RX ADMIN — GLIPIZIDE 10 MG: 10 TABLET ORAL at 16:43

## 2020-01-01 RX ADMIN — METOPROLOL SUCCINATE 100 MG: 100 TABLET, EXTENDED RELEASE ORAL at 08:47

## 2020-01-01 RX ADMIN — HYDRALAZINE HYDROCHLORIDE 50 MG: 50 TABLET ORAL at 14:24

## 2020-01-01 RX ADMIN — HEPARIN SODIUM 5000 UNITS: 5000 INJECTION INTRAVENOUS; SUBCUTANEOUS at 06:39

## 2020-01-01 RX ADMIN — CEFAZOLIN 1 G: 1 INJECTION, POWDER, FOR SOLUTION INTRAMUSCULAR; INTRAVENOUS; PARENTERAL at 10:36

## 2020-01-01 RX ADMIN — SULFASALAZINE 1000 MG: 500 TABLET ORAL at 08:23

## 2020-01-01 RX ADMIN — ALLOPURINOL 200 MG: 100 TABLET ORAL at 09:29

## 2020-01-01 RX ADMIN — GLIPIZIDE 10 MG: 10 TABLET ORAL at 09:35

## 2020-01-01 RX ADMIN — METOPROLOL SUCCINATE 100 MG: 100 TABLET, EXTENDED RELEASE ORAL at 08:58

## 2020-01-01 RX ADMIN — HYDRALAZINE HYDROCHLORIDE 25 MG: 25 TABLET, FILM COATED ORAL at 20:43

## 2020-01-01 RX ADMIN — DOXAZOSIN 4 MG: 4 TABLET ORAL at 11:57

## 2020-01-01 RX ADMIN — ISOSORBIDE MONONITRATE 30 MG: 30 TABLET, EXTENDED RELEASE ORAL at 09:08

## 2020-01-01 RX ADMIN — POTASSIUM CHLORIDE 40 MEQ: 750 CAPSULE, EXTENDED RELEASE ORAL at 12:36

## 2020-01-01 RX ADMIN — FINASTERIDE 5 MG: 5 TABLET, FILM COATED ORAL at 09:07

## 2020-01-01 RX ADMIN — TERAZOSIN HYDROCHLORIDE 10 MG: 5 CAPSULE ORAL at 17:07

## 2020-01-01 RX ADMIN — TERAZOSIN HYDROCHLORIDE 10 MG: 5 CAPSULE ORAL at 17:46

## 2020-01-01 RX ADMIN — PANTOPRAZOLE SODIUM 40 MG: 40 TABLET, DELAYED RELEASE ORAL at 05:38

## 2020-01-01 RX ADMIN — SODIUM CHLORIDE, SODIUM LACTATE, POTASSIUM CHLORIDE, AND CALCIUM CHLORIDE: 600; 310; 30; 20 INJECTION, SOLUTION INTRAVENOUS at 12:48

## 2020-01-01 RX ADMIN — PANTOPRAZOLE SODIUM 40 MG: 40 TABLET, DELAYED RELEASE ORAL at 06:07

## 2020-01-01 RX ADMIN — ALLOPURINOL 200 MG: 100 TABLET ORAL at 09:07

## 2020-01-01 RX ADMIN — FENTANYL CITRATE 50 MCG: 50 INJECTION INTRAMUSCULAR; INTRAVENOUS at 11:44

## 2020-01-01 RX ADMIN — METOPROLOL SUCCINATE 100 MG: 100 TABLET, EXTENDED RELEASE ORAL at 20:56

## 2020-01-01 RX ADMIN — DILTIAZEM HYDROCHLORIDE 240 MG: 240 CAPSULE, EXTENDED RELEASE ORAL at 20:51

## 2020-01-01 RX ADMIN — FUROSEMIDE 40 MG: 10 INJECTION, SOLUTION INTRAMUSCULAR; INTRAVENOUS at 22:32

## 2020-01-01 RX ADMIN — INSULIN LISPRO 10 UNITS: 100 INJECTION, SOLUTION INTRAVENOUS; SUBCUTANEOUS at 17:34

## 2020-01-01 RX ADMIN — CEFAZOLIN SODIUM 1 G: 1 INJECTION, POWDER, FOR SOLUTION INTRAMUSCULAR; INTRAVENOUS at 11:53

## 2020-01-01 RX ADMIN — BETAMETHASONE DIPROPIONATE: 0.5 CREAM TOPICAL at 19:52

## 2020-01-01 RX ADMIN — SULFASALAZINE 1000 MG: 500 TABLET ORAL at 17:19

## 2020-01-01 RX ADMIN — PRAVASTATIN SODIUM 20 MG: 20 TABLET ORAL at 21:05

## 2020-01-01 RX ADMIN — PANTOPRAZOLE SODIUM 40 MG: 40 TABLET, DELAYED RELEASE ORAL at 15:33

## 2020-01-01 RX ADMIN — METOPROLOL SUCCINATE 100 MG: 100 TABLET, EXTENDED RELEASE ORAL at 09:29

## 2020-01-01 RX ADMIN — SULFASALAZINE 1000 MG: 500 TABLET ORAL at 11:38

## 2020-01-01 RX ADMIN — DILTIAZEM HYDROCHLORIDE 240 MG: 240 CAPSULE, EXTENDED RELEASE ORAL at 09:30

## 2020-01-01 RX ADMIN — INSULIN LISPRO 3 UNITS: 100 INJECTION, SOLUTION INTRAVENOUS; SUBCUTANEOUS at 09:30

## 2020-01-01 RX ADMIN — DILTIAZEM HYDROCHLORIDE 240 MG: 240 CAPSULE, EXTENDED RELEASE ORAL at 21:52

## 2020-01-01 RX ADMIN — ASPIRIN 81 MG: 81 TABLET, COATED ORAL at 08:00

## 2020-01-01 RX ADMIN — FUROSEMIDE 80 MG: 10 INJECTION, SOLUTION INTRAMUSCULAR; INTRAVENOUS at 02:14

## 2020-01-01 RX ADMIN — SULFASALAZINE 1000 MG: 500 TABLET ORAL at 08:37

## 2020-01-01 RX ADMIN — FINASTERIDE 5 MG: 5 TABLET, FILM COATED ORAL at 09:11

## 2020-01-01 RX ADMIN — DIGOXIN 500 MCG: 250 INJECTION, SOLUTION INTRAMUSCULAR; INTRAVENOUS; PARENTERAL at 11:01

## 2020-01-01 RX ADMIN — SODIUM CHLORIDE 75 ML/HR: 4.5 INJECTION, SOLUTION INTRAVENOUS at 20:07

## 2020-01-01 RX ADMIN — PRAVASTATIN SODIUM 20 MG: 20 TABLET ORAL at 21:29

## 2020-01-01 RX ADMIN — SULFASALAZINE 1000 MG: 500 TABLET ORAL at 17:12

## 2020-01-01 RX ADMIN — SULFASALAZINE 1000 MG: 500 TABLET ORAL at 19:49

## 2020-01-01 RX ADMIN — BETAMETHASONE DIPROPIONATE: 0.5 CREAM TOPICAL at 12:00

## 2020-01-01 RX ADMIN — METOPROLOL SUCCINATE 100 MG: 100 TABLET, EXTENDED RELEASE ORAL at 20:26

## 2020-01-01 RX ADMIN — SULFASALAZINE 1000 MG: 500 TABLET ORAL at 21:17

## 2020-01-01 RX ADMIN — SODIUM CHLORIDE, PRESERVATIVE FREE 10 ML: 5 INJECTION INTRAVENOUS at 00:28

## 2020-01-01 RX ADMIN — SODIUM CHLORIDE 75 ML/HR: 4.5 INJECTION, SOLUTION INTRAVENOUS at 23:33

## 2020-01-01 RX ADMIN — PRAVASTATIN SODIUM 20 MG: 20 TABLET ORAL at 21:14

## 2020-01-01 RX ADMIN — GLIPIZIDE 10 MG: 10 TABLET ORAL at 08:23

## 2020-01-01 RX ADMIN — HYDRALAZINE HYDROCHLORIDE 50 MG: 50 TABLET, FILM COATED ORAL at 11:37

## 2020-01-01 RX ADMIN — HEPARIN SODIUM 5000 UNITS: 5000 INJECTION INTRAVENOUS; SUBCUTANEOUS at 22:35

## 2020-01-01 RX ADMIN — HYDRALAZINE HYDROCHLORIDE 50 MG: 50 TABLET ORAL at 20:14

## 2020-01-01 RX ADMIN — METOPROLOL SUCCINATE 100 MG: 100 TABLET, EXTENDED RELEASE ORAL at 08:23

## 2020-01-01 RX ADMIN — ALLOPURINOL 200 MG: 100 TABLET ORAL at 08:58

## 2020-01-01 RX ADMIN — PANTOPRAZOLE SODIUM 40 MG: 40 TABLET, DELAYED RELEASE ORAL at 05:29

## 2020-01-01 RX ADMIN — SULFASALAZINE 1000 MG: 500 TABLET ORAL at 09:53

## 2020-01-01 RX ADMIN — TERAZOSIN HYDROCHLORIDE 10 MG: 5 CAPSULE ORAL at 20:16

## 2020-01-01 RX ADMIN — FUROSEMIDE 40 MG: 10 INJECTION, SOLUTION INTRAMUSCULAR; INTRAVENOUS at 09:42

## 2020-01-01 RX ADMIN — DEXAMETHASONE SODIUM PHOSPHATE 4 MG: 4 INJECTION, SOLUTION INTRAMUSCULAR; INTRAVENOUS at 11:44

## 2020-01-01 RX ADMIN — INSULIN LISPRO 2 UNITS: 100 INJECTION, SOLUTION INTRAVENOUS; SUBCUTANEOUS at 20:50

## 2020-01-01 RX ADMIN — DILTIAZEM HYDROCHLORIDE 240 MG: 240 CAPSULE, COATED, EXTENDED RELEASE ORAL at 21:29

## 2020-01-01 RX ADMIN — PROPOFOL 100 MG: 10 INJECTION, EMULSION INTRAVENOUS at 11:38

## 2020-01-01 RX ADMIN — SULFASALAZINE 1000 MG: 500 TABLET ORAL at 16:44

## 2020-01-01 RX ADMIN — METOPROLOL SUCCINATE 100 MG: 100 TABLET, EXTENDED RELEASE ORAL at 20:43

## 2020-01-01 RX ADMIN — EPOETIN ALFA-EPBX 5000 UNITS: 10000 INJECTION, SOLUTION INTRAVENOUS; SUBCUTANEOUS at 16:44

## 2020-01-01 RX ADMIN — BUMETANIDE 1 MG: 1 TABLET ORAL at 17:07

## 2020-01-01 RX ADMIN — INSULIN LISPRO 2 UNITS: 100 INJECTION, SOLUTION INTRAVENOUS; SUBCUTANEOUS at 08:47

## 2020-01-01 RX ADMIN — ASPIRIN 81 MG: 81 TABLET ORAL at 09:07

## 2020-01-01 RX ADMIN — METOPROLOL SUCCINATE 100 MG: 50 TABLET, EXTENDED RELEASE ORAL at 09:41

## 2020-01-01 RX ADMIN — METOPROLOL SUCCINATE 100 MG: 100 TABLET, EXTENDED RELEASE ORAL at 08:35

## 2020-01-01 RX ADMIN — HYDRALAZINE HYDROCHLORIDE 50 MG: 50 TABLET, FILM COATED ORAL at 09:40

## 2020-01-01 RX ADMIN — DIGOXIN 125 MCG: 125 TABLET ORAL at 11:37

## 2020-01-01 RX ADMIN — DILTIAZEM HYDROCHLORIDE 240 MG: 240 CAPSULE, COATED, EXTENDED RELEASE ORAL at 11:38

## 2020-01-01 RX ADMIN — PRAVASTATIN SODIUM 20 MG: 20 TABLET ORAL at 20:58

## 2020-01-01 RX ADMIN — PANTOPRAZOLE SODIUM 40 MG: 40 TABLET, DELAYED RELEASE ORAL at 11:27

## 2020-01-01 RX ADMIN — INSULIN LISPRO 3 UNITS: 100 INJECTION, SOLUTION INTRAVENOUS; SUBCUTANEOUS at 13:15

## 2020-01-01 RX ADMIN — SULFASALAZINE 1000 MG: 500 TABLET ORAL at 22:32

## 2020-01-01 RX ADMIN — HYDRALAZINE HYDROCHLORIDE 50 MG: 50 TABLET ORAL at 22:41

## 2020-01-01 RX ADMIN — SODIUM CHLORIDE 75 ML/HR: 4.5 INJECTION, SOLUTION INTRAVENOUS at 11:31

## 2020-01-01 RX ADMIN — SODIUM CHLORIDE, PRESERVATIVE FREE 10 ML: 5 INJECTION INTRAVENOUS at 22:33

## 2020-01-01 RX ADMIN — DILTIAZEM HYDROCHLORIDE 240 MG: 240 CAPSULE, EXTENDED RELEASE ORAL at 20:08

## 2020-01-01 RX ADMIN — INSULIN LISPRO 10 UNITS: 100 INJECTION, SOLUTION INTRAVENOUS; SUBCUTANEOUS at 12:47

## 2020-01-01 RX ADMIN — PRAVASTATIN SODIUM 20 MG: 20 TABLET ORAL at 22:39

## 2020-01-01 RX ADMIN — METOPROLOL SUCCINATE 100 MG: 100 TABLET, EXTENDED RELEASE ORAL at 09:27

## 2020-01-01 RX ADMIN — SODIUM CHLORIDE, PRESERVATIVE FREE 10 ML: 5 INJECTION INTRAVENOUS at 21:03

## 2020-01-01 RX ADMIN — DILTIAZEM HYDROCHLORIDE 240 MG: 240 CAPSULE, EXTENDED RELEASE ORAL at 08:58

## 2020-01-01 RX ADMIN — INSULIN LISPRO 6 UNITS: 100 INJECTION, SOLUTION INTRAVENOUS; SUBCUTANEOUS at 13:01

## 2020-01-01 RX ADMIN — ISOSORBIDE MONONITRATE 30 MG: 30 TABLET, EXTENDED RELEASE ORAL at 09:11

## 2020-01-01 RX ADMIN — METOPROLOL SUCCINATE 100 MG: 100 TABLET, EXTENDED RELEASE ORAL at 21:15

## 2020-01-01 RX ADMIN — ALLOPURINOL 100 MG: 100 TABLET ORAL at 09:27

## 2020-01-01 RX ADMIN — HEPARIN SODIUM 5000 UNITS: 5000 INJECTION, SOLUTION INTRAVENOUS; SUBCUTANEOUS at 10:54

## 2020-01-01 RX ADMIN — HEPARIN SODIUM 5000 UNITS: 5000 INJECTION INTRAVENOUS; SUBCUTANEOUS at 16:19

## 2020-01-01 RX ADMIN — SULFASALAZINE 1000 MG: 500 TABLET ORAL at 17:39

## 2020-01-01 RX ADMIN — CALCITRIOL CAPSULES 0.25 MCG 0.25 MCG: 0.25 CAPSULE ORAL at 11:57

## 2020-01-01 RX ADMIN — SODIUM CHLORIDE, PRESERVATIVE FREE 10 ML: 5 INJECTION INTRAVENOUS at 09:08

## 2020-01-01 RX ADMIN — SULFASALAZINE 1000 MG: 500 TABLET ORAL at 17:06

## 2020-01-01 RX ADMIN — SULFASALAZINE 1000 MG: 500 TABLET ORAL at 09:41

## 2020-01-01 RX ADMIN — DILTIAZEM HYDROCHLORIDE 240 MG: 240 CAPSULE, EXTENDED RELEASE ORAL at 20:07

## 2020-01-01 RX ADMIN — METOPROLOL SUCCINATE 100 MG: 100 TABLET, EXTENDED RELEASE ORAL at 20:51

## 2020-01-01 RX ADMIN — METOPROLOL SUCCINATE 100 MG: 100 TABLET, EXTENDED RELEASE ORAL at 08:38

## 2020-01-01 RX ADMIN — INSULIN LISPRO 2 UNITS: 100 INJECTION, SOLUTION INTRAVENOUS; SUBCUTANEOUS at 13:15

## 2020-01-01 RX ADMIN — FUROSEMIDE 60 MG: 20 INJECTION, SOLUTION INTRAMUSCULAR; INTRAVENOUS at 17:22

## 2020-01-01 RX ADMIN — PANTOPRAZOLE SODIUM 40 MG: 40 TABLET, DELAYED RELEASE ORAL at 07:25

## 2020-01-01 RX ADMIN — SULFASALAZINE 1000 MG: 500 TABLET ORAL at 16:43

## 2020-01-01 RX ADMIN — LIDOCAINE HYDROCHLORIDE 1 ML: 10 INJECTION, SOLUTION INFILTRATION; PERINEURAL at 10:04

## 2020-01-01 RX ADMIN — ONDANSETRON HYDROCHLORIDE 4 MG: 2 INJECTION, SOLUTION INTRAMUSCULAR; INTRAVENOUS at 12:45

## 2020-01-01 RX ADMIN — DILTIAZEM HYDROCHLORIDE 240 MG: 240 CAPSULE, EXTENDED RELEASE ORAL at 09:27

## 2020-01-01 RX ADMIN — DILTIAZEM HYDROCHLORIDE 240 MG: 240 CAPSULE, EXTENDED RELEASE ORAL at 09:35

## 2020-01-01 RX ADMIN — METOPROLOL SUCCINATE 100 MG: 50 TABLET, EXTENDED RELEASE ORAL at 21:05

## 2020-01-01 RX ADMIN — FUROSEMIDE 40 MG: 40 TABLET ORAL at 11:57

## 2020-01-01 RX ADMIN — SULFASALAZINE 1000 MG: 500 TABLET ORAL at 22:37

## 2020-01-01 RX ADMIN — HYDRALAZINE HYDROCHLORIDE 25 MG: 25 TABLET, FILM COATED ORAL at 05:02

## 2020-01-01 RX ADMIN — SODIUM CHLORIDE: 9 INJECTION, SOLUTION INTRAVENOUS at 09:29

## 2020-01-01 RX ADMIN — GLIPIZIDE 10 MG: 10 TABLET ORAL at 08:34

## 2020-01-01 RX ADMIN — ZOLPIDEM TARTRATE 5 MG: 5 TABLET ORAL at 23:41

## 2020-01-01 RX ADMIN — FOLIC ACID 1 MG: 1 TABLET ORAL at 08:35

## 2020-01-01 RX ADMIN — METOPROLOL SUCCINATE 100 MG: 100 TABLET, EXTENDED RELEASE ORAL at 22:39

## 2020-01-01 RX ADMIN — FOLIC ACID 1 MG: 1 TABLET ORAL at 08:36

## 2020-01-01 RX ADMIN — PANTOPRAZOLE SODIUM 40 MG: 40 TABLET, DELAYED RELEASE ORAL at 05:20

## 2020-01-01 RX ADMIN — MAGNESIUM SULFATE HEPTAHYDRATE 2 G: 40 INJECTION, SOLUTION INTRAVENOUS at 11:32

## 2020-01-01 RX ADMIN — FUROSEMIDE 60 MG: 40 TABLET ORAL at 09:11

## 2020-01-01 RX ADMIN — PRAVASTATIN SODIUM 20 MG: 20 TABLET ORAL at 21:52

## 2020-01-01 RX ADMIN — APIXABAN 5 MG: 5 TABLET, FILM COATED ORAL at 08:47

## 2020-01-01 RX ADMIN — LINAGLIPTIN 5 MG: 5 TABLET, FILM COATED ORAL at 09:07

## 2020-01-01 RX ADMIN — METOPROLOL SUCCINATE 100 MG: 50 TABLET, EXTENDED RELEASE ORAL at 22:32

## 2020-01-01 RX ADMIN — CALCITRIOL CAPSULES 0.25 MCG 0.25 MCG: 0.25 CAPSULE ORAL at 11:38

## 2020-01-01 RX ADMIN — DILTIAZEM HYDROCHLORIDE 240 MG: 240 CAPSULE, COATED, EXTENDED RELEASE ORAL at 19:49

## 2020-01-01 RX ADMIN — HYDRALAZINE HYDROCHLORIDE 50 MG: 50 TABLET ORAL at 13:26

## 2020-01-01 RX ADMIN — APIXABAN 5 MG: 5 TABLET, FILM COATED ORAL at 09:54

## 2020-01-01 RX ADMIN — ALLOPURINOL 200 MG: 100 TABLET ORAL at 09:35

## 2020-01-01 RX ADMIN — ALLOPURINOL 200 MG: 100 TABLET ORAL at 08:37

## 2020-01-01 RX ADMIN — LIDOCAINE HYDROCHLORIDE 10 ML: 20 INJECTION, SOLUTION INFILTRATION; PERINEURAL at 10:54

## 2020-01-01 RX ADMIN — FENTANYL CITRATE 25 MCG: 50 INJECTION, SOLUTION INTRAMUSCULAR; INTRAVENOUS at 10:54

## 2020-01-01 RX ADMIN — HYDRALAZINE HYDROCHLORIDE 50 MG: 50 TABLET ORAL at 20:53

## 2020-01-01 RX ADMIN — DARBEPOETIN ALFA 60 MCG: 60 SOLUTION INTRAVENOUS; SUBCUTANEOUS at 11:39

## 2020-01-01 RX ADMIN — PROPOFOL 50 MG: 10 INJECTION, EMULSION INTRAVENOUS at 11:39

## 2020-01-01 RX ADMIN — SODIUM CHLORIDE, PRESERVATIVE FREE 10 ML: 5 INJECTION INTRAVENOUS at 09:54

## 2020-01-01 RX ADMIN — IOPAMIDOL 35 ML: 612 INJECTION, SOLUTION INTRATHECAL at 11:27

## 2020-01-01 RX ADMIN — MULTIPLE VITAMINS W/ MINERALS TAB 1 TABLET: TAB at 09:41

## 2020-01-01 RX ADMIN — DIGOXIN 125 MCG: 125 TABLET ORAL at 10:54

## 2020-01-01 RX ADMIN — METOPROLOL SUCCINATE 100 MG: 100 TABLET, EXTENDED RELEASE ORAL at 20:07

## 2020-01-01 RX ADMIN — SULFASALAZINE 1000 MG: 500 TABLET ORAL at 08:59

## 2020-01-01 RX ADMIN — INSULIN LISPRO 3 UNITS: 100 INJECTION, SOLUTION INTRAVENOUS; SUBCUTANEOUS at 10:54

## 2020-01-01 RX ADMIN — INSULIN LISPRO 2 UNITS: 100 INJECTION, SOLUTION INTRAVENOUS; SUBCUTANEOUS at 10:59

## 2020-01-01 RX ADMIN — FOLIC ACID 1 MG: 1 TABLET ORAL at 11:57

## 2020-01-01 RX ADMIN — METOPROLOL SUCCINATE 100 MG: 100 TABLET, EXTENDED RELEASE ORAL at 11:47

## 2020-01-01 RX ADMIN — SULFASALAZINE 1000 MG: 500 TABLET ORAL at 20:06

## 2020-01-01 RX ADMIN — MIDAZOLAM 1 MG: 1 INJECTION INTRAMUSCULAR; INTRAVENOUS at 09:54

## 2020-01-01 RX ADMIN — SODIUM CHLORIDE 75 ML/HR: 4.5 INJECTION, SOLUTION INTRAVENOUS at 09:36

## 2020-01-01 RX ADMIN — FINASTERIDE 5 MG: 5 TABLET, FILM COATED ORAL at 08:34

## 2020-01-01 RX ADMIN — SULFASALAZINE 1000 MG: 500 TABLET ORAL at 08:36

## 2020-01-01 RX ADMIN — TAMSULOSIN HYDROCHLORIDE 0.4 MG: 0.4 CAPSULE ORAL at 11:57

## 2020-01-01 RX ADMIN — IRON SUCROSE 200 MG: 20 INJECTION, SOLUTION INTRAVENOUS at 17:06

## 2020-01-01 RX ADMIN — LIDOCAINE HYDROCHLORIDE 50 MG: 10 INJECTION, SOLUTION EPIDURAL; INFILTRATION; INTRACAUDAL; PERINEURAL at 11:38

## 2020-01-01 RX ADMIN — DOXAZOSIN 4 MG: 4 TABLET ORAL at 09:40

## 2020-01-01 RX ADMIN — HEPARIN SODIUM 5000 UNITS: 5000 INJECTION INTRAVENOUS; SUBCUTANEOUS at 21:28

## 2020-01-01 RX ADMIN — TERAZOSIN HYDROCHLORIDE 10 MG: 5 CAPSULE ORAL at 16:44

## 2020-01-01 RX ADMIN — DILTIAZEM HYDROCHLORIDE 240 MG: 240 CAPSULE, EXTENDED RELEASE ORAL at 09:11

## 2020-01-01 RX ADMIN — FOLIC ACID 1 MG: 1 TABLET ORAL at 08:23

## 2020-01-01 RX ADMIN — SODIUM CHLORIDE, PRESERVATIVE FREE 10 ML: 5 INJECTION INTRAVENOUS at 21:16

## 2020-01-01 RX ADMIN — INSULIN LISPRO 2 UNITS: 100 INJECTION, SOLUTION INTRAVENOUS; SUBCUTANEOUS at 09:35

## 2020-01-01 RX ADMIN — SULFASALAZINE 1000 MG: 500 TABLET ORAL at 20:49

## 2020-01-01 RX ADMIN — NITROGLYCERIN 0.4 MG: 0.4 TABLET SUBLINGUAL at 03:53

## 2020-01-01 RX ADMIN — DILTIAZEM HYDROCHLORIDE 240 MG: 240 CAPSULE, COATED, EXTENDED RELEASE ORAL at 09:41

## 2020-01-01 RX ADMIN — SODIUM CHLORIDE, PRESERVATIVE FREE 10 ML: 5 INJECTION INTRAVENOUS at 09:36

## 2020-01-01 RX ADMIN — DOXAZOSIN 4 MG: 4 TABLET ORAL at 22:32

## 2020-01-01 RX ADMIN — SODIUM CHLORIDE, PRESERVATIVE FREE 10 ML: 5 INJECTION INTRAVENOUS at 20:46

## 2020-01-01 RX ADMIN — PANTOPRAZOLE SODIUM 40 MG: 40 TABLET, DELAYED RELEASE ORAL at 07:39

## 2020-01-01 RX ADMIN — METOPROLOL SUCCINATE 100 MG: 100 TABLET, EXTENDED RELEASE ORAL at 21:03

## 2020-01-01 RX ADMIN — ALLOPURINOL 100 MG: 100 TABLET ORAL at 16:57

## 2020-01-01 RX ADMIN — GLIPIZIDE 10 MG: 10 TABLET ORAL at 17:02

## 2020-01-01 RX ADMIN — SODIUM CHLORIDE 75 ML/HR: 9 INJECTION, SOLUTION INTRAVENOUS at 04:22

## 2020-01-01 RX ADMIN — ZOLPIDEM TARTRATE 5 MG: 5 TABLET ORAL at 22:37

## 2020-01-01 RX ADMIN — SULFASALAZINE 1000 MG: 500 TABLET ORAL at 20:43

## 2020-01-01 RX ADMIN — SULFASALAZINE 1000 MG: 500 TABLET ORAL at 20:16

## 2020-01-01 RX ADMIN — HYDRALAZINE HYDROCHLORIDE 50 MG: 50 TABLET ORAL at 05:48

## 2020-01-01 RX ADMIN — APIXABAN 5 MG: 5 TABLET, FILM COATED ORAL at 08:58

## 2020-01-01 RX ADMIN — TERAZOSIN HYDROCHLORIDE 10 MG: 5 CAPSULE ORAL at 21:52

## 2020-01-01 RX ADMIN — BUMETANIDE 1 MG: 1 TABLET ORAL at 08:58

## 2020-01-01 RX ADMIN — FOLIC ACID 1 MG: 1 TABLET ORAL at 09:08

## 2020-01-01 RX ADMIN — INSULIN LISPRO 3 UNITS: 100 INJECTION, SOLUTION INTRAVENOUS; SUBCUTANEOUS at 16:44

## 2020-01-01 RX ADMIN — POTASSIUM CHLORIDE 40 MEQ: 750 CAPSULE, EXTENDED RELEASE ORAL at 21:47

## 2020-01-01 RX ADMIN — FUROSEMIDE 80 MG: 10 INJECTION, SOLUTION INTRAMUSCULAR; INTRAVENOUS at 07:25

## 2020-01-01 RX ADMIN — SULFASALAZINE 1000 MG: 500 TABLET ORAL at 16:19

## 2020-01-01 RX ADMIN — INSULIN LISPRO 2 UNITS: 100 INJECTION, SOLUTION INTRAVENOUS; SUBCUTANEOUS at 09:27

## 2020-01-01 RX ADMIN — HYDRALAZINE HYDROCHLORIDE 50 MG: 50 TABLET, FILM COATED ORAL at 08:38

## 2020-01-01 RX ADMIN — DIGOXIN 125 MCG: 125 TABLET ORAL at 11:43

## 2020-01-01 RX ADMIN — DILTIAZEM HYDROCHLORIDE 240 MG: 240 CAPSULE, EXTENDED RELEASE ORAL at 20:16

## 2020-01-01 RX ADMIN — HYDRALAZINE HYDROCHLORIDE 50 MG: 50 TABLET ORAL at 09:11

## 2020-01-01 RX ADMIN — SULFASALAZINE 1000 MG: 500 TABLET ORAL at 09:27

## 2020-01-01 RX ADMIN — HEPARIN SODIUM 5000 UNITS: 5000 INJECTION INTRAVENOUS; SUBCUTANEOUS at 07:39

## 2020-01-01 RX ADMIN — BETAMETHASONE DIPROPIONATE: 0.5 CREAM TOPICAL at 11:40

## 2020-01-01 RX ADMIN — HYDRALAZINE HYDROCHLORIDE 25 MG: 25 TABLET, FILM COATED ORAL at 05:01

## 2020-01-01 RX ADMIN — SULFASALAZINE 1000 MG: 500 TABLET ORAL at 09:11

## 2020-01-01 RX ADMIN — DILTIAZEM HYDROCHLORIDE 240 MG: 240 CAPSULE, COATED, EXTENDED RELEASE ORAL at 21:05

## 2020-01-01 RX ADMIN — METOPROLOL SUCCINATE 100 MG: 50 TABLET, EXTENDED RELEASE ORAL at 21:17

## 2020-01-01 RX ADMIN — HYDRALAZINE HYDROCHLORIDE 10 MG: 10 TABLET, FILM COATED ORAL at 13:21

## 2020-01-01 RX ADMIN — DILTIAZEM HYDROCHLORIDE 240 MG: 240 CAPSULE, EXTENDED RELEASE ORAL at 09:08

## 2020-01-01 RX ADMIN — GLIPIZIDE 10 MG: 10 TABLET ORAL at 09:30

## 2020-01-01 RX ADMIN — INSULIN LISPRO 4 UNITS: 100 INJECTION, SOLUTION INTRAVENOUS; SUBCUTANEOUS at 12:07

## 2020-01-01 RX ADMIN — PRAVASTATIN SODIUM 20 MG: 20 TABLET ORAL at 21:03

## 2020-01-01 RX ADMIN — GLIPIZIDE 10 MG: 10 TABLET ORAL at 09:53

## 2020-01-01 RX ADMIN — PANTOPRAZOLE SODIUM 40 MG: 40 TABLET, DELAYED RELEASE ORAL at 16:19

## 2020-01-01 RX ADMIN — APIXABAN 5 MG: 5 TABLET, FILM COATED ORAL at 19:50

## 2020-01-01 RX ADMIN — SODIUM CHLORIDE, PRESERVATIVE FREE 10 ML: 5 INJECTION INTRAVENOUS at 20:51

## 2020-01-01 RX ADMIN — PANTOPRAZOLE SODIUM 40 MG: 40 TABLET, DELAYED RELEASE ORAL at 06:28

## 2020-01-01 RX ADMIN — INSULIN LISPRO 8 UNITS: 100 INJECTION, SOLUTION INTRAVENOUS; SUBCUTANEOUS at 17:32

## 2020-01-01 RX ADMIN — METOPROLOL SUCCINATE 100 MG: 100 TABLET, EXTENDED RELEASE ORAL at 21:52

## 2020-01-01 RX ADMIN — INSULIN LISPRO 2 UNITS: 100 INJECTION, SOLUTION INTRAVENOUS; SUBCUTANEOUS at 08:38

## 2020-01-01 RX ADMIN — HYDRALAZINE HYDROCHLORIDE 50 MG: 50 TABLET ORAL at 05:20

## 2020-01-01 RX ADMIN — SODIUM CHLORIDE, PRESERVATIVE FREE 10 ML: 5 INJECTION INTRAVENOUS at 08:48

## 2020-01-01 RX ADMIN — SULFASALAZINE 1000 MG: 500 TABLET ORAL at 21:05

## 2020-01-01 RX ADMIN — SODIUM CHLORIDE 75 ML/HR: 9 INJECTION, SOLUTION INTRAVENOUS at 19:31

## 2020-01-01 RX ADMIN — INSULIN LISPRO 10 UNITS: 100 INJECTION, SOLUTION INTRAVENOUS; SUBCUTANEOUS at 12:00

## 2020-01-01 RX ADMIN — HYDRALAZINE HYDROCHLORIDE 10 MG: 10 TABLET, FILM COATED ORAL at 20:07

## 2020-01-01 RX ADMIN — SODIUM CHLORIDE, SODIUM LACTATE, POTASSIUM CHLORIDE, AND CALCIUM CHLORIDE: 600; 310; 30; 20 INJECTION, SOLUTION INTRAVENOUS at 11:31

## 2020-01-01 RX ADMIN — INSULIN LISPRO 2 UNITS: 100 INJECTION, SOLUTION INTRAVENOUS; SUBCUTANEOUS at 11:29

## 2020-01-01 RX ADMIN — APIXABAN 5 MG: 5 TABLET, FILM COATED ORAL at 20:51

## 2020-01-01 RX ADMIN — INSULIN LISPRO 4 UNITS: 100 INJECTION, SOLUTION INTRAVENOUS; SUBCUTANEOUS at 17:38

## 2020-01-01 RX ADMIN — INSULIN LISPRO 2 UNITS: 100 INJECTION, SOLUTION INTRAVENOUS; SUBCUTANEOUS at 08:57

## 2020-01-01 RX ADMIN — SODIUM CHLORIDE, PRESERVATIVE FREE 10 ML: 5 INJECTION INTRAVENOUS at 21:36

## 2020-01-01 RX ADMIN — METOPROLOL SUCCINATE 100 MG: 100 TABLET, EXTENDED RELEASE ORAL at 09:11

## 2020-01-01 RX ADMIN — LINAGLIPTIN 5 MG: 5 TABLET, FILM COATED ORAL at 11:31

## 2020-01-01 RX ADMIN — INSULIN LISPRO 1 UNITS: 100 INJECTION, SOLUTION INTRAVENOUS; SUBCUTANEOUS at 21:27

## 2020-01-01 RX ADMIN — POTASSIUM CHLORIDE 40 MEQ: 750 CAPSULE, EXTENDED RELEASE ORAL at 17:38

## 2020-01-01 RX ADMIN — FUROSEMIDE 40 MG: 40 TABLET ORAL at 08:36

## 2020-01-01 RX ADMIN — DILTIAZEM HYDROCHLORIDE 240 MG: 240 CAPSULE, EXTENDED RELEASE ORAL at 08:47

## 2020-01-01 RX ADMIN — SODIUM CHLORIDE, PRESERVATIVE FREE 10 ML: 5 INJECTION INTRAVENOUS at 08:39

## 2020-01-01 RX ADMIN — PANTOPRAZOLE SODIUM 40 MG: 40 TABLET, DELAYED RELEASE ORAL at 05:49

## 2020-01-01 RX ADMIN — HYDRALAZINE HYDROCHLORIDE 50 MG: 50 TABLET, FILM COATED ORAL at 15:08

## 2020-01-01 RX ADMIN — HYDRALAZINE HYDROCHLORIDE 50 MG: 50 TABLET ORAL at 08:35

## 2020-01-01 RX ADMIN — SULFASALAZINE 1000 MG: 500 TABLET ORAL at 20:08

## 2020-01-01 RX ADMIN — ALLOPURINOL 200 MG: 100 TABLET ORAL at 08:34

## 2020-01-01 RX ADMIN — TAMSULOSIN HYDROCHLORIDE 0.4 MG: 0.4 CAPSULE ORAL at 11:28

## 2020-01-01 RX ADMIN — INSULIN LISPRO 2 UNITS: 100 INJECTION, SOLUTION INTRAVENOUS; SUBCUTANEOUS at 20:54

## 2020-01-01 RX ADMIN — APIXABAN 5 MG: 5 TABLET, FILM COATED ORAL at 11:37

## 2020-01-01 RX ADMIN — DILTIAZEM HYDROCHLORIDE 240 MG: 240 CAPSULE, COATED, EXTENDED RELEASE ORAL at 11:56

## 2020-01-01 RX ADMIN — SULFASALAZINE 1000 MG: 500 TABLET ORAL at 09:30

## 2020-01-01 RX ADMIN — INSULIN LISPRO 6 UNITS: 100 INJECTION, SOLUTION INTRAVENOUS; SUBCUTANEOUS at 12:00

## 2020-01-01 RX ADMIN — MULTIPLE VITAMINS W/ MINERALS TAB 1 TABLET: TAB at 11:39

## 2020-01-01 RX ADMIN — SULFASALAZINE 1000 MG: 500 TABLET ORAL at 21:52

## 2020-01-01 RX ADMIN — HYDRALAZINE HYDROCHLORIDE 50 MG: 50 TABLET, FILM COATED ORAL at 19:49

## 2020-01-01 RX ADMIN — SODIUM CHLORIDE, PRESERVATIVE FREE 10 ML: 5 INJECTION INTRAVENOUS at 11:39

## 2020-01-01 RX ADMIN — METOPROLOL SUCCINATE 100 MG: 100 TABLET, EXTENDED RELEASE ORAL at 09:35

## 2020-01-01 RX ADMIN — PANTOPRAZOLE SODIUM 40 MG: 40 TABLET, DELAYED RELEASE ORAL at 15:08

## 2020-01-01 RX ADMIN — FOLIC ACID 1 MG: 1 TABLET ORAL at 09:41

## 2020-01-01 RX ADMIN — DILTIAZEM HYDROCHLORIDE 240 MG: 240 CAPSULE, COATED, EXTENDED RELEASE ORAL at 21:17

## 2020-01-01 RX ADMIN — PANTOPRAZOLE SODIUM 40 MG: 40 TABLET, DELAYED RELEASE ORAL at 05:01

## 2020-01-01 RX ADMIN — HEPARIN SODIUM 5000 UNITS: 5000 INJECTION INTRAVENOUS; SUBCUTANEOUS at 21:05

## 2020-01-01 RX ADMIN — SULFASALAZINE 1000 MG: 500 TABLET ORAL at 09:07

## 2020-01-01 RX ADMIN — METOPROLOL SUCCINATE 100 MG: 50 TABLET, EXTENDED RELEASE ORAL at 19:50

## 2020-01-01 RX ADMIN — PRAVASTATIN SODIUM 20 MG: 20 TABLET ORAL at 20:43

## 2020-01-01 RX ADMIN — LINAGLIPTIN 5 MG: 5 TABLET, FILM COATED ORAL at 09:11

## 2020-01-01 RX ADMIN — DILTIAZEM HYDROCHLORIDE 240 MG: 240 CAPSULE, EXTENDED RELEASE ORAL at 11:47

## 2020-01-01 RX ADMIN — INSULIN LISPRO 6 UNITS: 100 INJECTION, SOLUTION INTRAVENOUS; SUBCUTANEOUS at 17:02

## 2020-01-01 RX ADMIN — DILTIAZEM HYDROCHLORIDE 240 MG: 240 CAPSULE, EXTENDED RELEASE ORAL at 22:39

## 2020-01-01 RX ADMIN — DOXAZOSIN 4 MG: 4 TABLET ORAL at 08:36

## 2020-01-01 RX ADMIN — SODIUM CHLORIDE, PRESERVATIVE FREE 10 ML: 5 INJECTION INTRAVENOUS at 21:29

## 2020-01-01 RX ADMIN — ALLOPURINOL 200 MG: 100 TABLET ORAL at 09:11

## 2020-01-01 RX ADMIN — SULFASALAZINE 1000 MG: 500 TABLET ORAL at 21:03

## 2020-01-01 RX ADMIN — EPOETIN ALFA-EPBX 5000 UNITS: 3000 INJECTION, SOLUTION INTRAVENOUS; SUBCUTANEOUS at 08:24

## 2020-01-01 RX ADMIN — SULFASALAZINE 1000 MG: 500 TABLET ORAL at 08:38

## 2020-01-01 RX ADMIN — HYDRALAZINE HYDROCHLORIDE 10 MG: 10 TABLET, FILM COATED ORAL at 13:01

## 2020-01-01 RX ADMIN — PRAVASTATIN SODIUM 20 MG: 20 TABLET ORAL at 19:50

## 2020-01-01 RX ADMIN — SULFASALAZINE 1000 MG: 500 TABLET ORAL at 15:33

## 2020-01-01 RX ADMIN — PRAVASTATIN SODIUM 20 MG: 20 TABLET ORAL at 20:08

## 2020-01-01 RX ADMIN — TAMSULOSIN HYDROCHLORIDE 0.4 MG: 0.4 CAPSULE ORAL at 08:38

## 2020-01-01 RX ADMIN — TAMSULOSIN HYDROCHLORIDE 0.4 MG: 0.4 CAPSULE ORAL at 11:38

## 2020-01-01 RX ADMIN — HEPARIN SODIUM 5000 UNITS: 5000 INJECTION INTRAVENOUS; SUBCUTANEOUS at 21:29

## 2020-01-01 RX ADMIN — DIGOXIN 125 MCG: 125 TABLET ORAL at 09:41

## 2020-01-01 RX ADMIN — GLIPIZIDE 10 MG: 10 TABLET ORAL at 08:59

## 2020-01-01 RX ADMIN — SULFASALAZINE 1000 MG: 500 TABLET ORAL at 17:02

## 2020-01-01 RX ADMIN — INSULIN LISPRO 2 UNITS: 100 INJECTION, SOLUTION INTRAVENOUS; SUBCUTANEOUS at 13:01

## 2020-01-01 RX ADMIN — HYDRALAZINE HYDROCHLORIDE 50 MG: 50 TABLET ORAL at 09:08

## 2020-01-01 RX ADMIN — PRAVASTATIN SODIUM 20 MG: 20 TABLET ORAL at 20:06

## 2020-01-01 RX ADMIN — DILTIAZEM HYDROCHLORIDE 240 MG: 240 CAPSULE, COATED, EXTENDED RELEASE ORAL at 08:38

## 2020-01-01 RX ADMIN — PANTOPRAZOLE SODIUM 40 MG: 40 TABLET, DELAYED RELEASE ORAL at 09:27

## 2020-01-01 RX ADMIN — SULFASALAZINE 1000 MG: 500 TABLET ORAL at 11:00

## 2020-01-01 RX ADMIN — GLIPIZIDE 10 MG: 10 TABLET ORAL at 17:11

## 2020-01-01 RX ADMIN — DILTIAZEM HYDROCHLORIDE 240 MG: 240 CAPSULE, EXTENDED RELEASE ORAL at 21:03

## 2020-01-01 RX ADMIN — SULFASALAZINE 1000 MG: 500 TABLET ORAL at 21:28

## 2020-01-01 RX ADMIN — GLIPIZIDE 10 MG: 10 TABLET ORAL at 09:07

## 2020-01-01 RX ADMIN — HYDRALAZINE HYDROCHLORIDE 50 MG: 50 TABLET ORAL at 13:08

## 2020-01-01 RX ADMIN — IRON SUCROSE 200 MG: 20 INJECTION, SOLUTION INTRAVENOUS at 16:45

## 2020-01-01 RX ADMIN — SULFASALAZINE 1000 MG: 500 TABLET ORAL at 17:01

## 2020-01-01 RX ADMIN — APIXABAN 5 MG: 5 TABLET, FILM COATED ORAL at 20:08

## 2020-01-01 RX ADMIN — DOXAZOSIN 4 MG: 4 TABLET ORAL at 11:38

## 2020-01-01 RX ADMIN — HYDRALAZINE HYDROCHLORIDE 50 MG: 50 TABLET ORAL at 13:27

## 2020-01-01 RX ADMIN — HEPARIN SODIUM 5000 UNITS: 5000 INJECTION INTRAVENOUS; SUBCUTANEOUS at 15:08

## 2020-01-01 RX ADMIN — SULFASALAZINE 1000 MG: 500 TABLET ORAL at 08:48

## 2020-01-01 RX ADMIN — INSULIN LISPRO 5 UNITS: 100 INJECTION, SOLUTION INTRAVENOUS; SUBCUTANEOUS at 12:04

## 2020-01-01 RX ADMIN — ROPIVACAINE HYDROCHLORIDE 20 ML: 5 INJECTION, SOLUTION EPIDURAL; INFILTRATION; PERINEURAL at 10:04

## 2020-01-01 RX ADMIN — DILTIAZEM HYDROCHLORIDE 240 MG: 240 CAPSULE, EXTENDED RELEASE ORAL at 20:53

## 2020-01-01 RX ADMIN — DILTIAZEM HYDROCHLORIDE 240 MG: 240 CAPSULE, EXTENDED RELEASE ORAL at 08:38

## 2020-01-01 RX ADMIN — IOPAMIDOL 125 ML: 755 INJECTION, SOLUTION INTRAVENOUS at 11:36

## 2020-01-01 RX ADMIN — INSULIN LISPRO 1 UNITS: 100 INJECTION, SOLUTION INTRAVENOUS; SUBCUTANEOUS at 09:45

## 2020-01-01 RX ADMIN — SODIUM CHLORIDE 75 ML/HR: 4.5 INJECTION, SOLUTION INTRAVENOUS at 05:54

## 2020-01-01 RX ADMIN — LORAZEPAM 0.5 MG: 0.5 TABLET ORAL at 20:50

## 2020-01-01 RX ADMIN — INSULIN LISPRO 2 UNITS: 100 INJECTION, SOLUTION INTRAVENOUS; SUBCUTANEOUS at 16:43

## 2020-01-01 RX ADMIN — HYDRALAZINE HYDROCHLORIDE 50 MG: 50 TABLET, FILM COATED ORAL at 09:41

## 2020-01-01 RX ADMIN — FUROSEMIDE 40 MG: 10 INJECTION, SOLUTION INTRAMUSCULAR; INTRAVENOUS at 09:41

## 2020-01-01 RX ADMIN — FUROSEMIDE 40 MG: 40 TABLET ORAL at 11:37

## 2020-01-01 RX ADMIN — ISOSORBIDE MONONITRATE 30 MG: 30 TABLET, EXTENDED RELEASE ORAL at 08:35

## 2020-01-01 RX ADMIN — ISOSORBIDE MONONITRATE 30 MG: 30 TABLET, EXTENDED RELEASE ORAL at 11:49

## 2020-01-01 RX ADMIN — SODIUM CHLORIDE, PRESERVATIVE FREE 10 ML: 5 INJECTION INTRAVENOUS at 22:39

## 2020-01-01 RX ADMIN — HYDRALAZINE HYDROCHLORIDE 50 MG: 50 TABLET, FILM COATED ORAL at 22:33

## 2020-01-01 RX ADMIN — METOPROLOL SUCCINATE 100 MG: 50 TABLET, EXTENDED RELEASE ORAL at 08:37

## 2020-01-01 RX ADMIN — SULFASALAZINE 1000 MG: 500 TABLET ORAL at 15:08

## 2020-01-01 RX ADMIN — LINAGLIPTIN 5 MG: 5 TABLET, FILM COATED ORAL at 08:23

## 2020-01-01 RX ADMIN — ASPIRIN 81 MG 81 MG: 81 TABLET ORAL at 21:02

## 2020-01-01 RX ADMIN — SODIUM CHLORIDE, PRESERVATIVE FREE 10 ML: 5 INJECTION INTRAVENOUS at 20:08

## 2020-01-01 RX ADMIN — MULTIPLE VITAMINS W/ MINERALS TAB 1 TABLET: TAB at 08:37

## 2020-01-01 RX ADMIN — HYDRALAZINE HYDROCHLORIDE 50 MG: 50 TABLET ORAL at 21:52

## 2020-01-01 RX ADMIN — ALLOPURINOL 200 MG: 100 TABLET ORAL at 08:23

## 2020-01-01 RX ADMIN — ZOLPIDEM TARTRATE 5 MG: 5 TABLET ORAL at 20:26

## 2020-01-01 RX ADMIN — METOPROLOL SUCCINATE 100 MG: 50 TABLET, EXTENDED RELEASE ORAL at 11:57

## 2020-01-01 RX ADMIN — HYDRALAZINE HYDROCHLORIDE 50 MG: 50 TABLET, FILM COATED ORAL at 21:17

## 2020-01-01 RX ADMIN — PANTOPRAZOLE SODIUM 40 MG: 40 TABLET, DELAYED RELEASE ORAL at 06:39

## 2020-01-01 RX ADMIN — BUMETANIDE 1 MG: 1 TABLET ORAL at 08:38

## 2020-01-01 RX ADMIN — HYDRALAZINE HYDROCHLORIDE 50 MG: 50 TABLET, FILM COATED ORAL at 21:05

## 2020-01-01 RX ADMIN — SODIUM CHLORIDE, PRESERVATIVE FREE 10 ML: 5 INJECTION INTRAVENOUS at 20:16

## 2020-01-01 RX ADMIN — APIXABAN 5 MG: 5 TABLET, FILM COATED ORAL at 12:00

## 2020-01-01 RX ADMIN — IRON SUCROSE 200 MG: 20 INJECTION, SOLUTION INTRAVENOUS at 17:46

## 2020-01-01 RX ADMIN — FOLIC ACID 1 MG: 1 TABLET ORAL at 22:33

## 2020-01-01 RX ADMIN — CALCITRIOL CAPSULES 0.25 MCG 0.25 MCG: 0.25 CAPSULE ORAL at 08:37

## 2020-01-01 RX ADMIN — CALCITRIOL CAPSULES 0.25 MCG 0.25 MCG: 0.25 CAPSULE ORAL at 11:28

## 2020-01-01 RX ADMIN — INSULIN LISPRO 3 UNITS: 100 INJECTION, SOLUTION INTRAVENOUS; SUBCUTANEOUS at 12:36

## 2020-01-01 RX ADMIN — SODIUM CHLORIDE, PRESERVATIVE FREE 10 ML: 5 INJECTION INTRAVENOUS at 09:30

## 2020-01-01 RX ADMIN — MIDAZOLAM 0.5 MG: 1 INJECTION INTRAMUSCULAR; INTRAVENOUS at 10:54

## 2020-01-01 RX ADMIN — FOLIC ACID 1 MG: 1 TABLET ORAL at 09:11

## 2020-01-01 RX ADMIN — ASPIRIN 81 MG: 81 TABLET, COATED ORAL at 09:49

## 2020-01-01 RX ADMIN — INSULIN LISPRO 2 UNITS: 100 INJECTION, SOLUTION INTRAVENOUS; SUBCUTANEOUS at 08:24

## 2020-01-01 RX ADMIN — ASPIRIN 81 MG: 81 TABLET ORAL at 11:34

## 2020-01-01 RX ADMIN — NITROGLYCERIN 0.4 MG: 0.4 TABLET SUBLINGUAL at 00:57

## 2020-01-01 RX ADMIN — METOPROLOL SUCCINATE 100 MG: 50 TABLET, EXTENDED RELEASE ORAL at 11:37

## 2020-01-01 RX ADMIN — DOXAZOSIN 4 MG: 4 TABLET ORAL at 09:41

## 2020-01-01 RX ADMIN — SULFASALAZINE 1000 MG: 500 TABLET ORAL at 21:15

## 2020-01-01 RX ADMIN — INSULIN LISPRO 2 UNITS: 100 INJECTION, SOLUTION INTRAVENOUS; SUBCUTANEOUS at 17:17

## 2020-01-01 RX ADMIN — FUROSEMIDE 60 MG: 40 TABLET ORAL at 09:08

## 2020-01-01 RX ADMIN — HYDRALAZINE HYDROCHLORIDE 25 MG: 25 TABLET, FILM COATED ORAL at 13:15

## 2020-01-01 RX ADMIN — PANTOPRAZOLE SODIUM 40 MG: 40 TABLET, DELAYED RELEASE ORAL at 09:35

## 2020-01-01 RX ADMIN — TERAZOSIN HYDROCHLORIDE 10 MG: 5 CAPSULE ORAL at 16:57

## 2020-01-01 RX ADMIN — MULTIPLE VITAMINS W/ MINERALS TAB 1 TABLET: TAB at 09:40

## 2020-01-01 RX ADMIN — HYDRALAZINE HYDROCHLORIDE 50 MG: 50 TABLET ORAL at 05:49

## 2020-01-01 RX ADMIN — HEPARIN SODIUM 5000 UNITS: 5000 INJECTION INTRAVENOUS; SUBCUTANEOUS at 06:28

## 2020-01-01 RX ADMIN — APIXABAN 5 MG: 5 TABLET, FILM COATED ORAL at 21:51

## 2020-01-01 RX ADMIN — PRAVASTATIN SODIUM 20 MG: 20 TABLET ORAL at 20:49

## 2020-01-01 RX ADMIN — MULTIPLE VITAMINS W/ MINERALS TAB 1 TABLET: TAB at 11:57

## 2020-01-01 RX ADMIN — DIGOXIN 125 MCG: 125 TABLET ORAL at 13:15

## 2020-01-01 RX ADMIN — PRAVASTATIN SODIUM 20 MG: 20 TABLET ORAL at 21:17

## 2020-01-01 RX ADMIN — DILTIAZEM HYDROCHLORIDE 240 MG: 240 CAPSULE, EXTENDED RELEASE ORAL at 21:14

## 2020-01-01 RX ADMIN — METOPROLOL SUCCINATE 100 MG: 50 TABLET, EXTENDED RELEASE ORAL at 09:40

## 2020-01-01 RX ADMIN — TERAZOSIN HYDROCHLORIDE 10 MG: 5 CAPSULE ORAL at 21:03

## 2020-01-01 RX ADMIN — HYDRALAZINE HYDROCHLORIDE 25 MG: 25 TABLET, FILM COATED ORAL at 21:14

## 2020-01-01 RX ADMIN — METOPROLOL SUCCINATE 100 MG: 100 TABLET, EXTENDED RELEASE ORAL at 09:07

## 2020-01-01 RX ADMIN — HYDRALAZINE HYDROCHLORIDE 50 MG: 50 TABLET ORAL at 20:08

## 2020-01-01 RX ADMIN — FOLIC ACID 1 MG: 1 TABLET ORAL at 09:40

## 2020-01-01 ASSESSMENT — ENCOUNTER SYMPTOMS
EYE DISCHARGE: 0
SHORTNESS OF BREATH: 0
ABDOMINAL PAIN: 0
VOMITING: 0
APNEA: 0
COUGH: 0
BLOOD IN STOOL: 0
RHINORRHEA: 0
NAUSEA: 0
BACK PAIN: 0
VOMITING: 0
COUGH: 0
BACK PAIN: 0
BACK PAIN: 0
SHORTNESS OF BREATH: 0
ABDOMINAL PAIN: 0
SHORTNESS OF BREATH: 0
PHOTOPHOBIA: 0
COLOR CHANGE: 0
DIARRHEA: 0
EYE ITCHING: 0
ABDOMINAL DISTENTION: 1
SORE THROAT: 0
COUGH: 0
WHEEZING: 0
DIARRHEA: 0
ABDOMINAL DISTENTION: 0

## 2020-01-01 ASSESSMENT — PAIN SCALES - GENERAL
PAINLEVEL_OUTOF10: 0
PAINLEVEL_OUTOF10: 3
PAINLEVEL_OUTOF10: 0

## 2020-01-01 ASSESSMENT — PAIN DESCRIPTION - LOCATION: LOCATION: ABDOMEN

## 2020-01-01 ASSESSMENT — LIFESTYLE VARIABLES: SMOKING_STATUS: 0

## 2020-01-01 ASSESSMENT — PAIN - FUNCTIONAL ASSESSMENT: PAIN_FUNCTIONAL_ASSESSMENT: 0-10

## 2020-01-01 ASSESSMENT — PAIN DESCRIPTION - DESCRIPTORS: DESCRIPTORS: SORE

## 2020-01-10 RX ORDER — METOPROLOL SUCCINATE 25 MG/1
TABLET, EXTENDED RELEASE ORAL
Qty: 180 TABLET | Refills: 3 | Status: SHIPPED | OUTPATIENT
Start: 2020-01-10 | End: 2020-04-06

## 2020-02-20 ENCOUNTER — OFFICE VISIT (OUTPATIENT)
Dept: CARDIOLOGY | Age: 80
End: 2020-02-20
Payer: MEDICARE

## 2020-02-20 ENCOUNTER — TELEPHONE (OUTPATIENT)
Dept: CARDIOLOGY | Age: 80
End: 2020-02-20

## 2020-02-20 VITALS
SYSTOLIC BLOOD PRESSURE: 128 MMHG | BODY MASS INDEX: 25.88 KG/M2 | WEIGHT: 161 LBS | DIASTOLIC BLOOD PRESSURE: 60 MMHG | HEART RATE: 63 BPM | HEIGHT: 66 IN

## 2020-02-20 PROCEDURE — 93000 ELECTROCARDIOGRAM COMPLETE: CPT | Performed by: INTERNAL MEDICINE

## 2020-02-20 PROCEDURE — 1036F TOBACCO NON-USER: CPT | Performed by: INTERNAL MEDICINE

## 2020-02-20 PROCEDURE — 1123F ACP DISCUSS/DSCN MKR DOCD: CPT | Performed by: INTERNAL MEDICINE

## 2020-02-20 PROCEDURE — G8484 FLU IMMUNIZE NO ADMIN: HCPCS | Performed by: INTERNAL MEDICINE

## 2020-02-20 PROCEDURE — G8427 DOCREV CUR MEDS BY ELIG CLIN: HCPCS | Performed by: INTERNAL MEDICINE

## 2020-02-20 PROCEDURE — 4040F PNEUMOC VAC/ADMIN/RCVD: CPT | Performed by: INTERNAL MEDICINE

## 2020-02-20 PROCEDURE — G8417 CALC BMI ABV UP PARAM F/U: HCPCS | Performed by: INTERNAL MEDICINE

## 2020-02-20 PROCEDURE — 99214 OFFICE O/P EST MOD 30 MIN: CPT | Performed by: INTERNAL MEDICINE

## 2020-02-20 RX ORDER — DILTIAZEM HYDROCHLORIDE 120 MG/1
120 CAPSULE, COATED, EXTENDED RELEASE ORAL 3 TIMES DAILY
Status: ON HOLD | COMMUNITY
End: 2020-02-26

## 2020-02-20 RX ORDER — DIGOXIN 125 MCG
125 TABLET ORAL DAILY
Status: ON HOLD | COMMUNITY
End: 2020-02-27 | Stop reason: HOSPADM

## 2020-02-20 ASSESSMENT — ENCOUNTER SYMPTOMS
GASTROINTESTINAL NEGATIVE: 1
NAUSEA: 0
SHORTNESS OF BREATH: 0
EYES NEGATIVE: 1
RESPIRATORY NEGATIVE: 1
VOMITING: 0
DIARRHEA: 0

## 2020-02-20 NOTE — PROGRESS NOTES
input(s): AST, ALT, LABALBU in the last 72 hours.         Past Medical History:   Diagnosis Date    Atherosclerosis of native arteries of the extremities with intermittent claudication     Blood circulation, collateral     Carotid artery occlusion, right     Chronic kidney disease     Depression     GERD (gastroesophageal reflux disease)     Hypertension     Pneumonia due to infectious organism 4/9/2019    Type II or unspecified type diabetes mellitus without mention of complication, not stated as uncontrolled     Ulcerative colitis ([facility] [address] 6/30/2019     Past Surgical History:   Procedure Laterality Date    HEMORRHOID SURGERY  1973    HERNIA REPAIR      umbilical hernia     Family History   Problem Relation Age of Onset    High Blood Pressure Mother     Heart Disease Mother     Stroke Mother         at 48    High Blood Pressure Father     Heart Disease Father     Stroke Father         at 59    No Known Problems Brother     Alzheimer's Disease Brother         onset at 80, then rapidly progressed to death     No Known Allergies  Current Outpatient Medications   Medication Sig Dispense Refill    digoxin (LANOXIN) 125 MCG tablet Take 125 mcg by mouth daily      dilTIAZem (CARDIZEM CD) 120 MG extended release capsule Take 120 mg by mouth 3 times daily      metoprolol succinate (TOPROL XL) 25 MG extended release tablet TAKE 1 TABLET BY MOUTH TWICE A  tablet 3    ELIQUIS 5 MG TABS tablet TAKE 1 TABLET BY MOUTH TWICE A DAY 60 tablet 5    omeprazole (PRILOSEC) 40 MG delayed release capsule Take 40 mg by mouth daily      Multiple Vitamins-Minerals (THERAPEUTIC MULTIVITAMIN-MINERALS) tablet Take 1 tablet by mouth daily      Multiple Vitamins-Minerals (PRESERVISION AREDS PO) Take by mouth daily      sulfaSALAzine (AZULFIDINE) 500 MG tablet Take 1,000 mg by mouth 3 times daily       furosemide (LASIX) 40 MG tablet Take 40 mg by mouth daily      folic acid (FOLVITE) 1 MG tablet Take 1 mg by mouth daily      glipiZIDE (GLUCOTROL) 10 MG tablet Take 10 mg by mouth 2 times daily (before meals)      pravastatin (PRAVACHOL) 20 MG tablet Take 20 mg by mouth nightly       metformin (GLUCOPHAGE-XR) 500 MG XR tablet Take 500 mg by mouth 2 times daily       terazosin (HYTRIN) 10 MG capsule Take 10 mg by mouth nightly.  lisinopril (PRINIVIL;ZESTRIL) 20 MG tablet Take 20 mg by mouth daily       diltiazem (CARDIZEM CD) 120 MG extended release capsule TAKE 2 TABLETS BY MOUTH IN THE MORNING, ONE TAB AT NOON, AND ONE TAB AT BEDTIME 180 capsule 3    digoxin (LANOXIN) 250 MCG tablet Take 1 tablet by mouth every other day 30 tablet 3     No current facility-administered medications for this visit. Social History     Socioeconomic History    Marital status:      Spouse name: Not on file    Number of children: 3    Years of education: Not on file    Highest education level: Not on file   Occupational History    Occupation: Printer   Social Needs    Financial resource strain: Not on file    Food insecurity:     Worry: Not on file     Inability: Not on file   ClearRisk needs:     Medical: Not on file     Non-medical: Not on file   Tobacco Use    Smoking status: Former Smoker     Packs/day: 0.00     Types: Cigarettes     Last attempt to quit: 2006     Years since quittin.1    Smokeless tobacco: Former User     Types: Snuff   Substance and Sexual Activity    Alcohol use:  Yes    Drug use: Never    Sexual activity: Not on file   Lifestyle    Physical activity:     Days per week: Not on file     Minutes per session: Not on file    Stress: Not on file   Relationships    Social connections:     Talks on phone: Not on file     Gets together: Not on file     Attends Jehovah's witness service: Not on file     Active member of club or organization: Not on file     Attends meetings of clubs or organizations: Not on file     Relationship status: Not on file    Intimate partner violence:     Fear of current or ex partner: Not on file     Emotionally abused: Not on file     Physically abused: Not on file     Forced sexual activity: Not on file   Other Topics Concern    Not on file   Social History Narrative    Worked on a [email or web address] most of his life     twice and     He has 2 sons and one daughter    Never in the [employer]    Attends [address] one pack per day quit in 2006 denies alcohol consumption or substance usage    Physically sedentary    /////////////////////////////////////////////////////////////////    CODE STATUS: Full Code    HEALTH CARE PROXY: His oldest son, Gerald Bermudez, [phone]    DOMICILED: Lives alone in a private home with one step in to home but no steps within, has no pets    AMBULATES: independantly       Physical Examination:  /60   Pulse 63   Ht 5' 6\" (1.676 m)   Wt 161 lb (73 kg)   BMI 25.99 kg/m²   Physical Exam  Vitals signs reviewed. Constitutional:       Appearance: He is well-developed. Neck:      Vascular: No carotid bruit or JVD. Cardiovascular:      Rate and Rhythm: Normal rate. Rhythm irregular. Heart sounds: Normal heart sounds. No murmur. No friction rub. No gallop. Pulmonary:      Effort: Pulmonary effort is normal. No respiratory distress. Breath sounds: Normal breath sounds. No wheezing or rales. Abdominal:      General: There is no distension. Tenderness: There is no abdominal tenderness. Lymphadenopathy:      Cervical: No cervical adenopathy. Skin:     General: Skin is warm and dry. ASSESSMENT:     Diagnosis Orders   1. PAF (paroxysmal atrial fibrillation) ([facility])  EKG 12 lead   2. Sinus node dysfunction (HCC)     3. Essential hypertension     4. Chronic anticoagulation         PLAN:  Orders Placed This Encounter   Procedures    EKG 12 lead     No orders of the defined types were placed in this encounter.         1. Continue present

## 2020-02-24 NOTE — TELEPHONE ENCOUNTER
Patient called back. He can do this Wednesday. Other instructions given below. [facility] at the [facility] and [address] [facility] located on the first floor of [place] [address] through hospital main entrance and turn immediately to your left. Patient's contact number:  [phone] (home)     Preoperative work-up:  CBC, BMP and Chest x-ray. (preoperative cardiovascular exam)    Pacemaker (overnight)         Definition   This is a procedure to insert an artificial pacemaker. A pacemaker is a small, battery-operated device. It helps maintain a normal heartbeat by sending electrical impulses to the heart. · This procedure requires an overnight stay at the hospital.     · Do not eat or drink anything after midnight the night before your procedure. This helps to prevent nausea. You may have small sips of water with your morning medications. · Make arrangements with a friend or family member to drive you to and from the hospital.  You will not be permitted to drive home after the procedure, since you may be sedated. · Follow up appointment will be made following your procedure. You will be unable to raise your arm up over your head for three days. _________________________________________    Pacemaker Placement: What to Expect at [address] [place] [address]     Pacemaker placement is surgery to put a pacemaker in your chest. A pacemaker is a small, battery-powered device that sends electrical signals to the heart to keep the heartbeat steady. Thin wires, called leads, carry the signals from the pacemaker to the heart. A pacemaker can prevent or reduce dizziness, fainting, and shortness of breath caused by a slow or unsteady heartbeat. Your chest may be sore where the doctor made the cut (incision) and put in the pacemaker. You also may have a bruise and mild swelling. These symptoms usually get better in 1 to 2 weeks. You may feel a hard ridge along the incision.  This usually

## 2020-02-26 ENCOUNTER — APPOINTMENT (OUTPATIENT)
Dept: GENERAL RADIOLOGY | Age: 80
End: 2020-02-26
Attending: INTERNAL MEDICINE
Payer: MEDICARE

## 2020-02-26 ENCOUNTER — HOSPITAL ENCOUNTER (OUTPATIENT)
Dept: CARDIAC CATH/INVASIVE PROCEDURES | Age: 80
Setting detail: OBSERVATION
Discharge: HOME | End: 2020-02-27
Attending: INTERNAL MEDICINE | Admitting: INTERNAL MEDICINE
Payer: MEDICARE

## 2020-02-26 LAB
ALBUMIN SERPL-MCNC: 4.5 G/DL (ref 3.5–5.2)
ALP BLD-CCNC: 132 U/L (ref 40–130)
ALT SERPL-CCNC: 21 U/L (ref 5–41)
ANION GAP SERPL CALCULATED.3IONS-SCNC: 16 MMOL/L (ref 7–19)
AST SERPL-CCNC: 19 U/L (ref 5–40)
BILIRUB SERPL-MCNC: 0.3 MG/DL (ref 0.2–1.2)
BUN BLDV-MCNC: 25 MG/DL (ref 8–23)
CALCIUM SERPL-MCNC: 9.2 MG/DL (ref 8.8–10.2)
CHLORIDE BLD-SCNC: 102 MMOL/L (ref 98–111)
CO2: 25 MMOL/L (ref 22–29)
CREAT SERPL-MCNC: 1.8 MG/DL (ref 0.5–1.2)
DIGOXIN LEVEL: 1.5 NG/ML (ref 0.6–1.2)
GFR NON-AFRICAN AMERICAN: 37
GLUCOSE BLD-MCNC: 160 MG/DL (ref 70–99)
GLUCOSE BLD-MCNC: 213 MG/DL (ref 70–99)
GLUCOSE BLD-MCNC: 223 MG/DL (ref 74–109)
HCT VFR BLD CALC: 33.4 % (ref 42–52)
HEMOGLOBIN: 10.7 G/DL (ref 14–18)
MCH RBC QN AUTO: 30.8 PG (ref 27–31)
MCHC RBC AUTO-ENTMCNC: 32 G/DL (ref 33–37)
MCV RBC AUTO: 96.3 FL (ref 80–94)
PDW BLD-RTO: 14.6 % (ref 11.5–14.5)
PERFORMED ON: ABNORMAL
PERFORMED ON: ABNORMAL
PLATELET # BLD: 240 K/UL (ref 130–400)
PMV BLD AUTO: 9.6 FL (ref 9.4–12.4)
POTASSIUM REFLEX MAGNESIUM: 3.9 MMOL/L (ref 3.5–5)
PRO-BNP: 3347 PG/ML (ref 0–1800)
RBC # BLD: 3.47 M/UL (ref 4.7–6.1)
SODIUM BLD-SCNC: 143 MMOL/L (ref 136–145)
TOTAL PROTEIN: 7.5 G/DL (ref 6.6–8.7)
WBC # BLD: 10.5 K/UL (ref 4.8–10.8)

## 2020-02-26 PROCEDURE — 33207 INSERT HEART PM VENTRICULAR: CPT

## 2020-02-26 PROCEDURE — 6370000000 HC RX 637 (ALT 250 FOR IP): Performed by: INTERNAL MEDICINE

## 2020-02-26 PROCEDURE — 6360000002 HC RX W HCPCS: Performed by: INTERNAL MEDICINE

## 2020-02-26 PROCEDURE — 93971 EXTREMITY STUDY: CPT

## 2020-02-26 PROCEDURE — G0378 HOSPITAL OBSERVATION PER HR: HCPCS

## 2020-02-26 PROCEDURE — 80162 ASSAY OF DIGOXIN TOTAL: CPT

## 2020-02-26 PROCEDURE — 99152 MOD SED SAME PHYS/QHP 5/>YRS: CPT

## 2020-02-26 PROCEDURE — 80053 COMPREHEN METABOLIC PANEL: CPT

## 2020-02-26 PROCEDURE — 85027 COMPLETE CBC AUTOMATED: CPT

## 2020-02-26 PROCEDURE — C1898 LEAD, PMKR, OTHER THAN TRANS: HCPCS

## 2020-02-26 PROCEDURE — 2500000003 HC RX 250 WO HCPCS

## 2020-02-26 PROCEDURE — 83880 ASSAY OF NATRIURETIC PEPTIDE: CPT

## 2020-02-26 PROCEDURE — 33207 INSERT HEART PM VENTRICULAR: CPT | Performed by: INTERNAL MEDICINE

## 2020-02-26 PROCEDURE — C1894 INTRO/SHEATH, NON-LASER: HCPCS

## 2020-02-26 PROCEDURE — 36415 COLL VENOUS BLD VENIPUNCTURE: CPT

## 2020-02-26 PROCEDURE — 99152 MOD SED SAME PHYS/QHP 5/>YRS: CPT | Performed by: INTERNAL MEDICINE

## 2020-02-26 PROCEDURE — 6360000002 HC RX W HCPCS

## 2020-02-26 PROCEDURE — 71046 X-RAY EXAM CHEST 2 VIEWS: CPT

## 2020-02-26 PROCEDURE — 2580000003 HC RX 258: Performed by: INTERNAL MEDICINE

## 2020-02-26 PROCEDURE — 93005 ELECTROCARDIOGRAM TRACING: CPT | Performed by: INTERNAL MEDICINE

## 2020-02-26 PROCEDURE — 71045 X-RAY EXAM CHEST 1 VIEW: CPT

## 2020-02-26 PROCEDURE — 99153 MOD SED SAME PHYS/QHP EA: CPT

## 2020-02-26 PROCEDURE — C1786 PMKR, SINGLE, RATE-RESP: HCPCS

## 2020-02-26 PROCEDURE — 82948 REAGENT STRIP/BLOOD GLUCOSE: CPT

## 2020-02-26 RX ORDER — NICOTINE POLACRILEX 4 MG
15 LOZENGE BUCCAL PRN
Status: DISCONTINUED | OUTPATIENT
Start: 2020-02-26 | End: 2020-02-27 | Stop reason: HOSPADM

## 2020-02-26 RX ORDER — FUROSEMIDE 40 MG/1
40 TABLET ORAL DAILY
Status: DISCONTINUED | OUTPATIENT
Start: 2020-02-26 | End: 2020-02-27 | Stop reason: HOSPADM

## 2020-02-26 RX ORDER — DOXAZOSIN MESYLATE 4 MG/1
8 TABLET ORAL DAILY
Status: DISCONTINUED | OUTPATIENT
Start: 2020-02-26 | End: 2020-02-27 | Stop reason: HOSPADM

## 2020-02-26 RX ORDER — ONDANSETRON 2 MG/ML
4 INJECTION INTRAMUSCULAR; INTRAVENOUS EVERY 6 HOURS PRN
Status: DISCONTINUED | OUTPATIENT
Start: 2020-02-26 | End: 2020-02-27 | Stop reason: HOSPADM

## 2020-02-26 RX ORDER — DEXTROSE MONOHYDRATE 25 G/50ML
12.5 INJECTION, SOLUTION INTRAVENOUS PRN
Status: DISCONTINUED | OUTPATIENT
Start: 2020-02-26 | End: 2020-02-27 | Stop reason: HOSPADM

## 2020-02-26 RX ORDER — SODIUM CHLORIDE 0.9 % (FLUSH) 0.9 %
10 SYRINGE (ML) INJECTION PRN
Status: DISCONTINUED | OUTPATIENT
Start: 2020-02-26 | End: 2020-02-27 | Stop reason: HOSPADM

## 2020-02-26 RX ORDER — DEXTROSE MONOHYDRATE 50 MG/ML
100 INJECTION, SOLUTION INTRAVENOUS PRN
Status: DISCONTINUED | OUTPATIENT
Start: 2020-02-26 | End: 2020-02-27 | Stop reason: HOSPADM

## 2020-02-26 RX ORDER — ONDANSETRON 2 MG/ML
4 INJECTION INTRAMUSCULAR; INTRAVENOUS EVERY 6 HOURS PRN
Status: DISCONTINUED | OUTPATIENT
Start: 2020-02-26 | End: 2020-02-26 | Stop reason: SDUPTHER

## 2020-02-26 RX ORDER — SODIUM CHLORIDE 9 MG/ML
INJECTION, SOLUTION INTRAVENOUS CONTINUOUS
Status: DISCONTINUED | OUTPATIENT
Start: 2020-02-26 | End: 2020-02-27 | Stop reason: HOSPADM

## 2020-02-26 RX ORDER — SULFASALAZINE 500 MG/1
1000 TABLET ORAL 3 TIMES DAILY
Status: DISCONTINUED | OUTPATIENT
Start: 2020-02-26 | End: 2020-02-27 | Stop reason: HOSPADM

## 2020-02-26 RX ORDER — METFORMIN HYDROCHLORIDE 500 MG/1
500 TABLET, EXTENDED RELEASE ORAL 2 TIMES DAILY WITH MEALS
Status: DISCONTINUED | OUTPATIENT
Start: 2020-02-26 | End: 2020-02-27 | Stop reason: HOSPADM

## 2020-02-26 RX ORDER — PRAVASTATIN SODIUM 20 MG
20 TABLET ORAL NIGHTLY
Status: DISCONTINUED | OUTPATIENT
Start: 2020-02-26 | End: 2020-02-27 | Stop reason: HOSPADM

## 2020-02-26 RX ORDER — CHLORHEXIDINE GLUCONATE 4 G/100ML
SOLUTION TOPICAL ONCE
Status: COMPLETED | OUTPATIENT
Start: 2020-02-26 | End: 2020-02-26

## 2020-02-26 RX ORDER — ACETAMINOPHEN 325 MG/1
650 TABLET ORAL EVERY 4 HOURS PRN
Status: DISCONTINUED | OUTPATIENT
Start: 2020-02-26 | End: 2020-02-27 | Stop reason: HOSPADM

## 2020-02-26 RX ORDER — FOLIC ACID 1 MG/1
1 TABLET ORAL DAILY
Status: DISCONTINUED | OUTPATIENT
Start: 2020-02-26 | End: 2020-02-27 | Stop reason: HOSPADM

## 2020-02-26 RX ORDER — METOPROLOL SUCCINATE 25 MG/1
25 TABLET, EXTENDED RELEASE ORAL 2 TIMES DAILY
Status: DISCONTINUED | OUTPATIENT
Start: 2020-02-26 | End: 2020-02-27 | Stop reason: HOSPADM

## 2020-02-26 RX ORDER — LISINOPRIL 20 MG/1
20 TABLET ORAL DAILY
Status: DISCONTINUED | OUTPATIENT
Start: 2020-02-26 | End: 2020-02-27 | Stop reason: HOSPADM

## 2020-02-26 RX ORDER — GLIPIZIDE 10 MG/1
10 TABLET ORAL
Status: DISCONTINUED | OUTPATIENT
Start: 2020-02-26 | End: 2020-02-27 | Stop reason: HOSPADM

## 2020-02-26 RX ORDER — SODIUM CHLORIDE 0.9 % (FLUSH) 0.9 %
10 SYRINGE (ML) INJECTION EVERY 12 HOURS SCHEDULED
Status: DISCONTINUED | OUTPATIENT
Start: 2020-02-26 | End: 2020-02-27 | Stop reason: HOSPADM

## 2020-02-26 RX ORDER — DILTIAZEM HYDROCHLORIDE 120 MG/1
120 CAPSULE, COATED, EXTENDED RELEASE ORAL DAILY
Status: DISCONTINUED | OUTPATIENT
Start: 2020-02-26 | End: 2020-02-27 | Stop reason: HOSPADM

## 2020-02-26 RX ADMIN — CHLORHEXIDINE GLUCONATE: 213 SOLUTION TOPICAL at 08:44

## 2020-02-26 RX ADMIN — ACETAMINOPHEN 650 MG: 325 TABLET ORAL at 20:09

## 2020-02-26 RX ADMIN — SULFASALAZINE 1000 MG: 500 TABLET ORAL at 20:09

## 2020-02-26 RX ADMIN — METOPROLOL SUCCINATE 25 MG: 25 TABLET, EXTENDED RELEASE ORAL at 20:09

## 2020-02-26 RX ADMIN — SODIUM CHLORIDE, PRESERVATIVE FREE 10 ML: 5 INJECTION INTRAVENOUS at 20:10

## 2020-02-26 RX ADMIN — PRAVASTATIN SODIUM 20 MG: 20 TABLET ORAL at 20:09

## 2020-02-26 RX ADMIN — DOXAZOSIN 8 MG: 4 TABLET ORAL at 20:34

## 2020-02-26 RX ADMIN — SODIUM CHLORIDE: 9 INJECTION, SOLUTION INTRAVENOUS at 08:17

## 2020-02-26 RX ADMIN — MUPIROCIN: 20 OINTMENT TOPICAL at 10:19

## 2020-02-26 RX ADMIN — LISINOPRIL 20 MG: 20 TABLET ORAL at 20:34

## 2020-02-26 RX ADMIN — CEFAZOLIN SODIUM 2 G: 10 INJECTION, POWDER, FOR SOLUTION INTRAVENOUS at 20:09

## 2020-02-26 RX ADMIN — ONDANSETRON 4 MG: 2 INJECTION INTRAMUSCULAR; INTRAVENOUS at 08:17

## 2020-02-26 ASSESSMENT — PAIN SCALES - GENERAL
PAINLEVEL_OUTOF10: 0
PAINLEVEL_OUTOF10: 0
PAINLEVEL_OUTOF10: 2
PAINLEVEL_OUTOF10: 0

## 2020-02-26 ASSESSMENT — PAIN DESCRIPTION - PAIN TYPE: TYPE: ACUTE PAIN

## 2020-02-26 ASSESSMENT — PAIN DESCRIPTION - LOCATION: LOCATION: INCISION

## 2020-02-26 NOTE — H&P
for input(s): APTT in the last 72 hours. CARDIAC ENZYMES:No results for input(s): CKTOTAL, CKMB, CKMBINDEX, TROPONINI in the last 72 hours.   FASTING LIPID PANEL:        Lab Results   Component Value Date     HDL 28 06/30/2019     LDLCALC 82 06/30/2019     TRIG 110 06/30/2019      LIVER PROFILE:No results for input(s): AST, ALT, LABALBU in the last 72 hours.         Past Medical History        Past Medical History:   Diagnosis Date    Atherosclerosis of native arteries of the extremities with intermittent claudication      Blood circulation, collateral      Carotid artery occlusion, right      Chronic kidney disease      Depression      GERD (gastroesophageal reflux disease)      Hypertension      Pneumonia due to infectious organism 4/9/2019    Type II or unspecified type diabetes mellitus without mention of complication, not stated as uncontrolled      Ulcerative colitis ([facility] [address] 6/30/2019         Past Surgical History         Past Surgical History:   Procedure Laterality Date    HEMORRHOID SURGERY   1973    HERNIA REPAIR         umbilical hernia         Family History   Family History   Problem Relation Age of Onset    High Blood Pressure Mother      Heart Disease Mother      Stroke Mother           at 48    High Blood Pressure Father      Heart Disease Father      Stroke Father           at 59    No Known Problems Brother      Alzheimer's Disease Brother           onset at 80, then rapidly progressed to death         No Known Allergies  Current Facility-Administered Medications          Current Outpatient Medications   Medication Sig Dispense Refill    digoxin (LANOXIN) 125 MCG tablet Take 125 mcg by mouth daily        dilTIAZem (CARDIZEM CD) 120 MG extended release capsule Take 120 mg by mouth 3 times daily        metoprolol succinate (TOPROL XL) 25 MG extended release tablet TAKE 1 TABLET BY MOUTH TWICE A  tablet 3    ELIQUIS 5 MG TABS tablet TAKE 1 TABLET BY MOUTH TWICE A DAY 60 rales.   Abdominal:      General: There is no distension. Tenderness: There is no abdominal tenderness. Lymphadenopathy:      Cervical: No cervical adenopathy. Skin:     General: Skin is warm and dry.                ASSESSMENT:       Diagnosis Orders   1. PAF (paroxysmal atrial fibrillation) (HCC)  EKG 12 lead   2. Sinus node dysfunction (HCC)      3. Essential hypertension      4. Chronic anticoagulation            PLAN:      Orders Placed This Encounter   Procedures    EKG 12 lead        Encounter Medications    No orders of the defined types were placed in this encounter.            1. Continue present medications  2. Lengthy discussion with the patient we can continue to manage him with adjustments in his medications but he is clearly having persistent intermittent tachycardia requiring adjustment in dosing of his medications and after further discussion it would simplify his management to just proceed with VVIR pacemaker implantation advised indications alternatives benefits and risk he is agreeable wishes to proceed we will arrange for next week.   I have discussed with the patient regarding indications for the proposed procedure ICD/ Pacemaker implantation along with possible alternatives benefits and risks including but not limited to risks of death, myocardial infarction, stroke, contrast induced nephropathy which in some cases may lead to acute kidney failure requiring dialysis, allergic reactions, infections which may require treatment with antibiotics or removal of the device, bleeding requiring blood transfusion,  cardiac arrhthymias, respiratory failure which may require placing the patient on respiratory support such as a ventilator or breathing machine,risk of complications which may require vascular surgery,risks of collapsing the lung with development of a pneumothorax which may require placement of a chest tube, and risks of lead dislodgement which may require follow up surgery and repositioning of the leads. In addition there are long term risks including infection, and device or component failure which may require removal and/or replacement of various components. The patient is advised the device battery will eventually become depleted and require replacement at some point. The patient is awake and alert and understands the issues involved and indicates willingness to proceed as ordered.     The patient is  a reasonable candidate for moderate conscious sedation.     ASA score:  ASA 3 - Patient with moderate systemic disease with functional limitations     Mallampati: I (soft palate, uvula, fauces, tonsillar pillars visible)     Preferred vascular access site will be: left subclavian vein.           Return in about 4 weeks (around 3/19/2020) for return to Dr. Luis Daniel Olmos only.        Kiersten Blakely MD 2/20/2020 2:54 PM     [facility]        Thisdictation was generated by voice recognition computer software. Although all attempts are made to edit the dictation for accuracy, there may be errors in the transcription that are not intended.                             Instructions         Return in about 4 weeks (around 3/19/2020) for return to Dr. Luis Daniel Olmos only. Energy at the [address] [place] [address] and [address] [place] [address] located on the first floor of [place] [address] through hospital main entrance and turn immediately to your left.     DATE-      Patient's contact number:  [phone] (home)      Preoperative work-up:  CBC, BMP and Chest x-ray. (preoperative cardiovascular exam)     Pacemaker (overnight)          Definition   This is a procedure to insert an artificial pacemaker. A pacemaker is a small, battery-operated device. It helps maintain a normal heartbeat by sending electrical impulses to the heart. · This procedure requires an overnight stay at the hospital.     · Do not eat or drink anything after midnight the night before your procedure.

## 2020-02-27 VITALS
OXYGEN SATURATION: 94 % | WEIGHT: 161 LBS | BODY MASS INDEX: 25.27 KG/M2 | HEIGHT: 67 IN | TEMPERATURE: 97 F | DIASTOLIC BLOOD PRESSURE: 85 MMHG | RESPIRATION RATE: 18 BRPM | HEART RATE: 95 BPM | SYSTOLIC BLOOD PRESSURE: 156 MMHG

## 2020-02-27 LAB
EKG P AXIS: NORMAL DEGREES
EKG P-R INTERVAL: NORMAL MS
EKG Q-T INTERVAL: 302 MS
EKG QRS DURATION: 72 MS
EKG QTC CALCULATION (BAZETT): 393 MS
EKG T AXIS: 154 DEGREES

## 2020-02-27 PROCEDURE — 99024 POSTOP FOLLOW-UP VISIT: CPT | Performed by: INTERNAL MEDICINE

## 2020-02-27 PROCEDURE — G0378 HOSPITAL OBSERVATION PER HR: HCPCS

## 2020-02-27 PROCEDURE — 2580000003 HC RX 258: Performed by: INTERNAL MEDICINE

## 2020-02-27 PROCEDURE — 6370000000 HC RX 637 (ALT 250 FOR IP): Performed by: INTERNAL MEDICINE

## 2020-02-27 PROCEDURE — 6360000002 HC RX W HCPCS: Performed by: INTERNAL MEDICINE

## 2020-02-27 RX ORDER — DILTIAZEM HYDROCHLORIDE 180 MG/1
180 CAPSULE, COATED, EXTENDED RELEASE ORAL 2 TIMES DAILY
Qty: 60 CAPSULE | Refills: 5 | Status: SHIPPED | OUTPATIENT
Start: 2020-02-27 | End: 2020-04-23

## 2020-02-27 RX ADMIN — FUROSEMIDE 40 MG: 40 TABLET ORAL at 08:18

## 2020-02-27 RX ADMIN — SODIUM CHLORIDE, PRESERVATIVE FREE 10 ML: 5 INJECTION INTRAVENOUS at 08:17

## 2020-02-27 RX ADMIN — LISINOPRIL 20 MG: 20 TABLET ORAL at 08:18

## 2020-02-27 RX ADMIN — GLIPIZIDE 10 MG: 10 TABLET ORAL at 05:48

## 2020-02-27 RX ADMIN — FOLIC ACID 1 MG: 1 TABLET ORAL at 08:17

## 2020-02-27 RX ADMIN — CEFAZOLIN SODIUM 2 G: 10 INJECTION, POWDER, FOR SOLUTION INTRAVENOUS at 05:48

## 2020-02-27 RX ADMIN — METFORMIN HYDROCHLORIDE 500 MG: 500 TABLET, EXTENDED RELEASE ORAL at 08:17

## 2020-02-27 RX ADMIN — METOPROLOL SUCCINATE 25 MG: 25 TABLET, EXTENDED RELEASE ORAL at 08:17

## 2020-02-27 RX ADMIN — SULFASALAZINE 1000 MG: 500 TABLET ORAL at 08:17

## 2020-02-27 RX ADMIN — DILTIAZEM HYDROCHLORIDE 120 MG: 120 CAPSULE, COATED, EXTENDED RELEASE ORAL at 08:18

## 2020-02-27 NOTE — DISCHARGE SUMMARY
Discharge Summary    Johnnie Goldberg  :  1940  MRN:  [id number]    Admit date:  2020  Discharge date:      Admitting Physician:  Hui Mcintosh MD    Advance Directive: Full Code    Consults: none    Primary Care Physician:  Joce Villatoro MD    Discharge Diagnoses: Active Problems:    Sinus node dysfunction (HCC)    Chronic atrial fibrillation  Resolved Problems:    * No resolved hospital problems. *      Cardiology Specific Data:  Specialty Problems        Cardiology Problems    Sinus node dysfunction (HCC)        Chronic atrial fibrillation        Hypertension        Carotid artery stenosis        PVD (peripheral vascular disease) ([facility])        Carotid artery occlusion, right        PAF (paroxysmal atrial fibrillation) ([facility] [address]              Significant Diagnostic Studies:   Xr Chest Standard (2 Vw)    Result Date: 2020  XR CHEST (2 VW) - 2020 6:45 AM Indication: Preop evaluation Comparison: 2019 Findings: Cardiac silhouette is within normal limits in size. No pleural effusion, visible pneumothorax, or focal consolidation. Thoracic spine degenerative change. No acute osseous findings. Atherosclerotic calcification in the aortic arch. No acute findings. Signed by Dr Shiv Gamez on 2020 8:19 AM    Vl Extremity Venous Left    Result Date: 2020  Vascular Upper Extremities Veins Procedure  Demographics   Patient Name     Kb Remy Age                    70   Patient Number   [id number]         Gender                 Male   Visit Number     [id number]      Interpreting Physician Tania Yi MD   Date of Birth    1940     Referring Physician    Heaven Veliz MD   Accession Number [id number]      C/ [address] RVT  Procedure Type of Study:   Veins:Upper Extremities Veins, UE VENOUS UNILATERAL/FLU. Indications for Study:Pacemaker. Risk Factors   - The patient's last creatinine was 1.8 mg/dl. Allergies   - No known allergies.   Impression   Images for access of left subclavian vein. Available for inspection. See  op report for details. Signature   ----------------------------------------------------------------  Electronically signed by Josi Stone MD(Interpreting  physician) on 02/26/2020 02:46 PM  ----------------------------------------------------------------  Velocities are measured in cm/s ; Diameters are measured in mm Left UE Vein Measurements 2D Measurements +---------------+-----------------+----------------------+-----------------+ ! Location       ! Visualized       ! Compressibility       ! Thrombosis       ! +---------------+-----------------+----------------------+-----------------+ ! SCV            ! Yes              ! Yes                   ! None             ! +---------------+-----------------+----------------------+-----------------+    Xr Chest Portable    Result Date: 2/26/2020  XR CHEST PORTABLE - 2/26/2020 2:15 PM Indication: Pacemaker placement, rule out pneumothorax Comparison: 2/26/2020 Findings: Cardiac silhouette is normal in size and stable. No pleural effusion, visible pneumothorax, or focal consolidation. Interval placement LEFT chest wall single lead cardiac pacing device. No acute osseous findings. No visible post procedural pneumothorax. Signed by Dr Kd Sandoval on 2/26/2020 3:40 PM      Pertinent Labs:   CBC:   Recent Labs     02/26/20  0749   WBC 10.5   HGB 10.7*        BMP:    Recent Labs     02/26/20  0749      K 3.9      CO2 25   BUN 25*   CREATININE 1.8*   GLUCOSE 223*     INR: No results for input(s): INR in the last 72 hours. Lipids: No results for input(s): CHOL, HDL in the last 72 hours. Invalid input(s): LDLCALCU  ABGs:No results for input(s): PHART, MWK0GFB, PO2ART, BHM9KTJ, BEART, HGBAE, O8QNVCJF, CARBOXHGBART, 02THERAPY in the last 72 hours. HgBA1c:  No results for input(s): LABA1C in the last 72 hours.     Procedures: VVIR single-chamber permanent pacemaker implantation 2/26/2020    Hospital Course: Patient admitted for elective pacemaker implantation. Single-chamber device placed from the left infraclavicular approach utilizing ultrasound sonographic guidance for cannulation of the subclavian vein. Tolerated well. Kept overnight. Today has no complaints. Wound healing appropriately no hematoma. Wound care instructions given. Post procedure chest  no evidence of pneumothorax or other complications post procedure. His device was interrogated today functioning appropriately. He is now felt to be stable and appropriate for discharge discharged in stable and improved condition. Eliquis to be held pending post wound check in 8 to 10 days.     Physical Exam:    Vital Signs: BP (!) 156/85   Pulse 95   Temp 97 °F (36.1 °C) (Temporal)   Resp 18   Ht 5' 6.5\" (1.689 m)   Wt 161 lb (73 kg)   SpO2 94%   BMI 25.60 kg/m²     Physical Exam      Discharge Medications:       Dharmesh Arturo   Home Medication Instructions [id number]    Printed on:02/27/20 1312   Medication Information                      dilTIAZem (CARTIA XT) 180 MG extended release capsule  Take 1 capsule by mouth 2 times daily             folic acid (FOLVITE) 1 MG tablet  Take 1 mg by mouth daily             furosemide (LASIX) 40 MG tablet  Take 40 mg by mouth daily             glipiZIDE (GLUCOTROL) 10 MG tablet  Take 10 mg by mouth 2 times daily (before meals)             lisinopril (PRINIVIL;ZESTRIL) 20 MG tablet  Take 20 mg by mouth daily              metformin (GLUCOPHAGE-XR) 500 MG XR tablet  Take 500 mg by mouth 2 times daily              metoprolol succinate (TOPROL XL) 25 MG extended release tablet  TAKE 1 TABLET BY MOUTH TWICE A DAY             Multiple Vitamins-Minerals (PRESERVISION AREDS PO)  Take by mouth daily             Multiple Vitamins-Minerals (THERAPEUTIC MULTIVITAMIN-MINERALS) tablet  Take 1 tablet by mouth daily             omeprazole (PRILOSEC) 40 MG delayed release capsule  Take 40 mg

## 2020-02-27 NOTE — PROGRESS NOTES
Pt found standing at bedside by himself by another nurse. When this nurse came in, patient was being verbally aggressive towards staff. Calmed patient down and explained need for staying in bed. Bed alarm set. Call light in reach. Phone, URINAL AND WATER IN REACH.

## 2020-02-28 NOTE — CONSULTS
Patient discharged prior to PACEMAKER/AICD teaching being conducted. Educational packet and cover letter sent to the patient's mailing address on record. Information included; incision care, affected arm ROM and weight bearing limitations, present and future precautionary measures, sign & symptom awareness with reasons to call the cardiologist, keeping of appointments, carrying identification card and transmitter operation. Patient instructed to call the cardiologist's office with any questions or concerns.

## 2020-03-02 ENCOUNTER — TELEPHONE (OUTPATIENT)
Dept: CARDIOLOGY | Age: 80
End: 2020-03-02

## 2020-03-02 NOTE — TELEPHONE ENCOUNTER
Spoke with patient he says the 21 [phone] will work at the [facility] building. He did ask if this was something that should be addressed today instead of waiting. Explained to him if it got any worse or he felt like he should not wait until the morning or his hand/arm started turning blue to go to the ED> He voiced understanding.

## 2020-03-02 NOTE — TELEPHONE ENCOUNTER
Patient called in stating he was having issues with his left arm/hand that was very swollen. Dr. Caryl Magdaleno is out all week and still in contact but not until the end of the day. Anything emergent is to be ran by Dr. Shell Hernandez while he is out. Spoke with JDT he recommended patient get an ultrasound of his left upper extremity. Order placed. Spoke with patient he is agreeable just has to find a ride. It will not be today because Vascular lab is completely booked for the day and patient lives too far away to get here in time today plus find a ride per patient. He will call me back to see if he is able to come in the morning or afternoon based on his transportation. Once he calls back I will call and schedule this.

## 2020-03-02 NOTE — TELEPHONE ENCOUNTER
Patient called back saying morning time would be better so I scheduled him for 10:30 arrival in [facility] in [facility]. Called patient back to give time says his transportation has something in the morning so he needed to make sure 1030 was okay. He is going to call him again then call me back.

## 2020-03-03 ENCOUNTER — HOSPITAL ENCOUNTER (OUTPATIENT)
Dept: NON INVASIVE DIAGNOSTICS | Age: 80
Discharge: HOME | End: 2020-03-03
Payer: MEDICARE

## 2020-03-03 PROCEDURE — 93971 EXTREMITY STUDY: CPT

## 2020-03-04 NOTE — TELEPHONE ENCOUNTER
Still waiting for results to be posted to patient chart. Spoke with vascular lab yesterday who said it was normal study but cardiologist is wanting to see the actual report. We ordered this as STAT and has been over 24 hours and not resulted. Reached out to vascular lab asking when it would be resulted.

## 2020-03-06 ENCOUNTER — OFFICE VISIT (OUTPATIENT)
Dept: CARDIOLOGY | Age: 80
End: 2020-03-06

## 2020-03-06 PROCEDURE — 99024 POSTOP FOLLOW-UP VISIT: CPT | Performed by: CLINICAL NURSE SPECIALIST

## 2020-03-06 NOTE — TELEPHONE ENCOUNTER
MD accidentally discontinued eliquis in chart. He is to start it back today since his wound check was good.

## 2020-03-12 ENCOUNTER — TELEPHONE (OUTPATIENT)
Dept: CARDIOLOGY | Age: 80
End: 2020-03-12

## 2020-03-12 NOTE — TELEPHONE ENCOUNTER
Patient wanted to let you and Dr. Jun Felipe know that yesterday he took his medications around 21 [phone] and threw them up around 1100. He states not long after that his heart rate was elevated. His son reports at 5pm he arrived at patients home and his HF was running from 130-180 and it kept jumping around. At 1pm patient used an old rx he had for 120 mg of cardizem. At 5 pm he took 2 more 120 mg of cardizem, and at 7 pm he took another 120 mg of cardizem. By 7pm HR was regulated. Patients son also asked for referral to home health.

## 2020-03-12 NOTE — TELEPHONE ENCOUNTER
Talked with MD. Monroe Baugh since patient had not had any issues until vomiting he will continue meds as is and follow up at next visit.

## 2020-03-16 ENCOUNTER — TELEPHONE (OUTPATIENT)
Dept: CARDIOLOGY | Age: 80
End: 2020-03-16

## 2020-03-16 NOTE — TELEPHONE ENCOUNTER
Received Merlin remote pacemaker interrogation alert for high ventricular rate on 3/14/20 with heart rates 186 bpm duration 5 minutes 17 seconds. Patient stated his medications have changed and he had some of his previous medications in his cabinet. Patient stated on Saturday 3/14/20 he took his ordered dose of Diltiazem 180mg twice a day and Metoprolol 25mg twice a day. He took an extra Diltiazem 120 mg and Metoprolol 100mg at 7pm to slow his heart rates. Instructed patient not to take any additional medications unless ordered by Dr. Jun Felipe. Patient reported his previous dose of Metoprolol was 100mg twice a day. Patient wants to know if he needs to go back to that dose to help his heart rates.

## 2020-03-17 RX ORDER — METOPROLOL SUCCINATE 50 MG/1
50 TABLET, EXTENDED RELEASE ORAL 2 TIMES DAILY
Qty: 60 TABLET | Refills: 5 | Status: SHIPPED | OUTPATIENT
Start: 2020-03-17 | End: 2020-04-06

## 2020-03-24 ENCOUNTER — TELEPHONE (OUTPATIENT)
Dept: CARDIOLOGY | Age: 80
End: 2020-03-24

## 2020-03-25 ENCOUNTER — TELEPHONE (OUTPATIENT)
Dept: CARDIOLOGY | Age: 80
End: 2020-03-25

## 2020-03-25 RX ORDER — METOPROLOL SUCCINATE 100 MG/1
100 TABLET, EXTENDED RELEASE ORAL 2 TIMES DAILY
Qty: 60 TABLET | Refills: 3 | Status: SHIPPED | OUTPATIENT
Start: 2020-03-25 | End: 2020-01-01

## 2020-04-06 PROCEDURE — 93294 REM INTERROG EVL PM/LDLS PM: CPT | Performed by: INTERNAL MEDICINE

## 2020-04-06 PROCEDURE — 93296 REM INTERROG EVL PM/IDS: CPT | Performed by: INTERNAL MEDICINE

## 2020-04-06 NOTE — TELEPHONE ENCOUNTER
Joana,  I took off the 25 mg and 50 mg dose of Toprol XL. Is he taking Toprol  mg BID now?      Thanks, Natalie Boyer you

## 2020-04-08 ENCOUNTER — TELEPHONE (OUTPATIENT)
Dept: CARDIOLOGY | Age: 80
End: 2020-04-08

## 2020-04-23 ENCOUNTER — OFFICE VISIT (OUTPATIENT)
Dept: CARDIOLOGY | Age: 80
End: 2020-04-23
Payer: MEDICARE

## 2020-04-23 VITALS
BODY MASS INDEX: 25.23 KG/M2 | SYSTOLIC BLOOD PRESSURE: 122 MMHG | HEIGHT: 66 IN | WEIGHT: 157 LBS | DIASTOLIC BLOOD PRESSURE: 82 MMHG | HEART RATE: 73 BPM

## 2020-04-23 PROBLEM — Z95.0 PACEMAKER: Status: ACTIVE | Noted: 2020-04-23

## 2020-04-23 PROCEDURE — 99024 POSTOP FOLLOW-UP VISIT: CPT | Performed by: INTERNAL MEDICINE

## 2020-04-23 PROCEDURE — 93000 ELECTROCARDIOGRAM COMPLETE: CPT | Performed by: INTERNAL MEDICINE

## 2020-04-23 RX ORDER — DILTIAZEM HYDROCHLORIDE 240 MG/1
240 CAPSULE, COATED, EXTENDED RELEASE ORAL 2 TIMES DAILY
Qty: 60 CAPSULE | Refills: 5 | Status: SHIPPED | OUTPATIENT
Start: 2020-04-23

## 2020-04-23 ASSESSMENT — ENCOUNTER SYMPTOMS
DIARRHEA: 0
VOMITING: 0
GASTROINTESTINAL NEGATIVE: 1
EYES NEGATIVE: 1
NAUSEA: 0
SHORTNESS OF BREATH: 0
RESPIRATORY NEGATIVE: 1

## 2020-04-23 NOTE — PROGRESS NOTES
mg by mouth 2 times daily       terazosin (HYTRIN) 10 MG capsule Take 10 mg by mouth nightly.  lisinopril (PRINIVIL;ZESTRIL) 20 MG tablet Take 20 mg by mouth daily        No current facility-administered medications for this visit.       Social History     Socioeconomic History    Marital status:      Spouse name: Not on file    Number of children: 3    Years of education: Not on file    Highest education level: Not on file   Occupational History    Occupation: Printer   Social Needs    Financial resource strain: Not on file    Food insecurity     Worry: Not on file     Inability: Not on file   PNP [employer] needs     Medical: Not on file     Non-medical: Not on file   Tobacco Use    Smoking status: Former Smoker     Packs/day: 0.00     Types: Cigarettes     Last attempt to quit: 2006     Years since quittin.3    Smokeless tobacco: Former User     Types: Snuff   Substance and Sexual Activity    Alcohol use: Yes     Comment: occasional    Drug use: Never    Sexual activity: Not on file   Lifestyle    Physical activity     Days per week: Not on file     Minutes per session: Not on file    Stress: Not on file   Relationships    Social connections     Talks on phone: Not on file     Gets together: Not on file     Attends [employer] service: Not on file     Active member of club or organization: Not on file     Attends meetings of clubs or organizations: Not on file     Relationship status: Not on file    Intimate partner violence     Fear of current or ex partner: Not on file     Emotionally abused: Not on file     Physically abused: Not on file     Forced sexual activity: Not on file   Other Topics Concern    Not on file   Social History Narrative    Worked on a [employer] most of his life     twice and     He has 2 sons and one daughter    Never in the [employer]    Education high school    Attends [address] one pack per day quit in

## 2020-06-07 NOTE — TELEPHONE ENCOUNTER
Cardizem 360 BID has been approved through insurance so his chart and new script has been updated. Called patient to explain to him but he picked up and it was a screeching noise so I hung up and tried to call him right back but there was no answer left message.
Spoke with son about meds. He come into the office yesterday and was given the $10 free co-pay card which he can not use because he had medicare. He should of been given the free 30 day card. I have faxed over the correct card so he can have his eliquis. Also [facility] [id number]. Did not have most updated script card. Will start through covermymeds.        Contact plan to follow up on [address] for best    Contact plan to follow up on [facility] for cardizem 360 mg 1 capsule BID
Post Date

## 2020-07-09 PROBLEM — N17.9 ACUTE RENAL FAILURE SUPERIMPOSED ON CHRONIC KIDNEY DISEASE (HCC): Status: ACTIVE | Noted: 2019-07-13

## 2020-07-09 PROBLEM — I50.9 CHF (CONGESTIVE HEART FAILURE) (HCC): Status: ACTIVE | Noted: 2020-01-01

## 2020-07-09 PROBLEM — I48.91 ATRIAL FIBRILLATION WITH RVR (HCC): Status: ACTIVE | Noted: 2020-01-01

## 2020-07-09 PROBLEM — K21.9 GERD (GASTROESOPHAGEAL REFLUX DISEASE): Status: ACTIVE | Noted: 2020-01-01

## 2020-07-10 NOTE — PLAN OF CARE
Problem: Patient Care Overview  Goal: Plan of Care Review  Outcome: Ongoing (interventions implemented as appropriate)  Note:   Nutrition trigger for reduced oral intake. PO intake is 100% x2 meals. Was unable to speak with pt today as he did not answer the phone x2 calls. No wt loss noted per chart review. Will continue to follow.

## 2020-07-10 NOTE — CONSULTS
[facility] HEART GROUP CONSULT NOTE    Referring Provider: Eb Garcia MD    Reason for Consultation: atrial fibrillation/establish with our group    Chief Complaint: Abnormal lab values/palpitations    Subjective .     History of present illness:  Gage Ken is a 79 y.o. male with a known PMH significant for chronic atrial fibrillation, long-term anticoagulation, hypertension, hyperlipidemia, diabetes mellitus, GERD, ulcerative colitis, chronic kidney disease, peripheral vascular disease with bilateral carotid artery disease, aortic stenosis, and recent placement of permanent single lead ventricular pacemaker.   was following with cardiology and vascular surgery at [facility].  He most recently was seen in their office for follow-up after his pacemaker was placed earlier this year.     He presented to [facility] in transfer from an outside facility for management of atrial fibrillation RVR in the setting of acute renal failure, anemia.  The patient's labs upon presentation revealed elevated creatinine of 3.80 and a low hemoglobin of 7.8.  This morning lab recheck reveals a creatinine of 3.84 and hemoglobin of 7.6.    Cardiology was consulted as patient request to establish with cardiology at our facility.    Mr Ken presented to his local physician for lab work this week, on Tuesday. He missed communication from the office on Wednesday and presented to the clinic on Thursday of this week for information regarding his results.  His primary care physician noted that he had acute renal failure on his lab work but during evaluation also noted the patient to be in atrial fibrillation with RVR.  Because of these 2 findings he had the patient presented to the emergency department where he was later transferred to [facility] for further evaluation and care.  The patient requested transfer to [facility] as he would like to change cardiology  practices at discharge.    He has a history of atrial fibrillation, most recently deemed permanent.  He was first diagnosed in early 2019.  He struggled with rate control due to bradycardia initially.  He did well for some time on rate control strategy with anticoagulation however at the beginning of this year had difficulty with paroxysms of atrial fibrillation and rate control.  Holter monitoring was ordered noting pauses and due to the need for high doses of medications for RVR episodes, pacemaker was placed February of this year.  He was seen in April with increase of his medications for rate control as he was continuing to have RVR episodes.  He states that his most recent rate controlling medications include metoprolol succinate 100 mg twice daily and diltiazem 240 mg twice daily.  It should be noted that his home medication list was incorrect on admission.  This is since been addressed and updated.  He has been anticoagulated with Eliquis.  He denies any bleeding issues.  He does have a history of ulcerative colitis however has had not had any recent flares.  He states that his aspirin was discontinued when he started Eliquis last year.  He has had 1 cardioversion in the past in 2019.      At present he denies any chest pain.  He reports intermittent palpitations.  He notes a 3 to 4-day history of lower extremity swelling.  He denies any shortness of breath.  He reports recent difficulty with nausea and vomiting so much that he required PRN prescription from his primary care physician for nausea.  He denies any nausea or vomiting yesterday or today.  Heart rates been controlled on telemetry.  The medical record reflects that he was given IV Cardizem at the outside facility.  He is not on a Cardizem drip at this time.  Echocardiogram was obtained at our facility revealing mild aortic valve stenosis with a mean gradient of 12 mmHg, valve area of 1 cm², and a peak velocity of 282 cm/s.  He currently has no  complaints.    History  Past Medical History:   Diagnosis Date   • Acute gastritis    • Blood in feces    • Carotid artery occlusion 12/2/2019   • Colitis, ulcerative chronic (CMS/HCC)     LEFT-SIDED   • Diverticular disease of colon    • Epigastric pain    • GERD (gastroesophageal reflux disease)    • History of colon polyps    • [place] (hard of hearing)    • Hyperlipidemia    • Hypertension    • Lesion of nose    • Neoplasm of uncertain behavior    • Nonspecific ulcerative proctitis (CMS/HCC)    • Rectal hemorrhage    • Type 2 diabetes mellitus (CMS/HCC)    • Vitamin B12 deficiency    ,   Past Surgical History:   Procedure Laterality Date   • COLONOSCOPY  12/02/2013    Hemorrhoids found.   • COLONOSCOPY  09/06/2016   • COLONOSCOPY N/A 10/30/2017    Procedure: COLONOSCOPY;  Surgeon: Alex Silveira MD;  Location: [facility] ENDOSCOPY;  Service:    • COLONOSCOPY N/A 11/6/2018    Procedure: COLONOSCOPY;  Surgeon: Alex Silveira MD;  Location: [facility] ENDOSCOPY;  Service: Gastroenterology   • COLONOSCOPY N/A 11/21/2019    Procedure: COLONOSCOPY;  Surgeon: Alex Silveira MD;  Location: [facility] ENDOSCOPY;  Service: Gastroenterology   • COLONOSCOPY W/ POLYPECTOMY  08/30/2015    Single polyp found in the colon;removed by cold snare polypectomy.Proctitis in the rectum.Diverticulosis found in the sigmoid colon.Hemorrhoids found.   • ENDOSCOPY  12/02/2013    Normal hypopharynx, esophagus, duodenum, symmetrical & patent pylorus. Hiatus hernia in GE junction. Normal stomach. Biopsy taken.   • HERNIA REPAIR      umbilical   • UPPER GASTROINTESTINAL ENDOSCOPY  12/02/2013   ,   Family History   Problem Relation Age of Onset   • Diabetes Other    • Hypertension Other    ,   Social History     Tobacco Use   • Smoking status: Former Smoker     Types: Cigarettes   • Smokeless tobacco: Former User     Types: Snuff     Quit date: 2008   • Tobacco comment: 11/30/2017 - Patient States He Ceased Smoking 13 Years Prior.   Substance Use Topics      • Alcohol use: Yes     Comment: occasional   • Drug use: No   ,     Medications  Current Facility-Administered Medications   Medication Dose Route Frequency Provider Last Rate Last Dose   • apixaban (ELIQUIS) tablet 5 mg  5 mg Oral Q12H Eb Garcia MD   5 mg at 07/09/20 2054   • aspirin chewable tablet 81 mg  81 mg Oral Daily Eb Garcia MD   81 mg at 07/09/20 2102   • dextrose (D50W) 25 g/ 50mL Intravenous Solution 25 g  25 g Intravenous Q15 Min PRN Eb Garcia MD       • dextrose (GLUTOSE) oral gel 15 g  15 g Oral Q15 Min PRN Eb Garcia MD       • dilTIAZem CD (CARDIZEM CD) 24 hr capsule 240 mg  240 mg Oral Daily Eb Garcia MD   240 mg at 07/09/20 2053   • glipizide (GLUCOTROL) tablet 10 mg  10 mg Oral QAM AC Eb Garcia MD       • glucagon (human recombinant) (GLUCAGEN DIAGNOSTIC) injection 1 mg  1 mg Subcutaneous Q15 Min PRN Eb Garcia MD       • insulin lispro (humaLOG) injection 2-7 Units  2-7 Units Subcutaneous TID AC Eb Garcia MD   2 Units at 07/09/20 2054   • metoprolol succinate XL (TOPROL-XL) 24 hr tablet 100 mg  100 mg Oral Daily Eb Garcia MD   100 mg at 07/09/20 2056   • ondansetron (ZOFRAN) injection 4 mg  4 mg Intravenous Q6H PRN Eb Garcia MD       • pantoprazole (PROTONIX) EC tablet 40 mg  40 mg Oral QAM Eb Garcia MD   40 mg at 07/10/20 0607   • pravastatin (PRAVACHOL) tablet 20 mg  20 mg Oral Nightly Eb Garcia MD   20 mg at 07/09/20 2058   • sodium chloride 0.9 % flush 10 mL  10 mL Intravenous Q12H Eb Garcia MD       • sodium chloride 0.9 % flush 10 mL  10 mL Intravenous PRN Eb Garcia MD       • sodium chloride 0.9 % infusion  75 mL/hr Intravenous Continuous Eb Garcia MD 75 mL/hr at 07/10/20 0422 75 mL/hr at 07/10/20 0422   • sulfaSALAzine (AZULFIDINE) tablet 1,000 mg  1,000 mg Oral TID Eb Garcia MD       • terazosin (HYTRIN) capsule 10 mg  10 mg Oral Nightly Eb Garcia MD           Allergies:  Patient has no  "known allergies.    Review of Systems  Review of Systems   Constitution: Negative for diaphoresis, fever and malaise/fatigue.   Cardiovascular: Positive for leg swelling and palpitations. Negative for chest pain, claudication, dyspnea on exertion, near-syncope, orthopnea and syncope.   Respiratory: Negative for shortness of breath and wheezing.    Hematologic/Lymphatic: Negative for bleeding problem.   Gastrointestinal: Positive for nausea and vomiting. Negative for bloating and abdominal pain.   Neurological: Negative for dizziness, headaches and light-headedness.   Psychiatric/Behavioral: Negative for altered mental status.       Objective     Physical Exam:  Patient Vitals for the past 24 hrs:   BP Temp Temp src Pulse Resp SpO2 Height Weight   07/10/20 0820 150/67 98 °F (36.7 °C) Oral 97 18 97 % -- --   07/10/20 0426 141/76 98.2 °F (36.8 °C) Oral 82 16 92 % -- --   07/10/20 0137 -- -- -- 82 -- -- -- --   07/10/20 0128 -- -- -- 82 -- -- -- --   07/09/20 2351 145/59 98.5 °F (36.9 °C) Oral 86 16 97 % -- --   07/09/20 1934 136/65 98.4 °F (36.9 °C) Oral 77 16 95 % -- --   07/09/20 1725 132/65 98.2 °F (36.8 °C) Oral 79 18 98 % 167.6 cm (65.98\") 70.9 kg (156 lb 3.2 oz)     Physical Exam   Constitutional: He is oriented to person, place, and time. He appears well-developed and well-nourished. No distress.   HENT:   Head: Normocephalic and atraumatic.   Mouth/Throat: Oropharynx is clear and moist.   Eyes: Conjunctivae are normal.   Neck: Normal range of motion. Neck supple. No JVD present.   Cardiovascular: Normal rate. An irregularly irregular rhythm present.   Murmur heard.  Pulmonary/Chest: Effort normal and breath sounds normal. No respiratory distress. He has no wheezes. He has no rales.   Abdominal: Soft. Normal appearance. He exhibits no distension. There is no tenderness.   Musculoskeletal: He exhibits edema (2+).   Neurological: He is alert and oriented to person, place, and time.   Skin: Skin is warm, dry and " intact. No rash noted. He is not diaphoretic. No erythema. No pallor.   Psychiatric: He has a normal mood and affect. His behavior is normal.   Vitals reviewed.      Results Review:   I reviewed the patient's new clinical results.    Lab Results (last 72 hours)     Procedure Component Value Units Date/Time    T4, Free [[id number]]  (Normal) Collected:  07/10/20 0523    Specimen:  Blood Updated:  07/10/20 0822     Free T4 1.07 ng/dL     Narrative:       Results may be falsely increased if patient taking Biotin.      Troponin [[id number]]  (Abnormal) Collected:  07/10/20 0523    Specimen:  Blood Updated:  07/10/20 0607     Troponin T 0.037 ng/mL     Narrative:       Troponin T Reference Range:  <= 0.03 ng/mL-   Negative for AMI  >0.03 ng/mL-     Abnormal for myocardial necrosis.  Clinicians would have to utilize clinical acumen, EKG, Troponin and serial changes to determine if it is an Acute Myocardial Infarction or myocardial injury due to an underlying chronic condition.       Results may be falsely decreased if patient taking Biotin.      Troponin [[id number]]  (Abnormal) Collected:  07/10/20 0001    Specimen:  Blood Updated:  07/10/20 0050     Troponin T 0.035 ng/mL     Narrative:       Troponin T Reference Range:  <= 0.03 ng/mL-   Negative for AMI  >0.03 ng/mL-     Abnormal for myocardial necrosis.  Clinicians would have to utilize clinical acumen, EKG, Troponin and serial changes to determine if it is an Acute Myocardial Infarction or myocardial injury due to an underlying chronic condition.       Results may be falsely decreased if patient taking Biotin.      Comprehensive Metabolic Panel [[id number]]  (Abnormal) Collected:  07/10/20 0001    Specimen:  Blood Updated:  07/10/20 0049     Glucose 63 mg/dL      BUN 44 mg/dL      Creatinine 3.84 mg/dL      Sodium 146 mmol/L      Potassium 3.4 mmol/L      Chloride 105 mmol/L      CO2 25.0 mmol/L      Calcium 8.8 mg/dL      Total Protein 6.5 g/dL      Albumin 4.00 g/dL       ALT (SGPT) 15 U/L      AST (SGOT) 11 U/L      Alkaline Phosphatase 92 U/L      Total Bilirubin 0.2 mg/dL      eGFR Non African Amer 15 mL/min/1.73      Globulin 2.5 gm/dL      A/G Ratio 1.6 g/dL      BUN/Creatinine Ratio 11.5     Anion Gap 16.0 mmol/L     Narrative:       GFR Normal >60  Chronic Kidney Disease <60  Kidney Failure <15      Lipid Panel [[id number]]  (Abnormal) Collected:  07/10/20 0001    Specimen:  Blood Updated:  07/10/20 0049     Total Cholesterol 122 mg/dL      Triglycerides 50 mg/dL      HDL Cholesterol 35 mg/dL      LDL Cholesterol  77 mg/dL      VLDL Cholesterol 10 mg/dL      LDL/HDL Ratio 2.20    Narrative:       Cholesterol Reference Ranges  (U.S. Department of Health and Human Services ATP III Classifications)    Desirable          <200 mg/dL  Borderline High    200-239 mg/dL  High Risk          >240 mg/dL      Triglyceride Reference Ranges  (U.S. Department of Health and Human Services ATP III Classifications)    Normal           <150 mg/dL  Borderline High  150-199 mg/dL  High             200-499 mg/dL  Very High        >500 mg/dL    HDL Reference Ranges  (U.S. Department of Health and Human Services ATP III Classifcations)    Low     <40 mg/dl (major risk factor for CHD)  High    >60 mg/dl ('negative' risk factor for CHD)        LDL Reference Ranges  (U.S. Department of Health and Human Services ATP III Classifcations)    Optimal          <100 mg/dL  Near Optimal     100-129 mg/dL  Borderline High  130-159 mg/dL  High             160-189 mg/dL  Very High        >189 mg/dL    TSH [[id number]]  (Abnormal) Collected:  07/10/20 0001    Specimen:  Blood Updated:  07/10/20 0049     TSH 4.390 uIU/mL     CBC Auto Differential [[id number]]  (Abnormal) Collected:  07/10/20 0001    Specimen:  Blood Updated:  07/10/20 0008     WBC 6.06 10*3/mm3      RBC 2.43 10*6/mm3      Hemoglobin 7.6 g/dL      Hematocrit 22.3 %      MCV 91.8 fL      MCH 31.3 pg      MCHC 34.1 g/dL      RDW 13.6 %      RDW-SD 46.1  fl      MPV 10.2 fL      Platelets 188 10*3/mm3      Neutrophil % 52.2 %      Lymphocyte % 32.0 %      Monocyte % 9.4 %      Eosinophil % 5.1 %      Basophil % 0.8 %      Immature Grans % 0.5 %      Neutrophils, Absolute 3.16 10*3/mm3      Lymphocytes, Absolute 1.94 10*3/mm3      Monocytes, Absolute 0.57 10*3/mm3      Eosinophils, Absolute 0.31 10*3/mm3      Basophils, Absolute 0.05 10*3/mm3      Immature Grans, Absolute 0.03 10*3/mm3      nRBC 0.0 /100 WBC     Troponin [[id number]]  (Abnormal) Collected:  07/09/20 1958    Specimen:  Blood Updated:  07/09/20 2033     Troponin T 0.043 ng/mL     Narrative:       Troponin T Reference Range:  <= 0.03 ng/mL-   Negative for AMI  >0.03 ng/mL-     Abnormal for myocardial necrosis.  Clinicians would have to utilize clinical acumen, EKG, Troponin and serial changes to determine if it is an Acute Myocardial Infarction or myocardial injury due to an underlying chronic condition.       Results may be falsely decreased if patient taking Biotin.      Comprehensive Metabolic Panel [[id number]]  (Abnormal) Collected:  07/09/20 1958    Specimen:  Blood Updated:  07/09/20 2029     Glucose 193 mg/dL      BUN 43 mg/dL      Creatinine 3.80 mg/dL      Sodium 144 mmol/L      Potassium 3.9 mmol/L      Chloride 104 mmol/L      CO2 24.0 mmol/L      Calcium 8.5 mg/dL      Total Protein 6.7 g/dL      Albumin 4.20 g/dL      ALT (SGPT) 17 U/L      AST (SGOT) 14 U/L      Alkaline Phosphatase 101 U/L      Total Bilirubin 0.3 mg/dL      eGFR Non African Amer 16 mL/min/1.73      Globulin 2.5 gm/dL      A/G Ratio 1.7 g/dL      BUN/Creatinine Ratio 11.3     Anion Gap 16.0 mmol/L     Narrative:       GFR Normal >60  Chronic Kidney Disease <60  Kidney Failure <15      CBC Auto Differential [[id number]]  (Abnormal) Collected:  07/09/20 1958    Specimen:  Blood Updated:  07/09/20 2010     WBC 6.00 10*3/mm3      RBC 2.54 10*6/mm3      Hemoglobin 7.8 g/dL      Hematocrit 23.4 %      MCV 92.1 fL      MCH 30.7 pg       MCHC 33.3 g/dL      RDW 13.7 %      RDW-SD 46.3 fl      MPV 10.0 fL      Platelets 195 10*3/mm3      Neutrophil % 57.7 %      Lymphocyte % 27.2 %      Monocyte % 9.2 %      Eosinophil % 4.5 %      Basophil % 0.7 %      Immature Grans % 0.7 %      Neutrophils, Absolute 3.47 10*3/mm3      Lymphocytes, Absolute 1.63 10*3/mm3      Monocytes, Absolute 0.55 10*3/mm3      Eosinophils, Absolute 0.27 10*3/mm3      Basophils, Absolute 0.04 10*3/mm3      Immature Grans, Absolute 0.04 10*3/mm3      nRBC 0.0 /100 WBC     POC Glucose Once [[id number]]  (Abnormal) Collected:  07/09/20 1936    Specimen:  Blood Updated:  07/09/20 1947     Glucose 213 mg/dL      Comment: : [id number] Aba FaithandraMeter ID: [id number]       COVID-19, [facility] IN-HOUSE,NP Swab (NO TRANSPORT MEDIA) 2 HR TAT - Swab, Nasopharynx [[id number]]  (Normal) Collected:  07/09/20 1845    Specimen:  Swab from Nasopharynx Updated:  07/09/20 1915     COVID19 Not Detected    Narrative:       Fact sheet for providers: https://www.fda.gov/media/582674/download     Fact sheet for patients: https://www.fda.gov/media/930636/download        Interpretation Summary   Transthoracic echocardiogram 7/10/2020     · Left atrial cavity size is moderately dilated.  · Moderate aortic valve stenosis is present.  · Left ventricular diastolic dysfunction.  · Left ventricular systolic function is normal.  · Mild tricuspid valve regurgitation is present.  · Mild mitral valve regurgitation is present     RHYTHM IS ATRIAL FIBRILLATION  LEFT ATRIAL ENLARGEMENT  MODERATE AORTIC VALVE STENOSIS  NORMAL LV AND RV SYSTOLIC FUNCTION  LV DIASTOLIC DYSFUNCTION  MODERATE PULMONARY HTN  EF 60 to 65%       Assessment     1.  Acute on chronic renal failure: Creatinine 3.84  2.  Anemia: Hemoglobin 7.6  3.  Permanent atrial fibrillation: Controlled heart rates on telemetry  4.  Chronic anticoagulation: With Eliquis  5.  Hypertension: Well-controlled  6.  Diabetes mellitus: On oral agents  7.   Pacemaker in situ: Single-lead device  8.  GERD: Stable  9.  Ulcerative colitis: Stable  10.  Mild aortic stenosis: mean gradient of 12 mmHg, valve area of 1 cm², and a peak velocity of 282 cm/s.  11.  Elevated troponin: Type II elevation in the setting of acute renal failure and anemia      Plan     - Adjust rate controlling medications as the patient was taking at home to include diltiazem 240 mg twice daily and metoprolol succinate 100 mg twice daily  - Monitor rates on telemetry  - Continue anticoagulation this time with close monitoring of his hemoglobin  - Nephrology to address renal failure  - Outpatient follow-up with our office as the patient would like to establish care with us and will also need to follow-up in our pacemaker device clinic  - No need to further trend troponins as the elevation of the troponin is related to renal failure and anemia  - ACE inhibitor on hold      Thank you for the consultation, cardiology will gladly continue to follow.     CUATE Ryan  7/10/2020  08:33      Please note this cardiology consultation note is the result of a face to face consultation with the patient, in addition to reviewing medical records at length by myself, CUATE Ryan.       Time: Approximately 45 minutes

## 2020-07-10 NOTE — CONSULTS
Nephrology ([facility]) Consult Note      Patient:  Gage Ken  YOB: 1940  Date of Service: 7/10/2020  MRN: [id number]   Acct: [id number]   Primary Care Physician: Cayetano Kay MD  Advance Directive:   Code Status and Medical Interventions:   Ordered at: 07/09/20 1836     Code Status:    CPR     Medical Interventions (Level of Support Prior to Arrest):    Full     Admit Date: 7/9/2020       Hospital Day: 1  Referring Provider: Eb Garcia MD      Patient personally seen and examined.  Complete chart including Consults, Notes, Operative Reports, Labs, Cardiology, and Radiology studies reviewed as able.        Subjective:  Gage Ken is a 79 y.o. male  whom we were consulted for acute kidney injury.  Baseline chronic kidney disease stage 3; baseline creatinine 1.3-1.6. No prior nephrology contacts.  History of atrial fibrillation with permanent pacemaker placement in 2019, hypertension, type 2 diabetes, ulcerative colitis, aortic stenosis.  Patient went to his PCP earlier this week for a checkup; was found to be in atrial fibrillation with RVR. Also had incidental finding of acute kidney injury with creatinine 3.8. He was sent to [facility] ER for further evaluation. Patient is somewhat of a difficult historian as he is unclear with the timing and duration of his symptoms. It would seem that he has recently been having some nausea and vomiting, denies diarrhea. Denies changes in oral intake. He has noted increased lower extremity edema recently. He thinks that his urine output has been less recently but denies any dysuria or hematuria. Denies dark color or strong odor. He does endorse episodes of palpitations but is unsure how long these have been present or how frequently they have been taking place.  Denies NSAIDs. Currently is awake and alert. Denies pain or dyspnea at time of exam    Allergies:  Patient has no known allergies.    Home  Meds:  Medications Prior to Admission   Medication Sig Dispense Refill Last Dose   • apixaban (ELIQUIS) 5 MG tablet tablet Take 5 mg by mouth 2 (Two) Times a Day.   Taking   • Cyanocobalamin 1000 MCG/ML kit Inject 1,000 mcg as directed Every 30 (Thirty) Days.      • dilTIAZem CD (CARDIZEM CD) 240 MG 24 hr capsule Take 240 mg by mouth 2 (Two) Times a Day.      • folic acid (FOLVITE) 1 MG tablet Take 1 mg by mouth Daily.   Taking   • furosemide (LASIX) 40 MG tablet Take 40 mg by mouth Daily.  2 Taking   • glipiZIDE (GLUCOTROL) 10 MG tablet Take 1 tablet by mouth 2 (Two) Times a Day.  3 Taking   • lisinopril (PRINIVIL,ZESTRIL) 20 MG tablet Take 20 mg by mouth Daily.   Taking   • metFORMIN ER (GLUCOPHAGE-XR) 500 MG 24 hr tablet Take 500 mg by mouth 2 (two) times a day.      • metoprolol succinate XL (TOPROL-XL) 100 MG 24 hr tablet Take 100 mg by mouth 2 (two) times a day.      • omeprazole (priLOSEC) 20 MG capsule Take 1 capsule by mouth 2 (Two) Times a Day. (Patient taking differently: Take 40 mg by mouth Daily.) 180 capsule 3 Taking   • ondansetron ODT (ZOFRAN-ODT) 4 MG disintegrating tablet Place 4 mg on the tongue Every 6 (Six) Hours As Needed for Nausea or Vomiting.      • pravastatin (PRAVACHOL) 20 MG tablet Take 1 tablet by mouth Every Night.  2 Taking   • sulfaSALAzine (AZULFIDINE) 500 MG tablet Take 2 tablets by mouth 3 (Three) Times a Day. 360 tablet 1    • terazosin (HYTRIN) 10 MG capsule Take 1 capsule by mouth Every Night.  2 Taking       Medicines:  Current Facility-Administered Medications   Medication Dose Route Frequency Provider Last Rate Last Dose   • apixaban (ELIQUIS) tablet 5 mg  5 mg Oral Q12H Eb Garcia MD   5 mg at 07/10/20 0954   • dextrose (D50W) 25 g/ 50mL Intravenous Solution 25 g  25 g Intravenous Q15 Min PRN Eb Garcia MD       • dextrose (GLUTOSE) oral gel 15 g  15 g Oral Q15 Min PRN Eb Garcia MD       • dilTIAZem CD (CARDIZEM CD) 24 hr capsule 240 mg  240 mg Oral BID  Sravanthi Waldron APRN       • glipizide (GLUCOTROL) tablet 10 mg  10 mg Oral QAM AC Eb Garcia MD   10 mg at 07/10/20 0953   • glucagon (human recombinant) (GLUCAGEN DIAGNOSTIC) injection 1 mg  1 mg Subcutaneous Q15 Min PRN Eb Garcia MD       • insulin lispro (humaLOG) injection 2-7 Units  2-7 Units Subcutaneous TID AC Eb Garcia MD   2 Units at 07/09/20 2054   • metoprolol succinate XL (TOPROL-XL) 24 hr tablet 100 mg  100 mg Oral BID Sravanthi Waldron APRN       • ondansetron (ZOFRAN) injection 4 mg  4 mg Intravenous Q6H PRN Eb Garcia MD       • pantoprazole (PROTONIX) EC tablet 40 mg  40 mg Oral QAM Eb Garcia MD   40 mg at 07/10/20 0607   • pravastatin (PRAVACHOL) tablet 20 mg  20 mg Oral Nightly Eb Garcia MD   20 mg at 07/09/20 2058   • sodium chloride 0.9 % flush 10 mL  10 mL Intravenous Q12H Eb Garcia MD   10 mL at 07/10/20 0954   • sodium chloride 0.9 % flush 10 mL  10 mL Intravenous PRN Eb Garcia MD   10 mL at 07/10/20 0954   • sodium chloride 0.9 % infusion  75 mL/hr Intravenous Continuous Eb Garcia MD 75 mL/hr at 07/10/20 0422 75 mL/hr at 07/10/20 0422   • sulfaSALAzine (AZULFIDINE) tablet 1,000 mg  1,000 mg Oral TID Eb Garcia MD   1,000 mg at 07/10/20 0953   • terazosin (HYTRIN) capsule 10 mg  10 mg Oral Nightly Eb Garcia MD           Past Medical History:  Past Medical History:   Diagnosis Date   • Acute gastritis    • Blood in feces    • Carotid artery occlusion 12/2/2019   • Colitis, ulcerative chronic (CMS/HCC)     LEFT-SIDED   • Diverticular disease of colon    • Epigastric pain    • GERD (gastroesophageal reflux disease)    • History of colon polyps    • Jackson (hard of hearing)    • Hyperlipidemia    • Hypertension    • Lesion of nose    • Neoplasm of uncertain behavior    • Nonspecific ulcerative proctitis (CMS/HCC)    • Rectal hemorrhage    • Type 2 diabetes mellitus (CMS/HCC)    • Vitamin B12 deficiency        Past Surgical  History:  Past Surgical History:   Procedure Laterality Date   • COLONOSCOPY  12/02/2013    Hemorrhoids found.   • COLONOSCOPY  09/06/2016   • COLONOSCOPY N/A 10/30/2017    Procedure: COLONOSCOPY;  Surgeon: Alex Silveira MD;  Location: [facility] ENDOSCOPY;  Service:    • COLONOSCOPY N/A 11/6/2018    Procedure: COLONOSCOPY;  Surgeon: Alex Silveira MD;  Location: [facility] ENDOSCOPY;  Service: Gastroenterology   • COLONOSCOPY N/A 11/21/2019    Procedure: COLONOSCOPY;  Surgeon: Alex Silveira MD;  Location: [facility] ENDOSCOPY;  Service: Gastroenterology   • COLONOSCOPY W/ POLYPECTOMY  08/30/2015    Single polyp found in the colon;removed by cold snare polypectomy.Proctitis in the rectum.Diverticulosis found in the sigmoid colon.Hemorrhoids found.   • ENDOSCOPY  12/02/2013    Normal hypopharynx, esophagus, duodenum, symmetrical & patent pylorus. Hiatus hernia in GE junction. Normal stomach. Biopsy taken.   • HERNIA REPAIR      umbilical   • UPPER GASTROINTESTINAL ENDOSCOPY  12/02/2013       Family History  Family History   Problem Relation Age of Onset   • Diabetes Other    • Hypertension Other        Social History  Social History     Socioeconomic History   • Marital status: Single     Spouse name: Not on file   • Number of children: Not on file   • Years of education: Not on file   • Highest education level: Not on file   Tobacco Use   • Smoking status: Former Smoker     Types: Cigarettes   • Smokeless tobacco: Former User     Types: Snuff     Quit date: 2008   • Tobacco comment: 11/30/2017 - Patient States He Ceased Smoking 13 Years Prior.   Substance and Sexual Activity   • Alcohol use: Yes     Comment: occasional   • Drug use: No   • Sexual activity: Defer         Review of Systems:  History obtained from chart review and the patient  General ROS: No fever or chills  Respiratory ROS: No cough, shortness of breath, wheezing  Cardiovascular ROS: positive for - edema  No chest pain or palpitations  Gastrointestinal  "ROS: positive for - nausea/vomiting  No abdominal pain or melena  Genito-Urinary ROS: No dysuria or hematuria  Neurological ROS: no headache or dizziness  14 point ROS reviewed with the patient and negative except as noted above and in the HPI unless unable to obtain.    Objective:  Patient Vitals for the past 24 hrs:   BP Temp Temp src Pulse Resp SpO2 Height Weight   07/10/20 0820 150/67 98 °F (36.7 °C) Oral 97 18 97 % -- --   07/10/20 0426 141/76 98.2 °F (36.8 °C) Oral 82 16 92 % -- --   07/10/20 0137 -- -- -- 82 -- -- -- --   07/10/20 0128 -- -- -- 82 -- -- -- --   07/09/20 2351 145/59 98.5 °F (36.9 °C) Oral 86 16 97 % -- --   07/09/20 1934 136/65 98.4 °F (36.9 °C) Oral 77 16 95 % -- --   07/09/20 1725 132/65 98.2 °F (36.8 °C) Oral 79 18 98 % 167.6 cm (65.98\") 70.9 kg (156 lb 3.2 oz)       Intake/Output Summary (Last 24 hours) at 7/10/2020 1027  Last data filed at 7/10/2020 0900  Gross per 24 hour   Intake 240 ml   Output 925 ml   Net -685 ml     General: awake/alert    Neck: supple, no JVD  Chest:  clear to auscultation bilaterally without respiratory distress  CVS: regular rate and rhythm  Abdominal: soft, nontender, positive bowel sounds  Extremities: bilateral 1+ lower extremity edema  Skin: warm and dry without rash  Neuro: no focal motor deficits    Labs:  Results from last 7 days   Lab Units 07/10/20  0001 07/09/20 1958   WBC 10*3/mm3 6.06 6.00   HEMOGLOBIN g/dL 7.6* 7.8*   HEMATOCRIT % 22.3* 23.4*   PLATELETS 10*3/mm3 188 195         Results from last 7 days   Lab Units 07/10/20  0523 07/10/20  0001 07/09/20 1958   SODIUM mmol/L 146* 146* 144   POTASSIUM mmol/L 3.5 3.4* 3.9   CHLORIDE mmol/L 106 105 104   CO2 mmol/L 23.0 25.0 24.0   BUN mg/dL 42* 44* 43*   CREATININE mg/dL 3.68* 3.84* 3.80*   CALCIUM mg/dL 8.6 8.8 8.5*   BILIRUBIN mg/dL  --  0.2 0.3   ALK PHOS U/L  --  92 101   ALT (SGPT) U/L  --  15 17   AST (SGOT) U/L  --  11 14   GLUCOSE mg/dL 94 63* 193*       Radiology:   Imaging Results (Last 72 " Hours)     Procedure Component Value Units Date/Time    US Renal Bilateral [[id number]] Collected:  07/10/20 0942     Updated:  07/10/20 1003    Narrative:       US RENAL BILATERAL-     Indication: renal failure     Comparison: None     Findings:     RIGHT kidney measures 12.4 cm in length and demonstrates normal cortical  thickness and echogenicity. No shadowing calculi or hydronephrosis.     The LEFT kidney measures 11.5 cm in length and demonstrates normal  cortical thickness and echogenicity. No shadowing calculi or  hydronephrosis.      Mild circumferential bladder wall thickening and enlarged prostate which  indents the urinary bladder. The urinary bladder diverticulum is  present.     Incidentally noted, a large rounded vascular structure in the upper  abdomen peripancreatic region is identified.       Impression:       Impression:  1. Negative for hydronephrosis or renal atrophy.  2. Enlarged prostate indenting the urinary bladder. Bladder wall  thickening and single diverticulum. Correlate for partial outlet  obstruction.  3. Incidentally noted, abnormal rounded vascular structure in the upper  abdomen peripancreatic region. Differential includes aneurysm. Recommend  CT of the abdomen for further evaluation.  This report was finalized on 07/10/2020 10:00 by Dr. Major Mariano MD.          Culture:  No results found for: BLOODCX, URINECX, WOUNDCX, MRSACX, RESPCX, STOOLCX      Assessment   1.  Acute kidney injury  2.  Baseline chronic kidney disease stage 3  3.  Hypernatremia  4.  Type 2 diabetes  5.  Benign hypertension  6.  Chronic atrial fibrillation; s/p perm pacemaker  7.  Anemia   8.  Hypomagnesemia      Plan:  1.  Replace magnesium  2.  Change IV fluids to 1/2 normal saline at 75 ml/hour  3.  Enlarged prostate noted on renal US. Will check post void residual; ? partial obstruction  4.  Peripancreatic vascular structure noted on renal US; CT abdomen recommended by radiology. Would ideally defer any IV  contrast for now until renal function improves.  5.  Further assessment and plan pending Dr Cho's evaluation of patient      Thank you for the consult, we appreciate the opportunity to provide care to your patients.  Feel free to contact me if I can be of any further assistance.      Jonathan Cardona, CUATE  7/10/2020  10:27

## 2020-07-10 NOTE — PROGRESS NOTES
[facility] Medicine Services  INPATIENT PROGRESS NOTE    Length of Stay: 1  Date of Admission: 7/9/2020  Primary Care Physician: Cayetano Kay MD    Subjective   Chief Complaint: Atrial fibrillation/renal failure/questionable abdomen aneurysm    HPI   Patient is off Cardizem drip.  Heart rates controlled.  Patient denies any chest pain or shortness of breath.  Creatinine is improving a little.    Review of Systems   Constitutional: Positive for activity change, appetite change and fatigue. Negative for chills and fever.   HENT: Negative for hearing loss, nosebleeds, tinnitus and trouble swallowing.    Eyes: Negative for visual disturbance.   Respiratory: Negative for cough, chest tightness, shortness of breath and wheezing.    Cardiovascular:  Negative for chest pain and leg swelling.   Gastrointestinal: Negative for abdominal distention, abdominal pain, blood in stool, constipation, diarrhea, nausea and vomiting.   Endocrine: Negative for cold intolerance, heat intolerance, polydipsia, polyphagia and polyuria.   Genitourinary: Negative for decreased urine volume, difficulty urinating, dysuria, flank pain, frequency and hematuria.   Musculoskeletal: Negative for arthralgias, joint swelling and myalgias.   Skin: Negative for rash.   Allergic/Immunologic: Negative for immunocompromised state.   Neurological: Positive for weakness. Negative for dizziness, syncope, light-headedness and headaches.   Hematological: Negative for adenopathy. Does not bruise/bleed easily.   Psychiatric/Behavioral: Negative for confusion and sleep disturbance. The patient is not nervous/anxious.           All pertinent negatives and positives are as above. All other systems have been reviewed and are negative unless otherwise stated.     Objective    Temp:  [98 °F (36.7 °C)-98.5 °F (36.9 °C)] 98.2 °F (36.8 °C)  Heart Rate:  [77-97] 89  Resp:  [16-18] 18  BP: (132-156)/(59-76) 156/74    Intake/Output  Summary (Last 24 hours) at 7/10/2020 1529  Last data filed at 7/10/2020 1300  Gross per 24 hour   Intake 480 ml   Output 1225 ml   Net -745 ml     Physical Exam  Constitutional: He is oriented to person, place, and time. He appears well-developed.   HENT:   Head: Normocephalic.   Eyes: Pupils are equal, round, and reactive to light. Conjunctivae are normal.   Neck: Neck supple. No JVD present.   Cardiovascular: Normal heart sounds and intact distal pulses. Exam reveals no gallop and no friction rub.   No murmur heard.  Irregular irregular, rates in the 80s.  Pacemaker noted.   Pulmonary/Chest: Effort normal and breath sounds normal. No respiratory distress. He has no wheezes. He has no rales. He exhibits no tenderness.   Diminished breath sound bilateral, clear.   Abdominal: Soft. Bowel sounds are normal. He exhibits no distension. There is no tenderness. There is no rebound and no guarding.   Musculoskeletal: Normal range of motion. He exhibits no edema, tenderness or deformity.   Neurological: He is alert and oriented to person, place, and time. He displays normal reflexes. No cranial nerve deficit. He exhibits normal muscle tone.   Skin: Skin is warm and dry. Capillary refill takes 2 to 3 seconds. No rash noted.   Psychiatric: He has a normal mood and affect. His behavior is normal. Judgment and thought content normal.   Nursing note and vitals reviewed.  Results Review:  Lab Results (last 24 hours)     Procedure Component Value Units Date/Time    Creatinine, Urine, Random - Urine, Clean Catch [[id number]] Collected:  07/10/20 1350    Specimen:  Urine, Clean Catch Updated:  07/10/20 1350    Urinalysis With Microscopic If Indicated (No Culture) - Urine, Clean Catch [[id number]]  (Abnormal) Collected:  07/10/20 1140    Specimen:  Urine, Clean Catch Updated:  07/10/20 1317     Color, UA Yellow     Appearance, UA Clear     pH, UA <=5.0     Specific Gravity, UA 1.013     Glucose,  mg/dL (2+)     Ketones, UA  Negative     Bilirubin, UA Negative     Blood, UA Negative     Protein, UA 30 mg/dL (1+)     Leuk Esterase, UA Negative     Nitrite, UA Negative     Urobilinogen, UA 0.2 E.U./dL    Urinalysis, Microscopic Only - Urine, Clean Catch [[id number]]  (Abnormal) Collected:  07/10/20 1140    Specimen:  Urine, Clean Catch Updated:  07/10/20 1317     RBC, UA 0-2 /HPF      WBC, UA None Seen /HPF      Bacteria, UA None Seen /HPF      Squamous Epithelial Cells, UA None Seen /HPF      Hyaline Casts, UA None Seen /LPF      Methodology Automated Microscopy    Protein, Urine, Random - Urine, Clean Catch [[id number]] Collected:  07/10/20 1140    Specimen:  Urine, Clean Catch Updated:  07/10/20 1213     Total Protein, Urine 25.9 mg/dL     Narrative:       Reference intervals for random urine have not been established.  Clinical usage is dependent upon physician's interpretation in combination with other laboratory tests.       Sodium, Urine, Random - Urine, Clean Catch [[id number]] Collected:  07/10/20 1140    Specimen:  Urine, Clean Catch Updated:  07/10/20 1213     Sodium, Urine 100 mmol/L     Narrative:       Reference intervals for random urine have not been established.  Clinical usage is dependent upon physician's interpretation in combination with other laboratory tests.       POC Glucose Once [[id number]]  (Abnormal) Collected:  07/10/20 1105    Specimen:  Blood Updated:  07/10/20 1135     Glucose 283 mg/dL      Comment: : [id number] Asim EuraisaMeter ID: [id number]       Uric Acid [[id number]]  (Abnormal) Collected:  07/10/20 0523    Specimen:  Blood Updated:  07/10/20 0859     Uric Acid 10.4 mg/dL     Basic Metabolic Panel [[id number]]  (Abnormal) Collected:  07/10/20 0523    Specimen:  Blood Updated:  07/10/20 0859     Glucose 94 mg/dL      BUN 42 mg/dL      Creatinine 3.68 mg/dL      Sodium 146 mmol/L      Potassium 3.5 mmol/L      Chloride 106 mmol/L      CO2 23.0 mmol/L      Calcium 8.6 mg/dL      eGFR Non  Amer 16  mL/min/1.73      BUN/Creatinine Ratio 11.4     Anion Gap 17.0 mmol/L     Narrative:       GFR Normal >60  Chronic Kidney Disease <60  Kidney Failure <15      Magnesium [[id number]]  (Abnormal) Collected:  07/10/20 0523    Specimen:  Blood Updated:  07/10/20 0859     Magnesium 1.3 mg/dL     POC Glucose Once [[id number]]  (Abnormal) Collected:  07/10/20 0819    Specimen:  Blood Updated:  07/10/20 0847     Glucose 147 mg/dL      Comment: : [id number] Asim EuraisaMeter ID: [id number]       T4, Free [[id number]]  (Normal) Collected:  07/10/20 0523    Specimen:  Blood Updated:  07/10/20 0822     Free T4 1.07 ng/dL     Narrative:       Results may be falsely increased if patient taking Biotin.      Troponin [[id number]]  (Abnormal) Collected:  07/10/20 0523    Specimen:  Blood Updated:  07/10/20 0607     Troponin T 0.037 ng/mL     Narrative:       Troponin T Reference Range:  <= 0.03 ng/mL-   Negative for AMI  >0.03 ng/mL-     Abnormal for myocardial necrosis.  Clinicians would have to utilize clinical acumen, EKG, Troponin and serial changes to determine if it is an Acute Myocardial Infarction or myocardial injury due to an underlying chronic condition.       Results may be falsely decreased if patient taking Biotin.      Troponin [[id number]]  (Abnormal) Collected:  07/10/20 0001    Specimen:  Blood Updated:  07/10/20 0050     Troponin T 0.035 ng/mL     Narrative:       Troponin T Reference Range:  <= 0.03 ng/mL-   Negative for AMI  >0.03 ng/mL-     Abnormal for myocardial necrosis.  Clinicians would have to utilize clinical acumen, EKG, Troponin and serial changes to determine if it is an Acute Myocardial Infarction or myocardial injury due to an underlying chronic condition.       Results may be falsely decreased if patient taking Biotin.      Comprehensive Metabolic Panel [[id number]]  (Abnormal) Collected:  07/10/20 0001    Specimen:  Blood Updated:  07/10/20 0049     Glucose 63 mg/dL      BUN 44 mg/dL      Creatinine  3.84 mg/dL      Sodium 146 mmol/L      Potassium 3.4 mmol/L      Chloride 105 mmol/L      CO2 25.0 mmol/L      Calcium 8.8 mg/dL      Total Protein 6.5 g/dL      Albumin 4.00 g/dL      ALT (SGPT) 15 U/L      AST (SGOT) 11 U/L      Alkaline Phosphatase 92 U/L      Total Bilirubin 0.2 mg/dL      eGFR Non African Amer 15 mL/min/1.73      Globulin 2.5 gm/dL      A/G Ratio 1.6 g/dL      BUN/Creatinine Ratio 11.5     Anion Gap 16.0 mmol/L     Narrative:       GFR Normal >60  Chronic Kidney Disease <60  Kidney Failure <15      Lipid Panel [[id number]]  (Abnormal) Collected:  07/10/20 0001    Specimen:  Blood Updated:  07/10/20 0049     Total Cholesterol 122 mg/dL      Triglycerides 50 mg/dL      HDL Cholesterol 35 mg/dL      LDL Cholesterol  77 mg/dL      VLDL Cholesterol 10 mg/dL      LDL/HDL Ratio 2.20    Narrative:       Cholesterol Reference Ranges  (U.S. Department of Health and Human Services ATP III Classifications)    Desirable          <200 mg/dL  Borderline High    200-239 mg/dL  High Risk          >240 mg/dL      Triglyceride Reference Ranges  (U.S. Department of Health and Human Services ATP III Classifications)    Normal           <150 mg/dL  Borderline High  150-199 mg/dL  High             200-499 mg/dL  Very High        >500 mg/dL    HDL Reference Ranges  (U.S. Department of Health and Human Services ATP III Classifcations)    Low     <40 mg/dl (major risk factor for CHD)  High    >60 mg/dl ('negative' risk factor for CHD)        LDL Reference Ranges  (U.S. Department of Health and Human Services ATP III Classifcations)    Optimal          <100 mg/dL  Near Optimal     100-129 mg/dL  Borderline High  130-159 mg/dL  High             160-189 mg/dL  Very High        >189 mg/dL    TSH [[id number]]  (Abnormal) Collected:  07/10/20 0001    Specimen:  Blood Updated:  07/10/20 0049     TSH 4.390 uIU/mL     CBC Auto Differential [[id number]]  (Abnormal) Collected:  07/10/20 0001    Specimen:  Blood Updated:  07/10/20  0008     WBC 6.06 10*3/mm3      RBC 2.43 10*6/mm3      Hemoglobin 7.6 g/dL      Hematocrit 22.3 %      MCV 91.8 fL      MCH 31.3 pg      MCHC 34.1 g/dL      RDW 13.6 %      RDW-SD 46.1 fl      MPV 10.2 fL      Platelets 188 10*3/mm3      Neutrophil % 52.2 %      Lymphocyte % 32.0 %      Monocyte % 9.4 %      Eosinophil % 5.1 %      Basophil % 0.8 %      Immature Grans % 0.5 %      Neutrophils, Absolute 3.16 10*3/mm3      Lymphocytes, Absolute 1.94 10*3/mm3      Monocytes, Absolute 0.57 10*3/mm3      Eosinophils, Absolute 0.31 10*3/mm3      Basophils, Absolute 0.05 10*3/mm3      Immature Grans, Absolute 0.03 10*3/mm3      nRBC 0.0 /100 WBC     Troponin [[id number]]  (Abnormal) Collected:  07/09/20 1958    Specimen:  Blood Updated:  07/09/20 2033     Troponin T 0.043 ng/mL     Narrative:       Troponin T Reference Range:  <= 0.03 ng/mL-   Negative for AMI  >0.03 ng/mL-     Abnormal for myocardial necrosis.  Clinicians would have to utilize clinical acumen, EKG, Troponin and serial changes to determine if it is an Acute Myocardial Infarction or myocardial injury due to an underlying chronic condition.       Results may be falsely decreased if patient taking Biotin.      Comprehensive Metabolic Panel [[id number]]  (Abnormal) Collected:  07/09/20 1958    Specimen:  Blood Updated:  07/09/20 2029     Glucose 193 mg/dL      BUN 43 mg/dL      Creatinine 3.80 mg/dL      Sodium 144 mmol/L      Potassium 3.9 mmol/L      Chloride 104 mmol/L      CO2 24.0 mmol/L      Calcium 8.5 mg/dL      Total Protein 6.7 g/dL      Albumin 4.20 g/dL      ALT (SGPT) 17 U/L      AST (SGOT) 14 U/L      Alkaline Phosphatase 101 U/L      Total Bilirubin 0.3 mg/dL      eGFR Non African Amer 16 mL/min/1.73      Globulin 2.5 gm/dL      A/G Ratio 1.7 g/dL      BUN/Creatinine Ratio 11.3     Anion Gap 16.0 mmol/L     Narrative:       GFR Normal >60  Chronic Kidney Disease <60  Kidney Failure <15      CBC Auto Differential [[id number]]  (Abnormal) Collected:   07/09/20 1958    Specimen:  Blood Updated:  07/09/20 2010     WBC 6.00 10*3/mm3      RBC 2.54 10*6/mm3      Hemoglobin 7.8 g/dL      Hematocrit 23.4 %      MCV 92.1 fL      MCH 30.7 pg      MCHC 33.3 g/dL      RDW 13.7 %      RDW-SD 46.3 fl      MPV 10.0 fL      Platelets 195 10*3/mm3      Neutrophil % 57.7 %      Lymphocyte % 27.2 %      Monocyte % 9.2 %      Eosinophil % 4.5 %      Basophil % 0.7 %      Immature Grans % 0.7 %      Neutrophils, Absolute 3.47 10*3/mm3      Lymphocytes, Absolute 1.63 10*3/mm3      Monocytes, Absolute 0.55 10*3/mm3      Eosinophils, Absolute 0.27 10*3/mm3      Basophils, Absolute 0.04 10*3/mm3      Immature Grans, Absolute 0.04 10*3/mm3      nRBC 0.0 /100 WBC     POC Glucose Once [[id number]]  (Abnormal) Collected:  07/09/20 1936    Specimen:  Blood Updated:  07/09/20 1947     Glucose 213 mg/dL      Comment: : [id number] [facility]: [id number]       COVID-19, [facility] IN-HOUSE,NP Swab (NO TRANSPORT MEDIA) 2 HR TAT - Swab, Nasopharynx [[id number]]  (Normal) Collected:  07/09/20 1845    Specimen:  Swab from Nasopharynx Updated:  07/09/20 1915     COVID19 Not Detected    Narrative:       Fact sheet for providers: https://www.fda.gov/media/639160/download     Fact sheet for patients: https://www.fda.gov/media/998648/download           Cultures:  No results found for: BLOODCX, URINECX, WOUNDCX, MRSACX, RESPCX, STOOLCX    Radiology Data:    Imaging Results (Last 24 Hours)     Procedure Component Value Units Date/Time    US Renal Bilateral [[id number]] Collected:  07/10/20 0942     Updated:  07/10/20 1003    Narrative:       US RENAL BILATERAL-     Indication: renal failure     Comparison: None     Findings:     RIGHT kidney measures 12.4 cm in length and demonstrates normal cortical  thickness and echogenicity. No shadowing calculi or hydronephrosis.     The LEFT kidney measures 11.5 cm in length and demonstrates normal  cortical thickness and echogenicity. No shadowing  calculi or  hydronephrosis.      Mild circumferential bladder wall thickening and enlarged prostate which  indents the urinary bladder. The urinary bladder diverticulum is  present.     Incidentally noted, a large rounded vascular structure in the upper  abdomen peripancreatic region is identified.       Impression:       Impression:  1. Negative for hydronephrosis or renal atrophy.  2. Enlarged prostate indenting the urinary bladder. Bladder wall  thickening and single diverticulum. Correlate for partial outlet  obstruction.  3. Incidentally noted, abnormal rounded vascular structure in the upper  abdomen peripancreatic region. Differential includes aneurysm. Recommend  CT of the abdomen for further evaluation.  This report was finalized on 07/10/2020 10:00 by Dr. Major Mariano MD.          No Known Allergies    Scheduled meds:     allopurinol 100 mg Oral Daily   apixaban 5 mg Oral Q12H   dilTIAZem  mg Oral BID   glipizide 10 mg Oral QAM AC   insulin lispro 2-7 Units Subcutaneous TID AC   metoprolol succinate  mg Oral BID   pantoprazole 40 mg Oral QAM   pravastatin 20 mg Oral Nightly   sodium chloride 10 mL Intravenous Q12H   sulfaSALAzine 1,000 mg Oral TID   terazosin 10 mg Oral Nightly       PRN meds:  dextrose  •  dextrose  •  glucagon (human recombinant)  •  ondansetron  •  sodium chloride    Assessment/Plan       Chronic anticoagulation    Acute renal failure superimposed on chronic kidney disease (CMS/[facility])    Type 2 diabetes mellitus (CMS/[facility])    Atrial fibrillation with RVR (CMS/[facility])    GERD (gastroesophageal reflux disease)    CHF (congestive heart failure) (CMS/[facility])      Plan:  Atrial for with RVR/pacemaker/CAD/moderate aortic valve stenosis.    Continue aspirin, Eliquis, Cardizem.  Hold Lasix due to worsening kidney function.  Hold lisinopril due to renal failure.  Continue Pravachol.  Continue Toprol.    Echocardiogram-ejection fraction 61 to 65%, left atrial cavity moderate dilated,  moderate aortic stenosis, diastolic dysfunction, mild tricuspid valve regurgitation, mild mitral valve regurgitation, atrial fibrillation, moderate pulmonary hypertension.    Hypo-magnesium.  2 g magnesium.   Magnesium in a.m.     Acute on chronic renal failure, stage III.  Creatinine is improving. Previous creatinine  is was 1.8.    Slow IV hydration.  Nephrology consult.  Ultrasound the kidneys- Negative for hydronephrosis or renal atrophy, Enlarged prostate indenting the urinary bladder, Bladder wall  thickening and single diverticulum- correlate for partial outlet  Obstruction,  abnormal rounded vascular structure in the upper abdomen peripancreatic region- differential includes aneurysm- recommend CT of the abdomen for further evaluation.    Hyponatremia.  Half-normal saline.    Possible abdomen aneurysm.  CT scan abdomen and pelvis to evaluate.     Reflux.  Protonix.  Zofran.     Diabetes.  Sliding scale.  Hemoglobin A1C 7.4.  Continue Glucotrol.  Hold Glucophage due to renal failure.     History of colitis.  Continue sulfasalazine.     Benign prostate hypertrophy.  Continue Hydrin.    Discharge Plannin-3 days.    Eb Garcia MD   07/10/20   15:29

## 2020-07-10 NOTE — PROGRESS NOTES
Discharge Planning Assessment  [facility]     Patient Name: Gage Ken  MRN: [id number]  Today's Date: 7/10/2020    Admit Date: 2020    Discharge Needs Assessment     Row Name 07/10/20 1244       Living Environment    Lives With  alone    Current Living Arrangements  home/apartment/condo    Primary Care Provided by  self    Provides Primary Care For  no one    Able to Return to Prior Arrangements  yes       Resource/Environmental Concerns    Resource/Environmental Concerns  none       Transition Planning    Patient/Family Anticipates Transition to  home    Patient/Family Anticipated Services at Transition  none    Transportation Anticipated  family or friend will provide       Discharge Needs Assessment    Readmission Within the Last 30 Days  no previous admission in last 30 days    Concerns to be Addressed  no discharge needs identified;denies needs/concerns at this time    Equipment Currently Used at Home  glucometer    Anticipated Changes Related to Illness  none    Equipment Needed After Discharge  none    Current Discharge Risk  chronically ill;lives alone;lack of support system/caregiver    Discharge Coordination/Progress  Social service consult received regarding patient not being able to afford his medications and ride home at discharge.  SW spoke with patient and he was unaware of any difficulty with his medication affordability.  Patient does have prescription coverage, Medicare Part D, under [employer] and copy of card is in EPIC.  Patient did however state he would likely need a ride home.  Patient states most of his family members are  and he has little social support.  Patient has a PCP and RX coverage.  Patient functions independently and plans to return home upon discharge.        Discharge Plan    No documentation.       Destination      Coordination has not been started for this encounter.      Durable Medical Equipment      Coordination has not been started for this encounter.       Dialysis/Infusion      Coordination has not been started for this encounter.      Home Medical Care      Coordination has not been started for this encounter.      Therapy      Coordination has not been started for this encounter.      Community Resources      Coordination has not been started for this encounter.          Demographic Summary    No documentation.       Functional Status    No documentation.       Psychosocial    No documentation.       Abuse/Neglect    No documentation.       Legal    No documentation.       Substance Abuse    No documentation.       Patient Forms    No documentation.           ENEDELIA De Leon

## 2020-07-10 NOTE — PLAN OF CARE
Problem: Patient Care Overview  Goal: Plan of Care Review  Outcome: Ongoing (interventions implemented as appropriate)  Flowsheets (Taken 7/10/2020 6892)  Progress: no change  Plan of Care Reviewed With: patient  Outcome Summary: Pt directly admitted from another hospital for Afib with RVR. Pt denies pain. Dr. Hicks and Jonathan Cardona notified of consultations. Dr. Hicks notified of Troponin 0.043 which has came down since 0.035. Pt has been Afib 78-94 with occasional V-pacing. Pt did have a 12 beat run of Vtach at 0137

## 2020-07-11 NOTE — PLAN OF CARE
Problem: Patient Care Overview  Goal: Plan of Care Review  Outcome: Ongoing (interventions implemented as appropriate)  Flowsheets (Taken 7/11/2020 1650)  Progress: no change  Plan of Care Reviewed With: patient  Outcome Summary: VSS this shift. AF  on tele with 24 bts of vtach on tele. MD aware of vtach, no new interventions. Patient recieving 240mg BID of cardizem. Hydralazine added for better BP control. Still recieving gentle IV fluid hydration. Anticoagulation on hold due to HGB. Safety maintained, no falls. Will cont to monitor and notify MD of any changes.

## 2020-07-11 NOTE — PLAN OF CARE
Problem: Patient Care Overview  Goal: Plan of Care Review  Outcome: Ongoing (interventions implemented as appropriate)  Flowsheets (Taken 7/11/2020 0404)  Progress: improving  Plan of Care Reviewed With: patient  Note:   No c/o pain voiced.  No SOA verbalized.  AF on tele.  [place] bilateral. UA sent to lab as ordered.  Continue to monitor.   Goal: Individualization and Mutuality  Outcome: Ongoing (interventions implemented as appropriate)  Flowsheets (Taken 7/11/2020 0404)  Patient Specific Goals (Include Timeframe): home  Patient Specific Interventions: 1/2 NS at 75 ml.hr  Patient Specific Preferences: Door closed  Goal: Discharge Needs Assessment  Outcome: Ongoing (interventions implemented as appropriate)  Goal: Interprofessional Rounds/Family Conf  Outcome: Ongoing (interventions implemented as appropriate)     Problem: Arrhythmia/Dysrhythmia (Symptomatic) (Adult)  Goal: Signs and Symptoms of Listed Potential Problems Will be Absent, Minimized or Managed (Arrhythmia/Dysrhythmia)  Outcome: Ongoing (interventions implemented as appropriate)

## 2020-07-11 NOTE — PROGRESS NOTES
PROGRESS NOTE.      Patient:  Gage Ken  YOB: 1940  Date of Service: 7/11/2020  MRN: [id number]   Acct: [id number]   Primary Care Physician: Cayetano Kay MD  Advance Directive:   Code Status and Medical Interventions:   Ordered at: 07/09/20 1836     Code Status:    CPR     Medical Interventions (Level of Support Prior to Arrest):    Full     Admit Date: 7/9/2020       Hospital Day: 2  Referring Provider: Eb Garcia MD      Patient Seen, Chart, Consults, Notes, Labs, Radiology studies reviewed.        Subjective:  Gage Ken is a 79 y.o. male  whom we were consulted for acute kidney injury.  Baseline chronic kidney disease stage 3; baseline creatinine 1.3-1.6. No prior nephrology contacts.  History of atrial fibrillation with permanent pacemaker placement in 2019, hypertension, type 2 diabetes, ulcerative colitis, aortic stenosis.  Patient went to his PCP earlier this week for a checkup; was found to be in atrial fibrillation with RVR. Also had incidental finding of acute kidney injury with creatinine 3.8. He was sent to [facility] ER for further evaluation. Patient is somewhat of a difficult historian as he is unclear with the timing and duration of his symptoms. It would seem that he has recently been having some nausea and vomiting, denies diarrhea. Denies changes in oral intake. He has noted increased lower extremity edema recently. He thinks that his urine output has been less recently but denies any dysuria or hematuria. Denies dark color or strong odor. He does endorse episodes of palpitations but is unsure how long these have been present or how frequently they have been taking place.  Denies NSAIDs. He is managed with IV fluids and he offered no new complaints today.       Review of Systems:  History obtained from chart review and the patient  General ROS: No fever or chills  Respiratory ROS: No cough, shortness of breath, wheezing  Cardiovascular ROS: no chest pain  "or dyspnea on exertion  Gastrointestinal ROS: No abdominal pain or melena  Genito-Urinary ROS: No dysuria or hematuria  Musculoskeletal: negative  Skin: negative    Objective:  /91 (BP Location: Right arm, Patient Position: Sitting) Comment: nurse notified - Isabel  Pulse 104   Temp 98.5 °F (36.9 °C) (Oral)   Resp 18   Ht 167.6 cm (65.98\")   Wt 70.4 kg (155 lb 4 oz)   SpO2 97%   BMI 25.07 kg/m²     Intake/Output Summary (Last 24 hours) at 7/11/2020 1353  Last data filed at 7/11/2020 1300  Gross per 24 hour   Intake 1660 ml   Output 1177 ml   Net 483 ml       Physical examination:  General: awake/alert   Chest:  clear to auscultation bilaterally without respiratory distress  CVS: regular rate and rhythm  Abdominal: soft, nontender, normal bowel sounds  Extremities: trac leg edema  Skin: warm and dry without rash  Neuro: No focal motor deficits    Labs:  Lab Results (last 24 hours)     Procedure Component Value Units Date/Time    Eosinophil Smear - Urine, Urine, Clean Catch [[id number]]  (Normal) Collected:  07/10/20 2006    Specimen:  Urine, Clean Catch Updated:  07/11/20 1240     Eosinophil Smear 0 % EOS/100 Cells     POC Glucose Once [[id number]]  (Abnormal) Collected:  07/11/20 1217    Specimen:  Blood Updated:  07/11/20 1229     Glucose 210 mg/dL      Comment: : [id number] Miguelito AshleyMeter ID: [id number]       POC Glucose Once [[id number]]  (Abnormal) Collected:  07/11/20 0759    Specimen:  Blood Updated:  07/11/20 0820     Glucose 179 mg/dL      Comment: : [id number] Miguelito AshleyMeter ID: [id number]       Magnesium [[id number]]  (Normal) Collected:  07/11/20 0522    Specimen:  Blood Updated:  07/11/20 0603     Magnesium 1.8 mg/dL     Comprehensive Metabolic Panel [[id number]]  (Abnormal) Collected:  07/11/20 0522    Specimen:  Blood Updated:  07/11/20 0603     Glucose 162 mg/dL      BUN 37 mg/dL      Creatinine 3.19 mg/dL      Sodium 141 mmol/L      Potassium 3.6 mmol/L      Chloride 103 mmol/L      " CO2 24.0 mmol/L      Calcium 8.8 mg/dL      Total Protein 6.3 g/dL      Albumin 3.90 g/dL      ALT (SGPT) 14 U/L      AST (SGOT) 11 U/L      Alkaline Phosphatase 93 U/L      Total Bilirubin 0.5 mg/dL      eGFR Non African Amer 19 mL/min/1.73      Globulin 2.4 gm/dL      A/G Ratio 1.6 g/dL      BUN/Creatinine Ratio 11.6     Anion Gap 14.0 mmol/L     Narrative:       GFR Normal >60  Chronic Kidney Disease <60  Kidney Failure <15      Iron Profile [[id number]]  (Abnormal) Collected:  07/11/20 0522    Specimen:  Blood Updated:  07/11/20 0603     Iron 55 mcg/dL      Iron Saturation 26 %      Transferrin 143 mg/dL      TIBC 213 mcg/dL     CK [[id number]]  (Normal) Collected:  07/11/20 0522    Specimen:  Blood Updated:  07/11/20 0603     Creatine Kinase 37 U/L     CBC Auto Differential [[id number]]  (Abnormal) Collected:  07/11/20 0522    Specimen:  Blood Updated:  07/11/20 0542     WBC 7.42 10*3/mm3      RBC 2.65 10*6/mm3      Hemoglobin 8.3 g/dL      Hematocrit 24.3 %      MCV 91.7 fL      MCH 31.3 pg      MCHC 34.2 g/dL      RDW 13.5 %      RDW-SD 45.1 fl      MPV 10.3 fL      Platelets 197 10*3/mm3      Neutrophil % 71.4 %      Lymphocyte % 16.4 %      Monocyte % 7.7 %      Eosinophil % 3.6 %      Basophil % 0.4 %      Immature Grans % 0.5 %      Neutrophils, Absolute 5.29 10*3/mm3      Lymphocytes, Absolute 1.22 10*3/mm3      Monocytes, Absolute 0.57 10*3/mm3      Eosinophils, Absolute 0.27 10*3/mm3      Basophils, Absolute 0.03 10*3/mm3      Immature Grans, Absolute 0.04 10*3/mm3      nRBC 0.0 /100 WBC     POC Glucose Once [[id number]]  (Abnormal) Collected:  07/10/20 2001    Specimen:  Blood Updated:  07/10/20 2012     Glucose 207 mg/dL      Comment: : [id number] Aba New BlainestanraMeter ID: [id number]       Creatinine, Urine, Random - Urine, Clean Catch [[id number]] Collected:  07/10/20 1350    Specimen:  Urine, Clean Catch Updated:  07/10/20 1933     Creatinine, Urine 48.3 mg/dL     Narrative:       Reference  intervals for random urine have not been established.  Clinical usage is dependent upon physician's interpretation in combination with other laboratory tests.             Radiology:   Imaging Results (Last 24 Hours)     Procedure Component Value Units Date/Time    CT Abdomen Pelvis Without Contrast [[id number]] Collected:  07/10/20 1607     Updated:  07/10/20 1619    Narrative:       EXAMINATION:   CT ABDOMEN PELVIS WO CONTRAST-  7/10/2020 4:07 PM CDT     HISTORY: CT ABDOMEN PELVIS WO CONTRAST- 7/10/2020 3:57 PM CDT     HISTORY: Abdominal aortic aneurysm (AAA), known, follow up      COMPARISON: None      DOSE LENGTH PRODUCT: 272 mGy cm. Automated exposure control was also  utilized to decrease patient radiation dose.     TECHNIQUE: Noncontrast enhanced images of the abdomen and pelvis  obtained without oral contrast. Multiplanar reformatted images were  provided for review.      FINDINGS:   The lung bases and base of the heart are unremarkable.      LIVER: Scattered granulomatous calcifications are present in the liver..        BILIARY SYSTEM: The wall the gallbladder appears slightly thickened and  there is subtle stranding or edema around the gallbladder wall  suspicious for cholecystitis.      PANCREAS: Pancreas is not well evaluated in the absence of contrast the  lesion in the head of the pancreas cannot BE excluded.      SPLEEN: Splenic calcifications are present..      KIDNEYS AND ADRENALS: Adrenal glands are visualized. The renal contours  are visualized. In the perinephric fat is present. This is nonspecific..   The ureters are decompressed and normal in appearance.     RETROPERITONEUM: No mass, lymphadenopathy or hemorrhage.      GI TRACT: No evidence of obstruction or bowel wall thickening.  Diverticulosis is present. The appendix is visualized and unremarkable.     OTHER: There is no mesenteric mass, lymphadenopathy or fluid collection.  The osseous structures and soft tissues demonstrate no  worrisome  lesions. Degenerative spondylosis is noted in the lumbar spine.  Extensive vascular calcifications present abdominal aorta and iliac  arteries. Mesh is present on the anterior abdominal wall.     PELVIS: Bilateral fat-containing inguinal hernias are present.. The  urinary bladder is normal in appearance.       Impression:       1. Unenhanced CT of the abdomen and pelvis. Stranding around the renal  contours is noted. Etiology is not apparent. Pyelonephritis cannot BE  excluded  2. The head of the pancreas is not well evaluated without intravenous  contrast. A lesion in the head of the pancreas cannot BE excluded  3. Diverticulosis..  4. Extensive vascular calcifications present  5. Mesh is present on the anterior abdominal wall. However a small  umbilical hernia is present.  6. Bilateral fat-containing hernias     #6        This report was finalized on 07/10/2020 16:16 by Dr. Zhang Dutton MD.              Assessment     1.  Acute kidney injury (ATN vs progression of his CKD, obstruction was ruled out)  2.  Baseline chronic kidney disease stage 3  3.  Hypernatremia  4.  Type 2 diabetes with renal disease  5.  Hypertension  6.  Chronic atrial fibrillation; s/p pacemaker  7.  Anemia   8.  Hypomagnesemia   9.  Hyperuricemia    Plan:  Continue gentle IV hydration, follow up labs. Allopurinol.       Jose Enrique Cho MD  7/11/2020  13:53

## 2020-07-11 NOTE — PROGRESS NOTES
[facility] Medicine Services  INPATIENT PROGRESS NOTE    Patient Name: Gage Ken  Date of Admission: 7/9/2020  Today's Date: 07/11/20  Length of Stay: 2  Primary Care Physician: Cayetano Kay MD    Subjective   Chief Complaint: follow up JARET  HPI   Doing ok.  No SOA, tolerating room air  Renal function improving  Had 24  Beats of NSVT on monitor  No CP, palpitation, light-headedness        Review of Systems   Constitutional: Negative for fatigue and fever.   HENT: Negative for congestion and ear pain.    Eyes: Negative for redness and visual disturbance.   Respiratory: Negative for cough, shortness of breath and wheezing.    Cardiovascular: Negative for chest pain and palpitations.   Gastrointestinal: Negative for abdominal pain, diarrhea, nausea and vomiting.   Endocrine: Negative for cold intolerance and heat intolerance.   Genitourinary: Negative for dysuria and frequency.   Musculoskeletal: Negative for arthralgias and back pain.   Skin: Negative for rash and wound.   Neurological: Negative for dizziness and headaches.   Psychiatric/Behavioral: Negative for confusion. The patient is not nervous/anxious.         All pertinent negatives and positives are as above. All other systems have been reviewed and are negative unless otherwise stated.     Objective    Temp:  [98 °F (36.7 °C)-98.5 °F (36.9 °C)] 98.5 °F (36.9 °C)  Heart Rate:  [] 104  Resp:  [16-18] 18  BP: (149-160)/(70-91) 159/91  Physical Exam   Constitutional: He is oriented to person, place, and time. He appears well-developed and well-nourished.   HENT:   Head: Normocephalic and atraumatic.   Right Ear: External ear normal.   Left Ear: External ear normal.   Nose: Nose normal.   Mouth/Throat: Oropharynx is clear and moist.   Eyes: Pupils are equal, round, and reactive to light. Conjunctivae and EOM are normal. Right eye exhibits no discharge. Left eye exhibits no discharge. No scleral icterus.      Neck: Normal range of motion. Neck supple. No tracheal deviation present. No thyromegaly present.   Cardiovascular: Normal rate, regular rhythm, normal heart sounds and intact distal pulses. Exam reveals no gallop and no friction rub.   No murmur heard.  Pulmonary/Chest: Effort normal and breath sounds normal. No stridor. No respiratory distress. He has no wheezes. He has no rales. He exhibits no tenderness.   Abdominal: Soft. Bowel sounds are normal. He exhibits no distension and no mass. There is no tenderness. There is no rebound and no guarding. No hernia.   Musculoskeletal: Normal range of motion. He exhibits no edema or deformity.   Lymphadenopathy:     He has no cervical adenopathy.   Neurological: He is alert and oriented to person, place, and time. He has normal reflexes. He displays normal reflexes. No cranial nerve deficit. He exhibits normal muscle tone. Coordination normal.   Skin: Skin is warm and dry. No rash noted. No erythema. No pallor.   Psychiatric: He has a normal mood and affect. His behavior is normal. Judgment and thought content normal.   Vitals reviewed.          Results Review:  I have reviewed the labs, radiology results, and diagnostic studies.    Laboratory Data:   Results from last 7 days   Lab Units 07/11/20  0522 07/10/20  0001 07/09/20 1958   WBC 10*3/mm3 7.42 6.06 6.00   HEMOGLOBIN g/dL 8.3* 7.6* 7.8*   HEMATOCRIT % 24.3* 22.3* 23.4*   PLATELETS 10*3/mm3 197 188 195        Results from last 7 days   Lab Units 07/11/20  0522 07/10/20  0523 07/10/20  0001 07/09/20 1958   SODIUM mmol/L 141 146* 146* 144   POTASSIUM mmol/L 3.6 3.5 3.4* 3.9   CHLORIDE mmol/L 103 106 105 104   CO2 mmol/L 24.0 23.0 25.0 24.0   BUN mg/dL 37* 42* 44* 43*   CREATININE mg/dL 3.19* 3.68* 3.84* 3.80*   CALCIUM mg/dL 8.8 8.6 8.8 8.5*   BILIRUBIN mg/dL 0.5  --  0.2 0.3   ALK PHOS U/L 93  --  92 101   ALT (SGPT) U/L 14  --  15 17   AST (SGOT) U/L 11  --  11 14   GLUCOSE mg/dL 162* 94 63* 193*       Culture  Data:   No results found for: BLOODCX, URINECX, WOUNDCX, MRSACX, RESPCX, STOOLCX    Radiology Data:   Imaging Results (Last 24 Hours)     Procedure Component Value Units Date/Time    CT Abdomen Pelvis Without Contrast [[id number]] Collected:  07/10/20 1607     Updated:  07/10/20 1619    Narrative:       EXAMINATION:   CT ABDOMEN PELVIS WO CONTRAST-  7/10/2020 4:07 PM CDT     HISTORY: CT ABDOMEN PELVIS WO CONTRAST- 7/10/2020 3:57 PM CDT     HISTORY: Abdominal aortic aneurysm (AAA), known, follow up      COMPARISON: None      DOSE LENGTH PRODUCT: 272 mGy cm. Automated exposure control was also  utilized to decrease patient radiation dose.     TECHNIQUE: Noncontrast enhanced images of the abdomen and pelvis  obtained without oral contrast. Multiplanar reformatted images were  provided for review.      FINDINGS:   The lung bases and base of the heart are unremarkable.      LIVER: Scattered granulomatous calcifications are present in the liver..        BILIARY SYSTEM: The wall the gallbladder appears slightly thickened and  there is subtle stranding or edema around the gallbladder wall  suspicious for cholecystitis.      PANCREAS: Pancreas is not well evaluated in the absence of contrast the  lesion in the head of the pancreas cannot BE excluded.      SPLEEN: Splenic calcifications are present..      KIDNEYS AND ADRENALS: Adrenal glands are visualized. The renal contours  are visualized. In the perinephric fat is present. This is nonspecific..   The ureters are decompressed and normal in appearance.     RETROPERITONEUM: No mass, lymphadenopathy or hemorrhage.      GI TRACT: No evidence of obstruction or bowel wall thickening.  Diverticulosis is present. The appendix is visualized and unremarkable.     OTHER: There is no mesenteric mass, lymphadenopathy or fluid collection.  The osseous structures and soft tissues demonstrate no worrisome  lesions. Degenerative spondylosis is noted in the lumbar spine.  Extensive vascular  calcifications present abdominal aorta and iliac  arteries. Mesh is present on the anterior abdominal wall.     PELVIS: Bilateral fat-containing inguinal hernias are present.. The  urinary bladder is normal in appearance.       Impression:       1. Unenhanced CT of the abdomen and pelvis. Stranding around the renal  contours is noted. Etiology is not apparent. Pyelonephritis cannot BE  excluded  2. The head of the pancreas is not well evaluated without intravenous  contrast. A lesion in the head of the pancreas cannot BE excluded  3. Diverticulosis..  4. Extensive vascular calcifications present  5. Mesh is present on the anterior abdominal wall. However a small  umbilical hernia is present.  6. Bilateral fat-containing hernias     #6        This report was finalized on 07/10/2020 16:16 by Dr. Zhang Dutton MD.          I have reviewed the patient's current medications.     Assessment/Plan     Active Hospital Problems    Diagnosis   • Atrial fibrillation with RVR (CMS/HCC)   • GERD (gastroesophageal reflux disease)   • CHF (congestive heart failure) (CMS/HCC)   • Acute renal failure superimposed on chronic kidney disease (CMS/HCC)   • Chronic anticoagulation   • Type 2 diabetes mellitus (CMS/HCC)   Labs reviewed by me:  CMP shows improving Cr from 3.68 to 3.19.  Electrolytes are stable    CBC shows improving anemia    Renal U/S shows no hydronephrosis     Telemetry independently reviewed by me shows NSVT    1.  JARET on CKD  -Nephrology following  -Gentle IVF  -Allopurinol    2.  Chronic Diastolic CHF  -monitor for volume overload  -no diuretics given JARET    3.  GERD  -protonix    4.  A-fib  -Cardizem  -Metoprolol    5.  T2DM  -Glipizide  -SSI    6.  HTN  -Hydralazine  -Metoprolol    7.  UC  -SulfaSalazine    8.  BPH  -Hytrin    9.  NSVT  -Continue Telemetry  -Cardiology following      Discharge Planning: I expect the patient to be discharged to home in 2-3 days    Viet Timmons MD   07/11/20   14:16

## 2020-07-11 NOTE — PROGRESS NOTES
"[facility], [facility] Progress Note     LOS: 2 days   Patient Care Team:  Cayetano Kay MD as PCP - General  Haley Lane APRN (Gastroenterology)  Haley Lane APRN (Gastroenterology)  Cayetano Kay MD as Referring Physician (Internal Medicine)  Alex Han MD as Consulting Physician (Otolaryngology)  Shaw Ag MD (Inactive) as Consulting Physician (Dermatology)    Chief Complaint:  AF, anemia    Subjective     Occasional heart palpitations.  No other cardiac c/o.    Review of Systems:   A 10-point review of systems is obtained and negative except for otherwise mentioned above.    Objective     Vital Sign Min/Max for last 24 hours  Temp  Min: 98 °F (36.7 °C)  Max: 98.3 °F (36.8 °C)   BP  Min: 149/89  Max: 160/82   Pulse  Min: 75  Max: 106   Resp  Min: 16  Max: 18   SpO2  Min: 93 %  Max: 97 %   No data recorded   Weight  Min: 70.4 kg (155 lb 4 oz)  Max: 70.4 kg (155 lb 4 oz)     Flowsheet Rows      First Filed Value   Admission Height  167.6 cm (65.98\") Documented at 07/09/2020 1725   Admission Weight  70.9 kg (156 lb 3.2 oz) Documented at 07/09/2020 1725          Physical Exam:   General Appearance: alert, appears stated age and cooperative  Lungs: clear to auscultation, respirations regular, respirations even and respirations unlabored  Heart: IRR     Results Review:     I reviewed the patient's new clinical results.    Results from last 7 days   Lab Units 07/11/20  0522   WBC 10*3/mm3 7.42   HEMOGLOBIN g/dL 8.3*   HEMATOCRIT % 24.3*   PLATELETS 10*3/mm3 197     Results from last 7 days   Lab Units 07/11/20  0522   SODIUM mmol/L 141   POTASSIUM mmol/L 3.6   CHLORIDE mmol/L 103   CO2 mmol/L 24.0   BUN mg/dL 37*   CREATININE mg/dL 3.19*   GLUCOSE mg/dL 162*   CALCIUM mg/dL 8.8     Results from last 7 days   Lab Units 07/11/20  0522   SODIUM mmol/L 141   POTASSIUM mmol/L 3.6   CHLORIDE mmol/L 103   CO2 mmol/L 24.0   BUN mg/dL 37*   CREATININE mg/dL 3.19* "   CALCIUM mg/dL 8.8   BILIRUBIN mg/dL 0.5   ALK PHOS U/L 93   ALT (SGPT) U/L 14   AST (SGOT) U/L 11   GLUCOSE mg/dL 162*     Results from last 7 days   Lab Units 07/11/20  0522 07/10/20  0523 07/10/20  0001 07/09/20 1958   CK TOTAL U/L 37  --   --   --    TROPONIN T ng/mL  --  0.037* 0.035* 0.043*     Results from last 7 days   Lab Units 07/10/20  0523 07/10/20  0001   TSH uIU/mL  --  4.390*   FREE T4 ng/dL 1.07  --      Results from last 7 days   Lab Units 07/10/20  0001   CHOLESTEROL mg/dL 122   TRIGLYCERIDES mg/dL 50   HDL CHOL mg/dL 35*   LDL CHOL mg/dL 77       Medication Review: yes    Assessment/Plan       Chronic anticoagulation    Acute renal failure superimposed on chronic kidney disease (CMS/HCC)    Type 2 diabetes mellitus (CMS/HCC)    Atrial fibrillation with RVR (CMS/HCC)    GERD (gastroesophageal reflux disease)    CHF (congestive heart failure) (CMS/HCC)  anemia    Rate controlled.  Hold DOAC for now d/t anemia.    Nanci Pascual PA-C  07/11/20  10:09

## 2020-07-12 NOTE — PLAN OF CARE
Problem: Patient Care Overview  Goal: Plan of Care Review  Outcome: Ongoing (interventions implemented as appropriate)  Flowsheets (Taken 7/12/2020 0320)  Progress: no change  Plan of Care Reviewed With: patient  Note:   VSS.  Multiple PVCs on tele, AF.  Started hydralazine for BP, appears BP lower.  No falls noted. Up to BR.  Continue to monitor.  Goal: Individualization and Mutuality  Outcome: Ongoing (interventions implemented as appropriate)  Flowsheets (Taken 7/11/2020 0404)  Patient Specific Goals (Include Timeframe): home  Patient Specific Interventions: 1/2 NS at 75 ml.hr  Patient Specific Preferences: Door closed  Goal: Discharge Needs Assessment  Outcome: Ongoing (interventions implemented as appropriate)  Goal: Interprofessional Rounds/Family Conf  Outcome: Ongoing (interventions implemented as appropriate)     Problem: Arrhythmia/Dysrhythmia (Symptomatic) (Adult)  Goal: Signs and Symptoms of Listed Potential Problems Will be Absent, Minimized or Managed (Arrhythmia/Dysrhythmia)  Outcome: Ongoing (interventions implemented as appropriate)     Problem: Renal Failure/Kidney Injury, Acute (Adult)  Goal: Signs and Symptoms of Listed Potential Problems Will be Absent, Minimized or Managed (Renal Failure/Kidney Injury, Acute)  Outcome: Ongoing (interventions implemented as appropriate)

## 2020-07-12 NOTE — PLAN OF CARE
Problem: Patient Care Overview  Goal: Plan of Care Review  Outcome: Ongoing (interventions implemented as appropriate)  Flowsheets (Taken 7/12/2020 1513)  Progress: improving  Plan of Care Reviewed With: patient  Outcome Summary: VSS this shift. No complaints of pain. IV digoxin given X1. Creatnine the same as yesterday, 3.19. HGB has dropped to 7.5. Hydralazine increased to 25mg per cardio. Patient to start on digoxin PO tomorrow morning. 1/2 NS currently infusing. Safety maintained, no falls. Will cont to monitor and notify MD of changes.

## 2020-07-12 NOTE — PROGRESS NOTES
[facility]  INPATIENT PROGRESS NOTE    Patient Name: Gage Ken  Date of Admission: 7/9/2020  Today's Date: 07/12/20  Length of Stay: 3  Primary Care Physician: Cayetano Kay MD    Subjective   Chief Complaint: follow up JARET  HPI   Doing ok.  No SOA, tolerating room air  Renal function improving stable today, urinating without difficulty        Review of Systems   Constitutional: Negative for fatigue and fever.   HENT: Negative for congestion and ear pain.    Eyes: Negative for redness and visual disturbance.   Respiratory: Negative for cough, shortness of breath and wheezing.    Cardiovascular: Negative for chest pain and palpitations.   Gastrointestinal: Negative for abdominal pain, diarrhea, nausea and vomiting.   Endocrine: Negative for cold intolerance and heat intolerance.   Genitourinary: Negative for dysuria and frequency.   Musculoskeletal: Negative for arthralgias and back pain.   Skin: Negative for rash and wound.   Neurological: Negative for dizziness and headaches.   Psychiatric/Behavioral: Negative for confusion. The patient is not nervous/anxious.         All pertinent negatives and positives are as above. All other systems have been reviewed and are negative unless otherwise stated.     Objective    Temp:  [98 °F (36.7 °C)-98.7 °F (37.1 °C)] 98 °F (36.7 °C)  Heart Rate:  [83-97] 92  Resp:  [16-18] 18  BP: (120-165)/(66-82) 153/66  Physical Exam   Constitutional: He is oriented to person, place, and time. He appears well-developed and well-nourished.   HENT:   Head: Normocephalic and atraumatic.   Right Ear: External ear normal.   Left Ear: External ear normal.   Nose: Nose normal.   Mouth/Throat: Oropharynx is clear and moist.   Eyes: Pupils are equal, round, and reactive to light. Conjunctivae and EOM are normal. Right eye exhibits no discharge. Left eye exhibits no discharge. No scleral icterus.   Neck: Normal range of motion. Neck  supple. No tracheal deviation present. No thyromegaly present.   Cardiovascular: Normal rate, regular rhythm, normal heart sounds and intact distal pulses. Exam reveals no gallop and no friction rub.   No murmur heard.  Pulmonary/Chest: Effort normal and breath sounds normal. No stridor. No respiratory distress. He has no wheezes. He has no rales. He exhibits no tenderness.   Abdominal: Soft. Bowel sounds are normal. He exhibits no distension and no mass. There is no tenderness. There is no rebound and no guarding. No hernia.   Musculoskeletal: Normal range of motion. He exhibits no edema or deformity.   Lymphadenopathy:     He has no cervical adenopathy.   Neurological: He is alert and oriented to person, place, and time. He has normal reflexes. He displays normal reflexes. No cranial nerve deficit. He exhibits normal muscle tone. Coordination normal.   Skin: Skin is warm and dry. No rash noted. No erythema. No pallor.   Psychiatric: He has a normal mood and affect. His behavior is normal. Judgment and thought content normal.   Vitals reviewed.          Results Review:  I have reviewed the labs, radiology results, and diagnostic studies.    Laboratory Data:   Results from last 7 days   Lab Units 07/12/20  0203 07/11/20  0522 07/10/20  0001   WBC 10*3/mm3 7.82 7.42 6.06   HEMOGLOBIN g/dL 7.5* 8.3* 7.6*   HEMATOCRIT % 21.8* 24.3* 22.3*   PLATELETS 10*3/mm3 177 197 188        Results from last 7 days   Lab Units 07/12/20  0203 07/11/20  0522 07/10/20  0523 07/10/20  0001   SODIUM mmol/L 139 141 146* 146*   POTASSIUM mmol/L 3.2* 3.6 3.5 3.4*   CHLORIDE mmol/L 103 103 106 105   CO2 mmol/L 23.0 24.0 23.0 25.0   BUN mg/dL 36* 37* 42* 44*   CREATININE mg/dL 3.19* 3.19* 3.68* 3.84*   CALCIUM mg/dL 8.3* 8.8 8.6 8.8   BILIRUBIN mg/dL 0.2 0.5  --  0.2   ALK PHOS U/L 108 93  --  92   ALT (SGPT) U/L 14 14  --  15   AST (SGOT) U/L 15 11  --  11   GLUCOSE mg/dL 242* 162* 94 63*       Culture Data:   No results found for: BLOODCX,  URINECX, WOUNDCX, MRSACX, RESPCX, STOOLCX    Radiology Data:   Imaging Results (Last 24 Hours)     ** No results found for the last 24 hours. **          I have reviewed the patient's current medications.     Assessment/Plan     Active Hospital Problems    Diagnosis   • Atrial fibrillation with RVR (CMS/[facility])   • GERD (gastroesophageal reflux disease)   • CHF (congestive heart failure) (CMS/[facility])   • Acute renal failure superimposed on chronic kidney disease (CMS/[facility])   • Chronic anticoagulation   • Type 2 diabetes mellitus (CMS/[facility])   Labs reviewed by me:  CMP shows unchanged Cr.    1.  JARET on CKD--stable  -Nephrology following  -Gentle IVF  -Allopurinol    2.  Chronic Diastolic CHF--stable  -monitor for volume overload  -no diuretics given JARET    3.  GERD  -protonix    4.  A-fib  -Cardizem  -Metoprolol    5.  T2DM  -Glipizide  -SSI    6.  HTN  -Hydralazine  -Metoprolol    7.  UC  -SulfaSalazine    8.  BPH  -Hytrin    9.  NSVT  -Continue Telemetry  -Cardiology following    10.  Hypokalemia  -PO K  -Will check Mg level to make sure no hypomagnesemia contributing      Discharge Planning: I expect the patient to be discharged to home in 2-3 days    Viet Timmons MD   07/12/20   17:12

## 2020-07-12 NOTE — PROGRESS NOTES
PROGRESS NOTE.      Patient:  Gage Ken  YOB: 1940  Date of Service: 7/12/2020  MRN: [id number]   Acct: [id number]   Primary Care Physician: Cayetano Kay MD  Advance Directive:   Code Status and Medical Interventions:   Ordered at: 07/09/20 1836     Code Status:    CPR     Medical Interventions (Level of Support Prior to Arrest):    Full     Admit Date: 7/9/2020       Hospital Day: 3  Referring Provider: Eb Garcia MD      Patient Seen, Chart, Consults, Notes, Labs, Radiology studies reviewed.    Chief complaint: Abnormal labs.    Subjective:  Gage Ken is a 79 y.o. male  whom we were consulted for acute kidney injury.  Baseline chronic kidney disease stage 3; baseline creatinine 1.3-1.6. No prior nephrology contacts.  History of atrial fibrillation with permanent pacemaker placement in 2019, hypertension, type 2 diabetes, ulcerative colitis, aortic stenosis.  Patient went to his PCP earlier this week for a checkup; was found to be in atrial fibrillation with RVR. Also had incidental finding of acute kidney injury with creatinine 3.8. He was sent to [facility] ER for further evaluation. Patient is somewhat of a difficult historian as he is unclear with the timing and duration of his symptoms. It would seem that he has recently been having some nausea and vomiting, denies diarrhea. Denies changes in oral intake. He has noted increased lower extremity edema recently. He thinks that his urine output has been less recently but denies any dysuria or hematuria. Denies dark color or strong odor. He does endorse episodes of palpitations but is unsure how long these have been present or how frequently they have been taking place.  Denies NSAIDs.    This morning he is still feeling weak and renal function has some improvement.      Review of Systems:  History obtained from chart review and the patient  General ROS: No fever or chills  Respiratory ROS: No cough, shortness of breath,  "wheezing  Cardiovascular ROS: no chest pain or dyspnea on exertion  Gastrointestinal ROS: No abdominal pain or melena  Genito-Urinary ROS: No dysuria or hematuria  Musculoskeletal: negative  Skin: negative    Objective:  /82 (BP Location: Left arm, Patient Position: Lying) Comment: RN notified  Pulse 87   Temp 98.1 °F (36.7 °C) (Oral)   Resp 18   Ht 167.6 cm (65.98\")   Wt 72.3 kg (159 lb 6 oz)   SpO2 96%   BMI 25.74 kg/m²     Intake/Output Summary (Last 24 hours) at 7/12/2020 1456  Last data filed at 7/12/2020 1112  Gross per 24 hour   Intake 2200 ml   Output 1700 ml   Net 500 ml       Physical examination:  General: awake/alert   Chest:  clear to auscultation bilaterally without respiratory distress  CVS: regular rate and rhythm  Abdominal: soft, nontender, normal bowel sounds  Extremities: trac leg edema  Skin: warm and dry without rash  Neuro: No focal motor deficits    Labs:  Lab Results (last 24 hours)     Procedure Component Value Units Date/Time    POC Glucose Once [[id number]]  (Abnormal) Collected:  07/12/20 1114    Specimen:  Blood Updated:  07/12/20 1145     Glucose 207 mg/dL      Comment: : [id number] [employer] ID: [id number]       POC Glucose Once [[id number]]  (Abnormal) Collected:  07/12/20 0729    Specimen:  Blood Updated:  07/12/20 0816     Glucose 167 mg/dL      Comment: : [id number] [employer] ID: [id number]       Comprehensive Metabolic Panel [[id number]]  (Abnormal) Collected:  07/12/20 0203    Specimen:  Blood Updated:  07/12/20 0343     Glucose 242 mg/dL      BUN 36 mg/dL      Creatinine 3.19 mg/dL      Sodium 139 mmol/L      Potassium 3.2 mmol/L      Chloride 103 mmol/L      CO2 23.0 mmol/L      Calcium 8.3 mg/dL      Total Protein 5.9 g/dL      Albumin 3.70 g/dL      ALT (SGPT) 14 U/L      AST (SGOT) 15 U/L      Alkaline Phosphatase 108 U/L      Total Bilirubin 0.2 mg/dL      eGFR Non African Amer 19 mL/min/1.73      Globulin 2.2 gm/dL      A/G Ratio 1.7 " g/dL      BUN/Creatinine Ratio 11.3     Anion Gap 13.0 mmol/L     Narrative:       GFR Normal >60  Chronic Kidney Disease <60  Kidney Failure <15      CBC Auto Differential [[id number]]  (Abnormal) Collected:  07/12/20 0203    Specimen:  Blood Updated:  07/12/20 0328     WBC 7.82 10*3/mm3      RBC 2.39 10*6/mm3      Hemoglobin 7.5 g/dL      Hematocrit 21.8 %      MCV 91.2 fL      MCH 31.4 pg      MCHC 34.4 g/dL      RDW 13.2 %      RDW-SD 44.6 fl      MPV 10.8 fL      Platelets 177 10*3/mm3      Neutrophil % 65.4 %      Lymphocyte % 22.6 %      Monocyte % 8.2 %      Eosinophil % 2.8 %      Basophil % 0.4 %      Immature Grans % 0.6 %      Neutrophils, Absolute 5.11 10*3/mm3      Lymphocytes, Absolute 1.77 10*3/mm3      Monocytes, Absolute 0.64 10*3/mm3      Eosinophils, Absolute 0.22 10*3/mm3      Basophils, Absolute 0.03 10*3/mm3      Immature Grans, Absolute 0.05 10*3/mm3      nRBC 0.0 /100 WBC     Protein Elec + Interp, Serum [[id number]] Collected:  07/11/20 2342    Specimen:  Blood Updated:  07/12/20 0011    POC Glucose Once [[id number]]  (Normal) Collected:  07/11/20 1740    Specimen:  Blood Updated:  07/11/20 1751     Glucose 121 mg/dL      Comment: : [id number] Miguelito [employer]eter ID: [id number]       POC Glucose Once [[id number]]  (Abnormal) Collected:  07/11/20 1651    Specimen:  Blood Updated:  07/11/20 1702     Glucose 64 mg/dL      Comment: : [id number] Miguelito [employer]eter ID: [id number]             Radiology:   Imaging Results (Last 24 Hours)     ** No results found for the last 24 hours. **              Assessment     1.  Acute kidney injury/ATN.  2.  Baseline chronic kidney disease stage 3  3.  Hypernatremia  4.  Type 2 diabetes with renal disease  5.  Hypertension  6.  Chronic atrial fibrillation; s/p pacemaker  7.  Anemia   8.  Hypomagnesemia   9.  Hyperuricemia  10.  Benign prostatic hypertrophy.    Plan:  1.  Continue IV fluid.  2.  Monitor renal function.      Jevon Valladares MD  7/12/2020  14:56

## 2020-07-12 NOTE — PROGRESS NOTES
"[facility], [facility] Progress Note     LOS: 3 days   Patient Care Team:  Cayetano Kay MD as PCP - General  Haley Lane APRN (Gastroenterology)  Haley Lane APRN (Gastroenterology)  Cayetano Kay MD as Referring Physician (Internal Medicine)  Alex Han MD as Consulting Physician (Otolaryngology)  Shaw Ag MD (Inactive) as Consulting Physician (Dermatology)    Chief Complaint:  Anemia, AF    Subjective     Pt has occasional heart palpitations but no other cardiac c/o.    Review of Systems:   A 10-point review of systems is obtained and negative except for otherwise mentioned above.    Objective     Vital Sign Min/Max for last 24 hours  Temp  Min: 98 °F (36.7 °C)  Max: 98.7 °F (37.1 °C)   BP  Min: 120/82  Max: 166/62   Pulse  Min: 83  Max: 108   Resp  Min: 16  Max: 18   SpO2  Min: 91 %  Max: 97 %   No data recorded   Weight  Min: 72.3 kg (159 lb 6 oz)  Max: 72.3 kg (159 lb 6 oz)     Flowsheet Rows      First Filed Value   Admission Height  167.6 cm (65.98\") Documented at 07/09/2020 1725   Admission Weight  70.9 kg (156 lb 3.2 oz) Documented at 07/09/2020 1725          Physical Exam:   General Appearance: alert, appears stated age and cooperative  Lungs: clear to auscultation, respirations regular, respirations even and respirations unlabored  Heart: IRR     Results Review:     I reviewed the patient's new clinical results.    Results from last 7 days   Lab Units 07/12/20  0203   WBC 10*3/mm3 7.82   HEMOGLOBIN g/dL 7.5*   HEMATOCRIT % 21.8*   PLATELETS 10*3/mm3 177     Results from last 7 days   Lab Units 07/12/20  0203   SODIUM mmol/L 139   POTASSIUM mmol/L 3.2*   CHLORIDE mmol/L 103   CO2 mmol/L 23.0   BUN mg/dL 36*   CREATININE mg/dL 3.19*   GLUCOSE mg/dL 242*   CALCIUM mg/dL 8.3*     Results from last 7 days   Lab Units 07/12/20  0203   SODIUM mmol/L 139   POTASSIUM mmol/L 3.2*   CHLORIDE mmol/L 103   CO2 mmol/L 23.0   BUN mg/dL 36*   CREATININE mg/dL " 3.19*   CALCIUM mg/dL 8.3*   BILIRUBIN mg/dL 0.2   ALK PHOS U/L 108   ALT (SGPT) U/L 14   AST (SGOT) U/L 15   GLUCOSE mg/dL 242*     Results from last 7 days   Lab Units 07/11/20  0522 07/10/20  0523 07/10/20  0001 07/09/20 1958   CK TOTAL U/L 37  --   --   --    TROPONIN T ng/mL  --  0.037* 0.035* 0.043*     Results from last 7 days   Lab Units 07/10/20  0523 07/10/20  0001   TSH uIU/mL  --  4.390*   FREE T4 ng/dL 1.07  --      Results from last 7 days   Lab Units 07/10/20  0001   CHOLESTEROL mg/dL 122   TRIGLYCERIDES mg/dL 50   HDL CHOL mg/dL 35*   LDL CHOL mg/dL 77       Medication Review: yes    Assessment/Plan       Chronic anticoagulation    Acute renal failure superimposed on chronic kidney disease (CMS/HCC)    Type 2 diabetes mellitus (CMS/HCC)    Atrial fibrillation with RVR (CMS/HCC)    GERD (gastroesophageal reflux disease)    CHF (congestive heart failure) (CMS/HCC)      HR is elevated today, possibly driven by anemia.  Will add dig as he is on hefty dose of BB and cardizem.  DOAC on hold now d/t anemia.    Nanci Pascual PA-C  07/12/20  09:47        k

## 2020-07-13 NOTE — PLAN OF CARE
Problem: Patient Care Overview  Goal: Plan of Care Review  Flowsheets (Taken 7/13/2020 3534)  Progress: improving  Plan of Care Reviewed With: patient  Outcome Summary: OT eval completed. Pt reports no pain. Pt has BLE swelling R>L. Pt reports no pain. Pt was independent for t/f. Pt was CGA-supervision for functional mobility. Pt was independent to vargas/doff socks. Pt is very active and motivated to return to baseline, which I expect he isn't far from a functional standpoint. Skilled OT not warranted at this time d/t independence level. OT to sign off. OT will defer balance and endurance to PT. Recommended d/c home.

## 2020-07-13 NOTE — PLAN OF CARE
Problem: Patient Care Overview  Goal: Plan of Care Review  7/13/2020 0249 by Xiao Allen RN  Outcome: Ongoing (interventions implemented as appropriate)  Flowsheets (Taken 7/13/2020 0249)  Progress: improving  Plan of Care Reviewed With: patient  Note:   VSS.  No c/o pain voiced.  Gentle IV fluids continued, 1/2 NS at 75 ml/hr.  Hydralazine increase of 25 mg po given.  BP monitored q4H. No falls noted, up to BR ad zeb.  Will continue to monitor.  AF controlled.    7/13/2020 0246 by Xiao Allen RN  Outcome: Ongoing (interventions implemented as appropriate)  Goal: Individualization and Mutuality  7/13/2020 0249 by Xiao Allen RN  Outcome: Ongoing (interventions implemented as appropriate)  Flowsheets (Taken 7/11/2020 0404)  Patient Specific Goals (Include Timeframe): home  Patient Specific Interventions: 1/2 NS at 75 ml.hr  Patient Specific Preferences: Door closed  7/13/2020 0246 by Xiao Allen RN  Outcome: Ongoing (interventions implemented as appropriate)  Flowsheets (Taken 7/11/2020 0404)  Patient Specific Goals (Include Timeframe): home  Patient Specific Interventions: 1/2 NS at 75 ml.hr  Patient Specific Preferences: Door closed  Goal: Discharge Needs Assessment  7/13/2020 0249 by Xiao Allen RN  Outcome: Ongoing (interventions implemented as appropriate)  7/13/2020 0246 by Xiao Allen RN  Outcome: Ongoing (interventions implemented as appropriate)  Goal: Interprofessional Rounds/Family Conf  7/13/2020 0249 by Xiao Allen RN  Outcome: Ongoing (interventions implemented as appropriate)  7/13/2020 0246 by Xiao Allen RN  Outcome: Ongoing (interventions implemented as appropriate)     Problem: Arrhythmia/Dysrhythmia (Symptomatic) (Adult)  Goal: Signs and Symptoms of Listed Potential Problems Will be Absent, Minimized or Managed (Arrhythmia/Dysrhythmia)  7/13/2020 0249 by Xiao Allen RN  Outcome: Ongoing (interventions implemented as appropriate)  7/13/2020  0246 by Xiao Allen, RN  Outcome: Ongoing (interventions implemented as appropriate)     Problem: Renal Failure/Kidney Injury, Acute (Adult)  Goal: Signs and Symptoms of Listed Potential Problems Will be Absent, Minimized or Managed (Renal Failure/Kidney Injury, Acute)  7/13/2020 0249 by Xiao Allen, RN  Outcome: Ongoing (interventions implemented as appropriate)  7/13/2020 0246 by Xiao Allen, RN  Outcome: Ongoing (interventions implemented as appropriate)

## 2020-07-13 NOTE — PLAN OF CARE
Problem: Patient Care Overview  Goal: Plan of Care Review  Outcome: Ongoing (interventions implemented as appropriate)  Flowsheets  Taken 7/13/2020 1611 by Karol Paredes, PT Student  Progress: no change (Pended)  Outcome Summary: PT evaluation completed. Pt alert and oriented x4, sitting EOB with no complaints of pain. Pt was eagar to begin therapy and ambulate outside room. Supervision given during sit to stand. Pt ambulated 50ft with CGA, displaying slight unsteadness when first standing. Pt descibes this to be the first time out of the room in 3 days. Pt will benefit from skilled PT to improve his cardiovascular endurance, stair training and continued education on falls prevention. Recommend d/c home. (Pended)

## 2020-07-13 NOTE — PROGRESS NOTES
[facility] -  Progress Note     LOS: 4 days   Patient Care Team:  Cayetano Kay MD as PCP - General  Haley Lane APRN (Gastroenterology)  Haley Lane APRN (Gastroenterology)  Cayetano Kay MD as Referring Physician (Internal Medicine)  Alex Han MD as Consulting Physician (Otolaryngology)  Shaw Ag MD (Inactive) as Consulting Physician (Dermatology)    Chief Complaint: atrial fibrillation follow up    Subjective     Interval History: rates controlled. BP fairly well controlled with use of other agent as his home med is on hold. No shortness of breath, no chest pain. No complaints of BRBPR. Reports good urine output. Lower leg swelling, bilaterally 2+      Review of Systems:     Review of Systems   Constitutional: Negative for diaphoresis and fever.   HENT: Negative for congestion.    Respiratory: Negative for cough, chest tightness, shortness of breath and wheezing.    Cardiovascular: Positive for leg swelling. Negative for chest pain and palpitations.   Gastrointestinal: Negative for nausea and vomiting.   Neurological: Negative for dizziness and weakness.   Psychiatric/Behavioral: Negative for agitation. The patient is not nervous/anxious.      Objective     Vital Sign Min/Max for last 24 hours  Temp  Min: 98 °F (36.7 °C)  Max: 98.2 °F (36.8 °C)   BP  Min: 141/75  Max: 165/82   Pulse  Min: 80  Max: 92   Resp  Min: 16  Max: 18   SpO2  Min: 93 %  Max: 96 %   No data recorded   Weight  Min: 72.6 kg (160 lb 1 oz)  Max: 72.6 kg (160 lb 1 oz)         07/12/20 1930   Weight: 72.6 kg (160 lb 1 oz)       Physical Exam:    Physical Exam   Constitutional: He is oriented to person, place, and time. He appears well-developed and well-nourished. No distress.   HENT:   Head: Normocephalic and atraumatic.   Eyes: Conjunctivae are normal.   Neck: Normal range of motion. Neck supple.   Cardiovascular: Normal rate. An irregularly irregular rhythm present.   Murmur  heard.  Pulmonary/Chest: Effort normal and breath sounds normal. No respiratory distress. He has no wheezes. He has no rales.   Abdominal: Soft. He exhibits no distension.   Musculoskeletal: He exhibits edema.   Neurological: He is alert and oriented to person, place, and time.   Skin: Skin is warm, dry and intact. No rash noted. He is not diaphoretic. No erythema. No pallor.   Psychiatric: He has a normal mood and affect. His behavior is normal.   Vitals reviewed.    Results Review:   Lab Results (last 72 hours)     Procedure Component Value Units Date/Time    POC Glucose Once [[id number]]  (Abnormal) Collected:  07/13/20 0743    Specimen:  Blood Updated:  07/13/20 0813     Glucose 212 mg/dL      Comment: : [id number] Matthew ChristinaMeter ID: [id number]       Comprehensive Metabolic Panel [[id number]]  (Abnormal) Collected:  07/13/20 0229    Specimen:  Blood Updated:  07/13/20 0424     Glucose 210 mg/dL      BUN 36 mg/dL      Creatinine 3.33 mg/dL      Sodium 140 mmol/L      Potassium 3.7 mmol/L      Chloride 105 mmol/L      CO2 22.0 mmol/L      Calcium 8.5 mg/dL      Total Protein 6.3 g/dL      Albumin 3.90 g/dL      ALT (SGPT) 18 U/L      AST (SGOT) 21 U/L      Alkaline Phosphatase 111 U/L      Total Bilirubin 0.3 mg/dL      eGFR Non African Amer 18 mL/min/1.73      Globulin 2.4 gm/dL      A/G Ratio 1.6 g/dL      BUN/Creatinine Ratio 10.8     Anion Gap 13.0 mmol/L     Narrative:       GFR Normal >60  Chronic Kidney Disease <60  Kidney Failure <15      CBC Auto Differential [[id number]]  (Abnormal) Collected:  07/13/20 0229    Specimen:  Blood Updated:  07/13/20 0402     WBC 7.17 10*3/mm3      RBC 2.43 10*6/mm3      Hemoglobin 7.5 g/dL      Hematocrit 22.2 %      MCV 91.4 fL      MCH 30.9 pg      MCHC 33.8 g/dL      RDW 13.5 %      RDW-SD 44.5 fl      MPV 10.7 fL      Platelets 179 10*3/mm3      Neutrophil % 63.5 %      Lymphocyte % 24.8 %      Monocyte % 8.1 %      Eosinophil % 2.6 %      Basophil % 0.4 %       Immature Grans % 0.6 %      Neutrophils, Absolute 4.55 10*3/mm3      Lymphocytes, Absolute 1.78 10*3/mm3      Monocytes, Absolute 0.58 10*3/mm3      Eosinophils, Absolute 0.19 10*3/mm3      Basophils, Absolute 0.03 10*3/mm3      Immature Grans, Absolute 0.04 10*3/mm3      nRBC 0.0 /100 WBC     POC Glucose Once [[id number]]  (Abnormal) Collected:  07/12/20 1932    Specimen:  Blood Updated:  07/12/20 1943     Glucose 249 mg/dL      Comment: : [id number] Aba BrooksraMeter ID: [id number]       POC Glucose Once [[id number]]  (Abnormal) Collected:  07/12/20 1834    Specimen:  Blood Updated:  07/12/20 1848     Glucose 213 mg/dL      Comment: : [id number] Asim EuraisaMeter ID: [id number]       Magnesium [[id number]]  (Normal) Collected:  07/12/20 1826    Specimen:  Blood Updated:  07/12/20 1846     Magnesium 1.7 mg/dL     POC Glucose Once [[id number]]  (Abnormal) Collected:  07/12/20 1618    Specimen:  Blood Updated:  07/12/20 1631     Glucose 63 mg/dL      Comment: : [id number] Asim EuraisaMeter ID: [id number]       POC Glucose Once [[id number]]  (Abnormal) Collected:  07/12/20 1114    Specimen:  Blood Updated:  07/12/20 1145     Glucose 207 mg/dL      Comment: : [id number] Asim EuraisaMeter ID: [id number]       POC Glucose Once [[id number]]  (Abnormal) Collected:  07/12/20 0729    Specimen:  Blood Updated:  07/12/20 0816     Glucose 167 mg/dL      Comment: : [id number] Asim EuraisaMeter ID: [id number]       Comprehensive Metabolic Panel [[id number]]  (Abnormal) Collected:  07/12/20 0203    Specimen:  Blood Updated:  07/12/20 0343     Glucose 242 mg/dL      BUN 36 mg/dL      Creatinine 3.19 mg/dL      Sodium 139 mmol/L      Potassium 3.2 mmol/L      Chloride 103 mmol/L      CO2 23.0 mmol/L      Calcium 8.3 mg/dL      Total Protein 5.9 g/dL      Albumin 3.70 g/dL      ALT (SGPT) 14 U/L      AST (SGOT) 15 U/L      Alkaline Phosphatase 108 U/L      Total Bilirubin 0.2 mg/dL      eGFR Non  Amer 19  mL/min/1.73      Globulin 2.2 gm/dL      A/G Ratio 1.7 g/dL      BUN/Creatinine Ratio 11.3     Anion Gap 13.0 mmol/L     Narrative:       GFR Normal >60  Chronic Kidney Disease <60  Kidney Failure <15      CBC Auto Differential [[id number]]  (Abnormal) Collected:  07/12/20 0203    Specimen:  Blood Updated:  07/12/20 0328     WBC 7.82 10*3/mm3      RBC 2.39 10*6/mm3      Hemoglobin 7.5 g/dL      Hematocrit 21.8 %      MCV 91.2 fL      MCH 31.4 pg      MCHC 34.4 g/dL      RDW 13.2 %      RDW-SD 44.6 fl      MPV 10.8 fL      Platelets 177 10*3/mm3      Neutrophil % 65.4 %      Lymphocyte % 22.6 %      Monocyte % 8.2 %      Eosinophil % 2.8 %      Basophil % 0.4 %      Immature Grans % 0.6 %      Neutrophils, Absolute 5.11 10*3/mm3      Lymphocytes, Absolute 1.77 10*3/mm3      Monocytes, Absolute 0.64 10*3/mm3      Eosinophils, Absolute 0.22 10*3/mm3      Basophils, Absolute 0.03 10*3/mm3      Immature Grans, Absolute 0.05 10*3/mm3      nRBC 0.0 /100 WBC     Protein Elec + Interp, Serum [[id number]] Collected:  07/11/20 2342    Specimen:  Blood Updated:  07/12/20 0011    POC Glucose Once [[id number]]  (Normal) Collected:  07/11/20 1740    Specimen:  Blood Updated:  07/11/20 1751     Glucose 121 mg/dL      Comment: : [id number] Miguelito AshleyMeter ID: [id number]       POC Glucose Once [[id number]]  (Abnormal) Collected:  07/11/20 1651    Specimen:  Blood Updated:  07/11/20 1702     Glucose 64 mg/dL      Comment: : [id number] Miguelito AshleyMeter ID: [id number]       Eosinophil Smear - Urine, Urine, Clean Catch [[id number]]  (Normal) Collected:  07/10/20 2006    Specimen:  Urine, Clean Catch Updated:  07/11/20 1240     Eosinophil Smear 0 % EOS/100 Cells     POC Glucose Once [[id number]]  (Abnormal) Collected:  07/11/20 1217    Specimen:  Blood Updated:  07/11/20 1229     Glucose 210 mg/dL      Comment: : [id number] Miguelito FishmanMeter ID: [id number]       POC Glucose Once [[id number]]  (Abnormal) Collected:  07/11/20 0756     Specimen:  Blood Updated:  07/11/20 0820     Glucose 179 mg/dL      Comment: : [id number] Miguelito FishmanMeter ID: [id number]       Magnesium [[id number]]  (Normal) Collected:  07/11/20 0522    Specimen:  Blood Updated:  07/11/20 0603     Magnesium 1.8 mg/dL     Comprehensive Metabolic Panel [[id number]]  (Abnormal) Collected:  07/11/20 0522    Specimen:  Blood Updated:  07/11/20 0603     Glucose 162 mg/dL      BUN 37 mg/dL      Creatinine 3.19 mg/dL      Sodium 141 mmol/L      Potassium 3.6 mmol/L      Chloride 103 mmol/L      CO2 24.0 mmol/L      Calcium 8.8 mg/dL      Total Protein 6.3 g/dL      Albumin 3.90 g/dL      ALT (SGPT) 14 U/L      AST (SGOT) 11 U/L      Alkaline Phosphatase 93 U/L      Total Bilirubin 0.5 mg/dL      eGFR Non African Amer 19 mL/min/1.73      Globulin 2.4 gm/dL      A/G Ratio 1.6 g/dL      BUN/Creatinine Ratio 11.6     Anion Gap 14.0 mmol/L     Narrative:       GFR Normal >60  Chronic Kidney Disease <60  Kidney Failure <15      Iron Profile [[id number]]  (Abnormal) Collected:  07/11/20 0522    Specimen:  Blood Updated:  07/11/20 0603     Iron 55 mcg/dL      Iron Saturation 26 %      Transferrin 143 mg/dL      TIBC 213 mcg/dL     CK [[id number]]  (Normal) Collected:  07/11/20 0522    Specimen:  Blood Updated:  07/11/20 0603     Creatine Kinase 37 U/L     CBC Auto Differential [[id number]]  (Abnormal) Collected:  07/11/20 0522    Specimen:  Blood Updated:  07/11/20 0542     WBC 7.42 10*3/mm3      RBC 2.65 10*6/mm3      Hemoglobin 8.3 g/dL      Hematocrit 24.3 %      MCV 91.7 fL      MCH 31.3 pg      MCHC 34.2 g/dL      RDW 13.5 %      RDW-SD 45.1 fl      MPV 10.3 fL      Platelets 197 10*3/mm3      Neutrophil % 71.4 %      Lymphocyte % 16.4 %      Monocyte % 7.7 %      Eosinophil % 3.6 %      Basophil % 0.4 %      Immature Grans % 0.5 %      Neutrophils, Absolute 5.29 10*3/mm3      Lymphocytes, Absolute 1.22 10*3/mm3      Monocytes, Absolute 0.57 10*3/mm3      Eosinophils, Absolute 0.27 10*3/mm3       Basophils, Absolute 0.03 10*3/mm3      Immature Grans, Absolute 0.04 10*3/mm3      nRBC 0.0 /100 WBC     POC Glucose Once [[id number]]  (Abnormal) Collected:  07/10/20 2001    Specimen:  Blood Updated:  07/10/20 2012     Glucose 207 mg/dL      Comment: : [id number] Aba Valero ID: [id number]       Creatinine, Urine, Random - Urine, Clean Catch [[id number]] Collected:  07/10/20 1350    Specimen:  Urine, Clean Catch Updated:  07/10/20 1933     Creatinine, Urine 48.3 mg/dL     Narrative:       Reference intervals for random urine have not been established.  Clinical usage is dependent upon physician's interpretation in combination with other laboratory tests.       Urinalysis With Microscopic If Indicated (No Culture) - Urine, Clean Catch [[id number]]  (Abnormal) Collected:  07/10/20 1140    Specimen:  Urine, Clean Catch Updated:  07/10/20 1317     Color, UA Yellow     Appearance, UA Clear     pH, UA <=5.0     Specific Gravity, UA 1.013     Glucose,  mg/dL (2+)     Ketones, UA Negative     Bilirubin, UA Negative     Blood, UA Negative     Protein, UA 30 mg/dL (1+)     Leuk Esterase, UA Negative     Nitrite, UA Negative     Urobilinogen, UA 0.2 E.U./dL    Urinalysis, Microscopic Only - Urine, Clean Catch [[id number]]  (Abnormal) Collected:  07/10/20 1140    Specimen:  Urine, Clean Catch Updated:  07/10/20 1317     RBC, UA 0-2 /HPF      WBC, UA None Seen /HPF      Bacteria, UA None Seen /HPF      Squamous Epithelial Cells, UA None Seen /HPF      Hyaline Casts, UA None Seen /LPF      Methodology Automated Microscopy    Protein, Urine, Random - Urine, Clean Catch [[id number]] Collected:  07/10/20 1140    Specimen:  Urine, Clean Catch Updated:  07/10/20 1213     Total Protein, Urine 25.9 mg/dL     Narrative:       Reference intervals for random urine have not been established.  Clinical usage is dependent upon physician's interpretation in combination with other laboratory tests.       Sodium, Urine,  Random - Urine, Clean Catch [[id number]] Collected:  07/10/20 1140    Specimen:  Urine, Clean Catch Updated:  07/10/20 1213     Sodium, Urine 100 mmol/L     Narrative:       Reference intervals for random urine have not been established.  Clinical usage is dependent upon physician's interpretation in combination with other laboratory tests.       POC Glucose Once [[id number]]  (Abnormal) Collected:  07/10/20 1105    Specimen:  Blood Updated:  07/10/20 1135     Glucose 283 mg/dL      Comment: : [id number] Asim EuraisaMeter ID: [id number]       Uric Acid [[id number]]  (Abnormal) Collected:  07/10/20 0523    Specimen:  Blood Updated:  07/10/20 0859     Uric Acid 10.4 mg/dL     Basic Metabolic Panel [[id number]]  (Abnormal) Collected:  07/10/20 0523    Specimen:  Blood Updated:  07/10/20 0859     Glucose 94 mg/dL      BUN 42 mg/dL      Creatinine 3.68 mg/dL      Sodium 146 mmol/L      Potassium 3.5 mmol/L      Chloride 106 mmol/L      CO2 23.0 mmol/L      Calcium 8.6 mg/dL      eGFR Non African Amer 16 mL/min/1.73      BUN/Creatinine Ratio 11.4     Anion Gap 17.0 mmol/L     Narrative:       GFR Normal >60  Chronic Kidney Disease <60  Kidney Failure <15      Magnesium [[id number]]  (Abnormal) Collected:  07/10/20 0523    Specimen:  Blood Updated:  07/10/20 0859     Magnesium 1.3 mg/dL           Medication Review: yes  Current Facility-Administered Medications   Medication Dose Route Frequency Provider Last Rate Last Dose   • allopurinol (ZYLOPRIM) tablet 200 mg  200 mg Oral Daily Jose Enrique Cho MD   200 mg at 07/12/20 0935   • dextrose (D50W) 25 g/ 50mL Intravenous Solution 25 g  25 g Intravenous Q15 Min PRN Eb Garcia MD       • dextrose (GLUTOSE) oral gel 15 g  15 g Oral Q15 Min PRN Eb Garcia MD       • digoxin (LANOXIN) tablet 125 mcg  125 mcg Oral Daily Nanci Pascual PA-C       • dilTIAZem CD (CARDIZEM CD) 24 hr capsule 240 mg  240 mg Oral BID Sravanthi Waldron APRN   240 mg at 07/12/20  2114   • glipizide (GLUCOTROL) tablet 10 mg  10 mg Oral QAM AC Eb Garcia MD   10 mg at 07/12/20 0935   • glucagon (human recombinant) (GLUCAGEN DIAGNOSTIC) injection 1 mg  1 mg Subcutaneous Q15 Min PRN Eb Garcia MD       • hydrALAZINE (APRESOLINE) tablet 25 mg  25 mg Oral Q8H Musa Mariano MD   25 mg at 07/13/20 0502   • insulin lispro (humaLOG) injection 2-7 Units  2-7 Units Subcutaneous TID AC Eb Garcia MD   2 Units at 07/12/20 1301   • metoprolol succinate XL (TOPROL-XL) 24 hr tablet 100 mg  100 mg Oral BID Sravanthi Waldron APRN   100 mg at 07/12/20 2115   • ondansetron (ZOFRAN) injection 4 mg  4 mg Intravenous Q6H PRN Eb Garcia MD       • pantoprazole (PROTONIX) EC tablet 40 mg  40 mg Oral QAM Eb Garcia MD   40 mg at 07/12/20 0935   • pravastatin (PRAVACHOL) tablet 20 mg  20 mg Oral Nightly Eb Garcia MD   20 mg at 07/12/20 2114   • sodium chloride 0.45 % infusion  75 mL/hr Intravenous Continuous Jonathan Cardona APRN 75 mL/hr at 07/12/20 2333 75 mL/hr at 07/12/20 2333   • sodium chloride 0.9 % flush 10 mL  10 mL Intravenous Q12H Eb Garcia MD   10 mL at 07/12/20 2116   • sodium chloride 0.9 % flush 10 mL  10 mL Intravenous PRN Eb Garcia MD   10 mL at 07/10/20 0954   • sulfaSALAzine (AZULFIDINE) tablet 1,000 mg  1,000 mg Oral TID Eb Garcia MD   1,000 mg at 07/12/20 2115   • terazosin (HYTRIN) capsule 10 mg  10 mg Oral Nightly Eb Garcia MD   10 mg at 07/12/20 1701         Assessment/Plan     Assessment  1.  Acute on chronic renal failure: Creatinine 3.33  2.  Anemia: Hemoglobin 7.5  3.  Permanent atrial fibrillation: Controlled heart rates on telemetry with use of beta blocker, ccb, and now new digoxin  4.  Chronic anticoagulation: With Eliquis which is on hold due to anemia  5.  Hypertension: now on hydralazine, home lisinopril on hold  6.  Diabetes mellitus: On oral agents  7.  Pacemaker in situ: Single-lead device  8.  GERD: Stable  9.   Ulcerative colitis: Stable  10.  Mild aortic stenosis: mean gradient of 12 mmHg, valve area of 1 cm², and a peak velocity of 282 cm/s.  11.  Elevated troponin: Type II elevation in the setting of acute renal failure and anemia      Plan    - check dig level in am  - continue to monitor counts off anticoagulation  - monitor renal function  - heart rates controlled on beta blocker, ccb, and digoxin  - lisinopril has been on hold due to JARET - hydralazine was started for BP control 7/11/20        Sravanthi Waldron, APRN  07/13/20  08:49

## 2020-07-13 NOTE — PROGRESS NOTES
Continued Stay Note   Noemi     Patient Name: Gage Ken  MRN: [id number]  Today's Date: 7/13/2020    Admit Date: 7/9/2020    Discharge Plan     Row Name 07/13/20 0858       Plan    Plan  Home    Patient/Family in Agreement with Plan  yes    Plan Comments  Pt planning to return home alone at discharge. He is saying he will need a ride home. Will follow.         Discharge Codes    No documentation.             GIOVANNA Peralta

## 2020-07-13 NOTE — PROGRESS NOTES
Nephrology ([facility]) Progress Note      Patient:  Gage Ken  YOB: 1940  Date of Service: 7/13/2020  MRN: [id number]   Acct: [id number]   Primary Care Physician: Cayetano Kay MD  Advance Directive:   Code Status and Medical Interventions:   Ordered at: 07/09/20 1836     Code Status:    CPR     Medical Interventions (Level of Support Prior to Arrest):    Full     Admit Date: 7/9/2020       Hospital Day: 4  Referring Provider: Eb Garcia MD      Patient personally seen and examined.  Complete chart including Consults, Notes, Operative Reports, Labs, Cardiology, and Radiology studies reviewed as able.        Subjective:  Gage Ken is a 79 y.o. male  whom we were consulted for acute kidney injury.  Baseline chronic kidney disease stage 3; baseline creatinine 1.3-1.6. No prior nephrology contacts.  History of atrial fibrillation with permanent pacemaker placement in 2019, hypertension, type 2 diabetes, ulcerative colitis, aortic stenosis.  Patient went to his PCP earlier this week for a checkup; was found to be in atrial fibrillation with RVR. Also had incidental finding of acute kidney injury with creatinine 3.8. He was sent to [facility] ER for further evaluation. Patient is somewhat of a difficult historian as he is unclear with the timing and duration of his symptoms. It would seem that he has recently been having some nausea and vomiting, denies diarrhea. Denies changes in oral intake. He has noted increased lower extremity edema recently. He thinks that his urine output has been less recently but denies any dysuria or hematuria. Denies dark color or strong odor. He does endorse episodes of palpitations but is unsure how long these have been present or how frequently they have been taking place.  Denies NSAIDs.    Today complains of increased difficulty voiding overnight. Urine output nonoliguric    Allergies:  Patient has no known  allergies.    Home Meds:  Medications Prior to Admission   Medication Sig Dispense Refill Last Dose   • apixaban (ELIQUIS) 5 MG tablet tablet Take 5 mg by mouth 2 (Two) Times a Day.   7/9/2020 at Unknown time   • dilTIAZem CD (CARDIZEM CD) 240 MG 24 hr capsule Take 240 mg by mouth 2 (Two) Times a Day.   7/9/2020 at Unknown time   • folic acid (FOLVITE) 1 MG tablet Take 1 mg by mouth Daily.   7/9/2020 at Unknown time   • furosemide (LASIX) 40 MG tablet Take 40 mg by mouth Daily.  2 7/9/2020 at Unknown time   • glipiZIDE (GLUCOTROL) 10 MG tablet Take 1 tablet by mouth 2 (Two) Times a Day.  3 7/9/2020 at Unknown time   • lisinopril (PRINIVIL,ZESTRIL) 20 MG tablet Take 20 mg by mouth 2 (two) times a day.   7/9/2020 at Unknown time   • metFORMIN ER (GLUCOPHAGE-XR) 500 MG 24 hr tablet Take 500 mg by mouth 2 (two) times a day.   7/9/2020 at Unknown time   • metoprolol succinate XL (TOPROL-XL) 100 MG 24 hr tablet Take 100 mg by mouth 2 (two) times a day.   7/9/2020 at Unknown time   • omeprazole (priLOSEC) 40 MG capsule Take 40 mg by mouth Daily.      • ondansetron ODT (ZOFRAN-ODT) 4 MG disintegrating tablet Place 4 mg on the tongue Every 6 (Six) Hours As Needed for Nausea or Vomiting.   7/9/2020 at Unknown time   • pravastatin (PRAVACHOL) 20 MG tablet Take 1 tablet by mouth Every Night.  2 7/9/2020 at Unknown time   • sulfaSALAzine (AZULFIDINE) 500 MG tablet Take 2 tablets by mouth 3 (Three) Times a Day. 360 tablet 1 7/9/2020 at Unknown time   • terazosin (HYTRIN) 10 MG capsule Take 1 capsule by mouth Every Night.  2 7/9/2020 at Unknown time       Medicines:  Current Facility-Administered Medications   Medication Dose Route Frequency Provider Last Rate Last Dose   • allopurinol (ZYLOPRIM) tablet 200 mg  200 mg Oral Daily Jose Enrique Cho MD   200 mg at 07/13/20 0929   • dextrose (D50W) 25 g/ 50mL Intravenous Solution 25 g  25 g Intravenous Q15 Min PRN Eb Garcia MD       • dextrose (GLUTOSE) oral gel 15 g  15  g Oral Q15 Min PRN Eb Garcia MD       • digoxin (LANOXIN) tablet 125 mcg  125 mcg Oral Daily Nanci Pascual PA-C       • dilTIAZem CD (CARDIZEM CD) 24 hr capsule 240 mg  240 mg Oral BID Sravanthi Waldron APRN   240 mg at 07/13/20 0930   • glipizide (GLUCOTROL) tablet 10 mg  10 mg Oral QAM AC Eb Garcia MD   10 mg at 07/13/20 0930   • glucagon (human recombinant) (GLUCAGEN DIAGNOSTIC) injection 1 mg  1 mg Subcutaneous Q15 Min PRN Eb Garcia MD       • hydrALAZINE (APRESOLINE) tablet 25 mg  25 mg Oral Q8H Musa Mariano MD   25 mg at 07/13/20 0502   • insulin lispro (humaLOG) injection 2-7 Units  2-7 Units Subcutaneous TID Eb Moon MD   3 Units at 07/13/20 0930   • metoprolol succinate XL (TOPROL-XL) 24 hr tablet 100 mg  100 mg Oral BID Sravanthi Waldron APRN   100 mg at 07/13/20 0929   • ondansetron (ZOFRAN) injection 4 mg  4 mg Intravenous Q6H PRN Eb Garcia MD       • pantoprazole (PROTONIX) EC tablet 40 mg  40 mg Oral QAM Eb Garcia MD   40 mg at 07/13/20 0930   • pravastatin (PRAVACHOL) tablet 20 mg  20 mg Oral Nightly Eb Garcia MD   20 mg at 07/12/20 2114   • sodium chloride 0.45 % infusion  75 mL/hr Intravenous Continuous Jonathan Cardona APRN 75 mL/hr at 07/12/20 2333 75 mL/hr at 07/12/20 2333   • sodium chloride 0.9 % flush 10 mL  10 mL Intravenous Q12H Eb Garcia MD   10 mL at 07/13/20 0930   • sodium chloride 0.9 % flush 10 mL  10 mL Intravenous PRN Eb Garcia MD   10 mL at 07/10/20 0954   • sulfaSALAzine (AZULFIDINE) tablet 1,000 mg  1,000 mg Oral TID Eb Garcia MD   1,000 mg at 07/13/20 0930   • terazosin (HYTRIN) capsule 10 mg  10 mg Oral Nightly Eb Garcia MD   10 mg at 07/12/20 1701       Past Medical History:  Past Medical History:   Diagnosis Date   • Acute gastritis    • Blood in feces    • Carotid artery occlusion 12/2/2019   • Colitis, ulcerative chronic (CMS/HCC)     LEFT-SIDED   • Diverticular disease of colon     • Epigastric pain    • GERD (gastroesophageal reflux disease)    • History of colon polyps    • Craig (hard of hearing)    • Hyperlipidemia    • Hypertension    • Lesion of nose    • Neoplasm of uncertain behavior    • Nonspecific ulcerative proctitis (CMS/HCC)    • Rectal hemorrhage    • Type 2 diabetes mellitus (CMS/HCC)    • Vitamin B12 deficiency        Past Surgical History:  Past Surgical History:   Procedure Laterality Date   • COLONOSCOPY  12/02/2013    Hemorrhoids found.   • COLONOSCOPY  09/06/2016   • COLONOSCOPY N/A 10/30/2017    Procedure: COLONOSCOPY;  Surgeon: Alex Silveira MD;  Location: [facility] ENDOSCOPY;  Service:    • COLONOSCOPY N/A 11/6/2018    Procedure: COLONOSCOPY;  Surgeon: Alex Silveira MD;  Location: [facility] ENDOSCOPY;  Service: Gastroenterology   • COLONOSCOPY N/A 11/21/2019    Procedure: COLONOSCOPY;  Surgeon: Alex Silveira MD;  Location: [facility] ENDOSCOPY;  Service: Gastroenterology   • COLONOSCOPY W/ POLYPECTOMY  08/30/2015    Single polyp found in the colon;removed by cold snare polypectomy.Proctitis in the rectum.Diverticulosis found in the sigmoid colon.Hemorrhoids found.   • ENDOSCOPY  12/02/2013    Normal hypopharynx, esophagus, duodenum, symmetrical & patent pylorus. Hiatus hernia in GE junction. Normal stomach. Biopsy taken.   • HERNIA REPAIR      umbilical   • UPPER GASTROINTESTINAL ENDOSCOPY  12/02/2013       Family History  Family History   Problem Relation Age of Onset   • Diabetes Other    • Hypertension Other        Social History  Social History     Socioeconomic History   • Marital status: Single     Spouse name: Not on file   • Number of children: Not on file   • Years of education: Not on file   • Highest education level: Not on file   Tobacco Use   • Smoking status: Former Smoker     Types: Cigarettes   • Smokeless tobacco: Former User     Types: Snuff     Quit date: 2008   • Tobacco comment: 11/30/2017 - Patient States He Ceased Smoking 13 Years Prior.    Substance and Sexual Activity   • Alcohol use: Yes     Comment: occasional   • Drug use: No   • Sexual activity: Defer         Review of Systems:  History obtained from chart review and the patient  General ROS: No fever or chills  Respiratory ROS: No cough, shortness of breath, wheezing  Cardiovascular ROS: No chest pain or palpitations  Gastrointestinal ROS: No abdominal pain or melena  Genito-Urinary ROS: positive for - change in urinary stream  No dysuria or hematuria  Neurological ROS; no headache or dizziness    Objective:  Patient Vitals for the past 24 hrs:   BP Temp Temp src Pulse Resp SpO2 Weight   07/13/20 0901 137/72 98.1 °F (36.7 °C) Oral 85 16 96 % --   07/13/20 0335 159/70 98.2 °F (36.8 °C) Oral 80 16 93 % --   07/13/20 0002 141/75 98.1 °F (36.7 °C) Oral 80 18 95 % --   07/12/20 1930 156/79 98 °F (36.7 °C) Oral 92 18 96 % 72.6 kg (160 lb 1 oz)   07/12/20 1617 153/66 98 °F (36.7 °C) Oral 92 18 96 % --   07/12/20 1112 165/82 98.1 °F (36.7 °C) Oral 87 18 96 % --       Intake/Output Summary (Last 24 hours) at 7/13/2020 1100  Last data filed at 7/13/2020 1049  Gross per 24 hour   Intake 2460 ml   Output 2000 ml   Net 460 ml     General: awake/alert    Neck: supple, no JVD  Chest:  clear to auscultation bilaterally without respiratory distress  CVS: regular rate and rhythm  Abdominal: soft, nontender, positive bowel sounds  Extremities: no cyanosis or edema  Skin: warm and dry without rash  Neuro: no focal motor deficits    Labs:  Results from last 7 days   Lab Units 07/13/20 0229 07/12/20 0203 07/11/20  0522   WBC 10*3/mm3 7.17 7.82 7.42   HEMOGLOBIN g/dL 7.5* 7.5* 8.3*   HEMATOCRIT % 22.2* 21.8* 24.3*   PLATELETS 10*3/mm3 179 177 197         Results from last 7 days   Lab Units 07/13/20 0229 07/12/20 0203 07/11/20  0522   SODIUM mmol/L 140 139 141   POTASSIUM mmol/L 3.7 3.2* 3.6   CHLORIDE mmol/L 105 103 103   CO2 mmol/L 22.0 23.0 24.0   BUN mg/dL 36* 36* 37*   CREATININE mg/dL 3.33* 3.19* 3.19*    CALCIUM mg/dL 8.5* 8.3* 8.8   BILIRUBIN mg/dL 0.3 0.2 0.5   ALK PHOS U/L 111 108 93   ALT (SGPT) U/L 18 14 14   AST (SGOT) U/L 21 15 11   GLUCOSE mg/dL 210* 242* 162*       Radiology:   Imaging Results (Last 72 Hours)     Procedure Component Value Units Date/Time    CT Abdomen Pelvis Without Contrast [[id number]] Collected:  07/10/20 1607     Updated:  07/10/20 1619    Narrative:       EXAMINATION:   CT ABDOMEN PELVIS WO CONTRAST-  7/10/2020 4:07 PM CDT     HISTORY: CT ABDOMEN PELVIS WO CONTRAST- 7/10/2020 3:57 PM CDT     HISTORY: Abdominal aortic aneurysm (AAA), known, follow up      COMPARISON: None      DOSE LENGTH PRODUCT: 272 mGy cm. Automated exposure control was also  utilized to decrease patient radiation dose.     TECHNIQUE: Noncontrast enhanced images of the abdomen and pelvis  obtained without oral contrast. Multiplanar reformatted images were  provided for review.      FINDINGS:   The lung bases and base of the heart are unremarkable.      LIVER: Scattered granulomatous calcifications are present in the liver..        BILIARY SYSTEM: The wall the gallbladder appears slightly thickened and  there is subtle stranding or edema around the gallbladder wall  suspicious for cholecystitis.      PANCREAS: Pancreas is not well evaluated in the absence of contrast the  lesion in the head of the pancreas cannot BE excluded.      SPLEEN: Splenic calcifications are present..      KIDNEYS AND ADRENALS: Adrenal glands are visualized. The renal contours  are visualized. In the perinephric fat is present. This is nonspecific..   The ureters are decompressed and normal in appearance.     RETROPERITONEUM: No mass, lymphadenopathy or hemorrhage.      GI TRACT: No evidence of obstruction or bowel wall thickening.  Diverticulosis is present. The appendix is visualized and unremarkable.     OTHER: There is no mesenteric mass, lymphadenopathy or fluid collection.  The osseous structures and soft tissues demonstrate no  worrisome  lesions. Degenerative spondylosis is noted in the lumbar spine.  Extensive vascular calcifications present abdominal aorta and iliac  arteries. Mesh is present on the anterior abdominal wall.     PELVIS: Bilateral fat-containing inguinal hernias are present.. The  urinary bladder is normal in appearance.       Impression:       1. Unenhanced CT of the abdomen and pelvis. Stranding around the renal  contours is noted. Etiology is not apparent. Pyelonephritis cannot BE  excluded  2. The head of the pancreas is not well evaluated without intravenous  contrast. A lesion in the head of the pancreas cannot BE excluded  3. Diverticulosis..  4. Extensive vascular calcifications present  5. Mesh is present on the anterior abdominal wall. However a small  umbilical hernia is present.  6. Bilateral fat-containing hernias     #6        This report was finalized on 07/10/2020 16:16 by Dr. Zhang Dutton MD.          Culture:  No results found for: BLOODCX, URINECX, WOUNDCX, MRSACX, RESPCX, STOOLCX      Assessment   1.  Acute kidney injury/ATN--worse today  2.  Baseline chronic kidney disease stage 3  3.  Hypernatremia--resolved  4.  Type 2 diabetes with renal disease  5.  Hypertension  6.  Chronic atrial fibrillation; s/p pacemaker  7.  Anemia   8.  Hypomagnesemia   9.  Hyperuricemia  10.  Benign prostatic hypertrophy.    Plan:  1.  Bladder scan for post-void residual PRN  2.  Hold IV fluids  3.  Monitor labs      Jonathan Cardona, CUATE  7/13/2020  11:00

## 2020-07-13 NOTE — PROGRESS NOTES
[facility]  INPATIENT PROGRESS NOTE    Patient Name: Gage Ken  Date of Admission: 7/9/2020  Today's Date: 07/13/20  Length of Stay: 4  Primary Care Physician: Cayetano Kay MD    Subjective   Chief Complaint: difficulty urinating  HPI   Had difficulty urinating last night.  Had some dysuria.  Afebrile.  Renal function worsening.        Review of Systems   Constitutional: Negative for fatigue and fever.   HENT: Negative for congestion and ear pain.    Eyes: Negative for redness and visual disturbance.   Respiratory: Negative for cough, shortness of breath and wheezing.    Cardiovascular: Negative for chest pain and palpitations.   Gastrointestinal: Negative for abdominal pain, diarrhea, nausea and vomiting.   Endocrine: Negative for cold intolerance and heat intolerance.   Genitourinary: Positive for difficulty urinating and dysuria. Negative for frequency.   Musculoskeletal: Negative for arthralgias and back pain.   Skin: Negative for rash and wound.   Neurological: Negative for dizziness and headaches.   Psychiatric/Behavioral: Negative for confusion. The patient is not nervous/anxious.         All pertinent negatives and positives are as above. All other systems have been reviewed and are negative unless otherwise stated.     Objective    Temp:  [97.5 °F (36.4 °C)-98.2 °F (36.8 °C)] 97.5 °F (36.4 °C)  Heart Rate:  [76-92] 76  Resp:  [16-18] 16  BP: (137-159)/(68-79) 151/68  Physical Exam   Constitutional: He is oriented to person, place, and time. He appears well-developed and well-nourished.   HENT:   Head: Normocephalic and atraumatic.   Right Ear: External ear normal.   Left Ear: External ear normal.   Nose: Nose normal.   Mouth/Throat: Oropharynx is clear and moist.   Eyes: Pupils are equal, round, and reactive to light. Conjunctivae and EOM are normal. Right eye exhibits no discharge. Left eye exhibits no discharge. No scleral icterus.      Neck: Normal range of motion. Neck supple. No tracheal deviation present. No thyromegaly present.   Cardiovascular: Normal rate, regular rhythm, normal heart sounds and intact distal pulses. Exam reveals no gallop and no friction rub.   No murmur heard.  Pulmonary/Chest: Effort normal and breath sounds normal. No stridor. No respiratory distress. He has no wheezes. He has no rales. He exhibits no tenderness.   Abdominal: Soft. Bowel sounds are normal. He exhibits no distension and no mass. There is no tenderness. There is no rebound and no guarding. No hernia.   Musculoskeletal: Normal range of motion. He exhibits no edema or deformity.   Lymphadenopathy:     He has no cervical adenopathy.   Neurological: He is alert and oriented to person, place, and time. He has normal reflexes. He displays normal reflexes. No cranial nerve deficit. He exhibits normal muscle tone. Coordination normal.   Skin: Skin is warm and dry. No rash noted. No erythema. No pallor.   Psychiatric: He has a normal mood and affect. His behavior is normal. Judgment and thought content normal.   Vitals reviewed.          Results Review:  I have reviewed the labs, radiology results, and diagnostic studies.    Laboratory Data:   Results from last 7 days   Lab Units 07/13/20 0229 07/12/20  0203 07/11/20  0522   WBC 10*3/mm3 7.17 7.82 7.42   HEMOGLOBIN g/dL 7.5* 7.5* 8.3*   HEMATOCRIT % 22.2* 21.8* 24.3*   PLATELETS 10*3/mm3 179 177 197        Results from last 7 days   Lab Units 07/13/20 0229 07/12/20 0203 07/11/20  0522   SODIUM mmol/L 140 139 141   POTASSIUM mmol/L 3.7 3.2* 3.6   CHLORIDE mmol/L 105 103 103   CO2 mmol/L 22.0 23.0 24.0   BUN mg/dL 36* 36* 37*   CREATININE mg/dL 3.33* 3.19* 3.19*   CALCIUM mg/dL 8.5* 8.3* 8.8   BILIRUBIN mg/dL 0.3 0.2 0.5   ALK PHOS U/L 111 108 93   ALT (SGPT) U/L 18 14 14   AST (SGOT) U/L 21 15 11   GLUCOSE mg/dL 210* 242* 162*       Culture Data:   No results found for: BLOODCX, URINECX, WOUNDCX, MRSACX,  RESPCX, STOOLCX    Radiology Data:   Imaging Results (Last 24 Hours)     ** No results found for the last 24 hours. **          I have reviewed the patient's current medications.     Assessment/Plan     Active Hospital Problems    Diagnosis   • Atrial fibrillation with RVR (CMS/[facility])   • GERD (gastroesophageal reflux disease)   • CHF (congestive heart failure) (CMS/[facility])   • Acute renal failure superimposed on chronic kidney disease (CMS/[facility])   • Chronic anticoagulation   • Type 2 diabetes mellitus (CMS/[facility])   Labs reviewed by me:  CMP shows worsening creatinine.      1.  JARET on CKD--worsening  -Nephrology following  -Allopurinol  -Repeat U/A    2.  Chronic Diastolic CHF--stable  -monitor for volume overload  -no diuretics given JARET    3.  GERD  -protonix    4.  A-fib  -Cardizem  -Metoprolol    5.  T2DM  -Glipizide  -SSI    6.  HTN  -Hydralazine  -Metoprolol    7.  UC  -SulfaSalazine    8.  BPH  -Hytrin    9.  NSVT  -Continue Telemetry  -Cardiology following    10.  Hypokalemia  -PO K  -Will check Mg level to make sure no hypomagnesemia contributing    11.  Urinary retention  -Hytrin    12.  Dysuria  -Will get UA      Discharge Planning: I expect the patient to be discharged to home in 2-3 days    Viet Timmons MD   07/13/20   16:24

## 2020-07-13 NOTE — THERAPY EVALUATION
Patient Name: Gage Ken  : 1940    MRN: [id number]                              Today's Date: 2020       Admit Date: 2020    Visit Dx:     ICD-10-CM ICD-9-CM   1. 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 (CMS/HCC)   • Benign prostatic hyperplasia   • Carotid artery occlusion   • Chronic anticoagulation   • Acute renal failure superimposed on chronic kidney disease (CMS/HCC)   • Depression   • Hypertension   • Hypocalcemia   • Insomnia   • Macrocytic anemia   • PVD (peripheral vascular disease) (CMS/HCC)   • Type 2 diabetes mellitus (CMS/HCC)   • Vitamin B deficiency   • Atrial fibrillation with RVR (CMS/HCC)   • GERD (gastroesophageal reflux disease)   • CHF (congestive heart failure) (CMS/HCC)     Past Medical History:   Diagnosis Date   • Acute gastritis    • Blood in feces    • Carotid artery occlusion 2019   • Colitis, ulcerative chronic (CMS/HCC)     LEFT-SIDED   • Diverticular disease of colon    • Epigastric pain    • GERD (gastroesophageal reflux disease)    • History of colon polyps    • [place] (hard of hearing)    • Hyperlipidemia    • Hypertension    • Lesion of nose    • Neoplasm of uncertain behavior    • Nonspecific ulcerative proctitis (CMS/HCC)    • Rectal hemorrhage    • Type 2 diabetes mellitus (CMS/HCC)    • Vitamin B12 deficiency      Past Surgical History:   Procedure Laterality Date   • COLONOSCOPY  2013    Hemorrhoids found.   • COLONOSCOPY  2016   • COLONOSCOPY N/A 10/30/2017    Procedure: COLONOSCOPY;  Surgeon: Alex Silveira MD;  Location: [facility] ENDOSCOPY;  Service:    • COLONOSCOPY N/A 2018    Procedure: COLONOSCOPY;  Surgeon: lAex Silveira MD;  Location: [facility] ENDOSCOPY;  Service: Gastroenterology   • COLONOSCOPY N/A 2019    Procedure: COLONOSCOPY;  Surgeon: Alex Silveira  MD;  Location: [facility] ENDOSCOPY;  Service: Gastroenterology   • COLONOSCOPY W/ POLYPECTOMY  08/30/2015    Single polyp found in the colon;removed by cold snare polypectomy.Proctitis in the rectum.Diverticulosis found in the sigmoid colon.Hemorrhoids found.   • ENDOSCOPY  12/02/2013    Normal hypopharynx, esophagus, duodenum, symmetrical & patent pylorus. Hiatus hernia in GE junction. Normal stomach. Biopsy taken.   • HERNIA REPAIR      umbilical   • UPPER GASTROINTESTINAL ENDOSCOPY  12/02/2013     General Information     Row Name 07/13/20 1133          PT Evaluation Time/Intention    Document Type  evaluation Pt admitted to hospital from cardiology apt, expirencing Afib with RVR; pacemaker put in 2/20; PMH chronic kidney disease, HTN, type 2 diabetes  -SB (r) EB (t) SB (c)     Mode of Treatment  physical therapy  -SB (r) EB (t) SB (c)     Row Name 07/13/20 1133          General Information    Patient Profile Reviewed?  yes  -SB (r) EB (t) SB (c)     Prior Level of Function  independent:;all household mobility;community mobility;bed mobility;ADL's;driving  -SB (r) EB (t) SB (c)     Existing Precautions/Restrictions  pacemaker  -SB (r) EB (t) SB (c)     Barriers to Rehab  hearing deficit  -SB (r) EB (t) SB (c)     Row Name 07/13/20 1133          Relationship/Environment    Lives With  alone  -SB (r) EB (t) SB (c)     Row Name 07/13/20 1133          Resource/Environmental Concerns    Current Living Arrangements  home/apartment/condo  -SB (r) EB (t) SB (c)     Row Name 07/13/20 1133          Home Main Entrance    Number of Stairs, Main Entrance  four  -SB (r) EB (t) SB (c)     Stair Railings, Main Entrance  none  -SB (r) EB (t) SB (c)     Row Name 07/13/20 1133          Cognitive Assessment/Intervention- PT/OT    Orientation Status (Cognition)  oriented x 4  -SB (r) EB (t) SB (c)     Personal Safety Interventions  fall prevention program maintained;gait belt;muscle strengthening facilitated;nonskid shoes/slippers when  out of bed;supervised activity  -SB (r) EB (t) SB (c)     Row Name 07/13/20 1133          Safety Issues, Functional Mobility    Safety Issues Affecting Function (Mobility)  safety precaution awareness  -SB (r) EB (t) SB (c)     Impairments Affecting Function (Mobility)  endurance/activity tolerance  -SB (r) EB (t) SB (c)       User Key  (r) = Recorded By, (t) = Taken By, (c) = Cosigned By    Initials Name Provider Type    [place] Piña, PT DPT Physical Therapist    Karol Ulloa, PT Student PT Student        Mobility     Row Name 07/13/20 1133          Bed Mobility Assessment/Treatment    Comment (Bed Mobility)  not formally assessed, Pt sitting EOB upon arrival  -SB (r) EB (t) SB (c)     Row Name 07/13/20 1133          Sit-Stand Transfer    Sit-Stand [place] (Transfers)  independent  -SB (r) EB (t) SB (c)     Row Name 07/13/20 1133          Gait/Stairs Assessment/Training    Gait/Stairs Assessment/Training  gait/ambulation independence  -SB (r) EB (t) SB (c)     [place] Level (Gait)  contact guard  -SB (r) EB (t) SB (c)     Distance in Feet (Gait)  50 ft  -SB (r) EB (t) SB (c)     Pattern (Gait)  step-through  -SB (r) EB (t) SB (c)       User Key  (r) = Recorded By, (t) = Taken By, (c) = Cosigned By    Initials Name Provider Type    [place] Piña, PT DPT Physical Therapist    Kraol Ulloa, PT Student PT Student        Obj/Interventions     Row Name 07/13/20 1133          General ROM    GENERAL ROM COMMENTS  BLE WFL   -SB (r) EB (t) SB (c)     Row Name 07/13/20 1133          MMT (Manual Muscle Testing)    General MMT Comments  BLE 5/5  -SB (r) EB (t) SB (c)     Row Name 07/13/20 1133          Static Sitting Balance    Level of [place] (Unsupported Sitting, Static Balance)  independent  -SB (r) EB (t) SB (c)     Sitting Position (Unsupported Sitting, Static Balance)  sitting on edge of bed  -SB (r) EB (t) SB (c)     Time Able to Maintain Position (Unsupported Sitting, Static Balance)   more than 5 minutes  -SB (r) EB (t) SB (c)     Row Name 07/13/20 1133          Static Standing Balance    Level of [place] (Supported Standing, Static Balance)  standby assist  -SB (r) EB (t) SB (c)     Time Able to Maintain Position (Supported Standing, Static Balance)  45 to 60 seconds  -SB (r) EB (t) SB (c)     Row Name 07/13/20 1133          Sensory Assessment/Intervention    Sensory General Assessment  no sensation deficits identified  -SB (r) EB (t) SB (c)       User Key  (r) = Recorded By, (t) = Taken By, (c) = Cosigned By    Initials Name Provider Type    [place] Piña, PT DPT Physical Therapist    Karol Ulloa, PT Student PT Student        Goals/Plan     Row Name 07/13/20 1133          Bed Mobility Goal 1 (PT)    Activity/Assistive Device (Bed Mobility Goal 1, PT)  bed mobility activities, all  -SB (r) EB (t) SB (c)     [place] Level/Cues Needed (Bed Mobility Goal 1, PT)  independent  -SB (r) EB (t) SB (c)     Time Frame (Bed Mobility Goal 1, PT)  long term goal (LTG)  -SB (r) EB (t) SB (c)     Progress/Outcomes (Bed Mobility Goal 1, PT)  goal ongoing  -SB (r) EB (t) SB (c)     Row Name 07/13/20 1133          Transfer Goal 1 (PT)    Activity/Assistive Device (Transfer Goal 1, PT)  sit-to-stand/stand-to-sit;bed-to-chair/chair-to-bed  -SB (r) EB (t) SB (c)     [place] Level/Cues Needed (Transfer Goal 1, PT)  independent  -SB (r) EB (t) SB (c)     Time Frame (Transfer Goal 1, PT)  long term goal (LTG)  -SB (r) EB (t) SB (c)     Progress/Outcome (Transfer Goal 1, PT)  goal ongoing  -SB (r) EB (t) SB (c)     Row Name 07/13/20 1133          Gait Training Goal 1 (PT)    Activity/Assistive Device (Gait Training Goal 1, PT)  gait (walking locomotion);increase endurance/gait distance;increase energy conservation;decrease fall risk  -SB (r) EB (t) SB (c)     [place] Level (Gait Training Goal 1, PT)  supervision required  -SB (r) EB (t) SB (c)     Distance (Gait Goal 1, PT)  200ft  -SB (r)  EB (t) SB (c)     Time Frame (Gait Training Goal 1, PT)  long term goal (LTG)  -SB (r) EB (t) SB (c)     Progress/Outcome (Gait Training Goal 1, PT)  goal ongoing  -SB (r) EB (t) SB (c)     Row Name 07/13/20 1133          Stairs Goal 1 (PT)    Activity/Assistive Device (Stairs Goal 1, PT)  ascending stairs;descending stairs;decrease fall risk  -SB (r) EB (t) SB (c)     [place] Level/Cues Needed (Stairs Goal 1, PT)  supervision required  -SB (r) EB (t) SB (c)     Time Frame (Stairs Goal 1, PT)  long term goal (LTG)  -SB (r) EB (t) SB (c)     Barriers (Stairs Goal 1, PT)  4  -SB (r) EB (t) SB (c)     Progress/Outcome (Stairs Goal 1, PT)  goal ongoing  -SB (r) EB (t) SB (c)     Row Name 07/13/20 1133          Patient Education Goal (PT)    Activity (Patient Education Goal, PT)  Pt will understand the importance of getting OOB and ambulating to maintain proir level of function   -SB (r) EB (t) SB (c)     [place]/Cues/Accuracy (Memory Goal 2, PT)  independent  -SB (r) EB (t) SB (c)     Time Frame (Patient Education Goal, PT)  long term goal (LTG)  -SB (r) EB (t) SB (c)     Progress/Outcome (Patient Education Goal, PT)  goal ongoing  -SB (r) EB (t) SB (c)       User Key  (r) = Recorded By, (t) = Taken By, (c) = Cosigned By    Initials Name Provider Type    [place] Piña, PT DPT Physical Therapist    Karol Ulloa, PT Student PT Student        Clinical Impression     Row Name 07/13/20 1139          Pain Assessment    Additional Documentation  Pain Scale: Numbers Pre/Post-Treatment (Group)  -SB (r) EB (t) SB (c)     Row Name 07/13/20 1133          Pain Scale: Numbers Pre/Post-Treatment    Pain Scale: Numbers, Pretreatment  0/10 - no pain  -SB (r) EB (t) SB (c)     Pre/Post Treatment Pain Comment  Bilateral LE edema; R>L   -SB (r) EB (t) SB (c)     Pain Intervention(s)  Ambulation/increased activity;Repositioned  -SB (r) EB (t) SB (c)     Row Name 07/13/20 7763          Plan of Care Review    Plan of Care  Reviewed With  patient  -SB (r) EB (t) SB (c)     Progress  no change  -SB (r) EB (t) SB (c)     Outcome Summary  PT evaluation completed. Pt alert and oriented x4, sitting EOB with no complaints of pain. Pt was eagar to begin therapy and ambulate outside room. Supervision given during sit to stand. Pt ambulated 50ft with CGA, displaying slight unsteadness when first standing. Pt descibes this to be the first time out of the room in 3 days. Pt will benefit from skilled PT to improve his cardiovascular endurance, stair training and continued education on falls prevention. Recommend d/c home.   -SB (r) EB (t) SB (c)     Row Name 07/13/20 1133          Physical Therapy Clinical Impression    Patient/Family Goals Statement (PT Clinical Impression)  return home with prior level of function, continue independence with ADL's   -SB (r) EB (t) SB (c)     Criteria for Skilled Interventions Met (PT Clinical Impression)  yes;treatment indicated  -SB (r) EB (t) SB (c)     Rehab Potential (PT Clinical Summary)  good, to achieve stated therapy goals  -SB (r) EB (t) SB (c)     Predicted Duration of Therapy (PT)  until d/c  -SB (r) EB (t) SB (c)     Row Name 07/13/20 1133          Vital Signs    O2 Delivery Pre Treatment  room air  -SB (r) EB (t) SB (c)     Pre Patient Position  Sitting  -SB (r) EB (t) SB (c)     Post Patient Position  Sitting  -SB (r) EB (t) SB (c)     Row Name 07/13/20 1133          Positioning and Restraints    Pre-Treatment Position  in bed sitting EOB  -SB (r) EB (t) SB (c)     Post Treatment Position  bed sitting EOB  -SB (r) EB (t) SB (c)     In Bed  sitting EOB;call light within reach;encouraged to call for assist  -SB (r) EB (t) SB (c)       User Key  (r) = Recorded By, (t) = Taken By, (c) = Cosigned By    Initials Name Provider Type    [place] Piña, PT DPT Physical Therapist    Karol Ulloa, PT Student PT Student        Outcome Measures     Row Name 07/13/20 1113          How much help from  another person do you currently need...    Turning from your back to your side while in flat bed without using bedrails?  4  -SB (r) EB (t) SB (c)     Moving from lying on back to sitting on the side of a flat bed without bedrails?  4  -SB (r) EB (t) SB (c)     Moving to and from a bed to a chair (including a wheelchair)?  4  -SB (r) EB (t) SB (c)     Standing up from a chair using your arms (e.g., wheelchair, bedside chair)?  4  -SB (r) EB (t) SB (c)     Climbing 3-5 steps with a railing?  3  -SB (r) EB (t) SB (c)     To walk in hospital room?  3  -SB (r) EB (t) SB (c)     AM-PAC 6 Clicks Score (PT)  22  -SB (r) EB (t)     Row Name 07/13/20 1113          Functional Assessment    Outcome Measure Options  AM-PAC 6 Clicks Basic Mobility (PT)  -SB (r) EB (t) SB (c)       User Key  (r) = Recorded By, (t) = Taken By, (c) = Cosigned By    Initials Name Provider Type    [place] Piña, PT DPT Physical Therapist    Karlo Ulloa, PT Student PT Student        Physical Therapy Education                 Title: PT OT SLP Therapies (In Progress)     Topic: Physical Therapy (In Progress)     Point: Mobility training (Done)     Description:   Instruct learner(s) on safety and technique for assisting patient out of bed, chair or wheelchair.  Instruct in the proper use of assistive devices, such as walker, crutches, cane or brace.              Patient Friendly Description:   It's important to get you on your feet again, but we need to do so in a way that is safe for you. Falling has serious consequences, and your personal safety is the most important thing of all.        When it's time to get out of bed, one of us or a family member will sit next to you on the bed to give you support.     If your doctor or nurse tells you to use a walker, crutches, a cane, or a brace, be sure you use it every time you get out of bed, even if you think you don't need it.    Learning Progress Summary           Patient Acceptance, E, VU by EB at  7/13/2020 1553    Comment:  Pt educated on safety precautions to take once returning home to minimize falls, importance of getting OOB and ambulating, POC and d/c                   Point: Home exercise program (Done)     Description:   Instruct learner(s) on appropriate technique for monitoring, assisting and/or progressing patient with therapeutic exercises and activities.              Learning Progress Summary           Patient Acceptance, E, VU by EB at 7/13/2020 1553    Comment:  Pt educated on safety precautions to take once returning home to minimize falls, importance of getting OOB and ambulating, POC and d/c                   Point: Body mechanics (Not Started)     Description:   Instruct learner(s) on proper positioning and spine alignment for patient and/or caregiver during mobility tasks and/or exercises.              Learner Progress:   Not documented in this visit.          Point: Precautions (Done)     Description:   Instruct learner(s) on prescribed precautions during mobility and gait tasks              Learning Progress Summary           Patient Acceptance, E, VU by EB at 7/13/2020 6293    Comment:  Pt educated on safety precautions to take once returning home to minimize falls, importance of getting OOB and ambulating, POC and d/c                               User Key     Initials Effective Dates Name Provider Type Discipline    CARMEN 06/23/20 -  Karol Paredes, PT Student PT Student PT              PT Recommendation and Plan  Planned Therapy Interventions (PT Eval): balance training, bed mobility training, gait training, home exercise program, patient/family education, stair training, transfer training  Outcome Summary/Treatment Plan (PT)  Anticipated Discharge Disposition (PT): home  Plan of Care Reviewed With: patient  Progress: no change  Outcome Summary: PT evaluation completed. Pt alert and oriented x4, sitting EOB with no complaints of pain. Pt was eagar to begin therapy and ambulate outside  room. Supervision given during sit to stand. Pt ambulated 50ft with CGA, displaying slight unsteadness when first standing. Pt descibes this to be the first time out of the room in 3 days. Pt will benefit from skilled PT to improve his cardiovascular endurance, stair training and continued education on falls prevention. Recommend d/c home.     Time Calculation:   PT Charges     Row Name 07/13/20 1418             Time Calculation    Start Time  1133 10 minute chart review for a total of 54 minutes   -SB (r) EB (t) SB (c)      Stop Time  1217  -SB (r) EB (t) SB (c)      Time Calculation (min)  44 min  -SB (r) EB (t)      PT Received On  07/13/20  -SB (r) EB (t) SB (c)      PT Goal Re-Cert Due Date  07/23/20  -SB (r) EB (t) SB (c)        User Key  (r) = Recorded By, (t) = Taken By, (c) = Cosigned By    Initials Name Provider Type    [place] Piña, PT DPT Physical Therapist    Karol Ulloa, PT Student PT Student        Therapy Charges for Today     Code Description Service Date Service Provider Modifiers Qty    [id number] HC PT EVAL LOW COMPLEXITY 4 7/13/2020 Karol Paredes, PT Student GP 1          PT G-Codes  Outcome Measure Options: AM-PAC 6 Clicks Daily Activity (OT)  AM-PAC 6 Clicks Score (PT): 22  AM-PAC 6 Clicks Score (OT): 24    TREVOR Brennan  7/13/2020

## 2020-07-13 NOTE — PLAN OF CARE
Problem: Patient Care Overview  Goal: Plan of Care Review  7/13/2020 1615 by Rosana Flores, RN  Flowsheets (Taken 7/13/2020 1615)  Plan of Care Reviewed With: patient  Outcome Summary: VSS this shift. IV fluids currently on hold  due to worsening creatnine and patient complaining of urinary retention. Patient to be bladder scanned post void PRN. Hgb is 7.5. Creatnine is 3.33. Patient has not had a BM to test occult stool. PO digoxin started this shift. Hydralazine helping with BP control. AF/ 70-94 with PVC, coup, and 3R on tele. Safety maintained, no falls. Will cont to monitor and notify MD of any changes.

## 2020-07-13 NOTE — THERAPY DISCHARGE NOTE
Acute Care - Occupational Therapy Initial Eval/Discharge  [facility]     Patient Name: Gage Ken  : 1940  MRN: [id number]  Today's Date: 2020  Onset of Illness/Injury or Date of Surgery: 20  Date of Referral to OT: 20  Referring Physician: Dr. Timmons      Admit Date: 2020       ICD-10-CM ICD-9-CM   1. 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 (CMS/HCC)   • Benign prostatic hyperplasia   • Carotid artery occlusion   • Chronic anticoagulation   • Acute renal failure superimposed on chronic kidney disease (CMS/HCC)   • Depression   • Hypertension   • Hypocalcemia   • Insomnia   • Macrocytic anemia   • PVD (peripheral vascular disease) (CMS/HCC)   • Type 2 diabetes mellitus (CMS/HCC)   • Vitamin B deficiency   • Atrial fibrillation with RVR (CMS/HCC)   • GERD (gastroesophageal reflux disease)   • CHF (congestive heart failure) (CMS/HCC)     Past Medical History:   Diagnosis Date   • Acute gastritis    • Blood in feces    • Carotid artery occlusion 2019   • Colitis, ulcerative chronic (CMS/HCC)     LEFT-SIDED   • Diverticular disease of colon    • Epigastric pain    • GERD (gastroesophageal reflux disease)    • History of colon polyps    • [place] (hard of hearing)    • Hyperlipidemia    • Hypertension    • Lesion of nose    • Neoplasm of uncertain behavior    • Nonspecific ulcerative proctitis (CMS/HCC)    • Rectal hemorrhage    • Type 2 diabetes mellitus (CMS/HCC)    • Vitamin B12 deficiency      Past Surgical History:   Procedure Laterality Date   • COLONOSCOPY  2013    Hemorrhoids found.   • COLONOSCOPY  2016   • COLONOSCOPY N/A 10/30/2017    Procedure: COLONOSCOPY;  Surgeon: Alex Silveira MD;  Location: [facility] ENDOSCOPY;  Service:    • COLONOSCOPY N/A 2018    Procedure: COLONOSCOPY;  Surgeon: Jordana  Alex GUERRA MD;  Location: [facility] ENDOSCOPY;  Service: Gastroenterology   • COLONOSCOPY N/A 11/21/2019    Procedure: COLONOSCOPY;  Surgeon: Alex Silveira MD;  Location: [facility] ENDOSCOPY;  Service: Gastroenterology   • COLONOSCOPY W/ POLYPECTOMY  08/30/2015    Single polyp found in the colon;removed by cold snare polypectomy.Proctitis in the rectum.Diverticulosis found in the sigmoid colon.Hemorrhoids found.   • ENDOSCOPY  12/02/2013    Normal hypopharynx, esophagus, duodenum, symmetrical & patent pylorus. Hiatus hernia in GE junction. Normal stomach. Biopsy taken.   • HERNIA REPAIR      umbilical   • UPPER GASTROINTESTINAL ENDOSCOPY  12/02/2013          OT ASSESSMENT FLOWSHEET (last 12 hours)      Occupational Therapy Evaluation     Row Name 07/13/20 1135                   OT Evaluation Time/Intention    Subjective Information  no complaints  -MM        Document Type  evaluation;other (see comments) see MAR  -MM        Mode of Treatment  occupational therapy  -MM           General Information    Patient Profile Reviewed?  yes  -MM        Onset of Illness/Injury or Date of Surgery  07/09/20  -MM        Referring Physician  Dr. Timmons  -MM        Patient Observations  alert;cooperative;agree to therapy  -MM        Patient/Family Observations  no family present  -MM        General Observations of Patient  sitting EOB, no apparent distress, IV, mask donned  -MM        Prior Level of Function  independent:;all household mobility;community mobility;transfer;bed mobility;ADL's;feeding;grooming;dressing;bathing;driving  -MM        Equipment Currently Used at Home  none  -MM        Pertinent History of Current Functional Problem  Pt admitted to hospital from cardiology apt, expirencing Afib with RVR; pacemaker put in 2/20; PMH chronic kidney disease, HTN, type 2 diabetes  -MM        Existing Precautions/Restrictions  fall  -MM        Limitations/Impairments  hearing  -MM        Risks Reviewed   patient:;LOB;nausea/vomiting;dizziness;increased discomfort;change in vital signs;lines disloged;increased drainage  -MM        Benefits Reviewed  patient:;improve function;increase independence;increase strength;increase balance;decrease pain;improve skin integrity;decrease risk of DVT;increase knowledge  -MM        Barriers to Rehab  hearing deficit  -MM           Relationship/Environment    Lives With  alone  -MM           Resource/Environmental Concerns    Current Living Arrangements  home/apartment/condo  -MM           Home Main Entrance    Number of Stairs, Main Entrance  four  -MM        Stair Railings, Main Entrance  none  -MM           Cognitive Assessment/Interventions    Additional Documentation  Cognitive Assessment/Intervention (Group)  -MM           Cognitive Assessment/Intervention- PT/OT    Affect/Mental Status (Cognitive)  WNL  -MM        Orientation Status (Cognition)  oriented x 4  -MM        Follows Commands (Cognition)  WFL  -MM        Cognitive Function (Cognitive)  WFL  -MM        Personal Safety Interventions  fall prevention program maintained;gait belt;muscle strengthening facilitated;nonskid shoes/slippers when out of bed;supervised activity  -MM           Safety Issues, Functional Mobility    Impairments Affecting Function (Mobility)  endurance/activity tolerance  -MM           Bed Mobility Assessment/Treatment    Comment (Bed Mobility)  sitting EOB pre and post tx  -MM           Functional Mobility    Functional Mobility- Ind. Level  contact guard assist;supervision required;verbal cues required  -MM        Functional Mobility- Comment  bed to RN station to bed, mask donned.  -MM           Transfer Assessment/Treatment    Transfer Assessment/Treatment  sit-stand transfer;stand-sit transfer  -MM           Sit-Stand Transfer    Sit-Stand [place] (Transfers)  independent  -MM           Stand-Sit Transfer    Stand-Sit [place] (Transfers)  independent  -MM           ADL  Assessment/Intervention    BADL Assessment/Intervention  lower body dressing  -MM           Lower Body Dressing Assessment/Training    Lower Body Dressing [place] Level  don;doff;socks;independent  -MM        Lower Body Dressing Position  edge of bed sitting  -MM           General ROM    GENERAL ROM COMMENTS  BUE AROM WFL  -MM           MMT (Manual Muscle Testing)    General MMT Comments  BUE 4+/5  -MM           Sensory Assessment/Intervention    Sensory General Assessment  no sensation deficits identified  -MM           Positioning and Restraints    Pre-Treatment Position  in bed  -MM        Post Treatment Position  bed  -MM        In Bed  sitting EOB;call light within reach;encouraged to call for assist;side rails up x2  -MM           Pain Scale: Numbers Pre/Post-Treatment    Pain Scale: Numbers, Pretreatment  0/10 - no pain  -MM        Pre/Post Treatment Pain Comment  Bilateral LE edema; R>L   -MM        Pain Intervention(s)  Ambulation/increased activity;Repositioned;Medication (See MAR)  -MM           Plan of Care Review    Plan of Care Reviewed With  patient  -MM        Progress  improving  -MM        Outcome Summary  OT eval completed. Pt reports no pain. Pt has BLE swelling R>L. Pt reports no pain. Pt was independent for t/f. Pt was CGA-supervision for functional mobility. Pt was independent to vargas/doff socks. Pt is very active and motivated to return to baseline, which I expect he isn't far from a functional standpoint. Skilled OT not warranted at this time d/t independence level. OT to sign off. OT will defer balance and endurance to PT. Recommended d/c home.   -MM           Clinical Impression (OT)    Date of Referral to OT  07/12/20  -MM        Prognosis (OT Eval)  good  -MM        Functional Level at Time of Evaluation (OT Eval)  good  -MM        Patient/Family Goals Statement (OT Eval)  return to baseline  -MM        Criteria for Skilled Therapeutic Interventions Met (OT Eval)  no;no problems  identified which require skilled intervention;current level of function same as previous level of function  -MM        Therapy Frequency (OT Eval)  evaluation only  -MM        Care Plan Review (OT)  evaluation/treatment results reviewed;care plan/treatment goals reviewed;risks/benefits reviewed;current/potential barriers reviewed;patient/other agree to care plan  -MM        Anticipated Discharge Disposition (OT)  home  -MM           Living Environment    Home Accessibility  stairs to enter home  -MM          User Key  (r) = Recorded By, (t) = Taken By, (c) = Cosigned By    Initials Name Effective Dates    Sarthak Perez OTR/L 07/05/20 -           Occupational Therapy Education                 Title: PT OT SLP Therapies (Resolved)     Topic: Occupational Therapy (Resolved)     Point: ADL training (Resolved)     Description:   Instruct learner(s) on proper safety adaptation and remediation techniques during self care or transfers.   Instruct in proper use of assistive devices.              Learning Progress Summary           Patient Acceptance, E, VU by MM at 7/13/2020 1528    Comment:  OT role, benefits, POC, d/c planning, home safety.                   Point: Home exercise program (Resolved)     Description:   Instruct learner(s) on appropriate technique for monitoring, assisting and/or progressing therapeutic exercises/activities.              Learning Progress Summary           Patient Acceptance, E, VU by MM at 7/13/2020 1528    Comment:  OT role, benefits, POC, d/c planning, home safety.                   Point: Precautions (Resolved)     Description:   Instruct learner(s) on prescribed precautions during self-care and functional transfers.              Learning Progress Summary           Patient Acceptance, E, VU by MM at 7/13/2020 1528    Comment:  OT role, benefits, POC, d/c planning, home safety.                   Point: Body mechanics (Resolved)     Description:   Instruct learner(s) on proper  positioning and spine alignment during self-care, functional mobility activities and/or exercises.              Learning Progress Summary           Patient Acceptance, E, VU by  at 7/13/2020 1848    Comment:  OT role, benefits, POC, d/c planning, home safety.                               User Key     Initials Effective Dates Name Provider Type Discipline    YEN 07/05/20 -  Sarthak Chavarria, OTR/L Occupational Therapist OT                OT Recommendation and Plan  Outcome Summary/Treatment Plan (OT)  Anticipated Discharge Disposition (OT): home  Therapy Frequency (OT Eval): evaluation only  Plan of Care Review  Plan of Care Reviewed With: patient  Plan of Care Reviewed With: patient  Outcome Summary: OT eval completed. Pt reports no pain. Pt has BLE swelling R>L. Pt reports no pain. Pt was independent for t/f. Pt was CGA-supervision for functional mobility. Pt was independent to vargas/doff socks. Pt is very active and motivated to return to baseline, which I expect he isn't far from a functional standpoint. Skilled OT not warranted at this time d/t independence level. OT to sign off. OT will defer balance and endurance to PT. Recommended d/c home.      Rehab Goal Summary     Row Name 07/13/20 1133             Bed Mobility Goal 1 (PT)    Activity/Assistive Device (Bed Mobility Goal 1, PT)  bed mobility activities, all  (Pended)   -EB      [place] Level/Cues Needed (Bed Mobility Goal 1, PT)  independent  (Pended)   -EB      Time Frame (Bed Mobility Goal 1, PT)  long term goal (LTG)  (Pended)   -EB      Progress/Outcomes (Bed Mobility Goal 1, PT)  goal ongoing  (Pended)   -EB         Transfer Goal 1 (PT)    Activity/Assistive Device (Transfer Goal 1, PT)  sit-to-stand/stand-to-sit;bed-to-chair/chair-to-bed  (Pended)   -EB      [place] Level/Cues Needed (Transfer Goal 1, PT)  independent  (Pended)   -EB      Time Frame (Transfer Goal 1, PT)  long term goal (LTG)  (Pended)   -EB      Progress/Outcome  (Transfer Goal 1, PT)  goal ongoing  (Pended)   -EB         Gait Training Goal 1 (PT)    Activity/Assistive Device (Gait Training Goal 1, PT)  gait (walking locomotion);increase endurance/gait distance;increase energy conservation;decrease fall risk  (Pended)   -EB      [place] Level (Gait Training Goal 1, PT)  supervision required  (Pended)   -EB      Distance (Gait Goal 1, PT)  200ft  (Pended)   -EB      Time Frame (Gait Training Goal 1, PT)  long term goal (LTG)  (Pended)   -EB      Progress/Outcome (Gait Training Goal 1, PT)  goal ongoing  (Pended)   -EB         Stairs Goal 1 (PT)    Activity/Assistive Device (Stairs Goal 1, PT)  ascending stairs;descending stairs;decrease fall risk  (Pended)   -EB      [place] Level/Cues Needed (Stairs Goal 1, PT)  supervision required  (Pended)   -EB      Time Frame (Stairs Goal 1, PT)  long term goal (LTG)  (Pended)   -EB      Barriers (Stairs Goal 1, PT)  4  (Pended)   -EB      Progress/Outcome (Stairs Goal 1, PT)  goal ongoing  (Pended)   -EB        User Key  (r) = Recorded By, (t) = Taken By, (c) = Cosigned By    Initials Name Provider Type Discipline    Karol Ulloa, PT Student PT Student PT          Outcome Measures     Row Name 07/13/20 1500             How much help from another is currently needed...    Putting on and taking off regular lower body clothing?  4  -MM      Bathing (including washing, rinsing, and drying)  4  -MM      Toileting (which includes using toilet bed pan or urinal)  4  -MM      Putting on and taking off regular upper body clothing  4  -MM      Taking care of personal grooming (such as brushing teeth)  4  -MM      Eating meals  4  -MM      AM-PAC 6 Clicks Score (OT)  24  -MM         Functional Assessment    Outcome Measure Options  AM-PAC 6 Clicks Daily Activity (OT)  -MM        User Key  (r) = Recorded By, (t) = Taken By, (c) = Cosigned By    Initials Name Provider Type    MM Sarthak Chavarria, OTR/L Occupational Therapist           Time Calculation:   Time Calculation- OT     Row Name 07/13/20 1125             Time Calculation- OT    OT Start Time  1125  -MM      OT Stop Time  1218  -MM      OT Time Calculation (min)  53 min  -MM      OT Received On  07/13/20  -MM        User Key  (r) = Recorded By, (t) = Taken By, (c) = Cosigned By    Initials Name Provider Type    MM Sarthak Chavarria, OTR/L Occupational Therapist        Therapy Suggested Charges     Code   Minutes Charges    None           Therapy Charges for Today     Code Description Service Date Service Provider Modifiers Qty    [id number]  OT EVAL LOW COMPLEXITY 4 7/13/2020 Sarthak Chavarria OTR/L GO 1               OT Discharge Summary  Anticipated Discharge Disposition (OT): home  Reason for Discharge: Independent, At baseline function  Outcomes Achieved: Refer to plan of care for updates on goals achieved  Discharge Destination: Home(expected d/c destination)    TIFFANY Louis/JOHANNA  7/13/2020

## 2020-07-14 NOTE — PROGRESS NOTES
[facility] Medicine Services  INPATIENT PROGRESS NOTE    Patient Name: Gage Ken  Date of Admission: 7/9/2020  Today's Date: 07/14/20  Length of Stay: 5  Primary Care Physician: Cayetano Kay MD    Subjective   Chief Complaint: difficulty urinating  HPI   Feeling better.  Urinary difficulties have resolved.  H&H stable.  No black or bloody stools.  Hemoccult negative.  No SOA      Review of Systems   Constitutional: Negative for fatigue and fever.   HENT: Negative for congestion and ear pain.    Eyes: Negative for redness and visual disturbance.   Respiratory: Negative for cough, shortness of breath and wheezing.    Cardiovascular: Negative for chest pain and palpitations.   Gastrointestinal: Negative for abdominal pain, diarrhea, nausea and vomiting.   Endocrine: Negative for cold intolerance and heat intolerance.   Genitourinary: Negative for frequency.   Musculoskeletal: Negative for arthralgias and back pain.   Skin: Negative for rash and wound.   Neurological: Negative for dizziness and headaches.   Psychiatric/Behavioral: Negative for confusion. The patient is not nervous/anxious.         All pertinent negatives and positives are as above. All other systems have been reviewed and are negative unless otherwise stated.     Objective    Temp:  [94.8 °F (34.9 °C)-98.4 °F (36.9 °C)] 98 °F (36.7 °C)  Heart Rate:  [73-93] 79  Resp:  [16-18] 16  BP: (137-171)/(62-90) 153/69  Physical Exam   Constitutional: He is oriented to person, place, and time. He appears well-developed and well-nourished.   HENT:   Head: Normocephalic and atraumatic.   Right Ear: External ear normal.   Left Ear: External ear normal.   Nose: Nose normal.   Mouth/Throat: Oropharynx is clear and moist.   Eyes: Pupils are equal, round, and reactive to light. Conjunctivae and EOM are normal. Right eye exhibits no discharge. Left eye exhibits no discharge. No scleral icterus.   Neck: Normal range of  motion. Neck supple. No tracheal deviation present. No thyromegaly present.   Cardiovascular: Normal rate, regular rhythm, normal heart sounds and intact distal pulses. Exam reveals no gallop and no friction rub.   No murmur heard.  Pulmonary/Chest: Effort normal and breath sounds normal. No stridor. No respiratory distress. He has no wheezes. He has no rales. He exhibits no tenderness.   Abdominal: Soft. Bowel sounds are normal. He exhibits no distension and no mass. There is no tenderness. There is no rebound and no guarding. No hernia.   Musculoskeletal: Normal range of motion. He exhibits no edema or deformity.   Lymphadenopathy:     He has no cervical adenopathy.   Neurological: He is alert and oriented to person, place, and time. He has normal reflexes. He displays normal reflexes. No cranial nerve deficit. He exhibits normal muscle tone. Coordination normal.   Skin: Skin is warm and dry. No rash noted. No erythema. No pallor.   Psychiatric: He has a normal mood and affect. His behavior is normal. Judgment and thought content normal.   Vitals reviewed.          Results Review:  I have reviewed the labs, radiology results, and diagnostic studies.    Laboratory Data:   Results from last 7 days   Lab Units 07/14/20  0421 07/13/20 0229 07/12/20  0203   WBC 10*3/mm3 6.71 7.17 7.82   HEMOGLOBIN g/dL 7.4* 7.5* 7.5*   HEMATOCRIT % 21.3* 22.2* 21.8*   PLATELETS 10*3/mm3 169 179 177        Results from last 7 days   Lab Units 07/14/20  0421 07/13/20 0229 07/12/20  0203   SODIUM mmol/L 139 140 139   POTASSIUM mmol/L 3.2* 3.7 3.2*   CHLORIDE mmol/L 105 105 103   CO2 mmol/L 21.0* 22.0 23.0   BUN mg/dL 35* 36* 36*   CREATININE mg/dL 3.11* 3.33* 3.19*   CALCIUM mg/dL 8.8 8.5* 8.3*   BILIRUBIN mg/dL 0.4 0.3 0.2   ALK PHOS U/L 108 111 108   ALT (SGPT) U/L 25 18 14   AST (SGOT) U/L 24 21 15   GLUCOSE mg/dL 185* 210* 242*       Culture Data:   No results found for: BLOODCX, URINECX, WOUNDCX, MRSACX, RESPCX,  STOOLCX    Radiology Data:   Imaging Results (Last 24 Hours)     ** No results found for the last 24 hours. **          I have reviewed the patient's current medications.     Assessment/Plan     Active Hospital Problems    Diagnosis   • Atrial fibrillation with RVR (CMS/[facility])   • GERD (gastroesophageal reflux disease)   • CHF (congestive heart failure) (CMS/[facility])   • Acute renal failure superimposed on chronic kidney disease (CMS/[facility])   • Chronic anticoagulation   • Type 2 diabetes mellitus (CMS/[facility])   Labs reviewed by me:  CMP shows improving creatinine, GFR    1.  JARET on CKD--worsening  -Nephrology following  -Allopurinol  -PO diuretics    2.  Chronic Diastolic CHF--stable  -monitor for volume overload  -po diuretics    3.  GERD  -protonix    4.  A-fib  -Cardizem  -Metoprolol  -Restart Eliquis    5.  T2DM  -Glipizide  -SSI    6.  HTN  -Hydralazine  -Metoprolol    7.  UC  -SulfaSalazine    8.  BPH  -Hytrin    9.  NSVT  -Continue Telemetry  -Cardiology following    10.  Hypokalemia  -PO K  -mg level wnl    11.  Urinary retention  -Hytrin          Discharge Planning: I expect the patient to be discharged to home in 2-3 days    Viet Timmons MD   07/14/20   16:27

## 2020-07-14 NOTE — PLAN OF CARE
Problem: Patient Care Overview  Goal: Plan of Care Review  Outcome: Ongoing (interventions implemented as appropriate)  Flowsheets (Taken 7/14/2020 1415)  Outcome Summary: Pt. is independent in bed mobility and transfers. Ambulates 320' with supervision. Performs exercises actively. No balance issues during this activity. Tolerated all well.

## 2020-07-14 NOTE — PLAN OF CARE
Problem: Patient Care Overview  Goal: Plan of Care Review  Outcome: Ongoing (interventions implemented as appropriate)  Note:   AF 73-88  occ.  coup, pvc  no c/o pain.  Patient has been voiding without difficulty or discomfort.  Patient remains on room air.  Still need OB but did get the UA.  Labs this morning.  Cont. To monitor.  Call for concerns.

## 2020-07-14 NOTE — PLAN OF CARE
Problem: Patient Care Overview  Goal: Plan of Care Review  Outcome: Ongoing (interventions implemented as appropriate)  Flowsheets (Taken 7/14/2020 1503)  Progress: no change  Plan of Care Reviewed With: patient  Outcome Summary: Pt cont on cardiac, C.CHO, renal diet. Cont to have a great appetite, 92% of the last 5 meals consumed. No sig weight changes noted. Will cont to follow for nutrition needs.

## 2020-07-14 NOTE — PROGRESS NOTES
Nephrology ([facility], [facility]) Progress Note      Patient:  Gage Ken  YOB: 1940  Date of Service: 7/14/2020  MRN: [id number]   Acct: [id number]   Primary Care Physician: Cayetano Kay MD  Advance Directive:   Code Status and Medical Interventions:   Ordered at: 07/09/20 1836     Code Status:    CPR     Medical Interventions (Level of Support Prior to Arrest):    Full     Admit Date: 7/9/2020       Hospital Day: 5  Referring Provider: Eb Garcia MD      Patient personally seen and examined.  Complete chart including Consults, Notes, Operative Reports, Labs, Cardiology, and Radiology studies reviewed as able.        Subjective:  Gage Ken is a 80 y.o. male  whom we were consulted for acute kidney injury.  Baseline chronic kidney disease stage 3; baseline creatinine 1.3-1.6. No prior nephrology contacts.  History of atrial fibrillation with permanent pacemaker placement in 2019, hypertension, type 2 diabetes, ulcerative colitis, aortic stenosis.  Patient went to his PCP earlier this week for a checkup; was found to be in atrial fibrillation with RVR. Also had incidental finding of acute kidney injury with creatinine 3.8. He was sent to [facility] ER for further evaluation. Patient is somewhat of a difficult historian as he is unclear with the timing and duration of his symptoms. It would seem that he has recently been having some nausea and vomiting, denies diarrhea. Denies changes in oral intake. He has noted increased lower extremity edema recently. He thinks that his urine output has been less recently but denies any dysuria or hematuria. Denies dark color or strong odor. He does endorse episodes of palpitations but is unsure how long these have been present or how frequently they have been taking place.  Denies NSAIDs.    Today has no new complaints.  Feels he is voiding better today. Urine output nonoliguric    Allergies:  Patient has no known  allergies.    Home Meds:  Medications Prior to Admission   Medication Sig Dispense Refill Last Dose   • apixaban (ELIQUIS) 5 MG tablet tablet Take 5 mg by mouth 2 (Two) Times a Day.   7/9/2020 at Unknown time   • dilTIAZem CD (CARDIZEM CD) 240 MG 24 hr capsule Take 240 mg by mouth 2 (Two) Times a Day.   7/9/2020 at Unknown time   • folic acid (FOLVITE) 1 MG tablet Take 1 mg by mouth Daily.   7/9/2020 at Unknown time   • furosemide (LASIX) 40 MG tablet Take 40 mg by mouth Daily.  2 7/9/2020 at Unknown time   • glipiZIDE (GLUCOTROL) 10 MG tablet Take 1 tablet by mouth 2 (Two) Times a Day.  3 7/9/2020 at Unknown time   • lisinopril (PRINIVIL,ZESTRIL) 20 MG tablet Take 20 mg by mouth 2 (two) times a day.   7/9/2020 at Unknown time   • metFORMIN ER (GLUCOPHAGE-XR) 500 MG 24 hr tablet Take 500 mg by mouth 2 (two) times a day.   7/9/2020 at Unknown time   • metoprolol succinate XL (TOPROL-XL) 100 MG 24 hr tablet Take 100 mg by mouth 2 (two) times a day.   7/9/2020 at Unknown time   • omeprazole (priLOSEC) 40 MG capsule Take 40 mg by mouth Daily.      • ondansetron ODT (ZOFRAN-ODT) 4 MG disintegrating tablet Place 4 mg on the tongue Every 6 (Six) Hours As Needed for Nausea or Vomiting.   7/9/2020 at Unknown time   • pravastatin (PRAVACHOL) 20 MG tablet Take 1 tablet by mouth Every Night.  2 7/9/2020 at Unknown time   • sulfaSALAzine (AZULFIDINE) 500 MG tablet Take 2 tablets by mouth 3 (Three) Times a Day. 360 tablet 1 7/9/2020 at Unknown time   • terazosin (HYTRIN) 10 MG capsule Take 1 capsule by mouth Every Night.  2 7/9/2020 at Unknown time       Medicines:  Current Facility-Administered Medications   Medication Dose Route Frequency Provider Last Rate Last Dose   • allopurinol (ZYLOPRIM) tablet 200 mg  200 mg Oral Daily Jose Enrique Cho MD   200 mg at 07/14/20 0837   • bumetanide (BUMEX) tablet 1 mg  1 mg Oral Daily Jevon Valladares MD   1 mg at 07/14/20 0838   • dextrose (D50W) 25 g/ 50mL Intravenous Solution 25 g   25 g Intravenous Q15 Min PRN Eb Garcia MD       • dextrose (GLUTOSE) oral gel 15 g  15 g Oral Q15 Min PRN Eb Garcia MD       • digoxin (LANOXIN) tablet 125 mcg  125 mcg Oral Daily Nanci Pascual PA-C   125 mcg at 07/13/20 1315   • dilTIAZem CD (CARDIZEM CD) 24 hr capsule 240 mg  240 mg Oral BID Sravanthi Waldron APRN   240 mg at 07/14/20 0838   • glipizide (GLUCOTROL) tablet 10 mg  10 mg Oral QAM AC Eb Garcia MD   10 mg at 07/14/20 0838   • glucagon (human recombinant) (GLUCAGEN DIAGNOSTIC) injection 1 mg  1 mg Subcutaneous Q15 Min PRN Eb Garcia MD       • hydrALAZINE (APRESOLINE) tablet 50 mg  50 mg Oral Q8H Sravanthi Waldron APRN       • insulin lispro (humaLOG) injection 2-7 Units  2-7 Units Subcutaneous TID Eb Moon MD   2 Units at 07/14/20 0838   • iron sucrose (VENOFER) 200 mg in sodium chloride 0.9 % 100 mL IVPB  200 mg Intravenous Q24H Jevon Valladares MD   200 mg at 07/13/20 1706   • metoprolol succinate XL (TOPROL-XL) 24 hr tablet 100 mg  100 mg Oral BID Sravanthi Waldron APRN   100 mg at 07/14/20 0838   • ondansetron (ZOFRAN) injection 4 mg  4 mg Intravenous Q6H PRN Eb Garcia MD       • pantoprazole (PROTONIX) EC tablet 40 mg  40 mg Oral QAM Eb Garcia MD   40 mg at 07/14/20 0501   • pravastatin (PRAVACHOL) tablet 20 mg  20 mg Oral Nightly Eb Garcia MD   20 mg at 07/13/20 2043   • sodium chloride 0.9 % flush 10 mL  10 mL Intravenous Q12H Eb Garcia MD   10 mL at 07/14/20 0839   • sodium chloride 0.9 % flush 10 mL  10 mL Intravenous PRN Eb Garcia MD   10 mL at 07/10/20 0954   • sulfaSALAzine (AZULFIDINE) tablet 1,000 mg  1,000 mg Oral TID Eb Garcia MD   1,000 mg at 07/14/20 0838   • terazosin (HYTRIN) capsule 10 mg  10 mg Oral Nightly Eb Garcia MD   10 mg at 07/13/20 1707       Past Medical History:  Past Medical History:   Diagnosis Date   • Acute gastritis    • Blood in feces    • Carotid artery occlusion 12/2/2019   • Colitis,  ulcerative chronic (CMS/HCC)     LEFT-SIDED   • Diverticular disease of colon    • Epigastric pain    • GERD (gastroesophageal reflux disease)    • History of colon polyps    • [place] (hard of hearing)    • Hyperlipidemia    • Hypertension    • Lesion of nose    • Neoplasm of uncertain behavior    • Nonspecific ulcerative proctitis (CMS/HCC)    • Rectal hemorrhage    • Type 2 diabetes mellitus (CMS/HCC)    • Vitamin B12 deficiency        Past Surgical History:  Past Surgical History:   Procedure Laterality Date   • COLONOSCOPY  12/02/2013    Hemorrhoids found.   • COLONOSCOPY  09/06/2016   • COLONOSCOPY N/A 10/30/2017    Procedure: COLONOSCOPY;  Surgeon: Alex Silveira MD;  Location: [facility] ENDOSCOPY;  Service:    • COLONOSCOPY N/A 11/6/2018    Procedure: COLONOSCOPY;  Surgeon: Alex Silveira MD;  Location: [facility] ENDOSCOPY;  Service: Gastroenterology   • COLONOSCOPY N/A 11/21/2019    Procedure: COLONOSCOPY;  Surgeon: Alex Silveira MD;  Location: [facility] ENDOSCOPY;  Service: Gastroenterology   • COLONOSCOPY W/ POLYPECTOMY  08/30/2015    Single polyp found in the colon;removed by cold snare polypectomy.Proctitis in the rectum.Diverticulosis found in the sigmoid colon.Hemorrhoids found.   • ENDOSCOPY  12/02/2013    Normal hypopharynx, esophagus, duodenum, symmetrical & patent pylorus. Hiatus hernia in GE junction. Normal stomach. Biopsy taken.   • HERNIA REPAIR      umbilical   • UPPER GASTROINTESTINAL ENDOSCOPY  12/02/2013       Family History  Family History   Problem Relation Age of Onset   • Diabetes Other    • Hypertension Other        Social History  Social History     Socioeconomic History   • Marital status: Single     Spouse name: Not on file   • Number of children: Not on file   • Years of education: Not on file   • Highest education level: Not on file   Tobacco Use   • Smoking status: Former Smoker     Types: Cigarettes   • Smokeless tobacco: Former User     Types: Snuff     Quit date: 2008   • Tobacco  comment: 11/30/2017 - Patient States He Ceased Smoking 13 Years Prior.   Substance and Sexual Activity   • Alcohol use: Yes     Comment: occasional   • Drug use: No   • Sexual activity: Defer         Review of Systems:  History obtained from chart review and the patient  General ROS: No fever or chills  Respiratory ROS: No cough, shortness of breath, wheezing  Cardiovascular ROS: No chest pain or palpitations  Gastrointestinal ROS: No abdominal pain or melena  Genito-Urinary ROS: positive for - change in urinary stream  No dysuria or hematuria  Neurological ROS; no headache or dizziness    Objective:  Patient Vitals for the past 24 hrs:   BP Temp Temp src Pulse Resp SpO2 Weight   07/14/20 0938 -- -- -- -- -- 95 % --   07/14/20 0756 171/77 98.4 °F (36.9 °C) Oral 93 18 96 % --   07/14/20 0355 159/77 98.1 °F (36.7 °C) Oral 73 16 93 % --   07/14/20 0015 157/75 -- -- 92 16 93 % --   07/13/20 2328 161/74 98.1 °F (36.7 °C) Oral 93 16 91 % --   07/13/20 1950 147/62 97.9 °F (36.6 °C) Oral 79 16 93 % 71.7 kg (158 lb 1.6 oz)   07/13/20 1742 137/68 94.8 °F (34.9 °C) Axillary 86 16 95 % --   07/13/20 1256 151/68 97.5 °F (36.4 °C) Oral 76 16 96 % --       Intake/Output Summary (Last 24 hours) at 7/14/2020 1026  Last data filed at 7/14/2020 0928  Gross per 24 hour   Intake 900 ml   Output 3770 ml   Net -2870 ml     General: awake/alert    Neck: supple, no JVD  Chest:  clear to auscultation bilaterally without respiratory distress  CVS: regular rate and rhythm  Abdominal: soft, nontender, positive bowel sounds  Extremities: no cyanosis or edema  Skin: warm and dry without rash  Neuro: no focal motor deficits    Labs:  Results from last 7 days   Lab Units 07/14/20  0421 07/13/20  0229 07/12/20  0203   WBC 10*3/mm3 6.71 7.17 7.82   HEMOGLOBIN g/dL 7.4* 7.5* 7.5*   HEMATOCRIT % 21.3* 22.2* 21.8*   PLATELETS 10*3/mm3 169 179 177         Results from last 7 days   Lab Units 07/14/20  0421 07/13/20  0229 07/12/20  0203   SODIUM mmol/L  139 140 139   POTASSIUM mmol/L 3.2* 3.7 3.2*   CHLORIDE mmol/L 105 105 103   CO2 mmol/L 21.0* 22.0 23.0   BUN mg/dL 35* 36* 36*   CREATININE mg/dL 3.11* 3.33* 3.19*   CALCIUM mg/dL 8.8 8.5* 8.3*   BILIRUBIN mg/dL 0.4 0.3 0.2   ALK PHOS U/L 108 111 108   ALT (SGPT) U/L 25 18 14   AST (SGOT) U/L 24 21 15   GLUCOSE mg/dL 185* 210* 242*       Radiology:   Imaging Results (Last 72 Hours)     ** No results found for the last 72 hours. **          Culture:  No results found for: BLOODCX, URINECX, WOUNDCX, MRSACX, RESPCX, STOOLCX      Assessment   1.  Acute kidney injury/ATN--worse today  2.  Baseline chronic kidney disease stage 3  3.  Hypernatremia--resolved  4.  Type 2 diabetes with renal disease  5.  Hypertension  6.  Chronic atrial fibrillation; s/p pacemaker  7.  Anemia   8.  Hypomagnesemia--improved  9.  Hyperuricemia  10.  Benign prostatic hypertrophy  11.  Hypokalemia     Plan:  1.  Encourage PO fluids  2.  Replace potassium  3.  Monitor labs      Jonathan Cardona, CUATE  7/14/2020  10:26

## 2020-07-15 NOTE — PLAN OF CARE
Problem: Patient Care Overview  Goal: Plan of Care Review  Outcome: Ongoing (interventions implemented as appropriate)  Flowsheets (Taken 7/15/2020 5320)  Progress: no change  Plan of Care Reviewed With: patient; son  Outcome Summary: pt denies pain this shift. VSS. AF  PVC's. MD was planning on discharging today, but with increase in renal labs decided to keep for today. no new orders at this time. bladder scan showed 312. pt denies discomfort. son at bedside. further plan per MD.

## 2020-07-15 NOTE — PROGRESS NOTES
[facility] Medicine Services  INPATIENT PROGRESS NOTE    Patient Name: Gage Ken  Date of Admission: 7/9/2020  Today's Date: 07/15/20  Length of Stay: 6  Primary Care Physician: Cayetano Kay MD    Subjective   Chief Complaint: follow up renal failure  HPI  Doing ok.  Renal function worsened today.  Cr from 3.11 to 3.6.  GFR dropped from 20 to 16.          Review of Systems   Constitutional: Negative for fatigue and fever.   HENT: Negative for congestion and ear pain.    Eyes: Negative for redness and visual disturbance.   Respiratory: Negative for cough, shortness of breath and wheezing.    Cardiovascular: Negative for chest pain and palpitations.   Gastrointestinal: Negative for abdominal pain, diarrhea, nausea and vomiting.   Endocrine: Negative for cold intolerance and heat intolerance.   Genitourinary: Negative for dysuria and frequency.   Musculoskeletal: Negative for arthralgias and back pain.   Skin: Negative for rash and wound.   Neurological: Negative for dizziness and headaches.   Psychiatric/Behavioral: Negative for confusion. The patient is not nervous/anxious.         All pertinent negatives and positives are as above. All other systems have been reviewed and are negative unless otherwise stated.     Objective    Temp:  [98 °F (36.7 °C)-99 °F (37.2 °C)] 98.1 °F (36.7 °C)  Heart Rate:  [79-93] 93  Resp:  [16] 16  BP: (142-153)/(56-81) 153/57  Physical Exam   Constitutional: He is oriented to person, place, and time. He appears well-developed and well-nourished.   HENT:   Head: Normocephalic and atraumatic.   Right Ear: External ear normal.   Left Ear: External ear normal.   Nose: Nose normal.   Mouth/Throat: Oropharynx is clear and moist.   Eyes: Pupils are equal, round, and reactive to light. Conjunctivae and EOM are normal. Right eye exhibits no discharge. Left eye exhibits no discharge. No scleral icterus.   Neck: Normal range of motion. Neck supple.  No tracheal deviation present. No thyromegaly present.   Cardiovascular: Normal rate, regular rhythm, normal heart sounds and intact distal pulses. Exam reveals no gallop and no friction rub.   No murmur heard.  Pulmonary/Chest: Effort normal and breath sounds normal. No stridor. No respiratory distress. He has no wheezes. He has no rales. He exhibits no tenderness.   Abdominal: Soft. Bowel sounds are normal. He exhibits no distension and no mass. There is no tenderness. There is no rebound and no guarding. No hernia.   Musculoskeletal: Normal range of motion. He exhibits no edema or deformity.   Lymphadenopathy:     He has no cervical adenopathy.   Neurological: He is alert and oriented to person, place, and time. He has normal reflexes. He displays normal reflexes. No cranial nerve deficit. He exhibits normal muscle tone. Coordination normal.   Skin: Skin is warm and dry. No rash noted. No erythema. No pallor.   Psychiatric: He has a normal mood and affect. His behavior is normal. Judgment and thought content normal.   Vitals reviewed.          Results Review:  I have reviewed the labs, radiology results, and diagnostic studies.    Laboratory Data:   Results from last 7 days   Lab Units 07/15/20  0458 07/14/20  0421 07/13/20 0229   WBC 10*3/mm3 6.81 6.71 7.17   HEMOGLOBIN g/dL 7.4* 7.4* 7.5*   HEMATOCRIT % 22.0* 21.3* 22.2*   PLATELETS 10*3/mm3 181 169 179        Results from last 7 days   Lab Units 07/15/20  0458 07/14/20  0421 07/13/20 0229   SODIUM mmol/L 142 139 140   POTASSIUM mmol/L 3.8 3.2* 3.7   CHLORIDE mmol/L 108* 105 105   CO2 mmol/L 22.0 21.0* 22.0   BUN mg/dL 32* 35* 36*   CREATININE mg/dL 3.60* 3.11* 3.33*   CALCIUM mg/dL 8.9 8.8 8.5*   BILIRUBIN mg/dL 0.4 0.4 0.3   ALK PHOS U/L 108 108 111   ALT (SGPT) U/L 26 25 18   AST (SGOT) U/L 23 24 21   GLUCOSE mg/dL 128* 185* 210*       Culture Data:   No results found for: BLOODCX, URINECX, WOUNDCX, MRSACX, RESPCX, STOOLCX    Radiology Data:   Imaging  Results (Last 24 Hours)     ** No results found for the last 24 hours. **          I have reviewed the patient's current medications.     Assessment/Plan     Active Hospital Problems    Diagnosis   • Atrial fibrillation with RVR (CMS/[facility])   • GERD (gastroesophageal reflux disease)   • CHF (congestive heart failure) (CMS/[facility])   • Acute renal failure superimposed on chronic kidney disease (CMS/[facility])   • Chronic anticoagulation   • Type 2 diabetes mellitus (CMS/[facility])   Labs reviewed:  Cr worse 3.11 to 3.6 today          1.  JARET on CKD--worsening  -Nephrology following  -Allopurinol  -PO diuretics     2.  Chronic Diastolic CHF--stable  -monitor for volume overload  -po diuretics     3.  GERD  -protonix     4.  A-fib  -Cardizem  -Metoprolol  -Restart Eliquis     5.  T2DM  -Glipizide  -SSI     6.  HTN  -Hydralazine  -Metoprolol     7.  UC  -SulfaSalazine     8.  BPH  -Hytrin     9.  NSVT  -Continue Telemetry  -Cardiology following     10.  Hypokalemia  -PO K  -mg level wnl     11.  Urinary retention  -Hytrin                  Discharge Planning: I expect the patient to be discharged to home in ? Days    Electronically signed by Viet Timmons MD, 07/15/20, 1:50 PM..

## 2020-07-15 NOTE — PLAN OF CARE
Problem: Patient Care Overview  Goal: Plan of Care Review  Outcome: Ongoing (interventions implemented as appropriate)  Flowsheets (Taken 7/15/2020 2707)  Outcome Summary: Patient without complaintsand slept well.  voiding well.  af 76-93  vss.  cont. to monitor.  call for concerns.

## 2020-07-15 NOTE — THERAPY TREATMENT NOTE
Acute Care - Physical Therapy Treatment Note  [facility]     Patient Name: Gage Ken  : 1940  MRN: [id number]  Today's Date: 7/15/2020  Onset of Illness/Injury or Date of Surgery: 20     Referring Physician: Dr. Timmons    Admit Date: 2020    Visit Dx:    ICD-10-CM ICD-9-CM   1. 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 (CMS/HCC)   • Benign prostatic hyperplasia   • Carotid artery occlusion   • Chronic anticoagulation   • Acute renal failure superimposed on chronic kidney disease (CMS/HCC)   • Depression   • Hypertension   • Hypocalcemia   • Insomnia   • Macrocytic anemia   • PVD (peripheral vascular disease) (CMS/HCC)   • Type 2 diabetes mellitus (CMS/HCC)   • Vitamin B deficiency   • Atrial fibrillation with RVR (CMS/HCC)   • GERD (gastroesophageal reflux disease)   • CHF (congestive heart failure) (CMS/[facility])       Therapy Treatment    Rehabilitation Treatment Summary     Row Name 07/15/20 1109             Treatment Time/Intention    Discipline  physical therapy assistant  -KJ      Document Type  therapy note (daily note)  -KJ      Subjective Information  no complaints  -KJ      Mode of Treatment  physical therapy  -KJ      Existing Precautions/Restrictions  fall  -KJ      Recorded by [KJ] Susana Farooq, [facility] 07/15/20 1120      Row Name 07/15/20 1109             Bed Mobility Assessment/Treatment    Supine-Sit Prentiss (Bed Mobility)  independent  -KJ      Recorded by [KJ] Susana Farooq, [facility] 07/15/20 1120      Row Name 07/15/20 1109             Sit-Stand Transfer    Sit-Stand Prentiss (Transfers)  independent  -KJ      Recorded by [KJ] Susana Farooq, PTA 07/15/20 1120      Row Name 07/15/20 1109             Stand-Sit Transfer    Stand-Sit Prentiss (Transfers)  independent  -KJ      Recorded by [KJ] Susana Farooq, PTA  07/15/20 1120      Row Name 07/15/20 1109             Gait/Stairs Assessment/Training    Polo Level (Gait)  supervision  -KJ      Distance in Feet (Gait)  400  -KJ      Recorded by [KJ] Susana Farooq, [facility] 07/15/20 1120      Row Name 07/15/20 1109             Therapeutic Exercise    Exercise Type (Therapeutic Exercise)  AROM (active range of motion)  -KJ      Position (Therapeutic Exercise)  standing  -KJ      Comment (Therapeutic Exercise)  grapeviine, backwards gait, sidestepping  -KJ      Recorded by [KJ] Susana Farooq, [facility] 07/15/20 1120      Row Name 07/15/20 1109             Positioning and Restraints    Pre-Treatment Position  in bed  -KJ      Post Treatment Position  bed  -KJ      In Bed  call light within reach  -KJ      Recorded by [KJ] Susana Farooq [facility] 07/15/20 1120      Row Name 07/15/20 1109             Pain Scale: Numbers Pre/Post-Treatment    Pain Scale: Numbers, Pretreatment  0/10 - no pain  -KJ      Recorded by [KJ] Susana Farooq PTA 07/15/20 1120        User Key  (r) = Recorded By, (t) = Taken By, (c) = Cosigned By    Initials Name Effective Dates Discipline    KJ Susana Farooq, [facility] 08/02/16 -  PT                   Physical Therapy Education                 Title: PT OT SLP Therapies (In Progress)     Topic: Physical Therapy (In Progress)     Point: Mobility training (Done)     Description:   Instruct learner(s) on safety and technique for assisting patient out of bed, chair or wheelchair.  Instruct in the proper use of assistive devices, such as walker, crutches, cane or brace.              Patient Friendly Description:   It's important to get you on your feet again, but we need to do so in a way that is safe for you. Falling has serious consequences, and your personal safety is the most important thing of all.        When it's time to get out of bed, one of us or a family member will sit next to you on the bed to give you support.     If your doctor or nurse tells you to use a  walker, crutches, a cane, or a brace, be sure you use it every time you get out of bed, even if you think you don't need it.    Learning Progress Summary           Patient Acceptance, E, VU by  at 7/13/2020 1553    Comment:  Pt educated on safety precautions to take once returning home to minimize falls, importance of getting OOB and ambulating, POC and d/c                   Point: Home exercise program (Done)     Description:   Instruct learner(s) on appropriate technique for monitoring, assisting and/or progressing patient with therapeutic exercises and activities.              Learning Progress Summary           Patient Acceptance, E, VU by EB at 7/13/2020 1553    Comment:  Pt educated on safety precautions to take once returning home to minimize falls, importance of getting OOB and ambulating, POC and d/c                   Point: Body mechanics (Not Started)     Description:   Instruct learner(s) on proper positioning and spine alignment for patient and/or caregiver during mobility tasks and/or exercises.              Learner Progress:   Not documented in this visit.          Point: Precautions (Done)     Description:   Instruct learner(s) on prescribed precautions during mobility and gait tasks              Learning Progress Summary           Patient Acceptance, E, VU by EB at 7/13/2020 7073    Comment:  Pt educated on safety precautions to take once returning home to minimize falls, importance of getting OOB and ambulating, POC and d/c                               User Key     Initials Effective Dates Name Provider Type Discipline     06/23/20 -  Karol Paredes, PT Student PT Student PT                PT Recommendation and Plan     Plan of Care Reviewed With: patient  Progress: improving  Outcome Summary: PT tx completed. Pt sitting edge of bed. C/O increased edema BLE's.  I bed mobility, I transfers, amb 400' S. High level balance act performed. Progessing well and feels like he is ready to go  home.  Outcome Measures     Row Name 07/13/20 1500             How much help from another is currently needed...    Putting on and taking off regular lower body clothing?  4  -MM      Bathing (including washing, rinsing, and drying)  4  -MM      Toileting (which includes using toilet bed pan or urinal)  4  -MM      Putting on and taking off regular upper body clothing  4  -MM      Taking care of personal grooming (such as brushing teeth)  4  -MM      Eating meals  4  -MM      AM-PAC 6 Clicks Score (OT)  24  -MM         Functional Assessment    Outcome Measure Options  AM-PAC 6 Clicks Daily Activity (OT)  -MM        User Key  (r) = Recorded By, (t) = Taken By, (c) = Cosigned By    Initials Name Provider Type    MM Sarthak Chavarria, OTR/L Occupational Therapist         Time Calculation:   PT Charges     Row Name 07/15/20 1120             Time Calculation    Start Time  1100  -KJ      Stop Time  1123  -KJ      Time Calculation (min)  23 min  -KJ      PT Received On  07/15/20  -KJ      PT Goal Re-Cert Due Date  07/23/20  -KJ         Time Calculation- PT    Total Timed Code Minutes- PT  23 minute(s)  -KJ        User Key  (r) = Recorded By, (t) = Taken By, (c) = Cosigned By    Initials Name Provider Type    Susana Banegas, [facility] Physical Therapy Assistant        Therapy Charges for Today     Code Description Service Date Service Provider Modifiers Qty    85514567722 HC PT THER PROC EA 15 MIN 7/15/2020 Susana Farooq, [facility] GP 1    34324055495 HC GAIT TRAINING EA 15 MIN 7/15/2020 Susana Farooq, [facility] GP 1          PT G-Codes  Outcome Measure Options: AM-PAC 6 Clicks Daily Activity (OT)  AM-PAC 6 Clicks Score (PT): 22  AM-PAC 6 Clicks Score (OT): 24    Susana Farooq PTA  7/15/2020

## 2020-07-15 NOTE — PROGRESS NOTES
Nephrology ([facility]) Progress Note      Patient:  Gage Ken  YOB: 1940  Date of Service: 7/15/2020  MRN: [id number]   Acct: [id number]   Primary Care Physician: Cayetano Kay MD  Advance Directive:   Code Status and Medical Interventions:   Ordered at: 07/09/20 1836     Code Status:    CPR     Medical Interventions (Level of Support Prior to Arrest):    Full     Admit Date: 7/9/2020       Hospital Day: 6  Referring Provider: Eb Garcia MD      Patient personally seen and examined.  Complete chart including Consults, Notes, Operative Reports, Labs, Cardiology, and Radiology studies reviewed as able.        Subjective:  Gage Ken is a 80 y.o. male  whom we were consulted for acute kidney injury.  Baseline chronic kidney disease stage 3; baseline creatinine 1.3-1.6. No prior nephrology contacts.  History of atrial fibrillation with permanent pacemaker placement in 2019, hypertension, type 2 diabetes, ulcerative colitis, aortic stenosis.  Patient went to his PCP earlier this week for a checkup; was found to be in atrial fibrillation with RVR. Also had incidental finding of acute kidney injury with creatinine 3.8. He was sent to [facility] ER for further evaluation. Patient is somewhat of a difficult historian as he is unclear with the timing and duration of his symptoms. It would seem that he has recently been having some nausea and vomiting, denies diarrhea. Denies changes in oral intake. He has noted increased lower extremity edema recently. He thinks that his urine output has been less recently but denies any dysuria or hematuria. Denies dark color or strong odor. He does endorse episodes of palpitations but is unsure how long these have been present or how frequently they have been taking place.  Denies NSAIDs.    Today is feeling better overall; although he is upset that he may not be going home today.  No new overnight issues. Urine output  nonoliguric    Allergies:  Patient has no known allergies.    Home Meds:  Medications Prior to Admission   Medication Sig Dispense Refill Last Dose   • apixaban (ELIQUIS) 5 MG tablet tablet Take 5 mg by mouth 2 (Two) Times a Day.   7/9/2020 at Unknown time   • dilTIAZem CD (CARDIZEM CD) 240 MG 24 hr capsule Take 240 mg by mouth 2 (Two) Times a Day.   7/9/2020 at Unknown time   • folic acid (FOLVITE) 1 MG tablet Take 1 mg by mouth Daily.   7/9/2020 at Unknown time   • furosemide (LASIX) 40 MG tablet Take 40 mg by mouth Daily.  2 7/9/2020 at Unknown time   • glipiZIDE (GLUCOTROL) 10 MG tablet Take 1 tablet by mouth 2 (Two) Times a Day.  3 7/9/2020 at Unknown time   • lisinopril (PRINIVIL,ZESTRIL) 20 MG tablet Take 20 mg by mouth 2 (two) times a day.   7/9/2020 at Unknown time   • metFORMIN ER (GLUCOPHAGE-XR) 500 MG 24 hr tablet Take 500 mg by mouth 2 (two) times a day.   7/9/2020 at Unknown time   • metoprolol succinate XL (TOPROL-XL) 100 MG 24 hr tablet Take 100 mg by mouth 2 (two) times a day.   7/9/2020 at Unknown time   • omeprazole (priLOSEC) 40 MG capsule Take 40 mg by mouth Daily.      • ondansetron ODT (ZOFRAN-ODT) 4 MG disintegrating tablet Place 4 mg on the tongue Every 6 (Six) Hours As Needed for Nausea or Vomiting.   7/9/2020 at Unknown time   • pravastatin (PRAVACHOL) 20 MG tablet Take 1 tablet by mouth Every Night.  2 7/9/2020 at Unknown time   • sulfaSALAzine (AZULFIDINE) 500 MG tablet Take 2 tablets by mouth 3 (Three) Times a Day. 360 tablet 1 7/9/2020 at Unknown time   • terazosin (HYTRIN) 10 MG capsule Take 1 capsule by mouth Every Night.  2 7/9/2020 at Unknown time       Medicines:  Current Facility-Administered Medications   Medication Dose Route Frequency Provider Last Rate Last Dose   • allopurinol (ZYLOPRIM) tablet 200 mg  200 mg Oral Daily Jose Enrique Cho MD   200 mg at 07/15/20 0858   • apixaban (ELIQUIS) tablet 5 mg  5 mg Oral Q12H Viet Timmons MD   5 mg at 07/15/20 0858    • bumetanide (BUMEX) tablet 1 mg  1 mg Oral Daily Jevon Valladares MD   1 mg at 07/15/20 0858   • dextrose (D50W) 25 g/ 50mL Intravenous Solution 25 g  25 g Intravenous Q15 Min PRN Eb Garcia MD       • dextrose (GLUTOSE) oral gel 15 g  15 g Oral Q15 Min PRN Eb Garcia MD       • digoxin (LANOXIN) tablet 125 mcg  125 mcg Oral Daily Nanci Pascual PA-C   125 mcg at 07/14/20 1236   • dilTIAZem CD (CARDIZEM CD) 24 hr capsule 240 mg  240 mg Oral BID Sravanthi Waldron APRN   240 mg at 07/15/20 0858   • glipizide (GLUCOTROL) tablet 10 mg  10 mg Oral QAM AC Eb Garcia MD   10 mg at 07/15/20 0859   • glucagon (human recombinant) (GLUCAGEN DIAGNOSTIC) injection 1 mg  1 mg Subcutaneous Q15 Min PRN Eb Garcia MD       • hydrALAZINE (APRESOLINE) tablet 50 mg  50 mg Oral Q8H Sravanthi Waldron APRN   50 mg at 07/15/20 0549   • insulin lispro (humaLOG) injection 2-7 Units  2-7 Units Subcutaneous TID Eb Moon MD   2 Units at 07/15/20 0857   • iron sucrose (VENOFER) 200 mg in sodium chloride 0.9 % 100 mL IVPB  200 mg Intravenous Q24H Jevon Valladares MD   200 mg at 07/14/20 1746   • metoprolol succinate XL (TOPROL-XL) 24 hr tablet 100 mg  100 mg Oral BID Sravanthi Waldron APRN   100 mg at 07/15/20 0858   • ondansetron (ZOFRAN) injection 4 mg  4 mg Intravenous Q6H PRN Eb Garcia MD       • pantoprazole (PROTONIX) EC tablet 40 mg  40 mg Oral QAM Eb Garcia MD   40 mg at 07/15/20 0549   • pravastatin (PRAVACHOL) tablet 20 mg  20 mg Oral Nightly Eb Garcia MD   20 mg at 07/14/20 2049   • sodium chloride 0.9 % flush 10 mL  10 mL Intravenous Q12H Eb Garcia MD   10 mL at 07/15/20 0859   • sodium chloride 0.9 % flush 10 mL  10 mL Intravenous PRN Eb Garcia MD   10 mL at 07/10/20 0954   • sulfaSALAzine (AZULFIDINE) tablet 1,000 mg  1,000 mg Oral TID Eb Garcia MD   1,000 mg at 07/15/20 0859   • terazosin (HYTRIN) capsule 10 mg  10 mg Oral Nightly Eb Garcia MD   10 mg at  07/14/20 1746       Past Medical History:  Past Medical History:   Diagnosis Date   • Acute gastritis    • Blood in feces    • Carotid artery occlusion 12/2/2019   • Colitis, ulcerative chronic (CMS/HCC)     LEFT-SIDED   • Diverticular disease of colon    • Epigastric pain    • GERD (gastroesophageal reflux disease)    • History of colon polyps    • [place]hard of hearing[place]    • Hyperlipidemia    • Hypertension    • Lesion of nose    • Neoplasm of uncertain behavior    • Nonspecific ulcerative proctitis (CMS/HCC)    • Rectal hemorrhage    • Type 2 diabetes mellitus (CMS/HCC)    • Vitamin B12 deficiency        Past Surgical History:  Past Surgical History:   Procedure Laterality Date   • COLONOSCOPY  12/02/2013    Hemorrhoids found.   • COLONOSCOPY  09/06/2016   • COLONOSCOPY N/A 10/30/2017    Procedure: COLONOSCOPY;  Surgeon: Alex Silveira MD;  Location: [facility] ENDOSCOPY;  Service:    • COLONOSCOPY N/A 11/6/2018    Procedure: COLONOSCOPY;  Surgeon: Alex Silveira MD;  Location: [facility] ENDOSCOPY;  Service: Gastroenterology   • COLONOSCOPY N/A 11/21/2019    Procedure: COLONOSCOPY;  Surgeon: Alex Silveira MD;  Location: [facility] ENDOSCOPY;  Service: Gastroenterology   • COLONOSCOPY W/ POLYPECTOMY  08/30/2015    Single polyp found in the colon;removed by cold snare polypectomy.Proctitis in the rectum.Diverticulosis found in the sigmoid colon.Hemorrhoids found.   • ENDOSCOPY  12/02/2013    Normal hypopharynx, esophagus, duodenum, symmetrical & patent pylorus. Hiatus hernia in GE junction. Normal stomach. Biopsy taken.   • HERNIA REPAIR      umbilical   • UPPER GASTROINTESTINAL ENDOSCOPY  12/02/2013       Family History  Family History   Problem Relation Age of Onset   • Diabetes Other    • Hypertension Other        Social History  Social History     Socioeconomic History   • Marital status: Single     Spouse name: Not on file   • Number of children: Not on file   • Years of education: Not on file   • Highest education  level: Not on file   Tobacco Use   • Smoking status: Former Smoker     Types: Cigarettes   • Smokeless tobacco: Former User     Types: Snuff     Quit date: 2008   • Tobacco comment: 11/30/2017 - Patient States He Ceased Smoking 13 Years Prior.   Substance and Sexual Activity   • Alcohol use: Yes     Comment: occasional   • Drug use: No   • Sexual activity: Defer         Review of Systems:  History obtained from chart review and the patient  General ROS: No fever or chills  Respiratory ROS: No cough, shortness of breath, wheezing  Cardiovascular ROS: No chest pain or palpitations  Gastrointestinal ROS: No abdominal pain or melena  Genito-Urinary ROS: positive for - change in urinary stream  No dysuria or hematuria  Neurological ROS; no headache or dizziness    Objective:  Patient Vitals for the past 24 hrs:   BP Temp Temp src Pulse Resp SpO2 Weight   07/15/20 0803 142/81 98.1 °F (36.7 °C) Oral 93 16 97 % --   07/15/20 0440 142/72 98.3 °F (36.8 °C) Oral 90 16 96 % --   07/15/20 0010 151/67 98.5 °F (36.9 °C) Oral 82 16 94 % --   07/14/20 2019 147/56 99 °F (37.2 °C) Oral 81 16 97 % 71.2 kg (157 lb 1 oz)   07/14/20 1544 153/69 98 °F (36.7 °C) Oral 79 16 97 % --   07/14/20 1219 163/90 98 °F (36.7 °C) Oral 88 18 97 % --       Intake/Output Summary (Last 24 hours) at 7/15/2020 1050  Last data filed at 7/15/2020 0917  Gross per 24 hour   Intake 900 ml   Output 1900 ml   Net -1000 ml     General: awake/alert    Neck: supple, no JVD  Chest:  clear to auscultation bilaterally without respiratory distress  CVS: regular rate and rhythm  Abdominal: soft, nontender, positive bowel sounds  Extremities: bilateral 1+ lower extremity edema  Skin: warm and dry without rash  Neuro: no focal motor deficits    Labs:  Results from last 7 days   Lab Units 07/15/20  0458 07/14/20  0421 07/13/20  0229   WBC 10*3/mm3 6.81 6.71 7.17   HEMOGLOBIN g/dL 7.4* 7.4* 7.5*   HEMATOCRIT % 22.0* 21.3* 22.2*   PLATELETS 10*3/mm3 181 169 179         Results  from last 7 days   Lab Units 07/15/20  0458 07/14/20  0421 07/13/20  0229   SODIUM mmol/L 142 139 140   POTASSIUM mmol/L 3.8 3.2* 3.7   CHLORIDE mmol/L 108* 105 105   CO2 mmol/L 22.0 21.0* 22.0   BUN mg/dL 32* 35* 36*   CREATININE mg/dL 3.60* 3.11* 3.33*   CALCIUM mg/dL 8.9 8.8 8.5*   BILIRUBIN mg/dL 0.4 0.4 0.3   ALK PHOS U/L 108 108 111   ALT (SGPT) U/L 26 25 18   AST (SGOT) U/L 23 24 21   GLUCOSE mg/dL 128* 185* 210*       Radiology:   Imaging Results (Last 72 Hours)     ** No results found for the last 72 hours. **          Culture:  No results found for: BLOODCX, URINECX, WOUNDCX, MRSACX, RESPCX, STOOLCX      Assessment   1.  Acute kidney injury/ATN--worse today  2.  Baseline chronic kidney disease stage 3  3.  Hypernatremia--resolved  4.  Type 2 diabetes with renal disease  5.  Hypertension  6.  Chronic atrial fibrillation; s/p pacemaker  7.  Anemia   8.  Hypomagnesemia--improved  9.  Hyperuricemia  10.  Benign prostatic hypertrophy  11.  Hypokalemia--improved     Plan:  1.  Continue Bumex  2.  Monitor labs      Jonathan Cardona, APRN  7/15/2020  10:50

## 2020-07-15 NOTE — PLAN OF CARE
Problem: Patient Care Overview  Goal: Plan of Care Review  Outcome: Ongoing (interventions implemented as appropriate)  Flowsheets (Taken 7/15/2020 1121)  Progress: improving  Plan of Care Reviewed With: patient  Outcome Summary: PT tx completed. Pt sitting edge of bed. C/O increased edema BLE's.  I bed mobility, I transfers, amb 400' S. High level balance act performed. Progessing well and feels like he is ready to go home.

## 2020-07-16 NOTE — DISCHARGE SUMMARY
[facility] Medicine Services  DISCHARGE SUMMARY       Date of Admission: 7/9/2020  Date of Discharge:  7/16/2020  Primary Care Physician: Cayetano Kay MD    Presenting Problem/History of Present Illness:  Atrial fibrillation with RVR (CMS/[facility]) [I48.91]     Final Discharge Diagnoses:  Active Hospital Problems    Diagnosis   • Atrial fibrillation with RVR (CMS/[facility])   • GERD (gastroesophageal reflux disease)   • CHF (congestive heart failure) (CMS/[facility])   • Acute renal failure superimposed on chronic kidney disease (CMS/[facility])   • Chronic anticoagulation   • Type 2 diabetes mellitus (CMS/[facility])   1.  JARET on CKD  -Nephrology following  -Allopurinol  -hold diuretics, will eval for obstruction again     2.  Chronic Diastolic CHF  -monitor for volume overload  -po diuretics     3.  GERD  -protonix     4.  A-fib  -Cardizem  -Metoprolol  -Restart Eliquis     5.  T2DM  -Glipizide  -SSI     6.  HTN  -Hydralazine  -Metoprolol     7.  UC  -SulfaSalazine     8.  BPH  -Hytrin     9.  NSVT  -Continue Telemetry  -Cardiology following     10.  Hypokalemia--resolved  -monitor K     11.  Urinary retention  -Hytrin  -will get repeat renal u/s          Consults:   1.  Cardiology  2.  Nephrology    Procedures Performed: N/A    Pertinent Test Results: N/A    Chief Complaint on Day of Discharge: Difficulty urinating    History of Present Illness on Day of Discharge:   Doing ok, has had some difficulty urinating.  Wants to go home.    Hospital Course:  The patient is a 80 y.o. male who presented to [facility] with A-fib and worsening renal failure.  He was placed on a cardizem drip and IV fluids    Cardiology was consulted.  They adjusted his rate control meds.  He was titrated back to his home regimen of Metoprolol XL BID and Cardizem CD BID, which is unusual but his HR has been 80's and 90's on this regimen.      Nephrology was consulted.  A renal ultrasound showed no obstruction.  He was  "hydrated.  Allopurinol was added to his regimen.      He was eventually placed back on diuretics as renal function improved.  His Cr improved to 3.1, but has worsened to 3.7.  Nephrology feels that this likely his baseline.  They feel that he will unfortunately need dialysis soon.  They are ok with him being discharged.      He is comfortable with being discharged and with the discharge plan.  He was given the chance to ask questions and all questions were answered to his satisfaction.          Condition on Discharge:  Stable    Physical Exam on Discharge:  /74 (BP Location: Left arm, Patient Position: Sitting)   Pulse 90   Temp 98.6 °F (37 °C) (Oral)   Resp 18   Ht 167.6 cm (65.98\")   Wt 71.9 kg (158 lb 9.6 oz)   SpO2 96%   BMI 25.61 kg/m²   Physical Exam   Constitutional: He is oriented to person, place, and time. He appears well-developed and well-nourished.   HENT:   Head: Normocephalic and atraumatic.   Right Ear: External ear normal.   Left Ear: External ear normal.   Nose: Nose normal.   Mouth/Throat: Oropharynx is clear and moist.   Eyes: Pupils are equal, round, and reactive to light. Conjunctivae and EOM are normal. Right eye exhibits no discharge. Left eye exhibits no discharge. No scleral icterus.   Neck: Normal range of motion. Neck supple. No tracheal deviation present. No thyromegaly present.   Cardiovascular: Normal rate, regular rhythm, normal heart sounds and intact distal pulses. Exam reveals no gallop and no friction rub.   No murmur heard.  Pulmonary/Chest: Effort normal and breath sounds normal. No stridor. No respiratory distress. He has no wheezes. He has no rales. He exhibits no tenderness.   Abdominal: Soft. Bowel sounds are normal. He exhibits no distension and no mass. There is no tenderness. There is no rebound and no guarding. No hernia.   Musculoskeletal: Normal range of motion. He exhibits no edema or deformity.   Lymphadenopathy:     He has no cervical adenopathy.   "   Neurological: He is alert and oriented to person, place, and time. He has normal reflexes. He displays normal reflexes. No cranial nerve deficit. He exhibits normal muscle tone. Coordination normal.   Skin: Skin is warm and dry. No rash noted. No erythema. No pallor.   Psychiatric: He has a normal mood and affect. His behavior is normal. Judgment and thought content normal.   Vitals reviewed.        Discharge Disposition:  Home or Self Care    Discharge Medications:     Discharge Medications      New Medications      Instructions Start Date   allopurinol 100 MG tablet  Commonly known as:  ZYLOPRIM   200 mg, Oral, Daily   Start Date:  July 17, 2020     digoxin 125 MCG tablet  Commonly known as:  LANOXIN   125 mcg, Oral, Daily Digoxin      epoetin connie-epbx 68332 UNIT/ML injection  Commonly known as:  Retacrit   5,000 Units, Subcutaneous, 3 Times Weekly   Start Date:  July 17, 2020     finasteride 5 MG tablet  Commonly known as:  PROSCAR   5 mg, Oral, Daily      hydrALAZINE 50 MG tablet  Commonly known as:  APRESOLINE   50 mg, Oral, Every 8 Hours Scheduled         Continue These Medications      Instructions Start Date   apixaban 5 MG tablet tablet  Commonly known as:  ELIQUIS   5 mg, Oral, 2 Times Daily      dilTIAZem  MG 24 hr capsule  Commonly known as:  CARDIZEM CD   240 mg, Oral, 2 Times Daily      folic acid 1 MG tablet  Commonly known as:  FOLVITE   1 mg, Oral, Daily      furosemide 40 MG tablet  Commonly known as:  LASIX   40 mg, Oral, Daily      glipizide 10 MG tablet  Commonly known as:  GLUCOTROL   1 tablet, Oral, 2 Times Daily      metoprolol succinate  MG 24 hr tablet  Commonly known as:  TOPROL-XL   100 mg, Oral, 2 times daily      omeprazole 40 MG capsule  Commonly known as:  priLOSEC   40 mg, Oral, Daily      ondansetron ODT 4 MG disintegrating tablet  Commonly known as:  ZOFRAN-ODT   4 mg, Translingual, Every 6 Hours PRN      pravastatin 20 MG tablet  Commonly known as:  PRAVACHOL   1  tablet, Oral, Nightly      sulfaSALAzine 500 MG tablet  Commonly known as:  AZULFIDINE   1,000 mg, Oral, 3 Times Daily      terazosin 10 MG capsule  Commonly known as:  HYTRIN   1 capsule, Oral, Nightly         Stop These Medications    lisinopril 20 MG tablet  Commonly known as:  PRINIVIL,ZESTRIL     metFORMIN  MG 24 hr tablet  Commonly known as:  GLUCOPHAGE-XR            Discharge Diet: Heart healthy/Renal/Low sodium    Activity at Discharge: As tolerated    Discharge Care Plan/Instructions:   Follow up with Nephrology in 1 week    Follow-up Appointments:   Future Appointments   Date Time Provider Department Center   9/8/2020  1:30 PM Haley Lane APRN [facility] None   9/30/2020 10:15 AM Musa Mariano MD [facility]       Test Results Pending at Discharge: N/A    Electronically signed by Viet Timmons MD, 07/16/20, 14:01.    Time: 35 minutes

## 2020-07-16 NOTE — PROGRESS NOTES
[facility] Medicine Services  INPATIENT PROGRESS NOTE    Patient Name: Gage Ken  Date of Admission: 7/9/2020  Today's Date: 07/16/20  Length of Stay: 7  Primary Care Physician: Cayetano Kay MD    Subjective   Chief Complaint: follow up renal failure  HPI  Doing ok.  Feels fine.  Renal function continues to worsen.    Notes he is having more difficulty urinating        Review of Systems   Constitutional: Negative for fatigue and fever.   HENT: Negative for congestion and ear pain.    Eyes: Negative for redness and visual disturbance.   Respiratory: Negative for cough, shortness of breath and wheezing.    Cardiovascular: Negative for chest pain and palpitations.   Gastrointestinal: Negative for abdominal pain, diarrhea, nausea and vomiting.   Endocrine: Negative for cold intolerance and heat intolerance.   Genitourinary: Negative for dysuria and frequency.   Musculoskeletal: Negative for arthralgias and back pain.   Skin: Negative for rash and wound.   Neurological: Negative for dizziness and headaches.   Psychiatric/Behavioral: Negative for confusion. The patient is not nervous/anxious.         All pertinent negatives and positives are as above. All other systems have been reviewed and are negative unless otherwise stated.     Objective    Temp:  [98.2 °F (36.8 °C)-98.6 °F (37 °C)] 98.6 °F (37 °C)  Heart Rate:  [83-97] 90  Resp:  [16-20] 18  BP: (122-151)/(41-87) 133/74  Physical Exam   Constitutional: He is oriented to person, place, and time. He appears well-developed and well-nourished.   HENT:   Head: Normocephalic and atraumatic.   Right Ear: External ear normal.   Left Ear: External ear normal.   Nose: Nose normal.   Mouth/Throat: Oropharynx is clear and moist.   Eyes: Pupils are equal, round, and reactive to light. Conjunctivae and EOM are normal. Right eye exhibits no discharge. Left eye exhibits no discharge. No scleral icterus.   Neck: Normal range of  motion. Neck supple. No tracheal deviation present. No thyromegaly present.   Cardiovascular: Normal rate, regular rhythm, normal heart sounds and intact distal pulses. Exam reveals no gallop and no friction rub.   No murmur heard.  Pulmonary/Chest: Effort normal and breath sounds normal. No stridor. No respiratory distress. He has no wheezes. He has no rales. He exhibits no tenderness.   Abdominal: Soft. Bowel sounds are normal. He exhibits no distension and no mass. There is no tenderness. There is no rebound and no guarding. No hernia.   Musculoskeletal: Normal range of motion. He exhibits no edema or deformity.   Lymphadenopathy:     He has no cervical adenopathy.   Neurological: He is alert and oriented to person, place, and time. He has normal reflexes. He displays normal reflexes. No cranial nerve deficit. He exhibits normal muscle tone. Coordination normal.   Skin: Skin is warm and dry. No rash noted. No erythema. No pallor.   Psychiatric: He has a normal mood and affect. His behavior is normal. Judgment and thought content normal.   Vitals reviewed.          Results Review:  I have reviewed the labs, radiology results, and diagnostic studies.    Laboratory Data:   Results from last 7 days   Lab Units 07/16/20  0403 07/15/20  0458 07/14/20  0421   WBC 10*3/mm3 6.82 6.81 6.71   HEMOGLOBIN g/dL 7.4* 7.4* 7.4*   HEMATOCRIT % 21.8* 22.0* 21.3*   PLATELETS 10*3/mm3 173 181 169        Results from last 7 days   Lab Units 07/16/20  0403 07/15/20  0458 07/14/20  0421   SODIUM mmol/L 142 142 139   POTASSIUM mmol/L 3.7 3.8 3.2*   CHLORIDE mmol/L 107 108* 105   CO2 mmol/L 22.0 22.0 21.0*   BUN mg/dL 33* 32* 35*   CREATININE mg/dL 3.73* 3.60* 3.11*   CALCIUM mg/dL 9.0 8.9 8.8   BILIRUBIN mg/dL 0.3 0.4 0.4   ALK PHOS U/L 113 108 108   ALT (SGPT) U/L 27 26 25   AST (SGOT) U/L 26 23 24   GLUCOSE mg/dL 126* 128* 185*       Culture Data:   No results found for: BLOODCX, URINECX, WOUNDCX, MRSACX, RESPCX, STOOLCX    Radiology  Data:   Imaging Results (Last 24 Hours)     ** No results found for the last 24 hours. **          I have reviewed the patient's current medications.     Assessment/Plan     Active Hospital Problems    Diagnosis   • Atrial fibrillation with RVR (CMS/[facility])   • GERD (gastroesophageal reflux disease)   • CHF (congestive heart failure) (CMS/[facility])   • Acute renal failure superimposed on chronic kidney disease (CMS/[facility])   • Chronic anticoagulation   • Type 2 diabetes mellitus (CMS/[facility])       Labs reviewed:  Cr worse 3.6 to 3.73 today    Renal u/s ordered            1.  JARET on CKD--worsening  -Nephrology following  -Allopurinol  -hold diuretics, will eval for obstruction again     2.  Chronic Diastolic CHF--stable  -monitor for volume overload  -po diuretics     3.  GERD  -protonix     4.  A-fib  -Cardizem  -Metoprolol  -Restart Eliquis     5.  T2DM  -Glipizide  -SSI     6.  HTN  -Hydralazine  -Metoprolol     7.  UC  -SulfaSalazine     8.  BPH  -Hytrin     9.  NSVT  -Continue Telemetry  -Cardiology following     10.  Hypokalemia--resolved  -monitor K    11.  Urinary retention  -Hytrin  -will get repeat renal u/s               Discharge Planning: I expect the patient to be discharged to home in ? Days    Electronically signed by Viet Timmons MD, 07/16/20, 11:30

## 2020-07-16 NOTE — PROGRESS NOTES
Nephrology ([facility]) Progress Note      Patient:  Gage Ken  YOB: 1940  Date of Service: 7/16/2020  MRN: [id number]   Acct: [id number]   Primary Care Physician: Cayetano Kay MD  Advance Directive:   Code Status and Medical Interventions:   Ordered at: 07/09/20 1836     Code Status:    CPR     Medical Interventions (Level of Support Prior to Arrest):    Full     Admit Date: 7/9/2020       Hospital Day: 7  Referring Provider: Eb Garcia MD      Patient personally seen and examined.  Complete chart including Consults, Notes, Operative Reports, Labs, Cardiology, and Radiology studies reviewed as able.        Subjective:  Gage Ken is a 80 y.o. male  whom we were consulted for acute kidney injury.  Baseline chronic kidney disease stage 3; baseline creatinine 1.3-1.6. No prior nephrology contacts.  History of atrial fibrillation with permanent pacemaker placement in 2019, hypertension, type 2 diabetes, ulcerative colitis, aortic stenosis.  Patient went to his PCP earlier this week for a checkup; was found to be in atrial fibrillation with RVR. Also had incidental finding of acute kidney injury with creatinine 3.8. He was sent to [facility] ER for further evaluation. Patient is somewhat of a difficult historian as he is unclear with the timing and duration of his symptoms. It would seem that he has recently been having some nausea and vomiting, denies diarrhea. Denies changes in oral intake. He has noted increased lower extremity edema recently. He thinks that his urine output has been less recently but denies any dysuria or hematuria. Denies dark color or strong odor. He does endorse episodes of palpitations but is unsure how long these have been present or how frequently they have been taking place.  Denies NSAIDs.    Today he continues to feel good; is frustrated at having to stay in hospital.  No new overnight issues. Urine output  nonoliguric    Allergies:  Patient has no known allergies.    Home Meds:  Medications Prior to Admission   Medication Sig Dispense Refill Last Dose   • apixaban (ELIQUIS) 5 MG tablet tablet Take 5 mg by mouth 2 (Two) Times a Day.   7/9/2020 at Unknown time   • dilTIAZem CD (CARDIZEM CD) 240 MG 24 hr capsule Take 240 mg by mouth 2 (Two) Times a Day.   7/9/2020 at Unknown time   • folic acid (FOLVITE) 1 MG tablet Take 1 mg by mouth Daily.   7/9/2020 at Unknown time   • furosemide (LASIX) 40 MG tablet Take 40 mg by mouth Daily.  2 7/9/2020 at Unknown time   • glipiZIDE (GLUCOTROL) 10 MG tablet Take 1 tablet by mouth 2 (Two) Times a Day.  3 7/9/2020 at Unknown time   • lisinopril (PRINIVIL,ZESTRIL) 20 MG tablet Take 20 mg by mouth 2 (two) times a day.   7/9/2020 at Unknown time   • metFORMIN ER (GLUCOPHAGE-XR) 500 MG 24 hr tablet Take 500 mg by mouth 2 (two) times a day.   7/9/2020 at Unknown time   • metoprolol succinate XL (TOPROL-XL) 100 MG 24 hr tablet Take 100 mg by mouth 2 (two) times a day.   7/9/2020 at Unknown time   • omeprazole (priLOSEC) 40 MG capsule Take 40 mg by mouth Daily.      • ondansetron ODT (ZOFRAN-ODT) 4 MG disintegrating tablet Place 4 mg on the tongue Every 6 (Six) Hours As Needed for Nausea or Vomiting.   7/9/2020 at Unknown time   • pravastatin (PRAVACHOL) 20 MG tablet Take 1 tablet by mouth Every Night.  2 7/9/2020 at Unknown time   • sulfaSALAzine (AZULFIDINE) 500 MG tablet Take 2 tablets by mouth 3 (Three) Times a Day. 360 tablet 1 7/9/2020 at Unknown time   • terazosin (HYTRIN) 10 MG capsule Take 1 capsule by mouth Every Night.  2 7/9/2020 at Unknown time       Medicines:  Current Facility-Administered Medications   Medication Dose Route Frequency Provider Last Rate Last Dose   • allopurinol (ZYLOPRIM) tablet 200 mg  200 mg Oral Daily Jose Enrique Cho MD   200 mg at 07/16/20 0847   • apixaban (ELIQUIS) tablet 5 mg  5 mg Oral Q12H Viet Timmons MD   5 mg at 07/16/20 0847    • bumetanide (BUMEX) tablet 1 mg  1 mg Oral Daily Jevon Valladares MD   1 mg at 07/15/20 0858   • dextrose (D50W) 25 g/ 50mL Intravenous Solution 25 g  25 g Intravenous Q15 Min PRN Eb Garcia MD       • dextrose (GLUTOSE) oral gel 15 g  15 g Oral Q15 Min PRN Eb Garcia MD       • digoxin (LANOXIN) tablet 125 mcg  125 mcg Oral Daily Nanci Pascual PA-C   125 mcg at 07/16/20 1054   • dilTIAZem CD (CARDIZEM CD) 24 hr capsule 240 mg  240 mg Oral BID Sravanthi Waldron APRN   240 mg at 07/16/20 0847   • epoetin connie-epbx (RETACRIT) injection 5,000 Units  5,000 Units Subcutaneous Once per day on Mon Wed Fri Jevon Valladares MD   5,000 Units at 07/15/20 1644   • glipizide (GLUCOTROL) tablet 10 mg  10 mg Oral QAM AC Eb Garcia MD   10 mg at 07/16/20 0847   • glucagon (human recombinant) (GLUCAGEN DIAGNOSTIC) injection 1 mg  1 mg Subcutaneous Q15 Min PRN Eb Garcia MD       • hydrALAZINE (APRESOLINE) tablet 50 mg  50 mg Oral Q8H Sravanthi Waldron APRN   50 mg at 07/16/20 0520   • insulin lispro (humaLOG) injection 2-7 Units  2-7 Units Subcutaneous TID Eb Moon MD   3 Units at 07/16/20 1054   • iron sucrose (VENOFER) 200 mg in sodium chloride 0.9 % 100 mL IVPB  200 mg Intravenous Q24H Jevon Valladares MD   Stopped at 07/15/20 1826   • metoprolol succinate XL (TOPROL-XL) 24 hr tablet 100 mg  100 mg Oral BID Sravanthi Waldron APRN   100 mg at 07/16/20 0847   • ondansetron (ZOFRAN) injection 4 mg  4 mg Intravenous Q6H PRN Eb Garcia MD       • pantoprazole (PROTONIX) EC tablet 40 mg  40 mg Oral QAM Eb Garcia MD   40 mg at 07/16/20 0520   • pravastatin (PRAVACHOL) tablet 20 mg  20 mg Oral Nightly Eb Garcia MD   20 mg at 07/15/20 2008   • sodium chloride 0.9 % flush 10 mL  10 mL Intravenous Q12H Eb Garcia MD   10 mL at 07/16/20 0848   • sodium chloride 0.9 % flush 10 mL  10 mL Intravenous PRN Eb Garcia MD   10 mL at 07/10/20 0954   • sulfaSALAzine (AZULFIDINE) tablet 1,000  mg  1,000 mg Oral TID Eb Garcia MD   1,000 mg at 07/16/20 0848   • terazosin (HYTRIN) capsule 10 mg  10 mg Oral Nightly Eb Garcia MD   10 mg at 07/15/20 1644       Past Medical History:  Past Medical History:   Diagnosis Date   • Acute gastritis    • Blood in feces    • Carotid artery occlusion 12/2/2019   • Colitis, ulcerative chronic (CMS/HCC)     LEFT-SIDED   • Diverticular disease of colon    • Epigastric pain    • GERD (gastroesophageal reflux disease)    • History of colon polyps    • [place] (hard of hearing)    • Hyperlipidemia    • Hypertension    • Lesion of nose    • Neoplasm of uncertain behavior    • Nonspecific ulcerative proctitis (CMS/HCC)    • Rectal hemorrhage    • Type 2 diabetes mellitus (CMS/HCC)    • Vitamin B12 deficiency        Past Surgical History:  Past Surgical History:   Procedure Laterality Date   • COLONOSCOPY  12/02/2013    Hemorrhoids found.   • COLONOSCOPY  09/06/2016   • COLONOSCOPY N/A 10/30/2017    Procedure: COLONOSCOPY;  Surgeon: Alex Silveira MD;  Location: [facility] ENDOSCOPY;  Service:    • COLONOSCOPY N/A 11/6/2018    Procedure: COLONOSCOPY;  Surgeon: Alex Silveira MD;  Location: [facility] ENDOSCOPY;  Service: Gastroenterology   • COLONOSCOPY N/A 11/21/2019    Procedure: COLONOSCOPY;  Surgeon: Alex Silveira MD;  Location: [facility] ENDOSCOPY;  Service: Gastroenterology   • COLONOSCOPY W/ POLYPECTOMY  08/30/2015    Single polyp found in the colon;removed by cold snare polypectomy.Proctitis in the rectum.Diverticulosis found in the sigmoid colon.Hemorrhoids found.   • ENDOSCOPY  12/02/2013    Normal hypopharynx, esophagus, duodenum, symmetrical & patent pylorus. Hiatus hernia in GE junction. Normal stomach. Biopsy taken.   • HERNIA REPAIR      umbilical   • UPPER GASTROINTESTINAL ENDOSCOPY  12/02/2013       Family History  Family History   Problem Relation Age of Onset   • Diabetes Other    • Hypertension Other        Social History  Social History     Socioeconomic  History   • Marital status: Single     Spouse name: Not on file   • Number of children: Not on file   • Years of education: Not on file   • Highest education level: Not on file   Tobacco Use   • Smoking status: Former Smoker     Types: Cigarettes   • Smokeless tobacco: Former User     Types: Snuff     Quit date: 2008   • Tobacco comment: 11/30/2017 - Patient States He Ceased Smoking 13 Years Prior.   Substance and Sexual Activity   • Alcohol use: Yes     Comment: occasional   • Drug use: No   • Sexual activity: Defer         Review of Systems:  History obtained from chart review and the patient  General ROS: No fever or chills  Respiratory ROS: No cough, shortness of breath, wheezing  Cardiovascular ROS: No chest pain or palpitations  Gastrointestinal ROS: No abdominal pain or melena  Genito-Urinary ROS: positive for - change in urinary stream  No dysuria or hematuria  Neurological ROS; no headache or dizziness    Objective:  Patient Vitals for the past 24 hrs:   BP Temp Temp src Pulse Resp SpO2 Weight   07/16/20 1119 133/74 98.6 °F (37 °C) Oral 90 18 96 % --   07/16/20 0727 151/71 98.5 °F (36.9 °C) Oral 85 18 93 % --   07/16/20 0459 122/41 98.5 °F (36.9 °C) Oral 86 18 94 % --   07/16/20 0035 137/54 98.5 °F (36.9 °C) Oral 97 16 94 % --   07/15/20 1951 132/60 98.5 °F (36.9 °C) Oral 83 20 95 % 71.9 kg (158 lb 9.6 oz)   07/15/20 1517 140/87 98.2 °F (36.8 °C) Oral 87 16 97 % --       Intake/Output Summary (Last 24 hours) at 7/16/2020 1159  Last data filed at 7/16/2020 1110  Gross per 24 hour   Intake 640 ml   Output 1575 ml   Net -935 ml     General: awake/alert    Neck: supple, no JVD  Chest:  clear to auscultation bilaterally without respiratory distress  CVS: regular rate and rhythm  Abdominal: soft, nontender, positive bowel sounds  Extremities: bilateral 1+ lower extremity edema  Skin: warm and dry without rash  Neuro: no focal motor deficits    Labs:  Results from last 7 days   Lab Units 07/16/20  5378  07/15/20  0458 07/14/20  0421   WBC 10*3/mm3 6.82 6.81 6.71   HEMOGLOBIN g/dL 7.4* 7.4* 7.4*   HEMATOCRIT % 21.8* 22.0* 21.3*   PLATELETS 10*3/mm3 173 181 169         Results from last 7 days   Lab Units 07/16/20  0403 07/15/20  0458 07/14/20  0421   SODIUM mmol/L 142 142 139   POTASSIUM mmol/L 3.7 3.8 3.2*   CHLORIDE mmol/L 107 108* 105   CO2 mmol/L 22.0 22.0 21.0*   BUN mg/dL 33* 32* 35*   CREATININE mg/dL 3.73* 3.60* 3.11*   CALCIUM mg/dL 9.0 8.9 8.8   BILIRUBIN mg/dL 0.3 0.4 0.4   ALK PHOS U/L 113 108 108   ALT (SGPT) U/L 27 26 25   AST (SGOT) U/L 26 23 24   GLUCOSE mg/dL 126* 128* 185*       Radiology:   Imaging Results (Last 72 Hours)     ** No results found for the last 72 hours. **          Culture:  No results found for: BLOODCX, URINECX, WOUNDCX, MRSACX, RESPCX, STOOLCX      Assessment   1.  Acute kidney injury/ATN--worse today  2.  Baseline chronic kidney disease stage 3  3.  Hypernatremia--resolved  4.  Type 2 diabetes with renal disease  5.  Hypertension  6.  Chronic atrial fibrillation; s/p pacemaker  7.  Anemia   8.  Hypomagnesemia--improved  9.  Hyperuricemia  10.  Benign prostatic hypertrophy  11.  Hypokalemia--improved     Plan:  1.  Hold Bumex  2.  Monitor labs  3.  Discussed with Dr Timmons.  Will repeat renal US, obtain urine studies.  Differential includes excessive diuresis, obstruction.         Jonathan Cardona, APRN  7/16/2020  11:59

## 2020-07-16 NOTE — PROGRESS NOTES
Continued Stay Note   Noemi     Patient Name: Gage Ken  MRN: [id number]  Today's Date: 7/16/2020    Admit Date: 7/9/2020    Discharge Plan     Row Name 07/16/20 0838       Plan    Plan  Home    Patient/Family in Agreement with Plan  yes    Plan Comments  Pt planning to return home alone at discharge.  Pt does say he will need a ride at discharge. Will follow.         Discharge Codes    No documentation.             GIOVANNA Peralta

## 2020-07-16 NOTE — PLAN OF CARE
Problem: Patient Care Overview  Goal: Plan of Care Review  Outcome: Ongoing (interventions implemented as appropriate)  Note:   No c/o pain or discomfort.  AF 83-95 rare   8 row PVC's.  Patient has voided without difficulty.  Patient reports no pain or discomfort tonight.  Patient is hopefull to go home today.  Cont. To monitor.  Call for concerns.

## 2020-07-17 NOTE — THERAPY DISCHARGE NOTE
Acute Care - Physical Therapy Discharge Summary  [facility]       Patient Name: Gage Ken  : 1940  MRN: [id number]    Today's Date: 2020  Onset of Illness/Injury or Date of Surgery: 20       Referring Physician: Dr. Timmons      Admit Date: 2020      PT Recommendation and Plan    Visit Dx:    ICD-10-CM ICD-9-CM   1. Impaired functional mobility and endurance Z74.09 V49.89               Rehab Goal Summary     Row Name 20 0657             Bed Mobility Goal 1 (PT)    Activity/Assistive Device (Bed Mobility Goal 1, PT)  bed mobility activities, all  -AB      Isabela Level/Cues Needed (Bed Mobility Goal 1, PT)  independent  -AB      Time Frame (Bed Mobility Goal 1, PT)  long term goal (LTG)  -AB      Progress/Outcomes (Bed Mobility Goal 1, PT)  goal met  -AB         Transfer Goal 1 (PT)    Activity/Assistive Device (Transfer Goal 1, PT)  sit-to-stand/stand-to-sit;bed-to-chair/chair-to-bed  -AB      Isabela Level/Cues Needed (Transfer Goal 1, PT)  independent  -AB      Time Frame (Transfer Goal 1, PT)  long term goal (LTG)  -AB      Progress/Outcome (Transfer Goal 1, PT)  goal met  -AB         Gait Training Goal 1 (PT)    Activity/Assistive Device (Gait Training Goal 1, PT)  gait (walking locomotion);increase endurance/gait distance;increase energy conservation;decrease fall risk  -AB      Isabela Level (Gait Training Goal 1, PT)  supervision required  -AB      Distance (Gait Goal 1, PT)  200ft  -AB      Time Frame (Gait Training Goal 1, PT)  long term goal (LTG)  -AB      Progress/Outcome (Gait Training Goal 1, PT)  goal met  -AB         Stairs Goal 1 (PT)    Activity/Assistive Device (Stairs Goal 1, PT)  ascending stairs;descending stairs;decrease fall risk  -AB      Isabela Level/Cues Needed (Stairs Goal 1, PT)  supervision required  -AB      Time Frame (Stairs Goal 1, PT)  long term goal (LTG)  -AB      Barriers (Stairs Goal 1, PT)  4  -AB      Progress/Outcome  (Stairs Goal 1, PT)  goal not met  -AB         Patient Education Goal (PT)    Activity (Patient Education Goal, PT)  Pt will understand the importance of getting OOB and ambulating to maintain proir level of function   -AB      Kell/Cues/Accuracy (Memory Goal 2, PT)  independent  -AB      Time Frame (Patient Education Goal, PT)  long term goal (LTG)  -AB      Progress/Outcome (Patient Education Goal, PT)  goal met  -AB        User Key  (r) = Recorded By, (t) = Taken By, (c) = Cosigned By    Initials Name Provider Type Discipline    Sravanthi Garcia, [facility] Physical Therapy Assistant PT              PT Discharge Summary  Anticipated Discharge Disposition (PT): home  Reason for Discharge: Discharge from facility  Outcomes Achieved: Refer to plan of care for updates on goals achieved  Discharge Destination: Home      Sravanthi Cummings PTA   7/17/2020

## 2020-07-17 NOTE — OUTREACH NOTE
Prep Survey      Responses   [facility] facility patient discharged from?  [facility]   Is LACE score < 7 ?  No   Eligibility  Readm Mgmt   Discharge diagnosis  Atrial fibrillation with RVR /AKIChronic Diastolic CHF   COVID-19 Test Status  Negative   Does the patient have one of the following disease processes/diagnoses(primary or secondary)?  Other   Does the patient have Home health ordered?  No   Is there a DME ordered?  No   Prep survey completed?  Yes          Annia Oro RN

## 2020-07-22 NOTE — OUTREACH NOTE
Medical Week 1 Survey      Responses   [facility] patient discharged from?  [place]   COVID-19 Test Status  Negative   Does the patient have one of the following disease processes/diagnoses(primary or secondary)?  Other   Is there a successful TCM telephone encounter documented?  No   Week 1 attempt successful?  Yes   Call start time  1019   Call end time  1024   Discharge diagnosis  Atrial fibrillation with RVR /AKIChronic Diastolic CHF   Is patient permission given to speak with other caregiver?  No   Meds reviewed with patient/caregiver?  Yes   Is the patient having any side effects they believe may be caused by any medication additions or changes?  No   Does the patient have all medications ordered at discharge?  Yes   Is the patient taking all medications as directed (includes completed medication regime)?  Yes   Does the patient have a primary care provider?   Yes   Does the patient have an appointment with their PCP within 7 days of discharge?  Yes   Has the patient kept scheduled appointments due by today?  Yes   Comments  Dr. Kay 07/21/2020  Dr Valladares on Friday   Has home health visited the patient within 72 hours of discharge?  N/A   Pulse Ox monitoring  None   Psychosocial issues?  No   Nursing interventions  Reviewed instructions with patient   What is the patient's perception of their health status since discharge?  Improving   Is the patient/caregiver able to teach back signs and symptoms related to disease process for when to call PCP?  Yes   Week 1 call completed?  Yes          Isela Padilla RN

## 2020-08-03 NOTE — OUTREACH NOTE
Medical Week 2 Survey      Responses   [facility] patient discharged from?  [place]   COVID-19 Test Status  Negative   Does the patient have one of the following disease processes/diagnoses(primary or secondary)?  Other   Week 2 attempt successful?  No   Unsuccessful attempts  Attempt 1          Isela Padilla RN

## 2020-08-06 NOTE — OUTREACH NOTE
Medical Week 2 Survey      Responses   [facility] patient discharged from?  [place]   COVID-19 Test Status  Negative   Does the patient have one of the following disease processes/diagnoses(primary or secondary)?  Other   Week 2 attempt successful?  Yes   Call start time  1454   Revoke  Readmitted [Patient states that he is currently in the hospital in [place]. ]   Call end time  1455          Cathy Lentz RN

## 2020-09-08 PROBLEM — R03.0 FINDING OF ABOVE NORMAL BLOOD PRESSURE: Status: ACTIVE | Noted: 2020-02-13

## 2020-09-08 PROBLEM — R53.83 FATIGUE: Status: ACTIVE | Noted: 2020-01-01

## 2020-09-08 PROBLEM — E53.8 DISORDER OF VITAMIN B12: Status: ACTIVE | Noted: 2020-03-13

## 2020-09-08 PROBLEM — R11.0 NAUSEA: Status: ACTIVE | Noted: 2020-01-01

## 2020-09-08 PROBLEM — K51.019 ULCERATIVE PANCOLITIS WITH COMPLICATION (HCC): Status: ACTIVE | Noted: 2020-01-01

## 2020-09-08 NOTE — PATIENT INSTRUCTIONS
Ulcerative Colitis, Adult    Ulcerative colitis is long-lasting (chronic) inflammation of the large intestine (colon) and rectum. Sores (ulcers) may also form in these areas.  Ulcerative colitis, along with a closely related condition called Crohn's disease, is often referred to as inflammatory bowel disease (IBD).  What are the causes?  This condition may be caused by increased activity of the immune system in the intestines. The immune system is the system that protects the body against harmful bacteria, viruses, fungi, and other things that can make you sick. The cause of the increased activity of the immune system is not known.  What increases the risk?  The following factors may make you more likely to develop this condition:  · Being 15-40 years old. The risk is also increased for people who are 50-70 years old.  · Having a family history of ulcerative colitis.  · Being of [place] descent.  What are the signs or symptoms?  Symptoms vary depending on how severe the condition is. Common symptoms include:  · Rectal bleeding.  · Diarrhea, often with blood or pus in the stool.  Other symptoms can include:  · Pain or cramping in the abdomen.  · Fever.  · Fatigue.  · Weight loss.  · Night sweats.  · Rectal pain.  · A strong and sudden need to have a bowel movement (bowel urgency).  · Nausea.  · Loss of appetite.  · Anemia.  · Yellowing of the skin (jaundice) from liver dysfunction.  · Joint pain or soreness.  · Eye irritation.  · Skin rashes.  Symptoms can range from mild to severe. They may come and go.  How is this diagnosed?  This condition may be diagnosed based on:  · Your symptoms and medical history.  · A physical exam.  · Tests, including:  ? Blood tests and stool tests.  ? X-ray.  ? A CT scan.  ? An MRI.  ? Colonoscopy. For this test, a flexible tube is inserted into your anus, and your colon is examined.  ? Biopsy. In this test, a tissue sample is taken from your colon and examined under a microscope.  How  is this treated?  Treatment for this condition may include medicines to:  · Decrease swelling and inflammation.  · Control your immune system.  · Treat infections.  · Relieve pain.  · Control diarrhea.  Severe flare-ups may need to be treated at a hospital. Treatment in a hospital may involve:  · Resting the bowel. This involves not eating or drinking for a period of time.  · Getting medicines through an IV.  · Getting fluids and nutrition through:  ? An IV.  ? A tube that is passed through the nose and into the stomach (nasogastric tube, or NG tube).  · Surgery to remove the affected part of the colon. This may be done if other treatments are not helping.  This condition increases the risk of colon cancer. Adults with this condition will need to be watched for colon cancer throughout life.  Follow these instructions at home:  Medicines and vitamins  · Take over-the-counter and prescription medicines only as told by your health care provider. Do not take aspirin.  · If you were prescribed an antibiotic medicine, take it as told by your health care provider. Do not stop taking the antibiotic even if you start to feel better.  · Ask your health care provider if you should take any vitamins or supplements. You may need to take:  ? Calcium and vitamin D for bone health.  ? Iron to help treat anemia.  Lifestyle  · Exercise regularly.  · Work with your health care provider to manage your condition and educate yourself about your condition.  · Do not use any products that contain nicotine or tobacco, such as cigarettes, e-cigarettes, and chewing tobacco. If you need help quitting, ask your health care provider.  · If you drink alcohol:  ? Limit how much you use to:  § 0-1 drink a day for women.  § 0-2 drinks a day for men.  ? Be aware of how much alcohol is in your drink. In the [place] one drink equals one 12 oz bottle of beer (355 mL), one 5 oz glass of wine (148 mL), or one 1½ oz glass of hard liquor (44 mL).  Eating and  drinking  · Drink enough fluid to keep your urine pale yellow.  · Ask your health care provider about the best diet for you. Follow the diet as told by your health care provider. This may include:  ? Avoiding carbonated drinks.  ? Avoiding popcorn, vegetable skins, nuts, and other high-fiber foods.  ? Avoiding high-fat foods.  ? Eating smaller meals more often.  ? Limiting your intake of sugary drinks.  ? Limiting your caffeine intake.  · Follow food safety recommendations as told by your health care provider. This may include making sure you:  ? Avoid eating raw or undercooked meat, fish, or eggs.  ? Do not eat or drink spoiled or  foods and drinks.  · Keep a food diary. This may help you identify and avoid any foods that trigger your symptoms.  General instructions  · Wash your hands often with soap and water. If soap and water are not available, use hand .  · Stay up to date on your vaccinations, including a yearly (annual) flu shot. Ask your health care provider which vaccines you should get.  · Follow recommendations from your health care provider for having cancer screening tests. Ulcerative colitis may place you at increased risk for colon cancer.  · Keep all follow-up visits as told by your health care provider. This is important.  Contact a health care provider if:  · Your symptoms do not improve or they get worse with treatment.  · You continue to lose weight.  · You have constant cramps or loose stools.  · You develop a new skin rash, skin sores, or eye problems.  · You have a fever or chills.  Get help right away if:  · You have bloody diarrhea.  · You have severe bleeding from the rectum.  · You feel that your heart is racing (tachycardia).  · You have severe pain in your abdomen.  · Your abdomen swells (abdominal distension).  · Your abdomen is tender to the touch.  · You vomit.  Summary  · Ulcerative colitis is long-lasting (chronic) inflammation of the large intestine (colon) and  rectum. Sores (ulcers) may also form in these areas.  · Follow instructions from your health care provider about medicines, lifestyle changes, and eating and drinking.  · Contact your health care provider if symptoms do not improve or they get worse with treatment.  · Get help right away if you have severe abdominal pain, abdominal swelling, or severe bleeding from the rectum.  · Keep all follow-up visits as told by your health care provider. This is important.  This information is not intended to replace advice given to you by your health care provider. Make sure you discuss any questions you have with your health care provider.  Document Released: 09/27/2006 Document Revised: 10/07/2019 Document Reviewed: 10/09/2019  [employer]vier Patient Education © 2020 [employer]

## 2020-09-08 NOTE — PROGRESS NOTES
Chief Complaint   Patient presents with   • Ulcerative Colitis       Subjective    Gage Ken is a 80 y.o. male. he is here today for follow-up.    80-year-old male presents for follow-up regarding ulcerative colitis.  States he is done very well though has been hospitalized in [facility] in [facility] during the summer due to cardiac and declining kidney function he has follow-up planned with nephrologist and is likely going to need dialysis.  He still occasionally has days of diarrhea about 3-4 times per day but denies any blood or mucus and denies any abdominal pain.  He has been well maintained on sulfasalazine for several years.  Most recent colonoscopy November 2019 noted adequate prep biopsies noted no active disease repeat recommended in 1 year.  He did not need Bentyl very often so discontinued it from his medication list.  He has lab work scheduled in 2 weeks with nephrology will obtain those results    Ulcerative Colitis   This is a chronic problem. The current episode started more than 1 year ago. The problem occurs daily. Associated symptoms include abdominal pain. Pertinent negatives include no anorexia, arthralgias, change in bowel habit, chest pain, chills, congestion, coughing, diaphoresis, fatigue, fever, headaches, joint swelling, myalgias, nausea, neck pain, numbness, sore throat or vomiting. The symptoms are aggravated by eating.       Plan; schedule patient for colonoscopy for surveillance of chronic ulcerative colitis.  Follow-up after test return office sooner if needed     The following portions of the patient's history were reviewed and updated as appropriate:   Past Medical History:   Diagnosis Date   • Acute gastritis    • Blood in feces    • Carotid artery occlusion 12/2/2019   • Colitis, ulcerative chronic (CMS/HCC)     LEFT-SIDED   • Diverticular disease of colon    • Epigastric pain    • GERD (gastroesophageal reflux disease)    • History of colon polyps    • [place] (hard  of hearing)    • Hyperlipidemia    • Hypertension    • Lesion of nose    • Neoplasm of uncertain behavior    • Nonspecific ulcerative proctitis (CMS/HCC)    • Rectal hemorrhage    • Type 2 diabetes mellitus (CMS/HCC)    • Vitamin B12 deficiency      Past Surgical History:   Procedure Laterality Date   • COLONOSCOPY  12/02/2013    Hemorrhoids found.   • COLONOSCOPY  09/06/2016   • COLONOSCOPY N/A 10/30/2017    Procedure: COLONOSCOPY;  Surgeon: Alex Silveira MD;  Location: [facility] ENDOSCOPY;  Service:    • COLONOSCOPY N/A 11/6/2018    Procedure: COLONOSCOPY;  Surgeon: Alex Silveira MD;  Location: [facility] ENDOSCOPY;  Service: Gastroenterology   • COLONOSCOPY N/A 11/21/2019    Procedure: COLONOSCOPY;  Surgeon: Alex Silveira MD;  Location: [facility] ENDOSCOPY;  Service: Gastroenterology   • COLONOSCOPY W/ POLYPECTOMY  08/30/2015    Single polyp found in the colon;removed by cold snare polypectomy.Proctitis in the rectum.Diverticulosis found in the sigmoid colon.Hemorrhoids found.   • ENDOSCOPY  12/02/2013    Normal hypopharynx, esophagus, duodenum, symmetrical & patent pylorus. Hiatus hernia in GE junction. Normal stomach. Biopsy taken.   • HERNIA REPAIR      umbilical   • UPPER GASTROINTESTINAL ENDOSCOPY  12/02/2013     Family History   Problem Relation Age of Onset   • Diabetes Other    • Hypertension Other        Prior to Admission medications    Medication Sig Start Date End Date Taking? Authorizing Provider   allopurinol (ZYLOPRIM) 100 MG tablet Take 2 tablets by mouth Daily. 7/17/20  Yes Viet Timmons MD   apixaban (ELIQUIS) 5 MG tablet tablet Take 5 mg by mouth 2 (Two) Times a Day.   Yes Geo Benítez MD   digoxin (LANOXIN) 125 MCG tablet Take 1 tablet by mouth Daily. 7/16/20  Yes Viet Timmons MD   dilTIAZem CD (CARDIZEM CD) 240 MG 24 hr capsule Take 240 mg by mouth 2 (Two) Times a Day.   Yes Geo Benítez MD   epoetin connie-epbx (Retacrit) 36643 UNIT/ML injection Inject 0.5 mL  under the skin into the appropriate area as directed 3 (Three) Times a Week. Indications: Anemia associated with Chronic Kidney Failure 7/17/20  Yes Viet Timmons MD   finasteride (PROSCAR) 5 MG tablet Take 1 tablet by mouth Daily. 7/16/20  Yes Viet Timmons MD   folic acid (FOLVITE) 1 MG tablet Take 1 mg by mouth Daily.   Yes Geo Benítez MD   furosemide (LASIX) 40 MG tablet Take 40 mg by mouth Daily. 5/1/18  Yes Geo Benítez MD   glipiZIDE (GLUCOTROL) 10 MG tablet Take 1 tablet by mouth 2 (Two) Times a Day. 8/4/16  Yes Geo Benítez MD   hydrALAZINE (APRESOLINE) 50 MG tablet Take 1 tablet by mouth Every 8 (Eight) Hours. 7/16/20  Yes Viet Timmons MD   metoprolol succinate XL (TOPROL-XL) 100 MG 24 hr tablet Take 100 mg by mouth 2 (two) times a day.   Yes Geo Benítez MD   omeprazole (priLOSEC) 40 MG capsule Take 40 mg by mouth Daily.   Yes Geo Benítez MD   ondansetron ODT (ZOFRAN-ODT) 4 MG disintegrating tablet Place 4 mg on the tongue Every 6 (Six) Hours As Needed for Nausea or Vomiting.   Yes Geo Benítez MD   pravastatin (PRAVACHOL) 20 MG tablet Take 1 tablet by mouth Every Night. 8/13/16  Yes Geo Benítez MD   sulfaSALAzine (AZULFIDINE) 500 MG tablet Take 2 tablets by mouth 3 (Three) Times a Day. 12/2/19  Yes Haley Lane APRN   terazosin (HYTRIN) 10 MG capsule Take 1 capsule by mouth Every Night. 8/30/16  Yes Geo Benítez MD   JANUVIA 50 MG tablet Take 50 mg by mouth Daily. 7/21/20   Geo Benítez MD   spironolactone (ALDACTONE) 25 MG tablet TAKE 25 MG EVERY DAY BY ORAL ROUTE IN THE MORNING FOR 30 DAYS. 8/13/20   Geo Benítez MD     No Known Allergies  Social History     Socioeconomic History   • Marital status: Single     Spouse name: Not on file   • Number of children: Not on file   • Years of education: Not on file   • Highest education level: Not on file   Tobacco Use   • Smoking status:  "Former Smoker     Types: Cigarettes   • Smokeless tobacco: Former User     Types: Snuff     Quit date: 2008   • Tobacco comment: 11/30/2017 - Patient States He Ceased Smoking 13 Years Prior.   Substance and Sexual Activity   • Alcohol use: Yes     Comment: occasional   • Drug use: No   • Sexual activity: Defer       Review of Systems  Review of Systems   Constitutional: Negative for activity change, appetite change, chills, diaphoresis, fatigue, fever and unexpected weight change.   HENT: Negative for congestion, sore throat and trouble swallowing.    Respiratory: Negative for cough and shortness of breath.    Cardiovascular: Negative for chest pain.   Gastrointestinal: Positive for abdominal pain. Negative for abdominal distention, anal bleeding, anorexia, blood in stool, change in bowel habit, constipation, diarrhea, nausea, rectal pain and vomiting.   Musculoskeletal: Negative for arthralgias, joint swelling, myalgias and neck pain.   Skin: Negative for pallor.   Neurological: Negative for light-headedness, numbness and headaches.        /65 (BP Location: Left arm)   Pulse 72   Ht 167.6 cm (66\")   Wt 63.9 kg (140 lb 12.8 oz)   BMI 22.73 kg/m²     Objective    Physical Exam   Constitutional: He is oriented to person, place, and time. He appears well-developed and well-nourished. He is cooperative. No distress.   HENT:   Head: Normocephalic and atraumatic.   Neck: Normal range of motion. Neck supple. No thyromegaly present.   Cardiovascular: Normal rate, regular rhythm and normal heart sounds.   Pulmonary/Chest: Effort normal and breath sounds normal. He has no wheezes. He has no rhonchi. He has no rales.   Abdominal: Soft. Normal appearance and bowel sounds are normal. He exhibits no distension. There is no hepatosplenomegaly. There is no tenderness. There is no rigidity and no guarding. No hernia.   Lymphadenopathy:     He has no cervical adenopathy.   Neurological: He is alert and oriented to person, " place, and time.   Skin: Skin is warm, dry and intact. No rash noted. No pallor.   Psychiatric: He has a normal mood and affect. His speech is normal.     No results displayed because visit has over 200 results.        Assessment/Plan      1. Ulcerative pancolitis with complication (CMS/HCC)    .       Orders placed during this encounter include:  Orders Placed This Encounter   Procedures   • Follow Anesthesia Guidelines / Standing Orders     Standing Status:   Future   • Obtain Informed Consent     Standing Status:   Future     Order Specific Question:   Informed Consent Given For     Answer:   COLONOSCOPY       COLONOSCOPY AM appt (N/A)    Review and/or summary of lab tests, radiology, procedures, medications. Review and summary of old records and obtaining of history. The risks and benefits of my recommendations, as well as other treatment options were discussed with the patient today. Questions were answered.    New Medications Ordered This Visit   Medications   • polyethylene glycol with electrolytes (GOLYTELY) 240 g reconstituted solution solution     Sig: Take 240 mL by mouth 1 (One) Time for 1 dose. Take as directed on package label.     Dispense:  240 mL     Refill:  0       Follow-up: Return in about 4 weeks (around 10/6/2020) for Recheck, After test.          This document has been electronically signed by CUATE Allen on September 8, 2020 14:30             Results for orders placed or performed during the hospital encounter of 07/09/20   Protein Elec + Interp, Serum   Result Value Ref Range    Total Protein 5.9 (L) 6.0 - 8.5 g/dL    Albumin 3.3 2.9 - 4.4 g/dL    Alpha-1-Globulin 0.2 0.0 - 0.4 g/dL    Alpha-2-Globulin 0.7 0.4 - 1.0 g/dL    Beta Globulin 0.7 0.7 - 1.3 g/dL    Gamma Globulin 0.9 0.4 - 1.8 g/dL    M-Marco Not Observed Not Observed g/dL    Globulin 2.6 2.2 - 3.9 g/dL    A/G Ratio 1.3 0.7 - 1.7    Please note Comment     SPE Interpretation Comment    COVID-19, [facility] IN-HOUSE,NP Swab (NO  TRANSPORT MEDIA) 2 HR TAT - Swab, Nasopharynx   Result Value Ref Range    COVID19 Not Detected Not Detected - Ref. Range   Eosinophil Smear - Urine, Urine, Clean Catch   Result Value Ref Range    Eosinophil Smear 0 0 - 0 % EOS/100 Cells   Urinalysis, Microscopic Only - Urine, Clean Catch   Result Value Ref Range    RBC, UA 0-2 (A) None Seen /HPF    WBC, UA None Seen None Seen /HPF    Bacteria, UA None Seen None Seen /HPF    Squamous Epithelial Cells, UA None Seen None Seen, 0-2 /HPF    Hyaline Casts, UA None Seen None Seen /LPF    Methodology Automated Microscopy    Urinalysis With Culture If Indicated - Urine, Clean Catch   Result Value Ref Range    Color, UA Yellow Yellow, Straw    Appearance, UA Clear Clear    pH, UA <=5.0 5.0 - 8.0    Specific Gravity, UA 1.009 1.005 - 1.030    Glucose,  mg/dL (Trace) (A) Negative    Ketones, UA Negative Negative    Bilirubin, UA Negative Negative    Blood, UA Negative Negative    Protein, UA Trace (A) Negative    Leuk Esterase, UA Negative Negative    Nitrite, UA Negative Negative    Urobilinogen, UA 0.2 E.U./dL 0.2 - 1.0 E.U./dL   Urinalysis With Microscopic If Indicated (No Culture) - Urine, Clean Catch   Result Value Ref Range    Color, UA Yellow Yellow, Straw    Appearance, UA Clear Clear    pH, UA <=5.0 5.0 - 8.0    Specific Gravity, UA 1.013 1.005 - 1.030    Glucose,  mg/dL (2+) (A) Negative    Ketones, UA Negative Negative    Bilirubin, UA Negative Negative    Blood, UA Negative Negative    Protein, UA 30 mg/dL (1+) (A) Negative    Leuk Esterase, UA Negative Negative    Nitrite, UA Negative Negative    Urobilinogen, UA 0.2 E.U./dL 0.2 - 1.0 E.U./dL   CBC Auto Differential   Result Value Ref Range    WBC 6.82 3.40 - 10.80 10*3/mm3    RBC 2.34 (L) 4.14 - 5.80 10*6/mm3    Hemoglobin 7.4 (L) 13.0 - 17.7 g/dL    Hematocrit 21.8 (L) 37.5 - 51.0 %    MCV 93.2 79.0 - 97.0 fL    MCH 31.6 26.6 - 33.0 pg    MCHC 33.9 31.5 - 35.7 g/dL    RDW 13.9 12.3 - 15.4 %    RDW-SD  47.0 37.0 - 54.0 fl    MPV 10.7 6.0 - 12.0 fL    Platelets 173 140 - 450 10*3/mm3    Neutrophil % 59.8 42.7 - 76.0 %    Lymphocyte % 24.3 19.6 - 45.3 %    Monocyte % 11.1 5.0 - 12.0 %    Eosinophil % 3.8 0.3 - 6.2 %    Basophil % 0.6 0.0 - 1.5 %    Immature Grans % 0.4 0.0 - 0.5 %    Neutrophils, Absolute 4.07 1.70 - 7.00 10*3/mm3    Lymphocytes, Absolute 1.66 0.70 - 3.10 10*3/mm3    Monocytes, Absolute 0.76 0.10 - 0.90 10*3/mm3    Eosinophils, Absolute 0.26 0.00 - 0.40 10*3/mm3    Basophils, Absolute 0.04 0.00 - 0.20 10*3/mm3    Immature Grans, Absolute 0.03 0.00 - 0.05 10*3/mm3    nRBC 0.0 0.0 - 0.2 /100 WBC   CBC Auto Differential   Result Value Ref Range    WBC 6.81 3.40 - 10.80 10*3/mm3    RBC 2.39 (L) 4.14 - 5.80 10*6/mm3    Hemoglobin 7.4 (L) 13.0 - 17.7 g/dL    Hematocrit 22.0 (L) 37.5 - 51.0 %    MCV 92.1 79.0 - 97.0 fL    MCH 31.0 26.6 - 33.0 pg    MCHC 33.6 31.5 - 35.7 g/dL    RDW 13.6 12.3 - 15.4 %    RDW-SD 45.7 37.0 - 54.0 fl    MPV 10.8 6.0 - 12.0 fL    Platelets 181 140 - 450 10*3/mm3    Neutrophil % 61.5 42.7 - 76.0 %    Lymphocyte % 23.1 19.6 - 45.3 %    Monocyte % 10.0 5.0 - 12.0 %    Eosinophil % 4.1 0.3 - 6.2 %    Basophil % 0.7 0.0 - 1.5 %    Immature Grans % 0.6 (H) 0.0 - 0.5 %    Neutrophils, Absolute 4.19 1.70 - 7.00 10*3/mm3    Lymphocytes, Absolute 1.57 0.70 - 3.10 10*3/mm3    Monocytes, Absolute 0.68 0.10 - 0.90 10*3/mm3    Eosinophils, Absolute 0.28 0.00 - 0.40 10*3/mm3    Basophils, Absolute 0.05 0.00 - 0.20 10*3/mm3    Immature Grans, Absolute 0.04 0.00 - 0.05 10*3/mm3    nRBC 0.0 0.0 - 0.2 /100 WBC   CBC Auto Differential   Result Value Ref Range    WBC 6.71 3.40 - 10.80 10*3/mm3    RBC 2.35 (L) 4.14 - 5.80 10*6/mm3    Hemoglobin 7.4 (L) 13.0 - 17.7 g/dL    Hematocrit 21.3 (L) 37.5 - 51.0 %    MCV 90.6 79.0 - 97.0 fL    MCH 31.5 26.6 - 33.0 pg    MCHC 34.7 31.5 - 35.7 g/dL    RDW 13.3 12.3 - 15.4 %    RDW-SD 43.8 37.0 - 54.0 fl    MPV 11.0 6.0 - 12.0 fL    Platelets 169 140 - 450  10*3/mm3    Neutrophil % 64.6 42.7 - 76.0 %    Lymphocyte % 22.7 19.6 - 45.3 %    Monocyte % 8.8 5.0 - 12.0 %    Eosinophil % 3.1 0.3 - 6.2 %    Basophil % 0.4 0.0 - 1.5 %    Immature Grans % 0.4 0.0 - 0.5 %    Neutrophils, Absolute 4.33 1.70 - 7.00 10*3/mm3    Lymphocytes, Absolute 1.52 0.70 - 3.10 10*3/mm3    Monocytes, Absolute 0.59 0.10 - 0.90 10*3/mm3    Eosinophils, Absolute 0.21 0.00 - 0.40 10*3/mm3    Basophils, Absolute 0.03 0.00 - 0.20 10*3/mm3    Immature Grans, Absolute 0.03 0.00 - 0.05 10*3/mm3    nRBC 0.0 0.0 - 0.2 /100 WBC     *Note: Due to a large number of results and/or encounters for the requested time period, some results have not been displayed. A complete set of results can be found in Results Review.

## 2020-09-28 NOTE — LETTER
Gia Hung   1940    Surgery Directions    1. Report to the outpatient registration at [facility] on Friday 10/16/20, at 7:30am for your surgery with Dr. Lawrence Mendoza. 2. Report to [facility] outpatient lab today 20 at 2:30pm for your MRSA swab of nares test.  3. Report to [facility] outpatient registration on Monday 10/12/20 at 12:30 for your pre-op lab work, EKG, chest xray, COVID-19 test. Once done please report to [address] [place] [address] at 2:00pm for your Carotid ultrasound. Once done with your tests we suggest you quarantine until your surgery. 4. Nothing to eat or drink after midnight the night before the procedure. 5. Please take all medications as normally scheduled to take, including heart and blood pressure medicines with a sip of water. 6. Please hold Lisinopril the morning of your surgery. 7. Do not take Lasix, insulin, or any diabetic medicine the morning of the procedure including including glipiZIDE (Glucotrol). If you take insulin, you may only take 1/2 of any scheduled nightly dose, and none the morning of the procedure. You may take all regularly scheduled heart, cholesterol, and blood pressure medicines with a sip of water. 8. If you take Glucophage, Metformin, Actos Plus Met, or Glucovance,you can not take this the day of your procedure and two days after the procedure. 9. Hold Eliquis the day of your surgery. 10. If you have sleep apnea and require C-PAP, please bring this with you to the hospital.  11. Bring a list of all of your allergies and medications with you to the hospital.  12. Please let our nurse know if you have had an allergy to iodine, shellfish, or x-ray dye. 15. Let the nurse know if you take any of the followin. Over the counter herbal supplements  2. Diclofenec, indomethacin, ketoprofen, Caridopa/levadopa, naproxen, sulindac, piroxicam, glucosamine, Chondrotin, cocchine, or methotrexate.

## 2020-09-28 NOTE — PROGRESS NOTES
Patient Care Team:  Colin Jiménez MD as PCP - Armando Garcia MD as Consulting Physician (Interventional Cardiology)  Reddy Su MD as Consulting Physician (Vascular Surgery)      History and Physical    Mr. Nisa Adair is a 69 yo male who has a history of DM, HTN, past tobacco abuse and stage IV CKD. He comes in today for evaluation and discussion of permanent access for dialysis. He has experienced fatigue. He is right handed. He has not had previous permacath.      Lay Antonio is a 68 y.o. male with the following history reviewed and recorded in [facility]:  Patient Active Problem List    Diagnosis Date Noted    Pacemaker 04/23/2020     Priority: High    Chronic atrial fibrillation ([facility] [address]      Priority: High    Sinus node dysfunction ([address] 08/12/2019     Priority: High    Chronic anticoagulation 05/09/2019     Priority: High    Ulcerative colitis ([address] 06/30/2019    PAF [facility]oxysmal [facility] fibrillation) ([facility] 04/13/2019 4/9/2019  lexiscan negative for myocardial ischemia, EF 49  %       Elevated d-dimer 04/09/2019    Hypocalcemia 04/09/2019    Macrocytic anemia 04/09/2019    Carotid artery occlusion, right     PVD (peripheral vascular disease) ([facility] [address] 11/09/2016    Carotid artery stenosis 04/11/2012    Depression     Hypertension      Current Outpatient Medications   Medication Sig Dispense Refill    metoprolol succinate (TOPROL XL) 100 MG extended release tablet TAKE 1 TABLET BY MOUTH TWICE A  tablet 3    dilTIAZem (CARDIZEM CD) 240 MG extended release capsule Take 1 capsule by mouth 2 times daily 60 capsule 5    apixaban (ELIQUIS) 5 MG TABS tablet Take 1 tablet by mouth 2 times daily 60 tablet 5    omeprazole (PRILOSEC) 40 MG delayed release capsule Take 40 mg by mouth daily      Multiple Vitamins-Minerals (THERAPEUTIC MULTIVITAMIN-MINERALS) tablet Take 1 tablet by mouth daily      Multiple Vitamins-Minerals (PRESERVISION AREDS PO) Take by mouth daily      sulfaSALAzine (AZULFIDINE) 500 MG tablet Take 1,000 mg by mouth 3 times daily       furosemide (LASIX) 40 MG tablet Take 40 mg by mouth daily      folic acid (FOLVITE) 1 MG tablet Take 1 mg by mouth daily      glipiZIDE (GLUCOTROL) 10 MG tablet Take 10 mg by mouth 2 times daily (before meals)      pravastatin (PRAVACHOL) 20 MG tablet Take 20 mg by mouth nightly       metformin (GLUCOPHAGE-XR) 500 MG XR tablet Take 500 mg by mouth 2 times daily       terazosin (HYTRIN) 10 MG capsule Take 10 mg by mouth nightly.  lisinopril (PRINIVIL;ZESTRIL) 20 MG tablet Take 20 mg by mouth daily        No current facility-administered medications for this visit. Allergies: Patient has no known allergies.   Past Medical History:   Diagnosis Date    Atherosclerosis of native arteries of the extremities with intermittent claudication     Blood circulation, collateral     Carotid artery occlusion, right     Chronic kidney disease     Depression     GERD (gastroesophageal reflux disease)     Hypertension     Pneumonia due to infectious organism 2019    Type II or unspecified type diabetes mellitus without mention of complication, not stated as uncontrolled     Ulcerative colitis ([facility] [address] 2019     Past Surgical History:   Procedure Laterality Date    CATARACT REMOVAL Bilateral     HEMORRHOID SURGERY  1973    HERNIA REPAIR      umbilical hernia     Family History   Problem Relation Age of Onset    High Blood Pressure Mother     Heart Disease Mother     Stroke Mother         at 48    High Blood Pressure Father     Heart Disease Father     Stroke Father         at 59    No Known Problems Brother     Alzheimer's Disease Brother         onset at 80, then rapidly progressed to death     Social History     Tobacco Use    Smoking status: Former Smoker     Packs/day: 0.00     Types: Cigarettes     Last attempt to quit:      Years since quittin.7    Smokeless tobacco: Former User     Types: Snuff Substance Use Topics    Alcohol use: Yes     Comment: occasional       Old records have been obtained from the referring provider. These records have been reviewed and summarized. Review of Systems    Constitutional - no significant activity change, appetite change, or unexpected weight change. No fever or chills. No diaphoresis or significant fatigue. HENT - no significant rhinorrhea or epistaxis. No tinnitus or significant hearing loss. Eyes - no sudden vision change or amaurosis. Respiratory - no significant shortness of breath, wheezing, or stridor. No apnea, cough, or chest tightness associated with shortness of breath. Cardiovascular - no chest pain, syncope, or significant dizziness. No palpitations or significant leg swelling. No claudication. Gastrointestinal - no abdominal swelling or pain. No blood in stool. No severe constipation, diarrhea, nausea, or vomiting. Genitourinary - No dysuria, frequency, or urgency. No flank pain or hematuria. Musculoskeletal - no back pain, gait disturbance, or myalgia. Skin - no color change, rash, pallor, or new wound. Neurologic - no dizziness, facial asymmetry, or light headedness. No seizures. No speech difficulty or lateralizing weakness. Hematologic - no easy bruising or excessive bleeding. Psychiatric - no severe anxiety or nervousness. No confusion. All other review of systems are negative. Physical Exam    /66 (Site: Left Upper Arm)   Pulse 65   Temp 97.8 °F (36.6 °C) (Temporal)   Resp 16   Ht 5' 7\" (1.702 m)   Wt 150 lb (68 kg)   SpO2 98%   BMI 23.49 kg/m²     Constitutional - well developed, well nourished. No diaphoresis or acute distress. HENT - head normocephalic. Right external ear canal appears normal.  Left external ear canal appears normal.  Septum appears midline. Eyes - conjunctiva normal.  EOMS normal.  No exudate. No icterus. Neck- ROM appears normal, no tracheal deviation.   Cardiovascular - Regular rate and rhythm. Heart sounds are normal.  No murmur, rub, or gallop. Carotid pulses are 2+ to palpation bilaterally without bruit. Extremities - Radial and ulnar pulses are 2+ to palpation bilaterally. No cyanosis, clubbing, or significant edema. No signs atheroembolic event. Palmar arch intact bilaterally. Pulmonary - effort appears normal.  No respiratory distress. Lungs - Breath sounds normal. No wheezes or rales. GI - Abdomen - soft, non tender, bowel sounds X 4 quadrants. No guarding or rebound tenderness. No distension or palpable mass. Genitourinary - deferred. Musculoskeletal - ROM appears normal.  No significant edema. Neurologic - alert and oriented X 3. Physiologic. Skin - warm, dry, and intact. No rash, erythema, or pallor. Psychiatric - mood, affect, and behavior appear normal.  Judgment and thought processes appear normal.    Options have been discussed with the patient including continued medical management vs. proceeding with creation right brachial - basilic AV fistula. Patient has opted to proceed with creation right brachial - basilic AV fistula  Risks have been discussed with the patient including but not limited to MI, death, CVA, bleed, nerve injury, steal, and infection. BUE vein mapping - (revewed by Dr. Irasema Morgan)  Impression          Bilateral upper extremity venous mapping with sizes as noted.  Arterial     sizes and pressures as noted.     Bilateral perforating veins proximal forearm measure 2.1 mm.          Signature          ----------------------------------------------------------------     Electronically signed by Porfirio Grove MD(Interpreting     physician) on 09/16/2020 08:17 AM     ----------------------------------------------------------------         Velocities are measured in cm/s ; Diameters are measured in mm         Right Upper Extremities Arterial Mapping    +------------------------+----+-------------+---------------------+--------+ !Location                !PSV ! AP Diam      !Trans Diam           !Quality ! +------------------------+----+-------------+---------------------+--------+    ! Prox Brachial           !127 !             !                     !        !    +------------------------+----+-------------+---------------------+--------+    ! Dist Brachial           !152 !             !5.1                  !        !    +------------------------+----+-------------+---------------------+--------+    ! Prox Radial             !99. 7!             !2. 8                  !        !    +------------------------+----+-------------+---------------------+--------+    ! Mid Radial              !96. 2!             !                     !        !    +------------------------+----+-------------+---------------------+--------+    ! Dist Radial             !96. 7!             !3                    !        !    +------------------------+----+-------------+---------------------+--------+    ! Prox Ulnar             [id number]!             !4.5                  !        !    +------------------------+----+-------------+---------------------+--------+    ! Mid Ulnar               !92. 6!             !                     !        !    +------------------------+----+-------------+---------------------+--------+    ! Dist Ulnar             [id number]!             !2.9                  !        !    +------------------------+----+-------------+---------------------+--------+         Left Upper Extremities Arterial Mapping    +------------------------+----+-------------+---------------------+--------+    ! Location                !PSV ! AP Diam      !Trans Diam           !Quality ! +------------------------+----+-------------+---------------------+--------+    ! Prox Brachial           !128 !             !                     !        !    +------------------------+----+-------------+---------------------+--------+    ! Dist Brachial           !143 !             !5.1                  !        !    +------------------------+----+-------------+---------------------+--------+    ! Prox Radial             !109 !             !3. 3                  !        !    +------------------------+----+-------------+---------------------+--------+    ! Mid Demian Collins  !             !                     !        !    +------------------------+----+-------------+---------------------+--------+    ! Dist Radial             !83. 3!             !3.1                  !        !    +------------------------+----+-------------+---------------------+--------+    ! Rafal Simon             [id number]!             !4. 1                  !        !    +------------------------+----+-------------+---------------------+--------+    ! Mid Aurora               !71. 5!             !                     !        !    +------------------------+----+-------------+---------------------+--------+    ! Dist Ulnar              !74. 5!             !2.5                  !        !    +------------------------+----+-------------+---------------------+--------+         Velocities are measured in cm/s ; Diameters are measured in mm         Cephalic Mapping    +------------------++--------+----------+-----++--------+----------+-------+    !                  ! ! Right   !          ! Left ! !        !          !       !    +------------------++--------+----------+-----++--------+----------+-------+    ! Location          ! !AP Diam.! Trans Diam!Depth! !AP Diam.! Trans Diam!Depth  !    +------------------++--------+----------+-----++--------+----------+-------+    ! Cephalic at UA    !!        !2.2       !     !!        !2.2       !       !    +------------------++--------+----------+-----++--------+----------+-------+    ! Cephalic at Mid UA!!        !2         !     !!        !2.2       !       !    +------------------++--------+----------+-----++--------+----------+-------+    ! Cephalic at AF    !!        !2. 5       !     !!        !2. 3       !       !    +------------------++--------+----------+-----++--------+----------+-------+    ! Cephalic at Mid LA!!        !1. 5       !     !!        !1. 5       !       !    +------------------++--------+----------+-----++--------+----------+-------+    ! Cephalic at Wrist !!        !1         !     !!        !1. 7       !       !    +------------------++--------+----------+-----++--------+----------+-------+    ! Median Cubital    !!        !3         !     !!        !          !       !    +------------------++--------+----------+-----++--------+----------+-------+    ! Prox Radial       !!        !1. 6       !     !!        !1.2       !       !    +------------------++--------+----------+-----++--------+----------+-------+    ! Dist Radial       !!        !1. 5       !     !!        !0. 8       !       !    +------------------++--------+----------+-----++--------+----------+-------+    ! Prox Ulnar        !!        !3. 1       !     !!        !3.2       !       !    +------------------++--------+----------+-----++--------+----------+-------+    ! Dist Ulnar        !!        !1. 6       !     !!        !0.9       !       !    +------------------++--------+----------+-----++--------+----------+-------+         Basilic Mapping    +------------------++-------+-----------+-----++-------+-----------+-------+    !                  ! ! Right  !           ! Left ! !       !           !       !    +------------------++-------+-----------+-----++-------+-----------+-------+    ! Location          ! !AP Diam!Trans Diam !Depth! !AP Diam!Trans Diam !Depth  !    +------------------++-------+-----------+-----++-------+-----------+-------+    ! Basilic at Mid UA !!       !3. 4        !     !!       !3          !       !    +------------------++-------+-----------+-----++-------+-----------+-------+    ! Basilic at AF     !!       !2. 8        !     !!       !2. 8        !       !    +------------------++-------+-----------+-----++-------+-----------+-------+ !Basilic at Mid LA !!       !1.1        !     !!       !0. 7        !       !    +------------------++-------+-----------+-----++-------+-----------+-------+    ! Dist Brachial     !!       !2. 3        !     !!       !1. 8        !       !    +------------------++-------+-----------+-----++-------+-----------+-------+             Assessment      1.  Stage IV CKD       Plan      Proceed with creation right brachial - basilic AV fistula

## 2020-09-30 PROBLEM — R03.0 FINDING OF ABOVE NORMAL BLOOD PRESSURE: Status: RESOLVED | Noted: 2020-02-13 | Resolved: 2020-01-01

## 2020-09-30 PROBLEM — N18.5 CKD (CHRONIC KIDNEY DISEASE) STAGE 5, GFR LESS THAN 15 ML/MIN (HCC): Status: ACTIVE | Noted: 2020-01-01

## 2020-09-30 PROBLEM — I48.20 CHRONIC ATRIAL FIBRILLATION (HCC): Status: ACTIVE | Noted: 2020-01-01

## 2020-09-30 PROBLEM — Z95.0 S/P PLACEMENT OF CARDIAC PACEMAKER: Status: ACTIVE | Noted: 2020-01-01

## 2020-09-30 NOTE — PROGRESS NOTES
"Subjective    Gage Rogelio Ken is a 80 y.o. male. - fu of rhythm and chf    History of Present Illness     AFIB:  He was previously seen at . meds were decreased after Pacer placed 2/20. Developed RVR with CHF exacerbation and was hospitalized at [facility] 7/20. HR slowing meds were re-instituted of increased and the pt was diuresed. Now \"I feel great\" and has no palpitations. Is on a DOAC without any known bleeding problems. EKG shows mostly V-pacing.    HFpEF:  Denies edema or unusual sob. Stamina is better and is active. Has no cp.    SSS / PERM PACER:  This was placed at  and fu with them - he is now switching to our fu clinic and will also be fu in our pacer clinic. No pain at the pacer site. No spells of light-headedness since the pacer was placed for afib with high-grade bradycardia.    AORTIC STENOSIS:  Good stamina and mild to moderate by ECHO 7/20          The following portions of the patient's history were reviewed and updated as appropriate: allergies, current medications, past family history, past medical history, past social history, past surgical history and problem list.    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 (CMS/HCC)   • Benign prostatic hyperplasia   • Carotid artery occlusion   • Chronic anticoagulation   • Acute renal failure superimposed on chronic kidney disease (CMS/HCC)   • Depression   • Hypertension   • Hypocalcemia   • Insomnia   • Macrocytic anemia   • PVD (peripheral vascular disease) (CMS/HCC)   • Type 2 diabetes mellitus (CMS/HCC)   • Vitamin B deficiency   • Chronic atrial fibrillation (CMS/HCC)   • GERD (gastroesophageal reflux disease)   • CHF (congestive heart failure) (CMS/HCC)   • Disorder of vitamin B12   • Fatigue   • Nausea   • Ulcerative pancolitis with complication (CMS/HCC)   • CKD (chronic kidney disease) stage 5, GFR less than 15 ml/min (CMS/HCC)   • S/P placement of " cardiac pacemaker       No Known Allergies    Family History   Problem Relation Age of Onset   • Diabetes Other    • Hypertension Other    • Stroke Mother    • Stroke Father        Social History     Socioeconomic History   • Marital status: Single     Spouse name: Not on file   • Number of children: Not on file   • Years of education: Not on file   • Highest education level: Not on file   Tobacco Use   • Smoking status: Former Smoker     Types: Cigarettes   • Smokeless tobacco: Former User     Types: Snuff     Quit date: 2008   • Tobacco comment: 11/30/2017 - Patient States He Ceased Smoking 13 Years Prior.   Substance and Sexual Activity   • Alcohol use: Yes     Comment: occasional   • Drug use: No   • Sexual activity: Defer         Current Outpatient Medications:   •  apixaban (ELIQUIS) 5 MG tablet tablet, Take 5 mg by mouth 2 (Two) Times a Day., Disp: , Rfl:   •  digoxin (LANOXIN) 125 MCG tablet, Take 1 tablet by mouth Daily., Disp: 30 tablet, Rfl: 0  •  dilTIAZem CD (CARDIZEM CD) 240 MG 24 hr capsule, Take 240 mg by mouth 2 (Two) Times a Day., Disp: , Rfl:   •  folic acid (FOLVITE) 1 MG tablet, Take 1 mg by mouth Daily., Disp: , Rfl:   •  furosemide (LASIX) 40 MG tablet, Take 40 mg by mouth Daily., Disp: , Rfl: 2  •  glipiZIDE (GLUCOTROL) 10 MG tablet, Take 1 tablet by mouth 2 (Two) Times a Day., Disp: , Rfl: 3  •  JANUVIA 50 MG tablet, Take 50 mg by mouth Daily., Disp: , Rfl:   •  metoprolol succinate XL (TOPROL-XL) 100 MG 24 hr tablet, Take 100 mg by mouth 2 (two) times a day., Disp: , Rfl:   •  omeprazole (priLOSEC) 40 MG capsule, Take 40 mg by mouth Daily., Disp: , Rfl:   •  pravastatin (PRAVACHOL) 20 MG tablet, Take 1 tablet by mouth Every Night., Disp: , Rfl: 2  •  sulfaSALAzine (AZULFIDINE) 500 MG tablet, Take 2 tablets by mouth 3 (Three) Times a Day., Disp: 360 tablet, Rfl: 1  •  terazosin (HYTRIN) 10 MG capsule, Take 1 capsule by mouth Every Night., Disp: , Rfl: 2  •  allopurinol (ZYLOPRIM) 100 MG  tablet, Take 2 tablets by mouth Daily., Disp: 30 tablet, Rfl: 0  •  epoetin connie-epbx (Retacrit) 80770 UNIT/ML injection, Inject 0.5 mL under the skin into the appropriate area as directed 3 (Three) Times a Week. Indications: Anemia associated with Chronic Kidney Failure, Disp: 6.6 mL, Rfl: 0  •  finasteride (PROSCAR) 5 MG tablet, Take 1 tablet by mouth Daily., Disp: 30 tablet, Rfl: 0  •  hydrALAZINE (APRESOLINE) 50 MG tablet, Take 1 tablet by mouth Every 8 (Eight) Hours., Disp: 30 tablet, Rfl: 0  •  ondansetron ODT (ZOFRAN-ODT) 4 MG disintegrating tablet, Place 4 mg on the tongue Every 6 (Six) Hours As Needed for Nausea or Vomiting., Disp: , Rfl:   •  spironolactone (ALDACTONE) 25 MG tablet, TAKE 25 MG EVERY DAY BY ORAL ROUTE IN THE MORNING FOR 30 DAYS., Disp: , Rfl:     Past Surgical History:   Procedure Laterality Date   • COLONOSCOPY  12/02/2013    Hemorrhoids found.   • COLONOSCOPY  09/06/2016   • COLONOSCOPY N/A 10/30/2017    Procedure: COLONOSCOPY;  Surgeon: Alex Silveira MD;  Location: [facility] ENDOSCOPY;  Service:    • COLONOSCOPY N/A 11/6/2018    Procedure: COLONOSCOPY;  Surgeon: Alex Silveira MD;  Location: [facility] ENDOSCOPY;  Service: Gastroenterology   • COLONOSCOPY N/A 11/21/2019    Procedure: COLONOSCOPY;  Surgeon: Alex Silveira MD;  Location: [facility] ENDOSCOPY;  Service: Gastroenterology   • COLONOSCOPY W/ POLYPECTOMY  08/30/2015    Single polyp found in the colon;removed by cold snare polypectomy.Proctitis in the rectum.Diverticulosis found in the sigmoid colon.Hemorrhoids found.   • ENDOSCOPY  12/02/2013    Normal hypopharynx, esophagus, duodenum, symmetrical & patent pylorus. Hiatus hernia in GE junction. Normal stomach. Biopsy taken.   • HERNIA REPAIR      umbilical   • UPPER GASTROINTESTINAL ENDOSCOPY  12/02/2013       Review of Systems   Constitutional: Negative for activity change, appetite change and unexpected weight change.   Respiratory: Negative for apnea, chest tightness and  "shortness of breath.    Cardiovascular: Negative for chest pain, palpitations and leg swelling.   Gastrointestinal: Negative for abdominal pain and blood in stool.   Genitourinary: Negative for dysuria and hematuria.   Musculoskeletal: Negative for myalgias.   Neurological: Negative for weakness and light-headedness.   Hematological: Does not bruise/bleed easily.   Psychiatric/Behavioral: Negative for sleep disturbance.       /58   Pulse 61   Ht 167.6 cm (66\")   Wt 64.9 kg (143 lb)   BMI 23.08 kg/m²   Procedures    Objective   Physical Exam  Constitutional:       General: He is not in acute distress.     Appearance: Normal appearance. He is normal weight. He is not ill-appearing.   HENT:      Head: Normocephalic.      Nose: Nose normal.   Cardiovascular:      Rate and Rhythm: Normal rate. Rhythm irregular.      Pulses: Normal pulses.      Heart sounds: Murmur present. Systolic murmur present with a grade of 2/6. No friction rub. No gallop.       Comments: SHAI at the base. Carotid upstroke is good.  Pulmonary:      Effort: Pulmonary effort is normal. No respiratory distress.      Breath sounds: No wheezing, rhonchi or rales.      Comments: Diminished bs bilat  Abdominal:      General: Abdomen is flat. Bowel sounds are normal.      Palpations: There is no mass.      Tenderness: There is no abdominal tenderness. There is no guarding.      Hernia: A hernia is present.      Comments: Small umbilical hernia   Musculoskeletal:         General: No deformity.      Right lower leg: No edema.      Left lower leg: No edema.   Skin:     General: Skin is warm and dry.   Neurological:      General: No focal deficit present.      Mental Status: He is alert and oriented to person, place, and time.   Psychiatric:         Mood and Affect: Mood normal.         Assessment/Plan   Gage was seen today for atrial fibrillation.    Diagnoses and all orders for this visit:    Chronic atrial fibrillation (CMS/HCC)  Comments:  well " tolerated now and on DOAC ok  Orders:  -     ECG 12 Lead    Essential hypertension  Comments:  controlled    Chronic diastolic congestive heart failure (CMS/[facility])  Comments:  compensated    Finding of above normal blood pressure    CKD (chronic kidney disease) stage 5, GFR less than 15 ml/min (CMS/[facility])  Comments:  planning on a fistula soon    PVD (peripheral vascular disease) (CMS/[facility])    Type 2 diabetes mellitus with stage 5 chronic kidney disease not on chronic dialysis, without long-term current use of insulin (CMS/[facility])    S/P placement of cardiac pacemaker  Comments:  2/20 at  - [facility] - enroll in our pacer clinic                 Return in about 6 months (around 3/30/2021) for Next scheduled follow up WITH [facility].  Orders Placed This Encounter   Procedures   • ECG 12 Lead     Order Specific Question:   Reason for Exam:     Answer:   AFIB.

## 2020-10-07 NOTE — H&P
Patient Care Team:  Ese Sofia MD as PCP - Brigida Zapata MD as Consulting Physician (Interventional Cardiology)  Pepe Roca MD as Consulting Physician (Vascular Surgery)      History and Physical    Mr. Scotty Menon is a 69 yo male who has a history of DM, HTN, past tobacco abuse and stage IV CKD. He comes in today for evaluation and discussion of permanent access for dialysis. He has experienced fatigue. He is right handed. He has not had previous permacath.      Gia Hung is a 68 y.o. male with the following history reviewed and recorded in [facility]:  Patient Active Problem List    Diagnosis Date Noted    Pacemaker 04/23/2020     Priority: High    Chronic atrial fibrillation ([facility] [address]      Priority: High    Sinus node dysfunction ([facility] [address] 08/12/2019     Priority: High    Chronic anticoagulation 05/09/2019     Priority: High    Ulcerative colitis ([facility] [address] 06/30/2019    PAF (paroxysmal atrial fibrillation) ([facility]) 04/13/2019 4/9/2019  lexiscan negative for myocardial ischemia, EF 49  %       Elevated d-dimer 04/09/2019    Hypocalcemia 04/09/2019    Macrocytic anemia 04/09/2019    Carotid artery occlusion, right     PVD (peripheral vascular disease) ([facility] [address] 11/09/2016    Carotid artery stenosis 04/11/2012    Depression     Hypertension      Current Outpatient Medications   Medication Sig Dispense Refill    metoprolol succinate (TOPROL XL) 100 MG extended release tablet TAKE 1 TABLET BY MOUTH TWICE A  tablet 3    dilTIAZem (CARDIZEM CD) 240 MG extended release capsule Take 1 capsule by mouth 2 times daily 60 capsule 5    apixaban (ELIQUIS) 5 MG TABS tablet Take 1 tablet by mouth 2 times daily 60 tablet 5    omeprazole (PRILOSEC) 40 MG delayed release capsule Take 40 mg by mouth daily      Multiple Vitamins-Minerals (THERAPEUTIC MULTIVITAMIN-MINERALS) tablet Take 1 tablet by mouth daily      Multiple Vitamins-Minerals (PRESERVISION AREDS PO) Take by mouth daily      sulfaSALAzine (AZULFIDINE) 500 MG tablet Take 1,000 mg by mouth 3 times daily       furosemide (LASIX) 40 MG tablet Take 40 mg by mouth daily      folic acid (FOLVITE) 1 MG tablet Take 1 mg by mouth daily      glipiZIDE (GLUCOTROL) 10 MG tablet Take 10 mg by mouth 2 times daily (before meals)      pravastatin (PRAVACHOL) 20 MG tablet Take 20 mg by mouth nightly       metformin (GLUCOPHAGE-XR) 500 MG XR tablet Take 500 mg by mouth 2 times daily       terazosin (HYTRIN) 10 MG capsule Take 10 mg by mouth nightly.  lisinopril (PRINIVIL;ZESTRIL) 20 MG tablet Take 20 mg by mouth daily        No current facility-administered medications for this visit. Allergies: Patient has no known allergies.   Past Medical History:   Diagnosis Date    Atherosclerosis of native arteries of the extremities with intermittent claudication     Blood circulation, collateral     Carotid artery occlusion, right     Chronic kidney disease     Depression     GERD (gastroesophageal reflux disease)     Hypertension     Pneumonia due to infectious organism 2019    Type II or unspecified type diabetes mellitus without mention of complication, not stated as uncontrolled     Ulcerative colitis ([facility] [address] 2019     Past Surgical History:   Procedure Laterality Date    CATARACT REMOVAL Bilateral     HEMORRHOID SURGERY  1973    HERNIA REPAIR      umbilical hernia     Family History   Problem Relation Age of Onset    High Blood Pressure Mother     Heart Disease Mother     Stroke Mother         at 48    High Blood Pressure Father     Heart Disease Father     Stroke Father         at 59    No Known Problems Brother     Alzheimer's Disease Brother         onset at 80, then rapidly progressed to death     Social History     Tobacco Use    Smoking status: Former Smoker     Packs/day: 0.00     Types: Cigarettes     Last attempt to quit:      Years since quittin.7    Smokeless tobacco: Former User     Types: Snuff Substance Use Topics    Alcohol use: Yes     Comment: occasional       Old records have been obtained from the referring provider. These records have been reviewed and summarized. Review of Systems    Constitutional - no significant activity change, appetite change, or unexpected weight change. No fever or chills. No diaphoresis or significant fatigue. HENT - no significant rhinorrhea or epistaxis. No tinnitus or significant hearing loss. Eyes - no sudden vision change or amaurosis. Respiratory - no significant shortness of breath, wheezing, or stridor. No apnea, cough, or chest tightness associated with shortness of breath. Cardiovascular - no chest pain, syncope, or significant dizziness. No palpitations or significant leg swelling. No claudication. Gastrointestinal - no abdominal swelling or pain. No blood in stool. No severe constipation, diarrhea, nausea, or vomiting. Genitourinary - No dysuria, frequency, or urgency. No flank pain or hematuria. Musculoskeletal - no back pain, gait disturbance, or myalgia. Skin - no color change, rash, pallor, or new wound. Neurologic - no dizziness, facial asymmetry, or light headedness. No seizures. No speech difficulty or lateralizing weakness. Hematologic - no easy bruising or excessive bleeding. Psychiatric - no severe anxiety or nervousness. No confusion. All other review of systems are negative. Physical Exam      Constitutional - well developed, well nourished. No diaphoresis or acute distress. HENT - head normocephalic. Right external ear canal appears normal.  Left external ear canal appears normal.  Septum appears midline. Eyes - conjunctiva normal.  EOMS normal.  No exudate. No icterus. Neck- ROM appears normal, no tracheal deviation. Cardiovascular - Regular rate and rhythm. Heart sounds are normal.  No murmur, rub, or gallop. Carotid pulses are 2+ to palpation bilaterally without bruit.     Extremities - Radial and ulnar pulses are 2+ to palpation bilaterally. No cyanosis, clubbing, or significant edema. No signs atheroembolic event. Palmar arch intact bilaterally. Pulmonary - effort appears normal.  No respiratory distress. Lungs - Breath sounds normal. No wheezes or rales. GI - Abdomen - soft, non tender, bowel sounds X 4 quadrants. No guarding or rebound tenderness. No distension or palpable mass. Genitourinary - deferred. Musculoskeletal - ROM appears normal.  No significant edema. Neurologic - alert and oriented X 3. Physiologic. Skin - warm, dry, and intact. No rash, erythema, or pallor. Psychiatric - mood, affect, and behavior appear normal.  Judgment and thought processes appear normal.    Options have been discussed with the patient including continued medical management vs. proceeding with creation right brachial - basilic AV fistula. Patient has opted to proceed with creation right brachial - basilic AV fistula  Risks have been discussed with the patient including but not limited to MI, death, CVA, bleed, nerve injury, steal, and infection. BUE vein mapping - (revewed by Dr. Breonna Gomes)  Impression          Bilateral upper extremity venous mapping with sizes as noted. Arterial     sizes and pressures as noted.     Bilateral perforating veins proximal forearm measure 2.1 mm.          Signature          ----------------------------------------------------------------     Electronically signed by Jacob Burden MD(Interpreting     physician) on 09/16/2020 08:17 AM     ----------------------------------------------------------------         Velocities are measured in cm/s ; Diameters are measured in mm         Right Upper Extremities Arterial Mapping    +------------------------+----+-------------+---------------------+--------+    ! Location                !PSV ! AP Diam      !Trans Diam           !Quality ! +------------------------+----+-------------+---------------------+--------+    ! Prox Brachial           !127 !             !                     !        !    +------------------------+----+-------------+---------------------+--------+    ! Dist Brachial           !152 !             !5.1                  !        !    +------------------------+----+-------------+---------------------+--------+    ! Prox Radial             !99. 7!             !2. 8                  !        !    +------------------------+----+-------------+---------------------+--------+    ! Mid Radial              !96. 2!             !                     !        !    +------------------------+----+-------------+---------------------+--------+    ! Dist Radial             !96. 7!             !3                    !        !    +------------------------+----+-------------+---------------------+--------+    ! Prox Aurora             [id number]!             !4.5                  !        !    +------------------------+----+-------------+---------------------+--------+    ! Mid Ulnar               !92. 6!             !                     !        !    +------------------------+----+-------------+---------------------+--------+    ! Dist Ulnar             [id number]!             !2.9                  !        !    +------------------------+----+-------------+---------------------+--------+         Left Upper Extremities Arterial Mapping    +------------------------+----+-------------+---------------------+--------+    ! Location                !PSV ! AP Diam      !Trans Diam           !Quality ! +------------------------+----+-------------+---------------------+--------+    ! Prox Brachial           !128 !             !                     !        !    +------------------------+----+-------------+---------------------+--------+    ! Dist Brachial           !143 !             !5.1                  !        !    +------------------------+----+-------------+---------------------+--------+    ! Prox Radial             !109 !             !3. 3                  !        !    +------------------------+----+-------------+---------------------+--------+    ! Mid Radial             ShaquilleTyrone  !             !                     !        !    +------------------------+----+-------------+---------------------+--------+    ! Dist Radial             !83. 3!             !3.1                  !        !    +------------------------+----+-------------+---------------------+--------+    ! Prox Aurora             [id number]!             !4. 1                  !        !    +------------------------+----+-------------+---------------------+--------+    ! Mid Ulnar               !71. 5!             !                     !        !    +------------------------+----+-------------+---------------------+--------+    ! Dist Ulnar              !74. 5!             !2.5                  !        !    +------------------------+----+-------------+---------------------+--------+         Velocities are measured in cm/s ; Diameters are measured in mm         Cephalic Mapping    +------------------++--------+----------+-----++--------+----------+-------+    !                  ! ! Right   !          ! Left ! !        !          !       !    +------------------++--------+----------+-----++--------+----------+-------+    ! Location          ! !AP Diam.! Trans Diam!Depth! !AP Diam.! Trans Diam!Depth  !    +------------------++--------+----------+-----++--------+----------+-------+    ! Cephalic at UA    !!        !2.2       !     !!        !2.2       !       !    +------------------++--------+----------+-----++--------+----------+-------+    ! Cephalic at Mid UA!!        !2         !     !!        !2.2       !       !    +------------------++--------+----------+-----++--------+----------+-------+    ! Cephalic at AF    !!        !2. 5       !     !!        !2. 3       !       !    +------------------++--------+----------+-----++--------+----------+-------+    ! Cephalic at Mid LA!!        !1. 5       !     !!        !1. 5       !       ! +------------------++--------+----------+-----++--------+----------+-------+    ! Cephalic at Wrist !!        !1         !     !!        !1. 7       !       !    +------------------++--------+----------+-----++--------+----------+-------+    ! Median Cubital    !!        !3         !     !!        !          !       !    +------------------++--------+----------+-----++--------+----------+-------+    ! Prox Radial       !!        !1. 6       !     !!        !1.2       !       !    +------------------++--------+----------+-----++--------+----------+-------+    ! Dist Radial       !!        !1. 5       !     !!        !0. 8       !       !    +------------------++--------+----------+-----++--------+----------+-------+    ! Prox Ulnar        !!        !3. 1       !     !!        !3.2       !       !    +------------------++--------+----------+-----++--------+----------+-------+    ! Dist Ulnar        !!        !1. 6       !     !!        !0.9       !       !    +------------------++--------+----------+-----++--------+----------+-------+         Basilic Mapping    +------------------++-------+-----------+-----++-------+-----------+-------+    !                  ! ! Right  !           ! Left ! !       !           !       !    +------------------++-------+-----------+-----++-------+-----------+-------+    ! Location          ! !AP Diam!Trans Diam !Depth! !AP Diam!Trans Diam !Depth  !    +------------------++-------+-----------+-----++-------+-----------+-------+    ! Basilic at Mid UA !!       !3. 4        !     !!       !3          !       !    +------------------++-------+-----------+-----++-------+-----------+-------+    ! Basilic at AF     !!       !2. 8        !     !!       !2. 8        !       !    +------------------++-------+-----------+-----++-------+-----------+-------+    ! Basilic at Mid LA !!       !1.1        !     !!       !0. 7        !       !    +------------------++-------+-----------+-----++-------+-----------+-------+ !Dist Brachial     !!       !2. 3        !     !!       !1. 8        !       !    +------------------++-------+-----------+-----++-------+-----------+-------+             Assessment      1.  Stage IV CKD       Plan      Proceed with creation right brachial - basilic AV fistula

## 2020-10-07 NOTE — H&P (VIEW-ONLY)
Patient Care Team:  Kimberli Peck MD as PCP - Shaye Coronel MD as Consulting Physician (Interventional Cardiology)  Shirley Kim MD as Consulting Physician (Vascular Surgery)      History and Physical    Mr. Aileen Phillips is a 69 yo male who has a history of DM, HTN, past tobacco abuse and stage IV CKD. He comes in today for evaluation and discussion of permanent access for dialysis. He has experienced fatigue. He is right handed. He has not had previous permacath.      Bryn Bella is a 68 y.o. male with the following history reviewed and recorded in [facility]:  Patient Active Problem List    Diagnosis Date Noted    Pacemaker 04/23/2020     Priority: High    Chronic atrial fibrillation ([facility]      Priority: High    Sinus node dysfunction ([address] 08/12/2019     Priority: High    Chronic anticoagulation 05/09/2019     Priority: High    Ulcerative colitis ([facility] [address] 06/30/2019    PAF (paroxysmal [facility] fibrillation) ([facility]) 04/13/2019 4/9/2019  lexiscan negative for myocardial ischemia, EF 49  %       Elevated d-dimer 04/09/2019    Hypocalcemia 04/09/2019    Macrocytic anemia 04/09/2019    Carotid artery occlusion, right     PVD (peripheral vascular disease) ([facility] [address] 11/09/2016    Carotid artery stenosis 04/11/2012    Depression     Hypertension      Current Outpatient Medications   Medication Sig Dispense Refill    metoprolol succinate (TOPROL XL) 100 MG extended release tablet TAKE 1 TABLET BY MOUTH TWICE A  tablet 3    dilTIAZem (CARDIZEM CD) 240 MG extended release capsule Take 1 capsule by mouth 2 times daily 60 capsule 5    apixaban (ELIQUIS) 5 MG TABS tablet Take 1 tablet by mouth 2 times daily 60 tablet 5    omeprazole (PRILOSEC) 40 MG delayed release capsule Take 40 mg by mouth daily      Multiple Vitamins-Minerals (THERAPEUTIC MULTIVITAMIN-MINERALS) tablet Take 1 tablet by mouth daily      Multiple Vitamins-Minerals (PRESERVISION AREDS PO) Take by mouth daily      sulfaSALAzine (AZULFIDINE) 500 MG tablet Take 1,000 mg by mouth 3 times daily       furosemide (LASIX) 40 MG tablet Take 40 mg by mouth daily      folic acid (FOLVITE) 1 MG tablet Take 1 mg by mouth daily      glipiZIDE (GLUCOTROL) 10 MG tablet Take 10 mg by mouth 2 times daily (before meals)      pravastatin (PRAVACHOL) 20 MG tablet Take 20 mg by mouth nightly       metformin (GLUCOPHAGE-XR) 500 MG XR tablet Take 500 mg by mouth 2 times daily       terazosin (HYTRIN) 10 MG capsule Take 10 mg by mouth nightly.  lisinopril (PRINIVIL;ZESTRIL) 20 MG tablet Take 20 mg by mouth daily        No current facility-administered medications for this visit. Allergies: Patient has no known allergies.   Past Medical History:   Diagnosis Date    Atherosclerosis of native arteries of the extremities with intermittent claudication     Blood circulation, collateral     Carotid artery occlusion, right     Chronic kidney disease     Depression     GERD (gastroesophageal reflux disease)     Hypertension     Pneumonia due to infectious organism 2019    Type II or unspecified type diabetes mellitus without mention of complication, not stated as uncontrolled     Ulcerative colitis ([facility] [address] 2019     Past Surgical History:   Procedure Laterality Date    CATARACT REMOVAL Bilateral     HEMORRHOID SURGERY  1973    HERNIA REPAIR      umbilical hernia     Family History   Problem Relation Age of Onset    High Blood Pressure Mother     Heart Disease Mother     Stroke Mother         at 48    High Blood Pressure Father     Heart Disease Father     Stroke Father         at 59    No Known Problems Brother     Alzheimer's Disease Brother         onset at 80, then rapidly progressed to death     Social History     Tobacco Use    Smoking status: Former Smoker     Packs/day: 0.00     Types: Cigarettes     Last attempt to quit:      Years since quittin.7    Smokeless tobacco: Former User     Types: Snuff ulnar pulses are 2+ to palpation bilaterally. No cyanosis, clubbing, or significant edema. No signs atheroembolic event. Palmar arch intact bilaterally. Pulmonary - effort appears normal.  No respiratory distress. Lungs - Breath sounds normal. No wheezes or rales. GI - Abdomen - soft, non tender, bowel sounds X 4 quadrants. No guarding or rebound tenderness. No distension or palpable mass. Genitourinary - deferred. Musculoskeletal - ROM appears normal.  No significant edema. Neurologic - alert and oriented X 3. Physiologic. Skin - warm, dry, and intact. No rash, erythema, or pallor. Psychiatric - mood, affect, and behavior appear normal.  Judgment and thought processes appear normal.    Options have been discussed with the patient including continued medical management vs. proceeding with creation right brachial - basilic AV fistula. Patient has opted to proceed with creation right brachial - basilic AV fistula  Risks have been discussed with the patient including but not limited to MI, death, CVA, bleed, nerve injury, steal, and infection. BUE vein mapping - (revewed by Dr. Becka Bains)  Impression          Bilateral upper extremity venous mapping with sizes as noted. Arterial     sizes and pressures as noted.     Bilateral perforating veins proximal forearm measure 2.1 mm.          Signature          ----------------------------------------------------------------     Electronically signed by Amna Kumar MD(Interpreting     physician) on 09/16/2020 08:17 AM     ----------------------------------------------------------------         Velocities are measured in cm/s ; Diameters are measured in mm         Right Upper Extremities Arterial Mapping    +------------------------+----+-------------+---------------------+--------+    ! Location                !PSV ! AP Diam      !Trans Diam           !Quality ! +------------------------+----+-------------+---------------------+--------+    ! Prox Brachial           !127 !             !                     !        !    +------------------------+----+-------------+---------------------+--------+    ! Dist Brachial           !152 !             !5.1                  !        !    +------------------------+----+-------------+---------------------+--------+    ! Prox Radial             !99. 7!             !2. 8                  !        !    +------------------------+----+-------------+---------------------+--------+    ! Mid Radial              !96. 2!             !                     !        !    +------------------------+----+-------------+---------------------+--------+    ! Dist Radial             !96. 7!             !3                    !        !    +------------------------+----+-------------+---------------------+--------+    ! Prox Ulnar             [id number]!             !4.5                  !        !    +------------------------+----+-------------+---------------------+--------+    ! Mid Ulnar               !92. 6!             !                     !        !    +------------------------+----+-------------+---------------------+--------+    ! Dist Ulnar             [id number]!             !2.9                  !        !    +------------------------+----+-------------+---------------------+--------+         Left Upper Extremities Arterial Mapping    +------------------------+----+-------------+---------------------+--------+    ! Location                !PSV ! AP Diam      !Trans Diam           !Quality ! +------------------------+----+-------------+---------------------+--------+    ! Prox Brachial           !128 !             !                     !        !    +------------------------+----+-------------+---------------------+--------+    ! Dist Brachial           !143 !             !5.1                  !        !    +------------------------+----+-------------+---------------------+--------+    ! Prox Radial             !109 !             !3. 3                  !        !    +------------------------+----+-------------+---------------------+--------+    ! Mid Demian             Luis  !             !                     !        !    +------------------------+----+-------------+---------------------+--------+    ! Dist Radial             !83. 3!             !3.1                  !        !    +------------------------+----+-------------+---------------------+--------+    ! Rafal Simon             [id number]!             !4. 1                  !        !    +------------------------+----+-------------+---------------------+--------+    ! Mid Ulnar               !71. 5!             !                     !        !    +------------------------+----+-------------+---------------------+--------+    ! Dist Ulnar              !74. 5!             !2.5                  !        !    +------------------------+----+-------------+---------------------+--------+         Velocities are measured in cm/s ; Diameters are measured in mm         Cephalic Mapping    +------------------++--------+----------+-----++--------+----------+-------+    !                  ! ! Right   !          ! Left ! !        !          !       !    +------------------++--------+----------+-----++--------+----------+-------+    ! Location          ! !AP Diam.! Trans Diam!Depth! !AP Diam.! Trans Diam!Depth  !    +------------------++--------+----------+-----++--------+----------+-------+    ! Cephalic at UA    !!        !2.2       !     !!        !2.2       !       !    +------------------++--------+----------+-----++--------+----------+-------+    ! Cephalic at Mid UA!!        !2         !     !!        !2.2       !       !    +------------------++--------+----------+-----++--------+----------+-------+    ! Cephalic at AF    !!        !2. 5       !     !!        !2. 3       !       !    +------------------++--------+----------+-----++--------+----------+-------+    ! Cephalic at Mid LA!!        !1. 5       !     !!        !1. 5       !       ! +------------------++--------+----------+-----++--------+----------+-------+    ! Cephalic at Wrist !!        !1         !     !!        !1. 7       !       !    +------------------++--------+----------+-----++--------+----------+-------+    ! Median Cubital    !!        !3         !     !!        !          !       !    +------------------++--------+----------+-----++--------+----------+-------+    ! Prox Radial       !!        !1. 6       !     !!        !1.2       !       !    +------------------++--------+----------+-----++--------+----------+-------+    ! Dist Radial       !!        !1. 5       !     !!        !0. 8       !       !    +------------------++--------+----------+-----++--------+----------+-------+    ! Prox Ulnar        !!        !3. 1       !     !!        !3.2       !       !    +------------------++--------+----------+-----++--------+----------+-------+    ! Dist Ulnar        !!        !1. 6       !     !!        !0.9       !       !    +------------------++--------+----------+-----++--------+----------+-------+         Basilic Mapping    +------------------++-------+-----------+-----++-------+-----------+-------+    !                  ! ! Right  !           ! Left ! !       !           !       !    +------------------++-------+-----------+-----++-------+-----------+-------+    ! Location          ! !AP Diam!Trans Diam !Depth! !AP Diam!Trans Diam !Depth  !    +------------------++-------+-----------+-----++-------+-----------+-------+    ! Basilic at Mid UA !!       !3. 4        !     !!       !3          !       !    +------------------++-------+-----------+-----++-------+-----------+-------+    ! Basilic at AF     !!       !2. 8        !     !!       !2. 8        !       !    +------------------++-------+-----------+-----++-------+-----------+-------+    ! Basilic at Mid LA !!       !1.1        !     !!       !0. 7        !       !    +------------------++-------+-----------+-----++-------+-----------+-------+ !Dist Brachial     !!       !2. 3        !     !!       !1. 8        !       !    +------------------++-------+-----------+-----++-------+-----------+-------+             Assessment      1.  Stage IV CKD       Plan      Proceed with creation right brachial - basilic AV fistula

## 2020-10-15 NOTE — PROGRESS NOTES
Dr. Gian Love reviewed patient's EKG done in [facility] and stated patient is ok to proceed with anesthesia for surgery.

## 2020-10-16 PROBLEM — Z99.2 ESRD (END STAGE RENAL DISEASE) ON DIALYSIS (HCC): Status: ACTIVE | Noted: 2020-01-01

## 2020-10-16 PROBLEM — N18.6 ESRD (END STAGE RENAL DISEASE) ON DIALYSIS (HCC): Status: ACTIVE | Noted: 2020-01-01

## 2020-10-16 NOTE — ANESTHESIA PROCEDURE NOTES
Peripheral Block    Patient location during procedure: holding area  Staffing  Anesthesiologist: Cortez Pennington MD  Performed: anesthesiologist   Preanesthetic Checklist  Completed: patient identified, site marked, surgical consent, pre-op evaluation, timeout performed, IV checked, risks and benefits discussed, monitors and equipment checked, anesthesia consent given, oxygen available and patient being monitored  Peripheral Block  Patient position: sitting  Prep: ChloraPrep  Patient monitoring: cardiac monitor, continuous pulse ox, frequent blood pressure checks and IV access  Block type: Brachial plexus  Laterality: right  Injection technique: single-shot  Procedures: ultrasound guided  Local infiltration: lidocaine  Infiltration strength: 1 %  Dose: 1 mL  Supraclavicular  Provider prep: mask and sterile gloves  Local infiltration: lidocaine  Needle  Needle type: combined needle/nerve stimulator   Needle gauge: 20 G  Needle length: 5 cm  Needle localization: ultrasound guidance  Assessment  Injection assessment: negative aspiration for heme, no paresthesia on injection and local visualized surrounding nerve on ultrasound  Paresthesia pain: none  Slow fractionated injection: yes  Hemodynamics: stable  Additional Notes  20 cc 0.5% Ropivacaine injected    Under ultrasound (\"US\") guidance, a 22 gauge needle was inserted and placed in close proximity to the brachial plexus nerves. Ultrasound was also used to visualize the spread of the anesthetic in close proximity to the nerve being blocked. The nerve appeared anatomically normal, and there were no abnormal pathological findings. A permanent image was recorded.    Medications Administered  Lidocaine injection 1%, 1 mL  ropivacaine (NAROPIN) injection 0.5%, 20 mL  Reason for block: post-op pain management

## 2020-10-16 NOTE — ANESTHESIA PRE PROCEDURE
Department of Anesthesiology  Preprocedure Note       Name:  Stefano Maguire   Age:  68 y.o.  :  1940                                          MRN:  [id number]         Date:  10/16/2020      Surgeon: Rhianna Ascencio):  Sonam Echevarria MD    Procedure: Procedure(s):  RIGHT BRACHIAL/BASILIC AV FISTULA    Medications prior to admission:   Prior to Admission medications    Medication Sig Start Date End Date Taking? Authorizing Provider   omeprazole (PRILOSEC) 20 MG delayed release capsule Take 20 mg by mouth 2 times daily   Yes Historical Provider, MD   digoxin (LANOXIN) 125 MCG tablet Take 125 mcg by mouth daily   Yes Historical Provider, MD   hydrALAZINE (APRESOLINE) 50 MG tablet Take 50 mg by mouth 3 times daily   Yes Historical Provider, MD   metoprolol succinate (TOPROL XL) 100 MG extended release tablet TAKE 1 TABLET BY MOUTH TWICE A DAY 20  Yes RED Voss - NP   dilTIAZem (CARDIZEM CD) 240 MG extended release capsule Take 1 capsule by mouth 2 times daily 20  Yes Sanchez Hurtado MD   apixaban (ELIQUIS) 5 MG TABS tablet Take 1 tablet by mouth 2 times daily 3/6/20  Yes Caren Gosselin, APRN   Multiple Vitamins-Minerals (THERAPEUTIC MULTIVITAMIN-MINERALS) tablet Take 1 tablet by mouth daily   Yes Historical Provider, MD   sulfaSALAzine (AZULFIDINE) 500 MG tablet Take 1,000 mg by mouth 3 times daily    Yes Historical Provider, MD   furosemide (LASIX) 40 MG tablet Take 40 mg by mouth daily   Yes Historical Provider, MD   folic acid (FOLVITE) 1 MG tablet Take 1 mg by mouth daily   Yes Historical Provider, MD   glipiZIDE (GLUCOTROL) 10 MG tablet Take 10 mg by mouth 2 times daily (before meals)   Yes Historical Provider, MD   pravastatin (PRAVACHOL) 20 MG tablet Take 20 mg by mouth nightly    Yes Historical Provider, MD   terazosin (HYTRIN) 10 MG capsule Take 10 mg by mouth nightly.    Yes Historical Provider, MD       Current medications:    Current Facility-Administered Medications   Medication Dose Route Frequency Provider Last Rate Last Dose    ceFAZolin (ANCEF) 1 g in sterile water 10 mL IV syringe  1 g Intravenous On Call to Miko Paulino PA-C        sodium chloride flush 0.9 % injection 10 mL  10 mL Intravenous 2 times per day Kel Jesus PA-C        sodium chloride flush 0.9 % injection 10 mL  10 mL Intravenous PRN Janene Xiao PA-C           Allergies:  No Known Allergies    Problem List:    Patient Active Problem List   Diagnosis Code    Depression F32.9    Hypertension I10    Carotid artery stenosis I65.29    PVD (peripheral vascular disease) ([facility]) I73.9    Carotid artery occlusion, right I65.29    Elevated d-dimer R79.89    Hypocalcemia E83.51    Macrocytic anemia D53.9    PAF (paroxysmal atrial fibrillation) ([facility]) I48.0    Chronic anticoagulation Z79.01    Ulcerative colitis ([facility] K51.90    Sinus node dysfunction ([facility]) I49.5    Chronic atrial fibrillation ([facility]) I48.20    Pacemaker Z95.0       Past Medical History:        Diagnosis Date    Atherosclerosis of native arteries of the extremities with intermittent claudication     Blood circulation, collateral     Carotid artery occlusion, right     Chronic kidney disease     Depression     GERD (gastroesophageal reflux disease)     Hyperlipidemia     Hypertension     Pneumonia due to infectious organism 2019    Type II or unspecified type diabetes mellitus without mention of complication, not stated as uncontrolled     [facility] 2019       Past Surgical History:        Procedure Laterality Date    CATARACT REMOVAL Bilateral     HEMORRHOID SURGERY  1973    HERNIA REPAIR      umbilical hernia    PACEMAKER PLACEMENT      [facility] PACEMAKER       Social History:    Social History     Tobacco Use    Smoking status: Former Smoker     Packs/day: 1.00     Years: 50.00     Pack years: 50.00     Types: Cigarettes     Last attempt to quit:      Years since quittin.8    Smokeless tobacco: Former User     Types: Snuff   Substance Use Topics    Alcohol use: Yes     Comment: occasional                                Counseling given: Not Answered      Vital Signs (Current):   Vitals:    10/16/20 0731   BP: (!) 164/50   Pulse: 87   Resp: 16   Temp: 97.4 °F (36.3 °C)   TempSrc: Tympanic   SpO2: 98%   Weight: 145 lb (65.8 kg)   Height: 5' 6\" (1.676 m)                                              BP Readings from Last 3 Encounters:   10/16/20 (!) 164/50   09/28/20 132/66   04/23/20 122/82       NPO Status: Time of last liquid consumption: 2100                        Time of last solid consumption: 2100                        Date of last liquid consumption: 10/15/20                        Date of last solid food consumption: 10/15/20    BMI:   Wt Readings from Last 3 Encounters:   10/16/20 145 lb (65.8 kg)   10/12/20 150 lb (68 kg)   09/28/20 150 lb (68 kg)     Body mass index is 23.4 kg/m². CBC:   Lab Results   Component Value Date    WBC 9.4 10/12/2020    RBC 2.61 10/12/2020    HGB 8.4 10/12/2020    HCT 25.7 10/12/2020    MCV 98.5 10/12/2020    RDW 15.8 10/12/2020     10/12/2020       CMP:   Lab Results   Component Value Date     10/12/2020    K 5.0 10/12/2020    K 3.9 02/26/2020    CL 99 10/12/2020    CO2 24 10/12/2020    BUN 42 10/12/2020    CREATININE 3.8 10/12/2020    GFRAA 19 10/12/2020    LABGLOM 15 10/12/2020    GLUCOSE 270 10/12/2020    PROT 7.5 02/26/2020    CALCIUM 9.0 10/12/2020    BILITOT 0.3 02/26/2020    ALKPHOS 132 02/26/2020    AST 19 02/26/2020    ALT 21 02/26/2020       POC Tests: No results for input(s): POCGLU, POCNA, POCK, POCCL, POCBUN, POCHEMO, POCHCT in the last 72 hours.     Coags:   Lab Results   Component Value Date    PROTIME 18.8 10/12/2020    INR 1.56 10/12/2020    APTT 35.1 10/12/2020       HCG (If Applicable): No results found for: PREGTESTUR, PREGSERUM, HCG, HCGQUANT     ABGs:   Lab Results   Component Value Date    PHART 7.460 04/08/2019    PO2ART 61.0 04/08/2019    UGW1QGS 27.0 04/08/2019    BIF4DDX 19.2 04/08/2019    BEART -3.6 04/08/2019    M6JOGHLE 91.9 04/08/2019        Type & Screen (If Applicable):  No results found for: LABABO, LABRH    Drug/Infectious Status (If Applicable):  No results found for: HIV, HEPCAB    COVID-19 Screening (If Applicable):   Lab Results   Component Value Date    COVID19 NOT DETECTED 10/12/2020         Anesthesia Evaluation  Patient summary reviewed and Nursing notes reviewed no history of anesthetic complications:   Airway: Mallampati: I  TM distance: >3 FB   Neck ROM: full  Mouth opening: > = 3 FB Dental:    (+) upper dentures and partials      Pulmonary:normal exam        (-) not a current smoker                           Cardiovascular:    (+) hypertension:, pacemaker:, dysrhythmias: atrial fibrillation,     (-) past MI      Rhythm: irregular             Beta Blocker:  Dose within 24 Hrs         Neuro/Psych:      (-) seizures and CVA           GI/Hepatic/Renal:   (+) GERD: well controlled, renal disease: CRI,           Endo/Other:    (+) DiabetesType II DM, , blood dyscrasia: anticoagulation therapy:., .                  ROS comment: Last dose of Eliquis yesterday Abdominal:           Vascular:   + PVD, aortic or cerebral, . Anesthesia Plan      general and regional     ASA 3     (Brachial plexus)  Induction: intravenous. MIPS: Postoperative opioids intended and Prophylactic antiemetics administered. Anesthetic plan and risks discussed with patient.                       Anthony Naik MD   10/16/2020

## 2020-10-16 NOTE — ANESTHESIA POSTPROCEDURE EVALUATION
Department of Anesthesiology  Postprocedure Note    Patient: Huan Oh  MRN: [id number]  YOB: 1940  Date of evaluation: 10/16/2020  Time:  1:08 PM     Procedure Summary     Date:  10/16/20 Room / Location:  [address]    Anesthesia Start:  7450 Anesthesia Stop:  1626    Procedure:  RIGHT BRACHIAL/BASILIC AV FISTULA (Right ) Diagnosis:  (N18.4)    Surgeon:  Haritha Kilpatrick MD Responsible Provider:  RED Robles CRNA    Anesthesia Type:  general, regional ASA Status:  3          Anesthesia Type: general, regional    Lennox Phase I: Lennox Score: 10    Lennox Phase II:      Last vitals: Reviewed and per EMR flowsheets.        Anesthesia Post Evaluation    Patient location during evaluation: PACU  Patient participation: waiting for patient participation  Level of consciousness: sleepy but conscious and responsive to physical stimuli  Pain score: 0  Airway patency: patent  Nausea & Vomiting: no nausea and no vomiting  Complications: no  Cardiovascular status: hemodynamically stable  Respiratory status: acceptable  Hydration status: euvolemic

## 2020-10-16 NOTE — OP NOTE
Preoperative Diagnosis:  1. End-stage renal disease on hemodialysis    Postoperative Diagnosis:  Same    Operative Procedure:   1. Right Brachial Artery to Cephalic Vein Arteriovenous Fistula Creation    Surgeon:  Malika Ybarra. Janene Arboleda M.D. Anesthesia:  Right upper extremity block plus general    Estimated Blood Loss:  Less than 50 ml    Findings:  1. The artery has mild plaque and is around 5 mm in diameter  2. The vein is around 3 to 4 mm in diameter with ultrasound from the antecubital crease all the way to the shoulder. Procedure in detail:    After the patient was consented, a block performed by anesthesia, and IV antibiotics administered, he was brought to the operating room and placed on the operating room table supine position. His arm was then prepped and draped in the usual sterile procedure. The veins and arteries were interrogated with ultrasound intraoperatively and a decision was made to perform the above procedure based on this intraoperative ultrasound. A transverse incision was made in the proximal forearm with knife and subcutaneous tissues were dissected with bovie electrocautery. Sharp dissection was used to expose and circumferentially dissect the vein and branches. The cephalic vein was marked for orientation purposes. The artery was then dissected proximally and distally and vesseloops placed for future vascular control. The patient was given intravenous heparin. After adequate heparinization time, the branches off of the cephalic vein are ligated and a bulldog clamp is placed proximally for vascular control. Two of the branches are then transected and connected with Llanes scissors to create a nice larkin for anastomosis. The proximal and distal vesseloops were tightened and a longitudinal ateriotomy made with 11 blade and Llanes scissors. An end vein to side artery anastomosis is then created with 6-0 prolene running suture.   Prior to completing the anastomosis, standard flushing techniques were utilized to remove any air and debris from the native vessels. Hemostasis was excellent and there is a nicely palpable thrill in the fistula. The wound is closed in layers with 3-0 PDS subcutaneous sutures, 4-0 monocryl subcuticular running suture and dermabond skin adhesive. He tolerated the procedure well and was brought to the recovery room in good condition.

## 2020-10-20 NOTE — TELEPHONE ENCOUNTER
I called both mobile and home phone for Remy Davis to go over instructions for Johnnie's procedure we scheduled with Dr. David Ovalles (Fistulogram/BAM). Patient did not answer either phone, I left a voicemail on patient's home phone asking for them to call our office back at their earliest convenience to go over instructions for procedure.

## 2020-10-21 NOTE — TELEPHONE ENCOUNTER
I spoke with Tushar Saldana (returned my phone call) and made hims aware we have scheduled his Fistulogram/BAM with Dr. Janay Mendoza for 11/2/20 arriving at [facility] at 7:30am. He will need to have a COVID test done on 10/29/20 by 11:00am at [facility]. Once done we suggest they quarantine until their procedure. I addressed all medications and instructions via phone (Please see letter for instructions given). Patient verbally understood all instructions. I have sent a copy of instructions via mail per patients request. Patient will call with any questions or concerns.

## 2020-10-29 NOTE — H&P (VIEW-ONLY)
Patient Care Team:  Marsa Ormond, MD as PCP - Herminia Agudelo MD as Consulting Physician (Interventional Cardiology)  Cathy Kelley MD as Consulting Physician (Vascular Surgery)      [facility] 68 y.o. male with a history of renal failure requiring hemodialysis access. Patient's right brachial - cephalic AV fistula is slow to mature. Patient Active Problem List   Diagnosis    Depression    Hypertension    Carotid artery stenosis    PVD (peripheral vascular disease) ([address]    Carotid artery occlusion, right    Elevated d-dimer    Hypocalcemia    Macrocytic anemia    PAF (paroxysmal atrial fibrillation) (HCC)    Chronic anticoagulation    Ulcerative colitis ([address]    Sinus node dysfunction (HCC)    Chronic atrial fibrillation ([address]    Pacemaker    ESRD (end stage renal disease) on dialysis ([address]      See attached meds  Allergies:  Patient has no known allergies.   Past Medical History:   Diagnosis Date    Atherosclerosis of native arteries of the extremities with intermittent claudication     Blood circulation, collateral     Carotid artery occlusion, right     Chronic kidney disease     Depression     GERD (gastroesophageal reflux disease)     Hyperlipidemia     Hypertension     Pneumonia due to infectious organism 4/9/2019    Type II or unspecified type diabetes mellitus without mention of complication, not stated as uncontrolled     Ulcerative colitis ([address] 6/30/2019      Past Surgical History:   Procedure Laterality Date    CATARACT REMOVAL Bilateral     DIALYSIS FISTULA CREATION Right 10/16/2020    RIGHT BRACHIAL/BASILIC AV FISTULA performed by Cathy Kelley MD at [facility] [address]53      umbilical hernia    PACEMAKER PLACEMENT       [address]      Family History   Problem Relation Age of Onset    High Blood Pressure Mother     Heart Disease Mother     Stroke Mother         at 48    High Blood Pressure Father  Heart Disease Father     Stroke Father         at 59    No Known Problems Brother     Alzheimer's Disease Brother         onset at 80, then rapidly progressed to death      Social History     Tobacco Use    Smoking status: Former Smoker     Packs/day: 1.00     Years: 50.00     Pack years: 50.00     Types: Cigarettes     Last attempt to quit:      Years since quittin.8    Smokeless tobacco: Former User     Types: Snuff   Substance Use Topics    Alcohol use: Yes     Comment: occasional       HEENT __normocepahlic; sclera clear  Neck   Supple  Lungs -cta  Heart  rr  GI - Abdomen soft, non-tender  Extremities without cyanosis  Skin without significant lesions     Diagnosis End Stage CKD requiring dialysis    Permit for fistulogram with BAM    Risks have been reviewed with the patient including but not limited to heart attack, death, CVA, bleeding, nerve injury, infection, steal, pain, and need for further surgery.       _______________________________________________________________________  Signature     Date    Time

## 2020-10-30 NOTE — TELEPHONE ENCOUNTER
I spoke to Pia Holder and made him aware we have him scheduled to arrive at [facility] on Monday 11/2/20 at 8:00am for his procedure with Dr. Becka Bains. Patient verbally confirmed.

## 2020-10-30 NOTE — TELEPHONE ENCOUNTER
I called and left a  for Gurwinder Steele asking if he by chance had his COVID test done yesterday, if not we will need to reschedule his procedure with Dr. Soraya Omalley that is scheduled for Monday 11/2/20. I asked that he call our office back and let us know either way.

## 2020-11-02 NOTE — INTERVAL H&P NOTE
I agree with the history and physical exam in chart. In addition, today's complete ROS was performed and the only positives are noted in the HPI. I examined the patient preoperatively and discussed the surgery, including the risks, benefits, and alternatives. They seem to understand and agree to proceed.   ASA 3, Mallenpati 3

## 2020-11-02 NOTE — OP NOTE
above findings. Over an 018 wire, balloon angioplasty was performed of the stenoses with an 8 mm x 40 mm Luke balloon. Venograms after this show an excellent result with no significant residual stenosis nor extravasation of dye. Next, I selected the branch off of the cephalic vein and an 8 mm x 5 cm and then 4 mm x 5 cm coils were placed. Completion venogram show that this was successfully embolized and all the flow is now going through the main cephalic vein. The sheath was removed and pressure applied for hemostasis. The patient's fistula should be ready for use in approximately 4 weeks if needed.

## 2020-11-06 PROBLEM — I50.9 ACUTE ON CHRONIC CONGESTIVE HEART FAILURE (HCC): Status: ACTIVE | Noted: 2020-01-01

## 2020-11-06 PROBLEM — M54.2 NECK PAIN: Status: ACTIVE | Noted: 2020-01-01

## 2020-11-06 PROBLEM — R07.89 CHEST PAIN, ATYPICAL: Status: ACTIVE | Noted: 2020-01-01

## 2020-11-06 NOTE — CONSULTS
Nephrology ([facility]) Consult Note      Patient:  Gage Ken  YOB: 1940  Date of Service: 11/6/2020  MRN: [id number]   Acct: [id number]   Primary Care Physician: Cayetano Kay MD  Advance Directive:   Code Status and Medical Interventions:   Ordered at: 11/06/20 0309     Level Of Support Discussed With:    Patient     Code Status:    CPR     Medical Interventions (Level of Support Prior to Arrest):    Full     Admit Date: 11/6/2020       Hospital Day: 0  Referring Provider: Delfin Navas MD      Patient personally seen and examined.  Complete chart including Consults, Notes, Operative Reports, Labs, Cardiology, and Radiology studies reviewed as able.        Subjective:  Gage Ken is a 80 y.o. male  whom we were consulted for acute kidney injury.  Patient is well known to our practice; has baseline chronic kidney disease stage 5. Most recent office visit was 10/02 and creatinine was 3.9 at that time.  History of type 2 diabetes, hypertension, chronic atrial fibrillation with recent pacemaker placement.  He has recently had AV fistula placed by Dr Martinez at [facility] in preparation for dialysis. On 11/02 underwent a fistulogram and Dr Martinez indicated at that time that he estimated fistula would be ready for use in four weeks.  Patient presented to hospital via EMS with complaint of chest pain and was admitted for further evaluation. Over the last few days he has noted increasing lower extremity edema and dyspnea. Denies any recent changes in urine output. Denies vomiting or diarrhea but does endorse nausea and poor appetite.  He was given IV Lasix overnight and he feels that his swelling and breathing are better this morning. Urine output nonoliguric.    Allergies:  Patient has no known allergies.    Home Meds:  Medications Prior to Admission   Medication Sig Dispense Refill Last Dose   • apixaban (ELIQUIS) 5 MG tablet tablet Take 5 mg by mouth  2 (Two) Times a Day.   11/6/2020 at Unknown time   • digoxin (LANOXIN) 125 MCG tablet Take 1 tablet by mouth Daily. 30 tablet 0 11/6/2020 at Unknown time   • dilTIAZem CD (CARDIZEM CD) 240 MG 24 hr capsule Take 240 mg by mouth 2 (Two) Times a Day.   11/6/2020 at Unknown time   • epoetin connie-epbx (Retacrit) 85395 UNIT/ML injection Inject 0.5 mL under the skin into the appropriate area as directed 3 (Three) Times a Week. Indications: Anemia associated with Chronic Kidney Failure 6.6 mL 0 11/6/2020 at Unknown time   • finasteride (PROSCAR) 5 MG tablet Take 1 tablet by mouth Daily. 30 tablet 0 11/6/2020 at Unknown time   • folic acid (FOLVITE) 1 MG tablet Take 1 mg by mouth Daily.   11/6/2020 at Unknown time   • furosemide (LASIX) 40 MG tablet Take 40 mg by mouth Daily.  2 11/6/2020 at Unknown time   • glipiZIDE (GLUCOTROL) 10 MG tablet Take 1 tablet by mouth 2 (Two) Times a Day.  3 11/6/2020 at Unknown time   • hydrALAZINE (APRESOLINE) 50 MG tablet Take 1 tablet by mouth Every 8 (Eight) Hours. 30 tablet 0 11/6/2020 at Unknown time   • JANUVIA 50 MG tablet Take 50 mg by mouth Daily.   11/6/2020 at Unknown time   • metoprolol succinate XL (TOPROL-XL) 100 MG 24 hr tablet Take 100 mg by mouth 2 (two) times a day.   11/6/2020 at Unknown time   • omeprazole (priLOSEC) 40 MG capsule Take 40 mg by mouth Daily.   11/6/2020 at Unknown time   • allopurinol (ZYLOPRIM) 100 MG tablet Take 2 tablets by mouth Daily. 30 tablet 0 More than a month at Unknown time   • ondansetron ODT (ZOFRAN-ODT) 4 MG disintegrating tablet Place 4 mg on the tongue Every 6 (Six) Hours As Needed for Nausea or Vomiting.      • pravastatin (PRAVACHOL) 20 MG tablet Take 1 tablet by mouth Every Night.  2    • spironolactone (ALDACTONE) 25 MG tablet TAKE 25 MG EVERY DAY BY ORAL ROUTE IN THE MORNING FOR 30 DAYS.      • sulfaSALAzine (AZULFIDINE) 500 MG tablet Take 2 tablets by mouth 3 (Three) Times a Day. 360 tablet 1    • terazosin (HYTRIN) 10 MG capsule Take  1 capsule by mouth Every Night.  2        Medicines:  Current Facility-Administered Medications   Medication Dose Route Frequency Provider Last Rate Last Dose   • acetaminophen (TYLENOL) tablet 650 mg  650 mg Oral Q4H PRN Delfin Navas MD        Or   • acetaminophen (TYLENOL) 160 MG/5ML solution 650 mg  650 mg Oral Q4H PRN Delfin Navas MD        Or   • acetaminophen (TYLENOL) suppository 650 mg  650 mg Rectal Q4H PRN Delfin Navas MD       • allopurinol (ZYLOPRIM) tablet 200 mg  200 mg Oral Daily Delfin Navas MD   200 mg at 11/06/20 0823   • apixaban (ELIQUIS) tablet 5 mg  5 mg Oral Q12H Delfin Navas MD   5 mg at 11/06/20 0823   • dextrose (D50W) 25 g/ 50mL Intravenous Solution 25 g  25 g Intravenous Q15 Min PRN Delfin Navas MD       • dextrose (GLUTOSE) oral gel 15 g  15 g Oral Q15 Min PRN Delfin Navas MD       • dilTIAZem CD (CARDIZEM CD) 24 hr capsule 240 mg  240 mg Oral BID Delfin Navas MD   240 mg at 11/06/20 0823   • epoetin connie-epbx (RETACRIT) 5,000 Units  5,000 Units Subcutaneous 3x Weekly Delfin Navas MD   5,000 Units at 11/06/20 0824   • finasteride (PROSCAR) tablet 5 mg  5 mg Oral Daily Delfin Navas MD   5 mg at 11/06/20 0823   • folic acid (FOLVITE) tablet 1 mg  1 mg Oral Daily Delfin Navas MD   1 mg at 11/06/20 0823   • furosemide (LASIX) injection 80 mg  80 mg Intravenous Q12H Delfin Navas MD   80 mg at 11/06/20 0549   • glipizide (GLUCOTROL) tablet 10 mg  10 mg Oral BID AC Delfin Navas MD   10 mg at 11/06/20 0823   • glucagon (human recombinant) (GLUCAGEN DIAGNOSTIC) injection 1 mg  1 mg Subcutaneous Q15 Min PRN Delfin Navas MD       • hydrALAZINE (APRESOLINE) tablet 50 mg  50 mg Oral Q8H Delfin Navas MD   50 mg at 11/06/20 0548   • insulin lispro (humaLOG) injection 2-9 Units  2-9 Units Subcutaneous TID AC Delfin Navas MD   2 Units at 11/06/20 0824   • isosorbide mononitrate (IMDUR) 24 hr tablet 30 mg  30  mg Oral Q24H Sravanthi Waldron APRN       • linagliptin (TRADJENTA) tablet 5 mg  5 mg Oral Daily Delfin Navas MD   5 mg at 11/06/20 0823   • metoprolol succinate XL (TOPROL-XL) 24 hr tablet 100 mg  100 mg Oral BID Delfin Navas MD   100 mg at 11/06/20 0823   • nitroglycerin (NITROSTAT) SL tablet 0.4 mg  0.4 mg Sublingual Q5 Min PRN Delfin Navas MD   0.4 mg at 11/06/20 0353   • ondansetron (ZOFRAN) tablet 4 mg  4 mg Oral Q6H PRN Delfin Navas MD        Or   • ondansetron (ZOFRAN) injection 4 mg  4 mg Intravenous Q6H PRN Delfin Navas MD       • pantoprazole (PROTONIX) EC tablet 40 mg  40 mg Oral QAM Delfin Navas MD   40 mg at 11/06/20 0548   • pravastatin (PRAVACHOL) tablet 20 mg  20 mg Oral Nightly Delfin Navas MD       • sodium chloride 0.9 % flush 10 mL  10 mL Intravenous Q12H Delfin Navas MD       • sodium chloride 0.9 % flush 10 mL  10 mL Intravenous PRN Delfin Navas MD       • sulfaSALAzine (AZULFIDINE) tablet 1,000 mg  1,000 mg Oral TID Delfin Navas MD   1,000 mg at 11/06/20 0823   • terazosin (HYTRIN) capsule 10 mg  10 mg Oral Nightly Delfin Navas MD   10 mg at 11/06/20 0630       Past Medical History:  Past Medical History:   Diagnosis Date   • Acute gastritis    • Blood in feces    • Carotid artery occlusion 12/2/2019   • Colitis, ulcerative chronic (CMS/HCC)     LEFT-SIDED   • Diverticular disease of colon    • Epigastric pain    • GERD (gastroesophageal reflux disease)    • History of colon polyps    • [place] (hard of hearing)    • Hyperlipidemia    • Hypertension    • Lesion of nose    • Neoplasm of uncertain behavior    • Nonspecific ulcerative proctitis (CMS/HCC)    • Rectal hemorrhage    • Renal disorder    • Type 2 diabetes mellitus (CMS/HCC)    • Vitamin B12 deficiency        Past Surgical History:  Past Surgical History:   Procedure Laterality Date   • COLONOSCOPY  12/02/2013    Hemorrhoids found.   • COLONOSCOPY  09/06/2016   • COLONOSCOPY  N/A 10/30/2017    Procedure: COLONOSCOPY;  Surgeon: Alex Silveira MD;  Location: [facility] ENDOSCOPY;  Service:    • COLONOSCOPY N/A 11/6/2018    Procedure: COLONOSCOPY;  Surgeon: Alex Silveira MD;  Location: [facility] ENDOSCOPY;  Service: Gastroenterology   • COLONOSCOPY N/A 11/21/2019    Procedure: COLONOSCOPY;  Surgeon: Alex Silveira MD;  Location: [facility] ENDOSCOPY;  Service: Gastroenterology   • COLONOSCOPY W/ POLYPECTOMY  08/30/2015    Single polyp found in the colon;removed by cold snare polypectomy.Proctitis in the rectum.Diverticulosis found in the sigmoid colon.Hemorrhoids found.   • ENDOSCOPY  12/02/2013    Normal hypopharynx, esophagus, duodenum, symmetrical & patent pylorus. Hiatus hernia in GE junction. Normal stomach. Biopsy taken.   • HERNIA REPAIR      umbilical   • UPPER GASTROINTESTINAL ENDOSCOPY  12/02/2013       Family History  Family History   Problem Relation Age of Onset   • Diabetes Other    • Hypertension Other    • Stroke Mother    • Stroke Father        Social History  Social History     Socioeconomic History   • Marital status: Single     Spouse name: Not on file   • Number of children: Not on file   • Years of education: Not on file   • Highest education level: Not on file   Tobacco Use   • Smoking status: Former Smoker     Types: Cigarettes   • Smokeless tobacco: Former User     Types: Snuff     Quit date: 2008   • Tobacco comment: 11/30/2017 - Patient States He Ceased Smoking 13 Years Prior.   Substance and Sexual Activity   • Alcohol use: Yes     Alcohol/week: 4.0 standard drinks     Types: 4 Cans of beer per week     Comment: occasional   • Drug use: No   • Sexual activity: Defer         Review of Systems:  History obtained from chart review and the patient  General ROS: No fever or chills  Respiratory ROS: No cough, shortness of breath, wheezing  Cardiovascular ROS: positive for - chest pain (on admission)  Gastrointestinal ROS: positive for - nausea  Genito-Urinary ROS: No  "dysuria or hematuria  14 point ROS reviewed with the patient and negative except as noted above and in the HPI unless unable to obtain.    Objective:  Patient Vitals for the past 24 hrs:   BP Temp Temp src Pulse Resp SpO2 Height Weight   11/06/20 0721 115/43 98.3 °F (36.8 °C) Oral 66 16 96 % -- --   11/06/20 0313 152/60 98.4 °F (36.9 °C) Oral 72 16 94 % 170.2 cm (67\") 69.1 kg (152 lb 4 oz)   11/06/20 0242 -- -- -- 101 -- 92 % -- --   11/06/20 0230 145/56 -- -- 98 -- 94 % -- --   11/06/20 0215 125/55 -- -- 89 -- 93 % -- --   11/06/20 0214 143/67 -- -- 88 -- -- -- --   11/06/20 0200 143/67 -- -- 94 -- 92 % -- --   11/06/20 0145 126/59 -- -- 97 -- 92 % -- --   11/06/20 0130 129/64 -- -- 85 -- 93 % -- --   11/06/20 0115 135/70 -- -- 83 -- 91 % -- --   11/06/20 0100 149/78 -- -- 92 -- 91 % -- --   11/06/20 0057 142/62 -- -- 94 -- -- -- --   11/06/20 0049 142/62 -- -- 93 -- 93 % -- --   11/06/20 0023 -- -- -- 109 -- 90 % -- --   11/06/20 0017 139/77 97.8 °F (36.6 °C) Oral 99 17 93 % 170.2 cm (67\") 65.8 kg (145 lb)       Intake/Output Summary (Last 24 hours) at 11/6/2020 1038  Last data filed at 11/6/2020 0821  Gross per 24 hour   Intake --   Output 500 ml   Net -500 ml     General: awake/alert    Neck: supple, no JVD  Chest:  clear to auscultation bilaterally without respiratory distress  CVS: regular rate and rhythm  Abdominal: soft, nontender, positive bowel sounds  Extremities: bilateral 2+ lower extremity edema  Skin: warm and dry without rash  Neuro: no focal motor deficits    Labs:  Results from last 7 days   Lab Units 11/06/20  0324 11/06/20  0026   WBC 10*3/mm3 10.50 10.71   HEMOGLOBIN g/dL 7.5* 7.8*   HEMATOCRIT % 22.8* 23.1*   PLATELETS 10*3/mm3 231 229         Results from last 7 days   Lab Units 11/06/20  0324 11/06/20  0026   SODIUM mmol/L 140 139   POTASSIUM mmol/L 4.4 4.0   CHLORIDE mmol/L 103 100   CO2 mmol/L 23.0 25.0   BUN mg/dL 48* 46*   CREATININE mg/dL 4.40* 4.34*   CALCIUM mg/dL 8.8 8.8   BILIRUBIN " mg/dL 0.3  --    ALK PHOS U/L 226*  --    ALT (SGPT) U/L 21  --    AST (SGOT) U/L 26  --    GLUCOSE mg/dL 253* 275*       Radiology:   Imaging Results (Last 72 Hours)     Procedure Component Value Units Date/Time    XR Chest 1 View [[id number]] Collected: 11/06/20 0646     Updated: 11/06/20 0649    Narrative:      Frontal upright radiograph of the chest 11/6/2020 1:10 AM CST     COMPARISON: None     HISTORY: Chest pain.     FINDINGS:   The lungs are clear. Cardiac silhouettes upper limits of normal. Pacing  device present soft tissues the left chest.      The osseous structures and surrounding soft tissues demonstrate no acute  abnormality.       Impression:      1. No radiographic evidence of acute cardiopulmonary process.        This report was finalized on 11/06/2020 06:46 by Dr. Zhang Dutton MD.          Culture:  No results found for: BLOODCX, URINECX, WOUNDCX, MRSACX, RESPCX, STOOLCX      Assessment   1.  Baseline chronic kidney disease stage 5  2.  Chest pain  3.  Type 2 diabetes with nephropathy  4.  Essential hypertension  5.  Atrial fibrillation with permanent pacemaker  6.  Anemia   7.  Volume overload    Plan:  1.  Patient has some volume overload but does not appear severe. Improved overnight with IV Lasix.   2.  Fistula will not be ready for use for another 4 weeks. If dialysis is needed before then will have to place a permcath. Patient has no urgent indications for dialysis at this time. Plan to continue volume management with IV diuretics for now.  3.  Monitor labs  4.  Further assessment and plan pending Dr Magaña's evaluation of patient      Thank you for the consult, we appreciate the opportunity to provide care to your patients.  Feel free to contact me if I can be of any further assistance.      Jonathan Cardona, APRN  11/6/2020  10:38 CST

## 2020-11-06 NOTE — ED PROVIDER NOTES
"Subjective       81 y/o male with history of afib follows with Dr. Almanza who has pacer implanted arrives for evaluation of chest pain and \"not feeling well.\" He tells me he is getting ready to start dialysis (has a fistula in his right arm) but is unsure of when this will start ([place]). He states tonight around 2100 he began to note left sided pain he cannot describe without radiation or provoking/alleviating factors. He states all day he was feeling \"not well\" stating he just stayed in the house. He denies any fevers or chills, falls or trauma. He states since arrival his pain has improved.         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  09/06/2016: COLONOSCOPY  10/30/2017: COLONOSCOPY; N/A      Comment:  Procedure: COLONOSCOPY;  Surgeon: Alex Silveira MD;                 Location: [facility] ENDOSCOPY;  Service:   11/6/2018: COLONOSCOPY; N/A      Comment:  Procedure: COLONOSCOPY;  Surgeon: Alex Silveira MD;                 Location: [facility] ENDOSCOPY;  Service: Gastroenterology  11/21/2019: COLONOSCOPY; N/A      Comment:  Procedure: COLONOSCOPY;  Surgeon: Alex Silveira MD;                 Location: [facility] " ENDOSCOPY;  Service: Gastroenterology  08/30/2015: COLONOSCOPY W/ POLYPECTOMY      Comment:  Single polyp found in the colon;removed by cold snare                polypectomy.Proctitis in the rectum.Diverticulosis found                in the sigmoid colon.Hemorrhoids found.  12/02/2013: ENDOSCOPY      Comment:  Normal hypopharynx, esophagus, duodenum, symmetrical &                patent pylorus. Hiatus hernia in GE junction. Normal                stomach. Biopsy taken.  No date: HERNIA REPAIR      Comment:  umbilical  12/02/2013: UPPER GASTROINTESTINAL ENDOSCOPY    Social History    Socioeconomic History      Marital status: Single      Spouse name: Not on file      Number of children: Not on file      Years of education: Not on file      Highest education level: Not on file    Tobacco Use      Smoking status: Former Smoker        Types: Cigarettes      Smokeless tobacco: Former User        Types: Snuff        Quit date: 2008      Tobacco comment: 11/30/2017 - Patient States He Ceased Smoking 13 Years Prior.    Substance and Sexual Activity      Alcohol use: Yes        Comment: occasional      Drug use: No      Sexual activity: Defer      Family history: reviewed and noncontributory                    Review of Systems   All other systems reviewed and are negative.      Past Medical History:   Diagnosis Date   • Acute gastritis    • Blood in feces    • Carotid artery occlusion 12/2/2019   • Colitis, ulcerative chronic (CMS/HCC)     LEFT-SIDED   • Diverticular disease of colon    • Epigastric pain    • GERD (gastroesophageal reflux disease)    • History of colon polyps    • [place] (hard of hearing)    • Hyperlipidemia    • Hypertension    • Lesion of nose    • Neoplasm of uncertain behavior    • Nonspecific ulcerative proctitis (CMS/HCC)    • Rectal hemorrhage    • Renal disorder    • Type 2 diabetes mellitus (CMS/HCC)    • Vitamin B12 deficiency        No Known Allergies    Past Surgical History:   Procedure Laterality  Date   • COLONOSCOPY  12/02/2013    Hemorrhoids found.   • COLONOSCOPY  09/06/2016   • COLONOSCOPY N/A 10/30/2017    Procedure: COLONOSCOPY;  Surgeon: Alex Silveira MD;  Location: [facility] ENDOSCOPY;  Service:    • COLONOSCOPY N/A 11/6/2018    Procedure: COLONOSCOPY;  Surgeon: Alex Silveira MD;  Location: [facility] ENDOSCOPY;  Service: Gastroenterology   • COLONOSCOPY N/A 11/21/2019    Procedure: COLONOSCOPY;  Surgeon: Alex Silveira MD;  Location: [facility] ENDOSCOPY;  Service: Gastroenterology   • COLONOSCOPY W/ POLYPECTOMY  08/30/2015    Single polyp found in the colon;removed by cold snare polypectomy.Proctitis in the rectum.Diverticulosis found in the sigmoid colon.Hemorrhoids found.   • ENDOSCOPY  12/02/2013    Normal hypopharynx, esophagus, duodenum, symmetrical & patent pylorus. Hiatus hernia in GE junction. Normal stomach. Biopsy taken.   • HERNIA REPAIR      umbilical   • UPPER GASTROINTESTINAL ENDOSCOPY  12/02/2013       Family History   Problem Relation Age of Onset   • Diabetes Other    • Hypertension Other    • Stroke Mother    • Stroke Father        Social History     Socioeconomic History   • Marital status: Single     Spouse name: Not on file   • Number of children: Not on file   • Years of education: Not on file   • Highest education level: Not on file   Tobacco Use   • Smoking status: Former Smoker     Types: Cigarettes   • Smokeless tobacco: Former User     Types: Snuff     Quit date: 2008   • Tobacco comment: 11/30/2017 - Patient States He Ceased Smoking 13 Years Prior.   Substance and Sexual Activity   • Alcohol use: Yes     Alcohol/week: 4.0 standard drinks     Types: 4 Cans of beer per week     Comment: occasional   • Drug use: No   • Sexual activity: Defer           Objective   Physical Exam  Vitals signs and nursing note reviewed.   Constitutional:       Appearance: He is well-developed.   HENT:      Head: Normocephalic.   Eyes:      Pupils: Pupils are equal, round, and reactive to light.    Neck:      Musculoskeletal: Normal range of motion and neck supple.   Cardiovascular:      Rate and Rhythm: Normal rate and regular rhythm.      Heart sounds: Murmur present. Systolic murmur present.   Pulmonary:      Effort: Pulmonary effort is normal. No tachypnea.      Breath sounds: Rales present.   Abdominal:      General: Bowel sounds are normal.      Palpations: Abdomen is soft.   Musculoskeletal:      Right lower leg: Edema present.      Left lower leg: Edema present.      Comments: Right arm has fistula with good thrill .   Skin:     General: Skin is warm and dry.      Capillary Refill: Capillary refill takes less than 2 seconds.   Neurological:      General: No focal deficit present.      Mental Status: He is alert and oriented to person, place, and time.   Psychiatric:         Mood and Affect: Mood normal.         Behavior: Behavior normal.         ECG 12 Lead      Date/Time: 11/6/2020 12:51 AM  Performed by: Aman Brannon MD  Authorized by: Aman Brannon MD   Interpreted by physician  Rhythm: atrial fibrillation  Rate: tachycardic  BPM: 105  QRS axis: normal                   ED Course  ED Course as of Nov 06 0427   Fri Nov 06, 2020   0053 Baseline hgb      [JH]   0126 He has a chronically elevated troponin.     His EKG just shows afib.     His clinically picture is that of CHF. Given his CP, high heart score and elevated troponin will admit.     [JH]      ED Course User Index  [] Aman Brannon MD            XR Chest 1 View    (Results Pending)     Labs Reviewed   BASIC METABOLIC PANEL - Abnormal; Notable for the following components:       Result Value    Glucose 275 (*)     BUN 46 (*)     Creatinine 4.34 (*)     eGFR Non  Amer 13 (*)     All other components within normal limits    Narrative:     GFR Normal >60  Chronic Kidney Disease <60  Kidney Failure <15     PROTIME-INR - Abnormal; Notable for the following components:    Protime 19.8 (*)     INR 1.71 (*)     All  other components within normal limits   BNP (IN-HOUSE) - Abnormal; Notable for the following components:    proBNP 15,223.0 (*)     All other components within normal limits    Narrative:     Among patients with dyspnea, NT-proBNP is highly sensitive for the detection of acute congestive heart failure. In addition NT-proBNP of <300 pg/ml effectively rules out acute congestive heart failure with 99% negative predictive value.    Results may be falsely decreased if patient taking Biotin.     TROPONIN (IN-HOUSE) - Abnormal; Notable for the following components:    Troponin T 0.075 (*)     All other components within normal limits    Narrative:     Troponin T Reference Range:  <= 0.03 ng/mL-   Negative for AMI  >0.03 ng/mL-     Abnormal for myocardial necrosis.  Clinicians would have to utilize clinical acumen, EKG, Troponin and serial changes to determine if it is an Acute Myocardial Infarction or myocardial injury due to an underlying chronic condition.       Results may be falsely decreased if patient taking Biotin.     DIGOXIN LEVEL - Abnormal; Notable for the following components:    Digoxin 1.60 (*)     All other components within normal limits   CBC WITH AUTO DIFFERENTIAL - Abnormal; Notable for the following components:    RBC 2.35 (*)     Hemoglobin 7.8 (*)     Hematocrit 23.1 (*)     MCV 98.3 (*)     MCH 33.2 (*)     Neutrophil % 87.3 (*)     Lymphocyte % 5.7 (*)     Immature Grans % 0.7 (*)     Neutrophils, Absolute 9.36 (*)     Lymphocytes, Absolute 0.61 (*)     Immature Grans, Absolute 0.07 (*)     All other components within normal limits   COVID-19,ABBOTT IN-HOUSE,NP SWAB (NO TRANSPORT MEDIA) 2 HR TAT - Normal    Narrative:     Fact sheet for providers: https://www.fda.gov/media/839364/download     Fact sheet for patients: https://www.fda.gov/media/751866/download   APTT - Normal   COVID PRE-OP / PRE-PROCEDURE SCREENING ORDER (NO ISOLATION)    Narrative:     The following orders were created for panel  order COVID PRE-OP / PRE-PROCEDURE SCREENING ORDER (NO ISOLATION) - Swab, Nasal Cavity.  Procedure                               Abnormality         Status                     ---------                               -----------         ------                     COVID-19, ABBOTT IN-HOUS...[[id number]]  Normal              Final result                 Please view results for these tests on the individual orders.   RAINBOW DRAW    Narrative:     The following orders were created for panel order [facility] Draw.  Procedure                               Abnormality         Status                     ---------                               -----------         ------                     Light Blue Top[[id number]]                                   Final result               Green Top (Gel)[[id number]]                                  Final result               Lavender Top[[id number]]                                     Final result               Red Top[[id number]]                                          Final result                 Please view results for these tests on the individual orders.   TROPONIN (IN-HOUSE)   MAGNESIUM   PHOSPHORUS   TSH   HEMOGLOBIN A1C   T4, FREE   CBC WITH AUTO DIFFERENTIAL   COMPREHENSIVE METABOLIC PANEL   DIGOXIN LEVEL   MAGNESIUM   PHOSPHORUS   TROPONIN (IN-HOUSE)   POCT GLUCOSE FINGERSTICK   POCT GLUCOSE FINGERSTICK   POCT GLUCOSE FINGERSTICK   POCT GLUCOSE FINGERSTICK   LIGHT BLUE TOP   GREEN TOP   LAVENDER TOP   RED TOP   CBC AND DIFFERENTIAL    Narrative:     The following orders were created for panel order CBC & Differential.  Procedure                               Abnormality         Status                     ---------                               -----------         ------                     CBC Auto Differential[[id number]]        Abnormal            Final result                 Please view results for these tests on the individual orders.                                     HEART Score  (for prediction of 6-week risk of major adverse cardiac event) reviewed and/or performed as part of the patient evaluation and treatment planning process.  The result associated with this review/performance is: 7       MDM    Final diagnoses:   Acute on chronic congestive heart failure, unspecified heart failure type (CMS/HCC)   NSTEMI (non-ST elevated myocardial infarction) (CMS/HCC)   Chest pain, unspecified type            Aman Brannon MD  11/06/20 0420

## 2020-11-06 NOTE — CONSULTS
"[facility] HEART GROUP CONSULT NOTE    Referring Provider: Delfin Navas MD    Reason for Consultation: \" heart failure\"    Chief Complaint   Patient presents with   • Chest Pain       Subjective .     History of present illness:  Gage Ken is a 80 y.o. male with a known PMH significant for chronic atrial fibrillation, long-term anticoagulation, hypertension, hyperlipidemia, diabetes mellitus, GERD, ulcerative colitis, chronic kidney disease, peripheral vascular disease with bilateral carotid artery disease, aortic stenosis, and recent placement of permanent single lead ventricular pacemaker. He recently established care with our office after being seen at [facility].  His primary cardiologist, Dr. Mariano, evaluated him in the office just over one month ago.  The patient has ESRD and is preparing to start dialysis.  He reports that yesterday after eating a meal he had chest discomfort.  He describes the discomfort as sharp in the midline of his chest.  He had also noted some palpitations and checked his heart rate and found his heart rate to be elevated.  He was concerned as he typically has not had chest discomfort associated with elevated heart rates in the past.  He thought that his discomfort was related to his known hiatal hernia, however, because he was home alone phoned EMS for evaluation.  He was brought to the emergency department.  Telemetry revealed intermittent pacing with underlying atrial fibrillation heart rates were slightly elevated.  He had abnormal lab findings with low blood counts, elevated creatinine, elevated BNP, elevated troponin, elevated digoxin level.  He was admitted for further evaluation of his complaints of chest discomfort.    He denies any chest discomfort at this time at rest.  However, he has significant reproducible pain so much that he pushes my hand away on the midline of his chest at the base of his sternum in his epigastric area.  He reports to me " that this is similar to the discomfort that he had yesterday.  He does report that some of his pain improved after the administration of nitroglycerin from staff this morning.  His heart rates have been in better control.  He denies any orthopnea, PND.      History  Past Medical History:   Diagnosis Date   • Acute gastritis    • Blood in feces    • Carotid artery occlusion 12/2/2019   • Colitis, ulcerative chronic (CMS/HCC)     LEFT-SIDED   • Diverticular disease of colon    • Epigastric pain    • GERD (gastroesophageal reflux disease)    • History of colon polyps    • [place] (hard of hearing)    • Hyperlipidemia    • Hypertension    • Lesion of nose    • Neoplasm of uncertain behavior    • Nonspecific ulcerative proctitis (CMS/HCC)    • Rectal hemorrhage    • Renal disorder    • Type 2 diabetes mellitus (CMS/HCC)    • Vitamin B12 deficiency    ,   Past Surgical History:   Procedure Laterality Date   • COLONOSCOPY  12/02/2013    Hemorrhoids found.   • COLONOSCOPY  09/06/2016   • COLONOSCOPY N/A 10/30/2017    Procedure: COLONOSCOPY;  Surgeon: Alex Silveira MD;  Location: [facility] ENDOSCOPY;  Service:    • COLONOSCOPY N/A 11/6/2018    Procedure: COLONOSCOPY;  Surgeon: Alex Silveira MD;  Location: [facility] ENDOSCOPY;  Service: Gastroenterology   • COLONOSCOPY N/A 11/21/2019    Procedure: COLONOSCOPY;  Surgeon: Alex Silveira MD;  Location: [facility] ENDOSCOPY;  Service: Gastroenterology   • COLONOSCOPY W/ POLYPECTOMY  08/30/2015    Single polyp found in the colon;removed by cold snare polypectomy.Proctitis in the rectum.Diverticulosis found in the sigmoid colon.Hemorrhoids found.   • ENDOSCOPY  12/02/2013    Normal hypopharynx, esophagus, duodenum, symmetrical & patent pylorus. Hiatus hernia in GE junction. Normal stomach. Biopsy taken.   • HERNIA REPAIR      umbilical   • UPPER GASTROINTESTINAL ENDOSCOPY  12/02/2013   ,   Family History   Problem Relation Age of Onset   • Diabetes Other    • Hypertension Other    •  Stroke Mother    • Stroke Father    ,   Social History     Tobacco Use   • Smoking status: Former Smoker     Types: Cigarettes   • Smokeless tobacco: Former User     Types: Snuff     Quit date: 2008   • Tobacco comment: 11/30/2017 - Patient States He Ceased Smoking 13 Years Prior.   Substance Use Topics   • Alcohol use: Yes     Alcohol/week: 4.0 standard drinks     Types: 4 Cans of beer per week     Comment: occasional   • Drug use: No   ,     Medications  Current Facility-Administered Medications   Medication Dose Route Frequency Provider Last Rate Last Dose   • acetaminophen (TYLENOL) tablet 650 mg  650 mg Oral Q4H PRN Delfin Navas MD        Or   • acetaminophen (TYLENOL) 160 MG/5ML solution 650 mg  650 mg Oral Q4H PRN Delfin Navas MD        Or   • acetaminophen (TYLENOL) suppository 650 mg  650 mg Rectal Q4H PRN Delfin Navas MD       • allopurinol (ZYLOPRIM) tablet 200 mg  200 mg Oral Daily Delfin Navas MD   200 mg at 11/06/20 0823   • apixaban (ELIQUIS) tablet 5 mg  5 mg Oral Q12H Delfin Navas MD   5 mg at 11/06/20 0823   • dextrose (D50W) 25 g/ 50mL Intravenous Solution 25 g  25 g Intravenous Q15 Min PRN Delfin Navas MD       • dextrose (GLUTOSE) oral gel 15 g  15 g Oral Q15 Min PRN Delfin Navas MD       • dilTIAZem CD (CARDIZEM CD) 24 hr capsule 240 mg  240 mg Oral BID Delfin Navas MD   240 mg at 11/06/20 0823   • epoetin connie-epbx (RETACRIT) 5,000 Units  5,000 Units Subcutaneous 3x Weekly Delfin Navas MD   5,000 Units at 11/06/20 0824   • finasteride (PROSCAR) tablet 5 mg  5 mg Oral Daily Delfin Navas MD   5 mg at 11/06/20 0823   • folic acid (FOLVITE) tablet 1 mg  1 mg Oral Daily Delfin Navas MD   1 mg at 11/06/20 0823   • furosemide (LASIX) injection 80 mg  80 mg Intravenous Q12H Delfin Navas MD   80 mg at 11/06/20 0549   • glipizide (GLUCOTROL) tablet 10 mg  10 mg Oral BID AC Delfin Navas MD   10 mg at 11/06/20 0854   • glucagon  (human recombinant) (GLUCAGEN DIAGNOSTIC) injection 1 mg  1 mg Subcutaneous Q15 Min PRN Delfin Navas MD       • hydrALAZINE (APRESOLINE) tablet 50 mg  50 mg Oral Q8H Delfin Navas MD   50 mg at 11/06/20 0548   • insulin lispro (humaLOG) injection 2-9 Units  2-9 Units Subcutaneous TID AC Delfin Navas MD   2 Units at 11/06/20 0824   • linagliptin (TRADJENTA) tablet 5 mg  5 mg Oral Daily Delfin Navas MD   5 mg at 11/06/20 0823   • metoprolol succinate XL (TOPROL-XL) 24 hr tablet 100 mg  100 mg Oral BID Delfin Navas MD   100 mg at 11/06/20 0823   • nitroglycerin (NITROSTAT) SL tablet 0.4 mg  0.4 mg Sublingual Q5 Min PRN Delfin Navas MD   0.4 mg at 11/06/20 0353   • ondansetron (ZOFRAN) tablet 4 mg  4 mg Oral Q6H PRN Delfin Navas MD        Or   • ondansetron (ZOFRAN) injection 4 mg  4 mg Intravenous Q6H PRN Delfin Navas MD       • pantoprazole (PROTONIX) EC tablet 40 mg  40 mg Oral QAM Delfin Navas MD   40 mg at 11/06/20 0548   • pravastatin (PRAVACHOL) tablet 20 mg  20 mg Oral Nightly Dlefin Navas MD       • sodium chloride 0.9 % flush 10 mL  10 mL Intravenous Q12H Delifn Navas MD       • sodium chloride 0.9 % flush 10 mL  10 mL Intravenous PRN Delfin Navas MD       • sulfaSALAzine (AZULFIDINE) tablet 1,000 mg  1,000 mg Oral TID Delfin Navas MD   1,000 mg at 11/06/20 0823   • terazosin (HYTRIN) capsule 10 mg  10 mg Oral Nightly Delfin Navas MD   10 mg at 11/06/20 0630       Allergies:  Patient has no known allergies.    Review of Systems  Review of Systems   Constitution: Positive for malaise/fatigue and weight gain. Negative for chills, diaphoresis and fever.   Eyes: Negative for visual disturbance.   Cardiovascular: Positive for chest pain, leg swelling and palpitations. Negative for syncope.   Respiratory: Negative for cough, shortness of breath and wheezing.    Musculoskeletal: Positive for arthritis.   Gastrointestinal: Negative for  "bloating, abdominal pain, nausea and vomiting.   Neurological: Negative for dizziness, focal weakness, headaches and light-headedness.   Psychiatric/Behavioral: Negative for altered mental status.       Objective     Physical Exam:  Patient Vitals for the past 24 hrs:   BP Temp Temp src Pulse Resp SpO2 Height Weight   11/06/20 0721 115/43 98.3 °F (36.8 °C) Oral 66 16 96 % -- --   11/06/20 0313 152/60 98.4 °F (36.9 °C) Oral 72 16 94 % 170.2 cm (67\") 69.1 kg (152 lb 4 oz)   11/06/20 0242 -- -- -- 101 -- 92 % -- --   11/06/20 0230 145/56 -- -- 98 -- 94 % -- --   11/06/20 0215 125/55 -- -- 89 -- 93 % -- --   11/06/20 0214 143/67 -- -- 88 -- -- -- --   11/06/20 0200 143/67 -- -- 94 -- 92 % -- --   11/06/20 0145 126/59 -- -- 97 -- 92 % -- --   11/06/20 0130 129/64 -- -- 85 -- 93 % -- --   11/06/20 0115 135/70 -- -- 83 -- 91 % -- --   11/06/20 0100 149/78 -- -- 92 -- 91 % -- --   11/06/20 0057 142/62 -- -- 94 -- -- -- --   11/06/20 0049 142/62 -- -- 93 -- 93 % -- --   11/06/20 0023 -- -- -- 109 -- 90 % -- --   11/06/20 0017 139/77 97.8 °F (36.6 °C) Oral 99 17 93 % 170.2 cm (67\") 65.8 kg (145 lb)     Vitals signs reviewed.   Constitutional:       Appearance: Well-developed and well-groomed. Chronically ill-appearing.   Neck:      Musculoskeletal: Normal range of motion and neck supple.   Pulmonary:      Effort: Pulmonary effort is normal.      Breath sounds: Normal breath sounds. No wheezing. No rhonchi. No rales.   Chest:      Chest wall: Tender to palpatation. Reproducing clinical pain.   Cardiovascular:      Normal rate. Regular rhythm. paced      Murmurs: There is a systolic murmur.      No click. No rub.   Edema:     Peripheral edema present.     Pretibial: bilateral 2+ pitting edema of the pretibial area.  Abdominal:      General: There is no distension.      Tenderness: There is no abdominal tenderness.   Skin:     General: Skin is warm and dry.   Neurological:      Mental Status: Alert and oriented to person, place " and time.         Results Review:   I reviewed the patient's new clinical results.    Lab Results (last 72 hours)     Procedure Component Value Units Date/Time    POC Glucose Once [[id number]]  (Abnormal) Collected: 11/06/20 0721    Specimen: Blood Updated: 11/06/20 0732     Glucose 197 mg/dL      Comment: : [id number] Aba Valero ID: [id number]       Comprehensive Metabolic Panel [[id number]]  (Abnormal) Collected: 11/06/20 0324    Specimen: Blood Updated: 11/06/20 0528     Glucose 253 mg/dL      BUN 48 mg/dL      Creatinine 4.40 mg/dL      Sodium 140 mmol/L      Potassium 4.4 mmol/L      Chloride 103 mmol/L      CO2 23.0 mmol/L      Calcium 8.8 mg/dL      Total Protein 6.5 g/dL      Albumin 3.80 g/dL      ALT (SGPT) 21 U/L      AST (SGOT) 26 U/L      Alkaline Phosphatase 226 U/L      Total Bilirubin 0.3 mg/dL      eGFR Non African Amer 13 mL/min/1.73      Comment: <15 Indicative of kidney failure.        eGFR   Amer --     Comment: <15 Indicative of kidney failure.        Globulin 2.7 gm/dL      A/G Ratio 1.4 g/dL      BUN/Creatinine Ratio 10.9     Anion Gap 14.0 mmol/L     Narrative:      GFR Normal >60  Chronic Kidney Disease <60  Kidney Failure <15      Digoxin Level [[id number]]  (Abnormal) Collected: 11/06/20 0324    Specimen: Blood Updated: 11/06/20 0507     Digoxin 1.90 ng/mL     CBC Auto Differential [[id number]]  (Abnormal) Collected: 11/06/20 0324    Specimen: Blood Updated: 11/06/20 0453     WBC 10.50 10*3/mm3      RBC 2.31 10*6/mm3      Hemoglobin 7.5 g/dL      Hematocrit 22.8 %      MCV 98.7 fL      MCH 32.5 pg      MCHC 32.9 g/dL      RDW 13.7 %      RDW-SD 49.1 fl      MPV 11.4 fL      Platelets 231 10*3/mm3      Neutrophil % 79.5 %      Lymphocyte % 11.3 %      Monocyte % 7.3 %      Eosinophil % 0.5 %      Basophil % 0.5 %      Immature Grans % 0.9 %      Neutrophils, Absolute 8.35 10*3/mm3      Lymphocytes, Absolute 1.19 10*3/mm3      Monocytes, Absolute 0.77 10*3/mm3       Eosinophils, Absolute 0.05 10*3/mm3      Basophils, Absolute 0.05 10*3/mm3      Immature Grans, Absolute 0.09 10*3/mm3      nRBC 0.0 /100 WBC     Troponin [[id number]]  (Abnormal) Collected: 11/06/20 0323    Specimen: Blood Updated: 11/06/20 0447     Troponin T 0.174 ng/mL     Narrative:      Troponin T Reference Range:  <= 0.03 ng/mL-   Negative for AMI  >0.03 ng/mL-     Abnormal for myocardial necrosis.  Clinicians would have to utilize clinical acumen, EKG, Troponin and serial changes to determine if it is an Acute Myocardial Infarction or myocardial injury due to an underlying chronic condition.       Results may be falsely decreased if patient taking Biotin.      Magnesium [[id number]]  (Normal) Collected: 11/06/20 0323    Specimen: Blood Updated: 11/06/20 0445     Magnesium 2.4 mg/dL     Phosphorus [[id number]]  (Normal) Collected: 11/06/20 0323    Specimen: Blood Updated: 11/06/20 0445     Phosphorus 3.6 mg/dL     TSH [[id number]]  (Abnormal) Collected: 11/06/20 0323    Specimen: Blood Updated: 11/06/20 0445     TSH 4.210 uIU/mL     T4, Free [[id number]]  (Normal) Collected: 11/06/20 0323    Specimen: Blood Updated: 11/06/20 0445     Free T4 1.09 ng/dL     Narrative:      Results may be falsely increased if patient taking Biotin.      Hemoglobin A1c [[id number]]  (Abnormal) Collected: 11/06/20 0026    Specimen: Blood Updated: 11/06/20 0432     Hemoglobin A1C 8.50 %     Narrative:      Hemoglobin A1C Ranges:    Increased Risk for Diabetes  5.7% to 6.4%  Diabetes                     >= 6.5%  Diabetic Goal                < 7.0%    COVID PRE-OP / PRE-PROCEDURE SCREENING ORDER (NO ISOLATION) - Swab, Nasal Cavity [[id number]]  (Normal) Collected: 11/06/20 0134    Specimen: Swab from Nasal Cavity Updated: 11/06/20 0200    Narrative:      The following orders were created for panel order COVID PRE-OP / PRE-PROCEDURE SCREENING ORDER (NO ISOLATION) - Swab, Nasal Cavity.  Procedure                               Abnormality          Status                     ---------                               -----------         ------                     COVID-19, ABBOTT IN-HOUS...[[id number]]  Normal              Final result                 Please view results for these tests on the individual orders.    COVID-19, ABBOTT IN-HOUSE,NP Swab (NO TRANSPORT MEDIA) 2 HR TAT - Swab, Nasal Cavity [[id number]]  (Normal) Collected: 11/06/20 0134    Specimen: Swab from Nasal Cavity Updated: 11/06/20 0200     COVID19 Not Detected    Narrative:      Fact sheet for providers: https://www.fda.gov/media/763716/download     Fact sheet for patients: https://www.fda.gov/media/561256/download    Troponin [[id number]]  (Abnormal) Collected: 11/06/20 0026    Specimen: Blood Updated: 11/06/20 0109     Troponin T 0.075 ng/mL     Narrative:      Troponin T Reference Range:  <= 0.03 ng/mL-   Negative for AMI  >0.03 ng/mL-     Abnormal for myocardial necrosis.  Clinicians would have to utilize clinical acumen, EKG, Troponin and serial changes to determine if it is an Acute Myocardial Infarction or myocardial injury due to an underlying chronic condition.       Results may be falsely decreased if patient taking Biotin.      Digoxin Level [[id number]]  (Abnormal) Collected: 11/06/20 0026    Specimen: Blood Updated: 11/06/20 0108     Digoxin 1.60 ng/mL     Basic Metabolic Panel [[id number]]  (Abnormal) Collected: 11/06/20 0026    Specimen: Blood Updated: 11/06/20 0105     Glucose 275 mg/dL      BUN 46 mg/dL      Creatinine 4.34 mg/dL      Sodium 139 mmol/L      Potassium 4.0 mmol/L      Chloride 100 mmol/L      CO2 25.0 mmol/L      Calcium 8.8 mg/dL      eGFR   Amer --     Comment: <15 Indicative of kidney failure.        eGFR Non African Amer 13 mL/min/1.73      Comment: <15 Indicative of kidney failure.        BUN/Creatinine Ratio 10.6     Anion Gap 14.0 mmol/L     Narrative:      GFR Normal >60  Chronic Kidney Disease <60  Kidney Failure <15      BNP [[id number]]  (Abnormal)  Collected: 11/06/20 0026    Specimen: Blood Updated: 11/06/20 0105     proBNP 15,223.0 pg/mL     Narrative:      Among patients with dyspnea, NT-proBNP is highly sensitive for the detection of acute congestive heart failure. In addition NT-proBNP of <300 pg/ml effectively rules out acute congestive heart failure with 99% negative predictive value.    Results may be falsely decreased if patient taking Biotin.      aPTT [[id number]]  (Normal) Collected: 11/06/20 0026    Specimen: Blood Updated: 11/06/20 0058     PTT 33.7 seconds     Protime-INR [[id number]]  (Abnormal) Collected: 11/06/20 0026    Specimen: Blood Updated: 11/06/20 0058     Protime 19.8 Seconds      INR 1.71    CBC & Differential [[id number]]  (Abnormal) Collected: 11/06/20 0026    Specimen: Blood Updated: 11/06/20 0052    Narrative:      The following orders were created for panel order CBC & Differential.  Procedure                               Abnormality         Status                     ---------                               -----------         ------                     CBC Auto Differential[[id number]]        Abnormal            Final result                 Please view results for these tests on the individual orders.    CBC Auto Differential [[id number]]  (Abnormal) Collected: 11/06/20 0026    Specimen: Blood Updated: 11/06/20 0052     WBC 10.71 10*3/mm3      RBC 2.35 10*6/mm3      Hemoglobin 7.8 g/dL      Hematocrit 23.1 %      MCV 98.3 fL      MCH 33.2 pg      MCHC 33.8 g/dL      RDW 13.9 %      RDW-SD 49.1 fl      MPV 10.3 fL      Platelets 229 10*3/mm3      Neutrophil % 87.3 %      Lymphocyte % 5.7 %      Monocyte % 5.4 %      Eosinophil % 0.3 %      Basophil % 0.6 %      Immature Grans % 0.7 %      Neutrophils, Absolute 9.36 10*3/mm3      Lymphocytes, Absolute 0.61 10*3/mm3      Monocytes, Absolute 0.58 10*3/mm3      Eosinophils, Absolute 0.03 10*3/mm3      Basophils, Absolute 0.06 10*3/mm3      Immature Grans, Absolute 0.07 10*3/mm3       nRBC 0.0 /100 WBC           Imaging Results (Last 72 Hours)     Procedure Component Value Units Date/Time    XR Chest 1 View [[id number]] Collected: 11/06/20 0646     Updated: 11/06/20 0649    Narrative:      Frontal upright radiograph of the chest 11/6/2020 1:10 AM CST     COMPARISON: None     HISTORY: Chest pain.     FINDINGS:   The lungs are clear. Cardiac silhouettes upper limits of normal. Pacing  device present soft tissues the left chest.      The osseous structures and surrounding soft tissues demonstrate no acute  abnormality.       Impression:      1. No radiographic evidence of acute cardiopulmonary process.        This report was finalized on 11/06/2020 06:46 by Dr. Zhang Dutton MD.        Results for orders placed during the hospital encounter of 07/09/20   Adult Transthoracic Echo Complete W/ Cont if Necessary Per Protocol    Narrative · Left atrial cavity size is moderately dilated.  · Moderate aortic valve stenosis is present.  · Left ventricular diastolic dysfunction.  · Left ventricular systolic function is normal.  · Mild tricuspid valve regurgitation is present.  · Mild mitral valve regurgitation is present     RHYTHM IS ATRIAL FIBRILLATION  LEFT ATRIAL ENLARGEMENT  MODERATE AORTIC VALVE STENOSIS  NORMAL LV AND RV SYSTOLIC FUNCTION  LV DIASTOLIC DYSFUNCTION  MODERATE PULMONARY HTN           Assessment     Chest pain: sharp, substernal, epigastric  Elevated troponin: trending, history of chronically elevated cardiac markers secondary to his ESRD and anemia  End stage Renal Failure: Creatinine 4.40  Anemia: Hemoglobin 7.5  Permanent atrial fibrillation: Controlled heart rates on telemetry  Chronic anticoagulation: With Eliquis  Hypertension: Well-controlled  Diabetes mellitus: On oral agents  Pacemaker in situ: Single-lead device  GERD: Stable  Ulcerative colitis: Stable  Hiatal Hernia: chronic condition  Mild aortic stenosis: noted on echo in July      Plan     The patient sought out treatment  due to new complaints of chest pain.  Cardiology was consulted due to heart failure.  Not felt the patient has any significant heart failure at this time.  He certainly has volume overload due to his end-stage renal disease but has no significant complaints regarding congestive heart failure, other than lower extremity edema.  His main concern is chest discomfort.  He reports that he had chest pain different from anything he has ever felt before.  He thought that this was related to his hiatal hernia as the pain occurred shortly after eating.  He now has reproducible pain on exam with significant guarding when pressing on his lower sternal border near his epigastric area at the midline of his chest.  He also reports a chest discomfort that he experienced this morning that improved with the use of nitroglycerin.  I am not sure what to make of his discomfort as it seems that he may be experiencing 2 different kinds of pain.  He is a history of an elevated troponin in the past due to his renal disease and anemia of chronic conditions.  Does not have a history of coronary artery disease.  We will start the patient on isosorbide mononitrate 30 mg daily as he noted some improvement after administration of this this morning.  Other recommendations regarding improvement of pain that could possibly related to his hiatal hernia from primary care.  Volume status will be managed by nephrology as the patient is preparing for dialysis in the near future.      - Isosorbide mononitrate 30 mg daily. Monitor symptoms.   - we will follow up on the patients symptoms again tomorrow.       Electronically signed by CUATE Ryan, 11/06/20, 9:47 AM CST.      Please note this cardiology consultation note is the result of a face to face consultation with the patient, in addition to reviewing medical records at length by myself, CUATE Ryan.       Time: 45 minutes

## 2020-11-06 NOTE — PROGRESS NOTES
[facility] Medicine Services  INPATIENT PROGRESS NOTE    Patient Name: Gage Ken  Date of Admission: 11/6/2020  Today's Date: 11/06/20  Length of Stay: 0  Primary Care Physician: Cayetano Kay MD    Subjective   Chief Complaint: Follow-up chest pain    HPI   Patient seen and admitted by my colleague Dr. Navas earlier this morning.  Reportedly admitted for acute on chronic heart failure.  However when I saw the patient he states he came in for chest pain.  This reportedly got better after nitro.  He denies any current shortness of breath.  Says he has not really urinated much after Lasix overnight.  Also note that his chest x-ray did not show any overt pulmonary edema or abnormalities.  He did have a positive troponin on arrival which has elevated on rechecks.  Again currently chest pain-free.        Review of Systems   All pertinent negatives and positives are as above. All other systems have been reviewed and are negative unless otherwise stated.     Objective    Temp:  [97.7 °F (36.5 °C)-98.4 °F (36.9 °C)] 97.7 °F (36.5 °C)  Heart Rate:  [] 61  Resp:  [16-17] 16  BP: (113-152)/(43-78) 113/47  Physical Exam  GEN: Awake, alert, interactive, in NAD  HEENT: EOMI, Anicteric, Trachea midline  Lungs: no overt wheezing or rales  Heart: irreg/irreg, +S1/s2, no rub  ABD: soft, nt/nd, +BS, no guarding/rebound  Extremities:  no cyanosis, 1+ LE edema b/l  Neuro: AAOx3, no focal deficits  Psych: normal mood & affect        Results Review:  I have reviewed the labs, radiology results, and diagnostic studies.    Laboratory Data:   Results from last 7 days   Lab Units 11/06/20  0324 11/06/20  0026   WBC 10*3/mm3 10.50 10.71   HEMOGLOBIN g/dL 7.5* 7.8*   HEMATOCRIT % 22.8* 23.1*   PLATELETS 10*3/mm3 231 229        Results from last 7 days   Lab Units 11/06/20  0324 11/06/20  0026   SODIUM mmol/L 140 139   POTASSIUM mmol/L 4.4 4.0   CHLORIDE mmol/L 103 100   CO2 mmol/L 23.0  25.0   BUN mg/dL 48* 46*   CREATININE mg/dL 4.40* 4.34*   CALCIUM mg/dL 8.8 8.8   BILIRUBIN mg/dL 0.3  --    ALK PHOS U/L 226*  --    ALT (SGPT) U/L 21  --    AST (SGOT) U/L 26  --    GLUCOSE mg/dL 253* 275*       Culture Data:   No results found for: BLOODCX, URINECX, WOUNDCX, MRSACX, RESPCX, STOOLCX    Radiology Data:   Imaging Results (Last 24 Hours)     Procedure Component Value Units Date/Time    XR Chest 1 View [[id number]] Collected: 11/06/20 0646     Updated: 11/06/20 0649    Narrative:      Frontal upright radiograph of the chest 11/6/2020 1:10 AM CST     COMPARISON: None     HISTORY: Chest pain.     FINDINGS:   The lungs are clear. Cardiac silhouettes upper limits of normal. Pacing  device present soft tissues the left chest.      The osseous structures and surrounding soft tissues demonstrate no acute  abnormality.       Impression:      1. No radiographic evidence of acute cardiopulmonary process.        This report was finalized on 11/06/2020 06:46 by Dr. Zhang Dutton MD.          I have reviewed the patient's current medications.     Assessment/Plan     Active Hospital Problems    Diagnosis   • **Chest pain, atypical   • Acute on chronic congestive heart failure (CMS/HCC)   • S/P placement of cardiac pacemaker   • Chronic atrial fibrillation (CMS/HCC)   • GERD (gastroesophageal reflux disease)   • Nausea   • Acute renal failure superimposed on chronic kidney disease (CMS/HCC)   • Hypertension   • Type 2 diabetes mellitus (CMS/HCC)       #1 chest pain -resolved.  History of CAD.  Does have an elevated troponin which is increasing but of unknown significance at this time given this has occurred in the setting of CKD 5, A. fib, possible volume overload.  Cardiology is evaluating patient.    #2 acute on chronic diastolic heart failure -per report.  Patient does not seem to be overtly in heart failure.  Has been given some Lasix.  He denies much output I saw him earlier but currently has -700 documented.   Continue to monitor I/O and daily weights.    #3 chronic A. Fib -rates were up on arrival.  Somewhat better now.  He is on metoprolol XL, Cardizem, and Eliquis.  Reportedly took a digoxin at home which we have held given his renal failure and elevated level.    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  Makes some urine.  Now has a fistula which is diet mature.  Do not see any emergent need for dialysis at this time.  Continue to monitor.  Seen by nephrology today.    #5 essential hypertension -blood pressure stable on current regimen.  Monitor.    #6 DM2 -sliding scale insulin and hypoglycemia protocol      Discharge Planning: Ongoing.  We will continue to follow up on specialist recommendations.  Hopefully home in the next 24 hours if stable and no other needs.    Electronically signed by Donavon Campbell DO, 11/06/20, 16:43 CST.

## 2020-11-06 NOTE — H&P
[facility] Medicine Services  HISTORY AND PHYSICAL    Date of Admission: 11/6/2020  Primary Care Physician: Cayetano Kay MD    Subjective     Chief Complaint: Chest pain shortness of breath rapid heart rate    History of Present Illness  Patient is a 80-year-old white male with known stage V kidney disease not currently on dialysis yet as well as chronic atrial fibrillation with a pacemaker, CHF with a preserved EF of 61 to 65% diastolic dysfunction and aortic stenosis.  He presents tonight with acute onset of substernal chest pain going down his right arm.  It started approximately 9 PM he finally checked his pulse and found it to be in the 130s and irregular.  He took a digoxin pill that he had and presented to the emergency room.  On presentation in the ER he was in A. fib with RVR rate is in the 1 teens.  He is currently partially paced with a rate in the 80s  .  His BNP is greater than 15,000 which is higher than it has been he also has a creatinine of 4.34 it was 3.8 the middle of October at [facility].  Troponin is also mildly elevated which is chronic for him as well.  Chest x-ray looks like component of failure he has had increased edema and shortness of breath.  Patient also states he had a worsening nausea and fatigue lately.  He had a fistulogram on Monday in preparation for dialysis.  He will be admitted to our services for further assessment of volume overload probably is a combination of end-stage renal disease and diastolic heart failure.  His dig levels also elevated.      Review of Systems   14 point review of systems negative except for as per HPI    Otherwise complete ROS reviewed and negative except as mentioned in the HPI.    Past Medical History:   Past Medical History:   Diagnosis Date   • Acute gastritis    • Blood in feces    • Carotid artery occlusion 12/2/2019   • Colitis, ulcerative chronic (CMS/HCC)     LEFT-SIDED   • Diverticular disease of colon     • Epigastric pain    • GERD (gastroesophageal reflux disease)    • History of colon polyps    • [place] (hard of hearing)    • Hyperlipidemia    • Hypertension    • Lesion of nose    • Neoplasm of uncertain behavior    • Nonspecific ulcerative proctitis (CMS/HCC)    • Rectal hemorrhage    • Renal disorder    • Type 2 diabetes mellitus (CMS/HCC)    • Vitamin B12 deficiency      Past Surgical History:  Past Surgical History:   Procedure Laterality Date   • COLONOSCOPY  12/02/2013    Hemorrhoids found.   • COLONOSCOPY  09/06/2016   • COLONOSCOPY N/A 10/30/2017    Procedure: COLONOSCOPY;  Surgeon: Alex Silveira MD;  Location: [facility] ENDOSCOPY;  Service:    • COLONOSCOPY N/A 11/6/2018    Procedure: COLONOSCOPY;  Surgeon: Alex Silveira MD;  Location: [facility] ENDOSCOPY;  Service: Gastroenterology   • COLONOSCOPY N/A 11/21/2019    Procedure: COLONOSCOPY;  Surgeon: Alex Silveira MD;  Location: [facility] ENDOSCOPY;  Service: Gastroenterology   • COLONOSCOPY W/ POLYPECTOMY  08/30/2015    Single polyp found in the colon;removed by cold snare polypectomy.Proctitis in the rectum.Diverticulosis found in the sigmoid colon.Hemorrhoids found.   • ENDOSCOPY  12/02/2013    Normal hypopharynx, esophagus, duodenum, symmetrical & patent pylorus. Hiatus hernia in GE junction. Normal stomach. Biopsy taken.   • HERNIA REPAIR      umbilical   • UPPER GASTROINTESTINAL ENDOSCOPY  12/02/2013     Social History:  reports that he has quit smoking. His smoking use included cigarettes. He quit smokeless tobacco use about 12 years ago.  His smokeless tobacco use included snuff. He reports current alcohol use of about 4.0 standard drinks of alcohol per week. He reports that he does not use drugs.    Family History: family history includes Diabetes in an other family member; Hypertension in an other family member; Stroke in his father and mother.       Allergies:  No Known Allergies  Medications:  Prior to Admission medications    Medication Sig  Start Date End Date Taking? Authorizing Provider   apixaban (ELIQUIS) 5 MG tablet tablet Take 5 mg by mouth 2 (Two) Times a Day.   Yes Geo Benítez MD   digoxin (LANOXIN) 125 MCG tablet Take 1 tablet by mouth Daily. 7/16/20  Yes Viet Timmons MD   dilTIAZem CD (CARDIZEM CD) 240 MG 24 hr capsule Take 240 mg by mouth 2 (Two) Times a Day.   Yes Geo Benítez MD   epoetin connie-epbx (Retacrit) 98818 UNIT/ML injection Inject 0.5 mL under the skin into the appropriate area as directed 3 (Three) Times a Week. Indications: Anemia associated with Chronic Kidney Failure 7/17/20  Yes Viet Timmons MD   finasteride (PROSCAR) 5 MG tablet Take 1 tablet by mouth Daily. 7/16/20  Yes Viet Timmons MD   folic acid (FOLVITE) 1 MG tablet Take 1 mg by mouth Daily.   Yes Geo Benítez MD   furosemide (LASIX) 40 MG tablet Take 40 mg by mouth Daily. 5/1/18  Yes Geo Benítez MD   glipiZIDE (GLUCOTROL) 10 MG tablet Take 1 tablet by mouth 2 (Two) Times a Day. 8/4/16  Yes Geo Benítez MD   hydrALAZINE (APRESOLINE) 50 MG tablet Take 1 tablet by mouth Every 8 (Eight) Hours. 7/16/20  Yes Viet Timmons MD   JANUVIA 50 MG tablet Take 50 mg by mouth Daily. 7/21/20  Yes Geo Benítez MD   metoprolol succinate XL (TOPROL-XL) 100 MG 24 hr tablet Take 100 mg by mouth 2 (two) times a day.   Yes Geo Benítez MD   omeprazole (priLOSEC) 40 MG capsule Take 40 mg by mouth Daily.   Yes Geo Benítez MD   allopurinol (ZYLOPRIM) 100 MG tablet Take 2 tablets by mouth Daily. 7/17/20   Viet Timmons MD   ondansetron ODT (ZOFRAN-ODT) 4 MG disintegrating tablet Place 4 mg on the tongue Every 6 (Six) Hours As Needed for Nausea or Vomiting.    Geo Benítez MD   pravastatin (PRAVACHOL) 20 MG tablet Take 1 tablet by mouth Every Night. 8/13/16   Geo Benítez MD   spironolactone (ALDACTONE) 25 MG tablet TAKE 25 MG EVERY DAY BY ORAL ROUTE IN THE  "MORNING FOR 30 DAYS. 8/13/20   ProviderGeo MD   sulfaSALAzine (AZULFIDINE) 500 MG tablet Take 2 tablets by mouth 3 (Three) Times a Day. 12/2/19   Skyler Lanegh CUATE GUERRA   terazosin (HYTRIN) 10 MG capsule Take 1 capsule by mouth Every Night. 8/30/16   Geo Benítez MD     Objective     Vital Signs: /56   Pulse 101   Temp 97.8 °F (36.6 °C) (Oral)   Resp 17   Ht 170.2 cm (67\")   Wt 65.8 kg (145 lb)   SpO2 92%   BMI 22.71 kg/m²   Physical Exam   Gen.:  Well-nourished well-developed white male in no acute distress  HEENT: Atraumatic, normocephalic.  Pupils equal, round, and reactive to light. Extraocular movements intact.  Sclerae anicteric.  External ears negative.  Mucous membranes moist.  Neck is supple without lymphadenopathy.  Positive JVD is noted.  No carotid bruits are auscultated.  Oropharynx is without erythema or exudate.   Chest: Rales in the bases  CV: Irregularly irregular rate and rhythm.  Normal S1-S2.  No gallops, 3/6 systolic ejection murmur left upper sternal border. or rubs.  Abdomen: Soft, nontender, nondistended.  Active bowel sounds,  No hepatosplenomegaly.  No masses.  No hernias.  Extremities: No clubbing, 3+ pitting edema, or cyanosis.  Capillary refill is normal.  Pulses are 2+ and symmetric at radial and dorsalis pedis.  Posterior tibial pulses are intact and 2+ palpable.  Neuro: Patient is awake, alert, and oriented ×3.  Cranial nerves II through XII are grossly intact.  Motor is 5 out of 5 bilaterally.  DTRs are 2+ and symmetric bilaterally. Sensory exam is nonfocal  Skin: Warm, dry, and intact.  No evidence of breakdown.  Sensorium: Normal thought and affect    Nursing notes and vital signs reviewed        Results Reviewed:  Lab Results (last 24 hours)     Procedure Component Value Units Date/Time    COVID PRE-OP / PRE-PROCEDURE SCREENING ORDER (NO ISOLATION) - Swab, Nasal Cavity [[id number]]  (Normal) Collected: 11/06/20 0134    Specimen: Swab from Nasal Cavity " Updated: 11/06/20 0200    Narrative:      The following orders were created for panel order COVID PRE-OP / PRE-PROCEDURE SCREENING ORDER (NO ISOLATION) - Swab, Nasal Cavity.  Procedure                               Abnormality         Status                     ---------                               -----------         ------                     COVID-19, ABBOTT IN-HOUS...[[id number]]  Normal              Final result                 Please view results for these tests on the individual orders.    COVID-19, ABBOTT IN-HOUSE,NP Swab (NO TRANSPORT MEDIA) 2 HR TAT - Swab, Nasal Cavity [[id number]]  (Normal) Collected: 11/06/20 0134    Specimen: Swab from Nasal Cavity Updated: 11/06/20 0200     COVID19 Not Detected    Narrative:      Fact sheet for providers: https://www.fda.gov/media/447446/download     Fact sheet for patients: https://www.fda.gov/media/312200/download    [place] Draw [[id number]] Collected: 11/06/20 0026    Specimen: Blood Updated: 11/06/20 0130    Narrative:      The following orders were created for panel order [place] Draw.  Procedure                               Abnormality         Status                     ---------                               -----------         ------                     Light Blue Top[[id number]]                                   Final result               Green Top (Gel)[[id number]]                                  Final result               Lavender Top[[id number]]                                     Final result               Red Top[[id number]]                                          Final result                 Please view results for these tests on the individual orders.    Light Blue Top [[id number]] Collected: 11/06/20 0026    Specimen: Blood Updated: 11/06/20 0130     Extra Tube hold for add-on     Comment: Auto resulted       Green Top (Gel) [[id number]] Collected: 11/06/20 0026    Specimen: Blood Updated: 11/06/20 0130     Extra Tube Hold for add-ons.     Comment: Auto  resulted.       Lavender Top [[id number]] Collected: 11/06/20 0026    Specimen: Blood Updated: 11/06/20 0130     Extra Tube hold for add-on     Comment: Auto resulted       Red Top [[id number]] Collected: 11/06/20 0026    Specimen: Blood Updated: 11/06/20 0130     Extra Tube Hold for add-ons.     Comment: Auto resulted.       Troponin [[id number]]  (Abnormal) Collected: 11/06/20 0026    Specimen: Blood Updated: 11/06/20 0109     Troponin T 0.075 ng/mL     Narrative:      Troponin T Reference Range:  <= 0.03 ng/mL-   Negative for AMI  >0.03 ng/mL-     Abnormal for myocardial necrosis.  Clinicians would have to utilize clinical acumen, EKG, Troponin and serial changes to determine if it is an Acute Myocardial Infarction or myocardial injury due to an underlying chronic condition.       Results may be falsely decreased if patient taking Biotin.      Digoxin Level [[id number]]  (Abnormal) Collected: 11/06/20 0026    Specimen: Blood Updated: 11/06/20 0108     Digoxin 1.60 ng/mL     Basic Metabolic Panel [[id number]]  (Abnormal) Collected: 11/06/20 0026    Specimen: Blood Updated: 11/06/20 0105     Glucose 275 mg/dL      BUN 46 mg/dL      Creatinine 4.34 mg/dL      Sodium 139 mmol/L      Potassium 4.0 mmol/L      Chloride 100 mmol/L      CO2 25.0 mmol/L      Calcium 8.8 mg/dL      eGFR   Amer --     Comment: <15 Indicative of kidney failure.        eGFR Non African Amer 13 mL/min/1.73      Comment: <15 Indicative of kidney failure.        BUN/Creatinine Ratio 10.6     Anion Gap 14.0 mmol/L     Narrative:      GFR Normal >60  Chronic Kidney Disease <60  Kidney Failure <15      BNP [[id number]]  (Abnormal) Collected: 11/06/20 0026    Specimen: Blood Updated: 11/06/20 0105     proBNP 15,223.0 pg/mL     Narrative:      Among patients with dyspnea, NT-proBNP is highly sensitive for the detection of acute congestive heart failure. In addition NT-proBNP of <300 pg/ml effectively rules out acute congestive heart failure with 99%  negative predictive value.    Results may be falsely decreased if patient taking Biotin.      aPTT [[id number]]  (Normal) Collected: 11/06/20 0026    Specimen: Blood Updated: 11/06/20 0058     PTT 33.7 seconds     Protime-INR [[id number]]  (Abnormal) Collected: 11/06/20 0026    Specimen: Blood Updated: 11/06/20 0058     Protime 19.8 Seconds      INR 1.71    CBC & Differential [[id number]]  (Abnormal) Collected: 11/06/20 0026    Specimen: Blood Updated: 11/06/20 0052    Narrative:      The following orders were created for panel order CBC & Differential.  Procedure                               Abnormality         Status                     ---------                               -----------         ------                     CBC Auto Differential[[id number]]        Abnormal            Final result                 Please view results for these tests on the individual orders.    CBC Auto Differential [[id number]]  (Abnormal) Collected: 11/06/20 0026    Specimen: Blood Updated: 11/06/20 0052     WBC 10.71 10*3/mm3      RBC 2.35 10*6/mm3      Hemoglobin 7.8 g/dL      Hematocrit 23.1 %      MCV 98.3 fL      MCH 33.2 pg      MCHC 33.8 g/dL      RDW 13.9 %      RDW-SD 49.1 fl      MPV 10.3 fL      Platelets 229 10*3/mm3      Neutrophil % 87.3 %      Lymphocyte % 5.7 %      Monocyte % 5.4 %      Eosinophil % 0.3 %      Basophil % 0.6 %      Immature Grans % 0.7 %      Neutrophils, Absolute 9.36 10*3/mm3      Lymphocytes, Absolute 0.61 10*3/mm3      Monocytes, Absolute 0.58 10*3/mm3      Eosinophils, Absolute 0.03 10*3/mm3      Basophils, Absolute 0.06 10*3/mm3      Immature Grans, Absolute 0.07 10*3/mm3      nRBC 0.0 /100 WBC         Imaging Results (Last 24 Hours)     Procedure Component Value Units Date/Time    XR Chest 1 View [[id number]] Resulted: 11/06/20 0113     Updated: 11/06/20 0113        I have personally reviewed and interpreted the radiology studies and ECG obtained at time of admission.     Assessment / Plan      Assessment:   Active Hospital Problems    Diagnosis   • Acute on chronic congestive heart failure (CMS/[facility])   • S/P placement of cardiac pacemaker   • Chronic atrial fibrillation (CMS/[facility])   • GERD (gastroesophageal reflux disease)   • Nausea   • Acute renal failure superimposed on chronic kidney disease (CMS/[facility])   • Hypertension   • Type 2 diabetes mellitus (CMS/[facility])   1.  Acute on chronic congestive heart failure diastolic admit patient monitor on telemetry trend troponins we will give patient IV Lasix to assist with diuresis.  Consult cardiology.  Interrogate pacemaker.  2.  A. fib rapid ventricular response rate now improved monitor on telemetry trend troponins diurese as above continue home medications for rate control.  Consult cards interrogate pacemaker.  We will also hold digoxin.  3.  Acute on chronic end-stage renal disease approaching dialysis will consult nephrology for recommendations.  Diurese with IV Lasix for now.  4.  Type 2 diabetes Accu-Chek sliding scale insulin check A1c.  5.  Anemia of chronic disease monitor transfuse as needed consult nephrology.  6.  Nausea likely related to worsening renal failure.  Consult nephrology.      Patient seen after midnight         Code Status: Full     I discussed the patient's findings and my recommendations with the patient.  He designates his son as his surrogate healthcare decision maker.    Estimated length of stay 2 nights.    Patient seen and examined by me on November 6, 2020 at 2:40 AM.    Electronically signed by Delfin Navas MD, 11/06/20, 03:09 CST.

## 2020-11-06 NOTE — PLAN OF CARE
Goal Outcome Evaluation:  Plan of Care Reviewed With: patient  Progress: no change  Outcome Summary: Pt admitted for CHF, IV bumex bid, nitro subling x1 with relief of CP, up with standby, continue to monitor for changes, CArdio and nephrology consults.

## 2020-11-06 NOTE — PROGRESS NOTES
Discharge Planning Assessment  [facility]     Patient Name: Gage Ken  MRN: [id number]  Today's Date: 11/6/2020    Admit Date: 11/6/2020    Discharge Needs Assessment     Row Name 11/06/20 1340       Living Environment    Lives With  alone    Unique Family Situation  Pt states his son lives in [place].    Current Living Arrangements  home/apartment/condo    Primary Care Provided by  self    Provides Primary Care For  no one    Quality of Family Relationships  involved;helpful;supportive       Resource/Environmental Concerns    Resource/Environmental Concerns  none    Transportation Concerns  other (see comments) Pt does not have a ride home.  SW can provide a cab voucher upon dc.       Transition Planning    Patient/Family Anticipates Transition to  home    Patient/Family Anticipated Services at Transition  none    Transportation Anticipated  family or friend will provide       Discharge Needs Assessment    Readmission Within the Last 30 Days  no previous admission in last 30 days    Equipment Currently Used at Home  -- Pt states he has a scale for home usage.    Concerns to be Addressed  no discharge needs identified    Anticipated Changes Related to Illness  none    Equipment Needed After Discharge  none    Current Discharge Risk  chronically ill;lives alone    Discharge Coordination/Progress  Pt has PCP and RX coverage.  Pt can afford medications.  Pt is open to home health if MD feels necessary.  Home health would be good for pt to monitor CHF.  SW will follow.        Discharge Plan    No documentation.       Continued Care and Services - Admitted Since 11/6/2020    Coordination has not been started for this encounter.         Demographic Summary    No documentation.       Functional Status    No documentation.       Psychosocial    No documentation.       Abuse/Neglect    No documentation.       Legal    No documentation.       Substance Abuse    No documentation.       Patient Forms    No  documentation.           YUNG AlemanW

## 2020-11-07 PROBLEM — N18.5 CKD (CHRONIC KIDNEY DISEASE) STAGE 5, GFR LESS THAN 15 ML/MIN (HCC): Chronic | Status: ACTIVE | Noted: 2020-01-01

## 2020-11-07 PROBLEM — E11.9 TYPE 2 DIABETES MELLITUS WITHOUT COMPLICATION, WITHOUT LONG-TERM CURRENT USE OF INSULIN (HCC): Chronic | Status: ACTIVE | Noted: 2018-05-08

## 2020-11-07 PROBLEM — N40.0 BENIGN PROSTATIC HYPERPLASIA: Chronic | Status: ACTIVE | Noted: 2018-07-27

## 2020-11-07 PROBLEM — Z79.01 CHRONIC ANTICOAGULATION: Chronic | Status: ACTIVE | Noted: 2019-05-09

## 2020-11-07 PROBLEM — I10 ESSENTIAL HYPERTENSION: Chronic | Status: ACTIVE | Noted: 2018-10-17

## 2020-11-07 PROBLEM — I48.20 CHRONIC ATRIAL FIBRILLATION (HCC): Chronic | Status: ACTIVE | Noted: 2020-01-01

## 2020-11-07 PROBLEM — I50.32 CHRONIC DIASTOLIC CONGESTIVE HEART FAILURE (HCC): Chronic | Status: ACTIVE | Noted: 2020-01-01

## 2020-11-07 PROBLEM — K21.9 GASTROESOPHAGEAL REFLUX DISEASE WITHOUT ESOPHAGITIS: Chronic | Status: ACTIVE | Noted: 2020-01-01

## 2020-11-07 NOTE — PROGRESS NOTES
[facility] Medicine Services  INPATIENT PROGRESS NOTE    Patient Name: Gage Ken  Date of Admission: 11/6/2020  Today's Date: 11/07/20  Length of Stay: 0  Primary Care Physician: Cayetano Kay MD    Subjective   Chief Complaint: Follow-up chest pain    HPI   Patient seen and examined.  He feels better today.  Denies any chest pain or shortness of breath.  No nausea or vomiting.  Denies any bright red blood per rectum.  Overnight his hemoglobin levels are down but again denies any bleeding.  No specific complaints at this time.  Says he has a manual colonoscopy and is about due for one that is due to his ulcerative colitis.  He says he also recently had an EGD done in [facility] in July because of his anemia which no acute issues or ulcers he states.  They are talking about doing a pill endoscopy on him at some point.        Review of Systems   All pertinent negatives and positives are as above. All other systems have been reviewed and are negative unless otherwise stated.     Objective    Temp:  [97.7 °F (36.5 °C)-98.6 °F (37 °C)] 98.1 °F (36.7 °C)  Heart Rate:  [61-70] 70  Resp:  [16] 16  BP: (111-132)/(46-60) 112/60  Physical Exam  GEN: Awake, alert, interactive, in NAD  HEENT: EOMI, Anicteric, Trachea midline  Lungs: no overt wheezing or rales  Heart: irreg/irreg, +S1/s2, no rub  ABD: soft, nt/nd, +BS, no guarding/rebound  Extremities:  no cyanosis, 1+ LE edema b/l  Neuro: AAOx3, no focal deficits  Psych: normal mood & affect        Results Review:  I have reviewed the labs, radiology results, and diagnostic studies.    Laboratory Data:   Results from last 7 days   Lab Units 11/07/20  0301 11/06/20 0324 11/06/20  0026   WBC 10*3/mm3 5.44 10.50 10.71   HEMOGLOBIN g/dL 6.6* 7.5* 7.8*   HEMATOCRIT % 20.7* 22.8* 23.1*   PLATELETS 10*3/mm3 209 231 229        Results from last 7 days   Lab Units 11/07/20  0301 11/06/20  0324 11/06/20  0026   SODIUM mmol/L 141 140  139   POTASSIUM mmol/L 4.2 4.4 4.0   CHLORIDE mmol/L 104 103 100   CO2 mmol/L 26.0 23.0 25.0   BUN mg/dL 50* 48* 46*   CREATININE mg/dL 4.46* 4.40* 4.34*   CALCIUM mg/dL 8.4* 8.8 8.8   BILIRUBIN mg/dL  --  0.3  --    ALK PHOS U/L  --  226*  --    ALT (SGPT) U/L  --  21  --    AST (SGOT) U/L  --  26  --    GLUCOSE mg/dL 76 253* 275*       Culture Data:   No results found for: BLOODCX, URINECX, WOUNDCX, MRSACX, RESPCX, STOOLCX    Radiology Data:   Imaging Results (Last 24 Hours)     ** No results found for the last 24 hours. **          I have reviewed the patient's current medications.     Assessment/Plan     Active Hospital Problems    Diagnosis   • **Chest pain, atypical   • Acute on chronic congestive heart failure (CMS/HCC)   • S/P placement of cardiac pacemaker   • Chronic atrial fibrillation (CMS/[facility])   • GERD (gastroesophageal reflux disease)   • Nausea   • Acute renal failure superimposed on chronic kidney disease (CMS/HCC)   • Hypertension   • Type 2 diabetes mellitus (CMS/[facility])       #1 chest pain -resolved.  History of CAD.  Does have an elevated troponin which is flat but of unknown significance at this time given this has occurred in the setting of CKD 5, A. fib, possible volume overload.  His chest pain has resolved at this point.  Patient cardiology input.  No further plans for inpatient work-up.    #2 acute on chronic diastolic heart failure -per report.  Patient does not seem to be overtly in heart failure.  Had some response to IV Lasix.  Likely be changed back to p.o. today.    #3 chronic A. Fib -rates were up on arrival.  Somewhat better now.  He is on metoprolol XL, Cardizem, and Eliquis.  Reportedly took a digoxin at home which we have held given his renal failure and elevated level.  Will DC Eliquis this morning given drop in H&H.    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  Makes some urine.  Now has a fistula which is not yet mature.  Do not see any emergent need for dialysis at this time.  Continue to  monitor.  Seen by nephrology    #5 essential hypertension -blood pressure stable on current regimen.  Monitor.    #6 DM2 -sliding scale insulin and hypoglycemia protocol    #7 anemia -acute on chronic.  Possibly in the setting of his kidney disease plus or minus bleeding.  Will check an occult stool and iron studies.  Patient denies any dark stools.  Gets annual colonoscopies.  Reportedly had a recent EGD with plans for a follow-up pill endoscopy which would lead me to believe it was a negative EGD.  We will try to request records.  Will give 1 unit PRBCs now.  Will hold Eliquis this morning and likely discontinue going forward.  We will discuss this with cardiology.    Discharge Planning: Ongoing.  Monitor here today given drop in blood counts.  Hopefully home tomorrow if stable and no other plans for specialists.    Electronically signed by Donavon Campbell DO, 11/07/20, 08:09 CST.

## 2020-11-07 NOTE — PROGRESS NOTES
"[facility] -  Progress Note     LOS: 0 days   Patient Care Team:  Cayetano Kay MD as PCP - General  Haley Lane APRN (Gastroenterology)  Haley Lane APRN (Gastroenterology)  Cayetano Kay MD as Referring Physician (Internal Medicine)  Alex Han MD as Consulting Physician (Otolaryngology)  Shaw Ag MD (Inactive) as Consulting Physician (Dermatology)    Chief Complaint: Chest pain    Subjective   Resting in bed. Denies any shortness of breath or shortness of breath. \"I feel great.\"      REVIEW OF SYSTEMS:  Constitutional: Denies fever or chills. Denies change in energy level or malaise.  HEENT: Denies headaches or visual disturbances. Denies difficulty swallowing or sore throat.  Respiratory: Denies cough or hoarseness. Denies shortness of breath.   Cardiovascular: Denies chest pain or pressure. Denies palpitations. Denies presyncope/syncope. Denies orthopnea/PND. Denies lower extremity edema.   Gastrointestinal: Denies abdominal pain. Denies nausea/vomiting. Denies change in bowel habits or history of recent GI tract blood loss.   Genitourinary: Denies urinary urgency or frequency. Denies dysuria, hematuria.  Musculoskeletal: Denies pain or swelling in joints.  Neurological: Denies paresthesias. Denies headache. Denies seizure or stroke symptoms.  Behavioral/Psych: Denies problems with anxiety or depression.    All other systems are negative except where stated above.      Objective     Vital Sign Min/Max for last 24 hours  Temp  Min: 97.7 °F (36.5 °C)  Max: 98.6 °F (37 °C)   BP  Min: 111/51  Max: 132/50   Pulse  Min: 61  Max: 76   Resp  Min: 14  Max: 16   SpO2  Min: 93 %  Max: 96 %   No data recorded   Weight  Min: 68.7 kg (151 lb 8 oz)  Max: 68.7 kg (151 lb 8 oz)         11/07/20  0405   Weight: 68.7 kg (151 lb 8 oz)         Intake/Output Summary (Last 24 hours) at 11/7/2020 0935  Last data filed at 11/7/2020 0824  Gross per 24 hour   Intake 600 ml   Output 1175 " ml   Net -575 ml         Physical Exam:  General Appearance: Alert and oriented x 3. No acute distress.  HEENT: Normocephalic. Atraumatic. NANCY.   Lungs: Clear bilateral breath sounds without wheezes or rhonchi.   Heart: Normal HT with RRR. 2/6 systolic murmur. No gallops or rubs.   Abdomen: Soft, non-tender with active bowel sounds x 4.   Extremities: No clubing or cyanosis. 1+ bilateral ankle edema. Pedal pulses palpated. Right AV fistula with palpable thrill and auscultated bruit.   Neurologic: Grossly intact.     Results Review:   Lab Results (last 72 hours)     Procedure Component Value Units Date/Time    POC Glucose Once [[id number]]  (Normal) Collected: 11/07/20 0827    Specimen: Blood Updated: 11/07/20 0847     Glucose 83 mg/dL      Comment: : [id number] Terence Lopezdeidra ID: [id number]       Iron Profile [[id number]]  (Abnormal) Collected: 11/07/20 0301    Specimen: Blood Updated: 11/07/20 0823     Iron 63 mcg/dL      Iron Saturation 32 %      Transferrin 132 mg/dL      TIBC 197 mcg/dL     CBC (No Diff) [[id number]]  (Abnormal) Collected: 11/07/20 0301    Specimen: Blood Updated: 11/07/20 0408     WBC 5.44 10*3/mm3      RBC 2.06 10*6/mm3      Hemoglobin 6.6 g/dL      Hematocrit 20.7 %      .5 fL      MCH 32.0 pg      MCHC 31.9 g/dL      RDW 14.0 %      RDW-SD 51.6 fl      MPV 11.2 fL      Platelets 209 10*3/mm3     Basic Metabolic Panel [[id number]]  (Abnormal) Collected: 11/07/20 0301    Specimen: Blood Updated: 11/07/20 0340     Glucose 76 mg/dL      BUN 50 mg/dL      Creatinine 4.46 mg/dL      Sodium 141 mmol/L      Potassium 4.2 mmol/L      Chloride 104 mmol/L      CO2 26.0 mmol/L      Calcium 8.4 mg/dL      eGFR   Amer --     Comment: <15 Indicative of kidney failure.        eGFR Non African Amer 13 mL/min/1.73      Comment: <15 Indicative of kidney failure.        BUN/Creatinine Ratio 11.2     Anion Gap 11.0 mmol/L     Narrative:      GFR Normal >60  Chronic Kidney Disease <60  Kidney  Failure <15      Phosphorus [[id number]]  (Normal) Collected: 11/07/20 0301    Specimen: Blood Updated: 11/07/20 0340     Phosphorus 3.2 mg/dL     Magnesium [[id number]]  (Abnormal) Collected: 11/07/20 0301    Specimen: Blood Updated: 11/07/20 0339     Magnesium 2.5 mg/dL     PTH, Intact [[id number]]  (Abnormal) Collected: 11/07/20 0301    Specimen: Blood Updated: 11/07/20 0337     PTH, Intact 100.9 pg/mL     Narrative:      Results may be falsely decreased if patient taking Biotin.      Vitamin D 25 Hydroxy [[id number]] Collected: 11/07/20 0301    Specimen: Blood Updated: 11/07/20 0313    Troponin [[id number]]  (Abnormal) Collected: 11/06/20 2355    Specimen: Blood Updated: 11/07/20 0034     Troponin T 0.305 ng/mL     Narrative:      Troponin T Reference Range:  <= 0.03 ng/mL-   Negative for AMI  >0.03 ng/mL-     Abnormal for myocardial necrosis.  Clinicians would have to utilize clinical acumen, EKG, Troponin and serial changes to determine if it is an Acute Myocardial Infarction or myocardial injury due to an underlying chronic condition.       Results may be falsely decreased if patient taking Biotin.      POC Glucose Once [[id number]]  (Normal) Collected: 11/06/20 1940    Specimen: Blood Updated: 11/06/20 1950     Glucose 128 mg/dL      Comment: : [id number] [facility] [id number]       Troponin [[id number]]  (Abnormal) Collected: 11/06/20 1751    Specimen: Blood Updated: 11/06/20 1817     Troponin T 0.323 ng/mL     Narrative:      Troponin T Reference Range:  <= 0.03 ng/mL-   Negative for AMI  >0.03 ng/mL-     Abnormal for myocardial necrosis.  Clinicians would have to utilize clinical acumen, EKG, Troponin and serial changes to determine if it is an Acute Myocardial Infarction or myocardial injury due to an underlying chronic condition.       Results may be falsely decreased if patient taking Biotin.      POC Glucose Once [[id number]]  (Abnormal) Collected: 11/06/20 1621    Specimen: Blood  Updated: 11/06/20 1631     Glucose 195 mg/dL      Comment: : [id number] Terence ElizondoMeter ID: [id number]       Troponin [[id number]]  (Abnormal) Collected: 11/06/20 1217    Specimen: Blood Updated: 11/06/20 1243     Troponin T 0.307 ng/mL     Narrative:      Troponin T Reference Range:  <= 0.03 ng/mL-   Negative for AMI  >0.03 ng/mL-     Abnormal for myocardial necrosis.  Clinicians would have to utilize clinical acumen, EKG, Troponin and serial changes to determine if it is an Acute Myocardial Infarction or myocardial injury due to an underlying chronic condition.       Results may be falsely decreased if patient taking Biotin.      POC Glucose Once [[id number]]  (Abnormal) Collected: 11/06/20 1050    Specimen: Blood Updated: 11/06/20 1105     Glucose 186 mg/dL      Comment: : [id number] Terence ElizondoMeter ID: [id number]       POC Glucose Once [[id number]]  (Abnormal) Collected: 11/06/20 0721    Specimen: Blood Updated: 11/06/20 0732     Glucose 197 mg/dL      Comment: : [id number] Aba Valero ID: [id number]       Comprehensive Metabolic Panel [[id number]]  (Abnormal) Collected: 11/06/20 0324    Specimen: Blood Updated: 11/06/20 0528     Glucose 253 mg/dL      BUN 48 mg/dL      Creatinine 4.40 mg/dL      Sodium 140 mmol/L      Potassium 4.4 mmol/L      Chloride 103 mmol/L      CO2 23.0 mmol/L      Calcium 8.8 mg/dL      Total Protein 6.5 g/dL      Albumin 3.80 g/dL      ALT (SGPT) 21 U/L      AST (SGOT) 26 U/L      Alkaline Phosphatase 226 U/L      Total Bilirubin 0.3 mg/dL      eGFR Non African Amer 13 mL/min/1.73      Comment: <15 Indicative of kidney failure.        eGFR   Amer --     Comment: <15 Indicative of kidney failure.        Globulin 2.7 gm/dL      A/G Ratio 1.4 g/dL      BUN/Creatinine Ratio 10.9     Anion Gap 14.0 mmol/L     Narrative:      GFR Normal >60  Chronic Kidney Disease <60  Kidney Failure <15      Digoxin Level [[id number]]  (Abnormal) Collected: 11/06/20 0324     Specimen: Blood Updated: 11/06/20 0507     Digoxin 1.90 ng/mL     CBC Auto Differential [[id number]]  (Abnormal) Collected: 11/06/20 0324    Specimen: Blood Updated: 11/06/20 0453     WBC 10.50 10*3/mm3      RBC 2.31 10*6/mm3      Hemoglobin 7.5 g/dL      Hematocrit 22.8 %      MCV 98.7 fL      MCH 32.5 pg      MCHC 32.9 g/dL      RDW 13.7 %      RDW-SD 49.1 fl      MPV 11.4 fL      Platelets 231 10*3/mm3      Neutrophil % 79.5 %      Lymphocyte % 11.3 %      Monocyte % 7.3 %      Eosinophil % 0.5 %      Basophil % 0.5 %      Immature Grans % 0.9 %      Neutrophils, Absolute 8.35 10*3/mm3      Lymphocytes, Absolute 1.19 10*3/mm3      Monocytes, Absolute 0.77 10*3/mm3      Eosinophils, Absolute 0.05 10*3/mm3      Basophils, Absolute 0.05 10*3/mm3      Immature Grans, Absolute 0.09 10*3/mm3      nRBC 0.0 /100 WBC     Troponin [[id number]]  (Abnormal) Collected: 11/06/20 0323    Specimen: Blood Updated: 11/06/20 0447     Troponin T 0.174 ng/mL     Narrative:      Troponin T Reference Range:  <= 0.03 ng/mL-   Negative for AMI  >0.03 ng/mL-     Abnormal for myocardial necrosis.  Clinicians would have to utilize clinical acumen, EKG, Troponin and serial changes to determine if it is an Acute Myocardial Infarction or myocardial injury due to an underlying chronic condition.       Results may be falsely decreased if patient taking Biotin.      Magnesium [[id number]]  (Normal) Collected: 11/06/20 0323    Specimen: Blood Updated: 11/06/20 0445     Magnesium 2.4 mg/dL     Phosphorus [[id number]]  (Normal) Collected: 11/06/20 0323    Specimen: Blood Updated: 11/06/20 0445     Phosphorus 3.6 mg/dL     TSH [[id number]]  (Abnormal) Collected: 11/06/20 0323    Specimen: Blood Updated: 11/06/20 0445     TSH 4.210 uIU/mL     T4, Free [[id number]]  (Normal) Collected: 11/06/20 0323    Specimen: Blood Updated: 11/06/20 0445     Free T4 1.09 ng/dL     Narrative:      Results may be falsely increased if patient taking Biotin.      Hemoglobin  A1c [[id number]]  (Abnormal) Collected: 11/06/20 0026    Specimen: Blood Updated: 11/06/20 0432     Hemoglobin A1C 8.50 %     Narrative:      Hemoglobin A1C Ranges:    Increased Risk for Diabetes  5.7% to 6.4%  Diabetes                     >= 6.5%  Diabetic Goal                < 7.0%    COVID PRE-OP / PRE-PROCEDURE SCREENING ORDER (NO ISOLATION) - Swab, Nasal Cavity [[id number]]  (Normal) Collected: 11/06/20 0134    Specimen: Swab from Nasal Cavity Updated: 11/06/20 0200    Narrative:      The following orders were created for panel order COVID PRE-OP / PRE-PROCEDURE SCREENING ORDER (NO ISOLATION) - Swab, Nasal Cavity.  Procedure                               Abnormality         Status                     ---------                               -----------         ------                     COVID-19, ABBOTT IN-HOUS...[[id number]]  Normal              Final result                 Please view results for these tests on the individual orders.    COVID-19, ABBOTT IN-HOUSE,NP Swab (NO TRANSPORT MEDIA) 2 HR TAT - Swab, Nasal Cavity [[id number]]  (Normal) Collected: 11/06/20 0134    Specimen: Swab from Nasal Cavity Updated: 11/06/20 0200     COVID19 Not Detected    Narrative:      Fact sheet for providers: https://www.fda.gov/media/281796/download     Fact sheet for patients: https://www.fda.gov/media/032734/download    [place] Draw [[id number]] Collected: 11/06/20 0026    Specimen: Blood Updated: 11/06/20 0130    Narrative:      The following orders were created for panel order [place] Draw.  Procedure                               Abnormality         Status                     ---------                               -----------         ------                     Light Blue Top[[id number]]                                   Final result               Green Top (Gel)[[id number]]                                  Final result               Lavender Top[[id number]]                                     Final result               Red  Top[[id number]]                                          Final result                 Please view results for these tests on the individual orders.    Light Blue Top [[id number]] Collected: 11/06/20 0026    Specimen: Blood Updated: 11/06/20 0130     Extra Tube hold for add-on     Comment: Auto resulted       Green Top (Gel) [[id number]] Collected: 11/06/20 0026    Specimen: Blood Updated: 11/06/20 0130     Extra Tube Hold for add-ons.     Comment: Auto resulted.       Lavender Top [[id number]] Collected: 11/06/20 0026    Specimen: Blood Updated: 11/06/20 0130     Extra Tube hold for add-on     Comment: Auto resulted       Red Top [[id number]] Collected: 11/06/20 0026    Specimen: Blood Updated: 11/06/20 0130     Extra Tube Hold for add-ons.     Comment: Auto resulted.       Troponin [[id number]]  (Abnormal) Collected: 11/06/20 0026    Specimen: Blood Updated: 11/06/20 0109     Troponin T 0.075 ng/mL     Narrative:      Troponin T Reference Range:  <= 0.03 ng/mL-   Negative for AMI  >0.03 ng/mL-     Abnormal for myocardial necrosis.  Clinicians would have to utilize clinical acumen, EKG, Troponin and serial changes to determine if it is an Acute Myocardial Infarction or myocardial injury due to an underlying chronic condition.       Results may be falsely decreased if patient taking Biotin.      Digoxin Level [[id number]]  (Abnormal) Collected: 11/06/20 0026    Specimen: Blood Updated: 11/06/20 0108     Digoxin 1.60 ng/mL     Basic Metabolic Panel [[id number]]  (Abnormal) Collected: 11/06/20 0026    Specimen: Blood Updated: 11/06/20 0105     Glucose 275 mg/dL      BUN 46 mg/dL      Creatinine 4.34 mg/dL      Sodium 139 mmol/L      Potassium 4.0 mmol/L      Chloride 100 mmol/L      CO2 25.0 mmol/L      Calcium 8.8 mg/dL      eGFR   Amer --     Comment: <15 Indicative of kidney failure.        eGFR Non African Amer 13 mL/min/1.73      Comment: <15 Indicative of kidney failure.        BUN/Creatinine Ratio 10.6     Anion  Gap 14.0 mmol/L     Narrative:      GFR Normal >60  Chronic Kidney Disease <60  Kidney Failure <15      BNP [[id number]]  (Abnormal) Collected: 11/06/20 0026    Specimen: Blood Updated: 11/06/20 0105     proBNP 15,223.0 pg/mL     Narrative:      Among patients with dyspnea, NT-proBNP is highly sensitive for the detection of acute congestive heart failure. In addition NT-proBNP of <300 pg/ml effectively rules out acute congestive heart failure with 99% negative predictive value.    Results may be falsely decreased if patient taking Biotin.      aPTT [[id number]]  (Normal) Collected: 11/06/20 0026    Specimen: Blood Updated: 11/06/20 0058     PTT 33.7 seconds     Protime-INR [[id number]]  (Abnormal) Collected: 11/06/20 0026    Specimen: Blood Updated: 11/06/20 0058     Protime 19.8 Seconds      INR 1.71    CBC & Differential [[id number]]  (Abnormal) Collected: 11/06/20 0026    Specimen: Blood Updated: 11/06/20 0052    Narrative:      The following orders were created for panel order CBC & Differential.  Procedure                               Abnormality         Status                     ---------                               -----------         ------                     CBC Auto Differential[[id number]]        Abnormal            Final result                 Please view results for these tests on the individual orders.    CBC Auto Differential [[id number]]  (Abnormal) Collected: 11/06/20 0026    Specimen: Blood Updated: 11/06/20 0052     WBC 10.71 10*3/mm3      RBC 2.35 10*6/mm3      Hemoglobin 7.8 g/dL      Hematocrit 23.1 %      MCV 98.3 fL      MCH 33.2 pg      MCHC 33.8 g/dL      RDW 13.9 %      RDW-SD 49.1 fl      MPV 10.3 fL      Platelets 229 10*3/mm3      Neutrophil % 87.3 %      Lymphocyte % 5.7 %      Monocyte % 5.4 %      Eosinophil % 0.3 %      Basophil % 0.6 %      Immature Grans % 0.7 %      Neutrophils, Absolute 9.36 10*3/mm3      Lymphocytes, Absolute 0.61 10*3/mm3      Monocytes, Absolute 0.58 10*3/mm3       Eosinophils, Absolute 0.03 10*3/mm3      Basophils, Absolute 0.06 10*3/mm3      Immature Grans, Absolute 0.07 10*3/mm3      nRBC 0.0 /100 WBC               2D Echocardiogram (07/10/2020):  · Left atrial cavity size is moderately dilated.  · Moderate aortic valve stenosis is present.  · Left ventricular diastolic dysfunction.  · Left ventricular systolic function is normal.  · Mild tricuspid valve regurgitation is present.  · Mild mitral valve regurgitation is present     RHYTHM IS ATRIAL FIBRILLATION  LEFT ATRIAL ENLARGEMENT  MODERATE AORTIC VALVE STENOSIS  NORMAL LV AND RV SYSTOLIC FUNCTION  LV DIASTOLIC DYSFUNCTION  MODERATE PULMONARY HTN      Medication Review: yes  Current Facility-Administered Medications   Medication Dose Route Frequency Provider Last Rate Last Dose   • acetaminophen (TYLENOL) tablet 650 mg  650 mg Oral Q4H PRN Delfin Navas MD        Or   • acetaminophen (TYLENOL) 160 MG/5ML solution 650 mg  650 mg Oral Q4H PRN Delfin Navas MD        Or   • acetaminophen (TYLENOL) suppository 650 mg  650 mg Rectal Q4H PRN Delfin Navas MD       • allopurinol (ZYLOPRIM) tablet 200 mg  200 mg Oral Daily Delfin Navas MD   200 mg at 11/07/20 0834   • apixaban (ELIQUIS) tablet 5 mg  5 mg Oral Q12H Delfin Navas MD   5 mg at 11/06/20 2151   • dextrose (D50W) 25 g/ 50mL Intravenous Solution 25 g  25 g Intravenous Q15 Min PRN Delfin Navas MD       • dextrose (GLUTOSE) oral gel 15 g  15 g Oral Q15 Min PRN Delfin Navas MD       • dilTIAZem CD (CARDIZEM CD) 24 hr capsule 240 mg  240 mg Oral BID Delfin Navas MD   240 mg at 11/07/20 0834   • epoetin connie-epbx (RETACRIT) 5,000 Units  5,000 Units Subcutaneous 3x Weekly Delfin Navas MD   Stopped at 11/06/20 1332   • finasteride (PROSCAR) tablet 5 mg  5 mg Oral Daily Delfin Navas MD   5 mg at 11/07/20 0834   • folic acid (FOLVITE) tablet 1 mg  1 mg Oral Daily Delfin Navas MD   1 mg at 11/07/20 0835   •  furosemide (LASIX) injection 80 mg  80 mg Intravenous Q12H Delfin Navas MD   80 mg at 11/07/20 0725   • glipizide (GLUCOTROL) tablet 10 mg  10 mg Oral BID AC Delfin Navas MD   10 mg at 11/07/20 0834   • glucagon (human recombinant) (GLUCAGEN DIAGNOSTIC) injection 1 mg  1 mg Subcutaneous Q15 Min PRN Delfin Navas MD       • hydrALAZINE (APRESOLINE) tablet 50 mg  50 mg Oral Q12H Donavon Campbell DO   50 mg at 11/07/20 0835   • insulin lispro (humaLOG) injection 2-9 Units  2-9 Units Subcutaneous TID AC Delfin Navas MD   2 Units at 11/06/20 1643   • isosorbide mononitrate (IMDUR) 24 hr tablet 30 mg  30 mg Oral Q24H Sravanthi Waldron APRN   30 mg at 11/07/20 0835   • linagliptin (TRADJENTA) tablet 5 mg  5 mg Oral Daily Delfin Navas MD   5 mg at 11/06/20 0823   • metoprolol succinate XL (TOPROL-XL) 24 hr tablet 100 mg  100 mg Oral BID Delfin Navas MD   100 mg at 11/07/20 0835   • nitroglycerin (NITROSTAT) SL tablet 0.4 mg  0.4 mg Sublingual Q5 Min PRN Delfin Navas MD   0.4 mg at 11/06/20 0353   • ondansetron (ZOFRAN) tablet 4 mg  4 mg Oral Q6H PRN Delfin Navas MD        Or   • ondansetron (ZOFRAN) injection 4 mg  4 mg Intravenous Q6H PRN Delfin Navas MD       • pantoprazole (PROTONIX) EC tablet 40 mg  40 mg Oral QAM Delfin Navas MD   40 mg at 11/07/20 0725   • pravastatin (PRAVACHOL) tablet 20 mg  20 mg Oral Nightly Delfin Navas MD   20 mg at 11/06/20 2152   • sodium chloride 0.9 % flush 10 mL  10 mL Intravenous Q12H Delfin Navas MD   10 mL at 11/06/20 2152   • sodium chloride 0.9 % flush 10 mL  10 mL Intravenous PRN Delfin Navas MD       • sulfaSALAzine (AZULFIDINE) tablet 1,000 mg  1,000 mg Oral TID Delfin Navas MD   1,000 mg at 11/07/20 0836   • terazosin (HYTRIN) capsule 10 mg  10 mg Oral Nightly Delfin Navas MD   10 mg at 11/06/20 2152   • zolpidem (AMBIEN) tablet 5 mg  5 mg Oral Nightly PRN Blaine Palumbo DO   5 mg at  11/06/20 2341         Assessment/Plan       Chest pain, atypical, Resolved. Unclear etiology.     Chronic atrial fibrillation (CMS/HCC) with SSS. S/P Pacemaker - currently V-paced. Chronic anticoagulation with Eliquis.     Essential hypertension - Controlled.    Type 2 diabetes mellitus without complication, without long-term current use of insulin (CMS/[facility]) - per Hospitalist    CKD (chronic kidney disease) stage 5, GFR less than 15 ml/min (CMS/[facility]) - Hx Right AV Fistula with recent fistulagram with angioplasty and coil embolization to cephalic vein branch on 11/02/2020 by Dr. Major Martinez. Followed by Nephrology.     Chronic diastolic congestive heart failure (CMS/HCC) - Appears euvolemic. On IV diuretic. BB.     Benign prostatic hyperplasia - on Proscar    Gastroesophageal reflux disease without esophagitis - on PPI    Acute Blood Loss Anemia with Underlying Chronic Anemia - no identifiable source. Does have history of ulcerative colitis, but denies any dark stools.      Will discontinue Eliquis. D/C IV diuretic and resume oral diuretic. Will need cardiology follow-up with Dr. Mariano to discuss need of anticoagulation or possible Watchman.       Nehal Meraz, APRN  11/07/20  09:35 CST

## 2020-11-08 NOTE — PLAN OF CARE
Goal Outcome Evaluation:  Plan of Care Reviewed With: patient  Progress: improving  Outcome Summary: patient got one unit of blood today for hgb 6.6, now its 8.5, patient has no pain, up ad zeb, awaiting bm for ob stool to send down(supplies in room), cardio dc'd eliquis and he will f/u with García in future for poss watchman, po lasix, creat 4.46, BUN 50 GFR 13, right arm fistula not mature for a month, will need permacath if dialysis needed, maybe home tomorrow or monday. Of note, patient lives by himself and had a bag packed when he called EMS but they did not retrieve bag like he asked so his keys are locked in house.  His son lives several hours away and he does not have a ride home or a way to open the door once he gets home.   /af 60-74 on tele

## 2020-11-08 NOTE — PROGRESS NOTES
Nephrology ([facility]) Progress Note      Patient:  Gage Ken  YOB: 1940  Date of Service: 11/7/2020  MRN: [id number]   Acct: [id number]   Primary Care Physician: Cayetano Kay MD  Advance Directive:   Code Status and Medical Interventions:   Ordered at: 11/06/20 0309     Level Of Support Discussed With:    Patient     Code Status:    CPR     Medical Interventions (Level of Support Prior to Arrest):    Full     Admit Date: 11/6/2020       Hospital Day: 0  Referring Provider: Delfin Navas MD      Patient personally seen and examined.  Complete chart including Consults, Notes, Operative Reports, Labs, Cardiology, and Radiology studies reviewed as able.        Subjective:  Gage Ken is a 80 y.o. male  whom we were consulted for acute kidney injury.  Patient is well known to our practice with baseline chronic kidney disease stage 5. Most recent office visit was 10/2 and creatinine was 3.9 at that time.  History of type 2 diabetes, hypertension, chronic atrial fibrillation with recent pacemaker placement.  He recently had AV fistula placed by Dr Martinez at [facility] in preparation for dialysis. On 11/2, he underwent a fistulogram and Dr Martinez indicated at that time that he estimated fistula would be ready for use in four weeks.  Patient presented to hospital via EMS with complaint of chest pain and was admitted for further evaluation. Over the last few days he has noted increasing lower extremity edema and dyspnea. Denies any recent changes in urine output. Denies vomiting or diarrhea but does endorse nausea and poor appetite.  He was given IV Lasix overnight and he feels that his swelling and breathing have improved.  Adequate UOP and was on room air.    Today, no events. Patient anxious for discharge. Denied chest pain, shortness of air, nausea vomiting. No bleeding sources recalled.    Allergies:  Patient has no known allergies.    Home  Meds:  Medications Prior to Admission   Medication Sig Dispense Refill Last Dose   • apixaban (ELIQUIS) 5 MG tablet tablet Take 5 mg by mouth 2 (Two) Times a Day.   11/6/2020 at Unknown time   • digoxin (LANOXIN) 125 MCG tablet Take 1 tablet by mouth Daily. 30 tablet 0 11/6/2020 at Unknown time   • dilTIAZem CD (CARDIZEM CD) 240 MG 24 hr capsule Take 240 mg by mouth 2 (Two) Times a Day.   11/6/2020 at Unknown time   • epoetin connie-epbx (Retacrit) 70807 UNIT/ML injection Inject 0.5 mL under the skin into the appropriate area as directed 3 (Three) Times a Week. Indications: Anemia associated with Chronic Kidney Failure 6.6 mL 0 11/6/2020 at Unknown time   • finasteride (PROSCAR) 5 MG tablet Take 1 tablet by mouth Daily. 30 tablet 0 11/6/2020 at Unknown time   • folic acid (FOLVITE) 1 MG tablet Take 1 mg by mouth Daily.   11/6/2020 at Unknown time   • furosemide (LASIX) 40 MG tablet Take 60 mg by mouth Daily.  2 11/6/2020 at Unknown time   • glipiZIDE (GLUCOTROL) 10 MG tablet Take 1 tablet by mouth 2 (Two) Times a Day.  3 11/6/2020 at Unknown time   • hydrALAZINE (APRESOLINE) 50 MG tablet Take 50 mg by mouth 2 (two) times a day.      • metoprolol succinate XL (TOPROL-XL) 100 MG 24 hr tablet Take 100 mg by mouth 2 (two) times a day.   11/6/2020 at Unknown time   • omeprazole (priLOSEC) 20 MG capsule Take 20 mg by mouth 2 (two) times a day.   11/6/2020 at Unknown time   • allopurinol (ZYLOPRIM) 100 MG tablet Take 2 tablets by mouth Daily. 30 tablet 0 More than a month at Unknown time   • ondansetron ODT (ZOFRAN-ODT) 4 MG disintegrating tablet Place 4 mg on the tongue Every 6 (Six) Hours As Needed for Nausea or Vomiting.      • pravastatin (PRAVACHOL) 20 MG tablet Take 1 tablet by mouth Every Night.  2    • spironolactone (ALDACTONE) 25 MG tablet Take 25 mg by mouth Daily.      • sulfaSALAzine (AZULFIDINE) 500 MG tablet Take 2 tablets by mouth 3 (Three) Times a Day. 360 tablet 1    • terazosin (HYTRIN) 5 MG capsule Take 1  capsule by mouth 2 (two) times a day.  2        Medicines:  Current Facility-Administered Medications   Medication Dose Route Frequency Provider Last Rate Last Dose   • acetaminophen (TYLENOL) tablet 650 mg  650 mg Oral Q4H PRN Delfin Navas MD        Or   • acetaminophen (TYLENOL) 160 MG/5ML solution 650 mg  650 mg Oral Q4H PRN Delfin Navas MD        Or   • acetaminophen (TYLENOL) suppository 650 mg  650 mg Rectal Q4H PRN Delfin Navas MD       • allopurinol (ZYLOPRIM) tablet 200 mg  200 mg Oral Daily Delfin Navas MD   200 mg at 11/07/20 0834   • aspirin EC tablet 81 mg  81 mg Oral Daily Donavon Hicks MD   81 mg at 11/07/20 1134   • dextrose (D50W) 25 g/ 50mL Intravenous Solution 25 g  25 g Intravenous Q15 Min PRN Delfin Navas MD       • dextrose (GLUTOSE) oral gel 15 g  15 g Oral Q15 Min PRN Delfin Navas MD       • dilTIAZem CD (CARDIZEM CD) 24 hr capsule 240 mg  240 mg Oral BID Delfin Navas MD   240 mg at 11/07/20 0834   • epoetin connie-epbx (RETACRIT) 5,000 Units  5,000 Units Subcutaneous 3x Weekly Delfin Navas MD   Stopped at 11/06/20 1332   • finasteride (PROSCAR) tablet 5 mg  5 mg Oral Daily Delfin Navas MD   5 mg at 11/07/20 0834   • folic acid (FOLVITE) tablet 1 mg  1 mg Oral Daily Delfin Navas MD   1 mg at 11/07/20 0835   • [START ON 11/8/2020] furosemide (LASIX) tablet 60 mg  60 mg Oral Daily Nehal Meraz APRN       • glipizide (GLUCOTROL) tablet 10 mg  10 mg Oral BID AC Delfin Navas MD   10 mg at 11/07/20 1711   • glucagon (human recombinant) (GLUCAGEN DIAGNOSTIC) injection 1 mg  1 mg Subcutaneous Q15 Min PRN Delfin Navas MD       • hydrALAZINE (APRESOLINE) tablet 50 mg  50 mg Oral Q12H Donavon Campbell DO   50 mg at 11/07/20 0835   • insulin lispro (humaLOG) injection 2-9 Units  2-9 Units Subcutaneous TID AC Delfin Navas MD   2 Units at 11/07/20 1129   • isosorbide mononitrate (IMDUR) 24 hr tablet 30 mg  30 mg  Oral Q24H Sravanthi Waldron APRN   30 mg at 11/07/20 0835   • linagliptin (TRADJENTA) tablet 5 mg  5 mg Oral Daily Delfin Navas MD   5 mg at 11/07/20 1131   • metoprolol succinate XL (TOPROL-XL) 24 hr tablet 100 mg  100 mg Oral BID Delfin Navas MD   100 mg at 11/07/20 0835   • nitroglycerin (NITROSTAT) SL tablet 0.4 mg  0.4 mg Sublingual Q5 Min PRN Delfin Navas MD   0.4 mg at 11/06/20 0353   • ondansetron (ZOFRAN) tablet 4 mg  4 mg Oral Q6H PRN Delfin Navas MD        Or   • ondansetron (ZOFRAN) injection 4 mg  4 mg Intravenous Q6H PRN Delfin Navas MD       • pantoprazole (PROTONIX) EC tablet 40 mg  40 mg Oral QAM Delfin Navas MD   40 mg at 11/07/20 0725   • pravastatin (PRAVACHOL) tablet 20 mg  20 mg Oral Nightly Delfin Navas MD   20 mg at 11/06/20 2152   • sodium chloride 0.9 % flush 10 mL  10 mL Intravenous Q12H Delfin Navas MD   10 mL at 11/06/20 2152   • sodium chloride 0.9 % flush 10 mL  10 mL Intravenous PRN Delfin Navas MD       • sulfaSALAzine (AZULFIDINE) tablet 1,000 mg  1,000 mg Oral TID Delfin Navas MD   1,000 mg at 11/07/20 1712   • terazosin (HYTRIN) capsule 10 mg  10 mg Oral Nightly Delfin Navas MD   10 mg at 11/06/20 2152   • zolpidem (AMBIEN) tablet 5 mg  5 mg Oral Nightly PRN Blaine Palumbo DO   5 mg at 11/06/20 2341       Past Medical History:  Past Medical History:   Diagnosis Date   • Acute gastritis    • Atrial fibrillation (CMS/HCC)    • Blood in feces    • Carotid artery occlusion 12/2/2019   • Colitis, ulcerative chronic (CMS/HCC)     LEFT-SIDED   • Diverticular disease of colon    • Epigastric pain    • GERD (gastroesophageal reflux disease)    • History of colon polyps    • [place] (hard of hearing)    • Hyperlipidemia    • Hypertension    • Lesion of nose    • Neoplasm of uncertain behavior    • Nonspecific ulcerative proctitis (CMS/HCC)    • Rectal hemorrhage    • Renal disorder    • Type 2 diabetes mellitus (CMS/HCC)    •  Vitamin B12 deficiency        Past Surgical History:  Past Surgical History:   Procedure Laterality Date   • COLONOSCOPY  12/02/2013    Hemorrhoids found.   • COLONOSCOPY  09/06/2016   • COLONOSCOPY N/A 10/30/2017    Procedure: COLONOSCOPY;  Surgeon: Alex Silveira MD;  Location: [facility] ENDOSCOPY;  Service:    • COLONOSCOPY N/A 11/6/2018    Procedure: COLONOSCOPY;  Surgeon: Alex Silveira MD;  Location: [facility] ENDOSCOPY;  Service: Gastroenterology   • COLONOSCOPY N/A 11/21/2019    Procedure: COLONOSCOPY;  Surgeon: Alex Silveira MD;  Location: [facility] ENDOSCOPY;  Service: Gastroenterology   • COLONOSCOPY W/ POLYPECTOMY  08/30/2015    Single polyp found in the colon;removed by cold snare polypectomy.Proctitis in the rectum.Diverticulosis found in the sigmoid colon.Hemorrhoids found.   • ENDOSCOPY  12/02/2013    Normal hypopharynx, esophagus, duodenum, symmetrical & patent pylorus. Hiatus hernia in GE junction. Normal stomach. Biopsy taken.   • HERNIA REPAIR      umbilical   • INSERT / REPLACE / REMOVE PACEMAKER     • UPPER GASTROINTESTINAL ENDOSCOPY  12/02/2013       Family History  Family History   Problem Relation Age of Onset   • Diabetes Other    • Hypertension Other    • Stroke Mother    • Stroke Father        Social History  Social History     Socioeconomic History   • Marital status: Single     Spouse name: Not on file   • Number of children: Not on file   • Years of education: Not on file   • Highest education level: Not on file   Tobacco Use   • Smoking status: Former Smoker     Types: Cigarettes   • Smokeless tobacco: Former User     Types: Snuff     Quit date: 2008   • Tobacco comment: 11/30/2017 - Patient States He Ceased Smoking 13 Years Prior.   Substance and Sexual Activity   • Alcohol use: Yes     Alcohol/week: 4.0 standard drinks     Types: 4 Cans of beer per week     Comment: occasional   • Drug use: No   • Sexual activity: Defer         Review of Systems:  History obtained from chart review and  the patient  General ROS: No fever or chills  Respiratory ROS: No cough, shortness of breath, wheezing  Cardiovascular ROS: No chest pain or palpitations  Gastrointestinal ROS: No abdominal pain or melena  Genito-Urinary ROS: No dysuria or hematuria    Objective:  Patient Vitals for the past 24 hrs:   BP Temp Temp src Pulse Resp SpO2 Weight   11/07/20 1635 131/54 98 °F (36.7 °C) Oral 65 14 97 % --   11/07/20 1320 116/72 97.5 °F (36.4 °C) -- 69 16 99 % --   11/07/20 1050 127/48 98.2 °F (36.8 °C) Axillary 69 14 96 % --   11/07/20 1038 132/47 98.2 °F (36.8 °C) -- 66 14 97 % --   11/07/20 0949 138/46 98.1 °F (36.7 °C) Axillary 62 14 96 % --   11/07/20 0859 131/50 98.4 °F (36.9 °C) Axillary 76 14 96 % --   11/07/20 0405 112/60 98.1 °F (36.7 °C) Oral 70 16 93 % 68.7 kg (151 lb 8 oz)   11/06/20 2330 111/51 98.6 °F (37 °C) Oral 67 16 94 % --   11/06/20 1935 119/46 98.3 °F (36.8 °C) Oral 62 16 96 % --       Intake/Output Summary (Last 24 hours) at 11/7/2020 1830  Last data filed at 11/7/2020 1635  Gross per 24 hour   Intake 900 ml   Output 1175 ml   Net -275 ml     General: awake/alert   Chest:  clear to auscultation bilaterally without respiratory distress  CVS: IRRR  Abdominal: soft, nontender, positive bowel sounds  Extremities: ble edema  Skin: warm and dry without rash      Labs:  Results from last 7 days   Lab Units 11/07/20  1734 11/07/20  1349 11/07/20  0301 11/06/20  0324 11/06/20  0026   WBC 10*3/mm3  --   --  5.44 10.50 10.71   HEMOGLOBIN g/dL 8.5* 8.1* 6.6* 7.5* 7.8*   HEMATOCRIT % 24.6* 23.2* 20.7* 22.8* 23.1*   PLATELETS 10*3/mm3  --   --  209 231 229         Results from last 7 days   Lab Units 11/07/20  0301 11/06/20  0324 11/06/20  0026   SODIUM mmol/L 141 140 139   POTASSIUM mmol/L 4.2 4.4 4.0   CHLORIDE mmol/L 104 103 100   CO2 mmol/L 26.0 23.0 25.0   BUN mg/dL 50* 48* 46*   CREATININE mg/dL 4.46* 4.40* 4.34*   CALCIUM mg/dL 8.4* 8.8 8.8   BILIRUBIN mg/dL  --  0.3  --    ALK PHOS U/L  --  226*  --    ALT  (SGPT) U/L  --  21  --    AST (SGOT) U/L  --  26  --    GLUCOSE mg/dL 76 253* 275*       Radiology:   Imaging Results (Last 72 Hours)     Procedure Component Value Units Date/Time    XR Chest 1 View [[id number]] Collected: 11/06/20 0646     Updated: 11/06/20 0649    Narrative:      Frontal upright radiograph of the chest 11/6/2020 1:10 AM CST     COMPARISON: None     HISTORY: Chest pain.     FINDINGS:   The lungs are clear. Cardiac silhouettes upper limits of normal. Pacing  device present soft tissues the left chest.      The osseous structures and surrounding soft tissues demonstrate no acute  abnormality.       Impression:      1. No radiographic evidence of acute cardiopulmonary process.        This report was finalized on 11/06/2020 06:46 by Dr. Zhang Dutton MD.          Culture:  No results found for: BLOODCX, URINECX, WOUNDCX, MRSACX, RESPCX, STOOLCX      Assessment   CKD V  DM2 with Nephropathy  HTN  Anemia      Plan:  IV diuretics with good symptomatic improvement  Monitor response over the weekend, will need permcath if dialysis required  Monitor labs  Reassess in AM  Hemoglobin better posttransfusion continue to monitor and transfuse again if needed    Viet Magaña MD  11/7/2020  18:30 CST

## 2020-11-08 NOTE — PLAN OF CARE
Goal Outcome Evaluation:  Plan of Care Reviewed With: patient  Progress: improving  Outcome Summary: patient denies pain this shift. vss. af/ 62-77, 2.62 sec pause. patient complained of not sleeping well. medication given with good relief. pt does not have a ride home if d/c. pt states that EMS left his bag by his door at home that contained his keys. pt is worried he is locked out of his house if d/c. right arm fistula is not mature for at least another month. Accu check. Room air. Safety maintained. Will continue to monitor.

## 2020-11-08 NOTE — PROGRESS NOTES
[facility] Medicine Services  INPATIENT PROGRESS NOTE    Patient Name: Gage Ken  Date of Admission: 11/6/2020  Today's Date: 11/08/20  Length of Stay: 0  Primary Care Physician: Cayetano Kay MD    Subjective   Chief Complaint: Follow-up chest pain    HPI   Doing well.  Denies any chest pain or shortness of breath.  No nausea.  Tolerating p.o.  Explains to me that his main concern today is that when the ambulance picked him up they locked his travel bag inside of his house which had his money fold, car keys, house keys.  He has no ability to get a ride home today if discharged and no ability to get to his house.    ROS:  All pertinent negatives and positives are as above. All other systems have been reviewed and are negative unless otherwise stated.     Objective    Temp:  [97.2 °F (36.2 °C)-98.6 °F (37 °C)] 98.6 °F (37 °C)  Heart Rate:  [61-86] 61  Resp:  [14-16] 16  BP: (131-142)/(49-70) 140/59  Physical Exam  GEN: Awake, alert, interactive, in NAD  HEENT: EOMI, Anicteric, Trachea midline  Lungs: no overt wheezing or rales  Heart: irreg/irreg, +S1/s2, no rub  ABD: soft, nt/nd, +BS, no guarding/rebound  Extremities:  no cyanosis, 1+ LE edema b/l  Neuro: AAOx3, no focal deficits  Psych: normal mood & affect        Results Review:  I have reviewed the labs, radiology results, and diagnostic studies.    Laboratory Data:   Results from last 7 days   Lab Units 11/08/20  1121 11/08/20  0453 11/07/20  1734  11/07/20  0301 11/06/20  0324   WBC 10*3/mm3  --  5.79  --   --  5.44 10.50   HEMOGLOBIN g/dL 8.4* 7.8* 8.5*   < > 6.6* 7.5*   HEMATOCRIT % 25.4* 23.6* 24.6*   < > 20.7* 22.8*   PLATELETS 10*3/mm3  --  200  --   --  209 231    < > = values in this interval not displayed.        Results from last 7 days   Lab Units 11/08/20  0453 11/07/20  0301 11/06/20  0324   SODIUM mmol/L 141 141 140   POTASSIUM mmol/L 3.5 4.2 4.4   CHLORIDE mmol/L 104 104 103   CO2 mmol/L 26.0 26.0  23.0   BUN mg/dL 49* 50* 48*   CREATININE mg/dL 4.22* 4.46* 4.40*   CALCIUM mg/dL 8.5* 8.4* 8.8   BILIRUBIN mg/dL  --   --  0.3   ALK PHOS U/L  --   --  226*   ALT (SGPT) U/L  --   --  21   AST (SGOT) U/L  --   --  26   GLUCOSE mg/dL 56* 76 253*       Culture Data:   No results found for: BLOODCX, URINECX, WOUNDCX, MRSACX, RESPCX, STOOLCX    Radiology Data:   Imaging Results (Last 24 Hours)     ** No results found for the last 24 hours. **          I have reviewed the patient's current medications.     Assessment/Plan     Active Hospital Problems    Diagnosis   • **Chest pain, atypical   • Chronic diastolic congestive heart failure (CMS/HCC)   • S/P placement of cardiac pacemaker   • CKD (chronic kidney disease) stage 5, GFR less than 15 ml/min (CMS/HCC)   • Chronic atrial fibrillation (CMS/HCC)   • Gastroesophageal reflux disease without esophagitis   • Nausea   • Chronic anticoagulation   • Essential hypertension   • Benign prostatic hyperplasia   • Type 2 diabetes mellitus without complication, without long-term current use of insulin (CMS/HCC)       #1 chest pain -resolved.  History of CAD.  Does have an elevated troponin which is flat but of unknown significance at this time given this has occurred in the setting of CKD 5, A. fib, possible volume overload.  His chest pain has resolved at this point.  Patient cardiology input.  No further plans for inpatient work-up.    #2 acute on chronic diastolic heart failure -per report.  Patient does not seem to be overtly in heart failure.  Now on po diuretic    #3 chronic A. Fib -rates were up on arrival.  Better now.  He is on metoprolol XL, Cardizem, and Eliquis.  Reportedly took a digoxin at home which we have held given his renal failure and elevated level.  Eliquis discontinued by cardiology due to current anemia.     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  Makes some urine.  Now has a fistula which is not yet mature.  Do not see any emergent need for dialysis at this time.   Continue to monitor.  Seen by nephrology    #5 essential hypertension -blood pressure stable on current regimen.  Monitor.    #6 DM2 -sliding scale insulin and hypoglycemia protocol    #7 anemia -acute on chronic.  Possibly in the setting of his kidney disease plus or minus bleeding.  Occult stool not yet obtained.  Patient denies any dark stools.  Gets annual colonoscopies.  Reportedly had a recent EGD with plans for a follow-up pill endoscopy which would lead me to believe it was a negative EGD.  Unable to get records from [place] until tomorrow per nursing. S/p 1 uprbc. Seems stable. Recheck again in the AM.      Discharge Planning: Ongoing.  Likely d/c home tomorrow as we don't have EGD results yet and pt has no ride, money, or way to get into his house until tomorrow.    Electronically signed by Donavon Campbell DO, 11/08/20, 13:51 CST.

## 2020-11-08 NOTE — PROGRESS NOTES
[facility] -  Progress Note     LOS: 0 days   Patient Care Team:  Cayetano Kay MD as PCP - General  Haley Lane APRN (Gastroenterology)  Haley Lane APRN (Gastroenterology)  Cayetano Kay MD as Referring Physician (Internal Medicine)  Alex Han MD as Consulting Physician (Otolaryngology)  Shaw Ag MD (Inactive) as Consulting Physician (Dermatology)    Chief Complaint: Chest pain    Subjective   Resting in bed. Denies any shortness of breath or shortness of breath. Received PRBC yesterday for low hgb/hct.      REVIEW OF SYSTEMS:  Constitutional: Denies fever or chills. Denies change in energy level or malaise.  HEENT: Denies headaches or visual disturbances. Denies difficulty swallowing or sore throat.  Respiratory: Denies cough or hoarseness. Denies shortness of breath.   Cardiovascular: Denies chest pain or pressure. Denies palpitations. Denies presyncope/syncope. Denies orthopnea/PND. Denies lower extremity edema.   Gastrointestinal: Denies abdominal pain. Denies nausea/vomiting. Denies change in bowel habits or history of recent GI tract blood loss.   Genitourinary: Denies urinary urgency or frequency. Denies dysuria, hematuria.  Musculoskeletal: Denies pain or swelling in joints.  Neurological: Denies paresthesias. Denies headache. Denies seizure or stroke symptoms.  Behavioral/Psych: Denies problems with anxiety or depression.    All other systems are negative except where stated above.      Objective     Vital Sign Min/Max for last 24 hours  Temp  Min: 97.2 °F (36.2 °C)  Max: 98.6 °F (37 °C)   BP  Min: 116/72  Max: 142/56   Pulse  Min: 64  Max: 86   Resp  Min: 14  Max: 16   SpO2  Min: 95 %  Max: 99 %   No data recorded   Weight  Min: 69.2 kg (152 lb 9.6 oz)  Max: 69.2 kg (152 lb 9.6 oz)         11/07/20 2036   Weight: 69.2 kg (152 lb 9.6 oz)         Intake/Output Summary (Last 24 hours) at 11/8/2020 1041  Last data filed at 11/8/2020 0818  Gross per 24 hour    Intake 780 ml   Output 800 ml   Net -20 ml         Physical Exam:  General Appearance: Alert and oriented x 3. No acute distress.  HEENT: Normocephalic. Atraumatic. NANCY.   Lungs: Clear bilateral breath sounds without wheezes or rhonchi.   Heart: Normal HT with RRR. 2/6 systolic murmur. No gallops or rubs.   Abdomen: Soft, non-tender with active bowel sounds x 4.   Extremities: No clubing or cyanosis. 1+ bilateral ankle edema. Pedal pulses palpated. Right AV fistula with palpable thrill and auscultated bruit.   Neurologic: Grossly intact.     Results Review:   Lab Results (last 72 hours)     Procedure Component Value Units Date/Time    POC Glucose Once [[id number]]  (Normal) Collected: 11/07/20 0827    Specimen: Blood Updated: 11/07/20 0847     Glucose 83 mg/dL      Comment: : [id number] Terence Johnson ID: [id number]       Iron Profile [[id number]]  (Abnormal) Collected: 11/07/20 0301    Specimen: Blood Updated: 11/07/20 0823     Iron 63 mcg/dL      Iron Saturation 32 %      Transferrin 132 mg/dL      TIBC 197 mcg/dL     CBC (No Diff) [[id number]]  (Abnormal) Collected: 11/07/20 0301    Specimen: Blood Updated: 11/07/20 0408     WBC 5.44 10*3/mm3      RBC 2.06 10*6/mm3      Hemoglobin 6.6 g/dL      Hematocrit 20.7 %      .5 fL      MCH 32.0 pg      MCHC 31.9 g/dL      RDW 14.0 %      RDW-SD 51.6 fl      MPV 11.2 fL      Platelets 209 10*3/mm3     Basic Metabolic Panel [[id number]]  (Abnormal) Collected: 11/07/20 0301    Specimen: Blood Updated: 11/07/20 0340     Glucose 76 mg/dL      BUN 50 mg/dL      Creatinine 4.46 mg/dL      Sodium 141 mmol/L      Potassium 4.2 mmol/L      Chloride 104 mmol/L      CO2 26.0 mmol/L      Calcium 8.4 mg/dL      eGFR   Amer --     Comment: <15 Indicative of kidney failure.        eGFR Non African Amer 13 mL/min/1.73      Comment: <15 Indicative of kidney failure.        BUN/Creatinine Ratio 11.2     Anion Gap 11.0 mmol/L     Narrative:      GFR Normal >60  Chronic  Kidney Disease <60  Kidney Failure <15      Phosphorus [[id number]]  (Normal) Collected: 11/07/20 0301    Specimen: Blood Updated: 11/07/20 0340     Phosphorus 3.2 mg/dL     Magnesium [[id number]]  (Abnormal) Collected: 11/07/20 0301    Specimen: Blood Updated: 11/07/20 0339     Magnesium 2.5 mg/dL     PTH, Intact [[id number]]  (Abnormal) Collected: 11/07/20 0301    Specimen: Blood Updated: 11/07/20 0337     PTH, Intact 100.9 pg/mL     Narrative:      Results may be falsely decreased if patient taking Biotin.      Vitamin D 25 Hydroxy [[id number]] Collected: 11/07/20 0301    Specimen: Blood Updated: 11/07/20 0313    Troponin [[id number]]  (Abnormal) Collected: 11/06/20 2355    Specimen: Blood Updated: 11/07/20 0034     Troponin T 0.305 ng/mL     Narrative:      Troponin T Reference Range:  <= 0.03 ng/mL-   Negative for AMI  >0.03 ng/mL-     Abnormal for myocardial necrosis.  Clinicians would have to utilize clinical acumen, EKG, Troponin and serial changes to determine if it is an Acute Myocardial Infarction or myocardial injury due to an underlying chronic condition.       Results may be falsely decreased if patient taking Biotin.      POC Glucose Once [[id number]]  (Normal) Collected: 11/06/20 1940    Specimen: Blood Updated: 11/06/20 1950     Glucose 128 mg/dL      Comment: : [id number] [facility] ID: [id number]       Troponin [[id number]]  (Abnormal) Collected: 11/06/20 1751    Specimen: Blood Updated: 11/06/20 1817     Troponin T 0.323 ng/mL     Narrative:      Troponin T Reference Range:  <= 0.03 ng/mL-   Negative for AMI  >0.03 ng/mL-     Abnormal for myocardial necrosis.  Clinicians would have to utilize clinical acumen, EKG, Troponin and serial changes to determine if it is an Acute Myocardial Infarction or myocardial injury due to an underlying chronic condition.       Results may be falsely decreased if patient taking Biotin.      POC Glucose Once [[id number]]  (Abnormal) Collected: 11/06/20 1621     Specimen: Blood Updated: 11/06/20 1631     Glucose 195 mg/dL      Comment: : [id number] Terence ElizondoMeter ID: [id number]       Troponin [[id number]]  (Abnormal) Collected: 11/06/20 1217    Specimen: Blood Updated: 11/06/20 1243     Troponin T 0.307 ng/mL     Narrative:      Troponin T Reference Range:  <= 0.03 ng/mL-   Negative for AMI  >0.03 ng/mL-     Abnormal for myocardial necrosis.  Clinicians would have to utilize clinical acumen, EKG, Troponin and serial changes to determine if it is an Acute Myocardial Infarction or myocardial injury due to an underlying chronic condition.       Results may be falsely decreased if patient taking Biotin.      POC Glucose Once [[id number]]  (Abnormal) Collected: 11/06/20 1050    Specimen: Blood Updated: 11/06/20 1105     Glucose 186 mg/dL      Comment: : [id number] Terence ElizondoMeter ID: [id number]       POC Glucose Once [[id number]]  (Abnormal) Collected: 11/06/20 0721    Specimen: Blood Updated: 11/06/20 0732     Glucose 197 mg/dL      Comment: : [id number] Aba BrooksraMeter ID: [id number]       Comprehensive Metabolic Panel [[id number]]  (Abnormal) Collected: 11/06/20 0324    Specimen: Blood Updated: 11/06/20 0528     Glucose 253 mg/dL      BUN 48 mg/dL      Creatinine 4.40 mg/dL      Sodium 140 mmol/L      Potassium 4.4 mmol/L      Chloride 103 mmol/L      CO2 23.0 mmol/L      Calcium 8.8 mg/dL      Total Protein 6.5 g/dL      Albumin 3.80 g/dL      ALT (SGPT) 21 U/L      AST (SGOT) 26 U/L      Alkaline Phosphatase 226 U/L      Total Bilirubin 0.3 mg/dL      eGFR Non African Amer 13 mL/min/1.73      Comment: <15 Indicative of kidney failure.        eGFR   Amer --     Comment: <15 Indicative of kidney failure.        Globulin 2.7 gm/dL      A/G Ratio 1.4 g/dL      BUN/Creatinine Ratio 10.9     Anion Gap 14.0 mmol/L     Narrative:      GFR Normal >60  Chronic Kidney Disease <60  Kidney Failure <15      Digoxin Level [[id number]]  (Abnormal) Collected:  11/06/20 0324    Specimen: Blood Updated: 11/06/20 0507     Digoxin 1.90 ng/mL     CBC Auto Differential [[id number]]  (Abnormal) Collected: 11/06/20 0324    Specimen: Blood Updated: 11/06/20 0453     WBC 10.50 10*3/mm3      RBC 2.31 10*6/mm3      Hemoglobin 7.5 g/dL      Hematocrit 22.8 %      MCV 98.7 fL      MCH 32.5 pg      MCHC 32.9 g/dL      RDW 13.7 %      RDW-SD 49.1 fl      MPV 11.4 fL      Platelets 231 10*3/mm3      Neutrophil % 79.5 %      Lymphocyte % 11.3 %      Monocyte % 7.3 %      Eosinophil % 0.5 %      Basophil % 0.5 %      Immature Grans % 0.9 %      Neutrophils, Absolute 8.35 10*3/mm3      Lymphocytes, Absolute 1.19 10*3/mm3      Monocytes, Absolute 0.77 10*3/mm3      Eosinophils, Absolute 0.05 10*3/mm3      Basophils, Absolute 0.05 10*3/mm3      Immature Grans, Absolute 0.09 10*3/mm3      nRBC 0.0 /100 WBC     Troponin [[id number]]  (Abnormal) Collected: 11/06/20 0323    Specimen: Blood Updated: 11/06/20 0447     Troponin T 0.174 ng/mL     Narrative:      Troponin T Reference Range:  <= 0.03 ng/mL-   Negative for AMI  >0.03 ng/mL-     Abnormal for myocardial necrosis.  Clinicians would have to utilize clinical acumen, EKG, Troponin and serial changes to determine if it is an Acute Myocardial Infarction or myocardial injury due to an underlying chronic condition.       Results may be falsely decreased if patient taking Biotin.      Magnesium [[id number]]  (Normal) Collected: 11/06/20 0323    Specimen: Blood Updated: 11/06/20 0445     Magnesium 2.4 mg/dL     Phosphorus [[id number]]  (Normal) Collected: 11/06/20 0323    Specimen: Blood Updated: 11/06/20 0445     Phosphorus 3.6 mg/dL     TSH [[id number]]  (Abnormal) Collected: 11/06/20 0323    Specimen: Blood Updated: 11/06/20 0445     TSH 4.210 uIU/mL     T4, Free [[id number]]  (Normal) Collected: 11/06/20 0323    Specimen: Blood Updated: 11/06/20 0445     Free T4 1.09 ng/dL     Narrative:      Results may be falsely increased if patient taking  Biotin.      Hemoglobin A1c [[id number]]  (Abnormal) Collected: 11/06/20 0026    Specimen: Blood Updated: 11/06/20 0432     Hemoglobin A1C 8.50 %     Narrative:      Hemoglobin A1C Ranges:    Increased Risk for Diabetes  5.7% to 6.4%  Diabetes                     >= 6.5%  Diabetic Goal                < 7.0%    COVID PRE-OP / PRE-PROCEDURE SCREENING ORDER (NO ISOLATION) - Swab, Nasal Cavity [[id number]]  (Normal) Collected: 11/06/20 0134    Specimen: Swab from Nasal Cavity Updated: 11/06/20 0200    Narrative:      The following orders were created for panel order COVID PRE-OP / PRE-PROCEDURE SCREENING ORDER (NO ISOLATION) - Swab, Nasal Cavity.  Procedure                               Abnormality         Status                     ---------                               -----------         ------                     COVID-19, ABBOTT IN-HOUS...[[id number]]  Normal              Final result                 Please view results for these tests on the individual orders.    COVID-19, ABBOTT IN-HOUSE,NP Swab (NO TRANSPORT MEDIA) 2 HR TAT - Swab, Nasal Cavity [[id number]]  (Normal) Collected: 11/06/20 0134    Specimen: Swab from Nasal Cavity Updated: 11/06/20 0200     COVID19 Not Detected    Narrative:      Fact sheet for providers: https://www.fda.gov/media/056677/download     Fact sheet for patients: https://www.fda.gov/media/503691/download    [place] Draw [[id number]] Collected: 11/06/20 0026    Specimen: Blood Updated: 11/06/20 0130    Narrative:      The following orders were created for panel order [place] Draw.  Procedure                               Abnormality         Status                     ---------                               -----------         ------                     Light Blue Top[[id number]]                                   Final result               Green Top (Gel)[[id number]]                                  Final result               Lavender Top[[id number]]                                     Final  result               Red Top[[id number]]                                          Final result                 Please view results for these tests on the individual orders.    Light Blue Top [[id number]] Collected: 11/06/20 0026    Specimen: Blood Updated: 11/06/20 0130     Extra Tube hold for add-on     Comment: Auto resulted       Green Top (Gel) [[id number]] Collected: 11/06/20 0026    Specimen: Blood Updated: 11/06/20 0130     Extra Tube Hold for add-ons.     Comment: Auto resulted.       Lavender Top [[id number]] Collected: 11/06/20 0026    Specimen: Blood Updated: 11/06/20 0130     Extra Tube hold for add-on     Comment: Auto resulted       Red Top [[id number]] Collected: 11/06/20 0026    Specimen: Blood Updated: 11/06/20 0130     Extra Tube Hold for add-ons.     Comment: Auto resulted.       Troponin [[id number]]  (Abnormal) Collected: 11/06/20 0026    Specimen: Blood Updated: 11/06/20 0109     Troponin T 0.075 ng/mL     Narrative:      Troponin T Reference Range:  <= 0.03 ng/mL-   Negative for AMI  >0.03 ng/mL-     Abnormal for myocardial necrosis.  Clinicians would have to utilize clinical acumen, EKG, Troponin and serial changes to determine if it is an Acute Myocardial Infarction or myocardial injury due to an underlying chronic condition.       Results may be falsely decreased if patient taking Biotin.      Digoxin Level [[id number]]  (Abnormal) Collected: 11/06/20 0026    Specimen: Blood Updated: 11/06/20 0108     Digoxin 1.60 ng/mL     Basic Metabolic Panel [[id number]]  (Abnormal) Collected: 11/06/20 0026    Specimen: Blood Updated: 11/06/20 0105     Glucose 275 mg/dL      BUN 46 mg/dL      Creatinine 4.34 mg/dL      Sodium 139 mmol/L      Potassium 4.0 mmol/L      Chloride 100 mmol/L      CO2 25.0 mmol/L      Calcium 8.8 mg/dL      eGFR   Amer --     Comment: <15 Indicative of kidney failure.        eGFR Non African Amer 13 mL/min/1.73      Comment: <15 Indicative of kidney failure.         BUN/Creatinine Ratio 10.6     Anion Gap 14.0 mmol/L     Narrative:      GFR Normal >60  Chronic Kidney Disease <60  Kidney Failure <15      BNP [[id number]]  (Abnormal) Collected: 11/06/20 0026    Specimen: Blood Updated: 11/06/20 0105     proBNP 15,223.0 pg/mL     Narrative:      Among patients with dyspnea, NT-proBNP is highly sensitive for the detection of acute congestive heart failure. In addition NT-proBNP of <300 pg/ml effectively rules out acute congestive heart failure with 99% negative predictive value.    Results may be falsely decreased if patient taking Biotin.      aPTT [[id number]]  (Normal) Collected: 11/06/20 0026    Specimen: Blood Updated: 11/06/20 0058     PTT 33.7 seconds     Protime-INR [[id number]]  (Abnormal) Collected: 11/06/20 0026    Specimen: Blood Updated: 11/06/20 0058     Protime 19.8 Seconds      INR 1.71    CBC & Differential [[id number]]  (Abnormal) Collected: 11/06/20 0026    Specimen: Blood Updated: 11/06/20 0052    Narrative:      The following orders were created for panel order CBC & Differential.  Procedure                               Abnormality         Status                     ---------                               -----------         ------                     CBC Auto Differential[[id number]]        Abnormal            Final result                 Please view results for these tests on the individual orders.    CBC Auto Differential [[id number]]  (Abnormal) Collected: 11/06/20 0026    Specimen: Blood Updated: 11/06/20 0052     WBC 10.71 10*3/mm3      RBC 2.35 10*6/mm3      Hemoglobin 7.8 g/dL      Hematocrit 23.1 %      MCV 98.3 fL      MCH 33.2 pg      MCHC 33.8 g/dL      RDW 13.9 %      RDW-SD 49.1 fl      MPV 10.3 fL      Platelets 229 10*3/mm3      Neutrophil % 87.3 %      Lymphocyte % 5.7 %      Monocyte % 5.4 %      Eosinophil % 0.3 %      Basophil % 0.6 %      Immature Grans % 0.7 %      Neutrophils, Absolute 9.36 10*3/mm3      Lymphocytes, Absolute 0.61 10*3/mm3       Monocytes, Absolute 0.58 10*3/mm3      Eosinophils, Absolute 0.03 10*3/mm3      Basophils, Absolute 0.06 10*3/mm3      Immature Grans, Absolute 0.07 10*3/mm3      nRBC 0.0 /100 WBC               2D Echocardiogram (07/10/2020):  · Left atrial cavity size is moderately dilated.  · Moderate aortic valve stenosis is present.  · Left ventricular diastolic dysfunction.  · Left ventricular systolic function is normal.  · Mild tricuspid valve regurgitation is present.  · Mild mitral valve regurgitation is present     RHYTHM IS ATRIAL FIBRILLATION  LEFT ATRIAL ENLARGEMENT  MODERATE AORTIC VALVE STENOSIS  NORMAL LV AND RV SYSTOLIC FUNCTION  LV DIASTOLIC DYSFUNCTION  MODERATE PULMONARY HTN      Medication Review: yes  Current Facility-Administered Medications   Medication Dose Route Frequency Provider Last Rate Last Dose   • acetaminophen (TYLENOL) tablet 650 mg  650 mg Oral Q4H PRN Delfin Navas MD        Or   • acetaminophen (TYLENOL) 160 MG/5ML solution 650 mg  650 mg Oral Q4H PRN Delfin Navas MD        Or   • acetaminophen (TYLENOL) suppository 650 mg  650 mg Rectal Q4H PRN Delfin Navas MD       • allopurinol (ZYLOPRIM) tablet 200 mg  200 mg Oral Daily Delfin Navas MD   200 mg at 11/08/20 0907   • aspirin EC tablet 81 mg  81 mg Oral Daily Donavon Hicks MD   81 mg at 11/08/20 0907   • dextrose (D50W) 25 g/ 50mL Intravenous Solution 25 g  25 g Intravenous Q15 Min PRN Delfin Navas MD       • dextrose (GLUTOSE) oral gel 15 g  15 g Oral Q15 Min PRN Delfin Navas MD       • dilTIAZem CD (CARDIZEM CD) 24 hr capsule 240 mg  240 mg Oral BID Delfin Navas MD   240 mg at 11/08/20 0908   • epoetin connie-epbx (RETACRIT) 5,000 Units  5,000 Units Subcutaneous 3x Weekly Delfin Navas MD   Stopped at 11/06/20 1332   • finasteride (PROSCAR) tablet 5 mg  5 mg Oral Daily Delfin Navas MD   5 mg at 11/08/20 0907   • folic acid (FOLVITE) tablet 1 mg  1 mg Oral Daily Delfin Navas  MD   1 mg at 11/08/20 0908   • furosemide (LASIX) tablet 60 mg  60 mg Oral Daily Nehal Meraz APRN   60 mg at 11/08/20 0908   • glipizide (GLUCOTROL) tablet 10 mg  10 mg Oral BID Delfin Rachel MD   10 mg at 11/08/20 0907   • glucagon (human recombinant) (GLUCAGEN DIAGNOSTIC) injection 1 mg  1 mg Subcutaneous Q15 Min PRN Delfin Navas MD       • hydrALAZINE (APRESOLINE) tablet 50 mg  50 mg Oral Q12H Donavon Campbell DO   50 mg at 11/08/20 0908   • insulin lispro (humaLOG) injection 2-9 Units  2-9 Units Subcutaneous TID AC Delfin Navas MD   2 Units at 11/07/20 1129   • isosorbide mononitrate (IMDUR) 24 hr tablet 30 mg  30 mg Oral Q24H Sravanthi Waldron APRN   30 mg at 11/08/20 0908   • linagliptin (TRADJENTA) tablet 5 mg  5 mg Oral Daily Delfin Navas MD   5 mg at 11/08/20 0907   • metoprolol succinate XL (TOPROL-XL) 24 hr tablet 100 mg  100 mg Oral BID Delfin Navas MD   100 mg at 11/08/20 0907   • nitroglycerin (NITROSTAT) SL tablet 0.4 mg  0.4 mg Sublingual Q5 Min PRN Delfin Nvaas MD   0.4 mg at 11/06/20 0353   • ondansetron (ZOFRAN) tablet 4 mg  4 mg Oral Q6H PRN Delfin Navas MD        Or   • ondansetron (ZOFRAN) injection 4 mg  4 mg Intravenous Q6H PRN Delfin Navas MD       • pantoprazole (PROTONIX) EC tablet 40 mg  40 mg Oral QAM Delfin Navas MD   40 mg at 11/08/20 0618   • pravastatin (PRAVACHOL) tablet 20 mg  20 mg Oral Nightly Delfin Navas MD   20 mg at 11/07/20 2239   • sodium chloride 0.9 % flush 10 mL  10 mL Intravenous Q12H Delfin Navas MD   10 mL at 11/08/20 0908   • sodium chloride 0.9 % flush 10 mL  10 mL Intravenous PRN Delfin Navas MD       • sulfaSALAzine (AZULFIDINE) tablet 1,000 mg  1,000 mg Oral TID Delfin Navas MD   1,000 mg at 11/08/20 0907   • terazosin (HYTRIN) capsule 10 mg  10 mg Oral Nightly Delfin Navas MD   10 mg at 11/07/20 2238   • zolpidem (AMBIEN) tablet 5 mg  5 mg Oral Nightly PRN Blaine Palumbo  Enma, DO   5 mg at 11/07/20 2253         Assessment/Plan       Chest pain, atypical, Resolved. Unclear etiology.     Chronic atrial fibrillation (CMS/HCC) with SSS. S/P Pacemaker - currently V-paced. Eliquis D/C'd due to profound anemia.    Essential hypertension - Controlled.    Type 2 diabetes mellitus without complication, without long-term current use of insulin (CMS/[facility]) - per Hospitalist    CKD (chronic kidney disease) stage 5, GFR less than 15 ml/min (CMS/[facility]) - Hx Right AV Fistula with recent fistulagram with angioplasty and coil embolization to cephalic vein branch on 11/02/2020 by Dr. Major Martinez. Followed by Nephrology.     Chronic diastolic congestive heart failure (CMS/HCC) - Appears euvolemic. On IV diuretic. BB.     Benign prostatic hyperplasia - on Proscar    Gastroesophageal reflux disease without esophagitis - on PPI    Acute Blood Loss Anemia with Underlying Chronic Anemia - no identifiable source. Does have history of ulcerative colitis, but denies any dark stools.      Will need cardiology follow-up with Dr. Mariano to discuss need of anticoagulation or possible Watchman.       Nehal Meraz, APRN  11/08/20  10:45 CST

## 2020-11-09 NOTE — DISCHARGE SUMMARY
[facility] Medicine Services  DISCHARGE SUMMARY       Date of Admission: 11/6/2020  Date of Discharge:  11/9/2020  Primary Care Physician: Cayetano Kay MD    Discharge Diagnoses:  Active Hospital Problems    Diagnosis   • **Chest pain, atypical   • Chronic diastolic congestive heart failure (CMS/HCC)   • S/P placement of cardiac pacemaker   • CKD (chronic kidney disease) stage 5, GFR less than 15 ml/min (CMS/[facility])   • Chronic atrial fibrillation (CMS/[facility])   • Gastroesophageal reflux disease without esophagitis   • Nausea   • Chronic anticoagulation   • Essential hypertension   • Benign prostatic hyperplasia   • Type 2 diabetes mellitus without complication, without long-term current use of insulin (CMS/[facility])   In addition to above  Secondary hyperparathyroidism likely from chronic kidney disease  Hyperglycemia in patient with diabetes  AV fistula right arm  Fluid retention/bilateral lower extremity edema      Presenting Problem/History of Present Illness:  Acute on chronic congestive heart failure, unspecified heart failure type (CMS/[facility]) [I50.9]         Hospital Course    He is a 80-year-old man who presented himself with chest pain.  He carries history of gastroesophageal reflux disease.  He also mentioned that he has hernia.  He described his discomfort from epigastric area as he rolls his fingers up to his neck area.  He has known history of coronary artery disease and had elevated but flat trend troponin.  It significance is not clear although he has chronic kidney disease stage V, atrial fibrillation and possibly volume overload.      Cardiology had seen the patient in consultation.  In their final consult report they mention that he needs to follow-up with Dr. Mariano to discuss need of anticoagulation or possible watchman procedure.  Note that patient previously was on Eliquis but due to profound anemia this was continued.  There has not been any identified source but  he carries history of ulcerative colitis and on sulfasalazine.    As part of the surveillance for ulcerative colitis Dr. Campbell in his progress note dated November 8 indicates that he gets annual colonoscopies.  Patient had recent EGD with plan for follow-up pill endoscopy likely due to negative EGD.    Patient had 1 unit of packed RBC for hemoglobin less than 7.  Posttransfusion hemoglobin is 8.3.    Record also indicates that he has chronic diastolic heart failure.  He was described to be euvolemic.    Noted that he has history of diabetes mellitus.  He also had episodes of hypoglycemia.  His A1c level has improved at 8.5.  Noted that he takes Glucotrol 10 mg twice daily.  He was on Januvia prior to admission but due to very high cost he was not able to refill this.  He was placed on linagliptin as Januvia was not part of the formulary.  We discussed action plans on hypoglycemia.  I cut back on Glucotrol to 5 mg twice daily due to episodes of hypoglycemia.  I encouraged him to eat and closely follow-up with primary care provider.    Nephrology had seen the patient in consultation.  Patient has acute kidney injury on baseline chronic kidney disease stage V.  He has a right AV fistula placed by Dr. Martinez at [facility] in preparation for dialysis.  On November 2 he underwent fistulogram.  Dr. Martinez indicate that the fistula will be ready for use in 4 weeks.  In the recent past due to increasing swelling and dyspnea he received IV Lasix with improvement of the swelling and breathing.    I reviewed the medications with him.  There are newly added medication such as aspirin and isosorbide mononitrate.  There are medications that were discontinued and medications that were revised.  These are all outlined below.  I encouraged him to ask questions.  All questions were answered at the end of our encounter.    I spoke to the .  He has key to his home.  He will be picked up by a friend.    indicates may benefit from home health service but patient declined.  He said that he is not homebound and plans to do squirrel hunting next week.      Procedures Performed: None    Consults:     Dr. Archana Hicks    Pertinent Test Results:   Lab Results (last 7 days)     Procedure Component Value Units Date/Time    POC Glucose Once [[id number]]  (Normal) Collected: 11/09/20 0715    Specimen: Blood Updated: 11/09/20 0729     Glucose 71 mg/dL      Comment: : [id number] Marshall PerezsorayaaMeter ID: [id number]       Basic Metabolic Panel [[id number]]  (Abnormal) Collected: 11/09/20 0447    Specimen: Blood Updated: 11/09/20 0537     Glucose 65 mg/dL      BUN 51 mg/dL      Creatinine 4.30 mg/dL      Sodium 138 mmol/L      Potassium 3.7 mmol/L      Chloride 101 mmol/L      CO2 24.0 mmol/L      Calcium 8.5 mg/dL      eGFR   Amer --     Comment: <15 Indicative of kidney failure.        eGFR Non African Amer 13 mL/min/1.73      Comment: <15 Indicative of kidney failure.        BUN/Creatinine Ratio 11.9     Anion Gap 13.0 mmol/L     Narrative:      GFR Normal >60  Chronic Kidney Disease <60  Kidney Failure <15      CBC (No Diff) [[id number]]  (Abnormal) Collected: 11/09/20 0447    Specimen: Blood Updated: 11/09/20 0509     WBC 7.00 10*3/mm3      RBC 2.61 10*6/mm3      Hemoglobin 8.3 g/dL      Hematocrit 25.1 %      MCV 96.2 fL      MCH 31.8 pg      MCHC 33.1 g/dL      RDW 14.7 %      RDW-SD 52.0 fl      MPV 10.5 fL      Platelets 209 10*3/mm3     POC Glucose Once [[id number]]  (Normal) Collected: 11/08/20 2013    Specimen: Blood Updated: 11/08/20 2032     Glucose 90 mg/dL      Comment: : [id number] Ginger MorinitaMeter ID: [id number]       POC Glucose Once [[id number]]  (Abnormal) Collected: 11/08/20 1602    Specimen: Blood Updated: 11/08/20 1613     Glucose 257 mg/dL      Comment: : [id number] Kory (Rhodes) JannethylieMeter ID: [id number]       POC Glucose Once [[id number]]  (Abnormal) Collected: 11/08/20  1202    Specimen: Blood Updated: 11/08/20 1213     Glucose 207 mg/dL      Comment: : [id number] Kory Santo) KaylieMeter ID: [id number]       Hemoglobin & Hematocrit, Blood [[id number]]  (Abnormal) Collected: 11/08/20 1121    Specimen: Blood Updated: 11/08/20 1138     Hemoglobin 8.4 g/dL      Hematocrit 25.4 %     POC Glucose Once [[id number]]  (Abnormal) Collected: 11/08/20 0817    Specimen: Blood Updated: 11/08/20 0829     Glucose 67 mg/dL      Comment: : [id number] Kory Santo) KaylieMeter ID: [id number]       CBC (No Diff) [[id number]]  (Abnormal) Collected: 11/08/20 0453    Specimen: Blood Updated: 11/08/20 0609     WBC 5.79 10*3/mm3      RBC 2.47 10*6/mm3      Hemoglobin 7.8 g/dL      Hematocrit 23.6 %      MCV 95.5 fL      MCH 31.6 pg      MCHC 33.1 g/dL      RDW 15.1 %      RDW-SD 54.1 fl      MPV 10.8 fL      Platelets 200 10*3/mm3     Basic Metabolic Panel [[id number]]  (Abnormal) Collected: 11/08/20 0453    Specimen: Blood Updated: 11/08/20 0554     Glucose 56 mg/dL      BUN 49 mg/dL      Creatinine 4.22 mg/dL      Sodium 141 mmol/L      Potassium 3.5 mmol/L      Chloride 104 mmol/L      CO2 26.0 mmol/L      Calcium 8.5 mg/dL      eGFR   Amer --     Comment: <15 Indicative of kidney failure.        eGFR Non African Amer 14 mL/min/1.73      Comment: <15 Indicative of kidney failure.        BUN/Creatinine Ratio 11.6     Anion Gap 11.0 mmol/L     Narrative:      GFR Normal >60  Chronic Kidney Disease <60  Kidney Failure <15      POC Glucose Once [[id number]]  (Normal) Collected: 11/07/20 2039    Specimen: Blood Updated: 11/07/20 2051     Glucose 122 mg/dL      Comment: : [id number] Ginger MorinitaMeter ID: [id number]       Hemoglobin & Hematocrit, Blood [[id number]]  (Abnormal) Collected: 11/07/20 1734    Specimen: Blood Updated: 11/07/20 1756     Hemoglobin 8.5 g/dL      Hematocrit 24.6 %     POC Glucose Once [[id number]]  (Abnormal) Collected: 11/07/20 1637    Specimen: Blood Updated:  11/07/20 1649     Glucose 132 mg/dL      Comment: : [id number] Kory Santo) KaylieMeter ID: [id number]       Hemoglobin & Hematocrit, Blood [[id number]]  (Abnormal) Collected: 11/07/20 1349    Specimen: Blood Updated: 11/07/20 1403     Hemoglobin 8.1 g/dL      Hematocrit 23.2 %     Vitamin D 25 Hydroxy [[id number]]  (Normal) Collected: 11/07/20 0301    Specimen: Blood Updated: 11/07/20 1205     25 Hydroxy, Vitamin D 31.1 ng/ml     Narrative:      Reference Range for Total Vitamin D 25(OH)     Deficiency <20.0 ng/mL   Insufficiency 21-29 ng/mL   Sufficiency  ng/mL  Toxicity >100 ng/ml    Results may be falsely increased if patient taking Biotin.      POC Glucose Once [[id number]]  (Abnormal) Collected: 11/07/20 1133    Specimen: Blood Updated: 11/07/20 1144     Glucose 194 mg/dL      Comment: : [id number] Kory Santo) KaylieMeter ID: [id number]       POC Glucose Once [[id number]]  (Normal) Collected: 11/07/20 0827    Specimen: Blood Updated: 11/07/20 0847     Glucose 83 mg/dL      Comment: : [id number] Terence ElizondoMeter ID: [id number]       Iron Profile [[id number]]  (Abnormal) Collected: 11/07/20 0301    Specimen: Blood Updated: 11/07/20 0823     Iron 63 mcg/dL      Iron Saturation 32 %      Transferrin 132 mg/dL      TIBC 197 mcg/dL     CBC (No Diff) [[id number]]  (Abnormal) Collected: 11/07/20 0301    Specimen: Blood Updated: 11/07/20 0408     WBC 5.44 10*3/mm3      RBC 2.06 10*6/mm3      Hemoglobin 6.6 g/dL      Hematocrit 20.7 %      .5 fL      MCH 32.0 pg      MCHC 31.9 g/dL      RDW 14.0 %      RDW-SD 51.6 fl      MPV 11.2 fL      Platelets 209 10*3/mm3     Basic Metabolic Panel [[id number]]  (Abnormal) Collected: 11/07/20 0301    Specimen: Blood Updated: 11/07/20 0340     Glucose 76 mg/dL      BUN 50 mg/dL      Creatinine 4.46 mg/dL      Sodium 141 mmol/L      Potassium 4.2 mmol/L      Chloride 104 mmol/L      CO2 26.0 mmol/L      Calcium 8.4 mg/dL      eGFR   Amer --      Comment: <15 Indicative of kidney failure.        eGFR Non African Amer 13 mL/min/1.73      Comment: <15 Indicative of kidney failure.        BUN/Creatinine Ratio 11.2     Anion Gap 11.0 mmol/L     Narrative:      GFR Normal >60  Chronic Kidney Disease <60  Kidney Failure <15      Phosphorus [[id number]]  (Normal) Collected: 11/07/20 0301    Specimen: Blood Updated: 11/07/20 0340     Phosphorus 3.2 mg/dL     Magnesium [[id number]]  (Abnormal) Collected: 11/07/20 0301    Specimen: Blood Updated: 11/07/20 0339     Magnesium 2.5 mg/dL     PTH, Intact [[id number]]  (Abnormal) Collected: 11/07/20 0301    Specimen: Blood Updated: 11/07/20 0337     PTH, Intact 100.9 pg/mL     Narrative:      Results may be falsely decreased if patient taking Biotin.      Troponin [[id number]]  (Abnormal) Collected: 11/06/20 2355    Specimen: Blood Updated: 11/07/20 0034     Troponin T 0.305 ng/mL     Narrative:      Troponin T Reference Range:  <= 0.03 ng/mL-   Negative for AMI  >0.03 ng/mL-     Abnormal for myocardial necrosis.  Clinicians would have to utilize clinical acumen, EKG, Troponin and serial changes to determine if it is an Acute Myocardial Infarction or myocardial injury due to an underlying chronic condition.       Results may be falsely decreased if patient taking Biotin.      POC Glucose Once [[id number]]  (Normal) Collected: 11/06/20 1940    Specimen: Blood Updated: 11/06/20 1950     Glucose 128 mg/dL      Comment: : [id number] Aba Valero ID: [id number]       Troponin [[id number]]  (Abnormal) Collected: 11/06/20 1751    Specimen: Blood Updated: 11/06/20 1817     Troponin T 0.323 ng/mL     Narrative:      Troponin T Reference Range:  <= 0.03 ng/mL-   Negative for AMI  >0.03 ng/mL-     Abnormal for myocardial necrosis.  Clinicians would have to utilize clinical acumen, EKG, Troponin and serial changes to determine if it is an Acute Myocardial Infarction or myocardial injury due to an underlying chronic condition.        Results may be falsely decreased if patient taking Biotin.      POC Glucose Once [[id number]]  (Abnormal) Collected: 11/06/20 1621    Specimen: Blood Updated: 11/06/20 1631     Glucose 195 mg/dL      Comment: : [id number] Terence ElizondoMetdeidra ID: [id number]       Troponin [[id number]]  (Abnormal) Collected: 11/06/20 1217    Specimen: Blood Updated: 11/06/20 1243     Troponin T 0.307 ng/mL     Narrative:      Troponin T Reference Range:  <= 0.03 ng/mL-   Negative for AMI  >0.03 ng/mL-     Abnormal for myocardial necrosis.  Clinicians would have to utilize clinical acumen, EKG, Troponin and serial changes to determine if it is an Acute Myocardial Infarction or myocardial injury due to an underlying chronic condition.       Results may be falsely decreased if patient taking Biotin.      POC Glucose Once [[id number]]  (Abnormal) Collected: 11/06/20 1050    Specimen: Blood Updated: 11/06/20 1105     Glucose 186 mg/dL      Comment: : [id number] Terence ScottjeffMeter ID: [id number]       POC Glucose Once [[id number]]  (Abnormal) Collected: 11/06/20 0721    Specimen: Blood Updated: 11/06/20 0732     Glucose 197 mg/dL      Comment: : [id number] Aba Valero ID: [id number]       Comprehensive Metabolic Panel [[id number]]  (Abnormal) Collected: 11/06/20 0324    Specimen: Blood Updated: 11/06/20 0528     Glucose 253 mg/dL      BUN 48 mg/dL      Creatinine 4.40 mg/dL      Sodium 140 mmol/L      Potassium 4.4 mmol/L      Chloride 103 mmol/L      CO2 23.0 mmol/L      Calcium 8.8 mg/dL      Total Protein 6.5 g/dL      Albumin 3.80 g/dL      ALT (SGPT) 21 U/L      AST (SGOT) 26 U/L      Alkaline Phosphatase 226 U/L      Total Bilirubin 0.3 mg/dL      eGFR Non African Amer 13 mL/min/1.73      Comment: <15 Indicative of kidney failure.        eGFR   Amer --     Comment: <15 Indicative of kidney failure.        Globulin 2.7 gm/dL      A/G Ratio 1.4 g/dL      BUN/Creatinine Ratio 10.9     Anion Gap 14.0 mmol/L      Narrative:      GFR Normal >60  Chronic Kidney Disease <60  Kidney Failure <15      Digoxin Level [[id number]]  (Abnormal) Collected: 11/06/20 0324    Specimen: Blood Updated: 11/06/20 0507     Digoxin 1.90 ng/mL     CBC Auto Differential [[id number]]  (Abnormal) Collected: 11/06/20 0324    Specimen: Blood Updated: 11/06/20 0453     WBC 10.50 10*3/mm3      RBC 2.31 10*6/mm3      Hemoglobin 7.5 g/dL      Hematocrit 22.8 %      MCV 98.7 fL      MCH 32.5 pg      MCHC 32.9 g/dL      RDW 13.7 %      RDW-SD 49.1 fl      MPV 11.4 fL      Platelets 231 10*3/mm3      Neutrophil % 79.5 %      Lymphocyte % 11.3 %      Monocyte % 7.3 %      Eosinophil % 0.5 %      Basophil % 0.5 %      Immature Grans % 0.9 %      Neutrophils, Absolute 8.35 10*3/mm3      Lymphocytes, Absolute 1.19 10*3/mm3      Monocytes, Absolute 0.77 10*3/mm3      Eosinophils, Absolute 0.05 10*3/mm3      Basophils, Absolute 0.05 10*3/mm3      Immature Grans, Absolute 0.09 10*3/mm3      nRBC 0.0 /100 WBC     Troponin [[id number]]  (Abnormal) Collected: 11/06/20 0323    Specimen: Blood Updated: 11/06/20 0447     Troponin T 0.174 ng/mL     Narrative:      Troponin T Reference Range:  <= 0.03 ng/mL-   Negative for AMI  >0.03 ng/mL-     Abnormal for myocardial necrosis.  Clinicians would have to utilize clinical acumen, EKG, Troponin and serial changes to determine if it is an Acute Myocardial Infarction or myocardial injury due to an underlying chronic condition.       Results may be falsely decreased if patient taking Biotin.      Magnesium [[id number]]  (Normal) Collected: 11/06/20 0323    Specimen: Blood Updated: 11/06/20 0445     Magnesium 2.4 mg/dL     Phosphorus [[id number]]  (Normal) Collected: 11/06/20 0323    Specimen: Blood Updated: 11/06/20 0445     Phosphorus 3.6 mg/dL     TSH [[id number]]  (Abnormal) Collected: 11/06/20 0323    Specimen: Blood Updated: 11/06/20 0445     TSH 4.210 uIU/mL     T4, Free [[id number]]  (Normal) Collected: 11/06/20 0329     Specimen: Blood Updated: 11/06/20 0445     Free T4 1.09 ng/dL     Narrative:      Results may be falsely increased if patient taking Biotin.      Hemoglobin A1c [[id number]]  (Abnormal) Collected: 11/06/20 0026    Specimen: Blood Updated: 11/06/20 0432     Hemoglobin A1C 8.50 %     Narrative:      Hemoglobin A1C Ranges:    Increased Risk for Diabetes  5.7% to 6.4%  Diabetes                     >= 6.5%  Diabetic Goal                < 7.0%    COVID PRE-OP / PRE-PROCEDURE SCREENING ORDER (NO ISOLATION) - Swab, Nasal Cavity [[id number]]  (Normal) Collected: 11/06/20 0134    Specimen: Swab from Nasal Cavity Updated: 11/06/20 0200    Narrative:      The following orders were created for panel order COVID PRE-OP / PRE-PROCEDURE SCREENING ORDER (NO ISOLATION) - Swab, Nasal Cavity.  Procedure                               Abnormality         Status                     ---------                               -----------         ------                     COVID-19, ABBOTT IN-HOUS...[[id number]]  Normal              Final result                 Please view results for these tests on the individual orders.    COVID-19, ABBOTT IN-HOUSE,NP Swab (NO TRANSPORT MEDIA) 2 HR TAT - Swab, Nasal Cavity [[id number]]  (Normal) Collected: 11/06/20 0134    Specimen: Swab from Nasal Cavity Updated: 11/06/20 0200     COVID19 Not Detected    Narrative:      Fact sheet for providers: https://www.fda.gov/media/522185/download     Fact sheet for patients: https://www.fda.gov/media/927424/download    [facility] Draw [[id number]] Collected: 11/06/20 0026    Specimen: Blood Updated: 11/06/20 0130    Narrative:      The following orders were created for panel order [facility] Draw.  Procedure                               Abnormality         Status                     ---------                               -----------         ------                     Light Blue Top[[id number]]                                   Final result               Green Top  (Gel)[[id number]]                                  Final result               Lavender Top[[id number]]                                     Final result               Red Top[[id number]]                                          Final result                 Please view results for these tests on the individual orders.    Light Blue Top [[id number]] Collected: 11/06/20 0026    Specimen: Blood Updated: 11/06/20 0130     Extra Tube hold for add-on     Comment: Auto resulted       Green Top (Gel) [[id number]] Collected: 11/06/20 0026    Specimen: Blood Updated: 11/06/20 0130     Extra Tube Hold for add-ons.     Comment: Auto resulted.       Lavender Top [[id number]] Collected: 11/06/20 0026    Specimen: Blood Updated: 11/06/20 0130     Extra Tube hold for add-on     Comment: Auto resulted       Red Top [[id number]] Collected: 11/06/20 0026    Specimen: Blood Updated: 11/06/20 0130     Extra Tube Hold for add-ons.     Comment: Auto resulted.       Troponin [[id number]]  (Abnormal) Collected: 11/06/20 0026    Specimen: Blood Updated: 11/06/20 0109     Troponin T 0.075 ng/mL     Narrative:      Troponin T Reference Range:  <= 0.03 ng/mL-   Negative for AMI  >0.03 ng/mL-     Abnormal for myocardial necrosis.  Clinicians would have to utilize clinical acumen, EKG, Troponin and serial changes to determine if it is an Acute Myocardial Infarction or myocardial injury due to an underlying chronic condition.       Results may be falsely decreased if patient taking Biotin.      Digoxin Level [[id number]]  (Abnormal) Collected: 11/06/20 0026    Specimen: Blood Updated: 11/06/20 0108     Digoxin 1.60 ng/mL     Basic Metabolic Panel [[id number]]  (Abnormal) Collected: 11/06/20 0026    Specimen: Blood Updated: 11/06/20 0105     Glucose 275 mg/dL      BUN 46 mg/dL      Creatinine 4.34 mg/dL      Sodium 139 mmol/L      Potassium 4.0 mmol/L      Chloride 100 mmol/L      CO2 25.0 mmol/L      Calcium 8.8 mg/dL      eGFR   Amer --     Comment:  <15 Indicative of kidney failure.        eGFR Non African Amer 13 mL/min/1.73      Comment: <15 Indicative of kidney failure.        BUN/Creatinine Ratio 10.6     Anion Gap 14.0 mmol/L     Narrative:      GFR Normal >60  Chronic Kidney Disease <60  Kidney Failure <15      BNP [[id number]]  (Abnormal) Collected: 11/06/20 0026    Specimen: Blood Updated: 11/06/20 0105     proBNP 15,223.0 pg/mL     Narrative:      Among patients with dyspnea, NT-proBNP is highly sensitive for the detection of acute congestive heart failure. In addition NT-proBNP of <300 pg/ml effectively rules out acute congestive heart failure with 99% negative predictive value.    Results may be falsely decreased if patient taking Biotin.      aPTT [[id number]]  (Normal) Collected: 11/06/20 0026    Specimen: Blood Updated: 11/06/20 0058     PTT 33.7 seconds     Protime-INR [[id number]]  (Abnormal) Collected: 11/06/20 0026    Specimen: Blood Updated: 11/06/20 0058     Protime 19.8 Seconds      INR 1.71    CBC & Differential [[id number]]  (Abnormal) Collected: 11/06/20 0026    Specimen: Blood Updated: 11/06/20 0052    Narrative:      The following orders were created for panel order CBC & Differential.  Procedure                               Abnormality         Status                     ---------                               -----------         ------                     CBC Auto Differential[[id number]]        Abnormal            Final result                 Please view results for these tests on the individual orders.    CBC Auto Differential [[id number]]  (Abnormal) Collected: 11/06/20 0026    Specimen: Blood Updated: 11/06/20 0052     WBC 10.71 10*3/mm3      RBC 2.35 10*6/mm3      Hemoglobin 7.8 g/dL      Hematocrit 23.1 %      MCV 98.3 fL      MCH 33.2 pg      MCHC 33.8 g/dL      RDW 13.9 %      RDW-SD 49.1 fl      MPV 10.3 fL      Platelets 229 10*3/mm3      Neutrophil % 87.3 %      Lymphocyte % 5.7 %      Monocyte % 5.4 %      Eosinophil % 0.3 %   "    Basophil % 0.6 %      Immature Grans % 0.7 %      Neutrophils, Absolute 9.36 10*3/mm3      Lymphocytes, Absolute 0.61 10*3/mm3      Monocytes, Absolute 0.58 10*3/mm3      Eosinophils, Absolute 0.03 10*3/mm3      Basophils, Absolute 0.06 10*3/mm3      Immature Grans, Absolute 0.07 10*3/mm3      nRBC 0.0 /100 WBC         Imaging Results (Last 7 Days)     Procedure Component Value Units Date/Time    XR Chest 1 View [[id number]] Collected: 11/06/20 0646     Updated: 11/06/20 0649    Narrative:      Frontal upright radiograph of the chest 11/6/2020 1:10 AM CST     COMPARISON: None     HISTORY: Chest pain.     FINDINGS:   The lungs are clear. Cardiac silhouettes upper limits of normal. Pacing  device present soft tissues the left chest.      The osseous structures and surrounding soft tissues demonstrate no acute  abnormality.       Impression:      1. No radiographic evidence of acute cardiopulmonary process.        This report was finalized on 11/06/2020 06:46 by Dr. Zhang Dutton MD.          Condition on Discharge: Stable    Physical Exam on Discharge:  /57 (BP Location: Left arm, Patient Position: Lying)   Pulse 62   Temp 97.4 °F (36.3 °C) (Oral)   Resp 16   Ht 170.2 cm (67\")   Wt 69.3 kg (152 lb 12.8 oz)   SpO2 97%   BMI 23.93 kg/m²   Physical Exam   Pleasant, appropriate affect, coherent  GEN: Awake, alert, interactive, in NAD  HEENT: EOMI, Anicteric, Trachea midline, no jugular venous distention  Lungs: no overt wheezing or rales  Heart: irreg/irreg, +S1/s2, no rub  ABD: soft, nt/nd, +BS, no guarding/rebound  Extremities:  no cyanosis, 1+ BLE pitting edema   Neuro: AAOx3, no focal deficits  Psych: normal mood & affect  Warm dry skin  Discharge Disposition:  Home or Self Care    Discharge Medications:     Discharge Medications      New Medications      Instructions Start Date   aspirin 81 MG EC tablet   81 mg, Oral, Daily   Start Date: November 10, 2020     dextrose 40 % gel  Commonly known as: " GLUTOSE   15 g, Oral, Every 15 Minutes PRN      isosorbide mononitrate 30 MG 24 hr tablet  Commonly known as: IMDUR   30 mg, Oral, Every 24 Hours Scheduled   Start Date: November 10, 2020     nitroglycerin 0.4 MG SL tablet  Commonly known as: NITROSTAT   0.4 mg, Sublingual, Every 5 Minutes PRN, Take no more than 3 doses in 15 minutes.         Changes to Medications      Instructions Start Date   glipizide 10 MG tablet  Commonly known as: GLUCOTROL  What changed:   · how much to take  · when to take this   5 mg, Oral, 2 Times Daily Before Meals         Continue These Medications      Instructions Start Date   allopurinol 100 MG tablet  Commonly known as: ZYLOPRIM   200 mg, Oral, Daily      dilTIAZem  MG 24 hr capsule  Commonly known as: CARDIZEM CD   240 mg, Oral, 2 Times Daily      epoetin connie-epbx 03010 UNIT/ML injection  Commonly known as: Retacrit   5,000 Units, Subcutaneous, 3 Times Weekly      finasteride 5 MG tablet  Commonly known as: PROSCAR   5 mg, Oral, Daily      folic acid 1 MG tablet  Commonly known as: FOLVITE   1 mg, Oral, Daily      furosemide 40 MG tablet  Commonly known as: LASIX   60 mg, Oral, Daily      hydrALAZINE 50 MG tablet  Commonly known as: APRESOLINE   50 mg, Oral, 2 times daily      metoprolol succinate  MG 24 hr tablet  Commonly known as: TOPROL-XL   100 mg, Oral, 2 times daily      omeprazole 20 MG capsule  Commonly known as: priLOSEC   20 mg, Oral, 2 times daily      pravastatin 20 MG tablet  Commonly known as: PRAVACHOL   1 tablet, Oral, Nightly      sulfaSALAzine 500 MG tablet  Commonly known as: AZULFIDINE   1,000 mg, Oral, 3 Times Daily      terazosin 5 MG capsule  Commonly known as: HYTRIN   1 capsule, Oral, 2 times daily         Stop These Medications    apixaban 5 MG tablet tablet  Commonly known as: ELIQUIS     digoxin 125 MCG tablet  Commonly known as: LANOXIN     ondansetron ODT 4 MG disintegrating tablet  Commonly known as: ZOFRAN-ODT     spironolactone 25 MG  tablet  Commonly known as: ALDACTONE            Discharge Diet:   Diet Instructions     Diet: Cardiac, Renal, Consistent Carbohydrate; Thin      Discharge Diet:  Cardiac  Renal  Consistent Carbohydrate       Fluid Consistency: Thin          Discharge Care Plan / Instructions:  Monitor BP and bring record to PCP  Monitor sugar. Discuss about action plan on hypoglycemia    Activity at Discharge:   Activity Instructions     Gradually Increase Activity Until at Pre-Hospitalization Level      Measure Blood Pressure      Measure Weight            Follow-up Appointments:   PCP within 1 wk  Nephrology per their discretion  Cardiology/Dr. Mariano per his discretion  Test Results Pending at Discharge:      Electronically signed by Jared Ford MD, 11/9/2020, 11:20 CST.    Time:> 30 mins      Part of this note may be an electronic transcription/translation of spoken language to printed text using the Dragon Dictation System.

## 2020-11-09 NOTE — PLAN OF CARE
Goal Outcome Evaluation:  Plan of Care Reviewed With: patient  Progress: improving  Outcome Summary: patient denies pain this shift. vss. /af 60-69. pt reports he will have a ride home today and will have keys to open his house. awaiting EGD results. fistula remains in place. room air. accu check .safety maintained. Will continue to monitor.

## 2020-11-09 NOTE — PROGRESS NOTES
Nephrology ([facility]) Progress Note      Patient:  Gage Ken  YOB: 1940  Date of Service: 11/8/2020  MRN: [id number]   Acct: [id number]   Primary Care Physician: Cayetano Kay MD  Advance Directive:   Code Status and Medical Interventions:   Ordered at: 11/06/20 0309     Level Of Support Discussed With:    Patient     Code Status:    CPR     Medical Interventions (Level of Support Prior to Arrest):    Full     Admit Date: 11/6/2020       Hospital Day: 0  Referring Provider: Delfin Navas MD      Patient personally seen and examined.  Complete chart including Consults, Notes, Operative Reports, Labs, Cardiology, and Radiology studies reviewed as able.        Subjective:  Gage Ken is a 80 y.o. male  whom we were consulted for acute kidney injury.  Patient is well known to our practice with baseline chronic kidney disease stage 5. Most recent office visit was 10/2 and creatinine was 3.9 at that time.  History of type 2 diabetes, hypertension, chronic atrial fibrillation with recent pacemaker placement.  He recently had AV fistula placed by Dr Martinez at [facility] in preparation for dialysis. On 11/2, he underwent a fistulogram and Dr Martinez indicated at that time that he estimated fistula would be ready for use in four weeks.  Patient presented to hospital via EMS with complaint of chest pain and was admitted for further evaluation. Over the last few days he has noted increasing lower extremity edema and dyspnea. Denies any recent changes in urine output. Denies vomiting or diarrhea but does endorse nausea and poor appetite.  He was given IV Lasix overnight and he feels that his swelling and breathing have improved.  Adequate UOP and was on room air.    Today, no events. Patient hopeful for discharge tomorrow. Denied chest pain, shortness of air, nausea vomiting. No bleeding sources recalled.    Allergies:  Patient has no known  Occupational Therapy Discharge Summary  Visit Count: 8    Plan of Care: 5/23/2019 Through: 8/1/2019  Insurance Information: [employer]  Referred by: Allen Nolan NP;   Next provider visit (if known/scheduled): 6/12/19  Medical Diagnosis (from order): S62.325D Closed displaced fracture of shaft of fourth metacarpal bone of left hand with routine healing, subsequent encounter , s/p ORIF of 4th metacarpal    Diagnosis Precautions: progress per MD - ROM or digits and wrist  Chart reviewed at time of initial evaluation (relevant co-morbidities, allergies, tests and medications listed): pt reports history of thumb sprain to left hand     SUBJECTIVE   I punched the wall this weekend because I was mad but my hand didn't hurt after that.     Current Pain (0-10 scale): 0/10   Functional Change:   Able to perform all ADLs and IADLs, improved  strength    OBJECTIVE   6/24/19: Pt able to perform full fist palm to pulp with ease    Ring Finger  5/23/19 6/11/19     MCP Ext/Flex 29-39 0-80     PIP Ext/Flex 81 95     DIP Ext/Flex 30 82     DPC distance 4cm To pulp    Small Finger    to pulp    MCP Ext/Flex 33      PIP Ext/Flex 79      DIP Ext/Flex 70      DPC distance 2cm     Thumb        MCP Ext/Flex        IP Ext/Flex        Radial abduction        Palmar abduction        Tip Opposition All digits except small finger All digits     Opposition to      Base of 5th digit unable  intact   Key: Ext=extension, Flex=flexion, SF=small finger, MCP=metacarpophalangeal joint, PIP=proximal interphalangeal joint, DIP=distal interphalangeal joints; IP=interphalangeal joint; DPC =distal palmar crease, IP=interphalangeal joint ; standard testing positions unless otherwise noted; ranges are reported in active range of motion unless noted as AA=active assistive or P=passive range of motion; * denotes pain   Comments: Only those motions that were assessed are noted.; *denotes pain     Range of Motion (degrees):  Wrist Active Range of Motion     Norm Left Right Left Left   Date   Initial Initial 6/11/19 6/24/19   Wrist Flexion 80°  870 46 70 60 62   Wrist Extension 70°  70°  52* 65 60 60   Radial Deviation 20°  20°  15 20 20    Ulnar Deviation 45°  45°  20 40 35    Forearm Supination 90°  90°  80 90     Forearm Pronation 90°  90°  90 90     [standard testing positions unless otherwise noted]  Comments: * denotes pain    /Pinch (pounds of force)     Left Right Left Left   Date Initial Initial 6/11/19 6/24/19    35  85  57 - painful ulnar side of wrist following  74 - pain ulnar side of wrist after gripping   Lateral Pinch 22   25  23   3 Point Pinch  20  23  20   standard testing positions unless otherwise noted,* denotes pain, average reported  Comments: Only muscle strength that was assessed are noted.  Norms:  : Age: 14-15: male: left 64.4+/-14.9, right 77.3+/-15.4; female: left 49.3+/-11.9, right 58.1+/-12.3  Key/lateral pinch: Age: 14-15: male: left 19.9+/-3.7, right 20.9+/-3.8; female: left 14.8+/-2.7, right 15.6+/-2.5  Palmar/3 point pinch: Age: 14-15: male: left 18.8+/-5.0, right 19.2+/-4.2; female: left 14.7+/-3.4, right 15.6+/-3.3  Tip pinch: Age: 14-15: male: left 12.6+/-3.0, right 13.1+/-2.9; female: left 9.5+/-2.4, right 10.2+/-2.3      Treatment   Therapeutic Exercise:  · Final measurements, strength, DASH completed  · Finalized HEP for discharge:  · Green putty resisted digit extension x 10 reps  · Green putty gross grasp x 10 reps with 5 second hold  · Wrist strengthening 5# flexion, extension and RU deviation x 10 reps  · Educated patient to ease back into activity, avoid physical contact using hand      Skilled input: verbal instruction/cues, tactile instruction/cues, posture correction, facilitation, inhibition    Home Program:   * above=instructed home program    Exercise: Date issued Date DC Comments   Wrist and digit ROM, flexor tendon glides 5/23/19        yellow putty drags  5/28/19        extensor tendon glides  allergies.    Home Meds:  Medications Prior to Admission   Medication Sig Dispense Refill Last Dose   • apixaban (ELIQUIS) 5 MG tablet tablet Take 5 mg by mouth 2 (Two) Times a Day.   11/6/2020 at Unknown time   • digoxin (LANOXIN) 125 MCG tablet Take 1 tablet by mouth Daily. 30 tablet 0 11/6/2020 at Unknown time   • dilTIAZem CD (CARDIZEM CD) 240 MG 24 hr capsule Take 240 mg by mouth 2 (Two) Times a Day.   11/6/2020 at Unknown time   • epoetin connie-epbx (Retacrit) 41167 UNIT/ML injection Inject 0.5 mL under the skin into the appropriate area as directed 3 (Three) Times a Week. Indications: Anemia associated with Chronic Kidney Failure 6.6 mL 0 11/6/2020 at Unknown time   • finasteride (PROSCAR) 5 MG tablet Take 1 tablet by mouth Daily. 30 tablet 0 11/6/2020 at Unknown time   • folic acid (FOLVITE) 1 MG tablet Take 1 mg by mouth Daily.   11/6/2020 at Unknown time   • furosemide (LASIX) 40 MG tablet Take 60 mg by mouth Daily.  2 11/6/2020 at Unknown time   • glipiZIDE (GLUCOTROL) 10 MG tablet Take 1 tablet by mouth 2 (Two) Times a Day.  3 11/6/2020 at Unknown time   • hydrALAZINE (APRESOLINE) 50 MG tablet Take 50 mg by mouth 2 (two) times a day.      • metoprolol succinate XL (TOPROL-XL) 100 MG 24 hr tablet Take 100 mg by mouth 2 (two) times a day.   11/6/2020 at Unknown time   • omeprazole (priLOSEC) 20 MG capsule Take 20 mg by mouth 2 (two) times a day.   11/6/2020 at Unknown time   • allopurinol (ZYLOPRIM) 100 MG tablet Take 2 tablets by mouth Daily. 30 tablet 0 More than a month at Unknown time   • ondansetron ODT (ZOFRAN-ODT) 4 MG disintegrating tablet Place 4 mg on the tongue Every 6 (Six) Hours As Needed for Nausea or Vomiting.      • pravastatin (PRAVACHOL) 20 MG tablet Take 1 tablet by mouth Every Night.  2    • spironolactone (ALDACTONE) 25 MG tablet Take 25 mg by mouth Daily.      • sulfaSALAzine (AZULFIDINE) 500 MG tablet Take 2 tablets by mouth 3 (Three) Times a Day. 360 tablet 1    • terazosin (HYTRIN)  5 MG capsule Take 1 capsule by mouth 2 (two) times a day.  2        Medicines:  Current Facility-Administered Medications   Medication Dose Route Frequency Provider Last Rate Last Dose   • acetaminophen (TYLENOL) tablet 650 mg  650 mg Oral Q4H PRN Delfin Navas MD        Or   • acetaminophen (TYLENOL) 160 MG/5ML solution 650 mg  650 mg Oral Q4H PRN Delfin Navas MD        Or   • acetaminophen (TYLENOL) suppository 650 mg  650 mg Rectal Q4H PRN Delfin Navas MD       • allopurinol (ZYLOPRIM) tablet 200 mg  200 mg Oral Daily Delfin Navas MD   200 mg at 11/08/20 0907   • aspirin EC tablet 81 mg  81 mg Oral Daily Donavon Hicks MD   81 mg at 11/08/20 0907   • dextrose (D50W) 25 g/ 50mL Intravenous Solution 25 g  25 g Intravenous Q15 Min PRN Delfin Navas MD       • dextrose (GLUTOSE) oral gel 15 g  15 g Oral Q15 Min PRN Delfin Navas MD       • dilTIAZem CD (CARDIZEM CD) 24 hr capsule 240 mg  240 mg Oral BID Delfin Navas MD   240 mg at 11/08/20 0908   • epoetin connie-epbx (RETACRIT) 5,000 Units  5,000 Units Subcutaneous 3x Weekly Delfin Navas MD   Stopped at 11/06/20 1332   • finasteride (PROSCAR) tablet 5 mg  5 mg Oral Daily Delfin Navas MD   5 mg at 11/08/20 0907   • folic acid (FOLVITE) tablet 1 mg  1 mg Oral Daily Delfin Navas MD   1 mg at 11/08/20 0908   • furosemide (LASIX) tablet 60 mg  60 mg Oral Daily Nehal Meraz APRN   60 mg at 11/08/20 0908   • glipizide (GLUCOTROL) tablet 10 mg  10 mg Oral BID AC Delfin Navas MD   10 mg at 11/08/20 1702   • glucagon (human recombinant) (GLUCAGEN DIAGNOSTIC) injection 1 mg  1 mg Subcutaneous Q15 Min PRN Delfin Navas MD       • hydrALAZINE (APRESOLINE) tablet 50 mg  50 mg Oral Q12H Donavon Campbell    50 mg at 11/08/20 0908   • insulin lispro (humaLOG) injection 2-9 Units  2-9 Units Subcutaneous TID AC Delfin Navas MD   6 Units at 11/08/20 1702   • isosorbide mononitrate (IMDUR) 24 hr   6/3/19       Green putty resisted extension, gross grasp, finger drags  6/5/19        wrist strengthening  6/11/19          Writer verbally educated the patient and received verbal consent from the patient on hand placement, positioning of patient, and techniques to be performed today including therapist position for techniques, hand placement and palpation for techniques as described above and how they are pertinent to the patient's plan of care.       Suggestions for next session as indicated: plan for discharge    ASSESSMENT   Occupational Therapy Discharge Summary    Referred by: Allen Nolan NP  Medical Diagnosis (from order): see above    Current Functional Limitations: pt reporting mild soreness with bench pressing    OBJECTIVE   remeasurements as noted in attached daily treatment note    Outcome Measure: (Outcome Scoring)   Quick Disabilities of the Arm, Shoulder, and Hand (QDASH): Initial Outcome Score: 14 (11-55); Calculated Score: 6.82 (0-100); Discharge Outcome Score: 11 (11-55); Calculated Score: 0 (0-100)    ASSESSMENT   Pt seen for 8 OT sessions to address above. To date the patient has made gains as expected as reported. Pt reports no pain during all ADL and IADL tasks, however reports mild pain initially with heavier lifting but subsides. Pt ready to discharge at this time and continue with HEP.     Result of above outlined education: Verbalizes understanding, Demonstrates understanding and Needs reinforcement      Discharge from skilled therapy with instructions/recommendations: continue with HEP        PLAN    Discharge from therapy    Discharge Measures:   Total Number of Visits: 8  Treatment Category: Wrist/Hand/Digit, Surgical  Outcome Measure: above  Primary Clinician: Jamar Luciano      Goals: To be obtained by end of this plan of care:  1. Patient will be independent with progressed and modified home exercise program MET  2. Achieve active fingertip to palm composite flexion for  resumption of small object grasp and hold, ½ inch diameter tool use (eating utensil, toothbrush) and wring out wash cloth. MET  3. Achieve active finger extension sufficient to reach into pants pocket, wipe flat surface or face. MET  4. Achieve pinch / dexterity sufficient to  coins, button, tie shoes, pull zipper, type. MET  5. Achieve  strength of 25 pounds for resumption of light grocery bag lift,  steering wheel, perform light home/yard maintenance tasks. MET  6. Achieve pinch strength of 10 pounds for resumption of functional writing, cap/lid removal. MET  7. Improve coordination speed for resumption of work related tasks, leisure pursuits, playing of musical instrument, writing, typing. MET  8. Quick DASH: Patient will complete form to reflect an improved score from initial score of 14 to less than or equal to 0 to indicate patient reported improvement in function/disability/impairment. MET       THERAPY DAILY BILLING   Insurance: [employer] 2. N/A    Evaluation Procedures:  No evaluation codes were used on this date of service    Timed Procedures:  Therapeutic Exercise, 29 minutes    Untimed Procedures:  No untimed codes were used on this date of service    Total Treatment Time: 29 minutes   tablet 30 mg  30 mg Oral Q24H Sravanthi Waldron APRN   30 mg at 11/08/20 0908   • linagliptin (TRADJENTA) tablet 5 mg  5 mg Oral Daily Delfin Navas MD   5 mg at 11/08/20 0907   • metoprolol succinate XL (TOPROL-XL) 24 hr tablet 100 mg  100 mg Oral BID Delfin Navas MD   100 mg at 11/08/20 0907   • nitroglycerin (NITROSTAT) SL tablet 0.4 mg  0.4 mg Sublingual Q5 Min PRN Delfin Navas MD   0.4 mg at 11/06/20 0353   • ondansetron (ZOFRAN) tablet 4 mg  4 mg Oral Q6H PRN Delfin Navas MD        Or   • ondansetron (ZOFRAN) injection 4 mg  4 mg Intravenous Q6H PRN Delfin Navas MD       • pantoprazole (PROTONIX) EC tablet 40 mg  40 mg Oral QAM Delfin Navas MD   40 mg at 11/08/20 0618   • pravastatin (PRAVACHOL) tablet 20 mg  20 mg Oral Nightly Delfin Navas MD   20 mg at 11/07/20 2239   • sodium chloride 0.9 % flush 10 mL  10 mL Intravenous Q12H Delfin Navas MD   10 mL at 11/08/20 0908   • sodium chloride 0.9 % flush 10 mL  10 mL Intravenous PRN Delfin Navas MD       • sulfaSALAzine (AZULFIDINE) tablet 1,000 mg  1,000 mg Oral TID Delfin Navas MD   1,000 mg at 11/08/20 1702   • terazosin (HYTRIN) capsule 10 mg  10 mg Oral Nightly Delfin Navas MD   10 mg at 11/07/20 2238   • zolpidem (AMBIEN) tablet 5 mg  5 mg Oral Nightly PRN Blaine Palumbo DO   5 mg at 11/07/20 2237       Past Medical History:  Past Medical History:   Diagnosis Date   • Acute gastritis    • Atrial fibrillation (CMS/HCC)    • Blood in feces    • Carotid artery occlusion 12/2/2019   • Colitis, ulcerative chronic (CMS/HCC)     LEFT-SIDED   • Diverticular disease of colon    • Epigastric pain    • GERD (gastroesophageal reflux disease)    • History of colon polyps    • [place] (hard of hearing)    • Hyperlipidemia    • Hypertension    • Lesion of nose    • Neoplasm of uncertain behavior    • Nonspecific ulcerative proctitis (CMS/HCC)    • Rectal hemorrhage    • Renal disorder    • Type 2 diabetes  mellitus (CMS/HCC)    • Vitamin B12 deficiency        Past Surgical History:  Past Surgical History:   Procedure Laterality Date   • COLONOSCOPY  12/02/2013    Hemorrhoids found.   • COLONOSCOPY  09/06/2016   • COLONOSCOPY N/A 10/30/2017    Procedure: COLONOSCOPY;  Surgeon: Alex Silveira MD;  Location: [facility] ENDOSCOPY;  Service:    • COLONOSCOPY N/A 11/6/2018    Procedure: COLONOSCOPY;  Surgeon: Alex Silveira MD;  Location: [facility] ENDOSCOPY;  Service: Gastroenterology   • COLONOSCOPY N/A 11/21/2019    Procedure: COLONOSCOPY;  Surgeon: Alex Silveira MD;  Location: [facility] ENDOSCOPY;  Service: Gastroenterology   • COLONOSCOPY W/ POLYPECTOMY  08/30/2015    Single polyp found in the colon;removed by cold snare polypectomy.Proctitis in the rectum.Diverticulosis found in the sigmoid colon.Hemorrhoids found.   • ENDOSCOPY  12/02/2013    Normal hypopharynx, esophagus, duodenum, symmetrical & patent pylorus. Hiatus hernia in GE junction. Normal stomach. Biopsy taken.   • HERNIA REPAIR      umbilical   • INSERT / REPLACE / REMOVE PACEMAKER     • UPPER GASTROINTESTINAL ENDOSCOPY  12/02/2013       Family History  Family History   Problem Relation Age of Onset   • Diabetes Other    • Hypertension Other    • Stroke Mother    • Stroke Father        Social History  Social History     Socioeconomic History   • Marital status: Single     Spouse name: Not on file   • Number of children: Not on file   • Years of education: Not on file   • Highest education level: Not on file   Tobacco Use   • Smoking status: Former Smoker     Types: Cigarettes   • Smokeless tobacco: Former User     Types: Snuff     Quit date: 2008   • Tobacco comment: 11/30/2017 - Patient States He Ceased Smoking 13 Years Prior.   Substance and Sexual Activity   • Alcohol use: Yes     Alcohol/week: 4.0 standard drinks     Types: 4 Cans of beer per week     Comment: occasional   • Drug use: No   • Sexual activity: Defer         Review of Systems:  History  obtained from chart review and the patient  General ROS: No fever or chills  Respiratory ROS: No cough, shortness of breath, wheezing  Cardiovascular ROS: No chest pain or palpitations  Gastrointestinal ROS: No abdominal pain or melena  Genito-Urinary ROS: No dysuria or hematuria    Objective:  Patient Vitals for the past 24 hrs:   BP Temp Temp src Pulse Resp SpO2 Weight   11/08/20 1500 129/46 98 °F (36.7 °C) Oral 63 14 98 % --   11/08/20 1203 140/59 98.6 °F (37 °C) Oral 61 16 97 % --   11/08/20 0818 142/56 98.6 °F (37 °C) Oral 70 14 95 % --   11/08/20 0406 132/49 98.1 °F (36.7 °C) Oral 86 16 96 % --   11/07/20 2347 142/70 97.9 °F (36.6 °C) Oral 64 14 97 % --   11/07/20 2036 132/62 97.2 °F (36.2 °C) Oral 64 16 97 % 69.2 kg (152 lb 9.6 oz)       Intake/Output Summary (Last 24 hours) at 11/8/2020 2010  Last data filed at 11/8/2020 1909  Gross per 24 hour   Intake 1100 ml   Output 400 ml   Net 700 ml     General: awake/alert   Chest:  clear to auscultation bilaterally without respiratory distress  CVS: IRRR  Abdominal: soft, nontender, positive bowel sounds  Extremities: ble edema  Skin: warm and dry without rash      Labs:  Results from last 7 days   Lab Units 11/08/20  1121 11/08/20  0453 11/07/20  1734  11/07/20  0301 11/06/20  0324   WBC 10*3/mm3  --  5.79  --   --  5.44 10.50   HEMOGLOBIN g/dL 8.4* 7.8* 8.5*   < > 6.6* 7.5*   HEMATOCRIT % 25.4* 23.6* 24.6*   < > 20.7* 22.8*   PLATELETS 10*3/mm3  --  200  --   --  209 231    < > = values in this interval not displayed.         Results from last 7 days   Lab Units 11/08/20  0453 11/07/20  0301 11/06/20  0324   SODIUM mmol/L 141 141 140   POTASSIUM mmol/L 3.5 4.2 4.4   CHLORIDE mmol/L 104 104 103   CO2 mmol/L 26.0 26.0 23.0   BUN mg/dL 49* 50* 48*   CREATININE mg/dL 4.22* 4.46* 4.40*   CALCIUM mg/dL 8.5* 8.4* 8.8   BILIRUBIN mg/dL  --   --  0.3   ALK PHOS U/L  --   --  226*   ALT (SGPT) U/L  --   --  21   AST (SGOT) U/L  --   --  26   GLUCOSE mg/dL 56* 76 253*        Radiology:   Imaging Results (Last 72 Hours)     Procedure Component Value Units Date/Time    XR Chest 1 View [[id number]] Collected: 11/06/20 0646     Updated: 11/06/20 0649    Narrative:      Frontal upright radiograph of the chest 11/6/2020 1:10 AM CST     COMPARISON: None     HISTORY: Chest pain.     FINDINGS:   The lungs are clear. Cardiac silhouettes upper limits of normal. Pacing  device present soft tissues the left chest.      The osseous structures and surrounding soft tissues demonstrate no acute  abnormality.       Impression:      1. No radiographic evidence of acute cardiopulmonary process.        This report was finalized on 11/06/2020 06:46 by Dr. Zhang Dutton MD.          Culture:  No results found for: BLOODCX, URINECX, WOUNDCX, MRSACX, RESPCX, STOOLCX      Assessment   CKD V  DM2 with Nephropathy  HTN  Anemia   Chronic diastolic CHF     Plan:  Received IV diuretics with good symptomatic improvement  Monitor response over the weekend, will need permcath if dialysis required  Monitor labs  Reassess in AM  Hemoglobin better posttransfusion continue to monitor and transfuse again if needed  No need for RRT at this point, should be read for d/c tomorrow when arrangements established    Viet Magaña MD  11/8/2020  20:10 CST

## 2020-11-09 NOTE — PROGRESS NOTES
Continued Stay Note   Noemi     Patient Name: Gage Ken  MRN: [id number]  Today's Date: 11/9/2020    Admit Date: 11/6/2020    Discharge Plan     Row Name 11/09/20 0947       Plan    Plan  Home    Patient/Family in Agreement with Plan  yes    Plan Comments  Chart reviewed; events noted.  Pt plans to dc home.  Pt would benefit from  services.        Discharge Codes    No documentation.             ENEDELIA Aleman

## 2020-11-10 NOTE — OUTREACH NOTE
Prep Survey      Responses   [facility] facility patient discharged from?  [facility]   Is LACE score < 7 ?  No   Eligibility  Readm Mgmt   Discharge diagnosis  **Chest pain, atypical, Chronic diastolic congestive heart failure    Does the patient have one of the following disease processes/diagnoses(primary or secondary)?  CHF   Does the patient have Home health ordered?  No   Is there a DME ordered?  No   Medication alerts for this patient  ASA    Prep survey completed?  Yes          Alicia Ordoñez RN

## 2020-11-12 NOTE — OUTREACH NOTE
CHF Week 1 Survey      Responses   [facility] patient discharged from?  [place]   Does the patient have one of the following disease processes/diagnoses(primary or secondary)?  CHF   CHF Week 1 attempt successful?  No   Unsuccessful attempts  Attempt 1          Cathy Hoover RN

## 2020-11-13 NOTE — ED TRIAGE NOTES
Pt sent here by [facility] told he is in fluid overload. Report from office is that pt may need to have perm cath placed for HD.  Pt has fistula currently that is not mature

## 2020-11-13 NOTE — ED PROVIDER NOTES
[facility] [place] [facility]  eMERGENCYdEPARTMENT eNCOUnter      Pt Name: Jessica Cline  MRN: [id number]  [place] 1940  Date of evaluation: 11/13/2020  Provider:BRANDIE Sharma    CHIEF COMPLAINT       Chief Complaint   Patient presents with    Chronic Kidney Disease     fluid overload, may need perm cath per kidney specialist office has fistula not mature         HISTORY OF PRESENT ILLNESS  (Location/Symptom, Timing/Onset, Context/Setting, Quality, Duration, Modifying Factors, Severity.)   Jessica Cline is a 68 y.o. male who presents to the emergency department here with chronic kidney disease establishing worse and worse. Patient has history of peripheral vascular disease A. fib on Eliquis and baby aspirin complains of heart pain/chest pain 1 week ago last Thursday he was admitted to [facility] and discharged this following Monday has a pacemaker with normal interrogation over the weekend. Patient has a fistula established in his right [facility] by Dr. Taylor Marking today did this procedure week and a half ago with the intention to likely start dialysis at some point he was sent here by the nephrology office [facility] with Dr. Duy Lewis and use of they wanted him admitted for PermCath placement. Patient admits to fatigue denies any chest pain today no fever. Admits to swelling in his legs and abdomen. [phone] Repeating troponin at this time patient had elevated troponin at [facility] last week as high as 0.3 currently 0.14 we are going to see if it is stable or trending down at this time. HPI    Nursing Notes were reviewed and I agree. REVIEW OF SYSTEMS    (2-9 systems for level 4, 10 or more for level 5)     Review of Systems   Constitutional: Negative for activity change, appetite change, chills and fever. HENT: Negative for congestion and dental problem. Eyes: Negative for photophobia, discharge and itching. Respiratory: Negative for apnea, cough and shortness of breath. Cardiovascular: Negative for chest pain. Gastrointestinal: Positive for abdominal distention. Musculoskeletal: Positive for joint swelling. Negative for arthralgias, back pain, gait problem, myalgias and neck pain. Skin: Negative for color change, pallor and rash. Neurological: Negative for dizziness, seizures and syncope. Psychiatric/Behavioral: Negative for agitation. The patient is not nervous/anxious. Except as noted above the remainder of the review of systems was reviewed and negative.        PAST MEDICAL HISTORY     Past Medical History:   Diagnosis Date    Arthritis     Atherosclerosis of native arteries of the extremities with intermittent claudication     Blood circulation, collateral     CAD (coronary artery disease)     Carotid artery occlusion, right     Chronic kidney disease     Depression     GERD (gastroesophageal reflux disease)     History of blood transfusion     Hyperlipidemia     Hypertension     Pneumonia due to infectious organism 4/9/2019    Type II or unspecified type diabetes mellitus without mention of complication, not stated as uncontrolled     [facility]         SURGICAL HISTORY       Past Surgical History:   Procedure Laterality Date    CATARACT REMOVAL Bilateral     DIALYSIS FISTULA CREATION Right 10/16/2020    RIGHT BRACHIAL/BASILIC AV FISTULA performed by Blessing Jaeger MD at [facility] 1753      umbilical hernia    PACEMAKER PLACEMENT      [address]       Discharge Medication List as of 11/13/2020  6:40 PM      CONTINUE these medications which have NOT CHANGED    Details   temazepam (RESTORIL) 15 MG capsule TAKE 1 CAPSULE BY MOUTH EVERY DAYHistorical Med      HYDROcodone-acetaminophen (NORCO) 5-325 MG per tablet hydrocodone 5 mg-acetaminophen 325 mg tabletHistorical Med      ondansetron (ZOFRAN-ODT) 4 MG disintegrating tablet ondansetron 4 mg disintegrating tablet   Take 1 tablet every 6 hours by oral route as needed. Historical Med      omeprazole (PRILOSEC) 20 MG delayed release capsule Take 20 mg by mouth 2 times dailyHistorical Med      digoxin (LANOXIN) 125 MCG tablet Take 125 mcg by mouth dailyHistorical Med      hydrALAZINE (APRESOLINE) 50 MG tablet Take 50 mg by mouth 3 times dailyHistorical Med      metoprolol succinate (TOPROL XL) 100 MG extended release tablet TAKE 1 TABLET BY MOUTH TWICE A DAY, Disp-180 tablet, R-3Normal      dilTIAZem (CARDIZEM CD) 240 MG extended release capsule Take 1 capsule by mouth 2 times daily, Disp-60 capsule, R-5Normal      apixaban (ELIQUIS) 5 MG TABS tablet Take 1 tablet by mouth 2 times daily, Disp-60 tablet, R-5Normal      Multiple Vitamins-Minerals (THERAPEUTIC MULTIVITAMIN-MINERALS) tablet Take 1 tablet by mouth dailyHistorical Med      sulfaSALAzine (AZULFIDINE) 500 MG tablet Take 1,000 mg by mouth 3 times daily Historical Med      furosemide (LASIX) 40 MG tablet Take 40 mg by mouth dailyHistorical Med      folic acid (FOLVITE) 1 MG tablet Take 1 mg by mouth dailyHistorical Med      glipiZIDE (GLUCOTROL) 10 MG tablet Take 10 mg by mouth 2 times daily (before meals)Historical Med      pravastatin (PRAVACHOL) 20 MG tablet Take 20 mg by mouth nightly Historical Med      terazosin (HYTRIN) 10 MG capsule Take 5 mg by mouth 2 times daily Historical Med             ALLERGIES     Patient has no known allergies.     FAMILY HISTORY       Family History   Problem Relation Age of Onset    High Blood Pressure Mother     Heart Disease Mother     Stroke Mother         at 48    High Blood Pressure Father     Heart Disease Father     Stroke Father         at 59    No Known Problems Brother     Alzheimer's Disease Brother         onset at 80, then rapidly progressed to death          SOCIAL HISTORY       Social History     Socioeconomic History    Marital status:      Spouse name: None    Number of children: 3    Years of education: None    Highest education level: None   Occupational History    Occupation: Trunk Archiveer   Social Needs    Financial resource strain: None    Food insecurity     Worry: None     Inability: None    Transportation needs     Medical: None     Non-medical: None   Tobacco Use    Smoking status: Former Smoker     Packs/day: 1.00     Years: 50.00     Pack years: 50.00     Types: Cigarettes     Last attempt to quit: 2006     Years since quittin.8    Smokeless tobacco: Former User     Types: Snuff   Substance and Sexual Activity    Alcohol use: Yes     Comment: occasional    Drug use: Never    Sexual activity: None   Lifestyle    Physical activity     Days per week: None     Minutes per session: None    Stress: None   Relationships    Social connections     Talks on phone: None     Gets together: None     Attends Mormonism service: None     Active member of club or organization: None     Attends meetings of clubs or organizations: None     Relationship status: None    Intimate partner violence     Fear of current or ex partner: None     Emotionally abused: None     Physically abused: None     Forced sexual activity: None   Other Topics Concern    None   Social History Narrative    Worked on a [employer] most of his life     twice and     He has 2 sons and one daughter    Never in the [employer]    Education high school    Attends [address] one pack per day quit in  denies alcohol consumption or substance usage    Physically sedentary    /////////////////////////////////////////////////////////////////    CODE STATUS: Full Code    HEALTH CARE PROXY: His oldest son, Luis Miguel Patton, [phone]    DOMICILED: Lives alone in a private home with one step in to home but no steps within, has no pets    AMBULATES: independantly       SCREENINGS    Fina Coma Scale  Eye Opening: Spontaneous  Best Verbal Response: Oriented  Best Motor Response: Obeys commands  Fina Coma Scale Score: 15      PHYSICAL EXAM    (up to 7 forlevel 4, 8 or more for level 5)     ED Triage Vitals   BP Temp Temp Source Pulse Resp SpO2 Height Weight   11/13/20 1351 11/13/20 1351 11/13/20 1351 11/13/20 1351 11/13/20 1351 11/13/20 1351 -- 11/13/20 1356   (!) 141/53 97.3 °F (36.3 °C) Temporal 66 18 94 %  162 lb 6.4 oz (73.7 kg)       Physical Exam  Vitals signs and nursing note reviewed. Constitutional:       General: He is not in acute distress. Appearance: Normal appearance. He is well-developed. He is not diaphoretic. HENT:      Head: Normocephalic and atraumatic. Right Ear: Tympanic membrane, ear canal and external ear normal.      Left Ear: Tympanic membrane, ear canal and external ear normal.      Nose: Nose normal.      Mouth/Throat:      Mouth: Mucous membranes are moist.   Eyes:      Pupils: Pupils are equal, round, and reactive to light. Neck:      Musculoskeletal: Normal range of motion and neck supple. Trachea: No tracheal deviation. Cardiovascular:      Rate and Rhythm: Normal rate and regular rhythm. Pulses: Normal pulses. Heart sounds: Normal heart sounds. No murmur. Pulmonary:      Effort: Pulmonary effort is normal.      Breath sounds: Normal breath sounds. No stridor. No wheezing. Chest:      Chest wall: No tenderness. Abdominal:      General: Abdomen is flat. Bowel sounds are normal. There is distension. Palpations: Abdomen is soft. Tenderness: There is no abdominal tenderness. Musculoskeletal: Normal range of motion. Right lower leg: Edema present. Left lower leg: Edema present. Skin:     General: Skin is warm and dry. Capillary Refill: Capillary refill takes less than 2 seconds. Neurological:      General: No focal deficit present. Mental Status: He is alert and oriented to person, place, and time. Mental status is at baseline.    Psychiatric:         Mood and Affect: Mood normal.         Behavior: Behavior normal. Thought Content: Thought content normal.         Judgment: Judgment normal.           DIAGNOSTIC RESULTS     RADIOLOGY:   Non-plain film images such as CT, Ultrasound and MRI are read by the radiologist. Plain radiographic images are visualized and preliminarilyinterpreted by No att. providers found with the below findings:      Interpretation per the Radiologist below, if available at the time of this note:    XR CHEST PORTABLE   Final Result   1. Stable chest exam without acute process. Underlying mild   cardiomegaly which is stable. No infiltrate or pulmonary edema.    Signed by Dr Lela Ambrocio on 11/13/2020 3:35 PM          LABS:  Labs Reviewed   CBC WITH AUTO DIFFERENTIAL - Abnormal; Notable for the following components:       Result Value    RBC 2.89 (*)     Hemoglobin 9.4 (*)     Hematocrit 29.1 (*)     .7 (*)     MCH 32.5 (*)     MCHC 32.3 (*)     RDW 15.0 (*)     Neutrophils % 70.5 (*)     Lymphocytes % 15.5 (*)     Monocytes % 10.5 (*)     All other components within normal limits   COMPREHENSIVE METABOLIC PANEL W/ REFLEX TO MG FOR LOW K - Abnormal; Notable for the following components:    Sodium 135 (*)     Glucose 274 (*)     BUN 55 (*)     CREATININE 4.0 (*)     GFR Non- 15 (*)     GFR African American 18 (*)     Alkaline Phosphatase 289 (*)     ALT 84 (*)     AST 60 (*)     All other components within normal limits   URINE RT REFLEX TO CULTURE - Abnormal; Notable for the following components:    Glucose, Ur 250 (*)     Protein,  (*)     All other components within normal limits   BRAIN NATRIURETIC PEPTIDE - Abnormal; Notable for the following components:    Pro-BNP 20,332 (*)     All other components within normal limits   TROPONIN - Abnormal; Notable for the following components:    Troponin 0.14 (*)     All other components within normal limits    Narrative:     CALL  Charan CULVER tel. ,  Chemistry results called to and read back by Patrick Mendoza RN in ER, 11/13/2020  15:53, by GAYATHRI   PROTIME-INR - Abnormal; Notable for the following components:    Protime 18.7 (*)     INR 1.55 (*)     All other components within normal limits   TROPONIN - Abnormal; Notable for the following components:    Troponin 0.14 (*)     All other components within normal limits    Narrative:     Vickie Олег tel. ,  Chemistry results called to and read back by Pennie Buerger RN in ER, 11/13/2020  18:06, by Sasha Cornejo - Abnormal; Notable for the following components:    Bacteria, UA NEGATIVE (*)     Crystals, UA NEG (*)     All other components within normal limits   APTT       All other labs were within normal range or notreturned as of this dictation. RE-ASSESSMENT        EMERGENCY DEPARTMENT COURSE and DIFFERENTIAL DIAGNOSIS/MDM:   Vitals:    Vitals:    11/13/20 1351 11/13/20 1356 11/13/20 1908   BP: (!) 141/53  137/64   Pulse: 66  67   Resp: 18  21   Temp: 97.3 °F (36.3 °C)  97.9 °F (36.6 °C)   TempSrc: Temporal  Oral   SpO2: 94%  97%   Weight:  162 lb 6.4 oz (73.7 kg)        MDM  Based on troponin at [facility] his numbers have trended down quite a bit here and remains stable at 0.14. His BNP is quite elevated. I talked to Dr. Shiv Nuñez with nephrology who wants me to double his Lasix over the weekend and do IV medicine here. The plan will be for him to call them over the phone in the office for next week with plan for dialysis set up. PROCEDURES:    Procedures      FINAL IMPRESSION      1. At high risk for fluid overload    2.  Stage 4 chronic kidney disease [facility])          DISPOSITION/PLAN   DISPOSITION Decision To Discharge 11/13/2020 06:21:37 PM      PATIENT REFERRED TO:  Wily Izabela oRdrigue EMERGENCY DEPT  [facility]  [phone]    If symptoms worsen      DISCHARGE MEDICATIONS:  Discharge Medication List as of 11/13/2020  6:40 PM          (Please note that portions of this note were completed with a voice recognition program.  Efforts were made to edit the dictations but occasionallywords are mis-transcribed.)    Bob Kearns 986, [place]  11/13/20 0848

## 2020-11-13 NOTE — ED PROVIDER NOTES
Attending Supervisory Note/Shared Visit    I have reviewed the mid-levels findings and agree. We have discussed the case reviewed the diagnostic data. Patient's troponin was a lot higher at [facility] last week. Like to recheck it make sure its plateaued. Otherwise I am in agreement to the plan up to this far.   Marisela Flynn MD  Attending Emergency Physician        Curtis Graves MD  11/13/20 5524

## 2020-11-16 NOTE — OUTREACH NOTE
CHF Week 1 Survey      Responses   [facility] patient discharged from?  [place]   Does the patient have one of the following disease processes/diagnoses(primary or secondary)?  CHF   CHF Week 1 attempt successful?  Yes   Call start time  1151   Call end time  1153   Discharge diagnosis  **Chest pain, atypical, Chronic diastolic congestive heart failure    Is patient permission given to speak with other caregiver?  No   Meds reviewed with patient/caregiver?  Yes   Is the patient having any side effects they believe may be caused by any medication additions or changes?  No   Does the patient have all medications ordered at discharge?  Yes   Is the patient taking all medications as directed (includes completed medication regime)?  Yes   Does the patient have a primary care provider?   Yes   Does the patient have an appointment with their PCP within 7 days of discharge?  Yes   Has the patient kept scheduled appointments due by today?  Yes   Pulse Ox monitoring  None   Psychosocial issues?  No   Did the patient receive a copy of their discharge instructions?  Yes   Nursing interventions  Reviewed instructions with patient, Educated on MyChart   What is the patient's perception of their health status since discharge?  Same   Nursing interventions  Nurse provided patient education   Is the patient weighing daily?  No   Does the patient have scales?  Yes   Daily weight interventions  Education provided on importance of daily weight   Is the patient able to teach back Heart Failure diet management?  Yes   Is the patient able to teach back Heart Failure Zones?  Yes   Is the patient able to teach back signs and symptoms of worsening condition? (i.e. weight gain, shortness of air, etc.)  Yes   If the patient is a current smoker, are they able to teach back resources for cessation?  Not a smoker   Is the patient/caregiver able to teach back the hierarchy of who to call/visit for symptoms/problems? PCP, Specialist, Home  health nurse, Urgent Care, ED, 911  Yes    [facility] Week 1 call completed?  Yes   Wrap up additional comments  Getting ready to start dialysis.          Zaina Zaidi RN

## 2020-11-24 NOTE — OUTREACH NOTE
CHF Week 2 Survey      Responses   [facility] patient discharged from?  [place]   Does the patient have one of the following disease processes/diagnoses(primary or secondary)?  CHF   Week 2 attempt successful?  No   Unsuccessful attempts  Attempt 1          Isela Padilla RN

## 2020-11-25 PROBLEM — N18.9 ACUTE KIDNEY INJURY SUPERIMPOSED ON CHRONIC KIDNEY DISEASE (HCC): Status: ACTIVE | Noted: 2020-01-01

## 2020-11-25 PROBLEM — T46.0X1A: Status: ACTIVE | Noted: 2020-01-01

## 2020-11-25 PROBLEM — N18.30 CKD (CHRONIC KIDNEY DISEASE) STAGE 3, GFR 30-59 ML/MIN (HCC): Status: ACTIVE | Noted: 2020-01-01

## 2020-11-25 PROBLEM — N17.9 ACUTE KIDNEY INJURY SUPERIMPOSED ON CHRONIC KIDNEY DISEASE (HCC): Status: ACTIVE | Noted: 2020-01-01

## 2020-11-25 PROBLEM — E87.70 VOLUME OVERLOAD: Status: ACTIVE | Noted: 2020-01-01

## 2020-11-25 NOTE — ED NOTES
Call placed to Nephrology office answering service @ [phone]. Ros Clayton with Nephrology returned call to [facility] @  [phone].  Electronically signed by Lotus Jimenez on 11/25/2020 at 4:56 PM       Lotus Jimenez  11/25/20 [phone]

## 2020-11-25 NOTE — H&P
Admission:    Prior to Admission medications    Medication Sig Start Date End Date Taking? Authorizing Provider   temazepam (RESTORIL) 15 MG capsule TAKE 1 CAPSULE BY MOUTH EVERY DAY 10/6/20   Historical Provider, MD   HYDROcodone-acetaminophen (NORCO) 5-325 MG per tablet hydrocodone 5 mg-acetaminophen 325 mg tablet    Historical Provider, MD   ondansetron (ZOFRAN-ODT) 4 MG disintegrating tablet ondansetron 4 mg disintegrating tablet   Take 1 tablet every 6 hours by oral route as needed.     Historical Provider, MD   omeprazole (PRILOSEC) 20 MG delayed release capsule Take 20 mg by mouth 2 times daily    Historical Provider, MD   digoxin (LANOXIN) 125 MCG tablet Take 125 mcg by mouth daily    Historical Provider, MD   hydrALAZINE (APRESOLINE) 50 MG tablet Take 50 mg by mouth 3 times daily    Historical Provider, MD   metoprolol succinate (TOPROL XL) 100 MG extended release tablet TAKE 1 TABLET BY MOUTH TWICE A DAY 7/1/20   Jessy Martínez APRN - NP   dilTIAZem (CARDIZEM CD) 240 MG extended release capsule Take 1 capsule by mouth 2 times daily 4/23/20   Tere Madera MD   apixaban (ELIQUIS) 5 MG TABS tablet Take 1 tablet by mouth 2 times daily 3/6/20   RED Wright   Multiple Vitamins-Minerals (THERAPEUTIC MULTIVITAMIN-MINERALS) tablet Take 1 tablet by mouth daily    Historical Provider, MD   sulfaSALAzine (AZULFIDINE) 500 MG tablet Take 1,000 mg by mouth 3 times daily     Historical Provider, MD   furosemide (LASIX) 40 MG tablet Take 40 mg by mouth daily    Historical Provider, MD   folic acid (FOLVITE) 1 MG tablet Take 1 mg by mouth daily    Historical Provider, MD   glipiZIDE (GLUCOTROL) 10 MG tablet Take 10 mg by mouth 2 times daily (before meals)    Historical Provider, MD   pravastatin (PRAVACHOL) 20 MG tablet Take 20 mg by mouth nightly     Historical Provider, MD   terazosin (HYTRIN) 10 MG capsule Take 5 mg by mouth 2 times daily     Historical Provider, MD       Allergies:  Patient has no known allergies. Social History:   TOBACCO:   reports that he quit smoking about 14 years ago. His smoking use included cigarettes. He has a 50.00 pack-year smoking history. He has quit using smokeless tobacco.  His smokeless tobacco use included snuff. ETOH:   reports current alcohol use. Family History:       Problem Relation Age of Onset    High Blood Pressure Mother     Heart Disease Mother     Stroke Mother         at 48    High Blood Pressure Father     Heart Disease Father     Stroke Father         at 59    No Known Problems Brother     Alzheimer's Disease Brother         onset at 80, then rapidly progressed to death           Physical Exam:    Vitals: BP (!) 161/65   Pulse 68   Temp 98.1 °F (36.7 °C)   Resp 17   Ht 5' 7\" (1.702 m)   Wt 165 lb (74.8 kg)   SpO2 94%   BMI 25.84 kg/m²   24HR INTAKE/OUTPUT:  No intake or output data in the 24 hours ending 11/25/20 1757  General appearance: alert and cooperative with exam  HEENT: atraumatic, eyes with clear conjunctiva and normal lids, pupils and irises normal, external ears and nose are normal, lips normal. Neck without masses, lympadenopathy, bruit, thyroid normal  Lungs: no increased work of breathing, clear to auscultation bilaterally without rales, rhonchi or wheezes  Heart: regular rate and rhythm, S1, S2 normal, no murmur, click, rub or gallop  Abdomen: soft, mildly distended, nontender, bowel sounds present  Extremities: edema 2+ bilaterally, modified Liliana's negative  Neurologic: No focal neurologic deficits, normal sensation, alert and oriented, affect and mood appropriate. Skin: no rashes, nodules.     CBC:   Recent Labs     11/25/20  1524   WBC 6.6   HGB 9.2*        BMP:    Recent Labs     11/25/20  1524      K 3.6      CO2 24   BUN 56*   CREATININE 3.8*   GLUCOSE 277*     Hepatic:   Recent Labs     11/25/20  1524   AST 33   ALT 66*   BILITOT 0.5   ALKPHOS 305*     Troponin T: No results for input(s): TROPONINI in the last 72 hours. ABGs: No results for input(s): PH, PCO2, PO2, HCO3, BE, O2SAT in the last 72 hours. INR:   Recent Labs     11/25/20  1524   INR 1.54*     Lactic Acid: No results for input(s): LACTA in the last 72 hours. -----------------------------------------------------------------  PA/lat CXR: shallow inspiration, no acute process    EKG: AF, rate controlled    Assessment and Plan   Principal Problem:    Volume overload  Active Problems:    Type 2 diabetes mellitus (HCC)    Hypocalcemia    Macrocytic anemia    PAF (paroxysmal atrial fibrillation) (HCC)    Acute kidney injury superimposed on chronic kidney disease (HCC)    CKD (chronic kidney disease) stage 3, GFR 30-59 ml/min    Digoxin overdose, accidental or unintentional, initial encounter  Resolved Problems:    * No resolved hospital problems. *      Admit to med/surg. Nephrology consult. Patient likely needs dialysis and will likely need vascular surgery consult for permcath placement as fistula is unlikely to be ready. Furosemide 40 mg IV daily but patient has experienced worsening oliguria, doubt diuresis will be effective. Cardiology consult for low-grade digoxin toxicity due to renal disease. Home medications reconciled.     Jazzy Reyes DO  Admitting Hospitalist

## 2020-11-25 NOTE — ED PROVIDER NOTES
[facility] EMERGENCY DEPT  eMERGENCY dEPARTMENT eNCOUnter      Pt Name: Oscar Alston  MRN: [id number]  Birthdate 1940  Date of evaluation: 11/25/2020  Provider: Penelope Espino MD    [address] [place] [address]       Chief Complaint   Patient presents with    Leg Swelling     to general body, told to come to ER for \"kidneys shut down\"         HISTORY OF PRESENT ILLNESS   (Location/Symptom, Timing/Onset,Context/Setting, Quality, Duration, Modifying Factors, Severity)  Note limiting factors. Oscar Alston is a 68 y.o. male who presents to the emergency department for concern of his kidney function decreasing. Over the past 2 weeks he has had increasing bilateral lower extremity edema but denies any shortness of breath. He has chronic kidney disease and had a right arm fistula placed back in October and has been told that he may have to have dialysis. He was seen by his primary care physician yesterday and called the nephrology office today and they recommended he come into the emergency department for evaluation. Again he denies any chest pain or shortness of breath. Urinates daily but feels has decreased over past 2 weeks. HPI    NursingNotes were reviewed. REVIEW OF SYSTEMS    (2-9 systems for level 4, 10 or more for level 5)     Review of Systems   Constitutional: Negative for chills and fever. HENT: Negative for rhinorrhea and sore throat. Respiratory: Negative for cough and shortness of breath. Cardiovascular: Positive for leg swelling. Negative for chest pain. Gastrointestinal: Negative for abdominal pain, diarrhea, nausea and vomiting. Genitourinary: Negative for dysuria and frequency. Musculoskeletal: Negative for back pain and neck pain. Neurological: Negative for dizziness and headaches. All other systems reviewed and are negative.            PAST MEDICALHISTORY     Past Medical History:   Diagnosis Date    Arthritis     Atherosclerosis of native arteries of the extremities with intermittent claudication     Blood circulation, collateral     CAD (coronary artery disease)     Carotid artery occlusion, right     Chronic kidney disease     Depression     GERD (gastroesophageal reflux disease)     History of blood transfusion     Hyperlipidemia     Hypertension     Pneumonia due to infectious organism 4/9/2019    Type II or unspecified type diabetes mellitus without mention of complication, not stated as uncontrolled     Ulcerative colitis ([facility] [address] 6/30/2019         SURGICAL HISTORY       Past Surgical History:   Procedure Laterality Date    CATARACT REMOVAL Bilateral     DIALYSIS FISTULA CREATION Right 10/16/2020    RIGHT BRACHIAL/BASILIC AV FISTULA performed by Shirley Kim MD at [facility]      umbilical hernia    PACEMAKER PLACEMENT      ST [facility] PACEMAKER         CURRENT MEDICATIONS     Previous Medications    APIXABAN (ELIQUIS) 5 MG TABS TABLET    Take 1 tablet by mouth 2 times daily    DIGOXIN (LANOXIN) 125 MCG TABLET    Take 125 mcg by mouth daily    DILTIAZEM (CARDIZEM CD) 240 MG EXTENDED RELEASE CAPSULE    Take 1 capsule by mouth 2 times daily    FOLIC ACID (FOLVITE) 1 MG TABLET    Take 1 mg by mouth daily    FUROSEMIDE (LASIX) 40 MG TABLET    Take 40 mg by mouth daily    GLIPIZIDE (GLUCOTROL) 10 MG TABLET    Take 10 mg by mouth 2 times daily (before meals)    HYDRALAZINE (APRESOLINE) 50 MG TABLET    Take 50 mg by mouth 3 times daily    HYDROCODONE-ACETAMINOPHEN (NORCO) 5-325 MG PER TABLET    hydrocodone 5 mg-acetaminophen 325 mg tablet    METOPROLOL SUCCINATE (TOPROL XL) 100 MG EXTENDED RELEASE TABLET    TAKE 1 TABLET BY MOUTH TWICE A DAY    MULTIPLE VITAMINS-MINERALS (THERAPEUTIC MULTIVITAMIN-MINERALS) TABLET    Take 1 tablet by mouth daily    OMEPRAZOLE (PRILOSEC) 20 MG DELAYED RELEASE CAPSULE    Take 20 mg by mouth 2 times daily    ONDANSETRON (ZOFRAN-ODT) 4 MG DISINTEGRATING TABLET    ondansetron 4 mg disintegrating tablet   Take 1 tablet every 6 hours by oral route as needed. PRAVASTATIN (PRAVACHOL) 20 MG TABLET    Take 20 mg by mouth nightly     SULFASALAZINE (AZULFIDINE) 500 MG TABLET    Take 1,000 mg by mouth 3 times daily     TEMAZEPAM (RESTORIL) 15 MG CAPSULE    TAKE 1 CAPSULE BY MOUTH EVERY DAY    TERAZOSIN (HYTRIN) 10 MG CAPSULE    Take 5 mg by mouth 2 times daily        ALLERGIES     Patient has no known allergies.     FAMILY HISTORY       Family History   Problem Relation Age of Onset    High Blood Pressure Mother     Heart Disease Mother     Stroke Mother         at 48    High Blood Pressure Father     Heart Disease Father     Stroke Father         at 59    No Known Problems Brother     Alzheimer's Disease Brother         onset at 80, then rapidly progressed to death          SOCIAL HISTORY       Social History     Socioeconomic History    Marital status:      Spouse name: None    Number of children: 3    Years of education: None    Highest education level: None   Occupational History    Occupation: Printer   Social Needs    Financial resource strain: None    Food insecurity     Worry: None     Inability: None    Transportation needs     Medical: None     Non-medical: None   Tobacco Use    Smoking status: Former Smoker     Packs/day: 1.00     Years: 50.00     Pack years: 50.00     Types: Cigarettes     Last attempt to quit: 2006     Years since quittin.9    Smokeless tobacco: Former User     Types: Snuff   Substance and Sexual Activity    Alcohol use: Yes     Comment: occasional    Drug use: Never    Sexual activity: None   Lifestyle    Physical activity     Days per week: None     Minutes per session: None    Stress: None   Relationships    Social connections     Talks on phone: None     Gets together: None     Attends Congregational service: None     Active member of club or organization: None     Attends meetings of clubs or organizations: None     Relationship status: None    Intimate partner violence     Fear of current or ex partner: None     Emotionally abused: None     Physically abused: None     Forced sexual activity: None   Other Topics Concern    None   Social History Narrative    Worked on a [employer] most of his life     twice and     He has 2 sons and one daughter    Never in the [employer]    Attends [address] one pack per day quit in 2006 denies alcohol consumption or substance usage    Physically sedentary    /////////////////////////////////////////////////////////////////    CODE STATUS: Full Code    HEALTH CARE PROXY: His oldest son, Betty Thompson, [phone]    DOMICILED: Lives alone in a private home with one step in to home but no steps within, has no pets    AMBULATES: independantly       SCREENINGS             PHYSICAL EXAM    (up to 7 for level 4, 8 or more for level 5)     ED Triage Vitals [11/25/20 1432]   BP Temp Temp src Pulse Resp SpO2 Height Weight   (!) 143/95 98.1 °F (36.7 °C) -- 105 16 96 % 5' 7\" (1.702 m) 165 lb (74.8 kg)       Physical Exam  Vitals signs and nursing note reviewed. Constitutional:       General: He is not in acute distress. Appearance: He is well-developed. He is not ill-appearing, toxic-appearing or diaphoretic. HENT:      Head: Normocephalic and atraumatic. Nose: Nose normal.      Mouth/Throat:      Mouth: Mucous membranes are moist.   Eyes:      Conjunctiva/sclera: Conjunctivae normal.   Neck:      Musculoskeletal: Normal range of motion and neck supple. Trachea: No tracheal deviation. Cardiovascular:      Rate and Rhythm: Normal rate and regular rhythm. Heart sounds: Normal heart sounds. No murmur. Pulmonary:      Breath sounds: Normal breath sounds. No wheezing or rales. Abdominal:      Palpations: Abdomen is soft. There is no mass. Tenderness: There is no abdominal tenderness. Musculoskeletal: Normal range of motion.       Right lower leg: Edema present. Left lower leg: Edema present. Comments: Bilateral 2+ pitting edema up to the mid thigh    Right upper extremity fistula with palpable thrill   Skin:     General: Skin is warm and dry. Neurological:      Mental Status: He is alert and oriented to person, place, and time. DIAGNOSTIC RESULTS     EKG: All EKG's areinterpreted by the Emergency Department Physician who either signs or Co-signs this chart in the absence of a cardiologist.    81 atrial fibrillation no obvious ST changes nondiagnostic EKG    RADIOLOGY:  Non-plain film images such as CT, Ultrasound and MRI are read by the radiologist. Plain radiographic images are visualized and preliminarily interpreted bythe emergency physician with the below findings:      XR CHEST PORTABLE   Final Result   Impression: Shallow inspiration, no acute infiltrates or evidence of   overt CHF. Signed by Dr Caridad Gonzalez on 11/25/2020 4:02 PM              LABS:  Labs Reviewed   CBC WITH AUTO DIFFERENTIAL - Abnormal; Notable for the following components:       Result Value    RBC 2.82 (*)     Hemoglobin 9.2 (*)     Hematocrit 28.3 (*)     .4 (*)     MCH 32.6 (*)     MCHC 32.5 (*)     Neutrophils % 72.0 (*)     Lymphocytes % 16.6 (*)     All other components within normal limits   COMPREHENSIVE METABOLIC PANEL - Abnormal; Notable for the following components:    Glucose 277 (*)     BUN 56 (*)     CREATININE 3.8 (*)     GFR Non- 15 (*)     GFR  19 (*)     Calcium 8.5 (*)     Alkaline Phosphatase 305 (*)     ALT 66 (*)     All other components within normal limits   PROTIME-INR - Abnormal; Notable for the following components:    Protime 18.6 (*)     INR 1.54 (*)     All other components within normal limits   DIGOXIN LEVEL - Abnormal; Notable for the following components:    Digoxin Lvl 1.4 (*)     All other components within normal limits   BRAIN NATRIURETIC PEPTIDE - Abnormal; Notable for the following components:    Pro-BNP 20,294 (*)     All other components within normal limits   APTT       All other labs were within normal range or not returned as of this dictation. EMERGENCY DEPARTMENT COURSE and DIFFERENTIAL DIAGNOSIS/MDM:   Vitals:    Vitals:    11/25/20 1432 11/25/20 1600 11/25/20 1630 11/25/20 1700   BP: (!) 143/95 (!) 162/70 (!) 150/76 (!) 161/65   Pulse: 105 89 65 68   Resp: 16  19 17   Temp: 98.1 °F (36.7 °C)      SpO2: 96% 94% 96% 94%   Weight: 165 lb (74.8 kg)      Height: 5' 7\" (1.702 m)          MDM  Number of Diagnoses or Management Options     Amount and/or Complexity of Data Reviewed  Clinical lab tests: ordered and reviewed  Tests in the radiology section of CPT®: ordered and reviewed  Independent visualization of images, tracings, or specimens: yes      Worsening renal fxn since Feb, dec urination, volume overloaded clinically, has fistula in anticipation of needing dialysis, d/w tori and she d/w Dr. Conrad Espino, request pt be admitted, d/w Dr. Rolf Galvez for admission      CONSULTS:  [address] [place] [address]:  Unless otherwise noted below, none     Procedures    FINAL IMPRESSION      1. Chronic kidney disease, unspecified CKD stage    2. Bilateral lower extremity edema    3. Hypervolemia, unspecified hypervolemia type          DISPOSITION/PLAN   DISPOSITION Decision To Admit 11/25/2020 07:06:44 PM      PATIENT REFERRED TO:  No follow-up provider specified.     DISCHARGE MEDICATIONS:  New Prescriptions    No medications on file          (Please note that portions of this note were completed with a voice recognition program.  Efforts were made to edit thedictations but occasionally words are mis-transcribed.)    Phil Hinkle MD (electronically signed)  Attending Emergency Physician        Maia Pop MD  11/25/20 0418

## 2020-11-26 NOTE — CONSULTS
[facility]  Cardiology Consult      Requesting MD:  Ricky Varma DO   Admit Status:  Inpatient [101]       History obtained from:   ? Patient  ? Other (specify):     PROBLEM LIST:    Patient Active Problem List    Diagnosis Date Noted    Pacemaker 04/23/2020     Priority: High    Chronic atrial fibrillation ([facility] [address]      Priority: High    Sinus node dysfunction (HCC) 08/12/2019     Priority: High    Chronic anticoagulation 05/09/2019     Priority: High    Volume overload 11/25/2020     Priority: Low    Acute kidney injury superimposed on chronic kidney disease ([facility] [address] 11/25/2020     Priority: Low    CKD (chronic kidney disease) stage 3, GFR 30-59 ml/min 11/25/2020     Priority: Low    Digoxin overdose, accidental or unintentional, initial encounter 11/25/2020     Priority: Low    ESRD on dialysis [facility])      Priority: Low    ESRD (end stage renal disease) on dialysis ([facility] [address] 10/16/2020     Priority: Low    Ulcerative colitis ([facility] [address] 06/30/2019     Priority: Low    PAF (paroxysmal atrial fibrillation) ([facility] [address] 04/13/2019     Priority: Low     Overview Note:     4/9/2019  lexiscan negative for myocardial ischemia, EF 49  %       Elevated d-dimer 04/09/2019     Priority: Low    Hypocalcemia 04/09/2019     Priority: Low    Macrocytic anemia 04/09/2019     Priority: Low    Carotid artery occlusion, right      Priority: Low    PVD (peripheral vascular disease) ([facility] [address] 11/09/2016     Priority: Low    Carotid artery stenosis 04/11/2012     Priority: Low    Depression      Priority: Low    Type 2 diabetes mellitus ([facility] [address]      Priority: Low    Hypertension      Priority: Low     1. Permanent atrial fibrillation, on Eliquis with tachybradycardia syndrome status post pacemaker placement (single lead) 2/26/2020.  2.  Normal LV systolic function with mild to moderate aortic stenosis (echo 8/2019). 3.  Diabetes mellitus.   4.  Stage IV-V CKD status post AV fistula placement 12/16/2020 with PTA 11/2/2020. PRESENTATION: Ana Linda is a 68y.o. year old male who presents with increasing bilateral lower extremity edema being evaluated for possible initiation of dialysis immediately. Recent intervention to AV fistula 11/2/2020 with PTA. No complaints of chest pain or shortness of breath. Patient was previously followed by Dr. Ileana Merritt but is now a patient of Dr. Cecilia Paul at [facility].  Asked to comment about digoxin dosing given renal insufficiency with digoxin level of 1.4 (borderline elevated) but asymptomatic. Atrial fibrillation rates remain controlled. REVIEW OF SYSTEMS:  Review of Systems   Constitutional: Negative for activity change, diaphoresis and fatigue. HENT: Negative for hearing loss, nosebleeds and tinnitus. Eyes: Negative for visual disturbance. Respiratory: Negative for cough, shortness of breath and wheezing. Cardiovascular: Positive for leg swelling. Negative for chest pain and palpitations. Gastrointestinal: Negative for abdominal distention, abdominal pain, blood in stool, diarrhea and vomiting. Endocrine: Negative for cold intolerance, heat intolerance, polydipsia, polyphagia and polyuria. Genitourinary: Negative for difficulty urinating, flank pain and hematuria. Musculoskeletal: Negative for arthralgias, back pain, joint swelling and myalgias. Skin: Negative for pallor and rash. Neurological: Negative for dizziness, seizures, syncope and headaches. Psychiatric/Behavioral: Negative for behavioral problems and dysphoric mood. The patient is not nervous/anxious.         Past Medical History:      Diagnosis Date    Arthritis     Atherosclerosis of native arteries of the extremities with intermittent claudication     Blood circulation, collateral     CAD (coronary artery disease)     Carotid artery occlusion, right     Chronic kidney disease     Depression     GERD (gastroesophageal reflux disease)     History of blood transfusion     Hyperlipidemia differently: Take 5 mg by mouth daily In morning, \"Pt stated doctor from [facility] started him on low dose asa daily approx. 2 weeks ago during an inpatient stay and d/c'd eliquis. But he hasn't seen his heart doctor so he has been taking a morning dose of eliquis and asa at HS. \" 3/6/20  Yes RED Wong   Multiple Vitamins-Minerals (THERAPEUTIC MULTIVITAMIN-MINERALS) tablet Take 1 tablet by mouth daily   Yes Historical Provider, MD   sulfaSALAzine (AZULFIDINE) 500 MG tablet Take 1,000 mg by mouth 3 times daily    Yes Historical Provider, MD   furosemide (LASIX) 40 MG tablet Take 40 mg by mouth daily   Yes Historical Provider, MD   folic acid (FOLVITE) 1 MG tablet Take 1 mg by mouth daily   Yes Historical Provider, MD   glipiZIDE (GLUCOTROL) 10 MG tablet Take 10 mg by mouth 2 times daily (before meals)   Yes Historical Provider, MD   pravastatin (PRAVACHOL) 20 MG tablet Take 20 mg by mouth nightly    Yes Historical Provider, MD   terazosin (HYTRIN) 10 MG capsule Take 5 mg by mouth 2 times daily    Yes Historical Provider, MD   temazepam (RESTORIL) 15 MG capsule TAKE 1 CAPSULE BY MOUTH EVERY DAY 10/6/20   Historical Provider, MD   ondansetron (ZOFRAN-ODT) 4 MG disintegrating tablet ondansetron 4 mg disintegrating tablet   Take 1 tablet every 6 hours by oral route as needed.     Historical Provider, MD       Current Meds:   [START ON 11/27/2020] digoxin  125 mcg Oral Q MWF    dilTIAZem  240 mg Oral BID    insulin lispro  0-12 Units Subcutaneous TID WC    insulin lispro  0-6 Units Subcutaneous Nightly    folic acid  1 mg Oral Daily    furosemide  40 mg Intravenous Daily    hydrALAZINE  50 mg Oral TID    metoprolol succinate  100 mg Oral BID    therapeutic multivitamin-minerals  1 tablet Oral Daily    pantoprazole  40 mg Oral BID AC    pravastatin  20 mg Oral Nightly    sulfaSALAzine  1,000 mg Oral TID    doxazosin  4 mg Oral Daily    sodium chloride flush  10 mL Intravenous 2 times per day    heparin (porcine)  5,000 Units Subcutaneous 3 times per day       Current Infused Meds:   dextrose         Physical Exam:  Vitals:    11/26/20 1144   BP: (!) 153/74   Pulse: 69   Resp: 20   Temp: 97.5 °F (36.4 °C)   SpO2: 96%       Intake/Output Summary (Last 24 hours) at 11/26/2020 1310  Last data filed at 11/26/2020 1255  Gross per 24 hour   Intake 240 ml   Output 1950 ml   Net -1710 ml     Estimated body mass index is 25.84 kg/m² as calculated from the following:    Height as of this encounter: 5' 7\" (1.702 m). Weight as of this encounter: 165 lb (74.8 kg). Physical Exam  Constitutional:       Appearance: He is well-developed. HENT:      Mouth/Throat:      Pharynx: No oropharyngeal exudate. Eyes:      General: No scleral icterus. Right eye: No discharge. Left eye: No discharge. Neck:      Thyroid: No thyromegaly. Vascular: No JVD. Cardiovascular:      Rate and Rhythm: Normal rate and regular rhythm. Heart sounds: Murmur present. No friction rub. No gallop. Comments: Mild jugular venous distention  2+ pitting edema in both lower extremities  Systolic murmur with mid peak with A2 mildly diminished in intensity  Pulmonary:      Effort: No respiratory distress. Breath sounds: No stridor. No wheezing or rales. Abdominal:      General: Bowel sounds are normal. There is no distension. Palpations: Abdomen is soft. There is no mass. Tenderness: There is no abdominal tenderness. There is no guarding or rebound. Comments: No palpable organomegaly   Musculoskeletal:         General: No deformity. Skin:     General: Skin is warm. Coloration: Skin is not pale. Findings: No erythema or rash. Neurological:      Mental Status: He is alert and oriented to person, place, and time. Motor: No abnormal muscle tone.       Coordination: Coordination normal.      Deep Tendon Reflexes: Reflexes normal.           Labs:  Recent Labs     11/25/20  1524 11/26/20  0221   WBC 6.6 7.9   HGB 9.2* 9.3*    195       Recent Labs     11/25/20  1524 11/26/20  0221    140   K 3.6 3.4*    103   CO2 24 22   BUN 56* 54*   CREATININE 3.8* 3.8*   LABGLOM 15* 15*   MG  --  2.1   CALCIUM 8.5* 8.7*       CK, CKMB, Troponin: @LABRCNT (CKTOTAL:3, CKMB:3, TROPONINI:3)@    Last 3 BNP:          IMAGING:  Us Renal Complete    Result Date: 11/26/2020  Exam: US RENAL COMPLETE - 11/26/2020 9:35 AM Indication: Acute on chronic kidney injury Comparison: None available. Findings: Both kidneys measure 12.1 cm in length. Both kidneys have normal cortical thickness and echogenicity. No shadowing calculi or hydronephrosis. Small volume ascites is noted around the spleen and within the pelvis. Prostate mildly indents the urinary bladder. No focal urinary bladder wall thickening. Small LEFT lateral bladder diverticulum. 1.  Negative for hydronephrosis or renal atrophy. 2.  Small volume ascites. Signed by Dr Axel Fajardo on 11/26/2020 11:18 AM    Xr Chest Portable    Result Date: 11/25/2020  EXAMINATION: XR CHEST PORTABLE 11/25/2020 4:00 PM HISTORY: Lower extremity swelling, edema. Report: A portable upright frontal view the chest was obtained. COMPARISON: Chest x-rays 11/13/2020. The lungs are mildly hypoaerated, no focal infiltrate is seen. There is mild central and basilar vascular crowding. Heart size is upper limits of normal. The left subclavian single lead pacemaker appears in satisfactory position, unchanged. No pneumothorax or effusion is identified. The bony thorax and upper abdomen are unremarkable. Impression: Shallow inspiration, no acute infiltrates or evidence of overt CHF. Signed by Dr Ann Machado. Carlos on 11/25/2020 4:02 PM    Xr Chest Portable    Result Date: 11/13/2020  XR CHEST PORTABLE 11/13/2020 2:22 PM HISTORY: Fatigue, fluid retention, history of A. fib COMPARISON: Chest exam dated 10/12/2020.  FINDINGS: There are small calcified granulomas in the right lung base. No lung consolidation. No pleural effusion or pneumothorax. Mild cardiomegaly which is stable. No septal thickening or subpleural Kerley B lines. Left chest wall pacemaker is stable. The osseous structures and surrounding soft tissues demonstrate no acute abnormality. 1. Stable chest exam without acute process. Underlying mild cardiomegaly which is stable. No infiltrate or pulmonary edema. Signed by Dr Rebecca Infante on 11/13/2020 3:35 PM          Assessment and Plan: This is a 68y.o. year old male with past medical history of permanent atrial fibrillation on Eliquis, tachybradycardia syndrome with prior single lead pacemaker placement 2/2020, normal LV systolic function with mild to moderate aortic stenosis, 2+ mitral regurgitation, grade 2 diastolic dysfunction, hypertension, diabetes mellitus, stage IV-V CKD status post prior AV fistula placement with recent PTA presenting with bilateral lower extremity edema being evaluated for initiation of dialysis. 1.  Asked to comment about digoxin dosing. Given renal dysfunction would change digoxin to 0.125 mg 3 times weekly at Monday/Wednesday/Friday dosing. Continue Toprol-XL at 100 mg twice daily and Cardizem  mg twice daily. Atrial fibrillation rates appear well-controlled. No further intervention from cardiac viewpoint at this time. Patient can follow-up with Dr. Cecilia Paul as an outpatient.       Electronically signed by Marty Leo MD on 11/26/2020 at 1:10 PM

## 2020-11-26 NOTE — CONSULTS
Nephrology ([address]) Consult Note      Patient:  Prisca Fletcher  YOB: 1940  Date of Service: 11/26/2020  MRN: [id number]   Acct: [de-identified]   Primary Care Physician: Marsa Ormond, MD  Advance Directive: Full Code  Admit Date: 11/25/2020       Hospital Day: 1  Referring Provider: Willam Leyva, DO    Patient independently seen and examined, Chart, Consults, Notes, Operative notes, Labs, Cardiology, and Radiology studies reviewed as available. Chief complaint: Abnormal labs. Subjective:  Prisca Fletcher is a 68 y.o. male  whom we were consulted for stage V chronic kidney disease. Patient has stage V chronic kidney disease secondary to diabetic diabetic nephropathy. Patient already has right upper arm primary fistula in preparation for hemodialysis. Presenting to the emergency room with complaining of increasing shortness of breath and swelling of legs. He is currently being treated with intravenous diuretics and feeling much better. Patient has already received good education regarding dialysis and fistula has been implanted. This morning his swelling is improving    Allergies:  Patient has no known allergies.     Medicines:  Current Facility-Administered Medications   Medication Dose Route Frequency Provider Last Rate Last Dose    [START ON 11/27/2020] digoxin (LANOXIN) tablet 125 mcg  125 mcg Oral Q MWF Brady Smith MD        dilTIAZem (CARDIZEM CD) extended release capsule 240 mg  240 mg Oral BID Brady Smith MD        folic acid (FOLVITE) tablet 1 mg  1 mg Oral Daily Mickey Farrar, DO   1 mg at 11/26/20 0940    furosemide (LASIX) injection 40 mg  40 mg Intravenous Daily [place] Farrar, DO   40 mg at 11/26/20 0941    hydrALAZINE (APRESOLINE) tablet 50 mg  50 mg Oral TID Willam Files, DO   50 mg at 11/26/20 0940    HYDROcodone-acetaminophen (NORCO) 5-325 MG per tablet 1 tablet  1 tablet Oral Q6H PRN Willam Files, DO        metoprolol succinate (TOPROL XL) extended release tablet 100 mg  100 mg Oral BID Ty Blind, DO   100 mg at 11/26/20 0940    therapeutic multivitamin-minerals 1 tablet  1 tablet Oral Daily Ty Blind, DO   1 tablet at 11/26/20 0940    pantoprazole (PROTONIX) tablet 40 mg  40 mg Oral BID AC Mickey Farrar, DO   40 mg at 11/26/20 9951    pravastatin (PRAVACHOL) tablet 20 mg  20 mg Oral Nightly Mickey Farrar, DO   20 mg at 11/25/20 2233    sulfaSALAzine (AZULFIDINE) tablet 1,000 mg  1,000 mg Oral TID Ty Blind, DO   1,000 mg at 11/26/20 0940    LORazepam (ATIVAN) tablet 0.5 mg  0.5 mg Oral Nightly PRN Ty Blind, DO        doxazosin (CARDURA) tablet 4 mg  4 mg Oral Daily Ty Blind, DO   4 mg at 11/26/20 0940    sodium chloride flush 0.9 % injection 10 mL  10 mL Intravenous 2 times per day Ty Blind, DO   10 mL at 11/25/20 2233    sodium chloride flush 0.9 % injection 10 mL  10 mL Intravenous PRN Ty Blind, DO        acetaminophen (TYLENOL) tablet 650 mg  650 mg Oral Q6H PRN Ty Blind, DO        Or    acetaminophen (TYLENOL) suppository 650 mg  650 mg Rectal Q6H PRN Ty Blind, DO        promethazine (PHENERGAN) tablet 12.5 mg  12.5 mg Oral Q6H PRN Ty Blind, DO        Or    ondansetron [facility]) injection 4 mg  4 mg Intravenous Q6H PRN Ty Blind, DO        heparin (porcine) injection 5,000 Units  5,000 Units Subcutaneous 3 times per day Ty Blind, DO   5,000 Units at 11/26/20 8080       Past Medical History:  Past Medical History:   Diagnosis Date    Arthritis     Atherosclerosis of native arteries of the extremities with intermittent claudication     Blood circulation, collateral     CAD (coronary artery disease)     Carotid artery occlusion, right     Chronic kidney disease     Depression     GERD (gastroesophageal reflux disease)     History of blood transfusion     Hyperlipidemia     Hypertension     Pneumonia due to infectious organism 4/9/2019    Type II or unspecified type diabetes mellitus without mention of complication, not stated as uncontrolled     Ulcerative colitis ([facility] [address] 2019       Past Surgical History:  Past Surgical History:   Procedure Laterality Date    CATARACT REMOVAL Bilateral     DIALYSIS FISTULA CREATION Right 10/16/2020    RIGHT BRACHIAL/BASILIC AV FISTULA performed by Lela Cross MD at [facility]      umbilical hernia    [facility] PLACEMENT      [facility]       Family History  Family History   Problem Relation Age of Onset    High Blood Pressure Mother     Heart Disease Mother     Stroke Mother         at 48    High Blood Pressure Father     Heart Disease Father     Stroke Father         at 59    No Known Problems Brother     Alzheimer's Disease Brother         onset at 80, then rapidly progressed to death       Social History  Social History     Socioeconomic History    Marital status:      Spouse name: Not on file    Number of children: 3    Years of education: Not on file    Highest education level: Not on file   Occupational History    Occupation: Printer   Social Needs    Financial resource strain: Not on file    Food insecurity     Worry: Not on file     Inability: Not on file   [employer] needs     Medical: Not on file     Non-medical: Not on file   Tobacco Use    Smoking status: Former Smoker     Packs/day: 1.00     Years: 50.00     Pack years: 50.00     Types: Cigarettes     Last attempt to quit: 2006     Years since quittin.9    Smokeless tobacco: Former User     Types: Snuff   Substance and Sexual Activity    Alcohol use: Yes     Comment: occasional    Drug use: Never    Sexual activity: Not on file   Lifestyle    Physical activity     Days per week: Not on file     Minutes per session: Not on file    Stress: Not on file   Relationships    Social connections     Talks on phone: Not on file     Gets together: Not on file     Attends Faith service: Not on file     Active member of club or organization: Not on file     Attends meetings of clubs or organizations: Not on file     Relationship status: Not on file    Intimate partner violence     Fear of current or ex partner: Not on file     Emotionally abused: Not on file     Physically abused: Not on file     Forced sexual activity: Not on file   Other Topics Concern    Not on file   Social History Narrative    Worked on [employer] most of his life     twice and     He has 2 sons and one daughter    Never in the [employer]    Education high school    Attends [address] one pack per day quit in 2006 denies alcohol consumption or substance usage    Physically sedentary    /////////////////////////////////////////////////////////////////    CODE STATUS: Full Code    HEALTH CARE PROXY: His oldest son, Bhavna Suarez, [phone]    DOMICILED: Lives alone in a private home with one step in to home but no steps within, has no pets    AMBULATES: independantly         Review of Systems:  History obtained from chart review and the patient  General ROS: No fever or chills  Respiratory ROS: positive for - shortness of breath  Cardiovascular ROS: No chest pain or palpitations  Gastrointestinal ROS: No abdominal pain or melena  Genito-Urinary ROS: No dysuria or hematuria  Musculoskeletal ROS: No joint pain or swelling   14 point ROS reviewed with the patient and negative except as noted above and in the HPI unless unable to obtain.     Objective:  Patient Vitals for the past 24 hrs:   BP Temp Temp src Pulse Resp SpO2 Height Weight   11/26/20 0632 -- -- -- -- -- 96 % -- --   11/26/20 0554 (!) 151/65 98.6 °F (37 °C) -- 86 16 94 % -- --   11/26/20 0144 (!) 156/74 97.5 °F (36.4 °C) Temporal 72 14 96 % -- --   11/25/20 2030 (!) 158/59 97 °F (36.1 °C) Temporal 65 16 97 % -- --   11/25/20 1700 (!) 161/65 -- -- 68 17 94 % -- --   11/25/20 1630 (!) 150/76 -- -- 65 19 96 % -- --   11/25/20 1600 (!) 162/70 -- -- 89 -- 94 % -- --   11/25/20 1432 (!) 143/95 98.1 BC, Leonie Alpers in the last 72 hours. No results for input(s): CXSURG in the last 72 hours. Radiology reports as per the Radiologist  Radiology: Xr Chest Portable    Result Date: 11/25/2020  EXAMINATION: XR CHEST PORTABLE 11/25/2020 4:00 PM HISTORY: Lower extremity swelling, edema. Report: A portable upright frontal view the chest was obtained. COMPARISON: Chest x-rays 11/13/2020. The lungs are mildly hypoaerated, no focal infiltrate is seen. There is mild central and basilar vascular crowding. Heart size is upper limits of normal. The left subclavian single lead pacemaker appears in satisfactory position, unchanged. No pneumothorax or effusion is identified. The bony thorax and upper abdomen are unremarkable. Impression: Shallow inspiration, no acute infiltrates or evidence of overt CHF. Signed by Dr Isael Collins. Carlos on 11/25/2020 4:02 PM       Assessment   1. Stage V chronic kidney disease  2. Clinically volume overload. 3.  Type II diabetic nephropathy. 4.  Anemia of chronic kidney disease. 5.  History of coronary artery disease. Plan:  1. Agree with the care provided thus far. 2.  Continue intravenous diuretics. 3.  Baseline iron studies. 4.  Urinary electrolytes. 5.  May need to initiate dialysis. Thank you for the consult, we appreciate the opportunity to provide care to your patients. Feel free to contact me if I can be of any further assistance.       Tory Jeffrey MD  11/26/20  10:25 AM

## 2020-11-26 NOTE — ED NOTES
Bed: 19  Expected date:   Expected time:   Means of arrival:   Comments:     Claudia Huang RN  11/25/20 1538

## 2020-11-26 NOTE — PROGRESS NOTES
Hospitalist Progress Note  11/26/2020 1:03 PM  Subjective:   Admit Date: 11/25/2020  PCP: Mamta Araujo MD    Chief Complaint: edema    Subjective: Patient feels he is diuresing better with intravenous furosemide than he was doing at home, but not a lot of improvement yet to edema or ascites. Nephrology has seen him and may initiate hemodialysis tomorrow. Blood glucose elevated. History is otherwise unchanged. Cumulative Hospital History:   11-25: Presents to ED with worsening edema of the lower extremities extending into abdomen x2 weeks. 20 pound weight gain. CKD has progressed, had fistula formation for imminent start of dialysis. Digoxin level supratherapeutic. Admitted to med/surg with nephrology and cardiology consults. 11-26: Medications were adjusted by cardiology and digoxin ordered three times weekly. Likely to start dialysis tomorrow. Insulin per scale with hypoglycemia protocol if needed. ROS: 14 point review of systems is negative except as specifically addressed above.     DIET RENAL; Carb Control: 3 carb choices (45 gms)/meal; Low Sodium (2 GM)    Intake/Output Summary (Last 24 hours) at 11/26/2020 1303  Last data filed at 11/26/2020 1255  Gross per 24 hour   Intake 240 ml   Output 1950 ml   Net -1710 ml     Medications:   dextrose       Current Facility-Administered Medications   Medication Dose Route Frequency Provider Last Rate Last Dose    [START ON 11/27/2020] digoxin (LANOXIN) tablet 125 mcg  125 mcg Oral Q MWF Enid Kat MD        dilTIAZem (CARDIZEM CD) extended release capsule 240 mg  240 mg Oral BID Enid Kat MD        insulin lispro (HUMALOG) injection vial 0-12 Units  0-12 Units Subcutaneous TID  Mickey Farrar, DO   10 Units at 11/26/20 1247    insulin lispro (HUMALOG) injection vial 0-6 Units  0-6 Units Subcutaneous Nightly Mickey Farrar, DO        glucose (GLUTOSE) 40 % oral gel 15 g  15 g Oral PRN Shirley Aragon, DO        dextrose 50 % IV solution  12.5 g Intravenous PRN Cara To Wilson, DO        glucagon (rDNA) injection 1 mg  1 mg Intramuscular PRN Janjordane Mock, DO        dextrose 5 % solution  100 mL/hr Intravenous PRN Janjordane Mock, DO        folic acid (FOLVITE) tablet 1 mg  1 mg Oral Daily [facility], DO   1 mg at 11/26/20 0940    furosemide (LASIX) injection 40 mg  40 mg Intravenous Daily [facility], DO   40 mg at 11/26/20 0941    hydrALAZINE (APRESOLINE) tablet 50 mg  50 mg Oral TID Janjordane [facility], DO   50 mg at 11/26/20 0940    HYDROcodone-acetaminophen (NORCO) 5-325 MG per tablet 1 tablet  1 tablet Oral Q6H PRN Janorlando Mock, DO        metoprolol succinate (TOPROL XL) extended release tablet 100 mg  100 mg Oral BID Jane [facility], DO   100 mg at 11/26/20 0940    therapeutic multivitamin-minerals 1 tablet  1 tablet Oral Daily [facility], DO   1 tablet at 11/26/20 0940    pantoprazole (PROTONIX) tablet 40 mg  40 mg Oral BID AC [facility], DO   40 mg at 11/26/20 0739    pravastatin (PRAVACHOL) tablet 20 mg  20 mg Oral Nightly [facility], DO   20 mg at 11/25/20 2233    sulfaSALAzine (AZULFIDINE) tablet 1,000 mg  1,000 mg Oral TID Janyce Mock, DO   1,000 mg at 11/26/20 0940    LORazepam (ATIVAN) tablet 0.5 mg  0.5 mg Oral Nightly PRN Janorlando [facility], DO        doxazosin (CARDURA) tablet 4 mg  4 mg Oral Daily [facility], DO   4 mg at 11/26/20 0940    sodium chloride flush 0.9 % injection 10 mL  10 mL Intravenous 2 times per day Janorlando Mock, DO   10 mL at 11/25/20 2233    sodium chloride flush 0.9 % injection 10 mL  10 mL Intravenous PRN Triston Champ Farrar, DO        acetaminophen (TYLENOL) tablet 650 mg  650 mg Oral Q6H PRN Brandon Mock, DO        Or    acetaminophen (TYLENOL) suppository 650 mg  650 mg Rectal Q6H PRN Leandroe Mock, DO        promethazine (PHENERGAN) tablet 12.5 mg  12.5 mg Oral Q6H PRN Brandon Mock, DO        Or    [facility]ron [facility]) injection 4 mg  4 mg Intravenous Q6H PRN Brandon Colvin DO        heparin (porcine) injection 5,000 Units  5,000 Units Subcutaneous 3 times per day Brandon Colvin DO   5,000 Units Problems:    Type 2 diabetes mellitus (HCC)    Hypocalcemia    Macrocytic anemia    PAF (paroxysmal atrial fibrillation) (HCC)    Acute kidney injury superimposed on chronic kidney disease (HCC)    CKD (chronic kidney disease) stage 3, GFR 30-59 ml/min    Digoxin overdose, accidental or unintentional, initial encounter  Resolved Problems:    * No resolved hospital problems. *      Continue IV furosemide. Likely to start dialysis tomorrow. Cardiology and nephrology onboard. Digoxin reduced to three times weekly with other rate control medications adjusted. Controlled at present. Insulin per scale. Hypoglycemia protocol as needed.     Advance Directive: Full Code    DVT prophylaxis: heparin    Discharge planning: TELMA Lucero DO  Rounding Hospitalist

## 2020-11-27 PROBLEM — N18.6 ESRD (END STAGE RENAL DISEASE) (HCC): Status: ACTIVE | Noted: 2020-01-01

## 2020-11-27 NOTE — PROGRESS NOTES
Nephrology ([address] [facility]) Progress Note      Patient:  Nam Cool  YOB: 1940  Date of Service: 11/27/2020  MRN: [id number]   Acct: [de-identified]   Primary Care Physician: Tammy Morley MD  Advance Directive: Full Code  Admit Date: 11/25/2020       Hospital Day: 2  Referring Provider: Butch Santos DO    Patient independently seen and examined, Chart, Consults, Notes, Operative notes, Labs, Cardiology, and Radiology studies reviewed as available. Chief complaint: Abnormal labs. Subjective:  Nam Cool is a 68 y.o. male  whom we were consulted for stage V chronic kidney disease. Patient has stage V chronic kidney disease secondary to diabetic diabetic nephropathy. Patient already has right upper arm primary fistula in preparation for hemodialysis. Presenting to the emergency room with complaining of increasing shortness of breath and swelling of legs. He is currently being treated with intravenous diuretics and feeling much better. Patient has already received good education regarding dialysis and fistula has been implanted. Today he is still complaining of swelling, agreed to proceed with hemodialysis. His fistula looks great. Allergies:  Patient has no known allergies.     Medicines:  Current Facility-Administered Medications   Medication Dose Route Frequency Provider Last Rate Last Dose    tamsulosin (FLOMAX) capsule 0.4 mg  0.4 mg Oral Daily Mickey Farrar, DO        digoxin (LANOXIN) tablet 125 mcg  125 mcg Oral Q MWF Jake Dinh MD   125 mcg at 11/27/20 0941    dilTIAZem (CARDIZEM CD) extended release capsule 240 mg  240 mg Oral BID Jake Dinh MD   240 mg at 11/27/20 0941    insulin lispro (HUMALOG) injection vial 0-12 Units  0-12 Units Subcutaneous TID  Mickey Farrar, DO   2 Units at 11/26/20 1717    insulin lispro (HUMALOG) injection vial 0-6 Units  0-6 Units Subcutaneous Nightly Mickey Farrar, DO   1 Units at 11/26/20 3117    glucose (GLUTOSE) 40 % oral gel 15 g  15 g Oral PRN Dipesh Viera, DO        dextrose 50 % IV solution  12.5 g Intravenous PRN Dipesh Viera, DO        glucagon (rDNA) injection 1 mg  1 mg Intramuscular PRN Dipesh Viera, DO        dextrose 5 % solution  100 mL/hr Intravenous PRN Dipesh Viera, DO        folic acid (FOLVITE) tablet 1 mg  1 mg Oral Daily [facility], DO   1 mg at 11/27/20 0941    furosemide (LASIX) injection 40 mg  40 mg Intravenous Daily [facility], DO   40 mg at 11/27/20 9546    hydrALAZINE (APRESOLINE) tablet 50 mg  50 mg Oral TID Dipesh Viera, DO   50 mg at 11/27/20 0941    HYDROcodone-acetaminophen (NORCO) 5-325 MG per tablet 1 tablet  1 tablet Oral Q6H PRN Dipesh Viera, DO        metoprolol succinate (TOPROL XL) extended release tablet 100 mg  100 mg Oral BID Dipesh Viera, DO   100 mg at 11/27/20 0941    therapeutic multivitamin-minerals 1 tablet  1 tablet Oral Daily [facility], DO   1 tablet at 11/27/20 0941    pantoprazole (PROTONIX) tablet 40 mg  40 mg Oral BID AC [facility], DO   40 mg at 11/26/20 1619    pravastatin (PRAVACHOL) tablet 20 mg  20 mg Oral Nightly [facility], DO   20 mg at 11/26/20 2117    sulfaSALAzine (AZULFIDINE) tablet 1,000 mg  1,000 mg Oral TID Dipesh Viera, DO   1,000 mg at 11/27/20 0941    LORazepam (ATIVAN) tablet 0.5 mg  0.5 mg Oral Nightly PRN Dipesh Viera, DO        doxazosin (CARDURA) tablet 4 mg  4 mg Oral Daily [facility], DO   4 mg at 11/27/20 0941    sodium chloride flush 0.9 % injection 10 mL  10 mL Intravenous 2 times per day Dipesh Viera, DO   10 mL at 11/26/20 2136    sodium chloride flush 0.9 % injection 10 mL  10 mL Intravenous PRN Dipesh Viera, DO        acetaminophen (TYLENOL) tablet 650 mg  650 mg Oral Q6H PRN Dipesh Viera,         Or    acetaminophen (TYLENOL) suppository 650 mg  650 mg Rectal Q6H PRN Dipesh Viera DO        promethazine (PHENERGAN) tablet 12.5 mg  12.5 mg Oral Q6H PRN Dipesh Viera DO        Or    ondansetron [facility]) injection 4 mg  4 mg Intravenous Q6H PRN Dipesh Viera DO        heparin (porcine) injection 5,000 Units  5,000 Units Subcutaneous 3 times per day Abel Dennis DO   5,000 Units at 11/26/20 2128       Past Medical History:  Past Medical History:   Diagnosis Date    Arthritis     Atherosclerosis of native arteries of the extremities with intermittent claudication     Blood circulation, collateral     CAD (coronary artery disease)     Carotid artery occlusion, right     Chronic kidney disease     Depression     GERD (gastroesophageal reflux disease)     History of blood transfusion     Hyperlipidemia     Hypertension     Pneumonia due to infectious organism 4/9/2019    Type II or unspecified type diabetes mellitus without mention of complication, not stated as uncontrolled     Ulcerative colitis ([facility] [address] 6/30/2019       Past Surgical History:  Past Surgical History:   Procedure Laterality Date    CATARACT REMOVAL Bilateral     DIALYSIS FISTULA CREATION Right 10/16/2020    RIGHT BRACHIAL/BASILIC AV FISTULA performed by Allyson Prescott MD at [facility]      umbilical hernia    PACEMAKER PLACEMENT       [address] [facility]       Family History  Family History   Problem Relation Age of Onset    High Blood Pressure Mother     Heart Disease Mother     Stroke Mother         at 48    High Blood Pressure Father     Heart Disease Father     Stroke Father         at 59    No Known Problems Brother     Alzheimer's Disease Brother         onset at 80, then rapidly progressed to death       Social History  Social History     Socioeconomic History    Marital status:      Spouse name: Not on file    Number of children: 3    Years of education: Not on file    Highest education level: Not on file   Occupational History    Occupation: Printer   Social Needs    Financial resource strain: Not on file    Food insecurity     Worry: Not on file     Inability: Not on file   [employer] needs     Medical: Not on file     Non-medical: Not on file   Tobacco Use    Smoking status: Former Smoker     Packs/day: 1.00     Years: 50.00     Pack years: 50.00     Types: Cigarettes     Last attempt to quit: 2006     Years since quittin.9    Smokeless tobacco: Former User     Types: Snuff   Substance and Sexual Activity    Alcohol use: Yes     Comment: occasional    Drug use: Never    Sexual activity: Not on file   Lifestyle    Physical activity     Days per week: Not on file     Minutes per session: Not on file    Stress: Not on file   Relationships    Social connections     Talks on phone: Not on file     Gets together: Not on file     Attends [employer] service: Not on file     Active member of club or organization: Not on file     Attends meetings of clubs or organizations: Not on file     Relationship status: Not on file    Intimate partner violence     Fear of current or ex partner: Not on file     Emotionally abused: Not on file     Physically abused: Not on file     Forced sexual activity: Not on file   Other Topics Concern    Not on file   Social History Narrative    Worked on [employer] most of his life     twice and     He has 2 sons and one daughter    Never in the [employer]    Education high school    Attends [address] one pack per day quit in  denies alcohol consumption or substance usage    Physically sedentary    /////////////////////////////////////////////////////////////////    CODE STATUS: Full Code    HEALTH CARE PROXY: His oldest son, Skyler Hooks, [phone]    DOMICILED: Lives alone in a private home with one step in to home but no steps within, has no pets    AMBULATES: independantly         Review of Systems:  History obtained from chart review and the patient  General ROS: No fever or chills  Respiratory ROS: positive for - shortness of breath  Cardiovascular ROS: No chest pain or palpitations  Gastrointestinal ROS: No abdominal pain or melena  Genito-Urinary ROS: No dysuria or hematuria  Musculoskeletal ROS: No joint pain or swelling   14 point ROS reviewed with the patient and negative except as noted above and in the HPI unless unable to obtain. Objective:  Patient Vitals for the past 24 hrs:   BP Temp Temp src Pulse Resp SpO2   11/27/20 0626 (!) 144/68 98.2 °F (36.8 °C) Temporal 93 18 94 %   11/27/20 0057 (!) 146/43 98.3 °F (36.8 °C) Temporal 68 16 93 %   11/26/20 1945 (!) 159/66 97 °F (36.1 °C) Temporal 80 16 96 %   11/26/20 1144 (!) 153/74 97.5 °F (36.4 °C) -- 69 20 96 %       Intake/Output Summary (Last 24 hours) at 11/27/2020 1038  Last data filed at 11/26/2020 1945  Gross per 24 hour   Intake 240 ml   Output 500 ml   Net -260 ml     General: awake/alert   HEENT: Normocephalic atraumatic  Neck: Supple with no JVD or carotid bruits  Chest:  clear to auscultation bilaterally  CVS: regular rate and rhythm  Abdominal: soft, nontender, normal bowel sounds  Extremities: Bilateral 2+ pedal edema  Skin: warm and dry without rash      Labs:  BMP:   Recent Labs     11/25/20  1524 11/26/20 0221 11/27/20 0143    140 143   K 3.6 3.4* 3.3*  3.3*    103 105   CO2 24 22 23   BUN 56* 54* 52*   CREATININE 3.8* 3.8* 3.9*   CALCIUM 8.5* 8.7* 8.5*     CBC:   Recent Labs     11/25/20  1524 11/26/20 0221 11/27/20  0143   WBC 6.6 7.9 6.7   HGB 9.2* 9.3* 8.8*   HCT 28.3* 28.1* 26.4*   .4* 97.6* 99.2*    195 181     LIVER PROFILE:   Recent Labs     11/25/20  1524 11/27/20 0143   AST 33 21   ALT 66* 42*   BILITOT 0.5 0.4   ALKPHOS 305* 265*     PT/INR:   Recent Labs     11/25/20  1524   PROTIME 18.6*   INR 1.54*     APTT:   Recent Labs     11/25/20  1524   APTT 32.6     BNP:  No results for input(s): BNP in the last 72 hours. Ionized Calcium:No results for input(s): IONCA in the last 72 hours.   Magnesium:  Recent Labs     11/26/20  0221 11/27/20  0143   MG 2.1 1.9     Phosphorus:No results for input(s): PHOS in the last 72 hours. HgbA1C: No results for input(s): LABA1C in the last 72 hours. Hepatic:   Recent Labs     11/25/20  1524 11/27/20  0143   ALKPHOS 305* 265*   ALT 66* 42*   AST 33 21   PROT 6.8 5.4*   BILITOT 0.5 0.4   LABALBU 4.1 3.5     Lactic Acid: No results for input(s): LACTA in the last 72 hours. Troponin: No results for input(s): CKTOTAL, CKMB, TROPONINT in the last 72 hours. ABGs: No results for input(s): PH, PCO2, PO2, HCO3, O2SAT in the last 72 hours. CRP:  No results for input(s): CRP in the last 72 hours. Sed Rate:  No results for input(s): SEDRATE in the last 72 hours. Cultures:   No results for input(s): CULTURE in the last 72 hours. No results for input(s): BC, Kiki Fredy in the last 72 hours. No results for input(s): CXSURG in the last 72 hours. Radiology reports as per the Radiologist  Radiology: Xr Chest Portable    Result Date: 11/25/2020  EXAMINATION: XR CHEST PORTABLE 11/25/2020 4:00 PM HISTORY: Lower extremity swelling, edema. Report: A portable upright frontal view the chest was obtained. COMPARISON: Chest x-rays 11/13/2020. The lungs are mildly hypoaerated, no focal infiltrate is seen. There is mild central and basilar vascular crowding. Heart size is upper limits of normal. The left subclavian single lead pacemaker appears in satisfactory position, unchanged. No pneumothorax or effusion is identified. The bony thorax and upper abdomen are unremarkable. Impression: Shallow inspiration, no acute infiltrates or evidence of overt CHF. Signed by Dr Frederick Gonzalez on 11/25/2020 4:02 PM       Assessment   1. Stage V chronic kidney disease, agreed to proceed with dialysis. 2.  Clinically volume overload. 3.  Type II diabetic nephropathy. 4.  Anemia of chronic kidney disease. 5.  History of coronary artery disease. 6.  Secondary hyperparathyroidism    Plan:  1. Start hemodialysis treatment today. 2.  Change to p.o. diuretics. 3.  Start ENZO. . 4.  P.o. calcitriol.     Belinda Waters MD  11/27/20  10:38 AM

## 2020-11-27 NOTE — PROGRESS NOTES
Physician Progress Note      PATIENT:               Jae Nagel  CSN #:                  [id number]  :                       1940  ADMIT DATE:       2020 3:08 PM  [address] [place] DATE:  RESPONDING  PROVIDER #:        SANNA MELCHOR DO          QUERY TEXT:    Pt admitted with  fluid volume overload. Noted documentation of consultant. Stage V CKD in Dr. Ellie Fernandes notes , . If possible, please document in   progress notes and discharge summary:    The medical record reflects the following:  Risk Factors: Chronic Kidney disease, hypertension, Diabetic nephropathy  Clinical Indicators: Fluid volume overload, creatinine 3.9  Treatment: Hemodialysis , nephrology consult    Thank you    Cait Naqvi RN, BSN, [facility]  [phone]  Options provided:  -- Chronic kidney disease Stage V  confirmed  -- Chronic kidney disease Stage V ruled out  -- Defer to Nephrology consultant documentation regarding Chronic kidney   disease Stage V  -- Other - I will add my own diagnosis  -- Disagree - Not applicable / Not valid  -- Disagree - Clinically unable to determine / Unknown  -- Refer to Clinical Documentation Reviewer    PROVIDER RESPONSE TEXT:    The diagnosis of Chronic kidney disease Stage V was confirmed.     Query created by: Yolanda Lowery on 2020 3:28 PM      Electronically signed by:  Billy Sotelo DO 2020 3:49 PM

## 2020-11-27 NOTE — PROGRESS NOTES
Hospitalist Progress Note  11/27/2020 12:20 PM  Subjective:   Admit Date: 11/25/2020  PCP: Carmel Lees MD    Chief Complaint: edema    Subjective: Patient's urine output is down today and he states that nursing had to catheterize him due to retention. He takes a large dose of terazosin at home to manage his retention. Willing to proceed with dialysis. History is otherwise unchanged. [facility] History:   11-25: Presents to ED with worsening edema of the lower extremities extending into abdomen x2 weeks. 20 pound weight gain. CKD has progressed, had fistula formation for imminent start of dialysis. Digoxin level supratherapeutic. Admitted to med/surg with nephrology and cardiology consults. 11-26: Medications were adjusted by cardiology and digoxin ordered three times weekly. Likely to start dialysis tomorrow. Insulin per scale with hypoglycemia protocol if needed. 11-27: Urinary retention, start tamsulosin. Urine output down but edema is visibly improving. Initiating hemodialysis today, changing diuretics to oral.    ROS: 14 point review of systems is negative except as specifically addressed above.     DIET RENAL; Carb Control: 3 carb choices (45 gms)/meal; Low Sodium (2 GM)    Intake/Output Summary (Last 24 hours) at 11/27/2020 1220  Last data filed at 11/26/2020 1945  Gross per 24 hour   Intake 240 ml   Output 500 ml   Net -260 ml     Medications:   dextrose       Current Facility-Administered Medications   Medication Dose Route Frequency Provider Last Rate Last Dose    tamsulosin (FLOMAX) capsule 0.4 mg  0.4 mg Oral Daily Mickey Farrar DO   0.4 mg at 11/27/20 1128    furosemide (LASIX) tablet 40 mg  40 mg Oral Daily Vaughn Nance MD        [START ON 11/30/2020] darbepoetin jolene-polysorbate (ARANESP) injection 60 mcg  60 mcg Subcutaneous Weekly - Monday Vaughn Nance MD        calcitRIOL (ROCALTROL) capsule 0.25 mcg  0.25 mcg Oral Daily Vaughn Nance MD   0.25 mcg at 11/27/20 1128    digoxin (LANOXIN) tablet 125 mcg  125 mcg Oral Q MWF Luis M Hernández MD   125 mcg at 11/27/20 0941    dilTIAZem (CARDIZEM CD) extended release capsule 240 mg  240 mg Oral BID Luis M Hernández MD   240 mg at 11/27/20 0941    insulin lispro (HUMALOG) injection vial 0-12 Units  0-12 Units Subcutaneous TID WC Rainbow Farrar, DO   1 Units at 11/27/20 0945    insulin lispro (HUMALOG) injection vial 0-6 Units  0-6 Units Subcutaneous Nightly Mickey Farrar, DO   1 Units at 11/26/20 2127    glucose (GLUTOSE) 40 % oral gel 15 g  15 g Oral PRN Janyce Mock, DO        dextrose 50 % IV solution  12.5 g Intravenous PRN Janyce Mock, DO        glucagon (rDNA) injection 1 mg  1 mg Intramuscular PRN Janyce Mock, DO        dextrose 5 % solution  100 mL/hr Intravenous PRN Janyce Mock, DO        folic acid (FOLVITE) tablet 1 mg  1 mg Oral Daily Rainbow Farrar, DO   1 mg at 11/27/20 0941    hydrALAZINE (APRESOLINE) tablet 50 mg  50 mg Oral TID Janyce Mock, DO   50 mg at 11/27/20 0941    HYDROcodone-acetaminophen (NORCO) 5-325 MG per tablet 1 tablet  1 tablet Oral Q6H PRN Janyce Mock, DO        metoprolol succinate (TOPROL XL) extended release tablet 100 mg  100 mg Oral BID Janyce Mock, DO   100 mg at 11/27/20 0941    therapeutic multivitamin-minerals 1 tablet  1 tablet Oral Daily [facility], DO   1 tablet at 11/27/20 0941    pantoprazole (PROTONIX) tablet 40 mg  40 mg Oral BID AC Rainbow Farrar, DO   40 mg at 11/27/20 1127    pravastatin (PRAVACHOL) tablet 20 mg  20 mg Oral Nightly Rainbow Farrar, DO   20 mg at 11/26/20 2117    sulfaSALAzine (AZULFIDINE) tablet 1,000 mg  1,000 mg Oral TID Janyce Mock, DO   1,000 mg at 11/27/20 0941    LORazepam (ATIVAN) tablet 0.5 mg  0.5 mg Oral Nightly PRN Janyce Mock, DO        doxazosin (CARDURA) tablet 4 mg  4 mg Oral Daily Rainbow Farrar, DO   4 mg at 11/27/20 0941    sodium chloride flush 0.9 % injection 10 mL  10 mL Intravenous 2 times per day Brandon Colvin DO   10 mL at 11/26/20 2136    sodium chloride flush 0.9 % injection 10 mL  10 mL normal lids, pupils and irises normal, external ears and nose are normal, lips normal  Neck without masses, lympadenopathy, bruit, thyroid normal  Lungs: no increased work of breathing, \"clear to auscultation bilaterally\" without rales, rhonchi or wheezes  Heart: regular rate and rhythm, S1, S2 normal, no murmur, click, rub or gallop  Abdomen: soft, mildly distended without fluid wave, nontender  Extremities: edema 1+ bilaterally, modified Liliana's negative  Neurologic: no focal neurologic deficits, normal sensation, alert and oriented, affect and mood appropriate  Skin: no rashes, nodules    Assessment and Plan:   Principal Problem:    Volume overload  Active Problems:    Type 2 diabetes mellitus (HCC)    Hypocalcemia    Macrocytic anemia    PAF (paroxysmal atrial fibrillation) (HCC)    Acute kidney injury superimposed on chronic kidney disease (HCC)    CKD (chronic kidney disease) stage 3, GFR 30-59 ml/min    Digoxin overdose, accidental or unintentional, initial encounter  Resolved Problems:    * No resolved hospital problems. *      Tamsulosin 0.4 mg daily. Initiating hemodialysis today. Change furosemide to oral.    Cardiology and nephrology onboard. Digoxin reduced to three times weekly with other rate control medications adjusted. Controlled at present. Insulin per scale. Hypoglycemia protocol as needed.     Advance Directive: Full Code    DVT prophylaxis: heparin    Discharge planning: TELMA Lucero DO  Rounding Hospitalist

## 2020-11-28 NOTE — PROGRESS NOTES
Nephrology ([facility]) Progress Note      Patient:  Florin Long  YOB: 1940  Date of Service: 11/28/2020  MRN: [id number]   Acct: [de-identified]   Primary Care Physician: Carmel Lees MD  Advance Directive: Full Code  Admit Date: 11/25/2020       Hospital Day: 3  Referring Provider: Nell Norman DO    Patient independently seen and examined, Chart, Consults, Notes, Operative notes, Labs, Cardiology, and Radiology studies reviewed as available. Chief complaint: Abnormal labs. Subjective:  Florin Long is a 68 y.o. male  whom we were consulted for stage V chronic kidney disease. Patient has stage V chronic kidney disease secondary to diabetic diabetic nephropathy. Patient already has right upper arm primary fistula in preparation for hemodialysis. Presenting to the emergency room with complaining of increasing shortness of breath and swelling of legs. He is currently being treated with intravenous diuretics and feeling much better. Patient has already received good education regarding dialysis and fistula has been implanted. On November 27, he underwent first dialysis treatment. He tolerated very well. Today he is getting second treatment    Currently seen on hemodialysis  Hemodialysis access: AV fistula  Hemodialysis: 3-hour  Ultrafiltration: 2000 cc  3K bath  Blood flow rate is 400 cc/min. Allergies:  Patient has no known allergies.     Medicines:  Current Facility-Administered Medications   Medication Dose Route Frequency Provider Last Rate Last Dose    tamsulosin (FLOMAX) capsule 0.4 mg  0.4 mg Oral Daily Mickey Farrar DO   0.4 mg at 11/27/20 1128    furosemide (LASIX) tablet 40 mg  40 mg Oral Daily Vaughn Nance MD        [START ON 11/30/2020] darbepoetin jolene-polysorbate (ARANESP) injection 60 mcg  60 mcg Subcutaneous Weekly - Monday Vaughn Nance MD        calcitRIOL (ROCALTROL) capsule 0.25 mcg  0.25 mcg Oral Daily Vaughn Nance MD   0.25 mcg at 11/27/20 1128    digoxin (LANOXIN) tablet 125 mcg  125 mcg Oral Q MWF Naeem Ivan MD   125 mcg at 11/27/20 0941    dilTIAZem (CARDIZEM CD) extended release capsule 240 mg  240 mg Oral BID Naeem Ivan MD   240 mg at 11/27/20 2105    insulin lispro (HUMALOG) injection vial 0-12 Units  0-12 Units Subcutaneous TID WC Mickey Farrar, DO   6 Units at 11/27/20 1200    insulin lispro (HUMALOG) injection vial 0-6 Units  0-6 Units Subcutaneous Nightly Mickey Farrar, DO   3 Units at 11/27/20 2106    glucose (GLUTOSE) 40 % oral gel 15 g  15 g Oral PRN Mickey Farrar, DO        dextrose 50 % IV solution  12.5 g Intravenous PRN Ori Sterling, DO        glucagon (rDNA) injection 1 mg  1 mg Intramuscular PRN Ori Sterling, DO        dextrose 5 % solution  100 mL/hr Intravenous PRN Ori Sterling, DO        folic acid (FOLVITE) tablet 1 mg  1 mg Oral Daily Mickey Farrar, DO   1 mg at 11/27/20 0941    hydrALAZINE (APRESOLINE) tablet 50 mg  50 mg Oral TID Ori Sterling, DO   50 mg at 11/27/20 2105    HYDROcodone-acetaminophen (NORCO) 5-325 MG per tablet 1 tablet  1 tablet Oral Q6H PRN Ori Sterling, DO        metoprolol succinate (TOPROL XL) extended release tablet 100 mg  100 mg Oral BID Ori Sterling, DO   100 mg at 11/27/20 2105    therapeutic multivitamin-minerals 1 tablet  1 tablet Oral Daily Mickey Farrar, DO   1 tablet at 11/27/20 0941    pantoprazole (PROTONIX) tablet 40 mg  40 mg Oral BID AC Mickey Farrar, DO   40 mg at 11/28/20 7690    pravastatin (PRAVACHOL) tablet 20 mg  20 mg Oral Nightly Mickey Farrar, DO   20 mg at 11/27/20 2105    sulfaSALAzine (AZULFIDINE) tablet 1,000 mg  1,000 mg Oral TID Ori Sterling, DO   1,000 mg at 11/27/20 2105    LORazepam (ATIVAN) tablet 0.5 mg  0.5 mg Oral Nightly PRN Ori Sterling, DO        doxazosin (CARDURA) tablet 4 mg  4 mg Oral Daily [facility], DO   4 mg at 11/27/20 0941    sodium chloride flush 0.9 % injection 10 mL  10 mL Intravenous 2 times per day [facility], DO   10 mL at 11/28/20 0028    sodium chloride flush 0.9 % injection 10 mL Marital status:      Spouse name: Not on file    Number of children: 3    Years of education: Not on file    Highest education level: Not on file   Occupational History    Occupation: Printer   Social Needs    Financial resource strain: Not on file    Food insecurity     Worry: Not on file     Inability: Not on file   [employer] needs     Medical: Not on file     Non-medical: Not on file   Tobacco Use    Smoking status: Former Smoker     Packs/day: 1.00     Years: 50.00     Pack years: 50.00     Types: Cigarettes     Last attempt to quit: 2006     Years since quittin.9    Smokeless tobacco: Former User     Types: Snuff   Substance and Sexual Activity    Alcohol use: Yes     Comment: occasional    Drug use: Never    Sexual activity: Not on file   Lifestyle    Physical activity     Days per week: Not on file     Minutes per session: Not on file    Stress: Not on file   Relationships    Social connections     Talks on phone: Not on file     Gets together: Not on file     Attends Ca[employer] service: Not on file     Active member of club or organization: Not on file     Attends meetings of clubs or organizations: Not on file     Relationship status: Not on file    Intimate partner violence     Fear of current or ex partner: Not on file     Emotionally abused: Not on file     Physically abused: Not on file     Forced sexual activity: Not on file   Other Topics Concern    Not on file   Social History Narrative    Worked on a [employer] most of his life     twice and     He has 2 sons and one daughter    Never in the [employer]    Education high school    Attends [address] one pack per day quit in  denies alcohol consumption or substance usage    Physically sedentary    /////////////////////////////////////////////////////////////////    CODE STATUS: Full Code    HEALTH CARE PROXY: His oldest son, Josefina Bailey, [phone] DOMICILED: Lives alone in a private home with one step in to home but no steps within, has no pets    AMBULATES: independantly         Review of Systems:  History obtained from chart review and the patient  General ROS: No fever or chills  Respiratory ROS: positive for - shortness of breath  Cardiovascular ROS: No chest pain or palpitations  Gastrointestinal ROS: No abdominal pain or melena  Genito-Urinary ROS: No dysuria or hematuria  Musculoskeletal ROS: No joint pain or swelling   14 point ROS reviewed with the patient and negative except as noted above and in the HPI unless unable to obtain.     Objective:  Patient Vitals for the past 24 hrs:   BP Temp Temp src Pulse Resp SpO2 Weight   11/28/20 0048 (!) 124/40 98.6 °F (37 °C) Temporal 64 16 94 % 156 lb 11.2 oz (71.1 kg)   11/27/20 1907 (!) 158/64 97.6 °F (36.4 °C) Temporal 65 16 98 % --   11/27/20 1800 (!) 171/87 -- -- 69 -- -- --       Intake/Output Summary (Last 24 hours) at 11/28/2020 1041  Last data filed at 11/28/2020 0544  Gross per 24 hour   Intake 640 ml   Output 2680 ml   Net -2040 ml     General: awake/alert   HEENT: Normocephalic atraumatic  Neck: Supple with no JVD or carotid bruits  Chest:  clear to auscultation bilaterally  CVS: regular rate and rhythm  Abdominal: soft, nontender, normal bowel sounds  Extremities: Bilateral 2+ pedal edema  Skin: warm and dry without rash      Labs:  BMP:   Recent Labs     11/26/20 0221 11/27/20 0143 11/28/20 0151    143 141   K 3.4* 3.3*  3.3* 3.2*    105 103   CO2 22 23 26   BUN 54* 52* 32*   CREATININE 3.8* 3.9* 2.7*   CALCIUM 8.7* 8.5* 8.4*     CBC:   Recent Labs     11/26/20 0221 11/27/20 0143 11/28/20  0151   WBC 7.9 6.7 5.1   HGB 9.3* 8.8* 8.6*   HCT 28.1* 26.4* 26.1*   MCV 97.6* 99.2* 100.0*    181 172     LIVER PROFILE:   Recent Labs     11/25/20  1524 11/27/20  0143   AST 33 21   ALT 66* 42*   BILITOT 0.5 0.4   ALKPHOS 305* 265*     PT/INR:   Recent Labs     11/25/20  1524   PROTIME 18.6*   INR 1.54*     APTT:   Recent Labs     11/25/20  1524   APTT 32.6     BNP:  No results for input(s): BNP in the last 72 hours. Ionized Calcium:No results for input(s): IONCA in the last 72 hours. Magnesium:  Recent Labs     11/26/20  0221 11/27/20  0143 11/28/20  0151   MG 2.1 1.9 1.9     Phosphorus:No results for input(s): PHOS in the last 72 hours. HgbA1C: No results for input(s): LABA1C in the last 72 hours. Hepatic:   Recent Labs     11/25/20  1524 11/27/20  0143   ALKPHOS 305* 265*   ALT 66* 42*   AST 33 21   PROT 6.8 5.4*   BILITOT 0.5 0.4   LABALBU 4.1 3.5     Lactic Acid: No results for input(s): LACTA in the last 72 hours. Troponin: No results for input(s): CKTOTAL, CKMB, TROPONINT in the last 72 hours. ABGs: No results for input(s): PH, PCO2, PO2, HCO3, O2SAT in the last 72 hours. CRP:  No results for input(s): CRP in the last 72 hours. Sed Rate:  No results for input(s): SEDRATE in the last 72 hours. Cultures:   No results for input(s): CULTURE in the last 72 hours. No results for input(s): BC, Dc Jewel in the last 72 hours. No results for input(s): CXSURG in the last 72 hours. Radiology reports as per the Radiologist  Radiology: Xr Chest Portable    Result Date: 11/25/2020  EXAMINATION: XR CHEST PORTABLE 11/25/2020 4:00 PM HISTORY: Lower extremity swelling, edema. Report: A portable upright frontal view the chest was obtained. COMPARISON: Chest x-rays 11/13/2020. The lungs are mildly hypoaerated, no focal infiltrate is seen. There is mild central and basilar vascular crowding. Heart size is upper limits of normal. The left subclavian single lead pacemaker appears in satisfactory position, unchanged. No pneumothorax or effusion is identified. The bony thorax and upper abdomen are unremarkable. Impression: Shallow inspiration, no acute infiltrates or evidence of overt CHF. Signed by Dr Hipolito Springer. Carlos on 11/25/2020 4:02 PM       Assessment   1.   End-stage renal disease/currently seen on hemodialysis. 2.  Clinically volume overload. 3.  Type II diabetic nephropathy. 4.  Anemia of chronic kidney disease. 5.  History of coronary artery disease. 6.  Secondary hyperparathyroidism    Plan:  1. Tolerating dialysis well. 2.  Change to p.o. diuretics. 3.  Continue ENZO. . 4.  P.o. calcitriol.     Joce Flynn MD  11/28/20  10:41 AM

## 2020-11-28 NOTE — PROGRESS NOTES
Received call from telemetry that patient had 6 beats of vtach. Went to patient's room, patient was asymptomatic and stated that he didn't feel any changes. Dr. Sammie Thomason notified and no new orders received. Will continue to monitor.  Electronically signed by Vincent Peoples RN on 11/28/2020 at 3:52 PM

## 2020-11-28 NOTE — PROGRESS NOTES
Hospitalist Progress Note  11/28/2020 12:27 PM  Subjective:   Admit Date: 11/25/2020  PCP: Mamta Araujo MD    Chief Complaint: edema    Subjective: Patient is tolerating dialysis and feeling well. Edema is down again today after dialysis yesterday. History is otherwise unchanged. Cumulative Hospital History:   11-25: Presents to ED with worsening edema of the lower extremities extending into abdomen x2 weeks. 20 pound weight gain. CKD has progressed, had fistula formation for imminent start of dialysis. Digoxin level supratherapeutic. Admitted to med/surg with nephrology and cardiology consults. 11-26: Medications were adjusted by cardiology and digoxin ordered three times weekly. Likely to start dialysis tomorrow. Insulin per scale with hypoglycemia protocol if needed. 11-27: Urinary retention, start tamsulosin. Urine output down but edema is visibly improving. Initiating hemodialysis today, changing diuretics to oral.  11-28: Tolerating dialysis well, edema improved. Likely discharge Monday when outpatient chair time is established. ROS: 14 point review of systems is negative except as specifically addressed above.     DIET RENAL; Carb Control: 3 carb choices (45 gms)/meal; Low Sodium (2 GM)    Intake/Output Summary (Last 24 hours) at 11/28/2020 1227  Last data filed at 11/28/2020 1205  Gross per 24 hour   Intake 650 ml   Output 4780 ml   Net -4130 ml     Medications:   dextrose       Current Facility-Administered Medications   Medication Dose Route Frequency Provider Last Rate Last Dose    tamsulosin (FLOMAX) capsule 0.4 mg  0.4 mg Oral Daily Mickey Farrar DO   0.4 mg at 11/28/20 1157    furosemide (LASIX) tablet 40 mg  40 mg Oral Daily Roque Kwon MD   40 mg at 11/28/20 1157    [START ON 11/30/2020] darbepoetin jolene-polysorbate (ARANESP) injection 60 mcg  60 mcg Subcutaneous Weekly - Monday Roque Kwon MD        calcitRIOL (ROCALTROL) capsule 0.25 mcg  0.25 mcg Oral Daily Roque Kwon MD   0.25 mcg injection 10 mL  10 mL Intravenous PRN [facility], DO        acetaminophen (TYLENOL) tablet 650 mg  650 mg Oral Q6H PRN [facility], DO        Or    acetaminophen (TYLENOL) suppository 650 mg  650 mg Rectal Q6H PRN [facility], DO        promethazine (PHENERGAN) tablet 12.5 mg  12.5 mg Oral Q6H PRN [facility], DO        Or    ondansetron [facility]) injection 4 mg  4 mg Intravenous Q6H PRN [facility], DO        heparin (porcine) injection 5,000 Units  5,000 Units Subcutaneous 3 times per day [facility], DO   5,000 Units at 11/28/20 7545        Labs:     Recent Labs     11/26/20 0221 11/27/20 0143 11/28/20 0151   WBC 7.9 6.7 5.1   RBC 2.88* 2.66* 2.61*   HGB 9.3* 8.8* 8.6*   HCT 28.1* 26.4* 26.1*   MCV 97.6* 99.2* 100.0*   MCH 32.3* 33.1* 33.0*   MCHC 33.1 33.3 33.0    181 172     Recent Labs     11/26/20 0221 11/27/20 0143 11/28/20  0151    143 141   K 3.4* 3.3*  3.3* 3.2*   ANIONGAP 15 15 12    105 103   CO2 22 23 26   BUN 54* 52* 32*   CREATININE 3.8* 3.9* 2.7*   GLUCOSE 206* 147* 137*   CALCIUM 8.7* 8.5* 8.4*     Recent Labs     11/26/20 0221 11/27/20 0143 11/28/20  0151   MG 2.1 1.9 1.9     Recent Labs     11/25/20  1524 11/27/20 0143   AST 33 21   ALT 66* 42*   BILITOT 0.5 0.4   ALKPHOS 305* 265*     ABGs:No results for input(s): PH, PO2, PCO2, HCO3, BE, O2SAT in the last 72 hours. Troponin T: No results for input(s): TROPONINI in the last 72 hours. INR:   Recent Labs     11/25/20  1524   INR 1.54*     Lactic Acid: No results for input(s): LACTA in the last 72 hours.     Objective:   Vitals: BP (!) 158/62   Pulse 72   Temp 97.9 °F (36.6 °C) (Temporal)   Resp 16   Ht 5' 7\" (1.702 m)   Wt 156 lb 11.2 oz (71.1 kg)   SpO2 96%   BMI 24.54 kg/m²   24HR INTAKE/OUTPUT:      Intake/Output Summary (Last 24 hours) at 11/28/2020 1227  Last data filed at 11/28/2020 1205  Gross per 24 hour   Intake 650 ml   Output 4780 ml   Net -4130 ml     General appearance: alert and cooperative with exam  HEENT: atraumatic, eyes with clear conjunctiva and normal lids, pupils and irises normal, external ears and nose are normal, lips normal  Neck without masses, lympadenopathy, bruit, thyroid normal  Lungs: no increased work of breathing, \"clear to auscultation bilaterally\" without rales, rhonchi or wheezes  Heart: regular rate and rhythm, S1, S2 normal, no murmur, click, rub or gallop  Abdomen: soft, mildly distended without fluid wave, nontender  Extremities: edema 1+ bilaterally, modified Liliana's negative  Neurologic: no focal neurologic deficits, normal sensation, alert and oriented, affect and mood appropriate  Skin: no rashes, nodules    Assessment and Plan:   Principal Problem:    Volume overload  Active Problems:    Type 2 diabetes mellitus ([facility])    Hypocalcemia    Macrocytic anemia    PAF (paroxysmal atrial fibrillation) ([facility])    Acute kidney injury superimposed on chronic kidney disease (HCC)    ESRD (end stage renal disease) ([facility])    Digoxin overdose, accidental or unintentional, initial encounter  Resolved Problems:    * No resolved hospital problems. *      Tolerating hemodialysis without issues. Awaiting chair time, which will no doubt not be given over the weekend. Continue diuresis. Cardiology and nephrology onboard. Digoxin reduced to three times weekly with other rate control medications adjusted. Controlled at present. Likely home Monday if chair time is established.     Advance Directive: Full Code    DVT prophylaxis: heparin    Discharge planning: DO Christine Aggarwal Hospitalist

## 2020-11-29 NOTE — PROGRESS NOTES
11/29/20 0837    digoxin (LANOXIN) tablet 125 mcg  125 mcg Oral Q MWF Edilson Devine MD   125 mcg at 11/27/20 0941    dilTIAZem (CARDIZEM CD) extended release capsule 240 mg  240 mg Oral BID Edilson Devine MD   240 mg at 11/29/20 0838    insulin lispro (HUMALOG) injection vial 0-12 Units  0-12 Units Subcutaneous TID WC [place] Farrar, DO   10 Units at 11/28/20 1734    insulin lispro (HUMALOG) injection vial 0-6 Units  0-6 Units Subcutaneous Nightly Mickey Farrar, DO   3 Units at 11/27/20 2106    glucose (GLUTOSE) 40 % oral gel 15 g  15 g Oral PRN Stiven Mini Farrar, DO        dextrose 50 % IV solution  12.5 g Intravenous PRN [place], DO        glucagon (rDNA) injection 1 mg  1 mg Intramuscular PRN [place], DO        dextrose 5 % solution  100 mL/hr Intravenous PRN [place] Abbasi, DO        folic acid (FOLVITE) tablet 1 mg  1 mg Oral Daily [place] Farrar, DO   1 mg at 11/29/20 0836    hydrALAZINE (APRESOLINE) tablet 50 mg  50 mg Oral TID [place] fern[place], DO   50 mg at 11/29/20 0838    HYDROcodone-acetaminophen (NORCO) 5-325 MG per tablet 1 tablet  1 tablet Oral Q6H PRN [place] Abbasi, DO        metoprolol succinate (TOPROL XL) extended release tablet 100 mg  100 mg Oral BID [place] Farrar, DO   100 mg at 11/29/20 0284    therapeutic multivitamin-minerals 1 tablet  1 tablet Oral Daily [place] Farrar, DO   1 tablet at 11/29/20 0837    pantoprazole (PROTONIX) tablet 40 mg  40 mg Oral BID AC [place] Farrar, DO   40 mg at 11/29/20 5972    pravastatin (PRAVACHOL) tablet 20 mg  20 mg Oral Nightly Mickey Farrar, DO   20 mg at 11/28/20 2129    sulfaSALAzine (AZULFIDINE) tablet 1,000 mg  1,000 mg Oral TID [place] fern[place], DO   1,000 mg at 11/29/20 6066    LORazepam (ATIVAN) tablet 0.5 mg  0.5 mg Oral Nightly PRN [place], DO   0.5 mg at 11/28/20 2138    doxazosin (CARDURA) tablet 4 mg  4 mg Oral Daily Mickey Farrar DO   4 mg at 11/29/20 0836    sodium chloride flush 0.9 % injection 10 mL  10 mL Intravenous 2 times per day [place] Abbasi DO   10 mL at 11/29/20 0838    sodium chloride flush 0.9 % injection 10 mL  10 mL Intravenous PRN Valaria Fender, DO        acetaminophen (TYLENOL) tablet 650 mg  650 mg Oral Q6H PRN Valaria Fender, DO        Or    acetaminophen (TYLENOL) suppository 650 mg  650 mg Rectal Q6H PRN Valaria Fender, DO        promethazine (PHENERGAN) tablet 12.5 mg  12.5 mg Oral Q6H PRN Valaria Fender, DO        Or    ondansetron [facility] injection 4 mg  4 mg Intravenous Q6H PRN Valaria Fender, DO           Past Medical History:  Past Medical History:   Diagnosis Date    Arthritis     Atherosclerosis of native arteries of the extremities with intermittent claudication     Blood circulation, collateral     CAD (coronary artery disease)     Carotid artery occlusion, right     Chronic kidney disease     Depression     GERD (gastroesophageal reflux disease)     History of blood transfusion     Hyperlipidemia     Hypertension     Pneumonia due to infectious organism 4/9/2019    Type II or unspecified type diabetes mellitus without mention of complication, not stated as uncontrolled     Ulcerative colitis ([facility] [address] 6/30/2019       Past Surgical History:  Past Surgical History:   Procedure Laterality Date    CATARACT REMOVAL Bilateral     DIALYSIS FISTULA CREATION Right 10/16/2020    RIGHT BRACHIAL/BASILIC AV FISTULA performed by Isabel Villa MD at [facility]      umbilical hernia    PACEMAKER PLACEMENT      ST [address] [facility]       Family History  Family History   Problem Relation Age of Onset    High Blood Pressure Mother     Heart Disease Mother     Stroke Mother         at 48    High Blood Pressure Father     Heart Disease Father     Stroke Father         at 59    No Known Problems Brother     Alzheimer's Disease Brother         onset at 80, then rapidly progressed to death       Social History  Social History     Socioeconomic History    Marital status:      Spouse name: Not on file    Number of children: 3    Years of education: Not on file    Highest education level: Not on file   Occupational History    Occupation: Printer   Social Needs    Financial resource strain: Not on file    Food insecurity     Worry: Not on file     Inability: Not on file   [employer] needs     Medical: Not on file     Non-medical: Not on file   Tobacco Use    Smoking status: Former Smoker     Packs/day: 1.00     Years: 50.00     Pack years: 50.00     Types: Cigarettes     Last attempt to quit: 2006     Years since quittin.9    Smokeless tobacco: Former User     Types: Snuff   Substance and Sexual Activity    Alcohol use: Yes     Comment: occasional    Drug use: Never    Sexual activity: Not on file   Lifestyle    Physical activity     Days per week: Not on file     Minutes per session: Not on file    Stress: Not on file   Relationships    Social connections     Talks on phone: Not on file     Gets together: Not on file     Attends Jewish service: Not on file     Active member of club or organization: Not on file     Attends meetings of clubs or organizations: Not on file     Relationship status: Not on file    Intimate partner violence     Fear of current or ex partner: Not on file     Emotionally abused: Not on file     Physically abused: Not on file     Forced sexual activity: Not on file   Other Topics Concern    Not on file   Social History Narrative    Worked on [employer] most of his life     twice and     He has 2 sons and one daughter    Never in the [place] [employer]    Education high school    Attends [address] one pack per day quit in  denies alcohol consumption or substance usage    Physically sedentary    /////////////////////////////////////////////////////////////////    CODE STATUS: Full Code    HEALTH CARE PROXY: His oldest son, Marcelo Gamez, [phone]    DOMICILED: Lives alone in a private home with one step in to home but no steps within, has no pets AMBULATES: independantly         Review of Systems:  History obtained from chart review and the patient  General ROS: No fever or chills  Respiratory ROS: positive for - shortness of breath  Cardiovascular ROS: No chest pain or palpitations  Gastrointestinal ROS: No abdominal pain or melena  Genito-Urinary ROS: No dysuria or hematuria  Musculoskeletal ROS: No joint pain or swelling   14 point ROS reviewed with the patient and negative except as noted above and in the HPI unless unable to obtain.     Objective:  Patient Vitals for the past 24 hrs:   BP Temp Temp src Pulse Resp SpO2   11/29/20 0706 (!) 159/59 98.6 °F (37 °C) Temporal 78 18 92 %   11/28/20 2354 (!) 118/51 98 °F (36.7 °C) Temporal 70 14 95 %   11/28/20 2139 -- -- -- 67 -- --   11/28/20 1828 (!) 122/49 98.1 °F (36.7 °C) Temporal 61 16 93 %   11/28/20 1403 (!) 130/52 98.5 °F (36.9 °C) Temporal 71 14 94 %   11/28/20 1134 (!) 158/62 97.9 °F (36.6 °C) Temporal 72 16 96 %       Intake/Output Summary (Last 24 hours) at 11/29/2020 0950  Last data filed at 11/29/2020 0836  Gross per 24 hour   Intake 500 ml   Output 2250 ml   Net -1750 ml     General: awake/alert   HEENT: Normocephalic atraumatic  Neck: Supple with no JVD or carotid bruits  Chest:  clear to auscultation bilaterally  CVS: regular rate and rhythm  Abdominal: soft, nontender, normal bowel sounds  Extremities: Bilateral 2+ pedal edema  Skin: warm and dry without rash      Labs:  BMP:   Recent Labs     11/27/20 0143 11/28/20 0151 11/29/20 0138    141 139   K 3.3*  3.3* 3.2* 3.6    103 100   CO2 23 26 26   BUN 52* 32* 26*   CREATININE 3.9* 2.7* 2.6*   CALCIUM 8.5* 8.4* 8.4*     CBC:   Recent Labs     11/27/20 0143 11/28/20 0151 11/29/20 0138   WBC 6.7 5.1 6.6   HGB 8.8* 8.6* 8.8*   HCT 26.4* 26.1* 27.4*   MCV 99.2* 100.0* 101.5*    172 174     LIVER PROFILE:   Recent Labs     11/27/20  0143   AST 21   ALT 42*   BILITOT 0.4   ALKPHOS 265*     PT/INR:   No results for input(s): PROTIME, INR in the last 72 hours. APTT:   No results for input(s): APTT in the last 72 hours. BNP:  No results for input(s): BNP in the last 72 hours. Ionized Calcium:No results for input(s): IONCA in the last 72 hours. Magnesium:  Recent Labs     11/27/20  0143 11/28/20  0151   MG 1.9 1.9     Phosphorus:No results for input(s): PHOS in the last 72 hours. HgbA1C: No results for input(s): LABA1C in the last 72 hours. Hepatic:   Recent Labs     11/27/20  0143   ALKPHOS 265*   ALT 42*   AST 21   PROT 5.4*   BILITOT 0.4   LABALBU 3.5     Lactic Acid: No results for input(s): LACTA in the last 72 hours. Troponin: No results for input(s): CKTOTAL, CKMB, TROPONINT in the last 72 hours. ABGs: No results for input(s): PH, PCO2, PO2, HCO3, O2SAT in the last 72 hours. CRP:  No results for input(s): CRP in the last 72 hours. Sed Rate:  No results for input(s): SEDRATE in the last 72 hours. Cultures:   No results for input(s): CULTURE in the last 72 hours. No results for input(s): BC, [place] Plan in the last 72 hours. No results for input(s): CXSURG in the last 72 hours. Radiology reports as per the Radiologist  Radiology: Xr Chest Portable    Result Date: 11/25/2020  EXAMINATION: XR CHEST PORTABLE 11/25/2020 4:00 PM HISTORY: Lower extremity swelling, edema. Report: A portable upright frontal view the chest was obtained. COMPARISON: Chest x-rays 11/13/2020. The lungs are mildly hypoaerated, no focal infiltrate is seen. There is mild central and basilar vascular crowding. Heart size is upper limits of normal. The left subclavian single lead pacemaker appears in satisfactory position, unchanged. No pneumothorax or effusion is identified. The bony thorax and upper abdomen are unremarkable. Impression: Shallow inspiration, no acute infiltrates or evidence of overt CHF. Signed by Dr Adrienne Gonzalez on 11/25/2020 4:02 PM       Assessment   1. End-stage renal disease/hemodialysis dependent.   2.

## 2020-11-29 NOTE — PROGRESS NOTES
(DIPROLENE) 0.05 % cream   Topical BID Antelmo Grayson, DO        tamsulosin [facility]) capsule 0.4 mg  0.4 mg Oral Daily Mickey Farrar, DO   0.4 mg at 11/29/20 6256    furosemide (LASIX) tablet 40 mg  40 mg Oral Daily Mendel Provost, MD   40 mg at 11/29/20 0836    [START ON 11/30/2020] darbepoetin jolene-polysorbate (ARANESP) injection 60 mcg  60 mcg Subcutaneous Weekly - Monday Mendel Provost, MD        calcitRIOL (ROCALTROL) capsule 0.25 mcg  0.25 mcg Oral Daily Mendel Provost, MD   0.25 mcg at 11/29/20 0837    digoxin (LANOXIN) tablet 125 mcg  125 mcg Oral Q MWF Vallarie Morning, MD   125 mcg at 11/27/20 0941    dilTIAZem (CARDIZEM CD) extended release capsule 240 mg  240 mg Oral BID Vallarie Morning, MD   240 mg at 11/29/20 0838    insulin lispro (HUMALOG) injection vial 0-12 Units  0-12 Units Subcutaneous TID WC [place] Farrar, DO   10 Units at 11/29/20 1200    insulin lispro (HUMALOG) injection vial 0-6 Units  0-6 Units Subcutaneous Nightly [place] Farrar, DO   3 Units at 11/27/20 2106    glucose (GLUTOSE) 40 % oral gel 15 g  15 g Oral PRN [place] Farrar, DO        dextrose 50 % IV solution  12.5 g Intravenous PRN Antelmo Grayson, DO        glucagon (rDNA) injection 1 mg  1 mg Intramuscular PRN Antelmo Grayson, DO        dextrose 5 % solution  100 mL/hr Intravenous PRN Antelmo Grayson, DO        folic acid (FOLVITE) tablet 1 mg  1 mg Oral Daily Mickey Farrar, DO   1 mg at 11/29/20 0836    hydrALAZINE (APRESOLINE) tablet 50 mg  50 mg Oral TID Antelmo Grayson, DO   50 mg at 11/29/20 0838    HYDROcodone-acetaminophen (NORCO) 5-325 MG per tablet 1 tablet  1 tablet Oral Q6H PRN Antelmo Grayson, DO        metoprolol succinate (TOPROL XL) extended release tablet 100 mg  100 mg Oral BID [facility], DO   100 mg at 11/29/20 1243    therapeutic multivitamin-minerals 1 tablet  1 tablet Oral Daily Mickey Farrar, DO   1 tablet at 11/29/20 0837    pantoprazole (PROTONIX) tablet 40 mg  40 mg Oral BID AC [facility], DO   40 mg at 11/29/20 5806    pravastatin (PRAVACHOL) tablet 20 mg  20 mg Oral Nightly Triston Champ Farrar, DO   20 mg at 11/28/20 2129    sulfaSALAzine (AZULFIDINE) tablet 1,000 mg  1,000 mg Oral TID Janyce Mock, DO   1,000 mg at 11/29/20 2652    LORazepam (ATIVAN) tablet 0.5 mg  0.5 mg Oral Nightly PRN Janyce Mock, DO   0.5 mg at 11/28/20 2138    doxazosin (CARDURA) tablet 4 mg  4 mg Oral Daily [facility], DO   4 mg at 11/29/20 7924    sodium chloride flush 0.9 % injection 10 mL  10 mL Intravenous 2 times per day Janyce Mock, DO   10 mL at 11/29/20 7296    sodium chloride flush 0.9 % injection 10 mL  10 mL Intravenous PRN Janyce Mock, DO        acetaminophen (TYLENOL) tablet 650 mg  650 mg Oral Q6H PRN Janyce Mock, DO        Or    acetaminophen (TYLENOL) suppository 650 mg  650 mg Rectal Q6H PRN Janyce Mock, DO        promethazine (PHENERGAN) tablet 12.5 mg  12.5 mg Oral Q6H PRN Janyce Mock, DO        Or    ondansetron [facility]) injection 4 mg  4 mg Intravenous Q6H PRN Janyce Mock, DO            Labs:     Recent Labs     11/27/20 0143 11/28/20 0151 11/29/20 0138   WBC 6.7 5.1 6.6   RBC 2.66* 2.61* 2.70*   HGB 8.8* 8.6* 8.8*   HCT 26.4* 26.1* 27.4*   MCV 99.2* 100.0* 101.5*   MCH 33.1* 33.0* 32.6*   MCHC 33.3 33.0 32.1*    172 174     Recent Labs     11/27/20 0143 11/28/20 0151 11/29/20 0138    141 139   K 3.3*  3.3* 3.2* 3.6   ANIONGAP 15 12 13    103 100   CO2 23 26 26   BUN 52* 32* 26*   CREATININE 3.9* 2.7* 2.6*   GLUCOSE 147* 137* 86   CALCIUM 8.5* 8.4* 8.4*     Recent Labs     11/27/20 0143 11/28/20  0151   MG 1.9 1.9     Recent Labs     11/27/20  0143   AST 21   ALT 42*   BILITOT 0.4   ALKPHOS 265*     ABGs:No results for input(s): PH, PO2, PCO2, HCO3, BE, O2SAT in the last 72 hours. Troponin T: No results for input(s): TROPONINI in the last 72 hours. INR:   No results for input(s): INR in the last 72 hours. Lactic Acid: No results for input(s): LACTA in the last 72 hours.     Objective:   Vitals: BP (!) 142/62   Pulse 83   Temp 98.6 °F (37 °C) (Temporal)   Resp 18   Ht 5' 7\" (1.702 m)   Wt 156 lb 11.2 oz (71.1 kg)   SpO2 94%   BMI 24.54 kg/m²   24HR INTAKE/OUTPUT:      Intake/Output Summary (Last 24 hours) at 11/29/2020 1322  Last data filed at 11/29/2020 0836  Gross per 24 hour   Intake 250 ml   Output 150 ml   Net 100 ml     General appearance: alert and cooperative with exam  HEENT: atraumatic, eyes with clear conjunctiva and normal lids, pupils and irises normal, external ears and nose are normal, lips normal  Neck without masses, lympadenopathy, bruit, thyroid normal  Lungs: no increased work of breathing, \"clear to auscultation bilaterally\" without rales, rhonchi or wheezes  Heart: regular rate and rhythm, S1, S2 normal, no murmur, click, rub or gallop  Abdomen: soft, mildly distended without fluid wave, nontender  Extremities: edema 1+ bilaterally, modified Liliana's negative  Neurologic: no focal neurologic deficits, normal sensation, alert and oriented, affect and mood appropriate  Skin: no rashes, nodules    Assessment and Plan:   Principal Problem:    Volume overload  Active Problems:    Type 2 diabetes mellitus (HCC)    Hypocalcemia    Macrocytic anemia    PAF (paroxysmal atrial fibrillation) (HCC)    Acute kidney injury superimposed on chronic kidney disease (HCC)    ESRD (end stage renal disease) ([facility])    Digoxin overdose, accidental or unintentional, initial encounter  Resolved Problems:    * No resolved hospital problems. *      Tolerating hemodialysis. Awaiting chair time, and can discharge tomorrow if we get one. Cardiology and nephrology onboard. Digoxin reduced to three times weekly with other rate control medications adjusted. Controlled at present. Betamethasone cream to ankle/shin. Monitor for worsening.     Advance Directive: Full Code    DVT prophylaxis: heparin    Discharge planning: to home      DO Christine Warren Hospitalist

## 2020-11-29 NOTE — PROGRESS NOTES
Rash noted to bilateral ankles that patient was complaining of itching. Dr. Lai Bi notified and orders placed. Will continue to monitor.  Electronically signed by Ruht Brownlee RN on 11/29/2020 at 12:06 PM

## 2020-11-30 PROBLEM — E87.70 VOLUME OVERLOAD: Status: RESOLVED | Noted: 2020-01-01 | Resolved: 2020-01-01

## 2020-11-30 NOTE — PROGRESS NOTES
Nephrology ([address]) Progress Note      Patient:  Stefano Maguire  YOB: 1940  Date of Service: 11/30/2020  MRN: [id number]   Acct: [de-identified]   Primary Care Physician: Juliana Mccarthy MD  Advance Directive: Full Code  Admit Date: 11/25/2020       Hospital Day: 5  Referring Provider: Ty Garibay DO    Patient independently seen and examined, Chart, Consults, Notes, Operative notes, Labs, Cardiology, and Radiology studies reviewed as available. Chief complaint: Abnormal labs. Subjective:  Stefano Maguire is a 68 y.o. male  whom we were consulted for stage V chronic kidney disease. Patient has stage V chronic kidney disease secondary to diabetic diabetic nephropathy. Patient already has right upper arm primary fistula in preparation for hemodialysis. Presenting to the emergency room with complaining of increasing shortness of breath and swelling of legs. He is currently being treated with intravenous diuretics and feeling much better. Patient has already received good education regarding dialysis and fistula has been implanted. On November 27, he underwent first dialysis treatment. After couple of hemodialysis treatments, he feels well and denies any shortness of breath. He has lost at least 7 pounds of fluid weight. Currently seen on hemodialysis  Hemodialysis access: AV fistula  Hemodialysis: 3 hours   ultrafiltration: 2000 cc  2K bath  Blood flow rate is 450 cc/min. Allergies:  Patient has no known allergies.     Medicines:  Current Facility-Administered Medications   Medication Dose Route Frequency Provider Last Rate Last Dose    apixaban (ELIQUIS) tablet 5 mg  5 mg Oral BID Ty Blind, DO   5 mg at 11/29/20 1950    betamethasone dipropionate (DIPROLENE) 0.05 % cream   Topical BID Ty Blind, DO        tamsulosin [facility]) capsule 0.4 mg  0.4 mg Oral Daily Mickey Farrar, DO   0.4 mg at 11/29/20 0838    furosemide (LASIX) tablet 40 mg  40 mg Oral Daily Job Evelio Cortes MD   40 mg at 11/29/20 1970    darbepoetin jolene-polysorbate (ARANESP) injection 60 mcg  60 mcg Subcutaneous Weekly - Monday Rik Duke MD        calcitRIOL (ROCALTROL) capsule 0.25 mcg  0.25 mcg Oral Daily Rik Duke MD   0.25 mcg at 11/29/20 0837    digoxin (LANOXIN) tablet 125 mcg  125 mcg Oral Q MWF Christian Cervantes MD   125 mcg at 11/27/20 0941    dilTIAZem (CARDIZEM CD) extended release capsule 240 mg  240 mg Oral BID Christian Cervantes MD   240 mg at 11/29/20 1949    insulin lispro (HUMALOG) injection vial 0-12 Units  0-12 Units Subcutaneous TID [facility] Farrar, DO   8 Units at 11/29/20 1732    insulin lispro (HUMALOG) injection vial 0-6 Units  0-6 Units Subcutaneous Nightly [place] Farrar, DO   2 Units at 11/29/20 2050    glucose (GLUTOSE) 40 % oral gel 15 g  15 g Oral PRN Merle Trinidad, DO        dextrose 50 % IV solution  12.5 g Intravenous PRN Merle Trinidad, DO        glucagon (rDNA) injection 1 mg  1 mg Intramuscular PRN Merle Trinidad, DO        dextrose 5 % solution  100 mL/hr Intravenous PRN Merle Trinidad, DO        folic acid (FOLVITE) tablet 1 mg  1 mg Oral Daily Mickey Farrar, DO   1 mg at 11/29/20 0836    hydrALAZINE (APRESOLINE) tablet 50 mg  50 mg Oral TID Merle Trinidad, DO   50 mg at 11/29/20 1949    HYDROcodone-acetaminophen (NORCO) 5-325 MG per tablet 1 tablet  1 tablet Oral Q6H PRN Merle Trinidad, DO        metoprolol succinate (TOPROL XL) extended release tablet 100 mg  100 mg Oral BID [place] Farrar, DO   100 mg at 11/29/20 1950    therapeutic multivitamin-minerals 1 tablet  1 tablet Oral Daily Mickey Farrar, DO   1 tablet at 11/29/20 0837    pantoprazole (PROTONIX) tablet 40 mg  40 mg Oral BID [facility] Farrar, DO   40 mg at 11/30/20 0529    pravastatin (PRAVACHOL) tablet 20 mg  20 mg Oral Nightly Mickey Farrar, DO   20 mg at 11/29/20 1950    sulfaSALAzine (AZULFIDINE) tablet 1,000 mg  1,000 mg Oral TID Merkenny Abdi, DO   1,000 mg at 11/29/20 1949    LORazepam (ATIVAN) tablet 0.5 mg  0.5 mg Oral Nightly PRN Merril Maira Farrar,    0.5 mg at 11/29/20 2050    doxazosin (CARDURA) tablet 4 mg  4 mg Oral Daily Mickey Farrar, DO   4 mg at 11/29/20 9257    sodium chloride flush 0.9 % injection 10 mL  10 mL Intravenous 2 times per day [facility], DO   10 mL at 11/29/20 1950    sodium chloride flush 0.9 % injection 10 mL  10 mL Intravenous PRN [facility], DO        acetaminophen (TYLENOL) tablet 650 mg  650 mg Oral Q6H PRN [facility], DO        Or    acetaminophen (TYLENOL) suppository 650 mg  650 mg Rectal Q6H PRN [facility], DO        promethazine (PHENERGAN) tablet 12.5 mg  12.5 mg Oral Q6H PRN [facility], DO        Or    ondansetron [facility]) injection 4 mg  4 mg Intravenous Q6H PRN [facility], DO           Past Medical History:  Past Medical History:   Diagnosis Date    Arthritis     Atherosclerosis of native arteries of the extremities with intermittent claudication     Blood circulation, collateral     CAD (coronary artery disease)     Carotid artery occlusion, right     Chronic kidney disease     Depression     GERD (gastroesophageal reflux disease)     History of blood transfusion     Hyperlipidemia     Hypertension     Pneumonia due to infectious organism 4/9/2019    Type II or unspecified type diabetes mellitus without mention of complication, not stated as uncontrolled     Ulcerative colitis ([facility] [address] 6/30/2019       Past Surgical History:  Past Surgical History:   Procedure Laterality Date    CATARACT REMOVAL Bilateral     DIALYSIS FISTULA CREATION Right 10/16/2020    RIGHT BRACHIAL/BASILIC AV FISTULA performed by Adrián Boyer MD at [facility]53      umbilical hernia    PACEMAKER PLACEMENT       [facility]       Family History  Family History   Problem Relation Age of Onset    High Blood Pressure Mother     Heart Disease Mother     Stroke Mother         at 48    High Blood Pressure Father     Heart Disease Father     Stroke Father         at 59    No Known Problems Brother     Alzheimer's Disease Brother         onset at 80, then rapidly progressed to death       Social History  Social History     Socioeconomic History    Marital status:      Spouse name: Not on file    Number of children: 3    Years of education: Not on file    Highest education level: Not on file   Occupational History    Occupation: Printer   Social Needs    Financial resource strain: Not on file    Food insecurity     Worry: Not on file     Inability: Not on file   [employer] needs     Medical: Not on file     Non-medical: Not on file   Tobacco Use    Smoking status: Former Smoker     Packs/day: 1.00     Years: 50.00     Pack years: 50.00     Types: Cigarettes     Last attempt to quit: 2006     Years since quittin.9    Smokeless tobacco: Former User     Types: Snuff   Substance and Sexual Activity    Alcohol use: Yes     Comment: occasional    Drug use: Never    Sexual activity: Not on file   Lifestyle    Physical activity     Days per week: Not on file     Minutes per session: Not on file    Stress: Not on file   Relationships    Social connections     Talks on phone: Not on file     Gets together: Not on file     Attends Zoroastrianism service: Not on file     Active member of club or organization: Not on file     Attends meetings of clubs or organizations: Not on file     Relationship status: Not on file    Intimate partner violence     Fear of current or ex partner: Not on file     Emotionally abused: Not on file     Physically abused: Not on file     Forced sexual activity: Not on file   Other Topics Concern    Not on file   Social History Narrative    Worked on a [employer] most of his life     twice and     He has 2 sons and one daughter    Never in the [employer]    Education high school    Attends [address] one pack per day quit in 2006 denies alcohol consumption or substance usage    Physically sedentary /////////////////////////////////////////////////////////////////    CODE STATUS: Full Code    HEALTH CARE PROXY: His oldest son, Juanis Bryson, [phone]    DOMICILED: Lives alone in a private home with one step in to home but no steps within, has no pets    AMBULATES: independantly         Review of Systems:  History obtained from chart review and the patient  General ROS: No fever or chills  Respiratory ROS: positive for - shortness of breath  Cardiovascular ROS: No chest pain or palpitations  Gastrointestinal ROS: No abdominal pain or melena  Genito-Urinary ROS: No dysuria or hematuria  Musculoskeletal ROS: No joint pain or swelling   14 point ROS reviewed with the patient and negative except as noted above and in the HPI unless unable to obtain.     Objective:  Patient Vitals for the past 24 hrs:   BP Temp Temp src Pulse Resp SpO2   11/30/20 0710 -- -- -- -- 17 96 %   11/30/20 0003 (!) 138/58 98.8 °F (37.1 °C) Temporal 80 16 96 %   11/29/20 1857 (!) 149/58 99.4 °F (37.4 °C) Temporal 90 16 96 %   11/29/20 1136 (!) 142/62 98.6 °F (37 °C) Temporal 83 18 94 %       Intake/Output Summary (Last 24 hours) at 11/30/2020 0923  Last data filed at 11/29/2020 1336  Gross per 24 hour   Intake 240 ml   Output --   Net 240 ml     General: awake/alert   HEENT: Normocephalic atraumatic  Neck: Supple with no JVD or carotid bruits  Chest:  clear to auscultation bilaterally  CVS: regular rate and rhythm  Abdominal: soft, nontender, normal bowel sounds  Extremities: Bilateral 2+ pedal edema  Skin: warm and dry without rash      Labs:  BMP:   Recent Labs     11/28/20  0151 11/29/20  0138 11/30/20  0139    139 136   K 3.2* 3.6 3.4*    100 97*   CO2 26 26 26   BUN 32* 26* 35*   CREATININE 2.7* 2.6* 3.1*   CALCIUM 8.4* 8.4* 8.5*     CBC:   Recent Labs     11/28/20  0151 11/29/20 0138 11/30/20 0139   WBC 5.1 6.6 7.5   HGB 8.6* 8.8* 8.9*   HCT 26.1* 27.4* 27.2*   .0* 101.5* 101.1*    174 167     LIVER PROFILE:   No results for input(s): AST, ALT, LIPASE, BILIDIR, BILITOT, ALKPHOS in the last 72 hours. Invalid input(s): AMYLASE,  ALB  PT/INR:   No results for input(s): PROTIME, INR in the last 72 hours. APTT:   No results for input(s): APTT in the last 72 hours. BNP:  No results for input(s): BNP in the last 72 hours. Ionized Calcium:No results for input(s): IONCA in the last 72 hours. Magnesium:  Recent Labs     11/28/20  0151 11/30/20  0139   MG 1.9 2.0     Phosphorus:No results for input(s): PHOS in the last 72 hours. HgbA1C: No results for input(s): LABA1C in the last 72 hours. Hepatic:   No results for input(s): ALKPHOS, ALT, AST, PROT, BILITOT, BILIDIR, LABALBU in the last 72 hours. Lactic Acid: No results for input(s): LACTA in the last 72 hours. Troponin: No results for input(s): CKTOTAL, CKMB, TROPONINT in the last 72 hours. ABGs: No results for input(s): PH, PCO2, PO2, HCO3, O2SAT in the last 72 hours. CRP:  No results for input(s): CRP in the last 72 hours. Sed Rate:  No results for input(s): SEDRATE in the last 72 hours. Cultures:   No results for input(s): CULTURE in the last 72 hours. No results for input(s): BC, Darion Saez in the last 72 hours. No results for input(s): CXSURG in the last 72 hours. Radiology reports as per the Radiologist  Radiology: Xr Chest Portable    Result Date: 11/25/2020  EXAMINATION: XR CHEST PORTABLE 11/25/2020 4:00 PM HISTORY: Lower extremity swelling, edema. Report: A portable upright frontal view the chest was obtained. COMPARISON: Chest x-rays 11/13/2020. The lungs are mildly hypoaerated, no focal infiltrate is seen. There is mild central and basilar vascular crowding. Heart size is upper limits of normal. The left subclavian single lead pacemaker appears in satisfactory position, unchanged. No pneumothorax or effusion is identified. The bony thorax and upper abdomen are unremarkable.     Impression: Shallow inspiration, no acute infiltrates or evidence of overt CHF. Signed by Dr Rachel Polanco. Carlos on 11/25/2020 4:02 PM       Assessment   1. End-stage renal disease/seen on hemodialysis. 2.  Clinically volume overload. 3.  Type II diabetic nephropathy. 4.  Anemia of chronic kidney disease. 5.  History of coronary artery disease. 6.  Secondary hyperparathyroidism    Plan:  1. Tolerating dialysis very well. 2.  Change to p.o. diuretics. 3.  Continue ENZO. . 4.  P.o. calcitriol. 5.  Okay to DC him today postdialysis.     Jacqueline Norton MD  11/30/20  9:23 AM

## 2020-11-30 NOTE — PROGRESS NOTES
Hypertension        Carotid artery stenosis        PVD (peripheral vascular disease) ([facility])        Carotid artery occlusion, right        PAF (paroxysmal atrial fibrillation) ([facility])              Subjective:  Mr. Guille Mancuso seen today admitted 11/25/2020 with evidence of volume overload. He had a recent bout of chest pain 1-1/2 to 2 weeks ago for which she was hospitalized several days at [facility] he does not recall any special test being performed. The chest pain was relieved with nitroglycerin. Denies exertional chest pain or dyspnea since that time. Weights up over 20 pounds. Fistula to started in October and just started on dialysis of the several days ago. I saw him last in April and apparently he missed an appointment and have not seen him since then. Objective:   BP (!) 138/58   Pulse 80   Temp 98.8 °F (37.1 °C) (Temporal)   Resp 17   Ht 5' 7\" (1.702 m)   Wt 156 lb 11.2 oz (71.1 kg)   SpO2 96%   BMI 24.54 kg/m²       Intake/Output Summary (Last 24 hours) at 11/30/2020 6451  Last data filed at 11/29/2020 1336  Gross per 24 hour   Intake 240 ml   Output --   Net 240 ml       Prior to Admission medications    Medication Sig Start Date End Date Taking?  Authorizing Provider   omeprazole (PRILOSEC) 20 MG delayed release capsule Take 20 mg by mouth 2 times daily   Yes Historical Provider, MD   digoxin (LANOXIN) 125 MCG tablet Take 125 mcg by mouth daily   Yes Historical Provider, MD   hydrALAZINE (APRESOLINE) 50 MG tablet Take 50 mg by mouth 3 times daily   Yes Historical Provider, MD   metoprolol succinate (TOPROL XL) 100 MG extended release tablet TAKE 1 TABLET BY MOUTH TWICE A DAY 7/1/20  Yes Hussein Sparks APRN - NP   dilTIAZem (CARDIZEM CD) 240 MG extended release capsule Take 1 capsule by mouth 2 times daily 4/23/20  Yes Irena Gallego MD   apixaban (ELIQUIS) 5 MG TABS tablet Take 1 tablet by mouth 2 times daily  Patient taking differently: Take 5 mg by mouth daily In morning, \"Pt stated doctor from [facility] started him on low dose asa daily approx. 2 weeks ago during an inpatient stay and d/c'd eliquis. But he hasn't seen his heart doctor so he has been taking a morning dose of eliquis and asa at HS. \" 3/6/20  Yes RED Miguel   Multiple Vitamins-Minerals (THERAPEUTIC MULTIVITAMIN-MINERALS) tablet Take 1 tablet by mouth daily   Yes Historical Provider, MD   sulfaSALAzine (AZULFIDINE) 500 MG tablet Take 1,000 mg by mouth 3 times daily    Yes Historical Provider, MD   furosemide (LASIX) 40 MG tablet Take 40 mg by mouth daily   Yes Historical Provider, MD   folic acid (FOLVITE) 1 MG tablet Take 1 mg by mouth daily   Yes Historical Provider, MD   glipiZIDE (GLUCOTROL) 10 MG tablet Take 10 mg by mouth 2 times daily (before meals)   Yes Historical Provider, MD   pravastatin (PRAVACHOL) 20 MG tablet Take 20 mg by mouth nightly    Yes Historical Provider, MD   terazosin (HYTRIN) 10 MG capsule Take 5 mg by mouth 2 times daily    Yes Historical Provider, MD   temazepam (RESTORIL) 15 MG capsule TAKE 1 CAPSULE BY MOUTH EVERY DAY 10/6/20   Historical Provider, MD   ondansetron (ZOFRAN-ODT) 4 MG disintegrating tablet ondansetron 4 mg disintegrating tablet   Take 1 tablet every 6 hours by oral route as needed.     Historical Provider, MD        apixaban  5 mg Oral BID    betamethasone dipropionate   Topical BID    tamsulosin  0.4 mg Oral Daily    furosemide  40 mg Oral Daily    darbepoetin jolene-polysorbate  60 mcg Subcutaneous Weekly - Monday    calcitRIOL  0.25 mcg Oral Daily    digoxin  125 mcg Oral Q MWF    dilTIAZem  240 mg Oral BID    insulin lispro  0-12 Units Subcutaneous TID WC    insulin lispro  0-6 Units Subcutaneous Nightly    folic acid  1 mg Oral Daily    hydrALAZINE  50 mg Oral TID    metoprolol succinate  100 mg Oral BID    therapeutic multivitamin-minerals  1 tablet Oral Daily    pantoprazole  40 mg Oral BID AC    pravastatin  20 mg Oral Nightly    sulfaSALAzine  1,000 mg Oral TID    doxazosin  4 mg Oral Daily    sodium chloride flush  10 mL Intravenous 2 times per day       TELEMETRY: Atrial fibrillation     Physical Exam:      Physical Exam      General:  Awake, alert, NAD  Skin:  Warm and dry  Neck:  no jvd , no carotid bruits  Chest:  Clear to auscultation, no wheezing or rales  Cardiovascular:  IRRR [id number] no murmurs, clicks, gallups, or rubs  Abdomen:  Soft nontender, nondistended, bowel sounds present  Extremities:  Edema: none      Lab Data:  CBC:   Recent Labs     11/28/20 0151 11/29/20 0138 11/30/20 0139   WBC 5.1 6.6 7.5   HGB 8.6* 8.8* 8.9*   HCT 26.1* 27.4* 27.2*   .0* 101.5* 101.1*    174 167     BMP:   Recent Labs     11/28/20 0151 11/29/20 0138 11/30/20 0139    139 136   K 3.2* 3.6 3.4*    100 97*   CO2 26 26 26   BUN 32* 26* 35*   CREATININE 2.7* 2.6* 3.1*     LIVER PROFILE: No results for input(s): AST, ALT, LIPASE, BILIDIR, BILITOT, ALKPHOS in the last 72 hours. Invalid input(s): AMYLASE,  ALB  PT/INR: No results for input(s): PROTIME, INR in the last 72 hours. APTT: No results for input(s): APTT in the last 72 hours. BNP:  No results for input(s): BNP in the last 72 hours. CK, CKMB, Troponin: @LABRCNT (CKTOTAL:3, CKMB:3, TROPONINI:3)@    IMAGING:  Us Renal Complete    Result Date: 11/26/2020  Exam: US RENAL COMPLETE - 11/26/2020 9:35 AM Indication: Acute on chronic kidney injury Comparison: None available. Findings: Both kidneys measure 12.1 cm in length. Both kidneys have normal cortical thickness and echogenicity. No shadowing calculi or hydronephrosis. Small volume ascites is noted around the spleen and within the pelvis. Prostate mildly indents the urinary bladder. No focal urinary bladder wall thickening. Small LEFT lateral bladder diverticulum. 1.  Negative for hydronephrosis or renal atrophy. 2.  Small volume ascites.  Signed by Dr Chika Starr on 11/26/2020 11:18 AM    Xr Chest Portable    Result Date: stenosis mean gradient 17 moderate 2+ MR moderate TR RVSP 40  15. Digoxin toxicity    Plan:  1.  May discharge as planned we will follow-up in the office in the near future    Paola Antoine MD 11/30/2020 9:18 AM

## 2020-11-30 NOTE — CARE COORDINATION
Took schedule letter to pt and explained to pt when and where his next treatment will be. Pt verbalized understanding and is ready for dc. Nurse Andriy Neighbours, notified.    Electronically signed by Estevan Light RN on 11/30/2020 at 11:31 AM

## 2020-11-30 NOTE — PLAN OF CARE
Problem: Falls - Risk of:  Goal: Will remain free from falls  Description: Will remain free from falls  Outcome: Ongoing  Goal: Absence of physical injury  Description: Absence of physical injury  Outcome: Ongoing     Problem: Fluid Volume:  Goal: Hemodynamic stability will improve  Description: Hemodynamic stability will improve  Outcome: Ongoing  Goal: Ability to maintain a balanced intake and output will improve  Description: Ability to maintain a balanced intake and output will improve  Outcome: Ongoing     Problem: Health Behavior:  Goal: Identification of resources available to assist in meeting health care needs will improve  Description: Identification of resources available to assist in meeting health care needs will improve  Outcome: Ongoing     Problem: Respiratory:  Goal: Respiratory status will improve  Description: Respiratory status will improve  Outcome: Ongoing     Problem: Skin Integrity:  Goal: Will show no infection signs and symptoms  Description: Will show no infection signs and symptoms  Outcome: Ongoing  Goal: Absence of new skin breakdown  Description: Absence of new skin breakdown  Outcome: Ongoing

## 2020-11-30 NOTE — DISCHARGE SUMMARY
Lalitha Bhardwaj  :  1940  MRN:  [id number]    Admit date:  2020  Discharge date:      Admitting Physician:  Rain Huitron DO    Advance Directive: Full Code    Consults: cardiology and nephrology    Primary Care Physician:  Mona Aaron MD    Discharge Diagnoses:  Principal Problem (Resolved): Volume overload  Active Problems:    Type 2 diabetes mellitus (HCC)    Hypocalcemia    Macrocytic anemia    PAF (paroxysmal atrial fibrillation) (HCC)    Acute kidney injury superimposed on chronic kidney disease ([facility] [address]    ESRD (end stage renal disease) (HCC)    Digoxin overdose, accidental or unintentional, initial encounter      Significant Diagnostic Studies:   Us Renal Complete    Result Date: 2020  Exam: US RENAL COMPLETE - 2020 9:35 AM Indication: Acute on chronic kidney injury Comparison: None available. Findings: Both kidneys measure 12.1 cm in length. Both kidneys have normal cortical thickness and echogenicity. No shadowing calculi or hydronephrosis. Small volume ascites is noted around the spleen and within the pelvis. Prostate mildly indents the urinary bladder. No focal urinary bladder wall thickening. Small LEFT lateral bladder diverticulum. 1.  Negative for hydronephrosis or renal atrophy. 2.  Small volume ascites. Signed by Dr Jay Rider on 2020 11:18 AM    Xr Chest Portable    Result Date: 2020  EXAMINATION: XR CHEST PORTABLE 2020 4:00 PM HISTORY: Lower extremity swelling, edema. Report: A portable upright frontal view the chest was obtained. COMPARISON: Chest x-rays 2020. The lungs are mildly hypoaerated, no focal infiltrate is seen. There is mild central and basilar vascular crowding. Heart size is upper limits of normal. The left subclavian single lead pacemaker appears in satisfactory position, unchanged. No pneumothorax or effusion is identified. The bony thorax and upper abdomen are unremarkable.     Impression: Shallow inspiration, no acute infiltrates or evidence of overt CHF. Signed by Dr Rachel Gonzalez on 11/25/2020 4:02 PM      Pertinent Labs:   CBC:   Recent Labs     11/28/20  0151 11/29/20 0138 11/30/20 0139   WBC 5.1 6.6 7.5   HGB 8.6* 8.8* 8.9*    174 167     BMP:    Recent Labs     11/28/20  0151 11/29/20 0138 11/30/20 0139    139 136   K 3.2* 3.6 3.4*    100 97*   CO2 26 26 26   BUN 32* 26* 35*   CREATININE 2.7* 2.6* 3.1*   GLUCOSE 137* 86 114*     INR: No results for input(s): INR in the last 72 hours. ABGs:No results for input(s): PH, PO2, PCO2, HCO3, BE, O2SAT in the last 72 hours. Lactic Acid:No results for input(s): LACTA in the last 72 hours. Procedures: The patient underwent hemodialysis initiation during hospital stay. Hospital Course:   11-25: Presents to ED with worsening edema of the lower extremities extending into abdomen x2 weeks. 20 pound weight gain. CKD has progressed, had fistula formation for imminent start of dialysis. Digoxin level supratherapeutic. Admitted to med/surg with nephrology and cardiology consults. 11-26: Medications were adjusted by cardiology and digoxin ordered three times weekly. Likely to start dialysis tomorrow. Insulin per scale with hypoglycemia protocol if needed. 11-27: Urinary retention, start tamsulosin. Urine output down but edema is visibly improving. Initiating hemodialysis today, changing diuretics to oral.  11-28: Tolerating dialysis well, edema improved. Likely discharge Monday when outpatient chair time is established. 11-29: Tolerating dialysis. Developing a pruritic rash on left shin and ankle. This does not look concerning at present for calciphylaxis and I suspect it is a minor irritation, have started betamethasone with instructions to nursing to continue monitoring for worsening. 11-30: Repeat dialysis today, feeling well. Chair time in [place] on 12-2, will discharge following dialysis to follow up with PCP within one week.  Follow up with cardiology as outpatient. Follow rash on shin. Physical Exam:  Vitals: BP (!) 138/58   Pulse 80   Temp 98.8 °F (37.1 °C) (Temporal)   Resp 17   Ht 5' 7\" (1.702 m)   Wt 156 lb 11.2 oz (71.1 kg)   SpO2 96%   BMI 24.54 kg/m²   24HR INTAKE/OUTPUT:      Intake/Output Summary (Last 24 hours) at 11/30/2020 6770  Last data filed at 11/29/2020 1336  Gross per 24 hour   Intake 240 ml   Output --   Net 240 ml     General appearance: alert and cooperative with exam  HEENT: atraumatic, eyes with clear conjunctiva and normal lids, pupils and irises normal, external ears and nose are normal, lips normal. Neck without masses, lympadenopathy, bruit, thyroid normal  Lungs: no increased work of breathing, clear to auscultation bilaterally without rales, rhonchi or wheezes  Heart: regular rate and rhythm, S1, S2 normal, no murmur, click, rub or gallop  Abdomen: soft, non-tender; bowel sounds normal; no masses,  no organomegaly  Extremities: edema trace to 1+ bilaterally, modified Liliana's negative  Neurologic: no focal neurologic deficits, normal sensation, alert and oriented, affect and mood appropriate  Skin: stable erythematous papular rash left ankle    Discharge Medications:       [facility] Home Medication Instructions KATIA:[id number]    Printed on:11/30/20 3961   Medication Information                      apixaban (ELIQUIS) 5 MG TABS tablet  Take 1 tablet by mouth 2 times daily             betamethasone dipropionate (DIPROLENE) 0.05 % cream  Apply topically 2 times daily.              calcitRIOL (ROCALTROL) 0.25 MCG capsule  Take 1 capsule by mouth daily             digoxin (LANOXIN) 125 MCG tablet  Take 125 mcg by mouth daily             dilTIAZem (CARDIZEM CD) 240 MG extended release capsule  Take 1 capsule by mouth 2 times daily             folic acid (FOLVITE) 1 MG tablet  Take 1 mg by mouth daily             furosemide (LASIX) 40 MG tablet  Take 40 mg by mouth daily             glipiZIDE (GLUCOTROL) 10 MG tablet  Take 10 mg by mouth 2 times daily (before meals)             hydrALAZINE (APRESOLINE) 50 MG tablet  Take 50 mg by mouth 3 times daily             metoprolol succinate (TOPROL XL) 100 MG extended release tablet  TAKE 1 TABLET BY MOUTH TWICE A DAY             Multiple Vitamins-Minerals (THERAPEUTIC MULTIVITAMIN-MINERALS) tablet  Take 1 tablet by mouth daily             omeprazole (PRILOSEC) 20 MG delayed release capsule  Take 20 mg by mouth 2 times daily             ondansetron (ZOFRAN-ODT) 4 MG disintegrating tablet  ondansetron 4 mg disintegrating tablet   Take 1 tablet every 6 hours by oral route as needed. pravastatin (PRAVACHOL) 20 MG tablet  Take 20 mg by mouth nightly              sulfaSALAzine (AZULFIDINE) 500 MG tablet  Take 1,000 mg by mouth 3 times daily              temazepam (RESTORIL) 15 MG capsule  TAKE 1 CAPSULE BY MOUTH EVERY DAY             terazosin (HYTRIN) 10 MG capsule  Take 5 mg by mouth 2 times daily                  Discharge Instructions: Follow up with Mona Aaron MD in 7 days. Take medications as directed. Resume activity as tolerated. Diet: DIET RENAL; Carb Control: 3 carb choices (45 gms)/meal; Low Sodium (2 GM)     Disposition: Patient is medically stable and will be discharged Home. Time spent on discharge less than 30 minutes.     Signed:  Rain Huitron DO

## 2020-12-03 NOTE — PROGRESS NOTES
Subjective:     Encounter Date:12/04/2020      Patient ID: Gage Ken is a 80 y.o. male     Chief Complaint:  History of Present Illness  Patient presents today for follow up after 2 recent admissions. Patient previously received cardiac care at [facility]. In July he was transferred to our facility as he wished to establish cardiac care at our facility. Patient has a known history of atrial fibrillation that was diagnosed in early 2019. 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 He was seen in July for A-fib RVR in the setting of anemia and acute kidney injury. At that time he was rate controled with Toprol, Cardizem and Digoxin. Eliquis held due to anemia. He saw Dr. Mariano outpatient 9/30 and overall was doing well. He was back on DOAC and doing okay at that time. Patient was seen in the hospital again in consultation November 6 for chest pain and recurrent anemia. Pain was unclear etiology. Patient was treated with blood products, and Imdur with improvement of pain. Patient was noted to have elevated troponins on both admissions- both with flat trends. Short term dialysis while admitted. Patient has a history of moderate aortic stenosis, moderate pulmonary hypertension, atrial fibrillation- thought to be permanent, anemia, type II diabetes, chronic kidney disease, chronic diastolic congestive heart failure, BPH, GERD, hypertension, hyperlipidemia, and carotid artery disease. Last pacemaker interrogation showed 31 ventricular high rates- longest duration 5 minutes. Patient was admitted 11/25-11/30 at [facility] in [place]. He was seen for worsening renal function, 20 pound weight gain, significant edema, Elevated Dig Level. He had daily dialysis and was planned for out patient dialysis in [place]. Patient has had several issues with blood in stool, GI bleed, and anemia requiring transfusions. Currently he is on Eliquis  and Aspirin. Aspirin added during November episodes for elevated troponin and chest pain. Patient overall is stable. Swelling is much better now on dialysis. Denies shortness of breath or chest pain. He had one episode of chest pain that brought him to the ER in November but denies any other chest pain. He never started his Imdur. His Digoxin was reduced to 3 times a week during hospital stay at [facility] due to a Dig level of 1.4.      The following portions of the patient's history were reviewed and updated as appropriate: allergies, current medications, past medical history, past social history, past and problem list.    No Known Allergies    Current Outpatient Medications:   •  apixaban (ELIQUIS) 5 MG tablet tablet, Take 5 mg by mouth Daily., Disp: , Rfl:   •  aspirin 81 MG EC tablet, Take 1 tablet by mouth Daily., Disp: 30 tablet, Rfl: 0  •  dextrose (GLUTOSE) 40 % gel, Take 15 g by mouth Every 15 (Fifteen) Minutes As Needed for Low Blood Sugar (Blood sugar less than 70) for up to 3 doses., Disp: 3 each, Rfl: 0  •  digoxin (LANOXIN) 125 MCG tablet, Take 125 mcg by mouth 3 (Three) Times a Week., Disp: , Rfl:   •  dilTIAZem CD (CARDIZEM CD) 240 MG 24 hr capsule, Take 240 mg by mouth 2 (Two) Times a Day., Disp: , Rfl:   •  folic acid (FOLVITE) 1 MG tablet, Take 1 mg by mouth Daily., Disp: , Rfl:   •  furosemide (LASIX) 40 MG tablet, Take 60 mg by mouth Daily., Disp: , Rfl: 2  •  glipizide (GLUCOTROL) 10 MG tablet, Take 0.5 tablets by mouth 2 (Two) Times a Day Before Meals., Disp: 30 tablet, Rfl: 0  •  hydrALAZINE (APRESOLINE) 50 MG tablet, Take 50 mg by mouth 2 (two) times a day., Disp: , Rfl:   •  metoprolol succinate XL (TOPROL-XL) 100 MG 24 hr tablet, Take 100 mg by mouth 2 (two) times a day., Disp: , Rfl:   •  nitroglycerin (NITROSTAT) 0.4 MG SL tablet, Place 1 tablet under the tongue Every 5 (Five) Minutes As Needed for Chest Pain. Take no more than 3 doses in 15 minutes., Disp: 30 tablet, Rfl: 0  •  omeprazole  (priLOSEC) 20 MG capsule, Take 20 mg by mouth 2 (two) times a day., Disp: , Rfl:   •  pravastatin (PRAVACHOL) 20 MG tablet, Take 1 tablet by mouth Every Night., Disp: , Rfl: 2  •  sulfaSALAzine (AZULFIDINE) 500 MG tablet, Take 2 tablets by mouth 3 (Three) Times a Day., Disp: 360 tablet, Rfl: 1  •  terazosin (HYTRIN) 5 MG capsule, Take 1 capsule by mouth 2 (two) times a day., Disp: , Rfl: 2    Social History     Socioeconomic History   • Marital status: Single     Spouse name: Not on file   • Number of children: Not on file   • Years of education: Not on file   • Highest education level: Not on file   Tobacco Use   • Smoking status: Former Smoker     Types: Cigarettes   • Smokeless tobacco: Former User     Types: Snuff     Quit date: 2008   • Tobacco comment: 11/30/2017 - Patient States He Ceased Smoking 13 Years Prior.   Substance and Sexual Activity   • Alcohol use: Yes     Alcohol/week: 4.0 standard drinks     Types: 4 Cans of beer per week     Comment: occasional   • Drug use: No   • Sexual activity: Defer       Review of Systems   Constitution: Positive for malaise/fatigue. Negative for chills, decreased appetite, fever, weight gain and weight loss.   HENT: Negative for nosebleeds.    Eyes: Negative for visual disturbance.   Cardiovascular: Positive for leg swelling (significantly improving ) and palpitations (rare). Negative for chest pain, dyspnea on exertion, near-syncope, orthopnea, paroxysmal nocturnal dyspnea and syncope.   Respiratory: Negative for cough, hemoptysis, shortness of breath and snoring.    Endocrine: Negative for cold intolerance and heat intolerance.   Hematologic/Lymphatic: Negative for bleeding problem. Does not bruise/bleed easily.   Skin: Negative for rash.   Musculoskeletal: Negative for back pain and falls.   Gastrointestinal: Negative for abdominal pain, constipation, diarrhea, heartburn, melena, nausea and vomiting.   Genitourinary: Negative for hematuria.   Neurological: Negative  "for dizziness, headaches and light-headedness.   Psychiatric/Behavioral: Negative for altered mental status.   Allergic/Immunologic: Negative for persistent infections.              Objective:     Vitals signs reviewed.   Constitutional:       Appearance: Well-developed. Frail. Chronically ill-appearing.   Eyes:      Pupils: Pupils are equal, round, and reactive to light.   HENT:      Head: Normocephalic and atraumatic.   Neck:      Musculoskeletal: Normal range of motion and neck supple.      Vascular: No carotid bruit or JVD.   Pulmonary:      Effort: Pulmonary effort is normal.      Breath sounds: Normal breath sounds.   Cardiovascular:      Normal rate. Irregular rhythm.      Murmurs: There is a harsh midsystolic murmur at the URSB, radiating to the neck.   Pulses:     Intact distal pulses.   Edema:     Peripheral edema present.     Ankle: bilateral 1+ edema of the ankle.  Abdominal:      General: Bowel sounds are normal.      Palpations: Abdomen is soft.   Musculoskeletal: Normal range of motion.   Skin:     General: Skin is warm and dry.   Neurological:      Mental Status: Alert, oriented to person, place, and time and oriented to person, place and time.      Deep Tendon Reflexes: Reflexes are normal and symmetric.   Psychiatric:         Behavior: Behavior normal.         Thought Content: Thought content normal.         Judgment: Judgment normal.             ECG 12 Lead    Date/Time: 12/4/2020 11:38 AM  Performed by: Maikol Edward APRN  Authorized by: Maikol Edward APRN   Comparison: compared with previous ECG from 11/7/2020  Comparison to previous ECG: Intermittent pacing   Rhythm: atrial fibrillation          /75 (BP Location: Right arm, Patient Position: Sitting, Cuff Size: Adult)   Pulse 69   Resp 18   Ht 167.6 cm (66\")   Wt 68 kg (150 lb)   SpO2 98%   BMI 24.21 kg/m²   Lab Review:   I have reviewed       Lab Results   Component Value Date    CHOL 122 07/10/2020    CHLPL 132 (L) " 06/30/2019    TRIG 50 07/10/2020    HDL 35 (L) 07/10/2020    LDL 77 07/10/2020      Results for orders placed during the hospital encounter of 07/09/20   Adult Transthoracic Echo Complete W/ Cont if Necessary Per Protocol    Narrative · Left atrial cavity size is moderately dilated.  · Moderate aortic valve stenosis is present.  · Left ventricular diastolic dysfunction.  · Left ventricular systolic function is normal.  · Mild tricuspid valve regurgitation is present.  · Mild mitral valve regurgitation is present     RHYTHM IS ATRIAL FIBRILLATION  LEFT ATRIAL ENLARGEMENT  MODERATE AORTIC VALVE STENOSIS  NORMAL LV AND RV SYSTOLIC FUNCTION  LV DIASTOLIC DYSFUNCTION  MODERATE PULMONARY HTN        Assessment:          Diagnosis Plan   1. Permanent atrial fibrillation (CMS/[facility])  Digoxin Level    Ambulatory Referral to [facility] - Organ   2. Aortic stenosis, moderate     3. S/P placement of cardiac pacemaker     4. Chronic diastolic congestive heart failure (CMS/[facility])     5. Essential hypertension     6. CKD (chronic kidney disease) stage 5, GFR less than 15 ml/min (CMS/[facility])     7. PVD (peripheral vascular disease) (CMS/[facility])            Plan:       1. Permanent Atrial Fibrillation - Now On digoxin 3 times a week following a Dig level of 1.4. will continue this dose. Rates controlled at this time. Verified with pharmacy patient is on Toprol  BID and Cardizem 240 BID. On Eliquis and Aspirin. Stop Aspirin. Will refer for WATCHMAN- Discussed with Dr. Cheung.   2. Moderate Aortic Stenosis - will monitor  3. Pacemaker - Placed in Februarys at [facility]. Now followed by our office. Well healed.   4. Chronic Diastolic Congestive Heart Failure - leg swelling- but improving. Overall appears stable. Low Salt. Daily weights. Fluid management by diaylysis  5. Blood Pressure controlled - continue to monitor  6. Chronic Kidney Disease - now on HD MWF  7. Peripheral Vascular Disease- bilateral carotid disease    Keep 3 month  follow up with me and call if need to be seen sooner. Referral for Watchman evaluation.     Current outpatient and discharge medications have been reconciled for the patient.   Reviewed by: CUATE Liao

## 2020-12-04 PROBLEM — I35.0 AORTIC STENOSIS, MODERATE: Status: ACTIVE | Noted: 2020-01-01

## 2020-12-04 PROBLEM — I48.21 PERMANENT ATRIAL FIBRILLATION (HCC): Status: ACTIVE | Noted: 2020-01-01

## 2020-12-04 NOTE — OUTREACH NOTE
CHF Week 4 Survey      Responses   [facility] patient discharged from?  [place]   Does the patient have one of the following disease processes/diagnoses(primary or secondary)?  CHF   Week 4 attempt successful?  No          Eli Virk RN

## 2020-12-07 NOTE — TELEPHONE ENCOUNTER
Patient has been notified to stop ASA    ----- Message from CUATE Liao sent at 12/4/2020 11:37 AM CST -----  Please let patient know he can stop Aspirin. Our office will reach out to him about WATCHMAN appointments.

## 2020-12-21 NOTE — PROGRESS NOTES
Referring Provider: Cayetano Kay MD    Reason for Follow-up Visit: afib    Subjective .   Chief Complaint:   Chief Complaint   Patient presents with   • Follow-up     to dichaley the watchman   • Atrial Fibrillation     pt states he has not been having any symptoms.         History of present illness:  Gage Ken is a 80 y.o. yo male with history of chronic afib and chronic GI bleeding due to ulcerative colitis. He has been referred for possible watchman device placement so he can get off anticoagulation. He denies any chest pain or SOB.        History  Past Medical History:   Diagnosis Date   • Acute gastritis    • Atrial fibrillation (CMS/HCC)    • Blood in feces    • Carotid artery occlusion 12/2/2019   • Colitis, ulcerative chronic (CMS/HCC)     LEFT-SIDED   • Diverticular disease of colon    • Epigastric pain    • GERD (gastroesophageal reflux disease)    • History of colon polyps    • [place] (hard of hearing)    • Hyperlipidemia    • Hypertension    • Lesion of nose    • Neoplasm of uncertain behavior    • Nonspecific ulcerative proctitis (CMS/HCC)    • Rectal hemorrhage    • Renal disorder    • Type 2 diabetes mellitus (CMS/HCC)    • Vitamin B12 deficiency    ,   Past Surgical History:   Procedure Laterality Date   • COLONOSCOPY  12/02/2013    Hemorrhoids found.   • COLONOSCOPY  09/06/2016   • COLONOSCOPY N/A 10/30/2017    Procedure: COLONOSCOPY;  Surgeon: Alex Silveira MD;  Location: [facility] ENDOSCOPY;  Service:    • COLONOSCOPY N/A 11/6/2018    Procedure: COLONOSCOPY;  Surgeon: Alex Silveira MD;  Location: [facility] ENDOSCOPY;  Service: Gastroenterology   • COLONOSCOPY N/A 11/21/2019    Procedure: COLONOSCOPY;  Surgeon: Alex Silveira MD;  Location: [facility] ENDOSCOPY;  Service: Gastroenterology   • COLONOSCOPY W/ POLYPECTOMY  08/30/2015    Single polyp found in the colon;removed by cold snare polypectomy.Proctitis in the rectum.Diverticulosis found in the sigmoid colon.Hemorrhoids found.   •  ENDOSCOPY  12/02/2013    Normal hypopharynx, esophagus, duodenum, symmetrical & patent pylorus. Hiatus hernia in GE junction. Normal stomach. Biopsy taken.   • HERNIA REPAIR      umbilical   • INSERT / REPLACE / REMOVE PACEMAKER     • UPPER GASTROINTESTINAL ENDOSCOPY  12/02/2013   ,   Family History   Problem Relation Age of Onset   • Diabetes Other    • Hypertension Other    • Stroke Mother    • Stroke Father    ,   Social History     Tobacco Use   • Smoking status: Former Smoker     Types: Cigarettes   • Smokeless tobacco: Former User     Types: Snuff     Quit date: 2008   • Tobacco comment: 11/30/2017 - Patient States He Ceased Smoking 13 Years Prior.   Substance Use Topics   • Alcohol use: Yes     Alcohol/week: 4.0 standard drinks     Types: 4 Cans of beer per week     Comment: occasional   • Drug use: No   ,     Medications  Current Outpatient Medications   Medication Sig Dispense Refill   • apixaban (ELIQUIS) 5 MG tablet tablet Take 5 mg by mouth Daily.     • aspirin 81 MG EC tablet Take 1 tablet by mouth Daily. 30 tablet 0   • dextrose (GLUTOSE) 40 % gel Take 15 g by mouth Every 15 (Fifteen) Minutes As Needed for Low Blood Sugar (Blood sugar less than 70) for up to 3 doses. 3 each 0   • digoxin (LANOXIN) 125 MCG tablet Take 125 mcg by mouth 3 (Three) Times a Week.     • dilTIAZem CD (CARDIZEM CD) 240 MG 24 hr capsule Take 240 mg by mouth 2 (Two) Times a Day.     • folic acid (FOLVITE) 1 MG tablet Take 1 mg by mouth Daily.     • furosemide (LASIX) 40 MG tablet Take 60 mg by mouth Daily.  2   • glipizide (GLUCOTROL) 10 MG tablet Take 0.5 tablets by mouth 2 (Two) Times a Day Before Meals. 30 tablet 0   • hydrALAZINE (APRESOLINE) 50 MG tablet Take 50 mg by mouth 2 (two) times a day.     • metoprolol succinate XL (TOPROL-XL) 100 MG 24 hr tablet Take 100 mg by mouth 2 (two) times a day.     • nitroglycerin (NITROSTAT) 0.4 MG SL tablet Place 1 tablet under the tongue Every 5 (Five) Minutes As Needed for Chest Pain.  "Take no more than 3 doses in 15 minutes. 30 tablet 0   • omeprazole (priLOSEC) 20 MG capsule TAKE 1 CAPSULE BY MOUTH TWICE A  capsule 3   • pravastatin (PRAVACHOL) 20 MG tablet Take 1 tablet by mouth Every Night.  2   • sulfaSALAzine (AZULFIDINE) 500 MG tablet TAKE 2 TABLETS BY MOUTH 3 (THREE) TIMES A DAY. 540 tablet 1   • terazosin (HYTRIN) 5 MG capsule Take 1 capsule by mouth 2 (two) times a day.  2     No current facility-administered medications for this visit.        Allergies:  Patient has no known allergies.    Review of Systems  Review of Systems   HENT: Negative for nosebleeds.    Cardiovascular: Negative for chest pain, claudication, dyspnea on exertion, irregular heartbeat, leg swelling, near-syncope, orthopnea, palpitations, paroxysmal nocturnal dyspnea and syncope.   Respiratory: Negative for cough, hemoptysis and shortness of breath.    Gastrointestinal: Negative for dysphagia, hematemesis and melena.   Genitourinary: Negative for hematuria.   All other systems reviewed and are negative.      Objective     Physical Exam:  /60   Pulse 64   Ht 170.2 cm (67\")   Wt 68 kg (150 lb)   SpO2 99%   BMI 23.49 kg/m²   Constitutional:       General: Not in acute distress.     Appearance: Well-developed. Not diaphoretic.   Eyes:      General: No scleral icterus.  HENT:      Head: Normocephalic and atraumatic.   Neck:      Musculoskeletal: Normal range of motion and neck supple.      Vascular: No carotid bruit.   Pulmonary:      Effort: Pulmonary effort is normal. No respiratory distress.      Breath sounds: Normal breath sounds. No wheezing. No rales.   Cardiovascular:      PMI at left midclavicular line. Normal rate. Irregularly irregular rhythm.      Murmurs: There is a grade 3/6 harsh midsystolic murmur at the URSB, radiating to the neck.      No gallop.   Edema:     Peripheral edema absent.   Abdominal:      General: Bowel sounds are normal. There is no distension.      Palpations: Abdomen is " soft.      Tenderness: There is no abdominal tenderness.   Skin:     General: Skin is warm and dry.      Findings: No erythema.   Neurological:      Mental Status: Alert and oriented to person, place, and time.   Psychiatric:         Behavior: Behavior normal.         Results Review:  Procedures    No results displayed because visit has over 200 results.          Assessment/Plan   Diagnoses and all orders for this visit:    1. Essential hypertension (Primary), good control    2. Permanent atrial fibrillation (CMS/HCC), anticoagulated and asymptomatic    3. 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 I agree that he is a good candidate for Watchman. Will initiate screening    4. Aortic stenosis, moderate, asymptomatic

## 2021-01-01 ENCOUNTER — READMISSION MANAGEMENT (OUTPATIENT)
Dept: CALL CENTER | Facility: HOSPITAL | Age: 81
End: 2021-01-01

## 2021-01-01 ENCOUNTER — PREP FOR SURGERY (OUTPATIENT)
Dept: OTHER | Facility: HOSPITAL | Age: 81
End: 2021-01-01

## 2021-01-01 ENCOUNTER — TELEPHONE (OUTPATIENT)
Dept: VASCULAR SURGERY | Age: 81
End: 2021-01-01

## 2021-01-01 ENCOUNTER — TELEPHONE (OUTPATIENT)
Dept: CARDIOLOGY | Facility: CLINIC | Age: 81
End: 2021-01-01

## 2021-01-01 ENCOUNTER — TRANSCRIBE ORDERS (OUTPATIENT)
Dept: LAB | Facility: HOSPITAL | Age: 81
End: 2021-01-01

## 2021-01-01 ENCOUNTER — OFFICE VISIT (OUTPATIENT)
Dept: VASCULAR SURGERY | Age: 81
End: 2021-01-01
Payer: MEDICARE

## 2021-01-01 ENCOUNTER — OFFICE VISIT (OUTPATIENT)
Age: 81
End: 2021-01-01

## 2021-01-01 ENCOUNTER — HOSPITAL ENCOUNTER (OUTPATIENT)
Dept: INTERVENTIONAL RADIOLOGY/VASCULAR | Age: 81
Discharge: HOME | End: 2021-01-14
Payer: MEDICARE

## 2021-01-01 ENCOUNTER — LAB (OUTPATIENT)
Dept: LAB | Facility: HOSPITAL | Age: 81
End: 2021-01-01

## 2021-01-01 ENCOUNTER — OFFICE VISIT (OUTPATIENT)
Dept: CARDIOLOGY | Facility: CLINIC | Age: 81
End: 2021-01-01

## 2021-01-01 ENCOUNTER — APPOINTMENT (OUTPATIENT)
Dept: MRI IMAGING | Age: 81
DRG: 064 | End: 2021-01-01
Attending: INTERNAL MEDICINE
Payer: MEDICARE

## 2021-01-01 ENCOUNTER — HOSPITAL ENCOUNTER (INPATIENT)
Age: 81
LOS: 6 days | Discharge: HOSPICE | DRG: 064 | End: 2021-05-25
Attending: INTERNAL MEDICINE
Payer: MEDICARE

## 2021-01-01 ENCOUNTER — APPOINTMENT (OUTPATIENT)
Dept: CARDIOLOGY | Facility: HOSPITAL | Age: 81
End: 2021-01-01

## 2021-01-01 ENCOUNTER — PREP FOR PROCEDURE (OUTPATIENT)
Dept: VASCULAR SURGERY | Age: 81
End: 2021-01-01

## 2021-01-01 ENCOUNTER — HOSPITAL ENCOUNTER (OUTPATIENT)
Dept: CARDIOLOGY | Facility: HOSPITAL | Age: 81
Discharge: HOME | End: 2021-01-21
Admitting: INTERNAL MEDICINE

## 2021-01-01 ENCOUNTER — HOSPITAL ENCOUNTER (OUTPATIENT)
Dept: CARDIOLOGY | Facility: HOSPITAL | Age: 81
Discharge: HOME | End: 2021-02-16

## 2021-01-01 ENCOUNTER — HOSPITAL ENCOUNTER (INPATIENT)
Facility: HOSPITAL | Age: 81
LOS: 1 days | Discharge: HOME | End: 2021-02-26
Attending: INTERNAL MEDICINE | Admitting: INTERNAL MEDICINE

## 2021-01-01 ENCOUNTER — APPOINTMENT (OUTPATIENT)
Dept: GENERAL RADIOLOGY | Age: 81
DRG: 064 | End: 2021-01-01
Attending: INTERNAL MEDICINE
Payer: MEDICARE

## 2021-01-01 ENCOUNTER — APPOINTMENT (OUTPATIENT)
Dept: LAB | Facility: HOSPITAL | Age: 81
End: 2021-01-01

## 2021-01-01 ENCOUNTER — CLINICAL SUPPORT NO REQUIREMENTS (OUTPATIENT)
Dept: CARDIOLOGY | Facility: CLINIC | Age: 81
End: 2021-01-01

## 2021-01-01 ENCOUNTER — HOSPITAL ENCOUNTER (OUTPATIENT)
Dept: CARDIOLOGY | Facility: HOSPITAL | Age: 81
End: 2021-01-01

## 2021-01-01 ENCOUNTER — HOSPITAL ENCOUNTER (OUTPATIENT)
Dept: CARDIOLOGY | Facility: HOSPITAL | Age: 81
Discharge: HOME | End: 2021-04-15
Admitting: INTERNAL MEDICINE

## 2021-01-01 VITALS
DIASTOLIC BLOOD PRESSURE: 78 MMHG | SYSTOLIC BLOOD PRESSURE: 128 MMHG | BODY MASS INDEX: 22.18 KG/M2 | WEIGHT: 138 LBS | HEART RATE: 67 BPM | HEIGHT: 66 IN | OXYGEN SATURATION: 98 % | RESPIRATION RATE: 18 BRPM

## 2021-01-01 VITALS
WEIGHT: 143.2 LBS | HEART RATE: 64 BPM | TEMPERATURE: 97.9 F | HEIGHT: 66 IN | RESPIRATION RATE: 20 BRPM | DIASTOLIC BLOOD PRESSURE: 61 MMHG | BODY MASS INDEX: 23.01 KG/M2 | SYSTOLIC BLOOD PRESSURE: 142 MMHG | OXYGEN SATURATION: 98 %

## 2021-01-01 VITALS
RESPIRATION RATE: 14 BRPM | TEMPERATURE: 97 F | SYSTOLIC BLOOD PRESSURE: 136 MMHG | HEIGHT: 66 IN | HEART RATE: 64 BPM | WEIGHT: 145 LBS | OXYGEN SATURATION: 95 % | DIASTOLIC BLOOD PRESSURE: 52 MMHG | BODY MASS INDEX: 23.3 KG/M2

## 2021-01-01 VITALS
RESPIRATION RATE: 18 BRPM | HEART RATE: 83 BPM | SYSTOLIC BLOOD PRESSURE: 126 MMHG | DIASTOLIC BLOOD PRESSURE: 43 MMHG | OXYGEN SATURATION: 96 % | TEMPERATURE: 98.3 F | WEIGHT: 137 LBS | HEIGHT: 66 IN | BODY MASS INDEX: 22.02 KG/M2

## 2021-01-01 VITALS
SYSTOLIC BLOOD PRESSURE: 167 MMHG | WEIGHT: 126.9 LBS | OXYGEN SATURATION: 100 % | BODY MASS INDEX: 19.23 KG/M2 | RESPIRATION RATE: 26 BRPM | HEIGHT: 68 IN | HEART RATE: 117 BPM | TEMPERATURE: 98.7 F | DIASTOLIC BLOOD PRESSURE: 75 MMHG

## 2021-01-01 VITALS
OXYGEN SATURATION: 98 % | DIASTOLIC BLOOD PRESSURE: 40 MMHG | HEART RATE: 69 BPM | BODY MASS INDEX: 22.13 KG/M2 | SYSTOLIC BLOOD PRESSURE: 130 MMHG | WEIGHT: 141 LBS | HEIGHT: 67 IN

## 2021-01-01 VITALS
DIASTOLIC BLOOD PRESSURE: 60 MMHG | WEIGHT: 138.2 LBS | BODY MASS INDEX: 22.21 KG/M2 | HEIGHT: 66 IN | SYSTOLIC BLOOD PRESSURE: 139 MMHG | OXYGEN SATURATION: 99 % | HEART RATE: 59 BPM | RESPIRATION RATE: 16 BRPM

## 2021-01-01 VITALS — TEMPERATURE: 98.5 F | OXYGEN SATURATION: 96 % | HEART RATE: 93 BPM

## 2021-01-01 VITALS
WEIGHT: 147 LBS | HEIGHT: 67 IN | SYSTOLIC BLOOD PRESSURE: 137 MMHG | DIASTOLIC BLOOD PRESSURE: 74 MMHG | RESPIRATION RATE: 16 BRPM | BODY MASS INDEX: 23.07 KG/M2 | HEART RATE: 82 BPM

## 2021-01-01 DIAGNOSIS — Z95.818 PRESENCE OF WATCHMAN LEFT ATRIAL APPENDAGE CLOSURE DEVICE: ICD-10-CM

## 2021-01-01 DIAGNOSIS — N18.6 ESRD ON DIALYSIS (HCC): Primary | ICD-10-CM

## 2021-01-01 DIAGNOSIS — Z01.818 PREOPERATIVE TESTING: ICD-10-CM

## 2021-01-01 DIAGNOSIS — I48.21 PERMANENT ATRIAL FIBRILLATION (HCC): ICD-10-CM

## 2021-01-01 DIAGNOSIS — Z01.818 PREOP TESTING: Primary | ICD-10-CM

## 2021-01-01 DIAGNOSIS — I48.21 PERMANENT ATRIAL FIBRILLATION (HCC): Primary | ICD-10-CM

## 2021-01-01 DIAGNOSIS — Z99.2 ESRD ON DIALYSIS (HCC): ICD-10-CM

## 2021-01-01 DIAGNOSIS — I48.20 CHRONIC ATRIAL FIBRILLATION (HCC): ICD-10-CM

## 2021-01-01 DIAGNOSIS — Z11.59 SCREENING FOR VIRAL DISEASE: Primary | ICD-10-CM

## 2021-01-01 DIAGNOSIS — Z01.818 PRE-OP TESTING: Primary | ICD-10-CM

## 2021-01-01 DIAGNOSIS — N18.6 ESRD (END STAGE RENAL DISEASE) ON DIALYSIS (HCC): ICD-10-CM

## 2021-01-01 DIAGNOSIS — Z99.2 ESRD ON DIALYSIS (HCC): Primary | ICD-10-CM

## 2021-01-01 DIAGNOSIS — I50.32 CHRONIC DIASTOLIC CONGESTIVE HEART FAILURE (HCC): Chronic | ICD-10-CM

## 2021-01-01 DIAGNOSIS — Z95.818 PRESENCE OF WATCHMAN LEFT ATRIAL APPENDAGE CLOSURE DEVICE: Primary | ICD-10-CM

## 2021-01-01 DIAGNOSIS — K51.019 ULCERATIVE PANCOLITIS WITH COMPLICATION (HCC): ICD-10-CM

## 2021-01-01 DIAGNOSIS — Z99.2 ESRD (END STAGE RENAL DISEASE) ON DIALYSIS (HCC): ICD-10-CM

## 2021-01-01 DIAGNOSIS — E11.9 TYPE 2 DIABETES MELLITUS WITHOUT COMPLICATION, WITHOUT LONG-TERM CURRENT USE OF INSULIN (HCC): Chronic | ICD-10-CM

## 2021-01-01 DIAGNOSIS — N18.6 ESRD ON DIALYSIS (HCC): ICD-10-CM

## 2021-01-01 DIAGNOSIS — I10 ESSENTIAL HYPERTENSION: Chronic | ICD-10-CM

## 2021-01-01 DIAGNOSIS — Z95.0 S/P PLACEMENT OF CARDIAC PACEMAKER: ICD-10-CM

## 2021-01-01 DIAGNOSIS — Z01.818 PREOPERATIVE TESTING: Primary | ICD-10-CM

## 2021-01-01 DIAGNOSIS — Z87.19 HISTORY OF GI BLEED: ICD-10-CM

## 2021-01-01 DIAGNOSIS — I35.0 AORTIC STENOSIS, MODERATE: ICD-10-CM

## 2021-01-01 DIAGNOSIS — T82.590A DIALYSIS AV FISTULA MALFUNCTION, INITIAL ENCOUNTER (HCC): ICD-10-CM

## 2021-01-01 DIAGNOSIS — Z95.0 PACEMAKER: Primary | ICD-10-CM

## 2021-01-01 DIAGNOSIS — I49.5 SINUS NODE DYSFUNCTION (HCC): ICD-10-CM

## 2021-01-01 DIAGNOSIS — I73.9 PVD (PERIPHERAL VASCULAR DISEASE) (HCC): ICD-10-CM

## 2021-01-01 LAB
ABO GROUP BLD: NORMAL
ACT BLD: 175 SECONDS (ref 82–152)
ALBUMIN SERPL-MCNC: 3 G/DL (ref 3.5–5.2)
ALBUMIN SERPL-MCNC: 3.1 G/DL (ref 3.5–5.2)
ALBUMIN SERPL-MCNC: 3.2 G/DL (ref 3.5–5.2)
ALBUMIN SERPL-MCNC: 3.2 G/DL (ref 3.5–5.2)
ALBUMIN SERPL-MCNC: 3.3 G/DL (ref 3.5–5.2)
ALBUMIN SERPL-MCNC: 3.5 G/DL (ref 3.5–5.2)
ALBUMIN SERPL-MCNC: 3.9 G/DL (ref 3.5–5.2)
ALP BLD-CCNC: 133 U/L (ref 40–130)
ALP BLD-CCNC: 145 U/L (ref 40–130)
ALP BLD-CCNC: 145 U/L (ref 40–130)
ALP BLD-CCNC: 242 U/L (ref 40–130)
ALP BLD-CCNC: 310 U/L (ref 40–130)
ALP BLD-CCNC: 325 U/L (ref 40–130)
ALP BLD-CCNC: 355 U/L (ref 40–130)
ALT SERPL-CCNC: 17 U/L (ref 5–41)
ALT SERPL-CCNC: 19 U/L (ref 5–41)
ALT SERPL-CCNC: 22 U/L (ref 5–41)
ALT SERPL-CCNC: 34 U/L (ref 5–41)
ALT SERPL-CCNC: 66 U/L (ref 5–41)
ALT SERPL-CCNC: 70 U/L (ref 5–41)
ALT SERPL-CCNC: 75 U/L (ref 5–41)
AMMONIA: 31 UMOL/L (ref 16–60)
AMORPHOUS: ABNORMAL /HPF
ANION GAP SERPL CALCULATED.3IONS-SCNC: 10 MMOL/L (ref 7–19)
ANION GAP SERPL CALCULATED.3IONS-SCNC: 11 MMOL/L (ref 7–19)
ANION GAP SERPL CALCULATED.3IONS-SCNC: 11 MMOL/L (ref 7–19)
ANION GAP SERPL CALCULATED.3IONS-SCNC: 13 MMOL/L (ref 7–19)
ANION GAP SERPL CALCULATED.3IONS-SCNC: 14 MMOL/L (ref 7–19)
ANION GAP SERPL CALCULATED.3IONS-SCNC: 6 MMOL/L (ref 7–19)
ANION GAP SERPL CALCULATED.3IONS-SCNC: 8 MMOL/L (ref 5–15)
ANION GAP SERPL CALCULATED.3IONS-SCNC: 9 MMOL/L (ref 5–15)
ANION GAP SERPL CALCULATED.3IONS-SCNC: 9 MMOL/L (ref 7–19)
APTT: 101.4 SEC (ref 26–36.2)
APTT: 28.9 SEC (ref 26–36.2)
APTT: 35.4 SEC (ref 26–36.2)
APTT: 35.4 SEC (ref 26–36.2)
APTT: 37.6 SEC (ref 26–36.2)
APTT: 42.4 SEC (ref 26–36.2)
APTT: 44.5 SEC (ref 26–36.2)
APTT: 45.2 SEC (ref 26–36.2)
APTT: 45.8 SEC (ref 26–36.2)
APTT: 47.3 SEC (ref 26–36.2)
APTT: 52 SEC (ref 26–36.2)
APTT: 52.1 SEC (ref 26–36.2)
APTT: 52.4 SEC (ref 26–36.2)
APTT: 52.5 SEC (ref 26–36.2)
APTT: 52.7 SEC (ref 26–36.2)
APTT: 53.4 SEC (ref 26–36.2)
APTT: 53.7 SEC (ref 26–36.2)
APTT: 55.2 SEC (ref 26–36.2)
APTT: 55.5 SEC (ref 26–36.2)
APTT: 58.6 SEC (ref 26–36.2)
APTT: 69.8 SEC (ref 26–36.2)
AST SERPL-CCNC: 24 U/L (ref 5–40)
AST SERPL-CCNC: 29 U/L (ref 5–40)
AST SERPL-CCNC: 30 U/L (ref 5–40)
AST SERPL-CCNC: 42 U/L (ref 5–40)
AST SERPL-CCNC: 49 U/L (ref 5–40)
AST SERPL-CCNC: 66 U/L (ref 5–40)
AST SERPL-CCNC: 67 U/L (ref 5–40)
ATYPICAL LYMPHOCYTE RELATIVE PERCENT: 1 % (ref 0–8)
BACTERIA: ABNORMAL /HPF
BASE EXCESS ARTERIAL: 4.6 MMOL/L (ref -2–2)
BASOPHILS # BLD AUTO: 0.03 10*3/MM3 (ref 0–0.2)
BASOPHILS ABSOLUTE: 0 K/UL (ref 0–0.2)
BASOPHILS ABSOLUTE: 0 K/UL (ref 0–0.2)
BASOPHILS ABSOLUTE: 0.1 K/UL (ref 0–0.2)
BASOPHILS NFR BLD AUTO: 0.3 % (ref 0–1.5)
BASOPHILS RELATIVE PERCENT: 0 % (ref 0–1)
BASOPHILS RELATIVE PERCENT: 0.1 % (ref 0–1)
BASOPHILS RELATIVE PERCENT: 0.2 % (ref 0–1)
BASOPHILS RELATIVE PERCENT: 0.4 % (ref 0–1)
BASOPHILS RELATIVE PERCENT: 0.4 % (ref 0–1)
BASOPHILS RELATIVE PERCENT: 0.5 % (ref 0–1)
BASOPHILS RELATIVE PERCENT: 0.5 % (ref 0–1)
BH CV ECHO MEAS - AO MAX PG (FULL): 22.1 MMHG
BH CV ECHO MEAS - AO MAX PG: 25.8 MMHG
BH CV ECHO MEAS - AO MEAN PG (FULL): 18 MMHG
BH CV ECHO MEAS - AO MEAN PG: 20 MMHG
BH CV ECHO MEAS - AO ROOT AREA (BSA CORRECTED): 1.7
BH CV ECHO MEAS - AO ROOT AREA: 6.6 CM^2
BH CV ECHO MEAS - AO ROOT DIAM: 2.9 CM
BH CV ECHO MEAS - AO V2 MAX: 254 CM/SEC
BH CV ECHO MEAS - AO V2 MEAN: 212 CM/SEC
BH CV ECHO MEAS - AO V2 VTI: 69.4 CM
BH CV ECHO MEAS - AVA(I,A): 0.92 CM^2
BH CV ECHO MEAS - AVA(I,D): 0.92 CM^2
BH CV ECHO MEAS - AVA(V,A): 1.1 CM^2
BH CV ECHO MEAS - AVA(V,D): 1.1 CM^2
BH CV ECHO MEAS - BSA(HAYCOCK): 1.7 M^2
BH CV ECHO MEAS - BSA: 1.7 M^2
BH CV ECHO MEAS - BZI_BMI: 22.1 KILOGRAMS/M^2
BH CV ECHO MEAS - BZI_METRIC_HEIGHT: 167.6 CM
BH CV ECHO MEAS - BZI_METRIC_WEIGHT: 62.1 KG
BH CV ECHO MEAS - EDV(CUBED): 75.2 ML
BH CV ECHO MEAS - EDV(MOD-SP4): 58.9 ML
BH CV ECHO MEAS - EDV(TEICH): 79.5 ML
BH CV ECHO MEAS - EF(CUBED): 68.9 %
BH CV ECHO MEAS - EF(MOD-SP4): 60.4 %
BH CV ECHO MEAS - EF(TEICH): 60.8 %
BH CV ECHO MEAS - ESV(CUBED): 23.4 ML
BH CV ECHO MEAS - ESV(MOD-SP4): 23.3 ML
BH CV ECHO MEAS - ESV(TEICH): 31.1 ML
BH CV ECHO MEAS - FS: 32.2 %
BH CV ECHO MEAS - IVS/LVPW: 0.97
BH CV ECHO MEAS - IVSD: 1.6 CM
BH CV ECHO MEAS - LA DIMENSION: 3.1 CM
BH CV ECHO MEAS - LA/AO: 1.1
BH CV ECHO MEAS - LAT PEAK E' VEL: 11.5 CM/SEC
BH CV ECHO MEAS - LV DIASTOLIC VOL/BSA (35-75): 34.6 ML/M^2
BH CV ECHO MEAS - LV MASS(C)D: 296.1 GRAMS
BH CV ECHO MEAS - LV MASS(C)DI: 173.9 GRAMS/M^2
BH CV ECHO MEAS - LV MAX PG: 3.7 MMHG
BH CV ECHO MEAS - LV MEAN PG: 2 MMHG
BH CV ECHO MEAS - LV SYSTOLIC VOL/BSA (12-30): 13.7 ML/M^2
BH CV ECHO MEAS - LV V1 MAX: 96.8 CM/SEC
BH CV ECHO MEAS - LV V1 MEAN: 62.9 CM/SEC
BH CV ECHO MEAS - LV V1 VTI: 22.5 CM
BH CV ECHO MEAS - LVIDD: 4.2 CM
BH CV ECHO MEAS - LVIDS: 2.9 CM
BH CV ECHO MEAS - LVLD AP4: 7.3 CM
BH CV ECHO MEAS - LVLS AP4: 5.3 CM
BH CV ECHO MEAS - LVOT AREA (M): 2.8 CM^2
BH CV ECHO MEAS - LVOT AREA: 2.8 CM^2
BH CV ECHO MEAS - LVOT DIAM: 1.9 CM
BH CV ECHO MEAS - LVPWD: 1.7 CM
BH CV ECHO MEAS - MED PEAK E' VEL: 9.1 CM/SEC
BH CV ECHO MEAS - MR MAX PG: 58.1 MMHG
BH CV ECHO MEAS - MR MAX VEL: 381 CM/SEC
BH CV ECHO MEAS - MR MEAN PG: 48 MMHG
BH CV ECHO MEAS - MR MEAN VEL: 333 CM/SEC
BH CV ECHO MEAS - MR VTI: 125 CM
BH CV ECHO MEAS - MV DEC SLOPE: 332 CM/SEC^2
BH CV ECHO MEAS - MV DEC TIME: 0.36 SEC
BH CV ECHO MEAS - MV E MAX VEL: 118 CM/SEC
BH CV ECHO MEAS - RAP SYSTOLE: 10 MMHG
BH CV ECHO MEAS - RVSP: 35.8 MMHG
BH CV ECHO MEAS - SI(AO): 269.2 ML/M^2
BH CV ECHO MEAS - SI(CUBED): 30.4 ML/M^2
BH CV ECHO MEAS - SI(LVOT): 37.5 ML/M^2
BH CV ECHO MEAS - SI(MOD-SP4): 20.9 ML/M^2
BH CV ECHO MEAS - SI(TEICH): 28.4 ML/M^2
BH CV ECHO MEAS - SV(AO): 458.4 ML
BH CV ECHO MEAS - SV(CUBED): 51.8 ML
BH CV ECHO MEAS - SV(LVOT): 63.8 ML
BH CV ECHO MEAS - SV(MOD-SP4): 35.6 ML
BH CV ECHO MEAS - SV(TEICH): 48.3 ML
BH CV ECHO MEAS - TR MAX VEL: 254 CM/SEC
BH CV ECHO MEAS - TR MAX VEL: 291 CM/SEC
BH CV ECHO MEASUREMENTS AVERAGE E/E' RATIO: 11.46
BILIRUB SERPL-MCNC: 0.5 MG/DL (ref 0.2–1.2)
BILIRUB SERPL-MCNC: 0.6 MG/DL (ref 0.2–1.2)
BILIRUB SERPL-MCNC: 0.6 MG/DL (ref 0.2–1.2)
BILIRUB SERPL-MCNC: 0.7 MG/DL (ref 0.2–1.2)
BILIRUB SERPL-MCNC: 0.7 MG/DL (ref 0.2–1.2)
BILIRUB SERPL-MCNC: 0.8 MG/DL (ref 0.2–1.2)
BILIRUB SERPL-MCNC: 1 MG/DL (ref 0.2–1.2)
BILIRUBIN URINE: NEGATIVE
BLD GP AB SCN SERPL QL: NEGATIVE
BLOOD CULTURE, ROUTINE: NORMAL
BLOOD, URINE: ABNORMAL
BUN BLDV-MCNC: 21 MG/DL (ref 8–23)
BUN BLDV-MCNC: 23 MG/DL (ref 8–23)
BUN BLDV-MCNC: 31 MG/DL (ref 8–23)
BUN BLDV-MCNC: 31 MG/DL (ref 8–23)
BUN BLDV-MCNC: 38 MG/DL (ref 8–23)
BUN BLDV-MCNC: 51 MG/DL (ref 8–23)
BUN BLDV-MCNC: 65 MG/DL (ref 8–23)
BUN SERPL-MCNC: 25 MG/DL (ref 8–23)
BUN SERPL-MCNC: 27 MG/DL (ref 8–23)
BUN/CREAT SERPL: 9.8 (ref 7–25)
BUN/CREAT SERPL: 9.9 (ref 7–25)
CALCIUM SERPL-MCNC: 8.4 MG/DL (ref 8.8–10.2)
CALCIUM SERPL-MCNC: 8.5 MG/DL (ref 8.8–10.2)
CALCIUM SERPL-MCNC: 8.5 MG/DL (ref 8.8–10.2)
CALCIUM SERPL-MCNC: 8.6 MG/DL (ref 8.8–10.2)
CALCIUM SERPL-MCNC: 8.6 MG/DL (ref 8.8–10.2)
CALCIUM SERPL-MCNC: 8.7 MG/DL (ref 8.8–10.2)
CALCIUM SERPL-MCNC: 9.1 MG/DL (ref 8.8–10.2)
CALCIUM SPEC-SCNC: 8.6 MG/DL (ref 8.6–10.5)
CALCIUM SPEC-SCNC: 9.2 MG/DL (ref 8.6–10.5)
CARBOXYHEMOGLOBIN ARTERIAL: 0.2 % (ref 0–5)
CHLORIDE BLD-SCNC: 83 MMOL/L (ref 98–111)
CHLORIDE BLD-SCNC: 87 MMOL/L (ref 98–111)
CHLORIDE BLD-SCNC: 90 MMOL/L (ref 98–111)
CHLORIDE BLD-SCNC: 91 MMOL/L (ref 98–111)
CHLORIDE BLD-SCNC: 91 MMOL/L (ref 98–111)
CHLORIDE BLD-SCNC: 97 MMOL/L (ref 98–111)
CHLORIDE BLD-SCNC: 98 MMOL/L (ref 98–111)
CHLORIDE SERPL-SCNC: 93 MMOL/L (ref 98–107)
CHLORIDE SERPL-SCNC: 98 MMOL/L (ref 98–107)
CLARITY: ABNORMAL
CO2 SERPL-SCNC: 30 MMOL/L (ref 22–29)
CO2 SERPL-SCNC: 32 MMOL/L (ref 22–29)
CO2: 25 MMOL/L (ref 22–29)
CO2: 25 MMOL/L (ref 22–29)
CO2: 28 MMOL/L (ref 22–29)
CO2: 31 MMOL/L (ref 22–29)
CO2: 33 MMOL/L (ref 22–29)
CO2: 33 MMOL/L (ref 22–29)
CO2: 36 MMOL/L (ref 22–29)
COLOR: YELLOW
CREAT SERPL-MCNC: 2.2 MG/DL (ref 0.5–1.2)
CREAT SERPL-MCNC: 2.2 MG/DL (ref 0.5–1.2)
CREAT SERPL-MCNC: 2.54 MG/DL (ref 0.76–1.27)
CREAT SERPL-MCNC: 2.72 MG/DL (ref 0.76–1.27)
CREAT SERPL-MCNC: 2.8 MG/DL (ref 0.5–1.2)
CREAT SERPL-MCNC: 2.8 MG/DL (ref 0.5–1.2)
CREAT SERPL-MCNC: 3 MG/DL (ref 0.5–1.2)
CREAT SERPL-MCNC: 3.6 MG/DL (ref 0.5–1.2)
CREAT SERPL-MCNC: 4.6 MG/DL (ref 0.5–1.2)
DEPRECATED RDW RBC AUTO: 42.5 FL (ref 37–54)
DEPRECATED RDW RBC AUTO: 42.6 FL (ref 37–54)
EOSINOPHIL # BLD AUTO: 0.15 10*3/MM3 (ref 0–0.4)
EOSINOPHIL NFR BLD AUTO: 1.7 % (ref 0.3–6.2)
EOSINOPHILS ABSOLUTE: 0 K/UL (ref 0–0.6)
EOSINOPHILS ABSOLUTE: 0 K/UL (ref 0–0.6)
EOSINOPHILS ABSOLUTE: 0.1 K/UL (ref 0–0.6)
EOSINOPHILS ABSOLUTE: 0.2 K/UL (ref 0–0.6)
EOSINOPHILS ABSOLUTE: 0.2 K/UL (ref 0–0.6)
EOSINOPHILS RELATIVE PERCENT: 0 % (ref 0–5)
EOSINOPHILS RELATIVE PERCENT: 0 % (ref 0–5)
EOSINOPHILS RELATIVE PERCENT: 0.3 % (ref 0–5)
EOSINOPHILS RELATIVE PERCENT: 0.5 % (ref 0–5)
EOSINOPHILS RELATIVE PERCENT: 0.8 % (ref 0–5)
EOSINOPHILS RELATIVE PERCENT: 0.8 % (ref 0–5)
EOSINOPHILS RELATIVE PERCENT: 1 % (ref 0–5)
EPITHELIAL CELLS, UA: ABNORMAL /HPF
ERYTHROCYTE [DISTWIDTH] IN BLOOD BY AUTOMATED COUNT: 12.8 % (ref 12.3–15.4)
ERYTHROCYTE [DISTWIDTH] IN BLOOD BY AUTOMATED COUNT: 12.9 % (ref 12.3–15.4)
GENTAMICIN DOSE AMOUNT: NORMAL
GENTAMICIN DOSE AMOUNT: NORMAL
GENTAMICIN RANDOM: 2.8 UG/ML
GENTAMICIN RANDOM: 3.3 UG/ML
GFR AFRICAN AMERICAN: 15
GFR AFRICAN AMERICAN: 20
GFR AFRICAN AMERICAN: 25
GFR AFRICAN AMERICAN: 27
GFR AFRICAN AMERICAN: 27
GFR AFRICAN AMERICAN: 35
GFR AFRICAN AMERICAN: 35
GFR NON-AFRICAN AMERICAN: 12
GFR NON-AFRICAN AMERICAN: 16
GFR NON-AFRICAN AMERICAN: 20
GFR NON-AFRICAN AMERICAN: 22
GFR NON-AFRICAN AMERICAN: 22
GFR NON-AFRICAN AMERICAN: 29
GFR NON-AFRICAN AMERICAN: 29
GFR SERPL CREATININE-BSD FRML MDRD: 23 ML/MIN/1.73
GFR SERPL CREATININE-BSD FRML MDRD: 25 ML/MIN/1.73
GLUCOSE BLD-MCNC: 119 MG/DL (ref 74–109)
GLUCOSE BLD-MCNC: 124 MG/DL (ref 70–99)
GLUCOSE BLD-MCNC: 125 MG/DL (ref 70–99)
GLUCOSE BLD-MCNC: 126 MG/DL (ref 70–99)
GLUCOSE BLD-MCNC: 126 MG/DL (ref 70–99)
GLUCOSE BLD-MCNC: 132 MG/DL (ref 70–99)
GLUCOSE BLD-MCNC: 134 MG/DL (ref 74–109)
GLUCOSE BLD-MCNC: 138 MG/DL (ref 70–99)
GLUCOSE BLD-MCNC: 149 MG/DL (ref 70–99)
GLUCOSE BLD-MCNC: 157 MG/DL (ref 70–99)
GLUCOSE BLD-MCNC: 16 MG/DL (ref 74–109)
GLUCOSE BLD-MCNC: 161 MG/DL (ref 70–99)
GLUCOSE BLD-MCNC: 161 MG/DL (ref 74–109)
GLUCOSE BLD-MCNC: 163 MG/DL (ref 70–99)
GLUCOSE BLD-MCNC: 172 MG/DL (ref 70–99)
GLUCOSE BLD-MCNC: 176 MG/DL (ref 70–99)
GLUCOSE BLD-MCNC: 182 MG/DL (ref 70–99)
GLUCOSE BLD-MCNC: 185 MG/DL (ref 70–99)
GLUCOSE BLD-MCNC: 188 MG/DL (ref 70–99)
GLUCOSE BLD-MCNC: 193 MG/DL (ref 70–99)
GLUCOSE BLD-MCNC: 195 MG/DL (ref 70–99)
GLUCOSE BLD-MCNC: 200 MG/DL (ref 70–99)
GLUCOSE BLD-MCNC: 205 MG/DL (ref 70–99)
GLUCOSE BLD-MCNC: 223 MG/DL (ref 70–99)
GLUCOSE BLD-MCNC: 226 MG/DL (ref 70–99)
GLUCOSE BLD-MCNC: 228 MG/DL (ref 70–99)
GLUCOSE BLD-MCNC: 235 MG/DL (ref 74–109)
GLUCOSE BLD-MCNC: 236 MG/DL (ref 70–99)
GLUCOSE BLD-MCNC: 239 MG/DL (ref 70–99)
GLUCOSE BLD-MCNC: 241 MG/DL (ref 70–99)
GLUCOSE BLD-MCNC: 245 MG/DL (ref 74–109)
GLUCOSE BLD-MCNC: 246 MG/DL (ref 70–99)
GLUCOSE BLD-MCNC: 252 MG/DL (ref 70–99)
GLUCOSE BLD-MCNC: 252 MG/DL (ref 70–99)
GLUCOSE BLD-MCNC: 267 MG/DL (ref 70–99)
GLUCOSE BLD-MCNC: 273 MG/DL (ref 74–109)
GLUCOSE BLD-MCNC: 279 MG/DL (ref 70–99)
GLUCOSE BLD-MCNC: 284 MG/DL (ref 70–99)
GLUCOSE BLD-MCNC: 53 MG/DL (ref 70–99)
GLUCOSE BLD-MCNC: 75 MG/DL (ref 70–99)
GLUCOSE BLD-MCNC: 78 MG/DL (ref 70–99)
GLUCOSE BLD-MCNC: 89 MG/DL (ref 70–99)
GLUCOSE BLD-MCNC: 90 MG/DL (ref 70–99)
GLUCOSE BLD-MCNC: 96 MG/DL (ref 70–99)
GLUCOSE BLDC GLUCOMTR-MCNC: 203 MG/DL (ref 70–130)
GLUCOSE BLDC GLUCOMTR-MCNC: 282 MG/DL (ref 70–130)
GLUCOSE BLDC GLUCOMTR-MCNC: 63 MG/DL (ref 70–130)
GLUCOSE SERPL-MCNC: 188 MG/DL (ref 65–99)
GLUCOSE SERPL-MCNC: 550 MG/DL (ref 65–99)
GLUCOSE URINE: NEGATIVE MG/DL
HAV IGM SER IA-ACNC: NORMAL
HBA1C MFR BLD: 13.8 % (ref 4–6)
HBA1C MFR BLD: 13.9 % (ref 4.8–5.6)
HCO3 ARTERIAL: 28.3 MMOL/L (ref 22–26)
HCT VFR BLD AUTO: 24.4 % (ref 37.5–51)
HCT VFR BLD AUTO: 27.4 % (ref 37.5–51)
HCT VFR BLD CALC: 31.5 % (ref 42–52)
HCT VFR BLD CALC: 32.1 % (ref 42–52)
HCT VFR BLD CALC: 33.9 % (ref 42–52)
HCT VFR BLD CALC: 34.4 % (ref 42–52)
HCT VFR BLD CALC: 34.5 % (ref 42–52)
HCT VFR BLD CALC: 34.8 % (ref 42–52)
HCT VFR BLD CALC: 35 % (ref 42–52)
HCT VFR BLD CALC: 35.3 % (ref 42–52)
HEMOGLOBIN, ART, EXTENDED: 11.6 G/DL (ref 14–18)
HEMOGLOBIN: 10.9 G/DL (ref 14–18)
HEMOGLOBIN: 11 G/DL (ref 14–18)
HEMOGLOBIN: 11.3 G/DL (ref 14–18)
HEMOGLOBIN: 11.7 G/DL (ref 14–18)
HEMOGLOBIN: 11.8 G/DL (ref 14–18)
HEPATITIS B CORE IGM ANTIBODY: NORMAL
HEPATITIS B SURFACE ANTIGEN INTERPRETATION: NORMAL
HEPATITIS BE ANTIGEN: NEGATIVE
HEPATITIS C ANTIBODY INTERPRETATION: NORMAL
HGB BLD-MCNC: 8.4 G/DL (ref 13–17.7)
HGB BLD-MCNC: 9.5 G/DL (ref 13–17.7)
IMM GRANULOCYTES # BLD AUTO: 0.07 10*3/MM3 (ref 0–0.05)
IMM GRANULOCYTES NFR BLD AUTO: 0.8 % (ref 0–0.5)
IMMATURE GRANULOCYTES #: 0.1 K/UL
IMMATURE GRANULOCYTES #: 0.2 K/UL
IMMATURE GRANULOCYTES #: 0.3 K/UL
IMMATURE GRANULOCYTES #: 0.4 K/UL
IMMATURE GRANULOCYTES #: 0.9 K/UL
INR BLD: 1.21 (ref 0.88–1.18)
INR PPP: 1.29 (ref 0.91–1.09)
KETONES, URINE: NEGATIVE MG/DL
LACTIC ACID, SEPSIS: 1.3 MG/DL (ref 0.5–1.9)
LACTIC ACID: 1.3 MMOL/L (ref 0.5–1.9)
LEFT ATRIUM VOLUME INDEX: 24 ML/M2
LEFT ATRIUM VOLUME: 40.8 CM3
LEUKOCYTE ESTERASE, URINE: ABNORMAL
LV EF 2D ECHO EST: 60 %
LV EF 2D ECHO EST: 65 %
LYMPHOCYTES # BLD AUTO: 1.33 10*3/MM3 (ref 0.7–3.1)
LYMPHOCYTES ABSOLUTE: 0.6 K/UL (ref 1.1–4.5)
LYMPHOCYTES ABSOLUTE: 1.2 K/UL (ref 1.1–4.5)
LYMPHOCYTES ABSOLUTE: 1.4 K/UL (ref 1.1–4.5)
LYMPHOCYTES ABSOLUTE: 1.5 K/UL (ref 1.1–4.5)
LYMPHOCYTES ABSOLUTE: 1.5 K/UL (ref 1.1–4.5)
LYMPHOCYTES ABSOLUTE: 1.6 K/UL (ref 1.1–4.5)
LYMPHOCYTES ABSOLUTE: 1.7 K/UL (ref 1.1–4.5)
LYMPHOCYTES NFR BLD AUTO: 15.3 % (ref 19.6–45.3)
LYMPHOCYTES RELATIVE PERCENT: 2.7 % (ref 20–40)
LYMPHOCYTES RELATIVE PERCENT: 4.5 % (ref 20–40)
LYMPHOCYTES RELATIVE PERCENT: 6.6 % (ref 20–40)
LYMPHOCYTES RELATIVE PERCENT: 7 % (ref 20–40)
LYMPHOCYTES RELATIVE PERCENT: 7.9 % (ref 20–40)
LYMPHOCYTES RELATIVE PERCENT: 7.9 % (ref 20–40)
LYMPHOCYTES RELATIVE PERCENT: 9.4 % (ref 20–40)
MAGNESIUM: 1.9 MG/DL (ref 1.6–2.4)
MAGNESIUM: 1.9 MG/DL (ref 1.6–2.4)
MAGNESIUM: 2 MG/DL (ref 1.6–2.4)
MAGNESIUM: 2.2 MG/DL (ref 1.6–2.4)
MAXIMAL PREDICTED HEART RATE: 142 BPM
MCH RBC QN AUTO: 29.2 PG (ref 27–31)
MCH RBC QN AUTO: 29.9 PG (ref 27–31)
MCH RBC QN AUTO: 29.9 PG (ref 27–31)
MCH RBC QN AUTO: 30.1 PG (ref 27–31)
MCH RBC QN AUTO: 30.1 PG (ref 27–31)
MCH RBC QN AUTO: 30.2 PG (ref 27–31)
MCH RBC QN AUTO: 30.3 PG (ref 27–31)
MCH RBC QN AUTO: 30.3 PG (ref 27–31)
MCH RBC QN AUTO: 31.6 PG (ref 26.6–33)
MCH RBC QN AUTO: 31.8 PG (ref 26.6–33)
MCHC RBC AUTO-ENTMCNC: 33.3 G/DL (ref 33–37)
MCHC RBC AUTO-ENTMCNC: 33.4 G/DL (ref 33–37)
MCHC RBC AUTO-ENTMCNC: 33.7 G/DL (ref 33–37)
MCHC RBC AUTO-ENTMCNC: 33.9 G/DL (ref 33–37)
MCHC RBC AUTO-ENTMCNC: 34 G/DL (ref 33–37)
MCHC RBC AUTO-ENTMCNC: 34.2 G/DL (ref 33–37)
MCHC RBC AUTO-ENTMCNC: 34.3 G/DL (ref 33–37)
MCHC RBC AUTO-ENTMCNC: 34.4 G/DL (ref 31.5–35.7)
MCHC RBC AUTO-ENTMCNC: 34.6 G/DL (ref 33–37)
MCHC RBC AUTO-ENTMCNC: 34.7 G/DL (ref 31.5–35.7)
MCV RBC AUTO: 86.3 FL (ref 80–94)
MCV RBC AUTO: 87.2 FL (ref 80–94)
MCV RBC AUTO: 87.6 FL (ref 80–94)
MCV RBC AUTO: 88 FL (ref 80–94)
MCV RBC AUTO: 88.9 FL (ref 80–94)
MCV RBC AUTO: 89.2 FL (ref 80–94)
MCV RBC AUTO: 89.7 FL (ref 80–94)
MCV RBC AUTO: 90.1 FL (ref 80–94)
MCV RBC AUTO: 91.6 FL (ref 79–97)
MCV RBC AUTO: 91.7 FL (ref 79–97)
METHEMOGLOBIN ARTERIAL: 1 %
MONOCYTES # BLD AUTO: 0.57 10*3/MM3 (ref 0.1–0.9)
MONOCYTES ABSOLUTE: 1.3 K/UL (ref 0–0.9)
MONOCYTES ABSOLUTE: 1.7 K/UL (ref 0–0.9)
MONOCYTES ABSOLUTE: 1.8 K/UL (ref 0–0.9)
MONOCYTES ABSOLUTE: 1.8 K/UL (ref 0–0.9)
MONOCYTES ABSOLUTE: 2 K/UL (ref 0–0.9)
MONOCYTES NFR BLD AUTO: 6.6 % (ref 5–12)
MONOCYTES RELATIVE PERCENT: 10.9 % (ref 0–10)
MONOCYTES RELATIVE PERCENT: 5.6 % (ref 0–10)
MONOCYTES RELATIVE PERCENT: 6.5 % (ref 0–10)
MONOCYTES RELATIVE PERCENT: 7.5 % (ref 0–10)
MONOCYTES RELATIVE PERCENT: 9 % (ref 0–10)
MONOCYTES RELATIVE PERCENT: 9.2 % (ref 0–10)
MONOCYTES RELATIVE PERCENT: 9.3 % (ref 0–10)
MYELOCYTE PERCENT: 1 %
NEUTROPHILS ABSOLUTE: 14.1 K/UL (ref 1.5–7.5)
NEUTROPHILS ABSOLUTE: 14.1 K/UL (ref 1.5–7.5)
NEUTROPHILS ABSOLUTE: 14.9 K/UL (ref 1.5–7.5)
NEUTROPHILS ABSOLUTE: 16.2 K/UL (ref 1.5–7.5)
NEUTROPHILS ABSOLUTE: 19.5 K/UL (ref 1.5–7.5)
NEUTROPHILS ABSOLUTE: 20.2 K/UL (ref 1.5–7.5)
NEUTROPHILS ABSOLUTE: 24.1 K/UL (ref 1.5–7.5)
NEUTROPHILS NFR BLD AUTO: 6.55 10*3/MM3 (ref 1.7–7)
NEUTROPHILS NFR BLD AUTO: 75.3 % (ref 42.7–76)
NEUTROPHILS RELATIVE PERCENT: 78.4 % (ref 50–65)
NEUTROPHILS RELATIVE PERCENT: 79.3 % (ref 50–65)
NEUTROPHILS RELATIVE PERCENT: 80.8 % (ref 50–65)
NEUTROPHILS RELATIVE PERCENT: 81 % (ref 50–65)
NEUTROPHILS RELATIVE PERCENT: 84.3 % (ref 50–65)
NEUTROPHILS RELATIVE PERCENT: 87.5 % (ref 50–65)
NEUTROPHILS RELATIVE PERCENT: 90.9 % (ref 50–65)
NITRITE, URINE: NEGATIVE
NRBC BLD AUTO-RTO: 0 /100 WBC (ref 0–0.2)
O2 CONTENT ARTERIAL: 16.7 ML/DL
O2 SAT, ARTERIAL: 97 %
O2 THERAPY: ABNORMAL
OVALOCYTES: ABNORMAL
PCO2 ARTERIAL: 38 MMHG (ref 35–45)
PDW BLD-RTO: 13.5 % (ref 11.5–14.5)
PDW BLD-RTO: 13.6 % (ref 11.5–14.5)
PDW BLD-RTO: 13.7 % (ref 11.5–14.5)
PDW BLD-RTO: 13.7 % (ref 11.5–14.5)
PDW BLD-RTO: 13.8 % (ref 11.5–14.5)
PDW BLD-RTO: 13.9 % (ref 11.5–14.5)
PDW BLD-RTO: 13.9 % (ref 11.5–14.5)
PDW BLD-RTO: 14 % (ref 11.5–14.5)
PERFORMED ON: ABNORMAL
PERFORMED ON: NORMAL
PH ARTERIAL: 7.48 (ref 7.35–7.45)
PH UA: 5.5 (ref 5–8)
PHOSPHORUS: 3.4 MG/DL (ref 2.5–4.5)
PLATELET # BLD AUTO: 159 10*3/MM3 (ref 140–450)
PLATELET # BLD AUTO: 171 10*3/MM3 (ref 140–450)
PLATELET # BLD: 186 K/UL (ref 130–400)
PLATELET # BLD: 194 K/UL (ref 130–400)
PLATELET # BLD: 195 K/UL (ref 130–400)
PLATELET # BLD: 200 K/UL (ref 130–400)
PLATELET # BLD: 200 K/UL (ref 130–400)
PLATELET # BLD: 214 K/UL (ref 130–400)
PLATELET # BLD: 230 K/UL (ref 130–400)
PLATELET # BLD: 259 K/UL (ref 130–400)
PLATELET SLIDE REVIEW: ADEQUATE
PMV BLD AUTO: 10.4 FL (ref 9.4–12.4)
PMV BLD AUTO: 10.5 FL (ref 9.4–12.4)
PMV BLD AUTO: 10.7 FL (ref 9.4–12.4)
PMV BLD AUTO: 10.9 FL (ref 6–12)
PMV BLD AUTO: 11 FL (ref 6–12)
PMV BLD AUTO: 11 FL (ref 9.4–12.4)
PMV BLD AUTO: 11.2 FL (ref 9.4–12.4)
PMV BLD AUTO: 11.2 FL (ref 9.4–12.4)
PO2 ARTERIAL: 330 MMHG (ref 80–100)
POTASSIUM REFLEX MAGNESIUM: 3.3 MMOL/L (ref 3.5–5)
POTASSIUM REFLEX MAGNESIUM: 3.4 MMOL/L (ref 3.5–5)
POTASSIUM REFLEX MAGNESIUM: 3.5 MMOL/L (ref 3.5–5)
POTASSIUM REFLEX MAGNESIUM: 3.6 MMOL/L (ref 3.5–5)
POTASSIUM REFLEX MAGNESIUM: 3.6 MMOL/L (ref 3.5–5)
POTASSIUM REFLEX MAGNESIUM: 3.8 MMOL/L (ref 3.5–5)
POTASSIUM REFLEX MAGNESIUM: 4 MMOL/L (ref 3.5–5)
POTASSIUM SERPL-SCNC: 3.6 MMOL/L (ref 3.5–5.2)
POTASSIUM SERPL-SCNC: 4 MMOL/L (ref 3.5–5.2)
POTASSIUM, WHOLE BLOOD: 3.4
PROTEIN UA: 300 MG/DL
PROTHROMBIN TIME: 15.3 SEC (ref 12–14.6)
PROTHROMBIN TIME: 15.7 SECONDS (ref 11.9–14.6)
RBC # BLD AUTO: 2.66 10*6/MM3 (ref 4.14–5.8)
RBC # BLD AUTO: 2.99 10*6/MM3 (ref 4.14–5.8)
RBC # BLD: 3.65 M/UL (ref 4.7–6.1)
RBC # BLD: 3.68 M/UL (ref 4.7–6.1)
RBC # BLD: 3.87 M/UL (ref 4.7–6.1)
RBC # BLD: 3.87 M/UL (ref 4.7–6.1)
RBC # BLD: 3.9 M/UL (ref 4.7–6.1)
RBC # BLD: 3.9 M/UL (ref 4.7–6.1)
RBC # BLD: 3.92 M/UL (ref 4.7–6.1)
RBC # BLD: 3.92 M/UL (ref 4.7–6.1)
RBC UA: ABNORMAL /HPF (ref 0–2)
RH BLD: NEGATIVE
SARS-COV-2 ORF1AB RESP QL NAA+PROBE: NOT DETECTED
SARS-COV-2 ORF1AB RESP QL NAA+PROBE: NOT DETECTED
SARS-COV-2 RNA PNL SPEC NAA+PROBE: NOT DETECTED
SARS-COV-2, PCR: NOT DETECTED
SODIUM BLD-SCNC: 127 MMOL/L (ref 136–145)
SODIUM BLD-SCNC: 130 MMOL/L (ref 136–145)
SODIUM BLD-SCNC: 131 MMOL/L (ref 136–145)
SODIUM BLD-SCNC: 131 MMOL/L (ref 136–145)
SODIUM BLD-SCNC: 132 MMOL/L (ref 136–145)
SODIUM BLD-SCNC: 135 MMOL/L (ref 136–145)
SODIUM BLD-SCNC: 136 MMOL/L (ref 136–145)
SODIUM SERPL-SCNC: 133 MMOL/L (ref 136–145)
SODIUM SERPL-SCNC: 137 MMOL/L (ref 136–145)
SPECIFIC GRAVITY UA: 1.01 (ref 1–1.03)
STRESS TARGET HR: 121 BPM
T&S EXPIRATION DATE: NORMAL
TOTAL PROTEIN: 5.5 G/DL (ref 6.6–8.7)
TOTAL PROTEIN: 5.7 G/DL (ref 6.6–8.7)
TOTAL PROTEIN: 5.7 G/DL (ref 6.6–8.7)
TOTAL PROTEIN: 5.8 G/DL (ref 6.6–8.7)
TOTAL PROTEIN: 5.8 G/DL (ref 6.6–8.7)
TOTAL PROTEIN: 5.9 G/DL (ref 6.6–8.7)
TOTAL PROTEIN: 6.8 G/DL (ref 6.6–8.7)
URINE CULTURE, ROUTINE: NORMAL
UROBILINOGEN, URINE: 0.2 E.U./DL
VANCOMYCIN RANDOM: 5.3 UG/ML
VANCOMYCIN RANDOM: 7.9 UG/ML
WBC # BLD AUTO: 10.85 10*3/MM3 (ref 3.4–10.8)
WBC # BLD AUTO: 8.7 10*3/MM3 (ref 3.4–10.8)
WBC # BLD: 17.8 K/UL (ref 4.8–10.8)
WBC # BLD: 17.9 K/UL (ref 4.8–10.8)
WBC # BLD: 18.5 K/UL (ref 4.8–10.8)
WBC # BLD: 19.8 K/UL (ref 4.8–10.8)
WBC # BLD: 22.3 K/UL (ref 4.8–10.8)
WBC # BLD: 23.1 K/UL (ref 4.8–10.8)
WBC # BLD: 27.5 K/UL (ref 4.8–10.8)
WBC # BLD: 27.9 K/UL (ref 4.8–10.8)
WBC UA: ABNORMAL /HPF (ref 0–5)

## 2021-01-01 PROCEDURE — 6370000000 HC RX 637 (ALT 250 FOR IP): Performed by: INTERNAL MEDICINE

## 2021-01-01 PROCEDURE — 4040F PNEUMOC VAC/ADMIN/RCVD: CPT | Performed by: PHYSICIAN ASSISTANT

## 2021-01-01 PROCEDURE — 99233 SBSQ HOSP IP/OBS HIGH 50: CPT | Performed by: PSYCHIATRY & NEUROLOGY

## 2021-01-01 PROCEDURE — 93000 ELECTROCARDIOGRAM COMPLETE: CPT | Performed by: NURSE PRACTITIONER

## 2021-01-01 PROCEDURE — 2580000003 HC RX 258: Performed by: INTERNAL MEDICINE

## 2021-01-01 PROCEDURE — 6360000002 HC RX W HCPCS: Performed by: INTERNAL MEDICINE

## 2021-01-01 PROCEDURE — 82962 GLUCOSE BLOOD TEST: CPT

## 2021-01-01 PROCEDURE — 86850 RBC ANTIBODY SCREEN: CPT | Performed by: INTERNAL MEDICINE

## 2021-01-01 PROCEDURE — 80074 ACUTE HEPATITIS PANEL: CPT

## 2021-01-01 PROCEDURE — 93312 ECHO TRANSESOPHAGEAL: CPT | Performed by: INTERNAL MEDICINE

## 2021-01-01 PROCEDURE — 85027 COMPLETE CBC AUTOMATED: CPT

## 2021-01-01 PROCEDURE — 80170 ASSAY OF GENTAMICIN: CPT

## 2021-01-01 PROCEDURE — 80053 COMPREHEN METABOLIC PANEL: CPT

## 2021-01-01 PROCEDURE — 85025 COMPLETE CBC W/AUTO DIFF WBC: CPT

## 2021-01-01 PROCEDURE — 93799 UNLISTED CV SVC/PROCEDURE: CPT

## 2021-01-01 PROCEDURE — 25010000002 HEPARIN (PORCINE) 2000-0.9 UNIT/L-% SOLUTION: Performed by: INTERNAL MEDICINE

## 2021-01-01 PROCEDURE — 87040 BLOOD CULTURE FOR BACTERIA: CPT

## 2021-01-01 PROCEDURE — 33340 PERQ CLSR TCAT L ATR APNDGE: CPT | Performed by: INTERNAL MEDICINE

## 2021-01-01 PROCEDURE — 83036 HEMOGLOBIN GLYCOSYLATED A1C: CPT

## 2021-01-01 PROCEDURE — 8010000000 HC HEMODIALYSIS ACUTE INPT

## 2021-01-01 PROCEDURE — 87086 URINE CULTURE/COLONY COUNT: CPT

## 2021-01-01 PROCEDURE — 36415 COLL VENOUS BLD VENIPUNCTURE: CPT

## 2021-01-01 PROCEDURE — 93312 ECHO TRANSESOPHAGEAL: CPT

## 2021-01-01 PROCEDURE — 80202 ASSAY OF VANCOMYCIN: CPT

## 2021-01-01 PROCEDURE — 25010000002 FENTANYL CITRATE (PF) 100 MCG/2ML SOLUTION: Performed by: INTERNAL MEDICINE

## 2021-01-01 PROCEDURE — 83735 ASSAY OF MAGNESIUM: CPT

## 2021-01-01 PROCEDURE — 85730 THROMBOPLASTIN TIME PARTIAL: CPT

## 2021-01-01 PROCEDURE — 5A1D70Z PERFORMANCE OF URINARY FILTRATION, INTERMITTENT, LESS THAN 6 HOURS PER DAY: ICD-10-PCS | Performed by: INTERNAL MEDICINE

## 2021-01-01 PROCEDURE — 94640 AIRWAY INHALATION TREATMENT: CPT

## 2021-01-01 PROCEDURE — 2700000000 HC OXYGEN THERAPY PER DAY

## 2021-01-01 PROCEDURE — 82140 ASSAY OF AMMONIA: CPT

## 2021-01-01 PROCEDURE — 25010000002 HEPARIN (PORCINE) PER 1000 UNITS: Performed by: INTERNAL MEDICINE

## 2021-01-01 PROCEDURE — C1769 GUIDE WIRE: HCPCS

## 2021-01-01 PROCEDURE — 25010000002 MIDAZOLAM PER 1 MG: Performed by: INTERNAL MEDICINE

## 2021-01-01 PROCEDURE — 02L73DK OCCLUSION OF LEFT ATRIAL APPENDAGE WITH INTRALUMINAL DEVICE, PERCUTANEOUS APPROACH: ICD-10-PCS | Performed by: INTERNAL MEDICINE

## 2021-01-01 PROCEDURE — 86900 BLOOD TYPING SEROLOGIC ABO: CPT | Performed by: INTERNAL MEDICINE

## 2021-01-01 PROCEDURE — C1894 INTRO/SHEATH, NON-LASER: HCPCS | Performed by: INTERNAL MEDICINE

## 2021-01-01 PROCEDURE — 93296 REM INTERROG EVL PM/IDS: CPT | Performed by: INTERNAL MEDICINE

## 2021-01-01 PROCEDURE — 2500000003 HC RX 250 WO HCPCS: Performed by: INTERNAL MEDICINE

## 2021-01-01 PROCEDURE — 82947 ASSAY GLUCOSE BLOOD QUANT: CPT

## 2021-01-01 PROCEDURE — 99214 OFFICE O/P EST MOD 30 MIN: CPT | Performed by: INTERNAL MEDICINE

## 2021-01-01 PROCEDURE — 84132 ASSAY OF SERUM POTASSIUM: CPT

## 2021-01-01 PROCEDURE — 99213 OFFICE O/P EST LOW 20 MIN: CPT | Performed by: PHYSICIAN ASSISTANT

## 2021-01-01 PROCEDURE — 93325 DOPPLER ECHO COLOR FLOW MAPG: CPT | Performed by: INTERNAL MEDICINE

## 2021-01-01 PROCEDURE — 94003 VENT MGMT INPAT SUBQ DAY: CPT

## 2021-01-01 PROCEDURE — 82803 BLOOD GASES ANY COMBINATION: CPT

## 2021-01-01 PROCEDURE — 25010000002 DIPHENHYDRAMINE PER 50 MG: Performed by: INTERNAL MEDICINE

## 2021-01-01 PROCEDURE — 84100 ASSAY OF PHOSPHORUS: CPT

## 2021-01-01 PROCEDURE — 93306 TTE W/DOPPLER COMPLETE: CPT | Performed by: INTERNAL MEDICINE

## 2021-01-01 PROCEDURE — 2000000000 HC ICU R&B

## 2021-01-01 PROCEDURE — 1123F ACP DISCUSS/DSCN MKR DOCD: CPT | Performed by: PHYSICIAN ASSISTANT

## 2021-01-01 PROCEDURE — C1769 GUIDE WIRE: HCPCS | Performed by: INTERNAL MEDICINE

## 2021-01-01 PROCEDURE — U0004 COV-19 TEST NON-CDC HGH THRU: HCPCS | Performed by: INTERNAL MEDICINE

## 2021-01-01 PROCEDURE — 81001 URINALYSIS AUTO W/SCOPE: CPT

## 2021-01-01 PROCEDURE — 83036 HEMOGLOBIN GLYCOSYLATED A1C: CPT | Performed by: INTERNAL MEDICINE

## 2021-01-01 PROCEDURE — 99152 MOD SED SAME PHYS/QHP 5/>YRS: CPT | Performed by: INTERNAL MEDICINE

## 2021-01-01 PROCEDURE — 25010000002 IOPAMIDOL 61 % SOLUTION: Performed by: INTERNAL MEDICINE

## 2021-01-01 PROCEDURE — 99153 MOD SED SAME PHYS/QHP EA: CPT | Performed by: INTERNAL MEDICINE

## 2021-01-01 PROCEDURE — 99223 1ST HOSP IP/OBS HIGH 75: CPT | Performed by: PSYCHIATRY & NEUROLOGY

## 2021-01-01 PROCEDURE — G8484 FLU IMMUNIZE NO ADMIN: HCPCS | Performed by: PHYSICIAN ASSISTANT

## 2021-01-01 PROCEDURE — 80048 BASIC METABOLIC PNL TOTAL CA: CPT | Performed by: INTERNAL MEDICINE

## 2021-01-01 PROCEDURE — 63710000001: Performed by: INTERNAL MEDICINE

## 2021-01-01 PROCEDURE — 25010000002 CEFAZOLIN PER 500 MG: Performed by: INTERNAL MEDICINE

## 2021-01-01 PROCEDURE — C9803 HOPD COVID-19 SPEC COLLECT: HCPCS | Performed by: INTERNAL MEDICINE

## 2021-01-01 PROCEDURE — 95816 EEG AWAKE AND DROWSY: CPT

## 2021-01-01 PROCEDURE — 99153 MOD SED SAME PHYS/QHP EA: CPT | Performed by: SURGERY

## 2021-01-01 PROCEDURE — 25010000002 MIDAZOLAM HCL (PF) 5 MG/5ML SOLUTION: Performed by: INTERNAL MEDICINE

## 2021-01-01 PROCEDURE — 93320 DOPPLER ECHO COMPLETE: CPT

## 2021-01-01 PROCEDURE — U0005 INFEC AGEN DETEC AMPLI PROBE: HCPCS

## 2021-01-01 PROCEDURE — 85025 COMPLETE CBC W/AUTO DIFF WBC: CPT | Performed by: INTERNAL MEDICINE

## 2021-01-01 PROCEDURE — 85610 PROTHROMBIN TIME: CPT | Performed by: INTERNAL MEDICINE

## 2021-01-01 PROCEDURE — 6360000002 HC RX W HCPCS: Performed by: SURGERY

## 2021-01-01 PROCEDURE — 63710000001 INSULIN LISPRO (HUMAN) PER 5 UNITS: Performed by: INTERNAL MEDICINE

## 2021-01-01 PROCEDURE — 99214 OFFICE O/P EST MOD 30 MIN: CPT | Performed by: NURSE PRACTITIONER

## 2021-01-01 PROCEDURE — 5A1935Z RESPIRATORY VENTILATION, LESS THAN 24 CONSECUTIVE HOURS: ICD-10-PCS | Performed by: INTERNAL MEDICINE

## 2021-01-01 PROCEDURE — 70551 MRI BRAIN STEM W/O DYE: CPT

## 2021-01-01 PROCEDURE — 85027 COMPLETE CBC AUTOMATED: CPT | Performed by: INTERNAL MEDICINE

## 2021-01-01 PROCEDURE — 6360000004 HC RX CONTRAST MEDICATION: Performed by: SURGERY

## 2021-01-01 PROCEDURE — 36902 INTRO CATH DIALYSIS CIRCUIT: CPT | Performed by: SURGERY

## 2021-01-01 PROCEDURE — C9803 HOPD COVID-19 SPEC COLLECT: HCPCS | Performed by: NURSE PRACTITIONER

## 2021-01-01 PROCEDURE — C1759 CATH, INTRA ECHOCARDIOGRAPHY: HCPCS | Performed by: INTERNAL MEDICINE

## 2021-01-01 PROCEDURE — 99152 MOD SED SAME PHYS/QHP 5/>YRS: CPT

## 2021-01-01 PROCEDURE — 85347 COAGULATION TIME ACTIVATED: CPT

## 2021-01-01 PROCEDURE — 25010000002 HEPARIN (PORCINE) 1000-0.9 UT/500ML-% SOLUTION: Performed by: INTERNAL MEDICINE

## 2021-01-01 PROCEDURE — 2500000003 HC RX 250 WO HCPCS: Performed by: SURGERY

## 2021-01-01 PROCEDURE — 0BH17EZ INSERTION OF ENDOTRACHEAL AIRWAY INTO TRACHEA, VIA NATURAL OR ARTIFICIAL OPENING: ICD-10-PCS | Performed by: INTERNAL MEDICINE

## 2021-01-01 PROCEDURE — 83605 ASSAY OF LACTIC ACID: CPT

## 2021-01-01 PROCEDURE — 71045 X-RAY EXAM CHEST 1 VIEW: CPT

## 2021-01-01 PROCEDURE — 95816 EEG AWAKE AND DROWSY: CPT | Performed by: PSYCHIATRY & NEUROLOGY

## 2021-01-01 PROCEDURE — 93288 INTERROG EVL PM/LDLS PM IP: CPT | Performed by: PHYSICIAN ASSISTANT

## 2021-01-01 PROCEDURE — 6360000002 HC RX W HCPCS: Performed by: PHYSICIAN ASSISTANT

## 2021-01-01 PROCEDURE — 93306 TTE W/DOPPLER COMPLETE: CPT

## 2021-01-01 PROCEDURE — 36600 WITHDRAWAL OF ARTERIAL BLOOD: CPT

## 2021-01-01 PROCEDURE — 93325 DOPPLER ECHO COLOR FLOW MAPG: CPT

## 2021-01-01 PROCEDURE — 99238 HOSP IP/OBS DSCHRG MGMT 30/<: CPT | Performed by: NURSE PRACTITIONER

## 2021-01-01 PROCEDURE — 94002 VENT MGMT INPAT INIT DAY: CPT

## 2021-01-01 PROCEDURE — U0004 COV-19 TEST NON-CDC HGH THRU: HCPCS

## 2021-01-01 PROCEDURE — 93320 DOPPLER ECHO COMPLETE: CPT | Performed by: INTERNAL MEDICINE

## 2021-01-01 PROCEDURE — C1760 CLOSURE DEV, VASC: HCPCS | Performed by: INTERNAL MEDICINE

## 2021-01-01 PROCEDURE — C9803 HOPD COVID-19 SPEC COLLECT: HCPCS

## 2021-01-01 PROCEDURE — G8420 CALC BMI NORM PARAMETERS: HCPCS | Performed by: PHYSICIAN ASSISTANT

## 2021-01-01 PROCEDURE — 99152 MOD SED SAME PHYS/QHP 5/>YRS: CPT | Performed by: SURGERY

## 2021-01-01 PROCEDURE — 87635 SARS-COV-2 COVID-19 AMP PRB: CPT | Performed by: NURSE PRACTITIONER

## 2021-01-01 PROCEDURE — 1036F TOBACCO NON-USER: CPT | Performed by: PHYSICIAN ASSISTANT

## 2021-01-01 PROCEDURE — 93294 REM INTERROG EVL PM/LDLS PM: CPT | Performed by: INTERNAL MEDICINE

## 2021-01-01 PROCEDURE — 87350 HEPATITIS BE AG IA: CPT

## 2021-01-01 PROCEDURE — 99232 SBSQ HOSP IP/OBS MODERATE 35: CPT | Performed by: PSYCHIATRY & NEUROLOGY

## 2021-01-01 PROCEDURE — 63710000001 INSULIN REGULAR HUMAN PER 5 UNITS: Performed by: INTERNAL MEDICINE

## 2021-01-01 PROCEDURE — G8428 CUR MEDS NOT DOCUMENT: HCPCS | Performed by: PHYSICIAN ASSISTANT

## 2021-01-01 PROCEDURE — 86901 BLOOD TYPING SEROLOGIC RH(D): CPT | Performed by: INTERNAL MEDICINE

## 2021-01-01 PROCEDURE — 99999 PR OFFICE/OUTPT VISIT,PROCEDURE ONLY: CPT | Performed by: NURSE PRACTITIONER

## 2021-01-01 PROCEDURE — A9270 NON-COVERED ITEM OR SERVICE: HCPCS | Performed by: INTERNAL MEDICINE

## 2021-01-01 PROCEDURE — 6370000000 HC RX 637 (ALT 250 FOR IP): Performed by: PHYSICIAN ASSISTANT

## 2021-01-01 PROCEDURE — 85610 PROTHROMBIN TIME: CPT

## 2021-01-01 DEVICE — LEFT ATRIAL APPENDAGE CLOSURE DEVICE WITH DELIVERY SYSTEM
Type: IMPLANTABLE DEVICE | Status: FUNCTIONAL
Brand: WATCHMAN®

## 2021-01-01 RX ORDER — MIDAZOLAM HYDROCHLORIDE 1 MG/ML
INJECTION, SOLUTION INTRAMUSCULAR; INTRAVENOUS
Status: COMPLETED | OUTPATIENT
Start: 2021-01-01 | End: 2021-01-01

## 2021-01-01 RX ORDER — LIDOCAINE HYDROCHLORIDE 20 MG/ML
INJECTION, SOLUTION INFILTRATION; PERINEURAL AS NEEDED
Status: DISCONTINUED | OUTPATIENT
Start: 2021-01-01 | End: 2021-01-01 | Stop reason: HOSPADM

## 2021-01-01 RX ORDER — PANTOPRAZOLE SODIUM 40 MG/1
40 TABLET, DELAYED RELEASE ORAL EVERY MORNING
Status: DISCONTINUED | OUTPATIENT
Start: 2021-01-01 | End: 2021-01-01 | Stop reason: HOSPADM

## 2021-01-01 RX ORDER — ASPIRIN 81 MG/1
TABLET, CHEWABLE ORAL
Status: COMPLETED
Start: 2021-01-01 | End: 2021-01-01

## 2021-01-01 RX ORDER — SODIUM CHLORIDE 0.9 % (FLUSH) 0.9 %
10 SYRINGE (ML) INJECTION AS NEEDED
Status: CANCELLED | OUTPATIENT
Start: 2021-01-01

## 2021-01-01 RX ORDER — HEPARIN SODIUM 200 [USP'U]/100ML
INJECTION, SOLUTION INTRAVENOUS AS NEEDED
Status: DISCONTINUED | OUTPATIENT
Start: 2021-01-01 | End: 2021-01-01 | Stop reason: HOSPADM

## 2021-01-01 RX ORDER — GENTAMICIN SULFATE 60 MG/50ML
120 INJECTION, SOLUTION INTRAVENOUS ONCE
Status: COMPLETED | OUTPATIENT
Start: 2021-01-01 | End: 2021-01-01

## 2021-01-01 RX ORDER — ASPIRIN 81 MG/1
81 TABLET ORAL ONCE
Status: CANCELLED | OUTPATIENT
Start: 2021-01-01 | End: 2021-01-01

## 2021-01-01 RX ORDER — ACETAMINOPHEN 325 MG/1
650 TABLET ORAL EVERY 6 HOURS PRN
Status: DISCONTINUED | OUTPATIENT
Start: 2021-01-01 | End: 2021-01-01 | Stop reason: HOSPADM

## 2021-01-01 RX ORDER — GLIPIZIDE 5 MG/1
5 TABLET ORAL
Status: DISCONTINUED | OUTPATIENT
Start: 2021-01-01 | End: 2021-01-01 | Stop reason: HOSPADM

## 2021-01-01 RX ORDER — SODIUM CHLORIDE 0.9 % (FLUSH) 0.9 %
10 SYRINGE (ML) INJECTION AS NEEDED
Status: DISCONTINUED | OUTPATIENT
Start: 2021-01-01 | End: 2021-01-01

## 2021-01-01 RX ORDER — SODIUM CHLORIDE 0.9 % (FLUSH) 0.9 %
5-40 SYRINGE (ML) INJECTION PRN
Status: CANCELLED | OUTPATIENT
Start: 2021-01-01

## 2021-01-01 RX ORDER — SODIUM CHLORIDE 0.9 % (FLUSH) 0.9 %
5-40 SYRINGE (ML) INJECTION PRN
Status: DISCONTINUED | OUTPATIENT
Start: 2021-01-01 | End: 2021-01-01 | Stop reason: HOSPADM

## 2021-01-01 RX ORDER — HEPARIN SODIUM 10000 [USP'U]/100ML
5-30 INJECTION, SOLUTION INTRAVENOUS CONTINUOUS
Status: DISCONTINUED | OUTPATIENT
Start: 2021-01-01 | End: 2021-01-01 | Stop reason: HOSPADM

## 2021-01-01 RX ORDER — SODIUM CHLORIDE 9 MG/ML
100 INJECTION, SOLUTION INTRAVENOUS CONTINUOUS
Status: DISCONTINUED | OUTPATIENT
Start: 2021-01-01 | End: 2021-01-01 | Stop reason: HOSPADM

## 2021-01-01 RX ORDER — HEPARIN SODIUM 1000 [USP'U]/ML
60 INJECTION, SOLUTION INTRAVENOUS; SUBCUTANEOUS PRN
Status: DISCONTINUED | OUTPATIENT
Start: 2021-01-01 | End: 2021-01-01 | Stop reason: HOSPADM

## 2021-01-01 RX ORDER — ASPIRIN 81 MG/1
81 TABLET ORAL ONCE
Status: COMPLETED | OUTPATIENT
Start: 2021-01-01 | End: 2021-01-01

## 2021-01-01 RX ORDER — FENTANYL CITRATE 50 UG/ML
INJECTION, SOLUTION INTRAMUSCULAR; INTRAVENOUS
Status: COMPLETED | OUTPATIENT
Start: 2021-01-01 | End: 2021-01-01

## 2021-01-01 RX ORDER — POTASSIUM CHLORIDE 20 MEQ/1
40 TABLET, EXTENDED RELEASE ORAL PRN
Status: DISCONTINUED | OUTPATIENT
Start: 2021-01-01 | End: 2021-01-01 | Stop reason: HOSPADM

## 2021-01-01 RX ORDER — METOPROLOL TARTRATE 5 MG/5ML
5 INJECTION INTRAVENOUS EVERY 6 HOURS
Status: DISCONTINUED | OUTPATIENT
Start: 2021-01-01 | End: 2021-01-01 | Stop reason: HOSPADM

## 2021-01-01 RX ORDER — SODIUM CHLORIDE 9 MG/ML
80 INJECTION, SOLUTION INTRAVENOUS CONTINUOUS
Status: DISCONTINUED | OUTPATIENT
Start: 2021-01-01 | End: 2021-01-01 | Stop reason: HOSPADM

## 2021-01-01 RX ORDER — MIDAZOLAM HYDROCHLORIDE 1 MG/ML
INJECTION INTRAMUSCULAR; INTRAVENOUS AS NEEDED
Status: DISCONTINUED | OUTPATIENT
Start: 2021-01-01 | End: 2021-01-01 | Stop reason: HOSPADM

## 2021-01-01 RX ORDER — DEXTROSE AND SODIUM CHLORIDE 5; .9 G/100ML; G/100ML
INJECTION, SOLUTION INTRAVENOUS CONTINUOUS
Status: DISCONTINUED | OUTPATIENT
Start: 2021-01-01 | End: 2021-01-01

## 2021-01-01 RX ORDER — SODIUM CHLORIDE 0.9 % (FLUSH) 0.9 %
10 SYRINGE (ML) INJECTION AS NEEDED
Status: DISCONTINUED | OUTPATIENT
Start: 2021-01-01 | End: 2021-01-01 | Stop reason: HOSPADM

## 2021-01-01 RX ORDER — METOPROLOL TARTRATE 5 MG/5ML
5 INJECTION INTRAVENOUS EVERY 6 HOURS
Status: CANCELLED | OUTPATIENT
Start: 2021-01-01

## 2021-01-01 RX ORDER — ALBUTEROL SULFATE 2.5 MG/3ML
2.5 SOLUTION RESPIRATORY (INHALATION) EVERY 6 HOURS PRN
Status: CANCELLED | OUTPATIENT
Start: 2021-01-01

## 2021-01-01 RX ORDER — TERAZOSIN 5 MG/1
5 CAPSULE ORAL EVERY MORNING
Status: DISCONTINUED | OUTPATIENT
Start: 2021-01-01 | End: 2021-01-01 | Stop reason: HOSPADM

## 2021-01-01 RX ORDER — METOPROLOL SUCCINATE 100 MG/1
100 TABLET, EXTENDED RELEASE ORAL
Status: DISCONTINUED | OUTPATIENT
Start: 2021-01-01 | End: 2021-01-01 | Stop reason: HOSPADM

## 2021-01-01 RX ORDER — GENTAMICIN SULFATE 60 MG/50ML
120 INJECTION, SOLUTION INTRAVENOUS
Status: DISCONTINUED | OUTPATIENT
Start: 2021-01-01 | End: 2021-01-01 | Stop reason: DRUGHIGH

## 2021-01-01 RX ORDER — SODIUM CHLORIDE 0.9 % (FLUSH) 0.9 %
5-40 SYRINGE (ML) INJECTION EVERY 12 HOURS SCHEDULED
Status: CANCELLED | OUTPATIENT
Start: 2021-01-01

## 2021-01-01 RX ORDER — TERAZOSIN 5 MG/1
5 CAPSULE ORAL NIGHTLY
Status: DISCONTINUED | OUTPATIENT
Start: 2021-01-01 | End: 2021-01-01

## 2021-01-01 RX ORDER — ASPIRIN 81 MG/1
81 TABLET ORAL DAILY
Start: 2021-01-01

## 2021-01-01 RX ORDER — SODIUM CHLORIDE 0.9 % (FLUSH) 0.9 %
3 SYRINGE (ML) INJECTION EVERY 12 HOURS SCHEDULED
Status: CANCELLED | OUTPATIENT
Start: 2021-01-01

## 2021-01-01 RX ORDER — DEXTROSE MONOHYDRATE 25 G/50ML
25 INJECTION, SOLUTION INTRAVENOUS
Status: DISCONTINUED | OUTPATIENT
Start: 2021-01-01 | End: 2021-01-01 | Stop reason: HOSPADM

## 2021-01-01 RX ORDER — DEXTROSE MONOHYDRATE 25 G/50ML
25 INJECTION, SOLUTION INTRAVENOUS ONCE
Status: COMPLETED | OUTPATIENT
Start: 2021-01-01 | End: 2021-01-01

## 2021-01-01 RX ORDER — HYDRALAZINE HYDROCHLORIDE 50 MG/1
50 TABLET, FILM COATED ORAL EVERY 12 HOURS SCHEDULED
Status: DISCONTINUED | OUTPATIENT
Start: 2021-01-01 | End: 2021-01-01 | Stop reason: HOSPADM

## 2021-01-01 RX ORDER — SODIUM CHLORIDE 0.9 % (FLUSH) 0.9 %
3 SYRINGE (ML) INJECTION EVERY 12 HOURS SCHEDULED
Status: DISCONTINUED | OUTPATIENT
Start: 2021-01-01 | End: 2021-01-01

## 2021-01-01 RX ORDER — NITROGLYCERIN 0.4 MG/1
0.4 TABLET SUBLINGUAL
Status: DISCONTINUED | OUTPATIENT
Start: 2021-01-01 | End: 2021-01-01 | Stop reason: HOSPADM

## 2021-01-01 RX ORDER — ALBUTEROL SULFATE 2.5 MG/3ML
2.5 SOLUTION RESPIRATORY (INHALATION) EVERY 6 HOURS PRN
Status: DISCONTINUED | OUTPATIENT
Start: 2021-01-01 | End: 2021-01-01 | Stop reason: HOSPADM

## 2021-01-01 RX ORDER — FOLIC ACID 1 MG/1
1 TABLET ORAL DAILY
Status: DISCONTINUED | OUTPATIENT
Start: 2021-01-01 | End: 2021-01-01 | Stop reason: HOSPADM

## 2021-01-01 RX ORDER — CLOPIDOGREL BISULFATE 75 MG/1
75 TABLET ORAL DAILY
COMMUNITY

## 2021-01-01 RX ORDER — LIDOCAINE HYDROCHLORIDE 20 MG/ML
INJECTION, SOLUTION INFILTRATION; PERINEURAL
Status: COMPLETED | OUTPATIENT
Start: 2021-01-01 | End: 2021-01-01

## 2021-01-01 RX ORDER — SODIUM CHLORIDE 9 MG/ML
25 INJECTION, SOLUTION INTRAVENOUS PRN
Status: CANCELLED | OUTPATIENT
Start: 2021-01-01

## 2021-01-01 RX ORDER — ENALAPRILAT 2.5 MG/2ML
1.25 INJECTION INTRAVENOUS EVERY 6 HOURS
Status: CANCELLED | OUTPATIENT
Start: 2021-01-01

## 2021-01-01 RX ORDER — HYDROCODONE BITARTRATE AND ACETAMINOPHEN 5; 325 MG/1; MG/1
1 TABLET ORAL EVERY 8 HOURS PRN
COMMUNITY

## 2021-01-01 RX ORDER — ASPIRIN 325 MG
325 TABLET ORAL ONCE
Status: DISCONTINUED | OUTPATIENT
Start: 2021-01-01 | End: 2021-01-01 | Stop reason: SDUPTHER

## 2021-01-01 RX ORDER — INSULIN GLARGINE 100 [IU]/ML
INJECTION, SOLUTION SUBCUTANEOUS NIGHTLY
COMMUNITY

## 2021-01-01 RX ORDER — ENALAPRILAT 2.5 MG/2ML
1.25 INJECTION INTRAVENOUS EVERY 6 HOURS
Status: DISCONTINUED | OUTPATIENT
Start: 2021-01-01 | End: 2021-01-01 | Stop reason: HOSPADM

## 2021-01-01 RX ORDER — MIDAZOLAM HYDROCHLORIDE 1 MG/ML
INJECTION INTRAMUSCULAR; INTRAVENOUS
Status: COMPLETED | OUTPATIENT
Start: 2021-01-01 | End: 2021-01-01

## 2021-01-01 RX ORDER — POTASSIUM CHLORIDE 7.45 MG/ML
10 INJECTION INTRAVENOUS PRN
Status: DISCONTINUED | OUTPATIENT
Start: 2021-01-01 | End: 2021-01-01 | Stop reason: HOSPADM

## 2021-01-01 RX ORDER — HEPARIN SODIUM 1000 [USP'U]/ML
60 INJECTION, SOLUTION INTRAVENOUS; SUBCUTANEOUS PRN
Status: CANCELLED | OUTPATIENT
Start: 2021-01-01

## 2021-01-01 RX ORDER — HEPARIN SODIUM 1000 [USP'U]/ML
INJECTION, SOLUTION INTRAVENOUS; SUBCUTANEOUS AS NEEDED
Status: DISCONTINUED | OUTPATIENT
Start: 2021-01-01 | End: 2021-01-01 | Stop reason: HOSPADM

## 2021-01-01 RX ORDER — DEXTROSE MONOHYDRATE 100 MG/ML
INJECTION, SOLUTION INTRAVENOUS CONTINUOUS
Status: DISCONTINUED | OUTPATIENT
Start: 2021-01-01 | End: 2021-01-01

## 2021-01-01 RX ORDER — ASPIRIN 325 MG
325 TABLET ORAL ONCE
Status: CANCELLED | OUTPATIENT
Start: 2021-01-01 | End: 2021-01-01

## 2021-01-01 RX ORDER — HEPARIN SODIUM 5000 [USP'U]/ML
INJECTION, SOLUTION INTRAVENOUS; SUBCUTANEOUS
Status: COMPLETED | OUTPATIENT
Start: 2021-01-01 | End: 2021-01-01

## 2021-01-01 RX ORDER — SODIUM CHLORIDE 0.9 % (FLUSH) 0.9 %
5-40 SYRINGE (ML) INJECTION EVERY 12 HOURS SCHEDULED
Status: DISCONTINUED | OUTPATIENT
Start: 2021-01-01 | End: 2021-01-01 | Stop reason: HOSPADM

## 2021-01-01 RX ORDER — TERAZOSIN 5 MG/1
10 CAPSULE ORAL ONCE
Status: COMPLETED | OUTPATIENT
Start: 2021-01-01 | End: 2021-01-01

## 2021-01-01 RX ORDER — CLOPIDOGREL BISULFATE 75 MG/1
75 TABLET ORAL DAILY
Qty: 90 TABLET | Refills: 1 | Status: SHIPPED | OUTPATIENT
Start: 2021-01-01

## 2021-01-01 RX ORDER — ACETYLCYSTEINE 200 MG/ML
600 SOLUTION ORAL; RESPIRATORY (INHALATION) 2 TIMES DAILY
Status: DISCONTINUED | OUTPATIENT
Start: 2021-01-01 | End: 2021-01-01 | Stop reason: HOSPADM

## 2021-01-01 RX ORDER — HEPARIN SODIUM 1000 [USP'U]/ML
60 INJECTION, SOLUTION INTRAVENOUS; SUBCUTANEOUS ONCE
Status: COMPLETED | OUTPATIENT
Start: 2021-01-01 | End: 2021-01-01

## 2021-01-01 RX ORDER — HEPARIN SODIUM 10000 [USP'U]/100ML
5-30 INJECTION, SOLUTION INTRAVENOUS CONTINUOUS
Status: CANCELLED | OUTPATIENT
Start: 2021-01-01

## 2021-01-01 RX ORDER — NICOTINE POLACRILEX 4 MG
15 LOZENGE BUCCAL
Status: DISCONTINUED | OUTPATIENT
Start: 2021-01-01 | End: 2021-01-01 | Stop reason: HOSPADM

## 2021-01-01 RX ORDER — FUROSEMIDE 40 MG/1
40 TABLET ORAL DAILY
Qty: 90 TABLET | Refills: 3 | Status: SHIPPED | OUTPATIENT
Start: 2021-01-01

## 2021-01-01 RX ORDER — ACETAMINOPHEN 325 MG/1
650 TABLET ORAL EVERY 4 HOURS PRN
Status: DISCONTINUED | OUTPATIENT
Start: 2021-01-01 | End: 2021-01-01 | Stop reason: HOSPADM

## 2021-01-01 RX ORDER — ASPIRIN 81 MG/1
324 TABLET, CHEWABLE ORAL DAILY
Status: DISCONTINUED | OUTPATIENT
Start: 2021-01-01 | End: 2021-01-01 | Stop reason: HOSPADM

## 2021-01-01 RX ORDER — HYDROCODONE BITARTRATE AND ACETAMINOPHEN 5; 325 MG/1; MG/1
1 TABLET ORAL EVERY 4 HOURS PRN
Status: DISCONTINUED | OUTPATIENT
Start: 2021-01-01 | End: 2021-01-01 | Stop reason: HOSPADM

## 2021-01-01 RX ORDER — FENTANYL CITRATE 50 UG/ML
INJECTION, SOLUTION INTRAMUSCULAR; INTRAVENOUS AS NEEDED
Status: DISCONTINUED | OUTPATIENT
Start: 2021-01-01 | End: 2021-01-01 | Stop reason: HOSPADM

## 2021-01-01 RX ORDER — ACETAMINOPHEN 650 MG/1
650 SUPPOSITORY RECTAL EVERY 6 HOURS PRN
Status: DISCONTINUED | OUTPATIENT
Start: 2021-01-01 | End: 2021-01-01 | Stop reason: HOSPADM

## 2021-01-01 RX ORDER — SODIUM CHLORIDE 9 MG/ML
50 INJECTION, SOLUTION INTRAVENOUS CONTINUOUS
Status: DISCONTINUED | OUTPATIENT
Start: 2021-01-01 | End: 2021-01-01 | Stop reason: HOSPADM

## 2021-01-01 RX ORDER — ACETAMINOPHEN 325 MG/1
650 TABLET ORAL EVERY 6 HOURS PRN
Status: CANCELLED | OUTPATIENT
Start: 2021-01-01

## 2021-01-01 RX ORDER — BUPIVACAINE HCL/0.9 % NACL/PF 0.1 %
2 PLASTIC BAG, INJECTION (ML) EPIDURAL ONCE
Status: COMPLETED | OUTPATIENT
Start: 2021-01-01 | End: 2021-01-01

## 2021-01-01 RX ORDER — DIGOXIN 125 MCG
125 TABLET ORAL 3 TIMES WEEKLY
Status: DISCONTINUED | OUTPATIENT
Start: 2021-01-01 | End: 2021-01-01 | Stop reason: HOSPADM

## 2021-01-01 RX ORDER — METOPROLOL TARTRATE 5 MG/5ML
5 INJECTION INTRAVENOUS ONCE
Status: COMPLETED | OUTPATIENT
Start: 2021-01-01 | End: 2021-01-01

## 2021-01-01 RX ORDER — SODIUM CHLORIDE 9 MG/ML
INJECTION, SOLUTION INTRAVENOUS CONTINUOUS
Status: DISCONTINUED | OUTPATIENT
Start: 2021-01-01 | End: 2021-01-01 | Stop reason: HOSPADM

## 2021-01-01 RX ORDER — SODIUM CHLORIDE 9 MG/ML
25 INJECTION, SOLUTION INTRAVENOUS PRN
Status: DISCONTINUED | OUTPATIENT
Start: 2021-01-01 | End: 2021-01-01 | Stop reason: HOSPADM

## 2021-01-01 RX ORDER — DILTIAZEM HYDROCHLORIDE 240 MG/1
240 CAPSULE, COATED, EXTENDED RELEASE ORAL
Status: DISCONTINUED | OUTPATIENT
Start: 2021-01-01 | End: 2021-01-01 | Stop reason: HOSPADM

## 2021-01-01 RX ORDER — PRAVASTATIN SODIUM 20 MG
20 TABLET ORAL NIGHTLY
Status: DISCONTINUED | OUTPATIENT
Start: 2021-01-01 | End: 2021-01-01 | Stop reason: HOSPADM

## 2021-01-01 RX ORDER — ACETYLCYSTEINE 200 MG/ML
600 SOLUTION ORAL; RESPIRATORY (INHALATION) 2 TIMES DAILY
Status: CANCELLED | OUTPATIENT
Start: 2021-01-01

## 2021-01-01 RX ORDER — SULFASALAZINE 500 MG/1
1000 TABLET ORAL 3 TIMES DAILY
Status: DISCONTINUED | OUTPATIENT
Start: 2021-01-01 | End: 2021-01-01 | Stop reason: HOSPADM

## 2021-01-01 RX ORDER — ONDANSETRON 2 MG/ML
4 INJECTION INTRAMUSCULAR; INTRAVENOUS EVERY 8 HOURS PRN
Status: DISCONTINUED | OUTPATIENT
Start: 2021-01-01 | End: 2021-01-01 | Stop reason: HOSPADM

## 2021-01-01 RX ORDER — POTASSIUM CHLORIDE 7.45 MG/ML
10 INJECTION INTRAVENOUS PRN
Status: CANCELLED | OUTPATIENT
Start: 2021-01-01

## 2021-01-01 RX ORDER — BUPIVACAINE HCL/0.9 % NACL/PF 0.1 %
2 PLASTIC BAG, INJECTION (ML) EPIDURAL ONCE
Status: CANCELLED | OUTPATIENT
Start: 2021-01-01 | End: 2021-01-01

## 2021-01-01 RX ORDER — ACETAMINOPHEN 650 MG/1
650 SUPPOSITORY RECTAL EVERY 6 HOURS PRN
Status: CANCELLED | OUTPATIENT
Start: 2021-01-01

## 2021-01-01 RX ORDER — MIDAZOLAM IN NACL,ISO-OSMOT/PF 50 MG/50ML
1-10 INFUSION BOTTLE (ML) INTRAVENOUS CONTINUOUS
Status: DISCONTINUED | OUTPATIENT
Start: 2021-01-01 | End: 2021-01-01

## 2021-01-01 RX ORDER — HYDROCODONE BITARTRATE AND ACETAMINOPHEN 5; 325 MG/1; MG/1
2 TABLET ORAL EVERY 4 HOURS PRN
Status: DISCONTINUED | OUTPATIENT
Start: 2021-01-01 | End: 2021-01-01 | Stop reason: HOSPADM

## 2021-01-01 RX ORDER — HEPARIN SODIUM 1000 [USP'U]/ML
30 INJECTION, SOLUTION INTRAVENOUS; SUBCUTANEOUS PRN
Status: DISCONTINUED | OUTPATIENT
Start: 2021-01-01 | End: 2021-01-01 | Stop reason: HOSPADM

## 2021-01-01 RX ORDER — SODIUM CHLORIDE 0.9 % (FLUSH) 0.9 %
3 SYRINGE (ML) INJECTION EVERY 12 HOURS SCHEDULED
Status: DISCONTINUED | OUTPATIENT
Start: 2021-01-01 | End: 2021-01-01 | Stop reason: HOSPADM

## 2021-01-01 RX ORDER — NITROGLYCERIN 0.4 MG/1
0.4 TABLET SUBLINGUAL EVERY 5 MIN PRN
COMMUNITY

## 2021-01-01 RX ORDER — HEPARIN SODIUM 1000 [USP'U]/ML
30 INJECTION, SOLUTION INTRAVENOUS; SUBCUTANEOUS PRN
Status: CANCELLED | OUTPATIENT
Start: 2021-01-01

## 2021-01-01 RX ORDER — POTASSIUM CHLORIDE 20 MEQ/1
40 TABLET, EXTENDED RELEASE ORAL PRN
Status: CANCELLED | OUTPATIENT
Start: 2021-01-01

## 2021-01-01 RX ORDER — DIPHENHYDRAMINE HYDROCHLORIDE 50 MG/ML
INJECTION INTRAMUSCULAR; INTRAVENOUS AS NEEDED
Status: DISCONTINUED | OUTPATIENT
Start: 2021-01-01 | End: 2021-01-01 | Stop reason: HOSPADM

## 2021-01-01 RX ORDER — METOPROLOL TARTRATE 5 MG/5ML
5 INJECTION INTRAVENOUS EVERY 6 HOURS PRN
Status: DISCONTINUED | OUTPATIENT
Start: 2021-01-01 | End: 2021-01-01

## 2021-01-01 RX ADMIN — HEPARIN SODIUM AND DEXTROSE 15 UNITS/KG/HR: 10000; 5 INJECTION INTRAVENOUS at 10:38

## 2021-01-01 RX ADMIN — INSULIN LISPRO 6 UNITS: 100 INJECTION, SOLUTION INTRAVENOUS; SUBCUTANEOUS at 17:32

## 2021-01-01 RX ADMIN — METOPROLOL TARTRATE 5 MG: 5 INJECTION INTRAVENOUS at 08:21

## 2021-01-01 RX ADMIN — ACETYLCYSTEINE 600 MG: 200 SOLUTION ORAL; RESPIRATORY (INHALATION) at 06:20

## 2021-01-01 RX ADMIN — GLIPIZIDE 5 MG: 5 TABLET ORAL at 13:04

## 2021-01-01 RX ADMIN — FENTANYL CITRATE 25 MCG: 50 INJECTION, SOLUTION INTRAMUSCULAR; INTRAVENOUS at 10:44

## 2021-01-01 RX ADMIN — SODIUM CHLORIDE, PRESERVATIVE FREE 10 ML: 5 INJECTION INTRAVENOUS at 21:07

## 2021-01-01 RX ADMIN — FOLIC ACID 1 MG: 1 TABLET ORAL at 13:04

## 2021-01-01 RX ADMIN — DEXTROSE MONOHYDRATE 150 MG: 50 INJECTION, SOLUTION INTRAVENOUS at 18:51

## 2021-01-01 RX ADMIN — SODIUM CHLORIDE 80 ML/HR: 9 INJECTION, SOLUTION INTRAVENOUS at 08:17

## 2021-01-01 RX ADMIN — APIXABAN 2.5 MG: 5 TABLET, FILM COATED ORAL at 13:02

## 2021-01-01 RX ADMIN — GLIPIZIDE 5 MG: 5 TABLET ORAL at 17:32

## 2021-01-01 RX ADMIN — SULFASALAZINE 1000 MG: 500 TABLET ORAL at 17:32

## 2021-01-01 RX ADMIN — SODIUM CHLORIDE 50 ML/HR: 9 INJECTION, SOLUTION INTRAVENOUS at 11:06

## 2021-01-01 RX ADMIN — LIDOCAINE HYDROCHLORIDE 10 ML: 20 INJECTION, SOLUTION INFILTRATION; PERINEURAL at 10:43

## 2021-01-01 RX ADMIN — APIXABAN 2.5 MG: 5 TABLET, FILM COATED ORAL at 17:33

## 2021-01-01 RX ADMIN — SODIUM CHLORIDE, PRESERVATIVE FREE 10 ML: 5 INJECTION INTRAVENOUS at 22:05

## 2021-01-01 RX ADMIN — HEPARIN SODIUM 3790 UNITS: 1000 INJECTION INTRAVENOUS; SUBCUTANEOUS at 08:01

## 2021-01-01 RX ADMIN — FOLIC ACID 1 MG: 1 TABLET ORAL at 17:32

## 2021-01-01 RX ADMIN — ENALAPRILAT 1.25 MG: 1.25 INJECTION INTRAVENOUS at 20:20

## 2021-01-01 RX ADMIN — DEXTROSE MONOHYDRATE 25 G: 25 INJECTION, SOLUTION INTRAVENOUS at 03:18

## 2021-01-01 RX ADMIN — METOPROLOL TARTRATE 5 MG: 5 INJECTION INTRAVENOUS at 21:07

## 2021-01-01 RX ADMIN — METOPROLOL TARTRATE 5 MG: 5 INJECTION INTRAVENOUS at 10:54

## 2021-01-01 RX ADMIN — HEPARIN SODIUM 1890 UNITS: 1000 INJECTION INTRAVENOUS; SUBCUTANEOUS at 06:38

## 2021-01-01 RX ADMIN — DILTIAZEM HYDROCHLORIDE 5 MG/HR: 5 INJECTION INTRAVENOUS at 18:02

## 2021-01-01 RX ADMIN — LINAGLIPTIN 5 MG: 5 TABLET, FILM COATED ORAL at 13:01

## 2021-01-01 RX ADMIN — BENZOCAINE, BUTAMBEN, AND TETRACAINE HYDROCHLORIDE 1 SPRAY: .028; .004; .004 AEROSOL, SPRAY TOPICAL at 09:04

## 2021-01-01 RX ADMIN — ASPIRIN 81 MG: 81 TABLET, COATED ORAL at 09:06

## 2021-01-01 RX ADMIN — ALBUTEROL SULFATE 2.5 MG: 2.5 SOLUTION RESPIRATORY (INHALATION) at 18:16

## 2021-01-01 RX ADMIN — SODIUM CHLORIDE, PRESERVATIVE FREE 10 ML: 5 INJECTION INTRAVENOUS at 20:47

## 2021-01-01 RX ADMIN — HEPARIN SODIUM 3790 UNITS: 1000 INJECTION INTRAVENOUS; SUBCUTANEOUS at 13:48

## 2021-01-01 RX ADMIN — PANTOPRAZOLE SODIUM 40 MG: 40 TABLET, DELAYED RELEASE ORAL at 06:36

## 2021-01-01 RX ADMIN — METOPROLOL TARTRATE 5 MG: 5 INJECTION INTRAVENOUS at 21:19

## 2021-01-01 RX ADMIN — HYDRALAZINE HYDROCHLORIDE 50 MG: 50 TABLET ORAL at 21:06

## 2021-01-01 RX ADMIN — METOPROLOL TARTRATE 5 MG: 5 INJECTION INTRAVENOUS at 20:47

## 2021-01-01 RX ADMIN — ACETAMINOPHEN 650 MG: 325 TABLET ORAL at 12:08

## 2021-01-01 RX ADMIN — METOPROLOL TARTRATE 5 MG: 5 INJECTION INTRAVENOUS at 03:22

## 2021-01-01 RX ADMIN — DEXTROSE MONOHYDRATE: 10 INJECTION, SOLUTION INTRAVENOUS at 15:36

## 2021-01-01 RX ADMIN — DILTIAZEM HYDROCHLORIDE 240 MG: 240 CAPSULE, EXTENDED RELEASE ORAL at 17:33

## 2021-01-01 RX ADMIN — HEPARIN SODIUM AND DEXTROSE 12 UNITS/KG/HR: 10000; 5 INJECTION INTRAVENOUS at 08:02

## 2021-01-01 RX ADMIN — METOPROLOL TARTRATE 5 MG: 5 INJECTION INTRAVENOUS at 03:55

## 2021-01-01 RX ADMIN — ASPIRIN 81 MG 324 MG: 81 TABLET ORAL at 13:05

## 2021-01-01 RX ADMIN — GENTAMICIN SULFATE 120 MG: 60 INJECTION, SOLUTION INTRAVENOUS at 15:38

## 2021-01-01 RX ADMIN — METOPROLOL TARTRATE 5 MG: 5 INJECTION INTRAVENOUS at 15:36

## 2021-01-01 RX ADMIN — DEXTROSE AND SODIUM CHLORIDE: 5; 900 INJECTION, SOLUTION INTRAVENOUS at 13:58

## 2021-01-01 RX ADMIN — DIGOXIN 125 MCG: 125 TABLET ORAL at 13:02

## 2021-01-01 RX ADMIN — ACETYLCYSTEINE 600 MG: 200 SOLUTION ORAL; RESPIRATORY (INHALATION) at 18:22

## 2021-01-01 RX ADMIN — HEPARIN SODIUM AND DEXTROSE 15 UNITS/KG/HR: 10000; 5 INJECTION INTRAVENOUS at 23:17

## 2021-01-01 RX ADMIN — SODIUM CHLORIDE 100 ML/HR: 9 INJECTION, SOLUTION INTRAVENOUS at 05:38

## 2021-01-01 RX ADMIN — HYDRALAZINE HYDROCHLORIDE 50 MG: 50 TABLET ORAL at 13:01

## 2021-01-01 RX ADMIN — GENTAMICIN SULFATE 120 MG: 60 INJECTION, SOLUTION INTRAVENOUS at 18:36

## 2021-01-01 RX ADMIN — MIDAZOLAM HYDROCHLORIDE 1 MG: 1 INJECTION, SOLUTION INTRAMUSCULAR; INTRAVENOUS at 10:44

## 2021-01-01 RX ADMIN — AMIODARONE HYDROCHLORIDE 1 MG/MIN: 50 INJECTION, SOLUTION INTRAVENOUS at 19:06

## 2021-01-01 RX ADMIN — DILTIAZEM HYDROCHLORIDE 10 MG/HR: 5 INJECTION INTRAVENOUS at 00:26

## 2021-01-01 RX ADMIN — INSULIN HUMAN 20 UNITS: 100 INJECTION, SOLUTION PARENTERAL at 12:40

## 2021-01-01 RX ADMIN — ALBUTEROL SULFATE 2.5 MG: 2.5 SOLUTION RESPIRATORY (INHALATION) at 06:20

## 2021-01-01 RX ADMIN — HEPARIN SODIUM AND DEXTROSE 13 UNITS/KG/HR: 10000; 5 INJECTION INTRAVENOUS at 08:22

## 2021-01-01 RX ADMIN — ENALAPRILAT 1.25 MG: 1.25 INJECTION INTRAVENOUS at 14:49

## 2021-01-01 RX ADMIN — SODIUM CHLORIDE, PRESERVATIVE FREE 10 ML: 5 INJECTION INTRAVENOUS at 08:21

## 2021-01-01 RX ADMIN — ALBUTEROL SULFATE 2.5 MG: 2.5 SOLUTION RESPIRATORY (INHALATION) at 06:18

## 2021-01-01 RX ADMIN — FENTANYL CITRATE 25 MCG: 50 INJECTION, SOLUTION INTRAMUSCULAR; INTRAVENOUS at 09:07

## 2021-01-01 RX ADMIN — METOPROLOL TARTRATE 5 MG: 5 INJECTION INTRAVENOUS at 10:20

## 2021-01-01 RX ADMIN — ACETYLCYSTEINE 600 MG: 200 SOLUTION ORAL; RESPIRATORY (INHALATION) at 18:16

## 2021-01-01 RX ADMIN — ENALAPRILAT 1.25 MG: 1.25 INJECTION INTRAVENOUS at 19:50

## 2021-01-01 RX ADMIN — SULFASALAZINE 1000 MG: 500 TABLET ORAL at 13:01

## 2021-01-01 RX ADMIN — AMIODARONE HYDROCHLORIDE 0.5 MG/MIN: 50 INJECTION, SOLUTION INTRAVENOUS at 02:21

## 2021-01-01 RX ADMIN — Medication 1000 MG: at 10:36

## 2021-01-01 RX ADMIN — HEPARIN SODIUM 1890 UNITS: 1000 INJECTION INTRAVENOUS; SUBCUTANEOUS at 08:21

## 2021-01-01 RX ADMIN — ACETAMINOPHEN 650 MG: 325 TABLET ORAL at 19:17

## 2021-01-01 RX ADMIN — METOPROLOL TARTRATE 5 MG: 5 INJECTION INTRAVENOUS at 16:19

## 2021-01-01 RX ADMIN — ENALAPRILAT 1.25 MG: 1.25 INJECTION INTRAVENOUS at 02:03

## 2021-01-01 RX ADMIN — HEPARIN SODIUM AND DEXTROSE 18 UNITS/KG/HR: 10000; 5 INJECTION INTRAVENOUS at 14:32

## 2021-01-01 RX ADMIN — MIDAZOLAM 2 MG: 1 INJECTION INTRAMUSCULAR; INTRAVENOUS at 09:04

## 2021-01-01 RX ADMIN — HEPARIN SODIUM AND DEXTROSE 21 UNITS/KG/HR: 10000; 5 INJECTION INTRAVENOUS at 08:21

## 2021-01-01 RX ADMIN — DILTIAZEM HYDROCHLORIDE 240 MG: 240 CAPSULE, EXTENDED RELEASE ORAL at 13:02

## 2021-01-01 RX ADMIN — METOPROLOL TARTRATE 5 MG: 5 INJECTION INTRAVENOUS at 14:50

## 2021-01-01 RX ADMIN — AMIODARONE HYDROCHLORIDE 0.5 MG/MIN: 50 INJECTION, SOLUTION INTRAVENOUS at 02:03

## 2021-01-01 RX ADMIN — GENTAMICIN SULFATE 120 MG: 60 INJECTION, SOLUTION INTRAVENOUS at 19:23

## 2021-01-01 RX ADMIN — HEPARIN SODIUM 5000 UNITS: 5000 INJECTION, SOLUTION INTRAVENOUS; SUBCUTANEOUS at 10:42

## 2021-01-01 RX ADMIN — METOPROLOL TARTRATE 5 MG: 5 INJECTION INTRAVENOUS at 04:17

## 2021-01-01 RX ADMIN — SODIUM CHLORIDE, PRESERVATIVE FREE 10 ML: 5 INJECTION INTRAVENOUS at 09:00

## 2021-01-01 RX ADMIN — ENALAPRILAT 1.25 MG: 1.25 INJECTION INTRAVENOUS at 13:17

## 2021-01-01 RX ADMIN — PRAVASTATIN SODIUM 20 MG: 20 TABLET ORAL at 21:06

## 2021-01-01 RX ADMIN — VANCOMYCIN HYDROCHLORIDE 1000 MG: 10 INJECTION, POWDER, LYOPHILIZED, FOR SOLUTION INTRAVENOUS at 20:08

## 2021-01-01 RX ADMIN — DILTIAZEM HYDROCHLORIDE 10 MG/HR: 5 INJECTION INTRAVENOUS at 14:18

## 2021-01-01 RX ADMIN — METOPROLOL TARTRATE 5 MG: 5 INJECTION INTRAVENOUS at 14:43

## 2021-01-01 RX ADMIN — FENTANYL CITRATE 50 MCG: 50 INJECTION, SOLUTION INTRAMUSCULAR; INTRAVENOUS at 09:05

## 2021-01-01 RX ADMIN — HEPARIN SODIUM 1890 UNITS: 1000 INJECTION INTRAVENOUS; SUBCUTANEOUS at 00:26

## 2021-01-01 RX ADMIN — ACETYLCYSTEINE 600 MG: 200 SOLUTION ORAL; RESPIRATORY (INHALATION) at 14:06

## 2021-01-01 RX ADMIN — HYDROCODONE BITARTRATE AND ACETAMINOPHEN 1 TABLET: 5; 325 TABLET ORAL at 13:50

## 2021-01-01 RX ADMIN — AMIODARONE HYDROCHLORIDE 0.5 MG/MIN: 50 INJECTION, SOLUTION INTRAVENOUS at 11:03

## 2021-01-01 RX ADMIN — DILTIAZEM HYDROCHLORIDE 15 MG/HR: 5 INJECTION INTRAVENOUS at 17:43

## 2021-01-01 RX ADMIN — IOPAMIDOL 20 ML: 612 INJECTION, SOLUTION INTRATHECAL at 10:58

## 2021-01-01 RX ADMIN — HEPARIN SODIUM AND DEXTROSE 13 UNITS/KG/HR: 10000; 5 INJECTION INTRAVENOUS at 00:28

## 2021-01-01 RX ADMIN — ACETYLCYSTEINE 600 MG: 200 SOLUTION ORAL; RESPIRATORY (INHALATION) at 06:18

## 2021-01-01 RX ADMIN — FUROSEMIDE 60 MG: 40 TABLET ORAL at 17:35

## 2021-01-01 RX ADMIN — HEPARIN SODIUM 1890 UNITS: 1000 INJECTION INTRAVENOUS; SUBCUTANEOUS at 12:39

## 2021-01-01 RX ADMIN — ASPIRIN 81 MG 324 MG: 81 TABLET ORAL at 15:29

## 2021-01-01 RX ADMIN — HEPARIN SODIUM 3790 UNITS: 1000 INJECTION INTRAVENOUS; SUBCUTANEOUS at 13:05

## 2021-01-01 RX ADMIN — HEPARIN SODIUM 3790 UNITS: 1000 INJECTION INTRAVENOUS; SUBCUTANEOUS at 17:18

## 2021-01-01 RX ADMIN — FENTANYL CITRATE 25 MCG: 50 INJECTION, SOLUTION INTRAMUSCULAR; INTRAVENOUS at 10:40

## 2021-01-01 RX ADMIN — Medication 2 G: at 12:41

## 2021-01-01 RX ADMIN — METOPROLOL TARTRATE 5 MG: 5 INJECTION INTRAVENOUS at 22:05

## 2021-01-01 RX ADMIN — ALBUTEROL SULFATE 2.5 MG: 2.5 SOLUTION RESPIRATORY (INHALATION) at 14:06

## 2021-01-01 RX ADMIN — ASPIRIN 325 MG: 325 TABLET, COATED ORAL at 11:02

## 2021-01-01 RX ADMIN — METOPROLOL TARTRATE 5 MG: 5 INJECTION INTRAVENOUS at 03:41

## 2021-01-01 RX ADMIN — METOPROLOL TARTRATE 5 MG: 5 INJECTION INTRAVENOUS at 20:58

## 2021-01-01 RX ADMIN — HEPARIN SODIUM AND DEXTROSE 18 UNITS/KG/HR: 10000; 5 INJECTION INTRAVENOUS at 13:11

## 2021-01-01 RX ADMIN — SODIUM CHLORIDE, PRESERVATIVE FREE 10 ML: 5 INJECTION INTRAVENOUS at 20:58

## 2021-01-01 RX ADMIN — DILTIAZEM HYDROCHLORIDE 5 MG/HR: 5 INJECTION INTRAVENOUS at 01:58

## 2021-01-01 RX ADMIN — METOPROLOL TARTRATE 5 MG: 5 INJECTION INTRAVENOUS at 08:44

## 2021-01-01 RX ADMIN — SULFASALAZINE 1000 MG: 500 TABLET ORAL at 21:06

## 2021-01-01 RX ADMIN — LINAGLIPTIN 5 MG: 5 TABLET, FILM COATED ORAL at 18:46

## 2021-01-01 RX ADMIN — POTASSIUM BICARBONATE 40 MEQ: 782 TABLET, EFFERVESCENT ORAL at 19:12

## 2021-01-01 RX ADMIN — MIDAZOLAM HYDROCHLORIDE 1 MG: 1 INJECTION, SOLUTION INTRAMUSCULAR; INTRAVENOUS at 10:39

## 2021-01-01 RX ADMIN — AMIODARONE HYDROCHLORIDE 0.5 MG/MIN: 50 INJECTION, SOLUTION INTRAVENOUS at 18:16

## 2021-01-01 RX ADMIN — METOPROLOL TARTRATE 5 MG: 5 INJECTION INTRAVENOUS at 09:48

## 2021-01-01 RX ADMIN — SODIUM CHLORIDE, PRESERVATIVE FREE 10 ML: 5 INJECTION INTRAVENOUS at 19:24

## 2021-01-01 RX ADMIN — INSULIN HUMAN 10 UNITS: 100 INJECTION, SOLUTION PARENTERAL at 14:39

## 2021-01-01 RX ADMIN — VANCOMYCIN HYDROCHLORIDE 1000 MG: 10 INJECTION, POWDER, LYOPHILIZED, FOR SOLUTION INTRAVENOUS at 14:39

## 2021-01-01 RX ADMIN — MIDAZOLAM 1 MG: 1 INJECTION INTRAMUSCULAR; INTRAVENOUS at 10:44

## 2021-01-01 RX ADMIN — HEPARIN SODIUM 1890 UNITS: 1000 INJECTION INTRAVENOUS; SUBCUTANEOUS at 23:17

## 2021-01-01 RX ADMIN — ENALAPRILAT 1.25 MG: 1.25 INJECTION INTRAVENOUS at 02:04

## 2021-01-01 RX ADMIN — TERAZOSIN HYDROCHLORIDE 5 MG: 5 CAPSULE ORAL at 06:36

## 2021-01-01 RX ADMIN — METOPROLOL TARTRATE 5 MG: 5 INJECTION INTRAVENOUS at 11:03

## 2021-01-01 RX ADMIN — VANCOMYCIN HYDROCHLORIDE 1000 MG: 10 INJECTION, POWDER, LYOPHILIZED, FOR SOLUTION INTRAVENOUS at 20:28

## 2021-01-01 RX ADMIN — ASPIRIN 81 MG 324 MG: 81 TABLET ORAL at 11:02

## 2021-01-01 RX ADMIN — METOPROLOL TARTRATE 5 MG: 5 INJECTION INTRAVENOUS at 03:21

## 2021-01-01 RX ADMIN — FUROSEMIDE 60 MG: 40 TABLET ORAL at 13:02

## 2021-01-01 RX ADMIN — MIDAZOLAM 1 MG: 1 INJECTION INTRAMUSCULAR; INTRAVENOUS at 09:07

## 2021-01-01 RX ADMIN — ALBUTEROL SULFATE 2.5 MG: 2.5 SOLUTION RESPIRATORY (INHALATION) at 18:22

## 2021-01-01 RX ADMIN — DEXTROSE AND SODIUM CHLORIDE: 5; 900 INJECTION, SOLUTION INTRAVENOUS at 17:04

## 2021-01-01 RX ADMIN — TERAZOSIN HYDROCHLORIDE 10 MG: 5 CAPSULE ORAL at 17:33

## 2021-01-01 RX ADMIN — ENALAPRILAT 1.25 MG: 1.25 INJECTION INTRAVENOUS at 07:01

## 2021-01-01 RX ADMIN — Medication 1 MG/HR: at 17:01

## 2021-01-01 ASSESSMENT — PULMONARY FUNCTION TESTS
PIF_VALUE: 29
PIF_VALUE: 17
PIF_VALUE: 16
PIF_VALUE: 17
PIF_VALUE: 18
PIF_VALUE: 19
PIF_VALUE: 14
PIF_VALUE: 15
PIF_VALUE: 18
PIF_VALUE: 17
PIF_VALUE: 14
PIF_VALUE: 11
PIF_VALUE: 19
PIF_VALUE: 21
PIF_VALUE: 16
PIF_VALUE: 14
PIF_VALUE: 15
PIF_VALUE: 12
PIF_VALUE: 14
PIF_VALUE: 21
PIF_VALUE: 17
PIF_VALUE: 16
PIF_VALUE: 16
PIF_VALUE: 20
PIF_VALUE: 16
PIF_VALUE: 14
PIF_VALUE: 19
PIF_VALUE: 21
PIF_VALUE: 18
PIF_VALUE: 15
PIF_VALUE: 15
PIF_VALUE: 17
PIF_VALUE: 16
PIF_VALUE: 9
PIF_VALUE: 15
PIF_VALUE: 21
PIF_VALUE: 20
PIF_VALUE: 11
PIF_VALUE: 23
PIF_VALUE: 17
PIF_VALUE: 16
PIF_VALUE: 36
PIF_VALUE: 19
PIF_VALUE: 16
PIF_VALUE: 18
PIF_VALUE: 12
PIF_VALUE: 13
PIF_VALUE: 17
PIF_VALUE: 13
PIF_VALUE: 13
PIF_VALUE: 18
PIF_VALUE: 22
PIF_VALUE: 26
PIF_VALUE: 14
PIF_VALUE: 15
PIF_VALUE: 13
PIF_VALUE: 14
PIF_VALUE: 16
PIF_VALUE: 22
PIF_VALUE: 60
PIF_VALUE: 16
PIF_VALUE: 15
PIF_VALUE: 16
PIF_VALUE: 23
PIF_VALUE: 18
PIF_VALUE: 23
PIF_VALUE: 14
PIF_VALUE: 18
PIF_VALUE: 18
PIF_VALUE: 11
PIF_VALUE: 19
PIF_VALUE: 15
PIF_VALUE: 13
PIF_VALUE: 24
PIF_VALUE: 16
PIF_VALUE: 18
PIF_VALUE: 17
PIF_VALUE: 13
PIF_VALUE: 13
PIF_VALUE: 16
PIF_VALUE: 15
PIF_VALUE: 17
PIF_VALUE: 44
PIF_VALUE: 19
PIF_VALUE: 0
PIF_VALUE: 19
PIF_VALUE: 15
PIF_VALUE: 7.3
PIF_VALUE: 19
PIF_VALUE: 13
PIF_VALUE: 12
PIF_VALUE: 13
PIF_VALUE: 15
PIF_VALUE: 13
PIF_VALUE: 21
PIF_VALUE: 16
PIF_VALUE: 20
PIF_VALUE: 25
PIF_VALUE: 15
PIF_VALUE: 26
PIF_VALUE: 20
PIF_VALUE: 14
PIF_VALUE: 16
PIF_VALUE: 18
PIF_VALUE: 7.2
PIF_VALUE: 18
PIF_VALUE: 16
PIF_VALUE: 15
PIF_VALUE: 15
PIF_VALUE: 21
PIF_VALUE: 18
PIF_VALUE: 21
PIF_VALUE: 17
PIF_VALUE: 7.6
PIF_VALUE: 19
PIF_VALUE: 13
PIF_VALUE: 19
PIF_VALUE: 18
PIF_VALUE: 17
PIF_VALUE: 18
PIF_VALUE: 32
PIF_VALUE: 19
PIF_VALUE: 16
PIF_VALUE: 18
PIF_VALUE: 16
PIF_VALUE: 53
PIF_VALUE: 13
PIF_VALUE: 19
PIF_VALUE: 18
PIF_VALUE: 19
PIF_VALUE: 17
PIF_VALUE: 19
PIF_VALUE: 24
PIF_VALUE: 12
PIF_VALUE: 14
PIF_VALUE: 9.19
PIF_VALUE: 17
PIF_VALUE: 16
PIF_VALUE: 13
PIF_VALUE: 17
PIF_VALUE: 27
PIF_VALUE: 22
PIF_VALUE: 25
PIF_VALUE: 22
PIF_VALUE: 20
PIF_VALUE: 17
PIF_VALUE: 18
PIF_VALUE: 19
PIF_VALUE: 13

## 2021-01-01 ASSESSMENT — PAIN SCALES - GENERAL
PAINLEVEL_OUTOF10: 0

## 2021-01-05 NOTE — TELEPHONE ENCOUNTER
Luis Miguel Arora called our office concerned about his dialysis access. Luis Miguel Arora said his last treatment was Saturday 1/2/21, He went in for treatment Monday 1/4/21 and they tried to access his fistula and was unable to cannulate successfully. Patient is complaining of pain and bruising on arm. Patient said he has possibly infiltrated twice since they started accessing his fistula. Patient said he has had post treatment bleeding on three separate occasions. I spoke with patient and made an office appointment for Wednesday 1/6/21 at 1:00pm unless he is unable to get dialyzed when he goes in for his morning treatment; if this happens I asked patient to come in earlier around 10:00am. Nanette Ordaz will evaluate patient's access and will make a plan. Patient verbally understood. I tried calling [facility] dialysis clinic to ask about patient's concerns, but the clinic is not open until Wednesday 1/6/21. I will call again in the morning.

## 2021-01-06 NOTE — TELEPHONE ENCOUNTER
I spoke with Celso Lopez in the office and addressed all instructions for his Fistulogram with Dr. Gil Barroso as follows:    Aileen Adelaida  1940     Fistulogram Instructions     1. Report to the [facility] (go in the front door and to the left) on 21, at 8:30am for your procedure with Dr. Gil Barroso. Arrival time is subject to change we will call you the day prior to confirm your arrival time. 2. Nothing to eat or drink after midnight the night before the procedure. 3. Please take all medications as normally scheduled to take, including heart and blood pressure medicines with a sip of water. 4. Do not take Lasix, insulin, or any diabetic medicine the morning of the procedure including Glipizide (GLUCOTROL). If you take insulin, you may only take 1/2 of any scheduled nightly dose, and none the morning of the procedure. You may take all regularly scheduled heart, cholesterol, and blood pressure medicines with a sip of water. 5. If you take Glucophage, Metformin, Actos Plus Met, or Glucovance,you can not take this the day of your procedure and two days after the procedure. 6. Hold Plavix/Coumadin for 0 days prior to surgery. You do not need to hold blood thinning medications. 7. If you have sleep apnea and require C-PAP, please bring this with you to the hospital.  8. Bring a list of all of your allergies and medications with you to the hospital.  9. Please let our nurse know if you have had an allergy to iodine, shellfish, or x-ray dye. 10. Let the nurse know if you take any of the followin. Over the counter herbal supplements  2. Diclofenec, indomethacin, ketoprofen, Caridopa/levadopa, naproxen, sulindac, piroxicam, glucosamine, Chondrotin, cocchine, or methotrexate. 11. Plan to stay at the hospital for 4 - 6 hours before being released  by the physician. You will need someone to drive you home after the procedure.   12. Medications instructed to hold: Please see above. 13. Please stop at your local [facility] or pharmacy and buy a bottle of Hibiclens. Wash thoroughly with this the night before and the morning of the procedure, paying special attention to the area that will be operated on. Make sure you rinse very well. The Hibiclens should only be used prior to surgery. 14. To ensure the health and safety of our patients and staff, [address] [place] [address],[address] has implemented visitor restrictions. Only one person will be allowed to accompany you for your procedure. If you or your visitor are exhibiting signs & symptoms of illness such as fever, cough, sore throat or body aches, we ask that you reschedule your procedure to a later date after your symptoms have been resolved. 15. New policy requires that anyone who comes into the hospital will be required to wear a face mask. A cloth mask is acceptable. 16. Other Directions: Please call our office with questions [phone].                       New guidelines require a COVID 19 test to be done prior to procedure and we suggest self quarantine after the test until procedure. You will need to go to the [facility] on [address] 1/10/21 between 11:00am and 2:00pm for the COVID 19 test. They open at 11:00am. You will go to the front of the building and drive under the awning and someone will come to your car. Do not get out of the car. Let them know that you are scheduled for a procedure with Dr. Toy Claude on 1/14/21. After your COVID 19 test we suggest that you self quarantine until your procedure.                  PLEASE NOTE:  If the patient is unable to sign his/her own paperwork, the appointed caregiver (POA, child, sibling, etc) must be present at the time of registration for all testing and procedures.     Transportation to and from all testing and procedure appointments is the sole responsibility of the patient, caregiver, and/or nursing facility in which they reside.  Please remember you will not be able to

## 2021-01-07 NOTE — PROGRESS NOTES
Conrado Coleman (:  1940) is a 68 y.o. male,Established patient, here for evaluation of the following chief complaint(s):  Follow-up (Patient having issues with dialysis access)    SUBJECTIVE/OBJECTIVE:  Mr. Srinath Patel is a 67 yo male who has a history of stage V CKD. He present today for evaluation today. He reports on 2 separate occasions his dialysis unit was unable to access his fistula and this infiltrated. He developed swelling and bruising in his access arm. He was able to dialyze today via fistula without any difficulty. Conrado Coleman is a 68 y.o. male with the following history as recorded in [facility]:  Patient Active Problem List    Diagnosis Date Noted    Pacemaker 2020     Priority: High    Chronic atrial fibrillation ([address]      Priority: High    Sinus node dysfunction ([address] 2019     Priority: High    Chronic anticoagulation 2019     Priority: High    Acute kidney injury superimposed on chronic kidney disease ([address] 2020    ESRD (end stage renal disease) ([address] 2020    Digoxin overdose, accidental or unintentional, initial encounter 2020    ESRD on dialysis ([address]     ESRD (end stage renal disease) on dialysis ([address] 10/16/2020    Ulcerative colitis ([address] 2019    PAF (paroxysmal atrial fibrillation) ([address] 2019    Elevated d-dimer 2019    Hypocalcemia 2019    Macrocytic anemia 2019    Carotid artery occlusion, right     PVD (peripheral vascular disease) ([address] 2016    Carotid artery stenosis 2012    Depression     Type 2 diabetes mellitus (HCC)     Hypertension      Current Outpatient Medications   Medication Sig Dispense Refill    betamethasone dipropionate (DIPROLENE) 0.05 % cream Apply topically 2 times daily.  15 g 0    calcitRIOL (ROCALTROL) 0.25 MCG capsule Take 1 capsule by mouth daily 30 capsule 0    temazepam (RESTORIL) 15 MG capsule TAKE 1 CAPSULE BY MOUTH EVERY DAY      ondansetron (ZOFRAN-ODT) 4 MG disintegrating tablet ondansetron 4 mg disintegrating tablet   Take 1 tablet every 6 hours by oral route as needed.  omeprazole (PRILOSEC) 20 MG delayed release capsule Take 20 mg by mouth 2 times daily      digoxin (LANOXIN) 125 MCG tablet Take 125 mcg by mouth daily      hydrALAZINE (APRESOLINE) 50 MG tablet Take 50 mg by mouth 3 times daily      metoprolol succinate (TOPROL XL) 100 MG extended release tablet TAKE 1 TABLET BY MOUTH TWICE A  tablet 3    dilTIAZem (CARDIZEM CD) 240 MG extended release capsule Take 1 capsule by mouth 2 times daily 60 capsule 5    apixaban (ELIQUIS) 5 MG TABS tablet Take 1 tablet by mouth 2 times daily (Patient taking differently: Take 5 mg by mouth daily In morning, \"Pt stated doctor from [facility] started him on low dose asa daily approx. 2 weeks ago during an inpatient stay and d/c'd eliquis. But he hasn't seen his heart doctor so he has been taking a morning dose of eliquis and asa at HS. \") 60 tablet 5    Multiple Vitamins-Minerals (THERAPEUTIC MULTIVITAMIN-MINERALS) tablet Take 1 tablet by mouth daily      sulfaSALAzine (AZULFIDINE) 500 MG tablet Take 1,000 mg by mouth 3 times daily       furosemide (LASIX) 40 MG tablet Take 40 mg by mouth daily      folic acid (FOLVITE) 1 MG tablet Take 1 mg by mouth daily      glipiZIDE (GLUCOTROL) 10 MG tablet Take 10 mg by mouth 2 times daily (before meals)      pravastatin (PRAVACHOL) 20 MG tablet Take 20 mg by mouth nightly       terazosin (HYTRIN) 10 MG capsule Take 5 mg by mouth 2 times daily        No current facility-administered medications for this visit. Allergies: Patient has no known allergies.   Past Medical History:   Diagnosis Date    Arthritis     Atherosclerosis of native arteries of the extremities with intermittent claudication     Blood circulation, collateral     CAD (coronary artery disease)     Carotid artery occlusion, right     Chronic kidney disease     Depression  GERD (gastroesophageal reflux disease)     History of blood transfusion     Hyperlipidemia     Hypertension     Pneumonia due to infectious organism 4/9/2019    Type II or unspecified type diabetes mellitus without mention of complication, not stated as uncontrolled     Ulcerative colitis ([address] 6/30/2019     Past Surgical History:   Procedure Laterality Date    CATARACT REMOVAL Bilateral     DIALYSIS FISTULA CREATION Right 10/16/2020    RIGHT BRACHIAL/BASILIC AV FISTULA performed by Yoni Olmstead MD at [address]      umbilical hernia    PACEMAKER PLACEMENT      [facility] [address] [facility]     Family History   Problem Relation Age of Onset    High Blood Pressure Mother     Heart Disease Mother     Stroke Mother         at 48    High Blood Pressure Father     Heart Disease Father     Stroke Father         at 59    No Known Problems Brother     Alzheimer's Disease Brother         onset at 80, then rapidly progressed to death     Social History     Tobacco Use    Smoking status: Former Smoker     Packs/day: 1.00     Years: 50.00     Pack years: 50.00     Types: Cigarettes     Quit date: 2006     Years since quitting: 15.0    Smokeless tobacco: Former User     Types: Snuff   Substance Use Topics    Alcohol use: Yes     Comment: occasional       ROS  Eyes - no sudden vision change or amaurosis. Respiratory - no significant shortness of breath,  Cardiovascular - no chest pain or syncope. No  significant leg swelling. No claudication. Musculoskeletal - no gait disturbance  Skin - no new wound. Neurologic -  No speech difficulty or lateralizing weakness. All other review of systems are negative. Physical Exam    /74 (Site: Left Upper Arm, Position: Sitting, Cuff Size: Medium Adult)   Pulse 82   Resp 16   Ht 5' 7\" (1.702 m)   Wt 147 lb (66.7 kg)   BMI 23.02 kg/m²       Neck- no masses  Cardiovascular - Regular rate and rhythm.     Pulmonary - effort appears normal.  No respiratory distress. Lungs - Breath sounds normal. No wheezes or rales. Extremities - Radial and brachial pulses are 2+ to palpation bilaterally. No cyanosis, clubbing, or significant edema. No signs atheroembolic event. Bruit is present but fistula feels pulsatile. There is a moderate amount of swelling and bruising noted. Neurologic - alert and oriented X 3. Physiologic. Face symmetric. Skin - warm, dry, and intact. She has no wounds  Psychiatric - mood, affect, and behavior appear normal.  Judgment and thought processes appear normal.    Risk factors for atherosclerosis of all vascular beds have been reviewed with the patient including:  Family history, tobacco abuse in all forms, elevated cholesterol, hyperlipidemia, and diabetes. ASSESSMENT/PLAN:      1. ESRD on chronic dialysis      We will proceed with Fistulogram possible angioplasty/stent    An electronic signature was used to authenticate this note.     --Maggie Xiao PA-C

## 2021-01-08 NOTE — TELEPHONE ENCOUNTER
Call to pt after reviewing CT with Dr. Cheung. It looks like there may be a thrombus in the JESUS. Dr. Cheung wants to do a DARY to confirm there is a thrombus. Explained this to the pt.    I asked the pt if he had missed any doses of his Eliquis and he confirmed that he had missed several doses because of cost. He currently has a 3 month supply. I explained how important it is to be sure he doesn't miss any doses.    He verbalized understanding of everything we talked about.

## 2021-01-13 NOTE — TELEPHONE ENCOUNTER
I sw the pt to confirm his arrival time of 830 am for his procedure with Dr. Aleida Rogel. Pt voiced understanding and is aware.

## 2021-01-14 PROBLEM — T82.590A DIALYSIS AV FISTULA MALFUNCTION, INITIAL ENCOUNTER (HCC): Status: ACTIVE | Noted: 2021-01-01

## 2021-01-14 NOTE — H&P (VIEW-ONLY)
Archie Christine (:  1940) is a 68 y.o. male,Established patient, here for evaluation of the following chief complaint(s):  No chief complaint on file. SUBJECTIVE/OBJECTIVE:  Mr. Josh Zaragoza is a 67 yo male who has a history of stage V CKD. He present today for evaluation today. He reports on 2 separate occasions his dialysis unit was unable to access his fistula and this infiltrated. He developed swelling and bruising in his access arm. He was able to dialyze today via fistula without any difficulty. Archie Christine is a 68 y.o. male with the following history as recorded in [facility]:  Patient Active Problem List    Diagnosis Date Noted    Pacemaker 2020     Priority: High    Chronic atrial fibrillation ([address]      Priority: High    Sinus node dysfunction ([address] 2019     Priority: High    Chronic anticoagulation 2019     Priority: High    Acute kidney injury superimposed on chronic kidney disease ([address] 2020    ESRD (end stage renal disease) ([address] 2020    Digoxin overdose, accidental or unintentional, initial encounter 2020    ESRD on dialysis ([address]     ESRD (end stage renal disease) on dialysis ([address] 10/16/2020    Ulcerative colitis ([address] 2019    PAF (paroxysmal atrial fibrillation) ([address] 2019    Elevated d-dimer 2019    Hypocalcemia 2019    Macrocytic anemia 2019    Carotid artery occlusion, right     PVD (peripheral vascular disease) ([address] 2016    Carotid artery stenosis 2012    Depression     Type 2 diabetes mellitus (HCC)     Hypertension      Current Outpatient Medications   Medication Sig Dispense Refill    betamethasone dipropionate (DIPROLENE) 0.05 % cream Apply topically 2 times daily.  15 g 0    calcitRIOL (ROCALTROL) 0.25 MCG capsule Take 1 capsule by mouth daily 30 capsule 0    temazepam (RESTORIL) 15 MG capsule TAKE 1 CAPSULE BY MOUTH EVERY DAY  ondansetron (ZOFRAN-ODT) 4 MG disintegrating tablet ondansetron 4 mg disintegrating tablet   Take 1 tablet every 6 hours by oral route as needed.  omeprazole (PRILOSEC) 20 MG delayed release capsule Take 20 mg by mouth 2 times daily      digoxin (LANOXIN) 125 MCG tablet Take 125 mcg by mouth daily      hydrALAZINE (APRESOLINE) 50 MG tablet Take 50 mg by mouth 3 times daily      metoprolol succinate (TOPROL XL) 100 MG extended release tablet TAKE 1 TABLET BY MOUTH TWICE A  tablet 3    dilTIAZem (CARDIZEM CD) 240 MG extended release capsule Take 1 capsule by mouth 2 times daily 60 capsule 5    apixaban (ELIQUIS) 5 MG TABS tablet Take 1 tablet by mouth 2 times daily (Patient taking differently: Take 5 mg by mouth daily In morning, \"Pt stated doctor from [facility] started him on low dose asa daily approx. 2 weeks ago during an inpatient stay and d/c'd eliquis. But he hasn't seen his heart doctor so he has been taking a morning dose of eliquis and asa at HS. \") 60 tablet 5    Multiple Vitamins-Minerals (THERAPEUTIC MULTIVITAMIN-MINERALS) tablet Take 1 tablet by mouth daily      sulfaSALAzine (AZULFIDINE) 500 MG tablet Take 1,000 mg by mouth 3 times daily       furosemide (LASIX) 40 MG tablet Take 40 mg by mouth daily      folic acid (FOLVITE) 1 MG tablet Take 1 mg by mouth daily      glipiZIDE (GLUCOTROL) 10 MG tablet Take 10 mg by mouth 2 times daily (before meals)      pravastatin (PRAVACHOL) 20 MG tablet Take 20 mg by mouth nightly       terazosin (HYTRIN) 10 MG capsule Take 5 mg by mouth 2 times daily        No current facility-administered medications for this visit. Allergies: Patient has no known allergies.   Past Medical History:   Diagnosis Date    Arthritis     Atherosclerosis of native arteries of the extremities with intermittent claudication     Blood circulation, collateral     CAD (coronary artery disease)     Carotid artery occlusion, right     Chronic kidney disease  Depression     GERD (gastroesophageal reflux disease)     History of blood transfusion     Hyperlipidemia     Hypertension     Pneumonia due to infectious organism 4/9/2019    Type II or unspecified type diabetes mellitus without mention of complication, not stated as uncontrolled     Ulcerative [facility] 6/30/2019     Past Surgical History:   Procedure Laterality Date    CATARACT REMOVAL Bilateral     DIALYSIS FISTULA CREATION Right 10/16/2020    RIGHT BRACHIAL/BASILIC AV FISTULA performed by Ramón Zayas MD at [address]      umbilical hernia   [address] [facility] VASCULAR SURGERY  11/02/2020    SJS. Right upper extremity fistulogram's including venography to the superior vena cava, Balloon angioplasty distal/mid upper arm cephalic vein stenoses with 8 mm x 40 mm Luke balloon, Coil embolization of cephalic vein branch (8 mm x 5 cm, 5 mm x 5 cm (4)), Completion fistulogram's and venograms     Family History   Problem Relation Age of Onset    High Blood Pressure Mother     Heart Disease Mother     Stroke Mother         at 48    High Blood Pressure Father     Heart Disease Father     Stroke Father         at 59    No Known Problems Brother     Alzheimer's Disease Brother         onset at 80, then rapidly progressed to death     Social History     Tobacco Use    Smoking status: Former Smoker     Packs/day: 1.00     Years: 50.00     Pack years: 50.00     Types: Cigarettes     Quit date: 2006     Years since quitting: 15.0    Smokeless tobacco: Former User     Types: Snuff   Substance Use Topics    Alcohol use: Yes     Comment: occasional       ROS  Eyes  no sudden vision change or amaurosis. Respiratory  no significant shortness of breath,  Cardiovascular  no chest pain or syncope. No  significant leg swelling. No claudication. Musculoskeletal  no gait disturbance  Skin  no new wound. Neurologic   No speech difficulty or lateralizing weakness. All other review of systems are negative. Physical Exam    There were no vitals taken for this visit. Neck- no masses  Cardiovascular  Regular rate and rhythm. Pulmonary  effort appears normal.  No respiratory distress. Lungs - Breath sounds normal. No wheezes or rales. Extremities - Radial and brachial pulses are 2+ to palpation bilaterally. No cyanosis, clubbing, or significant edema. No signs atheroembolic event. Bruit is present but fistula feels pulsatile. There is a moderate amount of swelling and bruising noted. Neurologic  alert and oriented X 3. Physiologic. Face symmetric. Skin  warm, dry, and intact. She has no wounds  Psychiatric  mood, affect, and behavior appear normal.  Judgment and thought processes appear normal.    Risk factors for atherosclerosis of all vascular beds have been reviewed with the patient including:  Family history, tobacco abuse in all forms, elevated cholesterol, hyperlipidemia, and diabetes. ASSESSMENT/PLAN:      1. ESRD on chronic dialysis      We will proceed with Fistulogram possible angioplasty/stent    An electronic signature was used to authenticate this note.     --Jony Xiao PA-C

## 2021-01-14 NOTE — H&P
disintegrating tablet ondansetron 4 mg disintegrating tablet   Take 1 tablet every 6 hours by oral route as needed.  omeprazole (PRILOSEC) 20 MG delayed release capsule Take 20 mg by mouth 2 times daily      digoxin (LANOXIN) 125 MCG tablet Take 125 mcg by mouth daily      hydrALAZINE (APRESOLINE) 50 MG tablet Take 50 mg by mouth 3 times daily      metoprolol succinate (TOPROL XL) 100 MG extended release tablet TAKE 1 TABLET BY MOUTH TWICE A  tablet 3    dilTIAZem (CARDIZEM CD) 240 MG extended release capsule Take 1 capsule by mouth 2 times daily 60 capsule 5    apixaban (ELIQUIS) 5 MG TABS tablet Take 1 tablet by mouth 2 times daily (Patient taking differently: Take 5 mg by mouth daily In morning, \"Pt stated doctor from [facility] started him on low dose asa daily approx. 2 weeks ago during an inpatient stay and d/c'd eliquis. But he hasn't seen his heart doctor so he has been taking a morning dose of eliquis and asa at HS. \") 60 tablet 5    Multiple Vitamins-Minerals (THERAPEUTIC MULTIVITAMIN-MINERALS) tablet Take 1 tablet by mouth daily      sulfaSALAzine (AZULFIDINE) 500 MG tablet Take 1,000 mg by mouth 3 times daily       furosemide (LASIX) 40 MG tablet Take 40 mg by mouth daily      folic acid (FOLVITE) 1 MG tablet Take 1 mg by mouth daily      glipiZIDE (GLUCOTROL) 10 MG tablet Take 10 mg by mouth 2 times daily (before meals)      pravastatin (PRAVACHOL) 20 MG tablet Take 20 mg by mouth nightly       terazosin (HYTRIN) 10 MG capsule Take 5 mg by mouth 2 times daily        No current facility-administered medications for this visit. Allergies: Patient has no known allergies.   Past Medical History:   Diagnosis Date    Arthritis     Atherosclerosis of native arteries of the extremities with intermittent claudication     Blood circulation, collateral     CAD (coronary artery disease)     Carotid artery occlusion, right     Chronic kidney disease     Depression     GERD (gastroesophageal reflux disease)     History of blood transfusion     Hyperlipidemia     Hypertension     Pneumonia due to infectious organism 4/9/2019    Type II or unspecified type diabetes mellitus without mention of complication, not stated as uncontrolled     Ulcerative colitis ([facility] [address] 6/30/2019     Past Surgical History:   Procedure Laterality Date    CATARACT REMOVAL Bilateral     DIALYSIS FISTULA CREATION Right 10/16/2020    RIGHT BRACHIAL/BASILIC AV FISTULA performed by Shania Lewis MD at [address]      umbilical hernia   [address] [facility] VASCULAR SURGERY  11/02/2020    SJS. Right upper extremity fistulogram's including venography to the superior vena cava, Balloon angioplasty distal/mid upper arm cephalic vein stenoses with 8 mm x 40 mm Luke balloon, Coil embolization of cephalic vein branch (8 mm x 5 cm, 5 mm x 5 cm (4)), Completion fistulogram's and venograms     Family History   Problem Relation Age of Onset    High Blood Pressure Mother     Heart Disease Mother     Stroke Mother         at 48    High Blood Pressure Father     Heart Disease Father     Stroke Father         at 59    No Known Problems Brother     Alzheimer's Disease Brother         onset at 80, then rapidly progressed to death     Social History     Tobacco Use    Smoking status: Former Smoker     Packs/day: 1.00     Years: 50.00     Pack years: 50.00     Types: Cigarettes     Quit date: 2006     Years since quitting: 15.0    Smokeless tobacco: Former User     Types: Snuff   Substance Use Topics    Alcohol use: Yes     Comment: occasional       ROS  Eyes - no sudden vision change or amaurosis. Respiratory - no significant shortness of breath,  Cardiovascular - no chest pain or syncope. No  significant leg swelling. No claudication. Musculoskeletal - no gait disturbance  Skin - no new wound.   Neurologic -  No speech difficulty or lateralizing weakness. All other review of systems are negative. Physical Exam    There were no vitals taken for this visit. Neck- no masses  Cardiovascular - Regular rate and rhythm. Pulmonary - effort appears normal.  No respiratory distress. Lungs - Breath sounds normal. No wheezes or rales. Extremities - Radial and brachial pulses are 2+ to palpation bilaterally. No cyanosis, clubbing, or significant edema. No signs atheroembolic event. Bruit is present but fistula feels pulsatile. There is a moderate amount of swelling and bruising noted. Neurologic - alert and oriented X 3. Physiologic. Face symmetric. Skin - warm, dry, and intact. She has no wounds  Psychiatric - mood, affect, and behavior appear normal.  Judgment and thought processes appear normal.    Risk factors for atherosclerosis of all vascular beds have been reviewed with the patient including:  Family history, tobacco abuse in all forms, elevated cholesterol, hyperlipidemia, and diabetes. ASSESSMENT/PLAN:      1. ESRD on chronic dialysis      We will proceed with Fistulogram possible angioplasty/stent    An electronic signature was used to authenticate this note.     --Alexis Xiao PA-C

## 2021-01-14 NOTE — INTERVAL H&P NOTE
I agree with the history and physical exam in chart. In addition, today's complete ROS was performed and the only positives are noted in the HPI. I examined the patient preoperatively and discussed the surgery, including the risks, benefits, and alternatives. They seem to understand and agree to proceed.   ASA III, [place]

## 2021-01-14 NOTE — OP NOTE
Preprocedure diagnosis:  1. End-stage renal disease on hemodialysis  2. Right upper extremity arteriovenous fistula malfunction with increased pressures    Post procedure diagnosis: Same    Procedure:  1. Right upper extremity fistulogram's including venography to the superior vena cava  2. Balloon angioplasty distal/mid upper arm cephalic vein stenosis with 8 mm x 60 mm Lutonix drug-coated balloon  3. Completion venograms right upper extremity    Surgeon: Macy Ortiz MD    Anesthesia: Intravenous/moderate sedation plus local    Estimated blood loss: Less than 50 mL    Findings:  1. The patient has a patent right brachial artery to cephalic vein arteriovenous fistula. The arteriovenous anastomosis is widely patent. The cephalic vein itself is around 7 to 8 mm in diameter. There is a 90% stenosis in the mid/distal upper arm cephalic vein. The subclavian vein and superior vena cava are both patent. There may be mild to moderate stenosis in the subclavian vein. Procedure in detail:    After the patient was consented and given intravenous antibiotics, he was brought to angiography and placed on the table supine position. Intravenous/moderate sedation was administered and the patient's right arm was prepped and draped in usual sterile procedure. Skin and subcutaneous tissues in the distal upper arm were anesthetized with lidocaine. Under ultrasound guidance, the cephalic vein was cannulated with a micropuncture needle. Seldinger technique was utilized to place a 4 [place] glide sheath. Right upper extremity fistulogram's including venography to the superior and cava were performed with the above findings. I exchanged the 4 Citizen [place] sheath for a 7 Citizen [place] sheath and then over an 035 wire, balloon angioplasty was performed of the cephalic vein stenosis with an 8 mm x 60 mm drug-coated balloon. This balloon was left inflated for 3 minutes. Completion venogram show an excellent result here with no significant residual stenosis nor extravasation of dye. The sheath was removed and puncture site closed with 3-0 nylon suture. Sterile dressings were placed. The patient tolerated the procedure well was brought back to [facility] in good condition.

## 2021-01-14 NOTE — FLOWSHEET NOTE
Pt returned from General acute hospital procedure, awake and alert. States on occasion when he lifts up his right arm it has a twinge in it. Connected to [facility] monitor with paced and At Fib rhythm noted.   VS recorded

## 2021-01-14 NOTE — FLOWSHEET NOTE
Pt ingested a small amt of sandwich and drink. Then was given discharge instructions. Pt dressed and then went to  to void. He was discharged to home with [employer] friend.

## 2021-01-20 NOTE — TELEPHONE ENCOUNTER
Monica with [facility] called and is working on this patient and his medications.  She cannot see where he has filled his Eliquis so that he could be taking it as prescribed.  She is going to call and discuss further with the patient, but we did see where he reported to CHILO Peterson earlier this month that cost is an issue for him.  She is asking if you could look into whether or not he would qualify for any patient assistance?  Thank you!

## 2021-01-26 NOTE — TELEPHONE ENCOUNTER
Call to pt to schedule a shared decision appt with Dr. Simmons. Appt made for Feb 1 at 3 pm. He verbalized understanding.

## 2021-02-01 NOTE — PROGRESS NOTES
Reason for Visit: Left atrial appendage closure shared decision making.     HPI:  Gage Ken is a 80 y.o. male is here today for left atrial appendage closure shared decision making.  He follows with Dr. Cheung for aortic stenosis and permanent atrial fibrillation.  He has ulcerative chronic pancolitis with a history of GI bleeding.  He particularly had a lot of bleeding back in 2015 when his ulcerative colitis was particularly bad.  He has been worked up for left atrial appendage exclusion with the watchman device.  He recently had a DARY on 1/21/2021 and also a CTA cardiac to evaluate the left atrial appendage.        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 (CMS/HCC)   • Benign prostatic hyperplasia   • Carotid artery occlusion   • Chronic anticoagulation   • Depression   • Essential hypertension   • Hypocalcemia   • Insomnia   • Macrocytic anemia   • PVD (peripheral vascular disease) (CMS/HCC)   • Type 2 diabetes mellitus without complication, without long-term current use of insulin (CMS/HCC)   • Vitamin B deficiency   • Permanent atrial fibrillation (CMS/HCC)   • Gastroesophageal reflux disease without esophagitis   • Disorder of vitamin B12   • Fatigue   • Nausea   • Ulcerative pancolitis with complication (CMS/HCC)   • CKD (chronic kidney disease) stage 5, GFR less than 15 ml/min (CMS/HCC)   • S/P placement of cardiac pacemaker   • Neck pain   • Chronic diastolic congestive heart failure (CMS/HCC)   • Chest pain, atypical   • Aortic stenosis, moderate       Social History     Tobacco Use   • Smoking status: Former Smoker     Types: Cigarettes   • Smokeless tobacco: Former User     Types: Snuff     Quit date: 2008   • Tobacco comment: 11/30/2017 - Patient States He Ceased Smoking 13 Years Prior.   Substance Use Topics   • Alcohol use: Yes     Alcohol/week: 4.0 standard drinks     Types: 4 Cans of beer per  week     Comment: occasional   • Drug use: No       Family History   Problem Relation Age of Onset   • Diabetes Other    • Hypertension Other    • Stroke Mother    • Stroke Father        The following portions of the patient's history were reviewed and updated as appropriate: allergies, current medications, past family history, past medical history, past social history, past surgical history and problem list.      Current Outpatient Medications:   •  apixaban (ELIQUIS) 5 MG tablet tablet, Take 5 mg by mouth Daily., Disp: , Rfl:   •  digoxin (LANOXIN) 125 MCG tablet, Take 125 mcg by mouth 3 (Three) Times a Week., Disp: , Rfl:   •  dilTIAZem CD (CARDIZEM CD) 240 MG 24 hr capsule, Take 240 mg by mouth 2 (Two) Times a Day., Disp: , Rfl:   •  folic acid (FOLVITE) 1 MG tablet, Take 1 mg by mouth Daily., Disp: , Rfl:   •  furosemide (LASIX) 40 MG tablet, Take 60 mg by mouth Daily., Disp: , Rfl: 2  •  glipizide (GLUCOTROL) 10 MG tablet, Take 0.5 tablets by mouth 2 (Two) Times a Day Before Meals., Disp: 30 tablet, Rfl: 0  •  hydrALAZINE (APRESOLINE) 50 MG tablet, Take 50 mg by mouth 2 (two) times a day., Disp: , Rfl:   •  metoprolol succinate XL (TOPROL-XL) 100 MG 24 hr tablet, Take 100 mg by mouth 2 (two) times a day., Disp: , Rfl:   •  nitroglycerin (NITROSTAT) 0.4 MG SL tablet, Place 1 tablet under the tongue Every 5 (Five) Minutes As Needed for Chest Pain. Take no more than 3 doses in 15 minutes., Disp: 30 tablet, Rfl: 0  •  omeprazole (priLOSEC) 20 MG capsule, TAKE 1 CAPSULE BY MOUTH TWICE A DAY, Disp: 180 capsule, Rfl: 3  •  pravastatin (PRAVACHOL) 20 MG tablet, Take 1 tablet by mouth Every Night., Disp: , Rfl: 2  •  sulfaSALAzine (AZULFIDINE) 500 MG tablet, TAKE 2 TABLETS BY MOUTH 3 (THREE) TIMES A DAY., Disp: 540 tablet, Rfl: 1  •  terazosin (HYTRIN) 5 MG capsule, Take 1 capsule by mouth 2 (two) times a day., Disp: , Rfl: 2    Review of Systems   Constitution: Negative for chills and fever.   Cardiovascular: Negative  "for chest pain and paroxysmal nocturnal dyspnea.   Respiratory: Negative for cough and shortness of breath.    Skin: Negative for rash.   Gastrointestinal: Negative for abdominal pain and heartburn.   Neurological: Negative for dizziness and numbness.       Objective   /40 (BP Location: Left arm, Patient Position: Sitting, Cuff Size: Adult)   Pulse 69   Ht 168.9 cm (66.5\")   Wt 64 kg (141 lb)   SpO2 98%   BMI 22.42 kg/m²   Constitutional:       Appearance: Well-developed.   HENT:      Head: Normocephalic and atraumatic.   Pulmonary:      Effort: Pulmonary effort is normal.      Breath sounds: Normal breath sounds.   Cardiovascular:      Normal rate. Irregular rhythm.      Murmurs: There is a grade 3/6 holosystolic murmur.   Skin:     General: Skin is warm and dry.   Neurological:      Mental Status: Alert and oriented to person, place, and time.       Procedures  CHADS-VASc Risk Assessment            6       Total Score        1 CHF    1 Hypertension    2 Age >/= 75    1 DM    1 Vascular Disease        Criteria that do not apply:    PRIOR STROKE/TIA/THROMBO    Age 65-74    Sex: Female           ICD-10-CM ICD-9-CM   1. Permanent atrial fibrillation (CMS/[facility])  I48.21 427.31   2. Ulcerative pancolitis with complication (CMS/HCC)  K51.019 556.6   3. Type 2 diabetes mellitus without complication, without long-term current use of insulin (CMS/HCC)  E11.9 250.00   4. PVD (peripheral vascular disease) (CMS/HCC)  I73.9 443.9   5. Chronic diastolic congestive heart failure (CMS/HCC)  I50.32 428.32     428.0   6. History of GI bleed  Z87.19 V12.79         Assessment/Plan:  Based on the history, it has been determined that the patient is a poor candidate for long-term oral anticoagulation.  The patient may, however, be tolerant to short-term anticoagulation as necessary.    Specifically regarding anticoagulation they have demonstrated: Pancolitis with history of GI bleeding    We have discussed that you need stroke " and bleeding risk on and off anticoagulation.  The individual [id number] stroke risk store is 9 (Age greater than 75, hypertension, CHF, diabetes, and vascular disease), indicating an adjusted stroke rate of 9.8%/year.    Based on both stroke and bleeding risk, shared decision has been made to pursue closure of the left atrial appendage is a safe, effective alternative to long-term oral anticoagulation therapy for stroke prophylaxis.  This will reduce his long-term risk of incidence of bleeding.  Information regarding left atrial appendage closure and shared decision making were provided to patient.

## 2021-02-03 NOTE — TELEPHONE ENCOUNTER
Call to patient to discuss scheduling his Watchman implant. He is agreeable to Feb 18. His only concern was he gets dialysis on M-W-F. I told him the 18th is a Thursday and we should be able to get his dialysis before he goes home on Friday post Watchman. He verbalized understanding and was agreeable.

## 2021-02-26 PROBLEM — Z95.818 PRESENCE OF WATCHMAN LEFT ATRIAL APPENDAGE CLOSURE DEVICE: Status: ACTIVE | Noted: 2021-01-01

## 2021-02-27 NOTE — OUTREACH NOTE
Prep Survey      Responses   [facility] patient discharged from?  [place]   Is LACE score < 7 ?  No   Emergency Room discharge w/ pulse ox?  No   Eligibility  Readm Mgmt   Discharge diagnosis  Permanent atrial fibrillation [s/p atrial appendage occlusion]   Does the patient have one of the following disease processes/diagnoses(primary or secondary)?  Other   Does the patient have Home health ordered?  No   Is there a DME ordered?  No   Comments regarding appointments  Per AVS   Medication alerts for this patient  Start aspirin, continue eliquis with changes   Prep survey completed?  Yes          Carito Duggan RN

## 2021-03-03 NOTE — OUTREACH NOTE
Medical Week 1 Survey      Responses   [facility] [place] patient discharged from?  [place]   Does the patient have one of the following disease processes/diagnoses(primary or secondary)?  Other   Week 1 attempt successful?  No   Unsuccessful attempts  Attempt 1          Sandra Tay RN

## 2021-03-08 NOTE — OUTREACH NOTE
Medical Week 1 Survey      Responses   [facility] [place] patient discharged from?  [place]   Does the patient have one of the following disease processes/diagnoses(primary or secondary)?  Other   Week 1 attempt successful?  No   Unsuccessful attempts  Attempt 2          Annia Oro RN

## 2021-03-17 NOTE — OUTREACH NOTE
Medical Week 2 Survey      Responses   [facility] patient discharged from?  [place]   Does the patient have one of the following disease processes/diagnoses(primary or secondary)?  Other   Week 2 attempt successful?  No   Unsuccessful attempts  Attempt 1          Nirav Meza RN

## 2021-03-19 NOTE — OUTREACH NOTE
Medical Week 2 Survey      Responses   [facility] patient discharged from?  [place]   Does the patient have one of the following disease processes/diagnoses(primary or secondary)?  Other   Week 2 attempt successful?  No   Unsuccessful attempts  Attempt 2          Isela Padilla RN

## 2021-03-23 NOTE — PROGRESS NOTES
Subjective:     Encounter Date:04/23/2021      Patient ID: Gage Ken is a 80 y.o. male     Chief Complaint:  Atrial Fibrillation  Presents for follow-up visit. Symptoms are negative for chest pain, dizziness, palpitations (resolved), shortness of breath and syncope. The symptoms have been improving. Past medical history includes atrial fibrillation. There are no medication compliance problems.     Patient presents today for follow up after watchman. He had watchman procedure and tolerated it well. He is due for repeat DARY April 12th. Patient previously received cardiac care at [facility] In July he was transferred to our facility as he wished to establish cardiac care at our facility. Patient has a known history of atrial fibrillation that was diagnosed in early 2019. 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 He was seen in July for A-fib RVR in the setting of anemia and acute kidney injury. At that time he was rate controled with Toprol, Cardizem and Digoxin. Eliquis held due to anemia. He saw Dr. Mariano outpatient 9/30 and overall was doing well. He was back on DOAC and doing okay at that time. Patient was seen in the hospital again in consultation November 6 for chest pain and recurrent anemia. Pain was unclear etiology. Patient was treated with blood products, and Imdur with improvement of pain. Patient was noted to have elevated troponins on both admissions- both with flat trends. Short term dialysis while admitted. Patient has a history of moderate aortic stenosis, moderate pulmonary hypertension, atrial fibrillation- thought to be permanent, anemia, type II diabetes, chronic kidney disease, chronic diastolic congestive heart failure, BPH, GERD, hypertension, hyperlipidemia, and carotid artery disease. Last pacemaker interrogation showed 31 ventricular high rates- longest duration 5 minutes. Patient was admitted  11/25-11/30 at [facility] in [place]. He was seen for worsening renal function, 20 pound weight gain, significant edema, Elevated Dig Level. He had daily dialysis and was planned for out patient dialysis in [place]. He now currently is on M,W,F dialysis. Patient has had several issues with blood in stool, GI bleed, and anemia requiring transfusions. Currently he is on Eliquis and Aspirin following Watchman procedure. He is on digoxin 125 three times a week due to elevated levels. He had a dig level earlier this month that was 0.8. He notes he is doing quite well. He denies any further palpitations- where he previously was having frequent episodes. He notes digoxin helped with this. He denies swelling, shortness of breath or chest pain. Notes he feels better than he has in some time.       The following portions of the patient's history were reviewed and updated as appropriate: allergies, current medications, past medical history, past social history, past and problem list.    No Known Allergies    Current Outpatient Medications:   •  apixaban (ELIQUIS) 5 MG tablet tablet, Take 1 tablet by mouth Every 12 (Twelve) Hours for 45 days., Disp: 90 tablet, Rfl: 0  •  aspirin (aspirin) 81 MG EC tablet, Take 1 tablet by mouth Daily., Disp:  , Rfl:   •  digoxin (LANOXIN) 125 MCG tablet, Take 125 mcg by mouth Daily., Disp: , Rfl:   •  dilTIAZem CD (CARDIZEM CD) 240 MG 24 hr capsule, Take 240 mg by mouth 2 (Two) Times a Day., Disp: , Rfl:   •  folic acid (FOLVITE) 1 MG tablet, Take 1 mg by mouth Daily., Disp: , Rfl:   •  furosemide (LASIX) 40 MG tablet, Take 1 tablet by mouth Daily., Disp: 90 tablet, Rfl: 3  •  glipizide (GLUCOTROL) 10 MG tablet, Take 0.5 tablets by mouth 2 (Two) Times a Day Before Meals., Disp: 30 tablet, Rfl: 0  •  hydrALAZINE (APRESOLINE) 50 MG tablet, Take 50 mg by mouth 2 (two) times a day., Disp: , Rfl:   •  metoprolol succinate XL (TOPROL-XL) 100 MG 24 hr tablet, Take 100 mg by mouth 2 (two) times a  day., Disp: , Rfl:   •  nitroglycerin (NITROSTAT) 0.4 MG SL tablet, Place 1 tablet under the tongue Every 5 (Five) Minutes As Needed for Chest Pain. Take no more than 3 doses in 15 minutes., Disp: 30 tablet, Rfl: 0  •  omeprazole (priLOSEC) 20 MG capsule, TAKE 1 CAPSULE BY MOUTH TWICE A DAY, Disp: 180 capsule, Rfl: 3  •  pravastatin (PRAVACHOL) 20 MG tablet, Take 1 tablet by mouth Every Night., Disp: , Rfl: 2  •  SITagliptin (JANUVIA) 50 MG tablet, Take 50 mg by mouth Daily., Disp: , Rfl:   •  sulfaSALAzine (AZULFIDINE) 500 MG tablet, TAKE 2 TABLETS BY MOUTH 3 (THREE) TIMES A DAY., Disp: 540 tablet, Rfl: 1  •  terazosin (HYTRIN) 5 MG capsule, Take 1 capsule by mouth 2 (two) times a day., Disp: , Rfl: 2    Social History     Socioeconomic History   • Marital status: Single     Spouse name: Not on file   • Number of children: Not on file   • Years of education: Not on file   • Highest education level: Not on file   Tobacco Use   • Smoking status: Former Smoker     Types: Cigarettes   • Smokeless tobacco: Former User     Types: Snuff     Quit date: 2008   • Tobacco comment: 11/30/2017 - Patient States He Ceased Smoking 13 Years Prior.   Substance and Sexual Activity   • Alcohol use: Yes     Alcohol/week: 4.0 standard drinks     Types: 4 Cans of beer per week     Comment: occasional   • Drug use: No   • Sexual activity: Defer       Review of Systems   Constitutional: Negative for chills, decreased appetite, fever, malaise/fatigue, weight gain and weight loss.   HENT: Negative for nosebleeds.    Eyes: Negative for visual disturbance.   Cardiovascular: Negative for chest pain, dyspnea on exertion, leg swelling (rare), near-syncope, orthopnea, palpitations (resolved), paroxysmal nocturnal dyspnea and syncope.   Respiratory: Negative for cough, hemoptysis, shortness of breath and snoring.    Endocrine: Negative for cold intolerance and heat intolerance.   Hematologic/Lymphatic: Negative for bleeding problem. Does not  bruise/bleed easily.   Skin: Negative for rash.   Musculoskeletal: Negative for back pain and falls.   Gastrointestinal: Negative for abdominal pain, constipation, diarrhea, heartburn, melena, nausea and vomiting.   Genitourinary: Negative for hematuria.   Neurological: Negative for dizziness, headaches and light-headedness.   Psychiatric/Behavioral: Negative for altered mental status.   Allergic/Immunologic: Negative for persistent infections.              Objective:     Vitals reviewed.   Constitutional:       Appearance: Well-developed. Frail. Chronically ill-appearing.   Eyes:      Pupils: Pupils are equal, round, and reactive to light.   HENT:      Head: Normocephalic and atraumatic.   Neck:      Vascular: No carotid bruit or JVD.   Pulmonary:      Effort: Pulmonary effort is normal.      Breath sounds: Normal breath sounds.   Cardiovascular:      Normal rate. Irregular rhythm.      Murmurs: There is a harsh midsystolic murmur at the URSB, radiating to the neck.   Pulses:     Intact distal pulses.   Edema:     Peripheral edema present.     Ankle: bilateral trace edema of the ankle.  Abdominal:      General: Bowel sounds are normal.      Palpations: Abdomen is soft.   Musculoskeletal: Normal range of motion.      Cervical back: Normal range of motion and neck supple. Skin:     General: Skin is warm and dry.   Neurological:      Mental Status: Alert, oriented to person, place, and time and oriented to person, place and time.      Deep Tendon Reflexes: Reflexes are normal and symmetric.   Psychiatric:         Behavior: Behavior normal.         Thought Content: Thought content normal.         Judgment: Judgment normal.             ECG 12 Lead    Date/Time: 3/23/2021 1:43 PM  Performed by: Maikol Edward APRN  Authorized by: Maikol Edward APRN   Comparison: compared with previous ECG from 12/4/2020  Similar to previous ECG  Rhythm: atrial fibrillation and paced          /78 (BP Location: Right arm,  "Patient Position: Sitting, Cuff Size: Adult)   Pulse 67   Resp 18   Ht 167.6 cm (66\")   Wt 62.6 kg (138 lb)   SpO2 98%   BMI 22.27 kg/m²   Lab Review:   I have reviewed       Lab Results   Component Value Date    CHOL 122 07/10/2020    CHLPL 132 (L) 06/30/2019    TRIG 50 07/10/2020    HDL 35 (L) 07/10/2020    LDL 77 07/10/2020      Results for orders placed during the hospital encounter of 02/25/21    Echocardiogram 2D Complete    Interpretation Summary  · Left ventricular systolic function is normal. Left ventricular ejection fraction appears to be 61 - 65%.  · Left ventricular wall thickness is consistent with severe concentric hypertrophy.  · Normal right ventricular cavity size and systolic function noted.  · Moderate aortic valve stenosis is present.  · Mild tricuspid valve regurgitation is present. Estimated right ventricular systolic pressure from tricuspid regurgitation is mildly elevated (35-45 mmHg).     Assessment:          Diagnosis Plan   1. Permanent atrial fibrillation (CMS/[facility])     2. Aortic stenosis, moderate     3. S/P placement of cardiac pacemaker     4. Chronic diastolic congestive heart failure (CMS/[facility])     5. Essential hypertension     6. ESRD (end stage renal disease) on dialysis (CMS/[facility])     7. PVD (peripheral vascular disease) (CMS/[facility])     8. Presence of Watchman left atrial appendage closure device            Plan:       1. Permanent Atrial Fibrillation - Now On digoxin 3 times a week  With normal dig level, continue this dose. Rates controlled at this time. Verified with pharmacy patient is on Toprol  BID and Cardizem 240 BID. On Eliquis and Aspirin. S/p WATCHMAN  2. Moderate Aortic Stenosis - will monitor  3. Pacemaker - Placed in Februarys at [facility]. Now followed by our office. Well healed.   4. Chronic Diastolic Congestive Heart Failure leg swelling much better. Appears euvolemic today. Overall appears stable. Low Salt. Daily weights. Fluid management by dialysis. " On Lasix 40 mg daily.   5. Blood Pressure- controlled, stable  6. Chronic Kidney Disease - now on HD MWF  7. Peripheral Vascular Disease- bilateral carotid disease  8. Watchman - tolerated procedure well. Due for repeat DARY April 12th. Plans to stop Eliquis at that time.     Current outpatient and discharge medications have been reconciled for the patient.  Reviewed by: CUATE Liao

## 2021-03-29 NOTE — OUTREACH NOTE
Medical Week 3 Survey      Responses   [facility] patient discharged from?  [place]   Does the patient have one of the following disease processes/diagnoses(primary or secondary)?  Other   Week 3 attempt successful?  Yes   Call start time  1211   Call end time  1213   Discharge diagnosis  Permanent atrial fibrillation   Medication alerts for this patient  Start aspirin, continue eliquis with changes   Meds reviewed with patient/caregiver?  Yes   Is the patient having any side effects they believe may be caused by any medication additions or changes?  No   Does the patient have all medications ordered at discharge?  Yes   Is the patient taking all medications as directed (includes completed medication regime)?  Yes   Does the patient have a primary care provider?   Yes   Does the patient have an appointment with their PCP within 7 days of discharge?  Yes   Has the patient kept scheduled appointments due by today?  Yes   Has home health visited the patient within 72 hours of discharge?  N/A   Psychosocial issues?  No   Did the patient receive a copy of their discharge instructions?  Yes   Nursing interventions  Reviewed instructions with patient   What is the patient's perception of their health status since discharge?  Improving   Is the patient/caregiver able to teach back signs and symptoms related to disease process for when to call PCP?  Yes   Is the patient/caregiver able to teach back signs and symptoms related to disease process for when to call 911?  Yes   Is the patient/caregiver able to teach back the hierarchy of who to call/visit for symptoms/problems? PCP, Specialist, Home health nurse, Urgent Care, ED, 911  Yes   Additional teach back comments  Encouraged asking cards about upcoming procedure. States no issues with heart at this time.   Wrap up additional comments  He is doing ok at this time he says.          Gloria Umaña RN

## 2021-04-30 NOTE — PROGRESS NOTES
Single Chamber Ventricular Pacemaker Evaluation Report  Clinic Interrogation    April 30, 2021    Primary Cardiologist: García  : [facility] Model: Assurity MRI 1272 Pacemaker  Implant date: 2/26/20    Reason for evaluation: routine  Indication for pacemaker: a-fib    Measurements  Ventricular sensing - R wave: 10.2 mV  Ventricular threshold: 1.25 V @ 0.4 ms  Ventricular lead impedance: 460 ohms      Diagnostic Data  Paced: 43 %  Other: 3 sec episode of NSVT  Battery status: satisfactory           Final Parameters  Mode: VVIR     Lower rate: 60 bpm    Ventricular - Amplitude: 2.5 V Pulse width: 0.4 ms Sensitivity: auto   Changes made: none  Conclusions: normal pacemaker function    Follow up: 3 months via [facility], annually in office

## 2021-05-07 NOTE — TELEPHONE ENCOUNTER
Cali Hardin from [facility] called and left our office a message. She made us aware patient had some clots pulled out of access. They were able to run patient after wards successfully. They would like patient evaluated and schedule for a Fistulogram if possible. I have scheduled patient an appt to be evaluated in office on 5/11/21 at 1:00pm with [facility] . I sent appointment letter to patient's dialysis clinic.

## 2021-05-10 NOTE — TELEPHONE ENCOUNTER
Patient called to reschedule his appointment with Stephanie De Jesus for tomorrow 5/11/21. He was wanting a Wednesday.  The next available Monday or Wednesday would be 5/19 at 3:30pm. Karina Corral will be here on 5/19/21 per patient's request.

## 2021-05-19 PROBLEM — A41.9 SEPSIS (HCC): Status: ACTIVE | Noted: 2021-01-01

## 2021-05-19 NOTE — PROGRESS NOTES
Call placed to nephrology awaiting call back.  Electronically signed by Nikita Cook RN on 5/19/2021 at 4:33 PM

## 2021-05-19 NOTE — PROGRESS NOTES
Pharmacy Note  Vancomycin Consult    Enrique Sanches is a 68 y.o. male started on Vancomycin for sepsis of unkown etiology; consult received from Dr. Harry Madden to manage therapy. Also receiving the following antibiotics: Gentamicin. Active Problems:    Sepsis ([address]  Resolved Problems:    * No resolved hospital problems. *      Allergies:  Patient has no known allergies. Temp max: 99.4    Recent Labs     05/19/21  1606   BUN 31*       Recent Labs     05/19/21  1606   CREATININE 2.8*       Recent Labs     05/19/21  1606   WBC 22.3*         Intake/Output Summary (Last 24 hours) at 5/19/2021 1703  Last data filed at 5/19/2021 1621  Gross per 24 hour   Intake --   Output 175 ml   Net -175 ml       Culture Date Source Results   05/19/21 blood sent                 Ht Readings from Last 1 Encounters:   05/19/21 5' 8\" (1.727 m)        Wt Readings from Last 1 Encounters:   05/19/21 139 lb (63 kg)         Body mass index is 21.13 kg/m². Estimated Creatinine Clearance: 19 mL/min (A) (based on SCr of 2.8 mg/dL (H)). ESRD  HD       Assessment/Plan:  Will initiate vancomycin 1000 mg IV x1 dose  Timing of trough level will be determined based on culture results, renal function, and clinical response. Thank you for the consult. Will continue to follow.     Electronically signed by Aysha Null [facility] on 5/19/2021 at 5:03 PM

## 2021-05-19 NOTE — CONSULTS
**Physician Signature**  This document was electronically signed by: Hanna Moreno MD  2021   05:45 PM    **Consult Information**  Member Facility: [address] [place] [address] Drive MRN: [id number]  Visit/Encounter Number: [id number]  Consult ID: [id number]  Facility Time Zone: CT  Date and Time of Request: 2021 04:45 PM  Requesting Clinician: Dr. Briana Hobson  Patient Name: Hansel Alex  Date of Birth:   Gender: Male  Patient identity was confirmed at the beginning of the consult with the   patient/family/staff using two personal identifiers: Patient name and       **Problem/Plan**  encephalopathy: - agree that neuro should see useful goal    **Miscellaneous**  Call Placed to / Case Discussed with: RN    **General**  Code Status: Full Code  History of Present Illness: - 80M with ESRD, on HD, afib, ulcerative colitis,   DM, PVD, HTN didn't go to HD, so police came to house and he was found   unresponsive went into resp arrest and was intubated en-route to an outside   hosp tx'ed to [facility]/[place] initial glucose in the 40'sReview of Systems:   unresponsive  Past Medical History: ESRD, on HD, afib, ulcerative colitis, DM, PVD,   HTN    **Allergies and Medications**  Allergies: none  Current Medications: - d5NS  **Vital Signs**  Temperature: Afebrile  Blood Pressure (mmHg): 140/70  Heart Rate (bpm): 88  Respiration Rate (bpm): 14  O2 Sat (%): 100  Vitals Entry Time: 2021 05:44:11 PM    **Exam**  Exam: - pt not responsive although is on versed 1mg  sputum from ETT suctioned   and breathing cleared 4; no rash/pallor/cyanosis erythema on his gluteal area  **Core Metrics**  DVT Prophylaxis: Not Present  DVT Prophylaxis Comments: spoke to RN to see if we can add hep   sc  GI Prophylaxis: Not Present  Are nutritional goals met?: No  Is there a problem with skin integrity?: No  Delirium: Present  Ethical Issues: Not Present  Central Line: Not Applicable    **Attestation**  Interaction Mode: Video Only  Video Duration (mins): 8  Time of Physician Video: 05/19/2021 05:22 PM  Interaction Attestation: Clinical telemedicine services delivered using HIPAA   - compliant interactive video-audio telecommunications while the patient and   the rendering provider were not in the same physical location. Written   report was provided to the requesting provider.   Evaluation Duration (mins): 20  Critical Care Time Duration (mins): 20

## 2021-05-19 NOTE — PROGRESS NOTES
Dr. Aman Rodriguez at bedside to evaluate patient.  Electronically signed by Tonya Sanders RN on 5/19/2021 at 4:16 PM

## 2021-05-19 NOTE — PROGRESS NOTES
4 Eyes Skin Assessment    Oscar Magaña is being assessed upon: Admission    I agree that I, Molina Lira, RN, along with April RN (either 2 RN's or 1 LPN and 1 RN) have performed a thorough Head to Toe Skin Assessment on the patient. ALL assessment sites listed below have been assessed. Areas assessed by both nurses:     [x]   Head, Face, and Ears   [x]   Shoulders, Back, and Chest  [x]   Arms, Elbows, and Hands   [x]   Coccyx, Sacrum, and Ischium  [x]   Legs, Feet, and Heels    Does the Patient have Skin Breakdown? Yes, wound(s) noted upon assessment. It is the responsibility of the Primary Nurse to assure that the following documentation, preventions, orders, and consults are complete on the above noted wound(s): Wound LDA initiated. LDA Flowsheet Documentation includes the Alyx-wound, Wound Assessment, Measurements, Dressing Treatment, Drainage, and Color.     Tom Prevention initiated: Yes  Wound Care Orders initiated: No    WOC nurse consulted for Pressure Injury (Stage 3,4, Unstageable, DTI, NWPT, and Complex wounds) and New or Established Ostomies: No        Primary Nurse eSignature: Molina Lira RN on 5/19/2021 at 3:56 PM      Co-Signer eSignature: Electronically signed by Daisha Davis RN on 5/19/21 at 4:27 PM CDT

## 2021-05-19 NOTE — PROGRESS NOTES
Spoke with [facility] transportation regarding patients condition for them to determine if patients son needs to come in from [place] since he is in the [employer].  Electronically signed by Mario Alberto Amaya RN on 5/19/2021 at 5:38 PM

## 2021-05-20 PROBLEM — Z51.5 PALLIATIVE CARE PATIENT: Status: ACTIVE | Noted: 2021-01-01

## 2021-05-20 NOTE — PROGRESS NOTES
Sedation vacation started at 0810.  Electronically signed by Alecia Farias RN on 5/20/2021 at 8:19 AM

## 2021-05-20 NOTE — CONSULTS
Renal Consult Note    Thank you to requesting provider: Dr Jaden Sequeira for asking us to see Magaly Thompson    Reason for consultation:      Chief Complaint:  Syncope, AMS    History of Presenting Illness      Patient is a 68 y.o. man who is admitted to [address] [place] [address] ICU as a transfer from [facility] with altered mental status. He was found unresponsive on the floor at home. Downtime unknown. He was intubated at the outside ER. He was recently placed on insulin or a new type of insulin his blood sugars have been somewhat low. He is on chronic hemodialysis ([facility]). He is diabetic. Renal service was consulted to manage his ESRD. Patient has elevated WBC count and is believed to be septic. He was awake after his sedation was lowered. We are also seeing hm while on dialysis.      Past Medical/Surgical History      Active Ambulatory Problems     Diagnosis Date Noted    Depression     Type 2 diabetes mellitus ([address]     Hypertension     Carotid artery stenosis 04/11/2012    PVD (peripheral vascular disease) ([address] 11/09/2016    Carotid artery occlusion, right     Elevated d-dimer 04/09/2019    Hypocalcemia 04/09/2019    Macrocytic anemia 04/09/2019    PAF (paroxysmal atrial fibrillation) ([address] 04/13/2019    Chronic anticoagulation 05/09/2019    Ulcerative colitis ([address] 06/30/2019    Sinus node dysfunction (HCC) 08/12/2019    Chronic atrial fibrillation ([address]     Pacemaker 04/23/2020    ESRD (end stage renal disease) on dialysis ([address] 10/16/2020    ESRD on dialysis ([address]     Acute kidney injury superimposed on chronic kidney disease ([address] 11/25/2020    ESRD (end stage renal disease) ([address] 11/25/2020    Digoxin overdose, accidental or unintentional, initial encounter 11/25/2020    Dialysis AV fistula malfunction, initial encounter ([address] 01/14/2021     Resolved Ambulatory Problems     Diagnosis Date Noted    Atherosclerosis of native artery of extremity with intermittent claudication ([facility]     Pneumonia due to infectious organism 04/09/2019    Bradycardia, 32bpm upon arrival 06/30/2019    Hyperkalemia, 7.6 with tanscellular shift agents but NOT any binding agent adminstered at transport to  06/30/2019    Volume overload 11/25/2020     Past Medical History:   Diagnosis Date    Arthritis     Atherosclerosis of native arteries of the extremities with intermittent claudication     Blood circulation, collateral     CAD (coronary artery disease)     Chronic kidney disease     GERD (gastroesophageal reflux disease)     Gout     History of blood transfusion     Hyperlipidemia     Palliative care patient 05/20/2021    Type II or unspecified type diabetes mellitus without mention of complication, not stated as uncontrolled          Review of Systems     UTO (patient is intubated and sedated)      Medications        Current Facility-Administered Medications:     heparin (porcine) injection 3,790 Units, 60 Units/kg, Intravenous, PRN, Abran Patrick, DO    heparin (porcine) injection 1,890 Units, 30 Units/kg, Intravenous, PRN, Abran Patrick, DO    heparin 25,000 units in dextrose 5% 250 mL (premix) infusion, 5-30 Units/kg/hr, Intravenous, Continuous, Abran Nguyen, , Last Rate: 7.6 mL/hr at 05/20/21 0802, 12 Units/kg/hr at 05/20/21 0802    metoprolol (LOPRESSOR) injection 5 mg, 5 mg, Intravenous, Q6H PRN, Abran Nguyen DO, 5 mg at 05/20/21 0844    sodium chloride flush 0.9 % injection 5-40 mL, 5-40 mL, Intravenous, 2 times per day, Abran Nguyen DO, 10 mL at 05/19/21 1924    sodium chloride flush 0.9 % injection 5-40 mL, 5-40 mL, Intravenous, PRN, Abran Nguyen, DO    0.9 % sodium chloride infusion, 25 mL, Intravenous, PRN, Abran Patrick, DO    acetaminophen (TYLENOL) tablet 650 mg, 650 mg, Oral, Q6H PRN **OR** acetaminophen (TYLENOL) suppository 650 mg, 650 mg, Rectal, Q6H PRN, Abran Patrick, DO    dextrose 5 % and 0.9 % sodium chloride infusion, , Intravenous, Continuous, Maryl Salt, DO, Last Rate: 50 mL/hr at 05/19/21 1704, New Bag at 05/19/21 1704    gentamicin (GARAMYCIN) IVPB 120 mg, 120 mg, Intravenous, Q48H, Maryl Salt, DO, Stopped at 05/19/21 2028    midazolam (VERSED) infusion 100mg/100mL, 1-10 mg/hr, Intravenous, Continuous, Maryl Salt, DO, Last Rate: 2 mL/hr at 05/20/21 9721, 2 mg/hr at 05/20/21 5409    aminoglycoside intermittent dosing (placeholder), , Other, RX Placeholder, Maryl Salt, DO    vancomycin Roc Stephens) intermittent dosing (placeholder), , Other, RX Placeholder, Maryl Salt, DO  Outpatient Medications Marked as Taking for the 5/19/21 encounter [facility] Encounter)   Medication Sig Dispense Refill    clopidogrel (PLAVIX) 75 MG tablet Take 75 mg by mouth daily      insulin glargine (BASAGLAR KWIKPEN) 100 UNIT/ML injection pen Inject into the skin nightly      HYDROcodone-acetaminophen (NORCO) 5-325 MG per tablet Take 1 tablet by mouth every 8 hours as needed for Pain.  nitroGLYCERIN (NITROSTAT) 0.4 MG SL tablet Place 0.4 mg under the tongue every 5 minutes as needed for Chest pain up to max of 3 total doses. If no relief after 1 dose, call 911. Allergies   Patient has no known allergies. Family History       Family History   Problem Relation Age of Onset    High Blood Pressure Mother     Heart Disease Mother     Stroke Mother         at 48    High Blood Pressure Father     Heart Disease Father     Stroke Father         at 59    No Known Problems Brother     Alzheimer's Disease Brother         onset at 80, then rapidly progressed to death     Family history negative for kidney disease.      Social History      Social History     Socioeconomic History    Marital status:      Spouse name: Not on file    Number of children: 3    Years of education: Not on file    Highest education level: Not on file   Occupational History    Occupation: Printer   Tobacco Use    Smoking status: Former Smoker     Packs/day: 1.00     Years: 50.00     Pack years: 50.00     Types: Cigarettes     Quit date: 2006     Years since quitting: 15.3    Smokeless tobacco: Former User     Types: Snuff   Vaping Use    Vaping Use: Never used   Substance and Sexual Activity    Alcohol use: Yes     Comment: occasional    Drug use: Never    Sexual activity: Not on file   Other Topics Concern    Not on file   Social History Narrative    Worked on a [employer] most of his life     twice and     He has 2 sons and one daughter    Never in the [employer]    Attends [address] one pack per day quit in 2006 denies alcohol consumption or substance usage    Physically sedentary    /////////////////////////////////////////////////////////////////    CODE STATUS: Full Code    HEALTH CARE PROXY: His oldest son, Reagan Alas, [phone]    DOMICILED: Lives alone in a private home with one step in to home but no steps within, has no pets    AMBULATES: independantly     Social Determinants of Health     Financial Resource Strain:     Difficulty of Paying Living Expenses:    Food Insecurity:     Worried About Running Out of Food in the Last Year:     [address] in the Last Year:    Transportation Needs:     Lack of Transportation (Medical):      Lack of Transportation (Non-Medical):    Physical Activity:     Days of Exercise per Week:     Minutes of Exercise per Session:    Stress:     Feeling of Stress :    Social Connections:     Frequency of Communication with Friends and Family:     Frequency of Social Gatherings with Friends and Family:     Attends [employer] Services:     Active Member of Clubs or Organizations:     Attends Club or Organization Meetings:     Marital Status:    Intimate Partner Violence:     Fear of Current or Ex-Partner:     Emotionally Abused:     Physically Abused:    

## 2021-05-20 NOTE — CONSULTS
PACEMAKER PLACEMENT      [facility] PACEMAKER    VASCULAR SURGERY  11/02/2020    [facility].  Right upper extremity fistulogram's including venography to the superior vena cava, Balloon angioplasty distal/mid upper arm cephalic vein stenoses with 8 mm x 40 mm Luke balloon, Coil embolization of cephalic vein branch (8 mm x 5 cm, 5 mm x 5 cm (4)), Completion fistulogram's and venograms    VASCULAR SURGERY  01/14/2021    [facility]-   Right upper extremity fistulogram's including venography to the superior vena cava,  Balloon angioplasty distal/mid upper arm cephalic vein stenosis with 8 mm x 60 mm Lutonix drug-coated balloon, Completion venograms right upper extremity     Medications in Hospital:     Current Facility-Administered Medications:     heparin (porcine) injection 3,790 Units, 60 Units/kg, Intravenous, Once, Charls Lat, DO    heparin (porcine) injection 3,790 Units, 60 Units/kg, Intravenous, PRN, Charls Lat, DO    heparin (porcine) injection 1,890 Units, 30 Units/kg, Intravenous, PRN, Charls Lat, DO    heparin 25,000 units in dextrose 5% 250 mL (premix) infusion, 5-30 Units/kg/hr, Intravenous, Continuous, Charls Lat, DO    sodium chloride flush 0.9 % injection 5-40 mL, 5-40 mL, Intravenous, 2 times per day, Charls Lat, DO, 10 mL at 05/19/21 1924    sodium chloride flush 0.9 % injection 5-40 mL, 5-40 mL, Intravenous, PRN, Charls Lat, DO    0.9 % sodium chloride infusion, 25 mL, Intravenous, PRN, Charls Lat, DO    acetaminophen (TYLENOL) tablet 650 mg, 650 mg, Oral, Q6H PRN **OR** acetaminophen (TYLENOL) suppository 650 mg, 650 mg, Rectal, Q6H PRN, Charls Lat, DO    dextrose 5 % and 0.9 % sodium chloride infusion, , Intravenous, Continuous, Charls Lat, DO, Last Rate: 50 mL/hr at 05/19/21 1704, New Bag at 05/19/21 1704    gentamicin (GARAMYCIN) IVPB 120 mg, 120 mg, Intravenous, Q48H, Charls Lat, DO, Stopped at 05/19/21 2028    midazolam (VERSED) infusion 100mg/100mL, 1-10 mg/hr, Intravenous, Continuous, Abran Nguyen DO, Last Rate: 1 mL/hr at 05/19/21 1701, 1 mg/hr at 05/19/21 1701    aminoglycoside intermittent dosing (placeholder), , Other, RX Placeholder, Abran Nguyen DO    vancomycin Lois Cartwright) intermittent dosing (placeholder), , Other, RX Abran Gudino DO  Allergies: Patient has no known allergies. Social History:   TOBACCO:  reports that he quit smoking about 15 years ago. His smoking use included cigarettes. He has a 50.00 pack-year smoking history. He has quit using smokeless tobacco.  His smokeless tobacco use included snuff. ETOH:  reports current alcohol use. Family History:       Problem Relation Age of Onset    High Blood Pressure Mother     Heart Disease Mother     Stroke Mother         at 48    High Blood Pressure Father     Heart Disease Father     Stroke Father         at 59    No Known Problems Brother     Alzheimer's Disease Brother         onset at 80, then rapidly progressed to death     ? ? Physical Exam:    Vitals: BP (!) 153/83   Pulse 90   Temp 98.4 °F (36.9 °C) (Temporal)   Resp 17   Ht 5' 8\" (1.727 m)   Wt 139 lb 3.2 oz (63.1 kg)   SpO2 99%   BMI 21.17 kg/m²   Constitutional - well developed, well nourished. Eyes - conjunctiva normal. Pupils react to light  Ear, nose, throat -hearing intact to finger rub No scars, masses, or lesions over external nose or ears, no atrophy of tongue  Neck-symmetric, no masses noted, no jugular vein distension  Respiration- chest wall appears symmetric, good expansion,   normal effort without use of accessory muscles  Musculoskeletal - no significant wasting of muscles noted, no bony deformities  Extremities-no clubbing, cyanosis or edema  Skin - warm, dry, and intact. No rash, erythema, or pallor. Psychiatric - mood, affect, and behavior appear normal.   Neurological exam  Awake, alert, and intubated.   Could not get him to blink to threat. Moves his head from side to side. Does not make good eye contact. Would not follow any simple commands. Cranial Nerve Exam   Pupils equal and reactive. Patient moves eyes from side to side. Face appears symmetric. Patient is intubated. Motor Exam  Appears to be moving arms and legs equally  Sensory Exam  Unable to assess  Reflexes   Absent throughout with questionable bilateral Babinski's  Tremors- no tremors in hands or head noted  Gait  Not tested  Coordination  Unable to assess  ? ?  CBC:   Recent Labs     05/19/21  1606 05/20/21  0138   WBC 22.3* 27.5*   HGB 11.8* 11.8*    200     BMP:   Recent Labs     05/19/21  1606 05/19/21  1613 05/20/21  0138   *  --  136   K 3.6 3.4 3.8   CL 97*  --  98   CO2 28  --  25   BUN 31*  --  31*   CREATININE 2.8*  --  2.8*   GLUCOSE 119*  --  16*     Hepatic:   Recent Labs     05/19/21  1606 05/20/21  0138   AST 29 30   ALT 22 19   BILITOT 0.5 0.6   ALKPHOS 145* 133*     Lipids: No results for input(s): CHOL, HDL in the last 72 hours. Invalid input(s): LDLCALCU  INR:   Recent Labs     05/19/21  1606   INR 1.21*     ?  ? Assessment and Plan   1. Patient found down unresponsive at home 2 days ago. Last known well was a couple of days before that. The differential is wide on this. Currently being treated with gentamicin and vancomycin for possible sepsis. That could certainly be the cause as well. Could have had an unwitnessed seizure or even a stroke. Will check EEG today and MRI of the head when able, if he does not improve. Chris Rey He is currently moving around a lot and still intubated and it would be difficult to get 1. May have had a prolonged hypoglycemic event.   ?  ?      Hemant Lopez MD  Board Certified in Neurology

## 2021-05-20 NOTE — PROGRESS NOTES
Erma Falk not going to follow for organ donation, we are to call with cardiac time of death for tissue donation if that occurs. Linda from [facility] passed this information along.  Electronically signed by Julio Soares RN on 5/20/2021 at 11:23 AM

## 2021-05-20 NOTE — CONSULTS
**Physician Signature**  This document was electronically signed by: Abiel Sinclair MD  2021   09:31 AM    **Consult Information**  Member Facility: [address] [place] [address] MRN: [id number]  Visit/Encounter Number: [id number]  Consult ID: [id number]  Facility Time Zone: CT  Date and Time of Request: 2021 06:04 AM  Requesting Clinician: Sidney Donnelly MD  Patient Name: Courtney Mix  YOB: 1940  Gender: Male  Patient identity was confirmed at the beginning of the consult with the   patient/family/staff using two personal identifiers: Patient name and       **Admission**  Admission Date: 2021  Chief reason for ICU admission: Respiratory Failure    **Core Metrics**  General orienting sentence for patient: 69 y/o transferred from an outside   hospital. Found down at home, had missed HD (ESRD). Intubated and on the   ventilator. Was also hypoglycemic. Only on Versed. Recently started on   insulin. . On hep drip for a fib. HD today. Starting   card drip.   Chief physiologic deterioration: None - Stable patient  Is the patient on DVT prophylaxis?: No  DVT Prophylaxis Comments: hep drip  Is the patient on GI prophylaxis?: No  Has this patient reached their nutritional goal?: No and issues are being   addressed  Are there current issues with pain management in this patient?:   No  Are there issues with skin integrity?: No  Are there issues with delirium?: No  Has the patient been mobilized?: No  Is this patient currently intubated?: Yes  Are there ethical or care philosophy or family issues?: No  Do you recommend an in depth evaluation?: No  Do you recommend the patient should: : Continue ICU level of care    **Inserted/Removed Devices**  Device Name: Endotracheal Tube  Site of insertion: Trachea  Date of insertion: 2021  Device Name: Gomes Catheter  Site of insertion: Bladder  Date of insertion: 2021  Device Name: Orogastric Tube  Site of insertion: Oropharynx  Date of insertion: 05-

## 2021-05-20 NOTE — PROGRESS NOTES
Patient became tachycardic, Dr. Julius Patterson made aware, orders received for metoprolol IV, Versed restarted as well due to agitation and tachycardia.  Electronically signed by Oscar Paulson RN on 5/20/2021 at 8:47 AM

## 2021-05-20 NOTE — PROGRESS NOTES
Strong corneal reflex noted on patient.  Electronically signed by Urmila Nagel RN on 5/20/2021 at 4:57 PM

## 2021-05-20 NOTE — PROGRESS NOTES
Hospitalist Progress Note    Patient:  Pete Huizar  YOB: 1940  Date of Service: 5/20/2021  MRN: [id number]   Acct: [de-identified]   Primary Care Physician: Shine Link MD  Advance Directive: Full Code  Admit Date: 5/19/2021       Hospital Day: 1  Referring Provider: Guillermina Van DO    Patient Seen, Chart, Consults, Notes, Labs, Radiology studies reviewed. Subjective:  Pete Huizar is a 68 y.o. male  whom we are following for hypoxemic respiratory failure, end-stage renal disease, mental status change, metabolic encephalopathy. No new events overnight. Hemodynamics have remained stable. He is not doing much from a neurologic standpoint. Sedation is currently off. He has been evaluated by neurology. He is currently on assist-control rate of 14, tidal volume of 400, 5 of PEEP, FiO2 of 0.3. Allergies:  Patient has no known allergies.     Medicines:  Current Facility-Administered Medications   Medication Dose Route Frequency Provider Last Rate Last Admin    heparin (porcine) injection 3,790 Units  60 Units/kg Intravenous PRN Guillermina Van, DO        heparin (porcine) injection 1,890 Units  30 Units/kg Intravenous PRN Guillermina Van, DO        heparin 25,000 units in dextrose 5% 250 mL (premix) infusion  5-30 Units/kg/hr Intravenous Continuous Guillermina Van, DO 6.3 mL/hr at 05/20/21 1218 10 Units/kg/hr at 05/20/21 1218    metoprolol (LOPRESSOR) injection 5 mg  5 mg Intravenous Q6H Guillermina Van, DO   5 mg at 05/20/21 1536    dextrose 10 % infusion   Intravenous Continuous Guillermina Van, DO 50 mL/hr at 05/20/21 1536 New Bag at 05/20/21 1536    sodium chloride flush 0.9 % injection 5-40 mL  5-40 mL Intravenous 2 times per day Guillermina Van, DO   10 mL at 05/19/21 1924    sodium chloride flush 0.9 % injection 5-40 mL  5-40 mL Intravenous PRN Guillermina Van, DO        0.9 % sodium chloride infusion  25 mL Intravenous PRN Guillermina Van, DO  acetaminophen (TYLENOL) tablet 650 mg  650 mg Oral Q6H PRN Loralyn [place], DO   650 mg at 05/20/21 1208    Or    acetaminophen (TYLENOL) suppository 650 mg  650 mg Rectal Q6H PRN Loralyn [place], DO        dextrose 5 % and 0.9 % sodium chloride infusion   Intravenous Continuous Loralyn [place], DO 50 mL/hr at 05/20/21 1358 New Bag at 05/20/21 1358    midazolam (VERSED) infusion 100mg/100mL  1-10 mg/hr Intravenous Continuous Loralyn [place], DO   Stopped at 05/20/21 1407    aminoglycoside intermittent dosing (placeholder)   Other RX Placeholder Lorgena Steinalds, DO        vancomycin Janene Jean Paul) intermittent dosing (placeholder)   Other RX Placeholder Albin Roys, DO           Past Medical History:  Past Medical History:   Diagnosis Date    Arthritis     Atherosclerosis of native arteries of the extremities with intermittent claudication     Blood circulation, collateral     CAD (coronary artery disease)     Carotid artery occlusion, right     Chronic kidney disease     Depression     GERD (gastroesophageal reflux disease)     Gout     History of blood transfusion     Hyperlipidemia     Hypertension     Palliative care patient 05/20/2021    Pneumonia due to infectious organism 04/09/2019    Type II or unspecified type diabetes mellitus without mention of complication, not stated as uncontrolled     Ulcerative colitis ([facility] [address] 06/30/2019       Past Surgical History:  Past Surgical History:   Procedure Laterality Date    CATARACT REMOVAL Bilateral     DIALYSIS FISTULA CREATION Right 10/16/2020    RIGHT BRACHIAL/BASILIC AV FISTULA performed by Isra Elias MD at [address] Right Brachial Artery to Cephalic Vein Arteriovenous Fistula Creation    HEMORRHOID SURGERY  1973    HERNIA REPAIR      umbilical hernia    PACEMAKER PLACEMENT       [address] VASCULAR SURGERY  11/02/2020    [facility].  Right upper extremity fistulogram's including venography to the superior vena cava, Balloon angioplasty distal/mid upper arm cephalic vein stenoses with 8 mm x 40 mm Luke balloon, Coil embolization of cephalic vein branch (8 mm x 5 cm, 5 mm x 5 cm (4)), Completion fistulogram's and venograms    VASCULAR SURGERY  01/14/2021    S[facility]-   Right upper extremity fistulogram's including venography to the superior vena cava,  Balloon angioplasty distal/mid upper arm cephalic vein stenosis with 8 mm x 60 mm Lutonix drug-coated balloon, Completion venograms right upper extremity       Family History  Family History   Problem Relation Age of Onset    High Blood Pressure Mother     Heart Disease Mother     Stroke Mother         at 48    High Blood Pressure Father     Heart Disease Father     Stroke Father         at 59    No Known Problems Brother     Alzheimer's Disease Brother         onset at 80, then rapidly progressed to death       Social History  Social History     Socioeconomic History    Marital status:      Spouse name: Not on file    Number of children: 3    Years of education: Not on file    Highest education level: Not on file   Occupational History    Occupation: Printer   Tobacco Use    Smoking status: Former Smoker     Packs/day: 1.00     Years: 50.00     Pack years: 50.00     Types: Cigarettes     Quit date: 2006     Years since quitting: 15.3    Smokeless tobacco: Former User     Types: Snuff   Vaping Use    Vaping Use: Never used   Substance and Sexual Activity    Alcohol use: Yes     Comment: occasional    Drug use: Never    Sexual activity: Not on file   Other Topics Concern    Not on file   Social History Narrative    Worked on [employer] most of his life     twice and     He has 2 sons and one daughter    Never in the [employer] high school    Attends [employer]    Smoked one pack per day quit in 2006 denies alcohol consumption or substance usage    Physically sedentary /////////////////////////////////////////////////////////////////    CODE STATUS: Full Code    HEALTH CARE PROXY: His oldest son, Ben Tello, [phone]    DOMICILED: Lives alone in a private home with one step in to home but no steps within, has no pets    AMBULATES: independantly     Social Determinants of Health     Financial Resource Strain:     Difficulty of Paying Living Expenses:    Food Insecurity:     Worried About Running Out of Food in the Last Year:     [address] in the Last Year:    Transportation Needs:     Lack of Transportation (Medical):  Lack of Transportation (Non-Medical):    Physical Activity:     Days of Exercise per Week:     Minutes of Exercise per Session:    Stress:     Feeling of Stress :    Social Connections:     Frequency of Communication with Friends and Family:     Frequency of Social Gatherings with Friends and Family:     Attends [employer] Services:     Active Member of Clubs or Organizations:     Attends Club or Organization Meetings:     Marital Status:    Intimate Partner Violence:     Fear of Current or Ex-Partner:     Emotionally Abused:     Physically Abused:     Sexually Abused:          Review of Systems:    Review of Systems   Unable to perform ROS: Intubated           Objective:  Blood pressure (!) 113/49, pulse 94, temperature 100.7 °F (38.2 °C), resp. rate 19, height 5' 8\" (1.727 m), weight 139 lb 3.2 oz (63.1 kg), SpO2 98 %. Intake/Output Summary (Last 24 hours) at 5/20/2021 1646  Last data filed at 5/20/2021 1200  Gross per 24 hour   Intake 1109.65 ml   Output 2810 ml   Net -1700.35 ml       Physical Exam  Vitals and nursing note reviewed. Constitutional:       Appearance: He is ill-appearing. HENT:      Head: Normocephalic and atraumatic.       Right Ear: External ear normal.      Left Ear: External ear normal.      Nose: Nose normal.      Mouth/Throat:      Mouth: Mucous membranes are moist.   Eyes:      Conjunctiva/sclera: Conjunctivae normal.      Pupils: Pupils are equal, round, and reactive to light. Cardiovascular:      Rate and Rhythm: Normal rate and regular rhythm. Heart sounds: Normal heart sounds. Pulmonary:      Effort: Pulmonary effort is normal.      Breath sounds: Normal breath sounds. Abdominal:      General: Abdomen is flat. Palpations: Abdomen is soft. Musculoskeletal:         General: No swelling. Cervical back: Neck supple. No rigidity. Skin:     General: Skin is warm and dry. Labs:  BMP:   Recent Labs     05/19/21  1606 05/19/21  1613 05/20/21  0138   *  --  136   K 3.6 3.4 3.8   CL 97*  --  98   CO2 28  --  25   BUN 31*  --  31*   CREATININE 2.8*  --  2.8*   CALCIUM 9.1  --  8.7*     CBC:   Recent Labs     05/19/21  1606 05/20/21  0138 05/20/21  0753   WBC 22.3* 27.5* 27.9*   HGB 11.8* 11.8* 11.8*   HCT 34.8* 35.0* 35.3*   MCV 89.2 89.7 90.1    200 200     LIVER PROFILE:   Recent Labs     05/19/21  1606 05/20/21  0138   AST 29 30   ALT 22 19   BILITOT 0.5 0.6   ALKPHOS 145* 133*     PT/INR:   Recent Labs     05/19/21  1606   PROTIME 15.3*   INR 1.21*     APTT:   Recent Labs     05/19/21  1606 05/20/21  1047   APTT 28.9 101.4*     BNP:  No results for input(s): BNP in the last 72 hours. Ionized Calcium:No results for input(s): IONCA in the last 72 hours. Magnesium:  Recent Labs     05/19/21  1606   MG 2.0     Phosphorus:No results for input(s): PHOS in the last 72 hours. HgbA1C:   Recent Labs     05/19/21  1606   LABA1C 13.8*     Hepatic:   Recent Labs     05/19/21  1606 05/20/21  0138   ALKPHOS 145* 133*   ALT 22 19   AST 29 30   PROT 6.8 5.8*   BILITOT 0.5 0.6   LABALBU 3.9 3.5     Lactic Acid:   Recent Labs     05/19/21  1606   LACTA 1.3     Troponin: No results for input(s): CKTOTAL, CKMB, TROPONINT in the last 72 hours. ABGs: No results for input(s): PH, PCO2, PO2, HCO3, O2SAT in the last 72 hours. CRP:  No results for input(s): CRP in the last 72 hours.   Sed Rate:  No results for input(s): SEDRATE in the last 72 hours. Cultures:   No results for input(s): CULTURE in the last 72 hours. No results for input(s): BC, Jesica Hock in the last 72 hours. No results for input(s): CXSURG in the last 72 hours. Radiology reports as per the Radiologist  Radiology: XR CHEST PORTABLE    Result Date: 5/19/2021  Exam: XR CHEST PORTABLE - 5/19/2021 3:00 PM Indication: Intubated patient Comparison: 11/25/2020 Findings: Endotracheal tube is 3 cm above the genia. Enteric tube terminates in the region of the gastric fundus. LEFT chest wall cardiac pacing device appears stable in position. Patchy bilateral pulmonary airspace opacities are present. No pleural effusion or visible pneumothorax. A skin fold projects along the LEFT lateral chest. No acute osseous finding. Impression: 1. Endotracheal tube appears in good position. 2.  Patchy bilateral airspace opacity. Signed by Dr Deric Hudson on 5/19/2021 4:20 PM       Assessment     Sepsis. Continue sepsis pathway. Cultures negative thus far.     Acute hypoxemic respiratory failure. Continue ventilatory support.     Mental status change. Work-up ongoing.     Please document 50 minutes of critical care time for patient assessment, chart review, discussion with staff, .       Colt Crockett DO

## 2021-05-20 NOTE — PROGRESS NOTES
Message placed to Dr. Harry Madden regarding patients anticoagulant status, orders placed for heparin drip.  Electronically signed by Pepe Alberto RN on 5/20/2021 at 7:35 AM

## 2021-05-20 NOTE — PROGRESS NOTES
DR. Keith Rosales made aware of patients elevated HR, after metoprolol dose. Awaiting response will update as needed.  Electronically signed by Graett Serrato RN on 5/20/2021 at 9:34 AM

## 2021-05-20 NOTE — CONSULTS
Pt was transferred to [facility] ED from [facility].  Pt was found down by EMS at his home. Last known well per note was Sunday. Pt is on maintenance HD. Insulin recently initiated per MD note. PC in ICU to see pt however, he is unable to provide information d/t no response, currently on HD and EEG in progress. Spoke with pt RN by phone, he tells me pt son is on the way from [place]. PC RN asked Joaquina Carlin RN to notify [facility] when family arrives. Palliative will talk with family to obtain additional information. Currently pt is a Full Code. Document on file was completed by pt but not signed. Family here from out of town. Dtr from [place] arrived today. Son, Isiah Seek in from [facility] [place]. Another son who lives in [place] has made arrangement to fly out on Saturday(earliest flight he could get). Family has agreed to proceed with aggressive tx until all children are present and can obtain more info from MD's about pt condition. Family tells me that pt has been on HD since last November, and recently started insulin. Son tells me his father has been active, drives himself to and from HD in [place]. Son also tells me pt has completed his COVID vaccine(Maderna) in March. Once family has gathered and they understand possible outcome they will make appropriate decisions. This nurse spoke with son, Iban Hardwick, about [address] such as Rehab, NF, Hospice care, LTAC as possibilities. Family voiced understanding. Palliative also following for support.     Electronically signed by Ean Naik RN on 5/20/2021 at 12:00 PM

## 2021-05-20 NOTE — H&P
Hospital Medicine H&P    Patient:  Cristina Ruiz  MRN: [id number]    Consulting Physician: Colt Crockett DO  Reason for Consult: Medical Management  Primacy Care Physician: Nellie Elias MD    HISTORY OF PRESENT ILLNESS:   The patient is a 68 y.o. male was taken in transfer from [facility].  The ER physician tells me that he was last seen Sunday. EMS found him down and unresponsive on his floor at home. His amount of downtime is unknown. He was intubated in the outside emergency department. He has minimal response to painful stimuli. CT of the head at the outside institution did not show any acute pathology. He is on maintenance hemodialysis. He is oxygenating without difficulty. He is not requiring any pressor support. The etiology of his loss of consciousness is not certain at this time. Past Medical History:        Diagnosis Date    Arthritis     Atherosclerosis of native arteries of the extremities with intermittent claudication     Blood circulation, collateral     CAD (coronary artery disease)     Carotid artery occlusion, right     Chronic kidney disease     Depression     GERD (gastroesophageal reflux disease)     Gout     History of blood transfusion     Hyperlipidemia     Hypertension     Pneumonia due to infectious organism 4/9/2019    Type II or unspecified type diabetes mellitus without mention of complication, not stated as uncontrolled     Ulcerative colitis ([facility] [address] 6/30/2019       Past Surgical History:        Procedure Laterality Date    CATARACT REMOVAL Bilateral     DIALYSIS FISTULA CREATION Right 10/16/2020    RIGHT BRACHIAL/BASILIC AV FISTULA performed by Milly June MD at [address] Right Brachial Artery to Cephalic Vein Arteriovenous Fistula Creation    HEMORRHOID SURGERY  1973    HERNIA REPAIR      umbilical hernia    PACEMAKER PLACEMENT      [address] VASCULAR SURGERY  11/02/2020    [facility].  Right upper extremity fistulogram's including venography to the superior vena cava, Balloon angioplasty distal/mid upper arm cephalic vein stenoses with 8 mm x 40 mm Luke balloon, Coil embolization of cephalic vein branch (8 mm x 5 cm, 5 mm x 5 cm (4)), Completion fistulogram's and venograms    VASCULAR SURGERY  01/14/2021    SJS-   Right upper extremity fistulogram's including venography to the superior vena cava,  Balloon angioplasty distal/mid upper arm cephalic vein stenosis with 8 mm x 60 mm Lutonix drug-coated balloon, Completion venograms right upper extremity       Medications: Scheduled Meds:   sodium chloride flush  5-40 mL Intravenous 2 times per day    vancomycin  1,000 mg Intravenous Once    gentamicin  120 mg Intravenous Q48H    aminoglycoside intermittent dosing (placeholder)   Other RX Placeholder    vancomycin (VANCOCIN) intermittent dosing (placeholder)   Other RX Placeholder     Continuous Infusions:   sodium chloride      dextrose 5 % and 0.9 % NaCl 50 mL/hr at 05/19/21 1704    midazolam 1 mg/hr (05/19/21 1701)     PRN Meds:.sodium chloride flush, sodium chloride, acetaminophen **OR** acetaminophen    Allergies:  Patient has no known allergies. Social History:   TOBACCO:   reports that he quit smoking about 15 years ago. His smoking use included cigarettes. He has a 50.00 pack-year smoking history. He has quit using smokeless tobacco.  His smokeless tobacco use included snuff. ETOH:   reports current alcohol use.     Family History:       Problem Relation Age of Onset    High Blood Pressure Mother     Heart Disease Mother     Stroke Mother         at 48    High Blood Pressure Father     Heart Disease Father     Stroke Father         at 59    No Known Problems Brother     Alzheimer's Disease Brother         onset at 80, then rapidly progressed to death       ROS:  Review of Systems   Unable to perform ROS: Intubated       Physical Exam:    Vitals: BP (!) 149/85   Pulse 93   Temp 99.4 °F (37.4 °C) (Temporal) Resp 19   Ht 5' 8\" (1.727 m)   Wt 139 lb (63 kg)   SpO2 97%   BMI 21.13 kg/m²     Physical Exam  Vitals and nursing note reviewed. Constitutional:       Appearance: He is ill-appearing. Interventions: He is intubated. HENT:      Head: Normocephalic and atraumatic. Right Ear: External ear normal.      Left Ear: External ear normal.      Nose: Nose normal.      Mouth/Throat:      Mouth: Mucous membranes are moist.   Eyes:      Conjunctiva/sclera: Conjunctivae normal.      Pupils: Pupils are equal, round, and reactive to light. Cardiovascular:      Rate and Rhythm: Normal rate and regular rhythm. Heart sounds: Normal heart sounds. Pulmonary:      Effort: Pulmonary effort is normal. He is intubated. Breath sounds: Normal breath sounds. Abdominal:      General: Abdomen is flat. Palpations: Abdomen is soft. Musculoskeletal:         General: No swelling. Cervical back: Neck supple. No rigidity. Skin:     General: Skin is warm and dry. CBC:   Recent Labs     05/19/21  1606   WBC 22.3*   HGB 11.8*        BMP:    Recent Labs     05/19/21  1606 05/19/21  1613   *  --    K 3.6 3.4   CL 97*  --    CO2 28  --    BUN 31*  --    CREATININE 2.8*  --    GLUCOSE 119*  --      Hepatic:   Recent Labs     05/19/21  1606   AST 29   ALT 22   BILITOT 0.5   ALKPHOS 145*     Troponin: No results for input(s): TROPONINI in the last 72 hours. BNP: No results for input(s): BNP in the last 72 hours. Lipids: No results for input(s): CHOL, HDL in the last 72 hours.     Invalid input(s): LDLCALCU  ABGs:   Lab Results   Component Value Date    PHART 7.480 05/19/2021    PO2ART 330.0 05/19/2021    MIE6GMU 38.0 05/19/2021     INR:   Recent Labs     05/19/21  1606   INR 1.21*     -----------------------------------------------------------------  XR CHEST PORTABLE    Result Date: 5/19/2021  Exam: XR CHEST PORTABLE - 5/19/2021 3:00 PM Indication: Intubated patient Comparison: 11/25/2020 Findings: Endotracheal tube is 3 cm above the genia. Enteric tube terminates in the region of the gastric fundus. LEFT chest wall cardiac pacing device appears stable in position. Patchy bilateral pulmonary airspace opacities are present. No pleural effusion or visible pneumothorax. A skin fold projects along the LEFT lateral chest. No acute osseous finding. Impression: 1. Endotracheal tube appears in good position. 2.  Patchy bilateral airspace opacity. Signed by Dr Oneta Hammans on 5/19/2021 4:20 PM          Assessment and Plan     Sepsis. Initiate sepsis pathway. Acute hypoxemic respiratory failure. Ventilatory support. Mental status change. Work-up will be ongoing. Please document 40 minutes of critical care time for patient assessment, chart review, discussion with staff, .       Lunette Litten, DO, DO

## 2021-05-20 NOTE — CONSULTS
**Physician Signature**  This document was electronically signed by: Benji Zavaleta MD  2021   10:12 PM    **Consult Information**  Member Facility: [address] [place] [address] MRN: [id number]  Visit/Encounter Number: [id number]  Consult ID: [id number]  Facility Time Zone: CT  Date and Time of Request: 2021 09:12 PM  Requesting Clinician: Mariaelena Teran MD  Patient Name: Rain Richardson  YOB: 1940  Gender: Male  Patient identity was confirmed at the beginning of the consult with the   patient/family/staff using two personal identifiers: Patient name and       **Admission**  Admission Date: 2021  Chief reason for ICU admission: Respiratory Failure    **Core Metrics**  General orienting sentence for patient: 69 y/o transferred from an outside   hospital. Found down at home, had missed HD (ESRD). Intubated and on the   ventilator. Was also hypoglycemic. Only on Versed.   Chief physiologic deterioration: None - Stable patient  Is the patient on DVT prophylaxis?: No  Is the patient on GI prophylaxis?: No  Has this patient reached their nutritional goal?: No and issues are being   addressed  Are there current issues with pain management in this patient?:   No  Are there issues with skin integrity?: No  Are there issues with delirium?: No  Has the patient been mobilized?: No  Is this patient currently intubated?: Yes  Are there ethical or care philosophy or family issues?: No  Do you recommend an in depth evaluation?: No  Do you recommend the patient should: : Continue ICU level of care    **Inserted/Removed Devices**  Device Name: Endotracheal Tube  Site of insertion: Trachea  Date of insertion: 2021  Device Name: Gomes Catheter  Site of insertion: Bladder  Date of insertion: 2021  Device Name: Orogastric Tube  Site of insertion: Oropharynx  Date of insertion: 2021

## 2021-05-20 NOTE — PROGRESS NOTES
Pharmacy Aminoglycoside Consult    Aminoglycoside: Gentamicin  Day: 2  Current Dosing: Pulse dose secondary to HD    Temp max: 99.4    Estimated Creatinine Clearance: 19 mL/min (A) (based on SCr of 2.8 mg/dL (H)). Recent Labs     05/19/21  1606 05/20/21  0138   BUN 31* 31*       Recent Labs     05/19/21  1606 05/20/21  0138   CREATININE 2.8* 2.8*       Recent Labs     05/20/21  0138 05/20/21  0753   WBC 27.5* 27.9*       Culture Date Source Results   05/19/21 Blood x 2 Ordered       Gentamicin random pre-dialysis: 2.8    ASSESSMENT/PLAN: Level therapeutic. After dialysis today give Gentamicin 120 mg. Draw Gentamicin random level before next HD. Thank you for the consult. Will continue to follow.      Electronically signed by ONUR Torres [facility] on 5/20/2021 at 10:42 AM

## 2021-05-20 NOTE — PROGRESS NOTES
Pharmacy Vancomycin Consult     Vancomycin Day: 2  Current Dosing: Pulse dose secondary to HD    Temp max:  100.7    Recent Labs     05/19/21  1606 05/20/21  0138   BUN 31* 31*       Recent Labs     05/19/21  1606 05/20/21  0138   CREATININE 2.8* 2.8*       Recent Labs     05/20/21  0138 05/20/21  0753   WBC 27.5* 27.9*         Intake/Output Summary (Last 24 hours) at 5/20/2021 1223  Last data filed at 5/20/2021 1200  Gross per 24 hour   Intake 1109.65 ml   Output 2985 ml   Net -1875.35 ml       Culture Date Source Results   05/19/21 Blood x 2 Ordered       Ht Readings from Last 1 Encounters:   05/19/21 5' 8\" (1.727 m)        Wt Readings from Last 1 Encounters:   05/20/21 139 lb 3.2 oz (63.1 kg)         Body mass index is 21.17 kg/m². Estimated Creatinine Clearance: 19 mL/min (A) (based on SCr of 2.8 mg/dL (H)). Random:  5.3    Assessment/Plan: Level sub-therapeutic. Give Vancomycin 1 gm IV x 1. Draw Vancomycin random level after next-HD. Thank you for the consult. Will continue to follow.       Electronically signed by Peter Grullon, [facility] on 5/20/2021 at 12:23 PM

## 2021-05-21 PROBLEM — E16.2 HYPOGLYCEMIA: Status: ACTIVE | Noted: 2021-01-01

## 2021-05-21 PROBLEM — J96.01 ACUTE RESPIRATORY FAILURE WITH HYPOXIA (HCC): Status: ACTIVE | Noted: 2021-01-01

## 2021-05-21 PROBLEM — R41.82 ALTERED MENTAL STATE: Status: ACTIVE | Noted: 2021-01-01

## 2021-05-21 NOTE — CONSULTS
**Physician Signature**  This document was electronically signed by: Devin Potter MD  2021   10:08 PM    **Consult Information**  Member Facility: [address] [place] [address] MRN: [id number]  Visit/Encounter Number: [id number]  Consult ID: [id number]  Facility Time Zone: CT  Date and Time of Request: 2021 07:13 PM  Requesting Clinician: Mariaelena Teran MD  Patient Name: Rain Richardson  Date of Birth:   Gender: Male  Patient identity was confirmed at the beginning of the consult with the   patient/family/staff using two personal identifiers: Patient name and       **Admission**  Admission Date: 2021  Chief reason for ICU admission: Respiratory Failure    **Core Metrics**  General orienting sentence for patient: 69 y/o transferred from an outside   hospital. Found down at home, had missed HD (ESRD). Intubated and on the   ventilator. Was also hypoglycemic. Only on Versed. Recently started on   insulin. . On hep drip for a fib. HD today. Starting   card drip. Chief physiologic deterioration: None - Stable patient  Is the patient on DVT prophylaxis?: No  DVT Prophylaxis Comments: hep drip  Is the patient on GI prophylaxis?: No  Has this patient reached their nutritional goal?: No and issues are being   addressed  Are there current issues with pain management in this patient?:   No  Are there issues with skin integrity?: No  Are there issues with delirium?: No  Has the patient been mobilized?: No  Is this patient currently intubated?: Yes  Are there ethical or care philosophy or family issues?: No  Do you recommend an in depth evaluation?: No  Do you recommend the patient should: : Continue ICU level of care    **Inserted/Removed Devices**  Device Name: Endotracheal Tube  Site of insertion: Trachea  Date of insertion: 2021  Device Name: Gomes Catheter  Site of insertion: Bladder  Date of insertion: 2021  Device Name: Orogastric Tube  Site of insertion: Oropharynx  Date of insertion: 05-

## 2021-05-21 NOTE — PROGRESS NOTES
Spoke with Dr. Sera Hawthorne reviewing patients case, he wants to try a CPAP trial on the vent. Resp to be notified.  Electronically signed by Jennifer Fernandez RN on 5/21/2021 at 11:00 AM

## 2021-05-21 NOTE — PROGRESS NOTES
Hospitalist Progress Note    Patient:  Miguel Topete  YOB: 1940  Date of Service: 5/21/2021  MRN: [id number]   Acct: [de-identified]   Primary Care Physician: Nano Guillen MD  Advance Directive: Full Code  Admit Date: 5/19/2021       Hospital Day: 2  Referring Provider: Kristy Frank DO    Patient Seen, Chart, Consults, Notes, Labs, Radiology studies reviewed. Subjective:  Miguel Topete is a 68 y.o. male  whom we are following for hypoxemic respiratory failure, end-stage renal disease, metabolic encephalopathy, mental status change. No new events overnight. We have placed him on CPAP and thus far he is doing fairly well. He is not following any commands. He is off of sedation. There is concerned that he may have had an extended period of hypoglycemia causing cerebral injury. He is to get an MRI today. He had been on assist-control rate of 14, tidal volume of 400, 5 of PEEP, FiO2 of 0.3. Allergies:  Patient has no known allergies.     Medicines:  Current Facility-Administered Medications   Medication Dose Route Frequency Provider Last Rate Last Admin    heparin (porcine) injection 3,790 Units  60 Units/kg Intravenous PRN Kristy Frank DO   3,790 Units at 05/20/21 1718    heparin (porcine) injection 1,890 Units  30 Units/kg Intravenous PRN Kristy Frank DO   1,890 Units at 05/21/21 0026    heparin 25,000 units in dextrose 5% 250 mL (premix) infusion  5-30 Units/kg/hr Intravenous Continuous Kristy Frank DO 8.2 mL/hr at 05/21/21 0822 13 Units/kg/hr at 05/21/21 3602    metoprolol (LOPRESSOR) injection 5 mg  5 mg Intravenous Q6H Kristy Frank DO   5 mg at 05/21/21 1054    dextrose 10 % infusion   Intravenous Continuous Kristy Frank DO 50 mL/hr at 05/20/21 1536 New Bag at 05/20/21 1536    dilTIAZem 125 mg in dextrose 5 % 125 mL infusion  5-15 mg/hr Intravenous Continuous Kristy Frank DO 10 mL/hr at 05/21/21 0026 10 mg/hr at 05/21/21 0026    hernia    PACEMAKER PLACEMENT      [facility] PACEMAKER    VASCULAR SURGERY  11/02/2020    [facility].  Right upper extremity fistulogram's including venography to the superior vena cava, Balloon angioplasty distal/mid upper arm cephalic vein stenoses with 8 mm x 40 mm Luke balloon, Coil embolization of cephalic vein branch (8 mm x 5 cm, 5 mm x 5 cm (4)), Completion fistulogram's and venograms    VASCULAR SURGERY  01/14/2021    [facility]-   Right upper extremity fistulogram's including venography to the superior vena cava,  Balloon angioplasty distal/mid upper arm cephalic vein stenosis with 8 mm x 60 mm Lutonix drug-coated balloon, Completion venograms right upper extremity       Family History  Family History   Problem Relation Age of Onset    High Blood Pressure Mother     Heart Disease Mother     Stroke Mother         at 48    High Blood Pressure Father     Heart Disease Father     Stroke Father         at 59    No Known Problems Brother     Alzheimer's Disease Brother         onset at 80, then rapidly progressed to death       Social History  Social History     Socioeconomic History    Marital status:      Spouse name: Not on file    Number of children: 3    Years of education: Not on file    Highest education level: Not on file   Occupational History    Occupation: [employer]   Tobacco Use    Smoking status: Former Smoker     Packs/day: 1.00     Years: 50.00     Pack years: 50.00     Types: Cigarettes     Quit date: 2006     Years since quitting: 15.3    Smokeless tobacco: Former User     Types: Snuff   Vaping Use    Vaping Use: Never used   Substance and Sexual Activity    Alcohol use: Yes     Comment: occasional    Drug use: Never    Sexual activity: Not on file   Other Topics Concern    Not on file   Social History Narrative    Worked on a [employer] most of his life     twice and     He has 2 sons and one daughter    Never in the [employer] high school Attends [address] one pack per day quit in 2006 denies alcohol consumption or substance usage    Physically sedentary    /////////////////////////////////////////////////////////////////    CODE STATUS: Full Code    HEALTH CARE PROXY: His oldest son, Madelin Bradford, [phone]    DOMICILED: Lives alone in a private home with one step in to home but no steps within, has no pets    AMBULATES: independantly     Social Determinants of Health     Financial Resource Strain:     Difficulty of Paying Living Expenses:    Food Insecurity:     Worried About Running Out of Food in the Last Year:     [address] in the Last Year:    Transportation Needs:     Lack of Transportation (Medical):  Lack of Transportation (Non-Medical):    Physical Activity:     Days of Exercise per Week:     Minutes of Exercise per Session:    Stress:     Feeling of Stress :    Social Connections:     Frequency of Communication with Friends and Family:     Frequency of Social Gatherings with Friends and Family:     Attends Sikhism Services:     Active Member of Clubs or Organizations:     Attends Club or Organization Meetings:     Marital Status:    Intimate Partner Violence:     Fear of Current or Ex-Partner:     Emotionally Abused:     Physically Abused:     Sexually Abused:          Review of Systems:    Review of Systems   Unable to perform ROS: Intubated           Objective:  Blood pressure 138/62, pulse 84, temperature 97.4 °F (36.3 °C), resp. rate 18, height 5' 8\" (1.727 m), weight 139 lb 3.2 oz (63.1 kg), SpO2 99 %. Intake/Output Summary (Last 24 hours) at 5/21/2021 1150  Last data filed at 5/21/2021 1485  Gross per 24 hour   Intake 2321.08 ml   Output 3210 ml   Net -888.92 ml       Physical Exam  Vitals and nursing note reviewed. Constitutional:       Appearance: He is ill-appearing. Interventions: He is intubated. HENT:      Head: Normocephalic and atraumatic. Right Ear: External ear normal.      Left Ear: External ear normal.      Nose: Nose normal.      Mouth/Throat:      Mouth: Mucous membranes are moist.   Eyes:      Conjunctiva/sclera: Conjunctivae normal.      Pupils: Pupils are equal, round, and reactive to light. Cardiovascular:      Rate and Rhythm: Normal rate. Rhythm irregular. Heart sounds: Normal heart sounds. Pulmonary:      Effort: Pulmonary effort is normal. He is intubated. Breath sounds: Normal breath sounds. Abdominal:      General: Abdomen is flat. Palpations: Abdomen is soft. Musculoskeletal:         General: No swelling. Cervical back: Neck supple. No rigidity. Skin:     General: Skin is warm and dry. Labs:  BMP:   Recent Labs     05/19/21  1606 05/19/21  1613 05/20/21  0138 05/21/21  0533   *  --  136 131*   K 3.6 3.4 3.8 3.5   CL 97*  --  98 91*   CO2 28  --  25 31*   PHOS  --   --   --  3.4   BUN 31*  --  31* 21   CREATININE 2.8*  --  2.8* 2.2*   CALCIUM 9.1  --  8.7* 8.4*     CBC:   Recent Labs     05/20/21  0138 05/20/21  0753 05/21/21  0533   WBC 27.5* 27.9* 23.1*   HGB 11.8* 11.8* 11.7*   HCT 35.0* 35.3* 34.4*   MCV 89.7 90.1 88.9    200 186     LIVER PROFILE:   Recent Labs     05/19/21  1606 05/20/21  0138 05/21/21  0533   AST 29 30 24   ALT 22 19 17   BILITOT 0.5 0.6 0.7   ALKPHOS 145* 133* 145*     PT/INR:   Recent Labs     05/19/21  1606   PROTIME 15.3*   INR 1.21*     APTT:   Recent Labs     05/20/21  2316 05/21/21  0533 05/21/21  1046   APTT 47.3* 53.4* 42.4*     BNP:  No results for input(s): BNP in the last 72 hours. Ionized Calcium:No results for input(s): IONCA in the last 72 hours.   Magnesium:  Recent Labs     05/19/21  1606 05/21/21  0533   MG 2.0 1.9     Phosphorus:  Recent Labs     05/21/21  0533   PHOS 3.4     HgbA1C:   Recent Labs     05/19/21  1606   LABA1C 13.8*     Hepatic:   Recent Labs     05/19/21  1606 05/20/21  0138 05/21/21  0533   ALKPHOS 145* 133* 145*   ALT 22 19 17   AST 29 30 24   PROT 6.8 5.8* 5.5*   BILITOT 0.5 0.6 0.7   LABALBU 3.9 3.5 3.2*     Lactic Acid:   Recent Labs     05/19/21  1606   LACTA 1.3     Troponin: No results for input(s): CKTOTAL, CKMB, TROPONINT in the last 72 hours. ABGs: No results for input(s): PH, PCO2, PO2, HCO3, O2SAT in the last 72 hours. CRP:  No results for input(s): CRP in the last 72 hours. Sed Rate:  No results for input(s): SEDRATE in the last 72 hours. Cultures:   No results for input(s): CULTURE in the last 72 hours. Recent Labs     05/19/21  1744   BC No Growth to date. Any change in status will be called. No results for input(s): CXSURG in the last 72 hours. Radiology reports as per the Radiologist  Radiology: XR CHEST PORTABLE    Result Date: 5/19/2021  Exam: XR CHEST PORTABLE - 5/19/2021 3:00 PM Indication: Intubated patient Comparison: 11/25/2020 Findings: Endotracheal tube is 3 cm above the genia. Enteric tube terminates in the region of the gastric fundus. LEFT chest wall cardiac pacing device appears stable in position. Patchy bilateral pulmonary airspace opacities are present. No pleural effusion or visible pneumothorax. A skin fold projects along the LEFT lateral chest. No acute osseous finding. Impression: 1. Endotracheal tube appears in good position. 2.  Patchy bilateral airspace opacity. Signed by Dr Liyah Krishnamurthy on 5/19/2021 4:20 PM       Assessment     Sepsis. Continue sepsis pathway. Cultures negative thus far. Recheck chest x-ray tomorrow.     Acute hypoxemic respiratory failure. Continue ventilatory support. Wean as feasible.     Mental status change. Work-up ongoing.   Possible cerebral injury from prolonged hypoglycemia.     Please document 49 minutes of critical care time for patient assessment, chart review, discussion with staff,       Ursula Marie DO

## 2021-05-21 NOTE — PROGRESS NOTES
Pt remains on vent/sedated  No family present at this time. VSS. Family support provided yesterday. Family is awaiting arrival of one of pt's sons from [facility]. EEG performed yesterday(see report). PC following. Will see pt/family later today once family arrives to support.   Electronically signed by Lizett Ken RN on 5/21/2021 at 10:08 AM

## 2021-05-21 NOTE — CONSULTS
Comprehensive Nutrition Assessment    Type and Reason for Visit:  Initial, Consult    Nutrition Recommendations/Plan: Nepro @ 40 mL/hr. Flush tube with Proteinex once daily. Nutrition Assessment:  Pt is mechanically ventilated and NPO. Consult received for TF ordering and management. Malnutrition Assessment:  Malnutrition Status: At risk for malnutrition (Comment)    Context:  Chronic Illness     Findings of the 6 clinical characteristics of malnutrition:  Energy Intake:  Mild decrease in energy intake (Comment)  Weight Loss:  7 - Greater than 10% over 6 months     Body Fat Loss:  Unable to assess     Muscle Mass Loss:  Unable to assess    Fluid Accumulation:  No significant fluid accumulation     Strength:  Not Performed    Estimated Daily Nutrient Needs:  Energy (kcal):  3519-4692 kcals/day; Weight Used for Energy Requirements:  Current (25-30)     Protein (g):  107 g/PRO/day; Weight Used for Protein Requirements:  Current (1.7)        Fluid (ml/day):   Method Used for Fluid Requirements:  1 ml/kcal (1000 mL + uop)       Wounds:  Stage I, Stage II       Current Nutrition Therapies:    DIET TUBE FEED CONTINUOUS/CYCLIC NPO; Renal Formula;  Orogastric; 10; 30    Anthropometric Measures:  · Height: 5' 8\" (172.7 cm)  · Current Body Weight: 139 lb (63 kg)      · Ideal Body Weight: 154 lbs  · BMI: 21.1   · BMI Categories: Underweight (BMI less than 22) age over 72       Nutrition Diagnosis:   · Inadequate oral intake related to impaired respiratory function as evidenced by intubation, NPO or clear liquid status due to medical condition    Nutrition Interventions:   Food and/or Nutrient Delivery:  Continue NPO, Start Tube Feeding  Coordination of Nutrition Care:  Continue to monitor while inpatient    Goals:  Pt will tolerate EN and show s/s healing       Nutrition Monitoring and Evaluation:   Food/Nutrient Intake Outcomes:  Enteral Nutrition Intake/Tolerance  Physical Signs/Symptoms Outcomes:  Biochemical Data, Nutrition Focused Physical Findings, Skin, Weight     Electronically signed by Shaneka Dominguez MS, RD, LD on 5/21/21 at 4:25 PM CDT    Contact: [phone]

## 2021-05-21 NOTE — PROGRESS NOTES
Spoke with MRI, the reps for the pacemaker wont be able to be here till Monday to prep the pacemaker for the MRI. Spoke with Dr. Kylie Hines regarding this and he stated that its ok if it has to wait till Monday. Information to given to the family regarding this.  Electronically signed by Urmila Nagel RN on 5/21/2021 at 2:27 PM

## 2021-05-21 NOTE — CONSULTS
the patient should: : Continue ICU level of care    **Inserted/Removed Devices**  Device Name: Endotracheal Tube  Site of insertion: Trachea  Date of insertion: 05-  Device Name: Gomes Catheter  Site of insertion: Bladder  Date of insertion: 05-  Device Name: Orogastric Tube  Site of insertion: Oropharynx  Date of insertion: 05-

## 2021-05-21 NOTE — PROGRESS NOTES
Met with pt's son and dtr at bedside. Pt's  here to give spiritual support. Son, Kathi Dorsey, tells me his brother has been having scheduling problems getting to [place]. Apparently he will arrive in or near [facility] and then have to drive in. This RN spoke with charge RNSalvador and pt nurse, Nitin Jurado asking that they pass along in report to allow son to see his dad whenever he arrives. Currently pt still opening his eyes, unsure this is  \"purposeful\". Family does not voice any needs. I did tell them their brother would be allowed to come and see pt once he arrives. Palliative following.   Electronically signed by Lisa Ayon RN on 5/21/2021 at 11:56 AM

## 2021-05-21 NOTE — PROGRESS NOTES
Spoke with patients son Nathan Rios regarding Code Status. He doesn't want the patient to undergo chest compressions if the patients heart was to stop, he does want medications to be given if needed. Son also expressed concerns for the patient not to be extubated at this time, he wants his brother to come in and see his father before any of the choices are made. This has been passed along to Dr. Terry Boykin as well, orders have been placed regarding code status as well. Palliative care team to be notified as well of this information. Patient has also been switched over to CPAP mode on the vent per respiratory team, patient is tolerating this very well and is maintaining respiratory status. Will continue to monitor and update as needed, needs have been addressed with the family at this time.  Electronically signed by Maria Elena Sr RN on 5/21/2021 at 12:12 PM

## 2021-05-21 NOTE — PROGRESS NOTES
Nephrology ([facility]) Progress Note    Patient:  Jacinto Patton  YOB: 1940  Date of Service: 5/21/2021  MRN: [id number]   Acct: [de-identified]   Primary Care Physician: Jez Beckett MD  Advance Directive: Full Code  Admit Date: 5/19/2021       Hospital Day: 2  Referring Provider: Trevor Baker DO    Patient Seen, Chart, Consults notes, Labs, Radiology studies reviewed. Subjective:  Patient is a 68 y. o. man who is admitted to [facility] as a transfer from [facility] with altered mental Sandraorlando Lopes was found unresponsive on the floor at home.  Downtime unknown. Kelley Suero was intubated at the outside ER. Kelley Suero was recently placed on insulin or a new type of insulin his blood sugars have been somewhat low.  He is on chronic hemodialysis ([facility]). Kelley Suero is diabetic. Renal service was consulted to manage his ESRD. Patient has elevated WBC count and is believed to be septic. Patient is s/p dialysis on 5/20. Today, he remained intubated, sedated and vetend. Allergies:  Patient has no known allergies.     Medicines:  Current Facility-Administered Medications   Medication Dose Route Frequency Provider Last Rate Last Admin    heparin (porcine) injection 3,790 Units  60 Units/kg Intravenous PRN Maryl Salt, DO   3,790 Units at 05/20/21 1718    heparin (porcine) injection 1,890 Units  30 Units/kg Intravenous PRN Maryl Salt, DO   1,890 Units at 05/21/21 0026    heparin 25,000 units in dextrose 5% 250 mL (premix) infusion  5-30 Units/kg/hr Intravenous Continuous Maryl Salt, DO 8.2 mL/hr at 05/21/21 0028 13 Units/kg/hr at 05/21/21 0028    metoprolol (LOPRESSOR) injection 5 mg  5 mg Intravenous Q6H Maryl Salt, DO   5 mg at 05/21/21 0355    dextrose 10 % infusion   Intravenous Continuous Maryl Salt, DO 50 mL/hr at 05/20/21 1536 New Bag at 05/20/21 1536    dilTIAZem 125 mg in dextrose 5 % 125 mL infusion  5-15 mg/hr Intravenous Continuous Abran Nguyen, DO 10 mL/hr at 05/21/21 0026 10 mg/hr at 05/21/21 0026    sodium chloride flush 0.9 % injection 5-40 mL  5-40 mL Intravenous 2 times per day Abran Nguyen, DO   10 mL at 05/19/21 1924    sodium chloride flush 0.9 % injection 5-40 mL  5-40 mL Intravenous PRN Abran Nguyen, DO        0.9 % sodium chloride infusion  25 mL Intravenous PRN Abran Nguyen, DO        acetaminophen (TYLENOL) tablet 650 mg  650 mg Oral Q6H PRN Abran Nguyen, DO   650 mg at 05/20/21 1208    Or    acetaminophen (TYLENOL) suppository 650 mg  650 mg Rectal Q6H PRN Abran Nguyen, DO        midazolam (VERSED) infusion 100mg/100mL  1-10 mg/hr Intravenous Continuous Abran Nguyen, DO   Stopped at 05/20/21 1407    aminoglycoside intermittent dosing (placeholder)   Other RX Placeholder Abran Nguyen DO        vancomycin Lois Chay) intermittent dosing (placeholder)   Other RX Placeholder Abran Nguyen, DO           Past Medical History:  Past Medical History:   Diagnosis Date    Arthritis     Atherosclerosis of native arteries of the extremities with intermittent claudication     Blood circulation, collateral     CAD (coronary artery disease)     Carotid artery occlusion, right     Chronic kidney disease     Depression     GERD (gastroesophageal reflux disease)     Gout     History of blood transfusion     Hyperlipidemia     Hypertension     Palliative care patient 05/20/2021    Pneumonia due to infectious organism 04/09/2019    Type II or unspecified type diabetes mellitus without mention of complication, not stated as uncontrolled     Ulcerative colitis ([facility] [address] 06/30/2019       Past Surgical History:  Past Surgical History:   Procedure Laterality Date    CATARACT REMOVAL Bilateral     DIALYSIS FISTULA CREATION Right 10/16/2020    RIGHT BRACHIAL/BASILIC AV FISTULA performed by Pablo Chou MD at [address] Right Brachial Artery to and     He has 2 sons and one daughter    Never in the [employer]    Attends [employer]    Smoked one pack per day quit in 2006 denies alcohol consumption or substance usage    Physically sedentary    /////////////////////////////////////////////////////////////////    CODE STATUS: Full Code    HEALTH CARE PROXY: His oldest son, Shyann Contreras, [phone]    DOMICILED: Lives alone in a private home with one step in to home but no steps within, has no pets    AMBULATES: independantly     Social Determinants of Health     Financial Resource Strain:     Difficulty of Paying Living Expenses:    Food Insecurity:     Worried About Running Out of Food in the Last Year:     [address] in the Last Year:    Transportation Needs:     Lack of Transportation (Medical):  Lack of Transportation (Non-Medical):    Physical Activity:     Days of Exercise per Week:     Minutes of Exercise per Session:    Stress:     Feeling of Stress :    Social Connections:     Frequency of Communication with Friends and Family:     Frequency of Social Gatherings with Friends and Family:     Attends Druze Services:     Active Member of Clubs or Organizations:     Attends Club or Organization Meetings:     Marital Status:    Intimate Partner Violence:     Fear of Current or Ex-Partner:     Emotionally Abused:     Physically Abused:     Sexually Abused:          Review of Systems:  UTO    Objective:  Blood pressure 123/72, pulse 100, temperature 97.2 °F (36.2 °C), temperature source Temporal, resp. rate 22, height 5' 8\" (1.727 m), weight 139 lb 3.2 oz (63.1 kg), SpO2 99 %.     Intake/Output Summary (Last 24 hours) at 5/21/2021 0817  Last data filed at 5/21/2021 0026  Gross per 24 hour   Intake 1823.42 ml   Output 3060 ml   Net -1236.58 ml     General: intubated, sedated  Chest:  clear to auscultation bilaterally without respiratory distress  CVS: regular rate and PORTABLE    Result Date: 5/19/2021  Exam: XR CHEST PORTABLE - 5/19/2021 3:00 PM Indication: Intubated patient Comparison: 11/25/2020 Findings: Endotracheal tube is 3 cm above the genia. Enteric tube terminates in the region of the gastric fundus. LEFT chest wall cardiac pacing device appears stable in position. Patchy bilateral pulmonary airspace opacities are present. No pleural effusion or visible pneumothorax. A skin fold projects along the LEFT lateral chest. No acute osseous finding. Impression: 1. Endotracheal tube appears in good position. 2.  Patchy bilateral airspace opacity. Signed by Dr Sarai Rodriguez on 5/19/2021 4:20 PM       Assessment   1. 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 Syncope (after hypoglycemia)  4. Atrial fibrillation with tachycardia  5. Anemia of CKD  6. Acute resp failure  7. Leukocytosis (rule out sepsis)  8. Urinary tract infection    Plan:  Continue antibiotics and resp support. Will need dialysis today or by tomorrow before the weekend. Labs were reviewed. He remained septic.

## 2021-05-21 NOTE — TELEPHONE ENCOUNTER
Adenike with [facility] called. This patient is inpatient and needing a MRI cardiology form.    Device: [employer] MRI  RV: [id number]    Form has been faxed

## 2021-05-21 NOTE — PROGRESS NOTES
Pharmacy Aminoglycoside Consult    Aminoglycoside: Gentamicin  Day: 3  Current Dosing: Pulse dosing for HD    Temp max: 99.4    Estimated Creatinine Clearance: 25 mL/min (A) (based on SCr of 2.2 mg/dL (H)). Recent Labs     05/20/21  0138 05/21/21  0533   BUN 31* 21       Recent Labs     05/20/21  0138 05/21/21  0533   CREATININE 2.8* 2.2*       Recent Labs     05/20/21  0753 05/21/21  0533   WBC 27.9* 23.1*       Culture Date Source Results   05/20/21 Urine No growth   05/19/21 Blood No growth            Random level: 3.3    ASSESSMENT/PLAN: Give Gentamicin 120mg IV x 1 dose tonight after HD. Will get random level prior to next HD.     TINO DANG, PHARM D, 5/21/2021, 1:58 PM

## 2021-05-21 NOTE — PROCEDURES
Patient:   So White  MR#:    [id number]   Room:    6561/475-36   YOB: 1940  Date of Progress Note: 5/21/2021  Time of Note                           6:16 AM  Consulting Physician:   Sandra Green M.D. Attending Physician:  Wicho Ayala,        This is a multichannel digital EEG recording using the international 10-20 placement system. Technical Summary:     Background EEG activity: There is not well defined occipital dominant rhythm. There is much low  voltage 4-6 Hz activity seen in a generalized distribution intermixed with low voltage 18-22 Hz activity. There is low to moderate to high voltage 1.5-3 Hz activity seen intermittently. Photic Stimulation: No abnormal waveforms are noted with various frequencies of flickering light. IMPRESSION:   There is significant movement and myogenic artifact seen through the recording. During the brief interpretable portions the background activity appears to be diffusely slow consistent with a diffuse disturbance in cerebral function.        Dr Aman Stanton Certified in Neurology  Board Certified in Clinical Neurophysiology  Fellowship trained in [address] [place] [address] Time 21 minutes

## 2021-05-21 NOTE — PROGRESS NOTES
Patient:   Stephanie Peck  MR#:    [id number]   Room:    [address] [place] [address]   YOB: 1940  Date of Progress Note: 5/21/2021  Time of Note                           8:25 AM  Consulting Physician:   Kingsely Winkler M.D. Attending Physician:  Tiffany Acosta DO     Chief complaint AMS    S:This 68 y.o. male a past medical history significant for AMS. As per Dr Zepeda Drivers note 5/20/21     \"This 68 y.o. male who presents with altered mental status. He was found down and unresponsive on his floor at home. Downtime unknown. He was intubated at the outside ER. He was recently placed on insulin or a new type of insulin his blood sugars have been somewhat low. He is on chronic hemodialysis. He is oxygenating well. He has woken up some today but is not making eye contact or following any commands. History obtained from the chart as well as from nursing staff. \"    Blood sugar in the 40's when found down for unclear duration. No new issues overnight. Still vented. Ne sedation since yesterday.       REVIEW OF SYSTEMS:  Unable to obtain    Past Medical History:      Diagnosis Date    Arthritis     Atherosclerosis of native arteries of the extremities with intermittent claudication     Blood circulation, collateral     CAD (coronary artery disease)     Carotid artery occlusion, right     Chronic kidney disease     Depression     GERD (gastroesophageal reflux disease)     Gout     History of blood transfusion     Hyperlipidemia     Hypertension     Palliative care patient 05/20/2021    Pneumonia due to infectious organism 04/09/2019    Type II or unspecified type diabetes mellitus without mention of complication, not stated as uncontrolled     Ulcerative colitis ([facility] [address] 06/30/2019       Past Surgical History:      Procedure Laterality Date    CATARACT REMOVAL Bilateral     DIALYSIS FISTULA CREATION Right 10/16/2020    RIGHT BRACHIAL/BASILIC AV [facility]ISTULA [facility] Right Brachial Artery to Cephalic Vein Arteriovenous Fistula Creation    HEMORRHOID SURGERY  1973    HERNIA REPAIR      umbilical hernia    PACEMAKER PLACEMENT      [facility] [address] VASCULAR SURGERY  11/02/2020    [facility].  Right upper extremity fistulogram's including venography to the superior vena cava, Balloon angioplasty distal/mid upper arm cephalic vein stenoses with 8 mm x 40 mm Luke balloon, Coil embolization of cephalic vein branch (8 mm x 5 cm, 5 mm x 5 cm (4)), Completion fistulogram's and venograms    VASCULAR SURGERY  01/14/2021    [facility]-   Right upper extremity fistulogram's including venography to the superior vena cava,  Balloon angioplasty distal/mid upper arm cephalic vein stenosis with 8 mm x 60 mm Lutonix drug-coated balloon, Completion venograms right upper extremity       Medications in Hospital:      Current Facility-Administered Medications:     heparin (porcine) injection 3,790 Units, 60 Units/kg, Intravenous, PRN, Evetta Shara, DO, 3,790 Units at 05/20/21 1718    heparin (porcine) injection 1,890 Units, 30 Units/kg, Intravenous, PRN, Evetta Shara, DO, 1,890 Units at 05/21/21 0026    heparin 25,000 units in dextrose 5% 250 mL (premix) infusion, 5-30 Units/kg/hr, Intravenous, Continuous, Evetta Shara, DO, Last Rate: 8.2 mL/hr at 05/21/21 0822, 13 Units/kg/hr at 05/21/21 4415    metoprolol (LOPRESSOR) injection 5 mg, 5 mg, Intravenous, Q6H, Evetta Shara, DO, 5 mg at 05/21/21 0355    dextrose 10 % infusion, , Intravenous, Continuous, Evetta Shara, DO, Last Rate: 50 mL/hr at 05/20/21 1536, New Bag at 05/20/21 1536    dilTIAZem 125 mg in dextrose 5 % 125 mL infusion, 5-15 mg/hr, Intravenous, Continuous, Evetta Shara, DO, Last Rate: 10 mL/hr at 05/21/21 0026, 10 mg/hr at 05/21/21 0026    sodium chloride flush 0.9 % injection 5-40 mL, 5-40 mL, Intravenous, 2 times per day, Evetta Shara, DO, 10 mL at 05/19/21 1924    sodium chloride flush 0.9 % injection 5-40 mL, 5-40 mL, Intravenous, PRN, Ursula Marie, DO    0.9 % sodium chloride infusion, 25 mL, Intravenous, PRN, Ursula Castrotock, DO    acetaminophen (TYLENOL) tablet 650 mg, 650 mg, Oral, Q6H PRN, 650 mg at 05/20/21 1208 **OR** acetaminophen (TYLENOL) suppository 650 mg, 650 mg, Rectal, Q6H PRN, Ursula Marie, DO    midazolam (VERSED) infusion 100mg/100mL, 1-10 mg/hr, Intravenous, Continuous, Ursula Marie, , Stopped at 05/20/21 1407    aminoglycoside intermittent dosing (placeholder), , Other, RX Placeholder, Ursula Marie, DO    vancomycin Theola Reason) intermittent dosing (placeholder), , Other, RX Placeholder, Ursula Marie, DO    Allergies:  Patient has no known allergies. Social History:   TOBACCO:   reports that he quit smoking about 15 years ago. His smoking use included cigarettes. He has a 50.00 pack-year smoking history. He has quit using smokeless tobacco.  His smokeless tobacco use included snuff. ETOH:   reports current alcohol use. Family History:       Problem Relation Age of Onset    High Blood Pressure Mother     Heart Disease Mother     Stroke Mother         at 48    High Blood Pressure Father     Heart Disease Father     Stroke Father         at 59    No Known Problems Brother     Alzheimer's Disease Brother         onset at 80, then rapidly progressed to death         PHYSICAL EXAM:  /64   Pulse 91   Temp 97.4 °F (36.3 °C)   Resp 17   Ht 5' 8\" (1.727 m)   Wt 139 lb 3.2 oz (63.1 kg)   SpO2 99%   BMI 21.17 kg/m²     Constitutional - well developed, well nourished.    Eyes - conjunctiva normal.   Ear, nose, throat - No scars, masses, or lesions over external nose or ears, no atrophy of tongue  Neck-symmetric, no masses noted, no jugular vein distension  Respiration- chest wall appears symmetric, good expansion,   normal effort without use of accessory muscles  Musculoskeletal - no significant wasting of muscles noted, no bony deformities  Extremities-no clubbing, cyanosis or edema  Skin - warm, dry, and intact. No rash, erythema, or pallor. Psychiatric - mood, affect, and behavior unable to assess     Neurological exam  Eyes open at times not tracking vented not following commands  Resists eye opening   Occasional movement of extremities    Nursing/pcp notes, imaging,labs and vitals reviewed.      PT,OT and/or speech notes reviewed    Lab Results   Component Value Date    WBC 23.1 (H) 05/21/2021    HGB 11.7 (L) 05/21/2021    HCT 34.4 (L) 05/21/2021    MCV 88.9 05/21/2021     05/21/2021     Lab Results   Component Value Date     (L) 05/21/2021    K 3.5 05/21/2021    CL 91 (L) 05/21/2021    CO2 31 (H) 05/21/2021    BUN 21 05/21/2021    CREATININE 2.2 (H) 05/21/2021    GLUCOSE 273 (H) 05/21/2021    CALCIUM 8.4 (L) 05/21/2021    PROT 5.5 (L) 05/21/2021    LABALBU 3.2 (L) 05/21/2021    BILITOT 0.7 05/21/2021    ALKPHOS 145 (H) 05/21/2021    AST 24 05/21/2021    ALT 17 05/21/2021    LABGLOM 29 (A) 05/21/2021    GFRAA 35 (L) 05/21/2021     Lab Results   Component Value Date    INR 1.21 (H) 05/19/2021    INR 1.54 (H) 11/25/2020    INR 1.55 (H) 11/13/2020    PROTIME 15.3 (H) 05/19/2021    PROTIME 18.6 (H) 11/25/2020    PROTIME 18.7 (H) 11/13/2020             RECORD REVIEW: Previous medical records, medications were reviewed at today's visit    IMPRESSION:   AMS in the setting of hypoglycemia in the 40s of unclear duration/respiratory failure/chronic renal failure  Profound hypoglycemia with subsequent cerebral injury is high on the differential along with stroke with history of atrial fibrillation    On vent no sedation x 24 hours  Exam 5/21 eyes open but not tracking or not following commands on vent    EEG significant movement-diffuse slowing  Check MRI brain    On Abx for presumed sepsis  Respiratory failure-wean vent as able  ESRD-on HD  DM/hypoglycemia/hyperglycemia-improved  Atrial fibrillation-Lopressor/Heparin    Minimize sedation as able    Supportive care      [address] CELL  Dr Teetee Blackwell

## 2021-05-22 NOTE — PROGRESS NOTES
Comprehensive Nutrition Assessment    Type and Reason for Visit:  Reassess    Nutrition Recommendations/Plan: Continue to increase TF rate per orders as tolerated. Nutrition Assessment:  TF has been started. Currently at 30 mL/hr tolertaing well AEB residuals 0-15 ml. Continue to increase rate per orders as tolerated. Malnutrition Assessment:  Malnutrition Status:   At risk for malnutrition (Comment)       Wounds:  Stage I, Pressure Injury       Current Nutrition Therapies:    Current Tube Feeding (TF) Orders:  · Feeding Route: Orogastric  · Formula: Renal  · Schedule: Continuous  · Additives/Modulars: Protein (One proteinex daily)  · Water Flushes: 25 mL/hr  · Current TF & Flush Orders Provides: Nepro at 30 mL/hr with 10 mL/hr free water flush and 1 proteinex provides: 1378 kcals, 84 g PRO, 115 g CHO, 763 mL free water  · Goal TF & Flush Orders Provides: Nepro at 40 mL/hr with 25 mL/r free water flush and 1 Proteinex provides: 1803 kcals, 104 g PRO, 154 g CHO, 1298 mL free water    Anthropometric Measures:  · Height: 5' 8\" (172.7 cm)  · Current Body Weight: 134 lb (60.8 kg)   · Admission Body Weight: 139 lb (63 kg)     · Ideal Body Weight: 154 lbs  · BMI: 20.4   · BMI Categories: Underweight (BMI less than 22) age over 72       Nutrition Interventions:   Food and/or Nutrient Delivery:  Continue NPO, Continue Current Tube Feeding  Coordination of Nutrition Care:  Continue to monitor while inpatient    Goals:  Pt will tolerate EN and show s/s healing       Nutrition Monitoring and Evaluation:   Food/Nutrient Intake Outcomes:  Enteral Nutrition Intake/Tolerance  Physical Signs/Symptoms Outcomes:  Biochemical Data, Hemodynamic Status, Nutrition Focused Physical Findings, Skin, Weight     Electronically signed by Lyndsey Stern, MS, RD, LD on 5/22/21 at 9:57 AM CDT    Contact: [phone]

## 2021-05-22 NOTE — PROGRESS NOTES
Hospitalist Progress Note    Patient:  Tam Ortiz  YOB: 1940  Date of Service: 5/22/2021  MRN: [id number]   Acct: [de-identified]   Primary Care Physician: Vinh Murillo MD  Advance Directive: Partial Code  Admit Date: 5/19/2021       Hospital Day: 3  Referring Provider: Abran Nguyen DO    Patient Seen, Chart, Consults, Notes, Labs, Radiology studies reviewed. Subjective:  Tam Ortiz is a 68 y.o. male  whom we are following for hypoxemic respiratory failure, end-stage renal disease, metabolic encephalopathy. No significant clinical change. His MRI will not be able to be done until Monday because of preparing his pacemaker. He is on assist-control rate of 14, tidal volume of 400, 5 of PEEP, FiO2 of 0.3. Allergies:  Patient has no known allergies.     Medicines:  Current Facility-Administered Medications   Medication Dose Route Frequency Provider Last Rate Last Admin    amiodarone (CORDARONE) 150 mg in dextrose 5 % 100 mL bolus  150 mg Intravenous Once Abran Nguyen DO        Followed by   [place] amiodarone (CORDARONE) 450 mg in dextrose 5 % 250 mL infusion  1 mg/min Intravenous Continuous Abran Nguyen DO        Followed by   Cyril Yi ON 5/23/2021] amiodarone (CORDARONE) 450 mg in dextrose 5 % 250 mL infusion  0.5 mg/min Intravenous Continuous Abran Nguyen DO        insulin lispro (HUMALOG) injection vial 0-18 Units  0-18 Units Subcutaneous Q4H Olvin López MD   3 Units at 05/22/21 1743    heparin (porcine) injection 3,790 Units  60 Units/kg Intravenous PRN Abran Nguyen DO   3,790 Units at 05/22/21 1305    heparin (porcine) injection 1,890 Units  30 Units/kg Intravenous PRN Abran Nguyen DO   1,890 Units at 05/21/21 2317    heparin 25,000 units in dextrose 5% 250 mL (premix) infusion  5-30 Units/kg/hr Intravenous Continuous Abran Nguyen DO 10.7 mL/hr at 05/22/21 1744 17 Units/kg/hr at 05/22/21 1744    metoprolol (LOPRESSOR) injection 5 mg  5 mg Intravenous Q6H Samantha Half, DO   5 mg at 05/22/21 1443    sodium chloride flush 0.9 % injection 5-40 mL  5-40 mL Intravenous 2 times per day Samantha Half, DO   10 mL at 05/22/21 0262    sodium chloride flush 0.9 % injection 5-40 mL  5-40 mL Intravenous PRN Samantha Half, DO        0.9 % sodium chloride infusion  25 mL Intravenous PRN Samantha Half, DO        acetaminophen (TYLENOL) tablet 650 mg  650 mg Oral Q6H PRN Samantha Half, DO   650 mg at 05/20/21 1208    Or    acetaminophen (TYLENOL) suppository 650 mg  650 mg Rectal Q6H PRN Samantha Half, DO        aminoglycoside intermittent dosing (placeholder)   Other RX Placeholder Samantha Half, DO        vancomycin Obi Adams) intermittent dosing (placeholder)   Other RX Placeholder Samantha Half, DO           Past Medical History:  Past Medical History:   Diagnosis Date    Arthritis     Atherosclerosis of native arteries of the extremities with intermittent claudication     Blood circulation, collateral     CAD (coronary artery disease)     Carotid artery occlusion, right     Chronic kidney disease     Depression     GERD (gastroesophageal reflux disease)     Gout     History of blood transfusion     Hyperlipidemia     Hypertension     Palliative care patient 05/20/2021    Pneumonia due to infectious organism 04/09/2019    Type II or unspecified type diabetes mellitus without mention of complication, not stated as uncontrolled     Ulcerative colitis ([facility] [address] 06/30/2019       Past Surgical History:  Past Surgical History:   Procedure Laterality Date    CATARACT REMOVAL Bilateral     DIALYSIS FISTULA CREATION Right 10/16/2020    RIGHT BRACHIAL/BASILIC AV FISTULA performed by Dong Henley MD at [address] Right Brachial Artery to Cephalic Vein Arteriovenous Fistula Creation    [facility]      umbilical hernia   [facility] PACEMAKER    VASCULAR SURGERY  11/02/2020    [facility].  Right upper extremity fistulogram's including venography to the superior vena cava, Balloon angioplasty distal/mid upper arm cephalic vein stenoses with 8 mm x 40 mm Luke balloon, Coil embolization of cephalic vein branch (8 mm x 5 cm, 5 mm x 5 cm (4)), Completion fistulogram's and venograms    VASCULAR SURGERY  01/14/2021    [facility]-   Right upper extremity fistulogram's including venography to the superior vena cava,  Balloon angioplasty distal/mid upper arm cephalic vein stenosis with 8 mm x 60 mm Lutonix drug-coated balloon, Completion venograms right upper extremity       Family History  Family History   Problem Relation Age of Onset    High Blood Pressure Mother     Heart Disease Mother     Stroke Mother         at 48    High Blood Pressure Father     Heart Disease Father     Stroke Father         at 59    No Known Problems Brother     Alzheimer's Disease Brother         onset at 80, then rapidly progressed to death       Social History  Social History     Socioeconomic History    Marital status:      Spouse name: Not on file    Number of children: 3    Years of education: Not on file    Highest education level: Not on file   Occupational History    Occupation: Printer   Tobacco Use    Smoking status: Former Smoker     Packs/day: 1.00     Years: 50.00     Pack years: 50.00     Types: Cigarettes     Quit date: 2006     Years since quitting: 15.3    Smokeless tobacco: Former User     Types: Snuff   Vaping Use    Vaping Use: Never used   Substance and Sexual Activity    Alcohol use: Yes     Comment: occasional    Drug use: Never    Sexual activity: Not on file   Other Topics Concern    Not on file   Social History Narrative    Worked on a [employer] most of his life     twice and     He has 2 sons and one daughter    Never in the [employer] high school    Attends [employer] one pack per day quit in 2006 denies alcohol consumption or substance usage    Physically sedentary    /////////////////////////////////////////////////////////////////    CODE STATUS: Full Code    HEALTH CARE PROXY: His oldest son, Easton Dobson, [phone]    DOMICILED: Lives alone in a private home with one step in to home but no steps within, has no pets    AMBULATES: independantly     Social Determinants of Health     Financial Resource Strain:     Difficulty of Paying Living Expenses:    Food Insecurity:     Worried About Running Out of Food in the Last Year:     [address] in the Last Year:    Transportation Needs:     Lack of Transportation (Medical):  Lack of Transportation (Non-Medical):    Physical Activity:     Days of Exercise per Week:     Minutes of Exercise per Session:    Stress:     Feeling of Stress :    Social Connections:     Frequency of Communication with Friends and Family:     Frequency of Social Gatherings with Friends and Family:     Attends Church Services:     Active Member of Clubs or Organizations:     Attends Club or Organization Meetings:     Marital Status:    Intimate Partner Violence:     Fear of Current or Ex-Partner:     Emotionally Abused:     Physically Abused:     Sexually Abused:          Review of Systems:    Review of Systems   Unable to perform ROS: Intubated           Objective:  Blood pressure (!) 152/56, pulse 112, temperature 98.8 °F (37.1 °C), temperature source Temporal, resp. rate 25, height 5' 8\" (1.727 m), weight 134 lb 12.8 oz (61.1 kg), SpO2 96 %. Intake/Output Summary (Last 24 hours) at 5/22/2021 1847  Last data filed at 5/22/2021 1800  Gross per 24 hour   Intake 1967.91 ml   Output 225 ml   Net 1742.91 ml       Physical Exam  Vitals and nursing note reviewed. Constitutional:       Appearance: He is ill-appearing. Interventions: He is intubated. HENT:      Head: Normocephalic and atraumatic.       Right Ear: External ear normal.      Left Ear: External ear normal.      Nose: Nose normal.      Mouth/Throat:      Mouth: Mucous membranes are moist.   Eyes:      Conjunctiva/sclera: Conjunctivae normal.      Pupils: Pupils are equal, round, and reactive to light. Cardiovascular:      Rate and Rhythm: Normal rate and regular rhythm. Heart sounds: Normal heart sounds. Pulmonary:      Effort: Pulmonary effort is normal. He is intubated. Breath sounds: Normal breath sounds. Abdominal:      General: Abdomen is flat. Palpations: Abdomen is soft. Musculoskeletal:         General: No swelling. Cervical back: Neck supple. No rigidity. Skin:     General: Skin is warm and dry. Labs:  BMP:   Recent Labs     05/20/21  0138 05/21/21 0533 05/22/21 0527    131* 132*   K 3.8 3.5 3.4*   CL 98 91* 90*   CO2 25 31* 36*   PHOS  --  3.4  --    BUN 31* 21 23   CREATININE 2.8* 2.2* 2.2*   CALCIUM 8.7* 8.4* 8.6*     CBC:   Recent Labs     05/20/21  0753 05/21/21 0533 05/22/21  0527   WBC 27.9* 23.1* 17.8*   HGB 11.8* 11.7* 11.3*   HCT 35.3* 34.4* 33.9*   MCV 90.1 88.9 87.6    186 194     LIVER PROFILE:   Recent Labs     05/20/21  0138 05/21/21 0533 05/22/21 0527   AST 30 24 42*   ALT 19 17 34   BILITOT 0.6 0.7 0.6   ALKPHOS 133* 145* 242*     PT/INR: No results for input(s): PROTIME, INR in the last 72 hours. APTT:   Recent Labs     05/22/21  0527 05/22/21  1121 05/22/21  1718   APTT 52.0* 35.4 52.4*     BNP:  No results for input(s): BNP in the last 72 hours. Ionized Calcium:No results for input(s): IONCA in the last 72 hours. Magnesium:  Recent Labs     05/21/21 0533 05/22/21 0527   MG 1.9 1.9     Phosphorus:  Recent Labs     05/21/21 0533   PHOS 3.4     HgbA1C: No results for input(s): LABA1C in the last 72 hours.   Hepatic:   Recent Labs     05/20/21  0138 05/21/21  0533 05/22/21  0527   ALKPHOS 133* 145* 242*   ALT 19 17 34   AST 30 24 42*   PROT 5.8* 5.5* 5.8*   BILITOT 0.6 0.7 0.6 LABALBU 3.5 3.2* 3.3*     Lactic Acid: No results for input(s): LACTA in the last 72 hours. Troponin: No results for input(s): CKTOTAL, CKMB, TROPONINT in the last 72 hours. ABGs: No results for input(s): PH, PCO2, PO2, HCO3, O2SAT in the last 72 hours. CRP:  No results for input(s): CRP in the last 72 hours. Sed Rate:  No results for input(s): SEDRATE in the last 72 hours. Cultures:   No results for input(s): CULTURE in the last 72 hours. No results for input(s): BC, [place] [facility] in the last 72 hours. No results for input(s): CXSURG in the last 72 hours. Radiology reports as per the Radiologist  Radiology: XR CHEST PORTABLE    Result Date: 5/19/2021  Exam: XR CHEST PORTABLE - 5/19/2021 3:00 PM Indication: Intubated patient Comparison: 11/25/2020 Findings: Endotracheal tube is 3 cm above the genia. Enteric tube terminates in the region of the gastric fundus. LEFT chest wall cardiac pacing device appears stable in position. Patchy bilateral pulmonary airspace opacities are present. No pleural effusion or visible pneumothorax. A skin fold projects along the LEFT lateral chest. No acute osseous finding. Impression: 1. Endotracheal tube appears in good position. 2.  Patchy bilateral airspace opacity. Signed by Dr Jonathan Kapadia on 5/19/2021 4:20 PM       Assessment     Sepsis. Cultures negative thus far.     Acute hypoxemic respiratory failure. Continue ventilatory support. Wean as feasible.     Mental status change. Work-up ongoing.   Possible cerebral injury from prolonged hypoglycemia.     Please document 39 minutes of critical care time for patient assessment, chart review, discussion with staff,       Emilee Overton DO

## 2021-05-22 NOTE — PROGRESS NOTES
Pharmacy Vancomycin Consult     Vancomycin Day: 3  Current Dosing: Pulse dosing    Temp max:  97.6    Recent Labs     05/20/21  0138 05/21/21  0533   BUN 31* 21       Recent Labs     05/20/21  0138 05/21/21  0533   CREATININE 2.8* 2.2*       Recent Labs     05/20/21  0753 05/21/21  0533   WBC 27.9* 23.1*         Intake/Output Summary (Last 24 hours) at 5/21/2021 1912  Last data filed at 5/21/2021 1800  Gross per 24 hour   Intake 1817.29 ml   Output 2920 ml   Net -1102.71 ml       Culture Date Source Results   05/20/21 Urine No growth   05/19/21 Blood No growth            Ht Readings from Last 1 Encounters:   05/19/21 5' 8\" (1.727 m)        Wt Readings from Last 1 Encounters:   05/20/21 139 lb 3.2 oz (63.1 kg)         Body mass index is 21.17 kg/m². Estimated Creatinine Clearance: 25 mL/min (A) (based on SCr of 2.2 mg/dL (H)). Random level: 7.9    Assessment/Plan: Give Vancomycin 1gm IV x 1 dose tonight. Random level after next HD.     TINO DANG, PHARM D, 5/21/2021, 7:14 PM

## 2021-05-22 NOTE — PROGRESS NOTES
Dr. Shaniqua Granado notified of patient blood sugar 267 with no coverage ordered. New order received.  Electronically signed by Priti Carey RN on 5/21/2021 at 10:30 PM'

## 2021-05-22 NOTE — CONSULTS
**Physician Signature**  This document was electronically signed by: Constantin Trujillo MD  2021   09:25 AM    **Consult Information**  Member Facility: [address] [place] [address] MRN: [id number]  Visit/Encounter Number: [id number]  Consult ID: [id number]  Facility Time Zone: CT  Date and Time of Request: 2021 05:59 AM  Requesting Clinician: Yann Burns MD  Patient Name: Sunny Mora  YOB: 1940  Gender: Male  Patient identity was confirmed at the beginning of the consult with the   patient/family/staff using two personal identifiers: Patient name and       **Admission**  Admission Date: 2021  Chief reason for ICU admission: Respiratory Failure  Secondary reasons for ICU admission: Altered Mental Status    **Core Metrics**  General orienting sentence for patient: 69 y/o transferred from an outside   hospital. Found down at home, had missed HD (ESRD). Intubated and on the   ventilator. Was also hypoglycemic. Only on Versed. Recently started on   insulin. . On hep drip for a fib. HD today. Starting   card drip. cardizem gtt along with Gtt heparin. 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 Off all   sedation and remains comatose. Intermittent hypoglycemia, requiring D10. HX   ESRD with right arm AVF. Son is in [facility] in [facility], coming tomorrow for   ? change in status. Has hx AICD/pacer. + corneals, + overbreaths vent. son   coming tomorrow night. Not on sedation. Remains on dilt gtt. Not   interactive. interactive. Cardizem at 5 mg/hr. Gtt heparin. BP reasoable, 30%   FIO2  Chief physiologic deterioration: Change in mental status  Is the patient on DVT prophylaxis?: No  DVT Prophylaxis Comments: hep drip  Is the patient on GI prophylaxis?: No  Gi Prophylaxis Comments: Should be on pepcid 20 or protonix 40.   Has this patient reached their nutritional goal?: Yes  Nutritional Goals Details: TF  Are there current issues with pain management in this patient?:   No  Are there issues

## 2021-05-22 NOTE — PROGRESS NOTES
Patient:   Jamel White  MR#:    [id number]   Room:    [address] [place] [address]   YOB: 1940  Date of Progress Note: 5/22/2021  Time of Note                           9:46 AM  Consulting Physician:   Wilber Pineda M.D. Attending Physician:  Yann Brito DO     Chief complaint AMS    S:This 68 y.o. male a past medical history significant for AMS. As per Dr Annika Tello note 5/20/21     \"This 68 y.o. male who presents with altered mental status. He was found down and unresponsive on his floor at home. Downtime unknown. He was intubated at the outside ER. He was recently placed on insulin or a new type of insulin his blood sugars have been somewhat low. He is on chronic hemodialysis. He is oxygenating well. He has woken up some today but is not making eye contact or following any commands. History obtained from the chart as well as from nursing staff. \"    Blood sugar in the 40's when found down for unclear duration. No new issues overnight. Still vented. Ne sedation x 2 days and not responsive.       REVIEW OF SYSTEMS:  Unable to obtain    Past Medical History:      Diagnosis Date    Arthritis     Atherosclerosis of native arteries of the extremities with intermittent claudication     Blood circulation, collateral     CAD (coronary artery disease)     Carotid artery occlusion, right     Chronic kidney disease     Depression     GERD (gastroesophageal reflux disease)     Gout     History of blood transfusion     Hyperlipidemia     Hypertension     Palliative care patient 05/20/2021    Pneumonia due to infectious organism 04/09/2019    Type II or unspecified type diabetes mellitus without mention of complication, not stated as uncontrolled     Ulcerative colitis ([facility] [address] 06/30/2019       Past Surgical History:      Procedure Laterality Date    CATARACT REMOVAL Bilateral     DIALYSIS FISTULA CREATION Right 10/16/2020    RIGHT BRACHIAL/BASILIC AV FISTULA performed by Meggan Sun MD at [facility] OR Right Brachial Artery to Cephalic Vein Arteriovenous Fistula Creation    HEMORRHOID SURGERY  1973    HERNIA REPAIR      umbilical hernia    PACEMAKER PLACEMENT      [facility] [address] VASCULAR SURGERY  11/02/2020    [facility].  Right upper extremity fistulogram's including venography to the superior vena cava, Balloon angioplasty distal/mid upper arm cephalic vein stenoses with 8 mm x 40 mm Luke balloon, Coil embolization of cephalic vein branch (8 mm x 5 cm, 5 mm x 5 cm (4)), Completion fistulogram's and venograms    VASCULAR SURGERY  01/14/2021    [facility]-   Right upper extremity fistulogram's including venography to the superior vena cava,  Balloon angioplasty distal/mid upper arm cephalic vein stenosis with 8 mm x 60 mm Lutonix drug-coated balloon, Completion venograms right upper extremity       Medications in Hospital:      Current Facility-Administered Medications:     insulin lispro (HUMALOG) injection vial 0-18 Units, 0-18 Units, Subcutaneous, Q4H, Eligio Lynch MD, 3 Units at 05/22/21 0225    heparin (porcine) injection 3,790 Units, 60 Units/kg, Intravenous, PRN, Johnita Au, DO, 3,790 Units at 05/20/21 1718    heparin (porcine) injection 1,890 Units, 30 Units/kg, Intravenous, PRN, Johnita Au, DO, 1,890 Units at 05/21/21 2317    heparin 25,000 units in dextrose 5% 250 mL (premix) infusion, 5-30 Units/kg/hr, Intravenous, Continuous, Johnita Au, DO, Last Rate: 9.5 mL/hr at 05/21/21 2317, 15 Units/kg/hr at 05/21/21 2317    metoprolol (LOPRESSOR) injection 5 mg, 5 mg, Intravenous, Q6H, Johnita Au, DO, 5 mg at 05/22/21 1624    dilTIAZem 125 mg in dextrose 5 % 125 mL infusion, 5-15 mg/hr, Intravenous, Continuous, Johnita Au, DO, Last Rate: 5 mL/hr at 05/22/21 0158, 5 mg/hr at 05/22/21 0158    sodium chloride flush 0.9 % injection 5-40 mL, 5-40 mL, Intravenous, 2 times per day, Johnita Au, DO, 10 mL at 05/22/21 4103    sodium chloride flush 0.9 % injection 5-40 mL, 5-40 mL, Intravenous, PRN, Betty Oden DO    0.9 % sodium chloride infusion, 25 mL, Intravenous, PRN, Betty Oden DO    acetaminophen (TYLENOL) tablet 650 mg, 650 mg, Oral, Q6H PRN, 650 mg at 05/20/21 1208 **OR** acetaminophen (TYLENOL) suppository 650 mg, 650 mg, Rectal, Q6H PRN, Betty Oden DO    aminoglycoside intermittent dosing (placeholder), , Other, RX Placeholder, Betty Oden DO    vancomycin [facility]) intermittent dosing (placeholder), , Other, RX Placeholder, Betty Oden DO    Allergies:  Patient has no known allergies. Social History:   TOBACCO:   reports that he quit smoking about 15 years ago. His smoking use included cigarettes. He has a 50.00 pack-year smoking history. He has quit using smokeless tobacco.  His smokeless tobacco use included snuff. ETOH:   reports current alcohol use. Family History:       Problem Relation Age of Onset    High Blood Pressure Mother     Heart Disease Mother     Stroke Mother         at 48    High Blood Pressure Father     Heart Disease Father     Stroke Father         at 59    No Known Problems Brother     Alzheimer's Disease Brother         onset at 80, then rapidly progressed to death         PHYSICAL EXAM:  BP (!) 122/57   Pulse 75   Temp 97.9 °F (36.6 °C)   Resp 11   Ht 5' 8\" (1.727 m)   Wt 134 lb 12.8 oz (61.1 kg)   SpO2 98%   BMI 20.50 kg/m²     Constitutional - well developed, well nourished. Eyes - conjunctiva normal.   Ear, nose, throat - No scars, masses, or lesions over external nose or ears, no atrophy of tongue  Neck-symmetric, no masses noted, no jugular vein distension  Respiration- chest wall appears symmetric, good expansion,   normal effort without use of accessory muscles  Musculoskeletal - no significant wasting of muscles noted, no bony deformities  Extremities-no clubbing, cyanosis or edema  Skin - warm, dry, and intact.  No rash, erythema, or pallor. Psychiatric - mood, affect, and behavior unable to assess     Neurological exam  Eyes closed resist eye opening  + gag  Not breathing over ventilator  Not following commands      Nursing/pcp notes, imaging,labs and vitals reviewed.      PT,OT and/or speech notes reviewed    Lab Results   Component Value Date    WBC 17.8 (H) 05/22/2021    HGB 11.3 (L) 05/22/2021    HCT 33.9 (L) 05/22/2021    MCV 87.6 05/22/2021     05/22/2021     Lab Results   Component Value Date     (L) 05/22/2021    K 3.4 (L) 05/22/2021    CL 90 (L) 05/22/2021    CO2 36 (H) 05/22/2021    BUN 23 05/22/2021    CREATININE 2.2 (H) 05/22/2021    GLUCOSE 134 (H) 05/22/2021    CALCIUM 8.6 (L) 05/22/2021    PROT 5.8 (L) 05/22/2021    LABALBU 3.3 (L) 05/22/2021    BILITOT 0.6 05/22/2021    ALKPHOS 242 (H) 05/22/2021    AST 42 (H) 05/22/2021    ALT 34 05/22/2021    LABGLOM 29 (A) 05/22/2021    GFRAA 35 (L) 05/22/2021     Lab Results   Component Value Date    INR 1.21 (H) 05/19/2021    INR 1.54 (H) 11/25/2020    INR 1.55 (H) 11/13/2020    PROTIME 15.3 (H) 05/19/2021    PROTIME 18.6 (H) 11/25/2020    PROTIME 18.7 (H) 11/13/2020             RECORD REVIEW: Previous medical records, medications were reviewed at today's visit    IMPRESSION:   AMS in the setting of hypoglycemia in the 40s of unclear duration/respiratory failure/chronic renal failure  Profound hypoglycemia with subsequent cerebral injury is high on the differential along with stroke with history of atrial fibrillation-no significant change in neurological status off sedation x 2 days    On vent no sedation x 24 hours  Exam 5/22 eyes closed resist eye opening   + gag not breathing over vent  Not following commands on vent    EEG significant movement-diffuse slowing  Need to wait till Monday for MRI brain to interrogate pacemaker    On Abx for presumed sepsis  Respiratory failure-wean vent as able  ESRD-on HD  DM/hypoglycemia/hyperglycemia-improved  Atrial fibrillation-Lopressor/Heparin    Minimize sedation as able    Supportive care    Concern for cerebral insult with prolonged hypoglycemia      CALL WITH ANY QUESTIONS  [phone] CELL  Dr Sandra Green

## 2021-05-22 NOTE — PLAN OF CARE
Nutrition Problem #1: Inadequate oral intake  Intervention: Food and/or Nutrient Delivery: Continue NPO, Continue Current Tube Feeding  Nutritional Goals: Pt will tolerate EN and show s/s healing

## 2021-05-22 NOTE — CONSULTS
**Physician Signature**  This document was electronically signed by: Paulino Kussmaul DO  2021   11:01 PM    **Consult Information**  Member Facility: [address] [place] [address] Drive MRN: [id number]  Visit/Encounter Number: [id number]  Consult ID: [id number]  Facility Time Zone: CT  Date and Time of Request: 2021 05:56 PM  Requesting Clinician: Clint Rose MD  Patient Name: Mag Goldstein  Date of Birth:   Gender: Male  Patient identity was confirmed at the beginning of the consult with the   patient/family/staff using two personal identifiers: Patient name and       **Admission**  Admission Date: 2021  Chief reason for ICU admission: Respiratory Failure    **Core Metrics**  General orienting sentence for patient: 69 y/o transferred from an outside   hospital. Found down at home, had missed HD (ESRD). Intubated and on the   ventilator. Was also hypoglycemic. Only on Versed. Recently started on   insulin. . On hep drip for a fib. HD today. Starting   card drip. cardizem gtt along with Gtt heparin. 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 Off all   sedation and remains comatose. Intermittent hypoglycemia, requiring D10. HX   ESRD with right arm AVF. Son is in [place] in [employer], coming tomorrow for   ? change in status. Has hx AICD/pacer. + corneals, + overbreaths vent. son   coming tomorrow night. Not on sedation. Remains on dilt gtt. Not   interactive.   Chief physiologic deterioration: Change in mental status  Is the patient on DVT prophylaxis?: No  DVT Prophylaxis Comments: hep drip  Is the patient on GI prophylaxis?: Yes  Has this patient reached their nutritional goal?: Yes  Nutritional Goals Details: TF  Are there current issues with pain management in this patient?:   No  Are there issues with skin integrity?: No  Are there issues with delirium?: No  Has the patient been mobilized?: No  Is this patient currently intubated?: Yes  Are there ethical or care philosophy or family issues?: No  Do you recommend an in depth evaluation?: No  Do you recommend the patient should: : Continue ICU level of care    **Inserted/Removed Devices**  Device Name: Endotracheal Tube  Site of insertion: Trachea  Date of insertion: 05-  Device Name: Gomes Catheter  Site of insertion: Bladder  Date of insertion: 05-  Device Name: Orogastric Tube  Site of insertion: Oropharynx  Date of insertion: 05-

## 2021-05-22 NOTE — PLAN OF CARE
Problem: Non-Violent Restraints  Goal: Removal from restraints as soon as assessed to be safe  5/22/2021 0143 by Cara Haro RN  Outcome: Met This Shift  5/22/2021 0143 by Cara Haro RN  Outcome: Met This Shift  Goal: No harm/injury to patient while restraints in use  5/22/2021 0143 by Cara Haro RN  Outcome: Met This Shift  5/22/2021 0143 by Cara Haro RN  Outcome: Met This Shift  Goal: Patient's dignity will be maintained  5/22/2021 0143 by Cara Haro RN  Outcome: Met This Shift  5/22/2021 0143 by Cara Haro RN  Outcome: Met This Shift     Problem: Falls - Risk of:  Goal: Will remain free from falls  Description: Will remain free from falls  5/22/2021 0143 by Cara Haro RN  Outcome: Met This Shift  5/22/2021 0143 by Cara Haro RN  Outcome: Met This Shift  Goal: Absence of physical injury  Description: Absence of physical injury  5/22/2021 0143 by Cara Haro RN  Outcome: Met This Shift  5/22/2021 0143 by Cara Haro RN  Outcome: Met This Shift     Problem: Nutrition  Goal: Optimal nutrition therapy  5/22/2021 0143 by Cara Haro RN  Outcome: Met This Shift  5/22/2021 0143 by Cara Haro RN  Outcome: Met This Shift     Problem: Skin Integrity:  Goal: Will show no infection signs and symptoms  Description: Will show no infection signs and symptoms  5/22/2021 0143 by Cara Haro RN  Outcome: Met This Shift  5/22/2021 0143 by Cara Haro RN  Outcome: Met This Shift  Goal: Absence of new skin breakdown  Description: Absence of new skin breakdown  5/22/2021 0143 by Cara Haro RN  Outcome: Met This Shift  5/22/2021 0143 by Cara Haro RN  Outcome: Met This Shift     Problem: Urinary Elimination:  Goal: Signs and symptoms of infection will decrease  Description: Signs and symptoms of infection will decrease  Outcome: Met This Shift  Goal: Complications related to the disease process, condition or treatment will be avoided or minimized  Description: Complications related to the disease process, condition or treatment will be avoided or minimized  Outcome: Met This Shift

## 2021-05-22 NOTE — PROGRESS NOTES
Nephrology ([facility]) Progress Note    Patient:  Diogo Bishop  YOB: 1940  Date of Service: 5/22/2021  MRN: [id number]   Acct: [de-identified]   Primary Care Physician: Karen Armenta MD  Advance Directive: Partial Code  Admit Date: 5/19/2021       Hospital Day: 3  Referring Provider: Edgar Du DO    Patient Seen, Chart, Consults notes, Labs, Radiology studies reviewed. Subjective:  Patient is a 68 y. o. man who is admitted to [facility] ICU as a transfer from [facility] with altered mental Lucia Lien was found unresponsive on the floor at home.  Downtime unknown. [facility] was intubated at the outside ER. [facility] was recently placed on insulin or a new type of insulin his blood sugars have been somewhat low.  [facility] is diabetic. Renal service was consulted to manage his ESRD. Patient has elevated WBC count and is believed to be septic. Patient is s/p dialysis on 5/20 and 5/21. Today, he remained intubated, vented. He was in A fib. He would not wake up enough when off sedation and no vent weaning was attempted yet. Allergies:  Patient has no known allergies.     Medicines:  Current Facility-Administered Medications   Medication Dose Route Frequency Provider Last Rate Last Admin    insulin lispro (HUMALOG) injection vial 0-18 Units  0-18 Units Subcutaneous Q4H Yossi Hatfield MD   3 Units at 05/22/21 0225    heparin (porcine) injection 3,790 Units  60 Units/kg Intravenous PRN Edgar Du, DO   3,790 Units at 05/20/21 1718    heparin (porcine) injection 1,890 Units  30 Units/kg Intravenous PRN Edgar Du, DO   1,890 Units at 05/21/21 2317    heparin 25,000 units in dextrose 5% 250 mL (premix) infusion  5-30 Units/kg/hr Intravenous Continuous Edgar Du, DO 9.5 mL/hr at 05/21/21 2317 15 Units/kg/hr at 05/21/21 2317    metoprolol (LOPRESSOR) injection 5 mg  5 mg Intravenous Q6H Anand Second, DO   5 mg at 05/22/21 0871    dilTIAZem 125 mg in dextrose 5 % 125 mL infusion  5-15 mg/hr Intravenous Continuous Anand Second, DO 5 mL/hr at 05/22/21 0158 5 mg/hr at 05/22/21 0158    sodium chloride flush 0.9 % injection 5-40 mL  5-40 mL Intravenous 2 times per day Anand Second, DO   10 mL at 05/22/21 8909    sodium chloride flush 0.9 % injection 5-40 mL  5-40 mL Intravenous PRN Anand Second, DO        0.9 % sodium chloride infusion  25 mL Intravenous PRN Anand Second, DO        acetaminophen (TYLENOL) tablet 650 mg  650 mg Oral Q6H PRN Anand Second, DO   650 mg at 05/20/21 1208    Or    acetaminophen (TYLENOL) suppository 650 mg  650 mg Rectal Q6H PRN Anand Second, DO        aminoglycoside intermittent dosing (placeholder)   Other RX Placeholder Anand Second, DO        vancomycin Jessica Israel) intermittent dosing (placeholder)   Other RX Placeholder Anand Second, DO           Past Medical History:  Past Medical History:   Diagnosis Date    Arthritis     Atherosclerosis of native arteries of the extremities with intermittent claudication     Blood circulation, collateral     CAD (coronary artery disease)     Carotid artery occlusion, right     Chronic kidney disease     Depression     GERD (gastroesophageal reflux disease)     Gout     History of blood transfusion     Hyperlipidemia     Hypertension     Palliative care patient 05/20/2021    Pneumonia due to infectious organism 04/09/2019    Type II or unspecified type diabetes mellitus without mention of complication, not stated as uncontrolled     Ulcerative colitis ([facility] [address] 06/30/2019       Past Surgical History:  Past Surgical History:   Procedure Laterality Date    CATARACT REMOVAL Bilateral     DIALYSIS FISTULA CREATION Right 10/16/2020    RIGHT BRACHIAL/BASILIC AV FISTULA performed by Carlee Reich MD at [address] [facility] Right Brachial Artery to Cephalic Vein He has 2 sons and one daughter    Never in the [employer] school    Attends [employer]    Smoked one pack per day quit in 2006 denies alcohol consumption or substance usage    Physically sedentary    /////////////////////////////////////////////////////////////////    CODE STATUS: Full Code    HEALTH CARE PROXY: His oldest son, Deborah Rodriguez, [phone]    DOMICILED: Lives alone in a private home with one step in to home but no steps within, has no pets    AMBULATES: independantly     Social Determinants of Health     Financial Resource Strain:     Difficulty of Paying Living Expenses:    Food Insecurity:     Worried About Running Out of Food in the Last Year:     [address] in the Last Year:    Transportation Needs:     Lack of Transportation (Medical):  Lack of Transportation (Non-Medical):    Physical Activity:     Days of Exercise per Week:     Minutes of Exercise per Session:    Stress:     Feeling of Stress :    Social Connections:     Frequency of Communication with Friends and Family:     Frequency of Social Gatherings with Friends and Family:     Attends Taoism Services:     Active Member of Clubs or Organizations:     Attends Club or Organization Meetings:     Marital Status:    Intimate Partner Violence:     Fear of Current or Ex-Partner:     Emotionally Abused:     Physically Abused:     Sexually Abused:          Review of Systems:  UTO    Objective:  Blood pressure (!) 122/57, pulse 75, temperature 97.9 °F (36.6 °C), resp. rate 11, height 5' 8\" (1.727 m), weight 134 lb 12.8 oz (61.1 kg), SpO2 98 %.     Intake/Output Summary (Last 24 hours) at 5/22/2021 0841  Last data filed at 5/22/2021 0600  Gross per 24 hour   Intake 1703.35 ml   Output 2345 ml   Net -641.65 ml     General: intubated, vented  Chest:  clear to auscultation bilaterally without respiratory distress  CVS: irreg T1S4, soft systolic murmur  Abdominal: soft, nontender, normal bowel sounds  Extremities: no cyanosis or edema  Skin: warm and dry without rash    Labs:  BMP:   Recent Labs     05/20/21  0138 05/21/21 0533 05/22/21 0527    131* 132*   K 3.8 3.5 3.4*   CL 98 91* 90*   CO2 25 31* 36*   PHOS  --  3.4  --    BUN 31* 21 23   CREATININE 2.8* 2.2* 2.2*   CALCIUM 8.7* 8.4* 8.6*     CBC:   Recent Labs     05/20/21  0753 05/21/21 0533 05/22/21 0527   WBC 27.9* 23.1* 17.8*   HGB 11.8* 11.7* 11.3*   HCT 35.3* 34.4* 33.9*   MCV 90.1 88.9 87.6    186 194     LIVER PROFILE:   Recent Labs     05/20/21  0138 05/21/21 0533 05/22/21 0527   AST 30 24 42*   ALT 19 17 34   BILITOT 0.6 0.7 0.6   ALKPHOS 133* 145* 242*     PT/INR:   Recent Labs     05/19/21  1606   PROTIME 15.3*   INR 1.21*     APTT:   Recent Labs     05/21/21  1638 05/21/21  2224 05/22/21 0527   APTT 58.6* 45.8* 52.0*     BNP:  No results for input(s): BNP in the last 72 hours. Ionized Calcium:No results for input(s): IONCA in the last 72 hours. Magnesium:  Recent Labs     05/19/21  1606 05/21/21 0533 05/22/21 0527   MG 2.0 1.9 1.9     Phosphorus:  Recent Labs     05/21/21 0533   PHOS 3.4     HgbA1C:   Recent Labs     05/19/21  1606   LABA1C 13.8*     Hepatic:   Recent Labs     05/20/21  0138 05/21/21 0533 05/22/21 0527   ALKPHOS 133* 145* 242*   ALT 19 17 34   AST 30 24 42*   PROT 5.8* 5.5* 5.8*   BILITOT 0.6 0.7 0.6   LABALBU 3.5 3.2* 3.3*     Lactic Acid:   Recent Labs     05/19/21  1606   LACTA 1.3     Troponin: No results for input(s): CKTOTAL, CKMB, TROPONINT in the last 72 hours. ABGs: No results for input(s): PH, PCO2, PO2, HCO3, O2SAT in the last 72 hours. CRP:  No results for input(s): CRP in the last 72 hours. Sed Rate:  No results for input(s): SEDRATE in the last 72 hours. Cultures:   No results for input(s): CULTURE in the last 72 hours.     Radiology reports as per the Radiologist  Radiology: XR CHEST PORTABLE    Result Date: 5/19/2021  Exam: XR CHEST PORTABLE - 5/19/2021 3:00 PM Indication: Intubated patient Comparison: 11/25/2020 Findings: Endotracheal tube is 3 cm above the genia. Enteric tube terminates in the region of the gastric fundus. LEFT chest wall cardiac pacing device appears stable in position. Patchy bilateral pulmonary airspace opacities are present. No pleural effusion or visible pneumothorax. A skin fold projects along the LEFT lateral chest. No acute osseous finding. Impression: 1. Endotracheal tube appears in good position. 2.  Patchy bilateral airspace opacity. Signed by Dr Kim Sebastian on 5/19/2021 4:20 PM       Assessment   1. 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 Syncope (after hypoglycemia)  4. Atrial fibrillation with tachycardia  5. Anemia of CKD  6. Acute resp failure  7. Leukocytosis (rule out sepsis)  8. Urinary tract infection  9. Hypokalemia- mild    Plan:  Continue antibiotics and resp support. Next dialysis is due on 5/23. Labs were reviewed. Continue antibiotics. No need for K suppl.

## 2021-05-23 NOTE — CONSULTS
**Physician Signature**  This document was electronically signed by: Serafin Ormond DO  2021   11:47 AM    **Consult Information**  Member Facility: [address] [place] [address] Drive MRN: [id number]  Visit/Encounter Number: [id number]  Consult ID: [id number]  Facility Time Zone: CT  Date and Time of Request: 2021 06:40 AM  Requesting Clinician: Kristy Valencia MD  Patient Name: Era Parson  YOB: 1940  Gender: Male  Patient identity was confirmed at the beginning of the consult with the   patient/family/staff using two personal identifiers: Patient name and       **Admission**  Admission Date: 2021  Chief reason for ICU admission: Respiratory Failure  Secondary reasons for ICU admission: Altered Mental Status    **Core Metrics**  General orienting sentence for patient: 67 y/o transferred from an outside   hospital. Found down at home, had missed HD (ESRD). Intubated and on the   ventilator. Was also hypoglycemic. Only on Versed. Recently started on   insulin. . On hep drip for a fib. HD today. Starting   card drip. Cardizem gtt along with Gtt heparin. 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 Off all   sedation and remains comatose. Intermittent hypoglycemia, requiring D10. HX   ESRD with right arm AVF. Son is in [place] in the [employer], coming tomorrow   for ? change in status. Has hx AICD/pacer. + corneal, + over breathes   vent. diltiazem gtt. Not interactive. but not interactive. Cardizem at 5   mg/hr. Gtt heparin. BP reasonable, 30%   FIO2 slowing. MRI is planned for Monday. Concern for anoxic or hypoglycemic   encephalopathy. Chief physiologic deterioration: Change in mental status  Is the patient on DVT prophylaxis?: No  DVT Prophylaxis Comments: hep drip  Is the patient on GI prophylaxis?: No  Gi Prophylaxis Comments: Should be on pepcid 20 or protonix 40.   Has this patient reached their nutritional goal?: Yes  Nutritional Goals Details: TF  Are there current issues with pain

## 2021-05-23 NOTE — PLAN OF CARE
Problem: Non-Violent Restraints  Goal: Removal from restraints as soon as assessed to be safe  Outcome: Met This Shift  Goal: No harm/injury to patient while restraints in use  Outcome: Met This Shift  Goal: Patient's dignity will be maintained  Outcome: Met This Shift     Problem: Falls - Risk of:  Goal: Will remain free from falls  Description: Will remain free from falls  Outcome: Met This Shift  Goal: Absence of physical injury  Description: Absence of physical injury  Outcome: Met This Shift     Problem: Nutrition  Goal: Optimal nutrition therapy  Outcome: Met This Shift     Problem: Skin Integrity:  Goal: Will show no infection signs and symptoms  Description: Will show no infection signs and symptoms  Outcome: Met This Shift  Goal: Absence of new skin breakdown  Description: Absence of new skin breakdown  Outcome: Met This Shift     Problem: Urinary Elimination:  Goal: Signs and symptoms of infection will decrease  Description: Signs and symptoms of infection will decrease  Outcome: Met This Shift  Goal: Complications related to the disease process, condition or treatment will be avoided or minimized  Description: Complications related to the disease process, condition or treatment will be avoided or minimized  Outcome: Met This Shift     Problem: Non-Violent Restraints  Goal: Removal from restraints as soon as assessed to be safe  Outcome: Met This Shift  Goal: No harm/injury to patient while restraints in use  Outcome: Met This Shift  Goal: Patient's dignity will be maintained  Outcome: Met This Shift     Problem: Falls - Risk of:  Goal: Will remain free from falls  Description: Will remain free from falls  Outcome: Met This Shift  Goal: Absence of physical injury  Description: Absence of physical injury  Outcome: Met This Shift     Problem: Nutrition  Goal: Optimal nutrition therapy  Outcome: Met This Shift     Problem: Skin Integrity:  Goal: Will show no infection signs and symptoms  Description: Will show no infection signs and symptoms  Outcome: Met This Shift  Goal: Absence of new skin breakdown  Description: Absence of new skin breakdown  Outcome: Met This Shift     Problem: Urinary Elimination:  Goal: Signs and symptoms of infection will decrease  Description: Signs and symptoms of infection will decrease  Outcome: Met This Shift  Goal: Complications related to the disease process, condition or treatment will be avoided or minimized  Description: Complications related to the disease process, condition or treatment will be avoided or minimized  Outcome: Met This Shift

## 2021-05-23 NOTE — PROGRESS NOTES
Patient:   So White  MR#:    [id number]   Room:    [facility]   YOB: 1940  Date of Progress Note: 5/23/2021  Time of Note                           1:06 PM  Consulting Physician:   Sandra Green M.D. Attending Physician:  Wicho Ayala DO     Chief complaint AMS    S:This 68 y.o. male a past medical history significant for AMS. As per Dr Rhoda Junior note 5/20/21     \"This 68 y.o. male who presents with altered mental status. He was found down and unresponsive on his floor at home. Downtime unknown. He was intubated at the outside ER. He was recently placed on insulin or a new type of insulin his blood sugars have been somewhat low. He is on chronic hemodialysis. He is oxygenating well. He has woken up some today but is not making eye contact or following any commands. History obtained from the chart as well as from nursing staff. \"    Blood sugar in the 40's when found down for unclear duration. No new issues overnight. Still vented. Ne sedation x 3 days and not responsive. No change other than opening eyes non purposely.       REVIEW OF SYSTEMS:  Unable to obtain    Past Medical History:      Diagnosis Date    Arthritis     Atherosclerosis of native arteries of the extremities with intermittent claudication     Blood circulation, collateral     CAD (coronary artery disease)     Carotid artery occlusion, right     Chronic kidney disease     Depression     GERD (gastroesophageal reflux disease)     Gout     History of blood transfusion     Hyperlipidemia     Hypertension     Palliative care patient 05/20/2021    Pneumonia due to infectious organism 04/09/2019    Type II or unspecified type diabetes mellitus without mention of complication, not stated as uncontrolled     Ulcerative colitis ([facility] [address] 06/30/2019       Past Surgical History:      Procedure Laterality Date    CATARACT REMOVAL Bilateral     DIALYSIS FISTULA CREATION Right 10/16/2020    RIGHT BRACHIAL/BASILIC AV FISTULA performed by Brianna Yo MD at [address] Right Brachial Artery to Cephalic Vein Arteriovenous Fistula Creation    [address]    HERNIA REPAIR      umbilical hernia    PACEMAKER PLACEMENT      [facility] [address] VASCULAR SURGERY  2020    S.  Right upper extremity fistulogram's including venography to the superior vena cava, Balloon angioplasty distal/mid upper arm cephalic vein stenoses with 8 mm x 40 mm Luke balloon, Coil embolization of cephalic vein branch (8 mm x 5 cm, 5 mm x 5 cm (4)), Completion fistulogram's and venograms    VASCULAR SURGERY  2021    [facility]-   Right upper extremity fistulogram's including venography to the superior vena cava,  Balloon angioplasty distal/mid upper arm cephalic vein stenosis with 8 mm x 60 mm Lutonix drug-coated balloon, Completion venograms right upper extremity       Medications in Hospital:      Current Facility-Administered Medications:     acetylcysteine (MUCOMYST) 20 % solution 600 mg, 600 mg, Inhalation, BID, [place] Bushy, DO    albuterol (PROVENTIL) nebulizer solution 2.5 mg, 2.5 mg, Nebulization, Q6H PRN, Vijaya Bushy, DO    [COMPLETED] amiodarone (CORDARONE) 150 mg in dextrose 5 % 100 mL bolus, 150 mg, Intravenous, Once, Stopped at 21 **FOLLOWED BY** [] amiodarone (CORDARONE) 450 mg in dextrose 5 % 250 mL infusion, 1 mg/min, Intravenous, Continuous, Last Rate: 33.3 mL/hr at 21, 1 mg/min at 21 **FOLLOWED BY** amiodarone (CORDARONE) 450 mg in dextrose 5 % 250 mL infusion, 0.5 mg/min, Intravenous, Continuous, [place] Bushy, DO, Last Rate: 16.7 mL/hr at 21, 0.5 mg/min at 21    insulin lispro (HUMALOG) injection vial 0-18 Units, 0-18 Units, Subcutaneous, Q4H, Erica Frias MD, 3 Units at 21 0617    heparin (porcine) injection 3,790 Units, 60 Units/kg, Intravenous, PRN, Vijaya Bushy, DO, 3,790 Units at 21 3396   heparin (porcine) injection 1,890 Units, 30 Units/kg, Intravenous, PRN, Tivis Hebo, DO, 1,890 Units at 05/23/21 9884    heparin 25,000 units in dextrose 5% 250 mL (premix) infusion, 5-30 Units/kg/hr, Intravenous, Continuous, Tivis Hebo, DO, Last Rate: 11.4 mL/hr at 05/23/21 0640, 18 Units/kg/hr at 05/23/21 0640    metoprolol (LOPRESSOR) injection 5 mg, 5 mg, Intravenous, Q6H, Tivis Hebo, DO, 5 mg at 05/23/21 1020    sodium chloride flush 0.9 % injection 5-40 mL, 5-40 mL, Intravenous, 2 times per day, Tivis Hebo, DO, 10 mL at 05/22/21 2047    sodium chloride flush 0.9 % injection 5-40 mL, 5-40 mL, Intravenous, PRN, Tivis Hebo, DO    0.9 % sodium chloride infusion, 25 mL, Intravenous, PRN, Tivis Hebo, DO    acetaminophen (TYLENOL) tablet 650 mg, 650 mg, Oral, Q6H PRN, 650 mg at 05/22/21 1917 **OR** acetaminophen (TYLENOL) suppository 650 mg, 650 mg, Rectal, Q6H PRN, Tivis Hebo, DO    aminoglycoside intermittent dosing (placeholder), , Other, RX Placeholder, Tivis Hebo, DO    vancomycin [place]) intermittent dosing (placeholder), , Other, RX Placeholder, Tivis Hebo, DO    Allergies:  Patient has no known allergies. Social History:   TOBACCO:   reports that he quit smoking about 15 years ago. His smoking use included cigarettes. He has a 50.00 pack-year smoking history. He has quit using smokeless tobacco.  His smokeless tobacco use included snuff. ETOH:   reports current alcohol use.     Family History:       Problem Relation Age of Onset    High Blood Pressure Mother     Heart Disease Mother     Stroke Mother         at 48    High Blood Pressure Father     Heart Disease Father     Stroke Father         at 59    No Known Problems Brother     Alzheimer's Disease Brother         onset at 80, then rapidly progressed to death         PHYSICAL EXAM:  BP (!) 173/78   Pulse 99   Temp 98.8 °F (37.1 °C) (Temporal)   Resp 20 Ht 5' 8\" (1.727 m)   Wt 134 lb (60.8 kg)   SpO2 93%   BMI 20.37 kg/m²     Constitutional - well developed, well nourished. Eyes - conjunctiva normal.   Ear, nose, throat - No scars, masses, or lesions over external nose or ears, no atrophy of tongue  Neck-symmetric, no masses noted, no jugular vein distension  Respiration- chest wall appears symmetric, good expansion,   normal effort without use of accessory muscles  Musculoskeletal - no significant wasting of muscles noted, no bony deformities  Extremities-no clubbing, cyanosis or edema  Skin - warm, dry, and intact. No rash, erythema, or pallor. Psychiatric - mood, affect, and behavior unable to assess     Neurological exam  Eyes opening non purposefully not track not follow commands  + gag  Not breathing over ventilator  No movements of extremities       Nursing/pcp notes, imaging,labs and vitals reviewed.      PT,OT and/or speech notes reviewed    Lab Results   Component Value Date    WBC 18.5 (H) 05/23/2021    HGB 11.0 (L) 05/23/2021    HCT 32.1 (L) 05/23/2021    MCV 87.2 05/23/2021     05/23/2021     Lab Results   Component Value Date     (L) 05/23/2021    K 3.3 (L) 05/23/2021    CL 87 (L) 05/23/2021    CO2 33 (H) 05/23/2021    BUN 51 (H) 05/23/2021    CREATININE 3.6 (H) 05/23/2021    GLUCOSE 161 (H) 05/23/2021    CALCIUM 8.5 (L) 05/23/2021    PROT 5.7 (L) 05/23/2021    LABALBU 3.2 (L) 05/23/2021    BILITOT 0.7 05/23/2021    ALKPHOS 310 (H) 05/23/2021    AST 66 (H) 05/23/2021    ALT 66 (H) 05/23/2021    LABGLOM 16 (A) 05/23/2021    GFRAA 20 (L) 05/23/2021     Lab Results   Component Value Date    INR 1.21 (H) 05/19/2021    INR 1.54 (H) 11/25/2020    INR 1.55 (H) 11/13/2020    PROTIME 15.3 (H) 05/19/2021    PROTIME 18.6 (H) 11/25/2020    PROTIME 18.7 (H) 11/13/2020             RECORD REVIEW: Previous medical records, medications were reviewed at today's visit    IMPRESSION:   AMS in the setting of hypoglycemia in the 40s of unclear duration/respiratory failure/chronic renal failure  Profound hypoglycemia with subsequent cerebral injury is high on the differential along with stroke with history of atrial fibrillation-no significant change in neurological status off sedation x 2 days    On vent no sedations  Exam 5/23 eyes open occasionally  + gag not breathing over vent  Not following commands on vent    EEG significant movement-diffuse slowing  Need to wait till Monday for MRI brain to interrogate pacemaker    On Abx for presumed sepsis  Respiratory failure-wean vent as able  ESRD-on HD  DM/hypoglycemia/hyperglycemia-improved  Atrial fibrillation-Lopressor/Heparin    Minimize sedation as able    Supportive care    Concern for cerebral insult with prolonged hypoglycemiz    MRI brain hopefully tomorow       CALL WITH ANY QUESTIONS  [phone] CELL  Dr Segundo Kan

## 2021-05-23 NOTE — CONSULTS
**Physician Signature**  This document was electronically signed by: Pati Geller MD  2021   10:11 PM    **Consult Information**  Member Facility: [address] [place] [address] Drive MRN: [id number]  Visit/Encounter Number: [id number]  Consult ID: [id number]  Facility Time Zone: CT  Date and Time of Request: 2021 06:13 PM  Requesting Clinician: Yann Burns MD  Patient Name: Sunny Mora  Date of Birth:   Gender: Male  Patient identity was confirmed at the beginning of the consult with the   patient/family/staff using two personal identifiers: Patient name and       **Admission**  Admission Date: 2021  Chief reason for ICU admission: Respiratory Failure  Secondary reasons for ICU admission: Altered Mental Status    **Core Metrics**  General orienting sentence for patient: 69 y/o transferred from an outside   hospital. Found down at home, had missed HD (ESRD). Intubated and on the   ventilator. Was also hypoglycemic. Only on Versed. Recently started on   insulin. . On hep drip for a fib. HD today. Starting   card drip. cardizem gtt along with Gtt heparin. 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 Off all   sedation and remains comatose. Intermittent hypoglycemia, requiring D10. HX   ESRD with right arm AVF. Son is in [place] in [employer], coming tomorrow for   ? change in status. Has hx AICD/pacer. + corneals, + overbreaths vent. son   coming tomorrow night. Not on sedation. Remains on dilt gtt. Not   interactive. interactive. Cardizem at 5 mg/hr. Gtt heparin. BP reasoable, 30%   FIO2 PM:  no change from day shift  Chief physiologic deterioration: Change in mental status  Is the patient on DVT prophylaxis?: No  DVT Prophylaxis Comments: hep drip  Is the patient on GI prophylaxis?: No  Gi Prophylaxis Comments: Should be on pepcid 20 or protonix 40.   Has this patient reached their nutritional goal?: Yes  Nutritional Goals Details: TF  Are there current issues with pain management in this patient?:   No  Are there issues with skin integrity?: No  Are there issues with delirium?: No  Has the patient been mobilized?: No  Is this patient currently intubated?: Yes  Are there ethical or care philosophy or family issues?: No  Do you recommend an in depth evaluation?: No  Do you recommend the patient should: : Continue ICU level of care    **Inserted/Removed Devices**  Device Name: Endotracheal Tube  Site of insertion: Trachea  Date of insertion: 05-  Device Name: Gomes Catheter  Site of insertion: Bladder  Date of insertion: 05-  Device Name: Orogastric Tube  Site of insertion: Oropharynx  Date of insertion: 05-

## 2021-05-23 NOTE — PROGRESS NOTES
Hospitalist Progress Note    Patient:  Britt Jurado  YOB: 1940  Date of Service: 5/23/2021  MRN: [id number]   Acct: [de-identified]   Primary Care Physician: Durga Stewart MD  Advance Directive: Partial Code  Admit Date: 5/19/2021       Hospital Day: 4  Referring Provider: Corbin Romano DO    Patient Seen, Chart, Consults, Notes, Labs, Radiology studies reviewed. Subjective:  Britt Jurado is a 68 y.o. male  whom we are following for hypoxemic respiratory failure, end-stage renal disease, metabolic encephalopathy. Clinically about the same. His son who is in the [employer] who was stationed in [place] was able to get in last night and spend time with him. We will get the MRI of his brain tomorrow and then make further decisions based on that information. He is on assist-control rate of 14, tidal volume of 400, 5 of PEEP, FiO2 of 0.3. Allergies:  Patient has no known allergies.     Medicines:  Current Facility-Administered Medications   Medication Dose Route Frequency Provider Last Rate Last Admin    acetylcysteine (MUCOMYST) 20 % solution 600 mg  600 mg Inhalation BID Corbin See, DO        albuterol (PROVENTIL) nebulizer solution 2.5 mg  2.5 mg Nebulization Q6H PRN Corbin Romano, DO        amiodarone (CORDARONE) 450 mg in dextrose 5 % 250 mL infusion  0.5 mg/min Intravenous Continuous Corbin Romano, DO 16.7 mL/hr at 05/23/21 0221 0.5 mg/min at 05/23/21 0221    insulin lispro (HUMALOG) injection vial 0-18 Units  0-18 Units Subcutaneous Q4H Oliver Viera MD   3 Units at 05/23/21 0617    heparin (porcine) injection 3,790 Units  60 Units/kg Intravenous PRN Caina See, DO   3,790 Units at 05/22/21 1305    heparin (porcine) injection 1,890 Units  30 Units/kg Intravenous PRN Corbin Romano, DO   1,890 Units at 05/23/21 8781    heparin 25,000 units in dextrose 5% 250 mL (premix) infusion  5-30 Units/kg/hr Intravenous Continuous Jenn Anguiano Olga Champion, DO 11.4 mL/hr at 05/23/21 0640 18 Units/kg/hr at 05/23/21 0640    metoprolol (LOPRESSOR) injection 5 mg  5 mg Intravenous Q6H [facility], DO   5 mg at 05/23/21 1020    sodium chloride flush 0.9 % injection 5-40 mL  5-40 mL Intravenous 2 times per day [facility], DO   10 mL at 05/22/21 2047    sodium chloride flush 0.9 % injection 5-40 mL  5-40 mL Intravenous PRN [facility], DO        0.9 % sodium chloride infusion  25 mL Intravenous PRN [facility], DO        acetaminophen (TYLENOL) tablet 650 mg  650 mg Oral Q6H PRN [facility], DO   650 mg at 05/22/21 0097    Or    acetaminophen (TYLENOL) suppository 650 mg  650 mg Rectal Q6H PRN [facility], DO        aminoglycoside intermittent dosing (placeholder)   Other RX Placeholder [facility],         vancomycin [facility]) intermittent dosing (placeholder)   Other RX Placeholder [facility], DO           Past Medical History:  Past Medical History:   Diagnosis Date    Arthritis     Atherosclerosis of native arteries of the extremities with intermittent claudication     Blood circulation, collateral     CAD (coronary artery disease)     Carotid artery occlusion, right     Chronic kidney disease     Depression     GERD (gastroesophageal reflux disease)     Gout     History of blood transfusion     Hyperlipidemia     Hypertension     Palliative care patient 05/20/2021    Pneumonia due to infectious organism 04/09/2019    Type II or unspecified type diabetes mellitus without mention of complication, not stated as uncontrolled     Ulcerative colitis ([facility] [address] 06/30/2019       Past Surgical History:  Past Surgical History:   Procedure Laterality Date    CATARACT REMOVAL Bilateral     DIALYSIS FISTULA CREATION Right 10/16/2020    RIGHT BRACHIAL/BASILIC AV FISTULA performed by Ashley Herman MD at [address] OR Right Brachial Artery to Cephalic Vein Arteriovenous Fistula Creation    Never in the [employer] high school    Attends [address] one pack per day quit in 2006 denies alcohol consumption or substance usage    Physically sedentary    /////////////////////////////////////////////////////////////////    CODE STATUS: Full Code    HEALTH CARE PROXY: His oldest son, Earnestine Forman, [phone]    DOMICILED: Lives alone in a private home with one step in to home but no steps within, has no pets    AMBULATES: independantly     Social Determinants of Health     Financial Resource Strain:     Difficulty of Paying Living Expenses:    Food Insecurity:     Worried About Running Out of Food in the Last Year:     [address] in the Last Year:    Transportation Needs:     Lack of Transportation (Medical):  Lack of Transportation (Non-Medical):    Physical Activity:     Days of Exercise per Week:     Minutes of Exercise per Session:    Stress:     Feeling of Stress :    Social Connections:     Frequency of Communication with Friends and Family:     Frequency of Social Gatherings with Friends and Family:     Attends [employer] Services:     Active Member of Clubs or Organizations:     Attends Club or Organization Meetings:     Marital Status:    Intimate Partner Violence:     Fear of Current or Ex-Partner:     Emotionally Abused:     Physically Abused:     Sexually Abused:          Review of Systems:    Review of Systems   Unable to perform ROS: Intubated           Objective:  Blood pressure (!) 173/78, pulse 99, temperature 98.8 °F (37.1 °C), temperature source Temporal, resp. rate 20, height 5' 8\" (1.727 m), weight 134 lb (60.8 kg), SpO2 93 %. Intake/Output Summary (Last 24 hours) at 5/23/2021 1336  Last data filed at 5/23/2021 1200  Gross per 24 hour   Intake 1911.59 ml   Output 260 ml   Net 1651.59 ml       Physical Exam  Vitals and nursing note reviewed. HENT:      Head: Normocephalic and atraumatic.       Right Ear: External ear normal.      Left Ear: External ear normal.      Nose: Nose normal.      Mouth/Throat:      Mouth: Mucous membranes are moist.   Eyes:      General: No scleral icterus. Right eye: No discharge. Left eye: No discharge. Cardiovascular:      Rate and Rhythm: Normal rate and regular rhythm. Heart sounds: Normal heart sounds. Pulmonary:      Effort: Pulmonary effort is normal.      Breath sounds: Normal breath sounds. Abdominal:      General: Abdomen is flat. Palpations: Abdomen is soft. Musculoskeletal:         General: No swelling. Cervical back: Neck supple. No rigidity. Skin:     General: Skin is warm and dry. Capillary Refill: Capillary refill takes 2 to 3 seconds. Labs:  BMP:   Recent Labs     05/21/21  0533 05/22/21 0527 05/23/21 0518   * 132* 131*   K 3.5 3.4* 3.3*   CL 91* 90* 87*   CO2 31* 36* 33*   PHOS 3.4  --   --    BUN 21 23 51*   CREATININE 2.2* 2.2* 3.6*   CALCIUM 8.4* 8.6* 8.5*     CBC:   Recent Labs     05/21/21 0533 05/22/21 0527 05/23/21 0518   WBC 23.1* 17.8* 18.5*   HGB 11.7* 11.3* 11.0*   HCT 34.4* 33.9* 32.1*   MCV 88.9 87.6 87.2    194 214     LIVER PROFILE:   Recent Labs     05/21/21 0533 05/22/21 0527 05/23/21 0518   AST 24 42* 66*   ALT 17 34 66*   BILITOT 0.7 0.6 0.7   ALKPHOS 145* 242* 310*     PT/INR: No results for input(s): PROTIME, INR in the last 72 hours. APTT:   Recent Labs     05/22/21  2237 05/23/21 0518 05/23/21  1227   APTT 55.5* 45.2* 52.5*     BNP:  No results for input(s): BNP in the last 72 hours. Ionized Calcium:No results for input(s): IONCA in the last 72 hours. Magnesium:  Recent Labs     05/21/21 0533 05/22/21 0527 05/23/21 0518   MG 1.9 1.9 2.2     Phosphorus:  Recent Labs     05/21/21  0533   PHOS 3.4     HgbA1C: No results for input(s): LABA1C in the last 72 hours.   Hepatic:   Recent Labs     05/21/21  0533 05/22/21  0527 05/23/21  0518   ALKPHOS 145* 242* 310*   ALT 17 34 66*   AST 24 42* 66*   PROT 5.5* 5.8* 5.7*   BILITOT 0.7 0.6 0.7   LABALBU 3.2* 3.3* 3.2*     Lactic Acid: No results for input(s): LACTA in the last 72 hours. Troponin: No results for input(s): CKTOTAL, CKMB, TROPONINT in the last 72 hours. ABGs: No results for input(s): PH, PCO2, PO2, HCO3, O2SAT in the last 72 hours. CRP:  No results for input(s): CRP in the last 72 hours. Sed Rate:  No results for input(s): SEDRATE in the last 72 hours. Cultures:   No results for input(s): CULTURE in the last 72 hours. No results for input(s): BC, [place] in the last 72 hours. No results for input(s): CXSURG in the last 72 hours. Radiology reports as per the Radiologist  Radiology: XR CHEST PORTABLE    Result Date: 5/19/2021  Exam: XR CHEST PORTABLE - 5/19/2021 3:00 PM Indication: Intubated patient Comparison: 11/25/2020 Findings: Endotracheal tube is 3 cm above the genia. Enteric tube terminates in the region of the gastric fundus. LEFT chest wall cardiac pacing device appears stable in position. Patchy bilateral pulmonary airspace opacities are present. No pleural effusion or visible pneumothorax. A skin fold projects along the LEFT lateral chest. No acute osseous finding. Impression: 1. Endotracheal tube appears in good position. 2.  Patchy bilateral airspace opacity. Signed by Dr [facility] on 5/19/2021 4:20 PM       Assessment     Sepsis now ruled out. Cultures negative thus far.     Acute hypoxemic respiratory failure. Continue ventilatory support. Wean as feasible.     Mental status change. Work-up ongoing.   Possible cerebral injury from prolonged hypoglycemia.     Please document 49 minutes of critical care time for patient assessment, chart review, discussion with staff,       Vijaya Caro DO

## 2021-05-23 NOTE — PROGRESS NOTES
Nephrology ([facility]) Progress Note    Patient:  Enrique Sanches  YOB: 1940  Date of Service: 5/23/2021  MRN: [id number]   Acct: [de-identified]   Primary Care Physician: Ida Shore MD  Advance Directive: Partial Code  Admit Date: 5/19/2021       Hospital Day: 4  Referring Provider: Ursula Marie DO    Patient Seen, Chart, Consults notes, Labs, Radiology studies reviewed. Subjective:  Patient is a 68 y. o. man who is admitted to [facility] ICU as a transfer from [facility] with altered mental Kelton Higginbotham was found unresponsive on the floor at home.  Downtime unknown. Jett Baird was intubated at the outside ER. Jett Baird was recently placed on insulin or a new type of insulin his blood sugars have been somewhat low.  He is on chronic hemodialysis ([facility]). Jett Baird is diabetic. Renal service was consulted to manage his ESRD. Patient has elevated WBC count and is believed to be septic. Patient is s/p dialysis on 5/20 and 5/21. Today, he remained intubated, vented. He was in A fib. He would not wake up enough while still off sedation. No new events. Allergies:  Patient has no known allergies.     Medicines:  Current Facility-Administered Medications   Medication Dose Route Frequency Provider Last Rate Last Admin    amiodarone (CORDARONE) 450 mg in dextrose 5 % 250 mL infusion  0.5 mg/min Intravenous Continuous Ursula Marie DO 16.7 mL/hr at 05/23/21 0221 0.5 mg/min at 05/23/21 0221    insulin lispro (HUMALOG) injection vial 0-18 Units  0-18 Units Subcutaneous Q4H Zena Castillo MD   3 Units at 05/23/21 0617    heparin (porcine) injection 3,790 Units  60 Units/kg Intravenous PRN Ursula Marie DO   3,790 Units at 05/22/21 1305    heparin (porcine) injection 1,890 Units  30 Units/kg Intravenous PRN Ursula Marie DO   1,890 Units at 05/23/21 6205    heparin 25,000 units in dextrose 5% 250 mL (premix) infusion  5-30 Units/kg/hr Intravenous Continuous Ursula Marie, DO 11.4 mL/hr at 05/23/21 0640 18 Units/kg/hr at 05/23/21 0640    metoprolol (LOPRESSOR) injection 5 mg  5 mg Intravenous Q6H Ursula Marie, DO   5 mg at 05/23/21 0321    sodium chloride flush 0.9 % injection 5-40 mL  5-40 mL Intravenous 2 times per day Ursula Marie, DO   10 mL at 05/22/21 2047    sodium chloride flush 0.9 % injection 5-40 mL  5-40 mL Intravenous PRN Ursula Marie, DO        0.9 % sodium chloride infusion  25 mL Intravenous PRN Ursula Marie, DO        acetaminophen (TYLENOL) tablet 650 mg  650 mg Oral Q6H PRN Ursula Marie, DO   650 mg at 05/22/21 4589    Or    acetaminophen (TYLENOL) suppository 650 mg  650 mg Rectal Q6H PRN Ursula Marie, DO        aminoglycoside intermittent dosing (placeholder)   Other RX Placeholder Ursula Marie, DO        vancomycin [facility]) intermittent dosing (placeholder)   Other RX Placeholder Ursula Marie, DO           Past Medical History:  Past Medical History:   Diagnosis Date    Arthritis     Atherosclerosis of native arteries of the extremities with intermittent claudication     Blood circulation, collateral     CAD (coronary artery disease)     Carotid artery occlusion, right     Chronic kidney disease     Depression     GERD (gastroesophageal reflux disease)     Gout     History of blood transfusion     Hyperlipidemia     Hypertension     Palliative care patient 05/20/2021    Pneumonia due to infectious organism 04/09/2019    Type II or unspecified type diabetes mellitus without mention of complication, not stated as uncontrolled     Ulcerative colitis ([facility] [address] 06/30/2019       Past Surgical History:  Past Surgical History:   Procedure Laterality Date    CATARACT REMOVAL Bilateral     DIALYSIS FISTULA CREATION Right 10/16/2020    RIGHT BRACHIAL/BASILIC AV FISTULA performed by Cadence Díaz MD at [address] Right Brachial Artery to Cephalic Vein Arteriovenous Fistula Creation    HEMORRHOID SURGERY  1973    HERNIA REPAIR      umbilical hernia    PACEMAKER PLACEMENT      [facility] [address] VASCULAR SURGERY  11/02/2020    [facility].  Right upper extremity fistulogram's including venography to the superior vena cava, Balloon angioplasty distal/mid upper arm cephalic vein stenoses with 8 mm x 40 mm Luke balloon, Coil embolization of cephalic vein branch (8 mm x 5 cm, 5 mm x 5 cm (4)), Completion fistulogram's and venograms    VASCULAR SURGERY  01/14/2021    [facility]-   Right upper extremity fistulogram's including venography to the superior vena cava,  Balloon angioplasty distal/mid upper arm cephalic vein stenosis with 8 mm x 60 mm Lutonix drug-coated balloon, Completion venograms right upper extremity       Family History  Family History   Problem Relation Age of Onset    High Blood Pressure Mother     Heart Disease Mother     Stroke Mother         at 48    High Blood Pressure Father     Heart Disease Father     Stroke Father         at 59    No Known Problems Brother     Alzheimer's Disease Brother         onset at 80, then rapidly progressed to death       Social History  Social History     Socioeconomic History    Marital status:      Spouse name: Not on file    Number of children: 3    Years of education: Not on file    Highest education level: Not on file   Occupational History    Occupation: [employer]   Tobacco Use    Smoking status: Former Smoker     Packs/day: 1.00     Years: 50.00     Pack years: 50.00     Types: Cigarettes     Quit date: 2006     Years since quitting: 15.4    Smokeless tobacco: Former User     Types: Snuff   Vaping Use    Vaping Use: Never used   Substance and Sexual Activity    Alcohol use: Yes     Comment: occasional    Drug use: Never    Sexual activity: Not on file   Other Topics Concern    Not on file   Social History Narrative    Worked on a [employer] most of his life     twice and  murmur  Abdominal: soft, nontender, normal bowel sounds  Extremities: no cyanosis or edema  Skin: warm and dry without rash    Labs:  BMP:   Recent Labs     05/21/21  0533 05/22/21 0527 05/23/21 0518   * 132* 131*   K 3.5 3.4* 3.3*   CL 91* 90* 87*   CO2 31* 36* 33*   PHOS 3.4  --   --    BUN 21 23 51*   CREATININE 2.2* 2.2* 3.6*   CALCIUM 8.4* 8.6* 8.5*     CBC:   Recent Labs     05/21/21 0533 05/22/21 0527 05/23/21 0518   WBC 23.1* 17.8* 18.5*   HGB 11.7* 11.3* 11.0*   HCT 34.4* 33.9* 32.1*   MCV 88.9 87.6 87.2    194 214     LIVER PROFILE:   Recent Labs     05/21/21 0533 05/22/21 0527 05/23/21 0518   AST 24 42* 66*   ALT 17 34 66*   BILITOT 0.7 0.6 0.7   ALKPHOS 145* 242* 310*     PT/INR:   No results for input(s): PROTIME, INR in the last 72 hours. APTT:   Recent Labs     05/22/21  1718 05/22/21 2237 05/23/21 0518   APTT 52.4* 55.5* 45.2*     BNP:  No results for input(s): BNP in the last 72 hours. Ionized Calcium:No results for input(s): IONCA in the last 72 hours. Magnesium:  Recent Labs     05/21/21  0533 05/22/21 0527 05/23/21 0518   MG 1.9 1.9 2.2     Phosphorus:  Recent Labs     05/21/21 0533   PHOS 3.4     HgbA1C:   No results for input(s): LABA1C in the last 72 hours. Hepatic:   Recent Labs     05/21/21 0533 05/22/21 0527 05/23/21 0518   ALKPHOS 145* 242* 310*   ALT 17 34 66*   AST 24 42* 66*   PROT 5.5* 5.8* 5.7*   BILITOT 0.7 0.6 0.7   LABALBU 3.2* 3.3* 3.2*     Lactic Acid:   No results for input(s): LACTA in the last 72 hours. Troponin: No results for input(s): CKTOTAL, CKMB, TROPONINT in the last 72 hours. ABGs: No results for input(s): PH, PCO2, PO2, HCO3, O2SAT in the last 72 hours. CRP:  No results for input(s): CRP in the last 72 hours. Sed Rate:  No results for input(s): SEDRATE in the last 72 hours. Cultures:   No results for input(s): CULTURE in the last 72 hours.     Radiology reports as per the Radiologist  Radiology: XR CHEST PORTABLE    Result Date: 5/19/2021  Exam: XR CHEST PORTABLE - 5/19/2021 3:00 PM Indication: Intubated patient Comparison: 11/25/2020 Findings: Endotracheal tube is 3 cm above the genia. Enteric tube terminates in the region of the gastric fundus. LEFT chest wall cardiac pacing device appears stable in position. Patchy bilateral pulmonary airspace opacities are present. No pleural effusion or visible pneumothorax. A skin fold projects along the LEFT lateral chest. No acute osseous finding. Impression: 1. Endotracheal tube appears in good position. 2.  Patchy bilateral airspace opacity. Signed by Dr Victoria Ran on 5/19/2021 4:20 PM       Assessment   1. 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 Syncope (after hypoglycemia)  4. Atrial fibrillation with tachycardia  5. Anemia of CKD  6. Acute resp failure  7. Leukocytosis (rule out sepsis)  8. Urinary tract infection  9. Hypokalemia- mild    Plan:  Continue antibiotics and resp support. Next dialysis is due on 5/23. Labs were reviewed. Continue antibiotics.

## 2021-05-24 NOTE — PROGRESS NOTES
Hospitalist Progress Note    Patient:  So White  YOB: 1940  Date of Service: 5/24/2021  MRN: [id number]   Acct: [de-identified]   Primary Care Physician: Lavon Goldmann, MD  Advance Directive: Partial Code  Admit Date: 5/19/2021       Hospital Day: 5  Referring Provider: Wicho Ayala DO    Patient Seen, Chart, Consults, Notes, Labs, Radiology studies reviewed. Subjective:  So White is a 68 y.o. male  whom we are following for hypoxemic respiratory failure, end-stage renal disease, newly discovered left occipital stroke. He underwent MRI today. He was shown to have an acute left occipital and cortical infarction. He has very extensive chronic white matter ischemic changes with loss of brain volume. Hemodynamics are stable. He is on assist-control rate of 14, tidal volume of 400, 5 of PEEP, FiO2 of 0.3. Allergies:  Patient has no known allergies.     Medicines:  Current Facility-Administered Medications   Medication Dose Route Frequency Provider Last Rate Last Admin    acetylcysteine (MUCOMYST) 20 % solution 600 mg  600 mg Inhalation BID Wicho Ayala DO   600 mg at 05/24/21 0618    albuterol (PROVENTIL) nebulizer solution 2.5 mg  2.5 mg Nebulization Q6H PRN Wicho Ayala DO   2.5 mg at 05/24/21 4718    enalaprilat (VASOTEC) injection 1.25 mg  1.25 mg Intravenous Q6H Wicho Ayala DO   1.25 mg at 05/24/21 1317    potassium chloride (KLOR-CON M) extended release tablet 40 mEq  40 mEq Oral PRN Wicho Ayala DO        Or    potassium bicarb-citric acid (EFFER-K) effervescent tablet 40 mEq  40 mEq Oral PRN Wicho Ayala DO   40 mEq at 05/23/21 1912    Or    potassium chloride 10 mEq/100 mL IVPB (Peripheral Line)  10 mEq Intravenous PRN Wicho Ayala DO        amiodarone (CORDARONE) 450 mg in dextrose 5 % 250 mL infusion  0.5 mg/min Intravenous Continuous Wicho Ayala DO 16.7 mL/hr at 05/24/21 1103 0.5 mg/min at 05/24/21 1103    History:  Past Surgical History:   Procedure Laterality Date    CATARACT REMOVAL Bilateral     DIALYSIS FISTULA CREATION Right 10/16/2020    RIGHT BRACHIAL/BASILIC AV FISTULA performed by Azul Palacios MD at [address] Right Brachial Artery to Cephalic Vein Arteriovenous Fistula Creation    [address]      umbilical hernia    PACEMAKER PLACEMENT      [facility] [address] VASCULAR SURGERY  11/02/2020    [facility].  Right upper extremity fistulogram's including venography to the superior vena cava, Balloon angioplasty distal/mid upper arm cephalic vein stenoses with 8 mm x 40 mm Luke balloon, Coil embolization of cephalic vein branch (8 mm x 5 cm, 5 mm x 5 cm (4)), Completion fistulogram's and venograms    VASCULAR SURGERY  01/14/2021    [facility]-   Right upper extremity fistulogram's including venography to the superior vena cava,  Balloon angioplasty distal/mid upper arm cephalic vein stenosis with 8 mm x 60 mm Lutonix drug-coated balloon, Completion venograms right upper extremity       Family History  Family History   Problem Relation Age of Onset    High Blood Pressure Mother     Heart Disease Mother     Stroke Mother         at 48    High Blood Pressure Father     Heart Disease Father     Stroke Father         at 59    No Known Problems Brother     Alzheimer's Disease Brother         onset at 80, then rapidly progressed to death       Social History  Social History     Socioeconomic History    Marital status:      Spouse name: Not on file    Number of children: 3    Years of education: Not on file    Highest education level: Not on file   Occupational History    Occupation: Printer   Tobacco Use    Smoking status: Former Smoker     Packs/day: 1.00     Years: 50.00     Pack years: 50.00     Types: Cigarettes     Quit date: 2006     Years since quitting: 15.4    Smokeless tobacco: Former User     Types: Snuff   Vaping Use    Vaping Use: Never used   Substance and Sexual Activity    Alcohol use: Yes     Comment: occasional    Drug use: Never    Sexual activity: Not on file   Other Topics Concern    Not on file   Social History Narrative    Worked on a [employer] most of his life     twice and     He has 2 sons and one daughter    Never in the [employer]    Education high school    Attends [address] one pack per day quit in 2006 denies alcohol consumption or substance usage    Physically sedentary    /////////////////////////////////////////////////////////////////    CODE STATUS: Full Code    HEALTH CARE PROXY: His oldest son, Shaan [place], [phone]    DOMICILED: Lives alone in a private home with one step in to home but no steps within, has no pets    AMBULATES: independantly     Social Determinants of Health     Financial Resource Strain:     Difficulty of Paying Living Expenses:    Food Insecurity:     Worried About Running Out of Food in the Last Year:     [address] in the Last Year:    Transportation Needs:     Lack of Transportation (Medical):  Lack of Transportation (Non-Medical):    Physical Activity:     Days of Exercise per Week:     Minutes of Exercise per Session:    Stress:     Feeling of Stress :    Social Connections:     Frequency of Communication with Friends and Family:     Frequency of Social Gatherings with Friends and Family:     Attends [employer] Services:     Active Member of Clubs or Organizations:     Attends Club or Organization Meetings:     Marital Status:    Intimate Partner Violence:     Fear of Current or Ex-Partner:     Emotionally Abused:     Physically Abused:     Sexually Abused:          Review of Systems:    Review of Systems   Unable to perform ROS: Intubated           Objective:  Blood pressure (!) 177/71, pulse 104, temperature 98.6 °F (37 °C), temperature source Temporal, resp.  rate 20, height 5' 8\" (1.727 m), weight 130 lb 6.4 oz (59.1 kg), SpO2 99 %. Intake/Output Summary (Last 24 hours) at 5/24/2021 1616  Last data filed at 5/24/2021 1600  Gross per 24 hour   Intake 1674.4 ml   Output 545 ml   Net 1129.4 ml       Physical Exam  Vitals and nursing note reviewed. Constitutional:       Appearance: He is ill-appearing. Interventions: He is intubated. HENT:      Head: Normocephalic and atraumatic. Right Ear: External ear normal.      Left Ear: External ear normal.      Nose: Nose normal.      Mouth/Throat:      Mouth: Mucous membranes are moist.   Eyes:      Conjunctiva/sclera: Conjunctivae normal.   Cardiovascular:      Rate and Rhythm: Normal rate and regular rhythm. Heart sounds: Normal heart sounds. Pulmonary:      Effort: Pulmonary effort is normal. He is intubated. Breath sounds: Normal breath sounds. Abdominal:      General: Abdomen is flat. Palpations: Abdomen is soft. Musculoskeletal:         General: No swelling. Cervical back: Neck supple. No rigidity. Skin:     General: Skin is warm and dry. Labs:  BMP:   Recent Labs     05/22/21  0527 05/23/21  0518 05/24/21  0113   * 131* 127*   K 3.4* 3.3* 3.6   CL 90* 87* 83*   CO2 36* 33* 33*   BUN 23 51* 65*   CREATININE 2.2* 3.6* 4.6*   CALCIUM 8.6* 8.5* 8.5*     CBC:   Recent Labs     05/22/21  0527 05/23/21  0518 05/24/21  0113   WBC 17.8* 18.5* 17.9*   HGB 11.3* 11.0* 10.9*   HCT 33.9* 32.1* 31.5*   MCV 87.6 87.2 86.3    214 230     LIVER PROFILE:   Recent Labs     05/22/21  0527 05/23/21  0518 05/24/21  0113   AST 42* 66* 67*   ALT 34 66* 75*   BILITOT 0.6 0.7 0.8   ALKPHOS 242* 310* 325*     PT/INR: No results for input(s): PROTIME, INR in the last 72 hours. APTT:   Recent Labs     05/24/21  0113 05/24/21  0629 05/24/21  1254   APTT 52.1* 55.2* 35.4     BNP:  No results for input(s): BNP in the last 72 hours. Ionized Calcium:No results for input(s): IONCA in the last 72 hours.   Magnesium:  Recent Labs     05/22/21  0595 05/23/21  0518   MG 1.9 2.2     Phosphorus:No results for input(s): PHOS in the last 72 hours. HgbA1C: No results for input(s): LABA1C in the last 72 hours. Hepatic:   Recent Labs     05/22/21  0527 05/23/21  0518 05/24/21  0113   ALKPHOS 242* 310* 325*   ALT 34 66* 75*   AST 42* 66* 67*   PROT 5.8* 5.7* 5.7*   BILITOT 0.6 0.7 0.8   LABALBU 3.3* 3.2* 3.0*     Lactic Acid: No results for input(s): LACTA in the last 72 hours. Troponin: No results for input(s): CKTOTAL, CKMB, TROPONINT in the last 72 hours. ABGs: No results for input(s): PH, PCO2, PO2, HCO3, O2SAT in the last 72 hours. CRP:  No results for input(s): CRP in the last 72 hours. Sed Rate:  No results for input(s): SEDRATE in the last 72 hours. Cultures:   No results for input(s): CULTURE in the last 72 hours. No results for input(s): BC, [place] in the last 72 hours. No results for input(s): CXSURG in the last 72 hours. Radiology reports as per the Radiologist  Radiology: XR CHEST PORTABLE    Result Date: 5/19/2021  Exam: XR CHEST PORTABLE - 5/19/2021 3:00 PM Indication: Intubated patient Comparison: 11/25/2020 Findings: Endotracheal tube is 3 cm above the genia. Enteric tube terminates in the region of the gastric fundus. LEFT chest wall cardiac pacing device appears stable in position. Patchy bilateral pulmonary airspace opacities are present. No pleural effusion or visible pneumothorax. A skin fold projects along the LEFT lateral chest. No acute osseous finding. Impression: 1. Endotracheal tube appears in good position. 2.  Patchy bilateral airspace opacity. Signed by Dr Rain Rogers on 5/19/2021 4:20 PM       Assessment     Sepsis now ruled out. Cultures negative thus far.     Acute hypoxemic respiratory failure. Continue ventilatory support. Wean as feasible.     Mental status change. Work-up ongoing.   Possible cerebral injury from prolonged hypoglycemia.     Please document 94 minutes of critical care time for extended

## 2021-05-24 NOTE — PROGRESS NOTES
Patient:   Sirena Teran  MR#:    [id number]   Room:    8546/132-   YOB: 1940  Date of Progress Note: 5/24/2021  Time of Note                           8:46 AM  Consulting Physician:   Mortimer Bidding, M.D. Attending Physician:  Magui Yap DO     Chief complaint AMS    Subjective: This 68 y.o. male who presents with altered mental status. He was found down and unresponsive on his floor at home. Downtime unknown. He was intubated at the outside ER. He was recently placed on insulin or a new type of insulin his blood sugars have been somewhat low. He is on chronic hemodialysis. He is oxygenating well. Blood sugar in the 40's when found down for unclear duration. No new issues overnight. Still vented. Ne sedation x 3 days and not responsive. No change other than opening eyes non purposely. Weekend notes reviewed.       REVIEW OF SYSTEMS:  Unable to obtain    Past Medical History:      Diagnosis Date    Arthritis     Atherosclerosis of native arteries of the extremities with intermittent claudication     Blood circulation, collateral     CAD (coronary artery disease)     Carotid artery occlusion, right     Chronic kidney disease     Depression     GERD (gastroesophageal reflux disease)     Gout     History of blood transfusion     Hyperlipidemia     Hypertension     Palliative care patient 05/20/2021    Pneumonia due to infectious organism 04/09/2019    Type II or unspecified type diabetes mellitus without mention of complication, not stated as uncontrolled     Ulcerative colitis ([facility] [address] 06/30/2019       Past Surgical History:      Procedure Laterality Date    CATARACT REMOVAL Bilateral     DIALYSIS FISTULA CREATION Right 10/16/2020    RIGHT BRACHIAL/BASILIC AV FISTULA performed by Sanchez Giles MD at [address] Right Brachial Artery to Cephalic Vein Arteriovenous Fistula Creation    HEMORRHOID SURGERY  1973    HERNIA REPAIR      umbilical hernia    PACEMAKER 05/23/21 1816    insulin lispro (HUMALOG) injection vial 0-18 Units, 0-18 Units, Subcutaneous, Q4H, Chuy Villatoro MD, 9 Units at 05/24/21 0612    heparin (porcine) injection 3,790 Units, 60 Units/kg, Intravenous, PRN, Ruthie Dings, DO, 3,790 Units at 05/22/21 1305    heparin (porcine) injection 1,890 Units, 30 Units/kg, Intravenous, PRN, Ruthie Dings, DO, 1,890 Units at 05/23/21 4093    heparin 25,000 units in dextrose 5% 250 mL (premix) infusion, 5-30 Units/kg/hr, Intravenous, Continuous, Ruthie Dings, DO, Last Rate: 11.4 mL/hr at 05/23/21 1432, 18 Units/kg/hr at 05/23/21 1432    metoprolol (LOPRESSOR) injection 5 mg, 5 mg, Intravenous, Q6H, Ruthie Dings, DO, 5 mg at 05/24/21 0341    sodium chloride flush 0.9 % injection 5-40 mL, 5-40 mL, Intravenous, 2 times per day, Ruthie Dings, DO, 10 mL at 05/23/21 2107    sodium chloride flush 0.9 % injection 5-40 mL, 5-40 mL, Intravenous, PRN, Ruthie Dings, DO    0.9 % sodium chloride infusion, 25 mL, Intravenous, PRN, Ruthie Dings, DO    acetaminophen (TYLENOL) tablet 650 mg, 650 mg, Oral, Q6H PRN, 650 mg at 05/22/21 1917 **OR** acetaminophen (TYLENOL) suppository 650 mg, 650 mg, Rectal, Q6H PRN, Ruthie Dings, DO    Allergies:  Patient has no known allergies. Social History:   TOBACCO:   reports that he quit smoking about 15 years ago. His smoking use included cigarettes. He has a 50.00 pack-year smoking history. He has quit using smokeless tobacco.  His smokeless tobacco use included snuff. ETOH:   reports current alcohol use.     Family History:       Problem Relation Age of Onset    High Blood Pressure Mother     Heart Disease Mother     Stroke Mother         at 48    High Blood Pressure Father     Heart Disease Father     Stroke Father         at 59    No Known Problems Brother     Alzheimer's Disease Brother         onset at 80, then rapidly progressed to death         PHYSICAL EXAM:  BP (!) 144/57   Pulse 83   Temp 99 °F (37.2 °C) (Temporal)   Resp 20   Ht 5' 8\" (1.727 m)   Wt 130 lb 6.4 oz (59.1 kg)   SpO2 99%   BMI 19.83 kg/m²     Constitutional - well developed, well nourished. Eyes - conjunctiva normal.   Ear, nose, throat - No scars, masses, or lesions over external nose or ears, no atrophy of tongue  Neck-symmetric, no masses noted, no jugular vein distension  Respiration- chest wall appears symmetric, good expansion,   normal effort without use of accessory muscles  Musculoskeletal - no significant wasting of muscles noted, no bony deformities  Extremities-no clubbing, cyanosis or edema  Skin - warm, dry, and intact. No rash, erythema, or pallor. Psychiatric - mood, affect, and behavior unable to assess     Neurological exam  Eyes opening non purposefully not track not follow commands  + gag  Not breathing over ventilator  No movements of extremities       Nursing/pcp notes, imaging,labs and vitals reviewed.      PT,OT and/or speech notes reviewed    Lab Results   Component Value Date    WBC 17.9 (H) 05/24/2021    HGB 10.9 (L) 05/24/2021    HCT 31.5 (L) 05/24/2021    MCV 86.3 05/24/2021     05/24/2021     Lab Results   Component Value Date     (L) 05/24/2021    K 3.6 05/24/2021    CL 83 (L) 05/24/2021    CO2 33 (H) 05/24/2021    BUN 65 (H) 05/24/2021    CREATININE 4.6 (H) 05/24/2021    GLUCOSE 245 (H) 05/24/2021    CALCIUM 8.5 (L) 05/24/2021    PROT 5.7 (L) 05/24/2021    LABALBU 3.0 (L) 05/24/2021    BILITOT 0.8 05/24/2021    ALKPHOS 325 (H) 05/24/2021    AST 67 (H) 05/24/2021    ALT 75 (H) 05/24/2021    LABGLOM 12 (A) 05/24/2021    GFRAA 15 (L) 05/24/2021     Lab Results   Component Value Date    INR 1.21 (H) 05/19/2021    INR 1.54 (H) 11/25/2020    INR 1.55 (H) 11/13/2020    PROTIME 15.3 (H) 05/19/2021    PROTIME 18.6 (H) 11/25/2020    PROTIME 18.7 (H) 11/13/2020             RECORD REVIEW: Previous medical records, medications were reviewed at [facility] visit    IMPRESSION:   AMS in the setting of hypoglycemia in the 40s of unclear duration/respiratory failure/chronic renal failure  Profound hypoglycemia with subsequent cerebral injury is high on the differential along with stroke with history of atrial fibrillation-no significant change in neurological status off sedation x 3 days    On vent no sedations  Exam 5/24 eyes open occasionally  + gag not breathing over vent  Not following commands on vent. Made brief eye contact with deep pain.     EEG significant movement-diffuse slowing  Need to wait till Monday for MRI brain to interrogate pacemaker    On Abx for presumed sepsis  Respiratory failure-wean vent as able  ESRD-on HD  DM/hypoglycemia/hyperglycemia-improved  Atrial fibrillation-Lopressor/Heparin    Minimize sedation as able    Supportive care    Concern for cerebral insult with prolonged hypoglycemiz    To go for MRI today

## 2021-05-24 NOTE — CONSULTS
**Physician Signature**  This document was electronically signed by: Val Long DO  2021   10:31 PM    **Consult Information**  Member Facility: [address] [place] [address] Drive MRN: [id number]  Visit/Encounter Number: [id number]  Consult ID: [id number]  Facility Time Zone: CT  Date and Time of Request: 2021 08:56 PM  Requesting Clinician: Vincent Virk MD  Patient Name: Sera Becerra  Date of Birth:   Gender: Male  Patient identity was confirmed at the beginning of the consult with the   patient/family/staff using two personal identifiers: Patient name and       **Admission**  Admission Date: 2021  Chief reason for ICU admission: Respiratory Failure  Secondary reasons for ICU admission: Altered Mental Status    **Core Metrics**  General orienting sentence for patient: 69 y/o transferred from an outside   hospital. Found down at home, had missed HD (ESRD). Intubated and on the   ventilator. Was also hypoglycemic. Only on Versed. Recently started on   insulin. . On hep drip for a fib. HD today. Starting   card drip. Cardizem gtt along with Gtt heparin. 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 Off all   sedation and remains comatose. Intermittent hypoglycemia, requiring D10. HX   ESRD with right arm AVF. Son is in [facility] in the [facility], coming tomorrow   for ? change in status. Has hx AICD/pacer. + corneal, + over breathes   vent. diltiazem gtt. Not interactive. but not interactive. Cardizem at 5   mg/hr. Gtt heparin. BP reasonable, 30%   FIO2 slowing. MRI is planned for Monday. Concern for anoxic or hypoglycemic   encephalopathy. Chief physiologic deterioration: None - Stable patient  Is the patient on DVT prophylaxis?: No  DVT Prophylaxis Comments: hep drip  Is the patient on GI prophylaxis?: No  Gi Prophylaxis Comments: Should be on pepcid 20 or protonix 40.   Has this patient reached their nutritional goal?: Yes  Nutritional Goals Details: TF  Are there current issues with pain management in this patient?:   No  Are there issues with skin integrity?: No  Are there issues with delirium?: No  Has the patient been mobilized?: No  Is this patient currently intubated?: Yes  Are there ethical or care philosophy or family issues?: No  Do you recommend an in depth evaluation?: No  Do you recommend the patient should: : Continue ICU level of care    **Inserted/Removed Devices**  Device Name: Endotracheal Tube  Site of insertion: Trachea  Date of insertion: 05-  Device Name: Gomes Catheter  Site of insertion: Bladder  Date of insertion: 05-  Device Name: Orogastric Tube  Site of insertion: Oropharynx  Date of insertion: 05-

## 2021-05-24 NOTE — PROGRESS NOTES
Palliative follow up family/support visit. Pt son/POA Andre and pt dtr are present. Long discussion on pt wishes as per his AD on file. Copy made and provided to children. Family understands pt will most likely not recover and state they have had their personal conversations individually with pt, in the past, about his wishes. However, son, Savi Booth, in from [place] is still \"struggling\" per his brother with accepting this may be the end for their dad. MRI was completed today. Dr. Shivam Mora will speak with family on the findings as soon as third child arrives as per their request.  Long discussion on hospice care and what is provided, as well as, the process of moving pt. Voiced understanding. Family believes pt will not survive and have decided to move toward hospice for palliative extubation and comfort measures. They have also decided it would be better to wait until morning to give Kim an opportunity to visit with his dad a little longer. This nurse provided emotional support and listening presence. Kameron Martinez aware that pt will most likely move tomorrow and has made plan to meet with them in the morning for POA to sign paperwork. Dr. Young Sees also aware of this pt and move tomorrow to [facility] PAlliative will continue to follow for support.   Electronically signed by Charlie Sibley RN on 5/24/2021 at 4:14 PM

## 2021-05-24 NOTE — CARE COORDINATION
Pt still on vent; family making their way here, seemingly all have arrived; normally HD at [facility] MWF; following for needs.     Electronically signed by JOAQUIN Stubbs on 5/24/2021 at 2:57 PM

## 2021-05-24 NOTE — PROGRESS NOTES
Nephrology ([facility]) Progress Note    Patient:  Enrique Sanches  YOB: 1940  Date of Service: 5/24/2021  MRN: [id number]   Acct: [de-identified]   Primary Care Physician: Ida Shore MD  Advance Directive: Partial Code  Admit Date: 5/19/2021       Hospital Day: 5  Referring Provider: Ursula Marie DO    Patient independently seen and examined, Chart, Consults, Notes, Operative notes, Labs, Cardiology, and Radiology studies reviewed as available. Subjective:  Enrique Sanches is a 68 y.o. male  whom we were consulted for ESRD. He was admitted to [facility] ICU as a transfer from [facility] altered mental Kelton Higginbotham was found unresponsive on the floor at home.  Downtime unknown. Jett Baird was intubated at the outside ER. Jett Baird was recently placed on insulin or a new type of insulin his blood sugars have been somewhat low.  He is on chronic hemodialysis ([facility]). Today, no overnight events. He remains on the ventilator and unable to participate in the history and physical examination. He was tolerating dialysis. He was in also atrial fibrillation with rapid ventricular response    Dialysis   Pt was seen on RRT  Modality: Hemodialysis  Access: Arterial Venous Fistula  Location: right upper  QB: 450  QD: 700  UF: 720      Allergies:  Patient has no known allergies.     Medicines:  Current Facility-Administered Medications   Medication Dose Route Frequency Provider Last Rate Last Admin    acetylcysteine (MUCOMYST) 20 % solution 600 mg  600 mg Inhalation BID Ursula Marie DO   600 mg at 05/24/21 0618    albuterol (PROVENTIL) nebulizer solution 2.5 mg  2.5 mg Nebulization Q6H PRN Ursula Marie DO   2.5 mg at 05/24/21 2974    enalaprilat (VASOTEC) injection 1.25 mg  1.25 mg Intravenous Q6H Ursula Marie, DO   1.25 mg at 05/24/21 0204    potassium chloride (KLOR-CON M) extended release tablet 40 mEq  40 mEq Oral PRN Bee Polanco Julien Bustamante, DO        Or    potassium bicarb-citric acid (EFFER-K) effervescent tablet 40 mEq  40 mEq Oral PRN Sherman Flair, DO   40 mEq at 05/23/21 1912    Or    potassium chloride 10 mEq/100 mL IVPB (Peripheral Line)  10 mEq Intravenous PRN Sherman Flair, DO        amiodarone (CORDARONE) 450 mg in dextrose 5 % 250 mL infusion  0.5 mg/min Intravenous Continuous Sherman Flair, DO 16.7 mL/hr at 05/23/21 1816 0.5 mg/min at 05/23/21 1816    insulin lispro (HUMALOG) injection vial 0-18 Units  0-18 Units Subcutaneous Q4H Lila MD Milagros   9 Units at 05/24/21 0612    heparin (porcine) injection 3,790 Units  60 Units/kg Intravenous PRN Sherman Flair, DO   3,790 Units at 05/22/21 1305    heparin (porcine) injection 1,890 Units  30 Units/kg Intravenous PRN Sherman Flair, DO   1,890 Units at 05/23/21 2717    heparin 25,000 units in dextrose 5% 250 mL (premix) infusion  5-30 Units/kg/hr Intravenous Continuous Sherman Flair, DO 11.4 mL/hr at 05/23/21 1432 18 Units/kg/hr at 05/23/21 1432    metoprolol (LOPRESSOR) injection 5 mg  5 mg Intravenous Q6H Sherman Flair, DO   5 mg at 05/24/21 4153    sodium chloride flush 0.9 % injection 5-40 mL  5-40 mL Intravenous 2 times per day Sherman Flair, DO   10 mL at 05/23/21 2107    sodium chloride flush 0.9 % injection 5-40 mL  5-40 mL Intravenous PRN Sherman Flair, DO        0.9 % sodium chloride infusion  25 mL Intravenous PRN Sherman Flair, DO        acetaminophen (TYLENOL) tablet 650 mg  650 mg Oral Q6H PRN Sherman Flair, DO   650 mg at 05/22/21 3766    Or    acetaminophen (TYLENOL) suppository 650 mg  650 mg Rectal Q6H PRN Sherman Flair, DO           Past Medical History:  Past Medical History:   Diagnosis Date    Arthritis     Atherosclerosis of native arteries of the extremities with intermittent claudication     Blood circulation, collateral     CAD (coronary artery disease)     Carotid artery occlusion, right     Chronic kidney disease     Depression     GERD (gastroesophageal reflux disease)     Gout     History of blood transfusion     Hyperlipidemia     Hypertension     Palliative care patient 05/20/2021    Pneumonia due to infectious organism 04/09/2019    Type II or unspecified type diabetes mellitus without mention of complication, not stated as uncontrolled     Ulcerative colitis ([facility] [address] 06/30/2019       Past Surgical History:  Past Surgical History:   Procedure Laterality Date    CATARACT REMOVAL Bilateral     DIALYSIS FISTULA CREATION Right 10/16/2020    RIGHT BRACHIAL/BASILIC AV FISTULA performed by Cam Hinojosa MD at [address] Right Brachial Artery to Cephalic Vein Arteriovenous Fistula Creation    [address]      umbilical hernia    PACEMAKER PLACEMENT      [facility] [address] VASCULAR SURGERY  11/02/2020    [facility].  Right upper extremity fistulogram's including venography to the superior vena cava, Balloon angioplasty distal/mid upper arm cephalic vein stenoses with 8 mm x 40 mm Luke balloon, Coil embolization of cephalic vein branch (8 mm x 5 cm, 5 mm x 5 cm (4)), Completion fistulogram's and venograms    VASCULAR SURGERY  01/14/2021    [facility]-   Right upper extremity fistulogram's including venography to the superior vena cava,  Balloon angioplasty distal/mid upper arm cephalic vein stenosis with 8 mm x 60 mm Lutonix drug-coated balloon, Completion venograms right upper extremity       Family History  Family History   Problem Relation Age of Onset    High Blood Pressure Mother     Heart Disease Mother     Stroke Mother         at 48    High Blood Pressure Father     Heart Disease Father     Stroke Father         at 59    No Known Problems Brother     Alzheimer's Disease Brother         onset at 80, then rapidly progressed to death       Social History  Social History     Socioeconomic History    Marital status:  Club or Organization Meetings:     Marital Status:    Intimate Partner Violence:     Fear of Current or Ex-Partner:     Emotionally Abused:     Physically Abused:     Sexually Abused:          Review of Systems:  History obtained from unobtainable from patient due to mental status          Objective:  Patient Vitals for the past 24 hrs:   BP Temp Temp src Pulse Resp SpO2 Weight   05/24/21 1020 -- -- -- 130 17 -- --   05/24/21 0800 (!) 144/57 97.2 °F (36.2 °C) Temporal 83 20 99 % --   05/24/21 0700 (!) 151/66 -- -- 87 19 99 % --   05/24/21 0630 (!) 149/66 -- -- 89 13 98 % --   05/24/21 0619 -- -- -- 85 14 100 % --   05/24/21 0615 139/60 -- -- 73 22 100 % --   05/24/21 0600 (!) 135/57 -- -- 80 21 99 % --   05/24/21 0545 (!) 143/58 -- -- 86 13 99 % --   05/24/21 0530 (!) 145/56 -- -- 83 18 99 % --   05/24/21 0515 (!) 126/57 -- -- 80 21 99 % --   05/24/21 0500 (!) 131/51 -- -- 72 29 99 % --   05/24/21 0445 (!) 130/52 -- -- 81 17 99 % --   05/24/21 0430 135/61 -- -- 67 18 99 % --   05/24/21 0415 120/64 -- -- 78 18 99 % --   05/24/21 0400 (!) 118/59 99 °F (37.2 °C) Temporal 79 14 99 % 130 lb 6.4 oz (59.1 kg)   05/24/21 0345 124/64 -- -- 81 22 99 % --   05/24/21 0330 (!) 141/66 -- -- 94 26 99 % --   05/24/21 0315 (!) 140/65 -- -- 86 22 96 % --   05/24/21 0300 (!) 144/63 -- -- 93 (!) 33 99 % --   05/24/21 0245 (!) 111/48 -- -- 82 23 98 % --   05/24/21 0230 129/64 -- -- 97 14 98 % --   05/24/21 0215 (!) 149/69 -- -- 98 17 99 % --   05/24/21 0115 (!) 157/64 -- -- 103 19 97 % --   05/24/21 0100 (!) 157/71 -- -- 116 15 98 % --   05/24/21 0045 (!) 152/85 -- -- 100 17 98 % --   05/24/21 0030 (!) 150/69 -- -- 99 21 98 % --   05/24/21 0015 (!) 154/80 -- -- 99 19 98 % --   05/24/21 0000 (!) 150/61 99 °F (37.2 °C) Temporal 100 14 97 % --   05/23/21 2345 (!) 156/66 -- -- 98 18 96 % --   05/23/21 2330 (!) 158/78 -- -- 98 15 98 % --   05/23/21 2315 (!) 166/85 -- -- 114 21 98 % --   05/23/21 2300 (!) 146/82 -- -- 102 14 98 % -- 05/23/21 2245 (!) 140/73 -- -- 95 15 98 % --   05/23/21 2230 (!) 154/73 -- -- 96 22 98 % --   05/23/21 2215 (!) 145/66 -- -- 88 18 98 % --   05/23/21 2200 (!) 166/63 -- -- 90 19 98 % --   05/23/21 2157 -- -- -- 91 18 98 % --   05/23/21 2145 (!) 163/86 -- -- 81 18 98 % --   05/23/21 2130 (!) 163/64 -- -- 91 13 98 % --   05/23/21 2115 (!) 148/65 -- -- 95 20 98 % --   05/23/21 2100 133/69 -- -- 97 18 98 % --   05/23/21 2045 (!) 140/63 -- -- 102 19 97 % --   05/23/21 2030 (!) 157/64 -- -- 93 20 97 % --   05/23/21 2015 (!) 142/73 -- -- 112 20 97 % --   05/23/21 2000 (!) 155/92 99.5 °F (37.5 °C) Temporal 114 23 98 % --   05/23/21 1945 (!) 158/83 -- -- 116 16 98 % --   05/23/21 1930 (!) 162/93 -- -- 124 18 98 % --   05/23/21 1916 (!) 142/89 -- -- 106 21 98 % --   05/23/21 1900 (!) 175/97 -- -- 109 23 98 % --   05/23/21 1800 (!) 143/70 -- -- 98 18 98 % --   05/23/21 1700 (!) 161/74 -- -- 96 16 98 % --   05/23/21 1600 (!) 158/75 98.3 °F (36.8 °C) Temporal 92 21 98 % --   05/23/21 1500 (!) 169/62 -- -- 84 14 98 % --   05/23/21 1410 -- -- -- 107 14 99 % --   05/23/21 1409 -- -- -- -- 16 98 % --   05/23/21 1400 (!) 173/78 -- -- 100 19 96 % --   05/23/21 1330 (!) 153/82 -- -- 105 19 94 % --   05/23/21 1300 (!) 171/70 -- -- 102 14 98 % --   05/23/21 1200 (!) 173/78 98.8 °F (37.1 °C) Temporal 99 20 93 % --   05/23/21 1100 (!) 155/59 -- -- 86 14 97 % --       Intake/Output Summary (Last 24 hours) at 5/24/2021 1058  Last data filed at 5/24/2021 0800  Gross per 24 hour   Intake 1518.2 ml   Output 505 ml   Net 1013.2 ml     General: Ventilator and ill-appearing  Chest:  clear to auscultation bilaterally  CVS: IRRR  Abdominal: soft, nontender, normal bowel sounds  Extremities: no cyanosis or edema  Skin: warm and dry without rash    Labs:  BMP:   Recent Labs     05/22/21  0527 05/23/21  0518 05/24/21  0113   * 131* 127*   K 3.4* 3.3* 3.6   CL 90* 87* 83*   CO2 36* 33* 33*   BUN 23 51* 65*   CREATININE 2.2* 3.6* 4.6*   CALCIUM 8. 6* 8.5* 8.5*     CBC:   Recent Labs     05/22/21  0527 05/23/21  0518 05/24/21  0113   WBC 17.8* 18.5* 17.9*   HGB 11.3* 11.0* 10.9*   HCT 33.9* 32.1* 31.5*   MCV 87.6 87.2 86.3    214 230     LIVER PROFILE:   Recent Labs     05/22/21 0527 05/23/21  0518 05/24/21  0113   AST 42* 66* 67*   ALT 34 66* 75*   BILITOT 0.6 0.7 0.8   ALKPHOS 242* 310* 325*     PT/INR: No results for input(s): PROTIME, INR in the last 72 hours. APTT:   Recent Labs     05/23/21  1831 05/24/21 0113 05/24/21  0629   APTT 53.7* 52.1* 55.2*     BNP:  No results for input(s): BNP in the last 72 hours. Ionized Calcium:No results for input(s): IONCA in the last 72 hours. Magnesium:  Recent Labs     05/22/21 0527 05/23/21 0518   MG 1.9 2.2     Phosphorus:No results for input(s): PHOS in the last 72 hours. HgbA1C: No results for input(s): LABA1C in the last 72 hours. Hepatic:   Recent Labs     05/22/21 0527 05/23/21 0518 05/24/21 0113   ALKPHOS 242* 310* 325*   ALT 34 66* 75*   AST 42* 66* 67*   PROT 5.8* 5.7* 5.7*   BILITOT 0.6 0.7 0.8   LABALBU 3.3* 3.2* 3.0*     Lactic Acid: No results for input(s): LACTA in the last 72 hours. Troponin: No results for input(s): CKTOTAL, CKMB, TROPONINT in the last 72 hours. ABGs:   Lab Results   Component Value Date    PHART 7.480 05/19/2021    PO2ART 330.0 05/19/2021    HTU0KTZ 38.0 05/19/2021     CRP:  No results for input(s): CRP in the last 72 hours. Sed Rate:  No results for input(s): SEDRATE in the last 72 hours. Culture Results:   Blood Culture Recent:   Recent Labs     05/19/21  1744   BC No Growth to date. Any change in status will be called. Urine Culture Recent :   Recent Labs     05/20/21  1306   LABURIN No growth at 36-48 hours       Radiology reports as per the Radiologist  Radiology: XR CHEST PORTABLE    Result Date: 5/22/2021  EXAM: XR CHEST PORTABLE -- 5/22/2021 3:28 AM HISTORY: 68 years, Male, respiratory failure COMPARISON: 5/19/2021 TECHNIQUE:  1 images. Frontal view of the chest. FINDINGS:  Endotracheal tube tip projects at the mid trachea. Gastric tube with normal coarse, tip at the gastric fundus. Cardiac pulse generator with single lead at the left chest. No pneumothorax or pleural effusion. Mildly decreased opacity of the left lower lung. Patchy opacities at the right lower lung. Calcified aortic atherosclerosis. Cardiac mediastinal silhouette otherwise within normal limits. No acute bony finding. 1. Bibasilar patchy opacities appear to be waxing and waning, favored to represent atelectasis. 2. Lines and tubes as above. Signed by Dr Kesha Mendoza on 5/22/2021 7:37 AM    XR CHEST PORTABLE    Result Date: 5/19/2021  Exam: XR CHEST PORTABLE - 5/19/2021 3:00 PM Indication: Intubated patient Comparison: 11/25/2020 Findings: Endotracheal tube is 3 cm above the genia. Enteric tube terminates in the region of the gastric fundus. LEFT chest wall cardiac pacing device appears stable in position. Patchy bilateral pulmonary airspace opacities are present. No pleural effusion or visible pneumothorax. A skin fold projects along the LEFT lateral chest. No acute osseous finding. Impression: 1. Endotracheal tube appears in good position. 2.  Patchy bilateral airspace opacity.  Signed by Dr Praveen Mcgowan on 5/19/2021 4:20 PM       Assessment   End-stage renal disease  Diabetes type 2 with diabetic nephropathy  Atrial fibrillation with periods of rapid ventricular response  Anemia chronic kidney disease  Acute respiratory failure  Hyperkalemia  Urinary tract infection      Plan:  Hemodialysis Monday Wednesday Friday  Adjust potassium bath as needed  Monitor labs  Wean ventilator as tolerated  Antibiotics per primary service    Addie Bryson MD, MD  05/24/21  10:58 AM

## 2021-05-24 NOTE — PROGRESS NOTES
Patient residual checked and noted to be 200cc tube feeding turned off at this time. Will resume in two hours and recheck residuals.   Electronically signed by Matthew Bergman RN on 5/23/2021 at 8:42 PM'

## 2021-05-24 NOTE — PROGRESS NOTES
Comprehensive Nutrition Assessment    Type and Reason for Visit:  Reassess    Nutrition Assessment:  EN is running @ goal rate of 40 mL/hr. No s/s intolerance at this time. Continue current POC. Malnutrition Assessment:  Malnutrition Status:   At risk for malnutrition (Comment)      Estimated Daily Nutrient Needs:  Energy (kcal):  4958-5486 kcals/day; Weight Used for Energy Requirements:   (25-30)     Protein (g):  107 g/PRO/day; Weight Used for Protein Requirements:  Current (1.7)        Fluid (ml/day):  Method Used for Fluid Requirements:  1 ml/kcal (1000 mL + uop)      Current Nutrition Therapies:    Current Tube Feeding (TF) Orders:  · Feeding Route: Orogastric  · Formula: Renal  · Schedule: Continuous  · Additives/Modulars: Protein (One proteinex daily)  · Current TF & Flush Orders Provides: Nepro at 40 mL/hr and 1 Proteinex flush provides: 1803 kcals, 104 g/PRO, 115 g/CHO, and 698 mL/fluid daily  · Goal TF & Flush Orders Provides: Nepro at 40 mL/hr and 1 Proteinex flush provides: 1803 kcals, 104 g/PRO, 115 g/CHO, and 698 mL/fluid daily    Anthropometric Measures:  · Height: 5' 8\" (172.7 cm)  · Current Body Weight: 130 lb (59 kg)   · Admission Body Weight: 139 lb (63 kg)    · Ideal Body Weight: 154 lbs  · BMI: 19.8  · BMI Categories: Underweight (BMI less than 22) age over 72       Nutrition Diagnosis:   · Inadequate oral intake related to impaired respiratory function as evidenced by intubation, NPO or clear liquid status due to medical condition, nutrition support - enteral nutrition    Nutrition Interventions:   Food and/or Nutrient Delivery:  Continue NPO, Continue Current Tube Feeding   Coordination of Nutrition Care:  Continue to monitor while inpatient    Goals:  Pt will tolerate EN and show s/s healing       Nutrition Monitoring and Evaluation:   Food/Nutrient Intake Outcomes:  Enteral Nutrition Intake/Tolerance  Physical Signs/Symptoms Outcomes:  Biochemical Data, Hemodynamic Status, Nutrition

## 2021-05-25 PROBLEM — N17.9 ACUTE KIDNEY INJURY SUPERIMPOSED ON CHRONIC KIDNEY DISEASE (HCC): Status: RESOLVED | Noted: 2020-01-01 | Resolved: 2021-01-01

## 2021-05-25 PROBLEM — Z79.01 CHRONIC ANTICOAGULATION: Status: RESOLVED | Noted: 2019-05-09 | Resolved: 2021-01-01

## 2021-05-25 PROBLEM — N25.81 SECONDARY HYPERPARATHYROIDISM OF RENAL ORIGIN (HCC): Status: ACTIVE | Noted: 2020-01-01

## 2021-05-25 PROBLEM — Z51.5 PALLIATIVE CARE PATIENT: Status: RESOLVED | Noted: 2021-01-01 | Resolved: 2021-01-01

## 2021-05-25 PROBLEM — I67.9 SMALL VESSEL DISEASE, CEREBROVASCULAR: Status: ACTIVE | Noted: 2021-01-01

## 2021-05-25 PROBLEM — J96.01 ACUTE RESPIRATORY FAILURE WITH HYPOXIA (HCC): Status: RESOLVED | Noted: 2021-01-01 | Resolved: 2021-01-01

## 2021-05-25 PROBLEM — K51.90 ULCERATIVE COLITIS (HCC): Chronic | Status: RESOLVED | Noted: 2019-06-30 | Resolved: 2021-01-01

## 2021-05-25 PROBLEM — E83.51 HYPOCALCEMIA: Status: RESOLVED | Noted: 2019-04-09 | Resolved: 2021-01-01

## 2021-05-25 PROBLEM — T46.0X1A: Status: RESOLVED | Noted: 2020-01-01 | Resolved: 2021-01-01

## 2021-05-25 PROBLEM — I63.432 CEREBROVASCULAR ACCIDENT (CVA) DUE TO EMBOLISM OF LEFT POSTERIOR CEREBRAL ARTERY (HCC): Status: ACTIVE | Noted: 2021-01-01

## 2021-05-25 PROBLEM — I35.0 AORTIC STENOSIS, MODERATE: Status: ACTIVE | Noted: 2020-01-01

## 2021-05-25 PROBLEM — A41.9 SEPSIS (HCC): Status: RESOLVED | Noted: 2021-01-01 | Resolved: 2021-01-01

## 2021-05-25 PROBLEM — N18.9 ACUTE KIDNEY INJURY SUPERIMPOSED ON CHRONIC KIDNEY DISEASE (HCC): Status: RESOLVED | Noted: 2020-01-01 | Resolved: 2021-01-01

## 2021-05-25 PROBLEM — I50.32 CHRONIC DIASTOLIC CONGESTIVE HEART FAILURE (HCC): Status: ACTIVE | Noted: 2020-01-01

## 2021-05-25 PROBLEM — E16.2 HYPOGLYCEMIA: Status: RESOLVED | Noted: 2021-01-01 | Resolved: 2021-01-01

## 2021-05-25 PROBLEM — K21.9 GASTROESOPHAGEAL REFLUX DISEASE WITHOUT ESOPHAGITIS: Status: ACTIVE | Noted: 2019-04-05

## 2021-05-25 PROBLEM — R41.82 ALTERED MENTAL STATE: Status: RESOLVED | Noted: 2021-01-01 | Resolved: 2021-01-01

## 2021-05-25 PROBLEM — Z95.818 PRESENCE OF WATCHMAN LEFT ATRIAL APPENDAGE CLOSURE DEVICE: Status: ACTIVE | Noted: 2021-01-01

## 2021-05-25 NOTE — PROGRESS NOTES
The pts son/poa signed the adm forms admitting the pt to the [place] [address]. for Extubation. He did sign the DNR. It is ok to dc the pt, call [phone] to give report the transfer the pt to the [place] [address].. Emotional and sc support.

## 2021-05-25 NOTE — DISCHARGE SUMMARY
Discharge Summary    Enrique aSnches  :  1940  MRN:  [id number]    Admit date:  2021  Discharge date: 2021     Admitting Physician:  No admitting provider for patient encounter. Advance Directive: Partial Code    Consults: nephrology, neurology, palliative care, and hospice    Primary Care Physician:  Ida Shore MD    Discharge Diagnoses: Active Problems:    Chronic atrial fibrillation (HCC)    Pacemaker    Type 2 diabetes mellitus (HCC)    Hypertension    ESRD (end stage renal disease) on dialysis ([address]    ESRD (end stage renal disease) ([address]  Resolved Problems:    Chronic anticoagulation    Depression    Carotid artery occlusion, right    Ulcerative colitis ([address]    Sepsis ([address]    Palliative care patient    Altered mental state    Acute respiratory failure with hypoxia ([address]    Hypoglycemia      Significant Diagnostic Studies:   XR CHEST PORTABLE    Result Date: 2021  Exam: XR CHEST PORTABLE - 2021 3:00 PM Indication: Intubated patient Comparison: 2020 Findings: Endotracheal tube is 3 cm above the genia. Enteric tube terminates in the region of the gastric fundus. LEFT chest wall cardiac pacing device appears stable in position. Patchy bilateral pulmonary airspace opacities are present. No pleural effusion or visible pneumothorax. A skin fold projects along the LEFT lateral chest. No acute osseous finding. Impression: 1. Endotracheal tube appears in good position. 2.  Patchy bilateral airspace opacity. Signed by Dr Liyah Krishnamurthy on 2021 4:20 PM      CBC:   Recent Labs     21  0518 21  0113 21  0200   WBC 18.5* 17.9* 19.8*   HGB 11.0* 10.9* 11.8*    230 259     BMP:    Recent Labs     21  0518 21  0113 21  0200   * 127* 130*   K 3.3* 3.6 4.0   CL 87* 83* 91*   CO2 33* 33* 25   BUN 51* 65* 38*   CREATININE 3.6* 4.6* 3.0*   GLUCOSE 161* 245* 235*     INR: No results for input(s): INR in the last 72 hours.   Lipids: No results for input(s): CHOL, HDL in the last 72 hours. Invalid input(s): LDLCALCU  ABGs:No results for input(s): PHART, DQT6GSQ, PO2ART, RQZ4BMS, BEART, HGBAE, L8NCQDOK, CARBOXHGBART, 02THERAPY in the last 72 hours. HgBA1c:  No results for input(s): LABA1C in the last 72 hours. Procedures: Mechanical ventilation, hemodialysis  Hospital Course: Mr. Alberto Proctor was admitted on 5/19 after being found down unresponsive at home. He was hypoglycemic on initial assessment. His exact downtime was unknown. He was taken to an outside emergency department. He was intubated because of inability to maintain his airway. He was transferred to our facility for higher level of care. Consultation was obtained with neurology as well as nephrology. He was on maintenance hemodialysis as an outpatient. MRI of his brain showed an acute occipital stroke and very advanced chronic ischemic disease. It was felt that he most likely had significant cerebral damage related to prolonged hypoglycemia. Consultation was obtained with palliative care and after much discussion with the patient's children and review of his living will they made the decision to pursue hospice. He will be transferred to inpatient hospice for terminal extubation later this morning. Physical Exam:  Vital Signs: BP (!) 155/71   Pulse 76   Temp 98.7 °F (37.1 °C) (Temporal)   Resp 23   Ht 5' 8\" (1.727 m)   Wt 126 lb 14.4 oz (57.6 kg)   SpO2 100%   BMI 19.30 kg/m²   General appearance:. Alert and Cooperative   HEENT: Normocephalic. Chest: clear to auscultation bilaterally without wheezes or rhonchi. Cardiac: Normal heart tones with regular rate and rhythm, S1, S2 normal. No murmurs, gallops, or rubs auscultated. Abdomen:soft, non-tender; normal bowel sounds, no masses, no organomegaly. Extremities: No clubbing or cyanosis. No peripheral edema. Peripheral pulses palpable. Neurologic: Grossly intact.     Discharge Medications:       Milly Lorena

## 2021-05-25 NOTE — CONSULTS
**Physician Signature**  This document was electronically signed by: Beth Wilson MD  2021   10:40 PM    **Consult Information**  Member Facility: [address] [place] [address] Drive MRN: [id number]  Visit/Encounter Number: [id number]  Consult ID: [id number]  Facility Time Zone: CT  Date and Time of Request: 2021 07:38 PM  Requesting Clinician: Kal Bhatti MD  Patient Name: Nicole Miller  Date of Birth:   Gender: Male  Patient identity was confirmed at the beginning of the consult with the   patient/family/staff using two personal identifiers: Patient name and       **Admission**  Admission Date: 2021  Chief reason for ICU admission: Respiratory Failure  Secondary reasons for ICU admission: Altered Mental Status    **Core Metrics**  General orienting sentence for patient: 67 y/o transferred from an outside   hospital. Found down at home, had missed HD (ESRD). Intubated and on the   ventilator. Was also hypoglycemic. Only on Versed. Recently started on   insulin. . On hep drip for a fib. HD today. Starting   card drip. Cardizem gtt along with Gtt heparin. 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 Off all   sedation and remains comatose. Intermittent hypoglycemia, requiring D10. HX   ESRD with right arm AVF. Son is in [place] in the [employer], coming tomorrow   for ? change in status. Has hx AICD/pacer. + corneal, + over breathes   vent. diltiazem gtt. Not interactive. but not interactive. Cardizem at 5   mg/hr. Gtt heparin. BP reasonable, 30%   FIO2 slowing. MRI is planned for Monday. Concern for anoxic or hypoglycemic   encephalopathy. today then a family meeting based on results. AM. Remains   vented. Off all sedation. Does respond to pain and opens eyes   reflexively with turning. Remains on amiodaroned. No CPR but meds to be   given until w/d of support.  MRI today showed big new CVA  Chief physiologic deterioration: Change in mental status  Is the patient on DVT prophylaxis?: Yes  DVT Prophylaxis Comments: Gtt heparin  Is the patient on GI prophylaxis?: No  Gi Prophylaxis Comments: pepcid  Has this patient reached their nutritional goal?: Yes  Nutritional Goals Details: TF  Are there current issues with pain management in this patient?:   No  Are there issues with skin integrity?: No  Skin Integrity Details: Tear over sacrum, healing, mepilex in   place.   Are there issues with delirium?: No  Has the patient been mobilized?: No  Is this patient currently intubated?: Yes  Are there ethical or care philosophy or family issues?: No  Do you recommend an in depth evaluation?: No  Do you recommend the patient should: : Continue ICU level of care    **Inserted/Removed Devices**  Device Name: Endotracheal Tube  Site of insertion: Trachea  Date of insertion: 05-  Device Name: Gomes Catheter  Site of insertion: Bladder  Date of insertion: 05-  Device Name: Orogastric Tube  Site of insertion: Oropharynx  Date of insertion: 05-

## 2021-05-25 NOTE — PROGRESS NOTES
Patient:   Enrique Sanches  MR#:    [id number]   Room:    5909/786-25   YOB: 1940  Date of Progress Note: 5/25/2021  Time of Note                           8:50 AM  Consulting Physician:   Davin Alas M.D. Attending Physician:  Ursula Marie DO     Chief complaint AMS    Subjective: This 68 y.o. male who presents with altered mental status. He was found down and unresponsive on his floor at home. Downtime unknown. He was intubated at the outside ER. He was recently placed on insulin or a new type of insulin his blood sugars have been somewhat low. He is on chronic hemodialysis. He is oxygenating well. Blood sugar in the 40's when found down for unclear duration. No new issues overnight. Still vented. No improvement neurologically      REVIEW OF SYSTEMS:  Unable to obtain    Past Medical History:      Diagnosis Date    Arthritis     Atherosclerosis of native arteries of the extremities with intermittent claudication     Blood circulation, collateral     CAD (coronary artery disease)     Carotid artery occlusion, right     Chronic kidney disease     Depression     GERD (gastroesophageal reflux disease)     Gout     History of blood transfusion     Hyperlipidemia     Hypertension     Palliative care patient 05/20/2021    Pneumonia due to infectious organism 04/09/2019    Type II or unspecified type diabetes mellitus without mention of complication, not stated as uncontrolled     Ulcerative colitis ([facility] [address] 06/30/2019       Past Surgical History:      Procedure Laterality Date    CATARACT REMOVAL Bilateral     DIALYSIS FISTULA CREATION Right 10/16/2020    RIGHT BRACHIAL/BASILIC AV FISTULA performed by Cadence Díaz MD at [address] Right Brachial Artery to Cephalic Vein Arteriovenous Fistula Creation    HEMORRHOID SURGERY  1973    HERNIA REPAIR      umbilical hernia    PACEMAKER PLACEMENT       [address] VASCULAR SURGERY  11/02/2020    [facility].  Right upper extremity fistulogram's including venography to the superior vena cava, Balloon angioplasty distal/mid upper arm cephalic vein stenoses with 8 mm x 40 mm Luke balloon, Coil embolization of cephalic vein branch (8 mm x 5 cm, 5 mm x 5 cm (4)), Completion fistulogram's and venograms    VASCULAR SURGERY  2021    [facility]-   Right upper extremity fistulogram's including venography to the superior vena cava,  Balloon angioplasty distal/mid upper arm cephalic vein stenosis with 8 mm x 60 mm Lutonix drug-coated balloon, Completion venograms right upper extremity       Medications in Hospital:      Current Facility-Administered Medications:     acetylcysteine (MUCOMYST) 20 % solution 600 mg, 600 mg, Inhalation, BID, Glenard Rubinstein, DO, 600 mg at 21 0620    albuterol (PROVENTIL) nebulizer solution 2.5 mg, 2.5 mg, Nebulization, Q6H PRN, Glenard Rubinstein, DO, 2.5 mg at 21 0620    enalaprilat (VASOTEC) injection 1.25 mg, 1.25 mg, Intravenous, Q6H, Glenard Rubinstein, DO, 1.25 mg at 21 0701    potassium chloride (KLOR-CON M) extended release tablet 40 mEq, 40 mEq, Oral, PRN **OR** potassium bicarb-citric acid (EFFER-K) effervescent tablet 40 mEq, 40 mEq, Oral, PRN, 40 mEq at 21 **OR** potassium chloride 10 mEq/100 mL IVPB (Peripheral Line), 10 mEq, Intravenous, PRN, Glenard Rubinstein, DO    [COMPLETED] amiodarone (CORDARONE) 150 mg in dextrose 5 % 100 mL bolus, 150 mg, Intravenous, Once, Stopped at 21 **FOLLOWED BY** [] amiodarone (CORDARONE) 450 mg in dextrose 5 % 250 mL infusion, 1 mg/min, Intravenous, Continuous, Last Rate: 33.3 mL/hr at 21, 1 mg/min at 21 **FOLLOWED BY** amiodarone (CORDARONE) 450 mg in dextrose 5 % 250 mL infusion, 0.5 mg/min, Intravenous, Continuous, Glenard Rubinstein, DO, Last Rate: 16.7 mL/hr at 21, 0.5 mg/min at 21    insulin lispro (HUMALOG) injection vial 0-18 Units, 0-18 Units, Subcutaneous, Q4H, Jose Yo MD, 6 Units at 05/25/21 3025    heparin (porcine) injection 3,790 Units, 60 Units/kg, Intravenous, PRN, Theador Murphy, DO, 3,790 Units at 05/24/21 1348    heparin (porcine) injection 1,890 Units, 30 Units/kg, Intravenous, PRN, Theador Murphy, DO, 1,890 Units at 05/25/21 4257    heparin 25,000 units in dextrose 5% 250 mL (premix) infusion, 5-30 Units/kg/hr, Intravenous, Continuous, Theador Murphy, DO, Last Rate: 13.3 mL/hr at 05/25/21 0821, 21 Units/kg/hr at 05/25/21 0821    metoprolol (LOPRESSOR) injection 5 mg, 5 mg, Intravenous, Q6H, Theador Murphy, DO, 5 mg at 05/25/21 1036    sodium chloride flush 0.9 % injection 5-40 mL, 5-40 mL, Intravenous, 2 times per day, Theador Murphy, DO, 10 mL at 05/24/21 2058    sodium chloride flush 0.9 % injection 5-40 mL, 5-40 mL, Intravenous, PRN, Theador Murphy, DO    0.9 % sodium chloride infusion, 25 mL, Intravenous, PRN, Theador Murphy, DO    acetaminophen (TYLENOL) tablet 650 mg, 650 mg, Oral, Q6H PRN, 650 mg at 05/22/21 1917 **OR** acetaminophen (TYLENOL) suppository 650 mg, 650 mg, Rectal, Q6H PRN, Theador Murphy, DO    Allergies:  Patient has no known allergies. Social History:   TOBACCO:   reports that he quit smoking about 15 years ago. His smoking use included cigarettes. He has a 50.00 pack-year smoking history. He has quit using smokeless tobacco.  His smokeless tobacco use included snuff. ETOH:   reports current alcohol use.     Family History:       Problem Relation Age of Onset    High Blood Pressure Mother     Heart Disease Mother     Stroke Mother         at 48    High Blood Pressure Father     Heart Disease Father     Stroke Father         at 59    No Known Problems Brother     Alzheimer's Disease Brother         onset at 80, then rapidly progressed to death         PHYSICAL EXAM:  BP (!) 146/79   Pulse 112   Temp 98.7 °F (37.1 °C) (Temporal)   Resp 14   Ht The diffusion-weighted imaging sequence including the ADC map show any   area of restricted diffusion involving the cortex and superficial   subcortical region of the left occipital lobe with corresponding   signal void in the ADC map sequence. There are no corresponding signal   abnormality seen in the FLAIR sequence images. There is no evidence of a mass. No midline shift. Moderately dilated   ventricles, basal cistern and the cortical sulci represent chronic   volume loss/atrophy. The orbits are unremarkable. The pituitary gland is normal. The corpus callosum, the brainstem and   cerebellum are normal.   The FLAIR sequence images show extensive chronic white matter ischemic   changes in the centrum semiovale bilaterally. The gradient recalled imaging sequence show normal flow/flow void in   the limited visualized intracranial vessels. There is mild-to-moderate mucosal thickening involving the maxillary   antrum bilaterally. There is bilateral mastoid effusion.       Impression   Acute left occipital cortical infarction. Extensive chronic white matter ischemic changes. Maxillary sinusitis and bilateral mastoid effusion. Signed by Dr Valri Kussmaul on 5/24/2021 3:11 PM           RECORD REVIEW: Previous medical records, medications were reviewed at today's visit    IMPRESSION:   AMS in the setting of hypoglycemia in the 40s of unclear duration/respiratory failure/chronic renal failure  Profound hypoglycemia with subsequent cerebral injury is high on the differential along with stroke with history of atrial fibrillation-no significant change in neurological status off sedation x 4 days  MRI films reviewed. On vent no sedations  Exam 5/24 eyes open occasionally  + gag not breathing over vent  Not following commands on vent. Made brief eye contact with deep pain. Has spontaneous facial grimace. EEG significant movement-diffuse slowing      On Abx for presumed sepsis.   Cultures

## 2022-01-01 NOTE — H&P
[facility] Medicine Services  HISTORY AND PHYSICAL    Date of Admission: 7/9/2020  Primary Care Physician: Cayetano Kay MD    Subjective     Chief Complaint: Atrial fib with RVR/renal failure    History of Present Illness  Patient is a 79-year-old  male was sent from the clinic for management of atrial for with RVR in ER.  In ER patient was given a dose of Cardizem IV and was started on a drip.  Patient heart rate today in the 90s.  Patient does have a chronic history of atrial fib.  Patient was sent here due to no cardiology.  Patient is currently in stable condition.  Rate currently is in the 80s.    Laboratory shows acute renal failure previous creatinine 2/26/2020 1.8.  Today creatinine is 4.3.    Laboratory-glucose 161, BUNs 45, creatinine 4.3, sodium 141, potassium 4.6, chloride is 104, CO2 is 26, anion gap is 11, total protein 7.2, albumin is 4.1, calcium is 8.6, corrected calcium 8.5, alkaline phosphatase 115, ALT is 31, AST 17, total bili 1.6, GFR 14, cholesterol is 149, triglycerides 55, HDL 43, LDL 95, HDL 3.5, hemoglobin A1c 7.4, urine microalbumin 5399, TSH 3.442, free T4 1.0, EKG shows atrial fibrillation.           ABG-pH 7.46, CO2 34, , CO2 24.    CK 74, CK-MB 2.4, troponin 0.023.  PT 13, INR 1.3, PTT 29.                    Review of Systems   Constitutional: Positive for activity change, appetite change and fatigue. Negative for chills and fever.   HENT: Negative for hearing loss, nosebleeds, tinnitus and trouble swallowing.    Eyes: Negative for visual disturbance.   Respiratory: Negative for cough, chest tightness, shortness of breath and wheezing.    Cardiovascular: Positive for palpitations. Negative for chest pain and leg swelling.   Gastrointestinal: Negative for abdominal distention, abdominal pain, blood in stool, constipation, diarrhea, nausea and vomiting.   Endocrine: Negative for cold intolerance, heat intolerance, polydipsia,  polyphagia and polyuria.   Genitourinary: Negative for decreased urine volume, difficulty urinating, dysuria, flank pain, frequency and hematuria.   Musculoskeletal: Negative for arthralgias, joint swelling and myalgias.   Skin: Negative for rash.   Allergic/Immunologic: Negative for immunocompromised state.   Neurological: Positive for weakness. Negative for dizziness, syncope, light-headedness and headaches.   Hematological: Negative for adenopathy. Does not bruise/bleed easily.   Psychiatric/Behavioral: Negative for confusion and sleep disturbance. The patient is not nervous/anxious.         Otherwise complete ROS reviewed and negative except as mentioned in the HPI.      Past Medical History:   Past Medical History:   Diagnosis Date   • Acute gastritis    • Blood in feces    • Carotid artery occlusion 12/2/2019   • Colitis, ulcerative chronic (CMS/HCC)     LEFT-SIDED   • Diverticular disease of colon    • Epigastric pain    • GERD (gastroesophageal reflux disease)    • History of colon polyps    • [place] (hard of hearing)    • Hypertension    • Lesion of nose    • Neoplasm of uncertain behavior    • Nonspecific ulcerative proctitis (CMS/HCC)    • Rectal hemorrhage    • Type 2 diabetes mellitus (CMS/HCC)    • Vitamin B12 deficiency        Past Surgical History:  Past Surgical History:   Procedure Laterality Date   • COLONOSCOPY  12/02/2013    Hemorrhoids found.   • COLONOSCOPY  09/06/2016   • COLONOSCOPY N/A 10/30/2017    Procedure: COLONOSCOPY;  Surgeon: Alex Silveira MD;  Location: [facility] ENDOSCOPY;  Service:    • COLONOSCOPY N/A 11/6/2018    Procedure: COLONOSCOPY;  Surgeon: Alex Silveira MD;  Location: [facility] ENDOSCOPY;  Service: Gastroenterology   • COLONOSCOPY N/A 11/21/2019    Procedure: COLONOSCOPY;  Surgeon: Alex Silveira MD;  Location: [facility] ENDOSCOPY;  Service: Gastroenterology   • COLONOSCOPY W/ POLYPECTOMY  08/30/2015    Single polyp found in the colon;removed by cold snare  polypectomy.Proctitis in the rectum.Diverticulosis found in the sigmoid colon.Hemorrhoids found.   • ENDOSCOPY  12/02/2013    Normal hypopharynx, esophagus, duodenum, symmetrical & patent pylorus. Hiatus hernia in GE junction. Normal stomach. Biopsy taken.   • HERNIA REPAIR      umbilical   • UPPER GASTROINTESTINAL ENDOSCOPY  12/02/2013       Family History: family history includes Diabetes in an other family member; Hypertension in an other family member.    Social History:  reports that he has quit smoking. His smoking use included cigarettes. He quit smokeless tobacco use about 12 years ago.  His smokeless tobacco use included snuff. He reports that he drinks alcohol. He reports that he does not use drugs.    Medications:  Prior to Admission medications    Medication Sig Start Date End Date Taking? Authorizing Provider   apixaban (ELIQUIS) 5 MG tablet tablet Take 5 mg by mouth 2 (Two) Times a Day.    Geo Benítez MD   dicyclomine (BENTYL) 10 MG capsule Take 1 capsule by mouth 4 (Four) Times a Day Before Meals & at Bedtime. 12/2/19   Haley Lane APRN   dilTIAZem (CARDIZEM) 120 MG tablet Take 120 mg by mouth 4 (Four) Times a Day. 2 tablets by mouth in A.M., 1 tablet by mouth at Noon, 1 tablet by mouth at bedtime    Geo Benítez MD   folic acid (FOLVITE) 1 MG tablet Take 1 mg by mouth Daily.    Geo Benítez MD   furosemide (LASIX) 40 MG tablet Take 40 mg by mouth Daily. 5/1/18   Geo Benítez MD   glipiZIDE (GLUCOTROL) 10 MG tablet Take 1 tablet by mouth 2 (Two) Times a Day. 8/4/16   Geo Benítez MD   lisinopril (PRINIVIL,ZESTRIL) 20 MG tablet Take 1 tablet by mouth 2 (Two) Times a Day. 9/4/16   Geo Benítez MD   metFORMIN (GLUCOPHAGE) 1000 MG tablet Take 1,000 mg by mouth 2 (Two) Times a Day With Meals.    Geo Benítez MD   metoprolol tartrate (LOPRESSOR) 25 MG tablet Take 25 mg by mouth 2 (Two) Times a Day.    Geo Benítez MD   omeprazole  "(priLOSEC) 20 MG capsule Take 1 capsule by mouth 2 (Two) Times a Day. 10/24/19   Haley Lane APRN   pravastatin (PRAVACHOL) 20 MG tablet Take 1 tablet by mouth Every Night. 8/13/16   ProviderGeo MD   sotalol (BETAPACE) 80 MG tablet TAKE 0.5 TABLET BY ORAL ROUTE 2 TIMES PER DAY 5/15/18   Geo Benítez MD   sulfaSALAzine (AZULFIDINE) 500 MG tablet Take 2 tablets by mouth 3 (Three) Times a Day. 12/2/19   Haley Lane APRN   terazosin (HYTRIN) 10 MG capsule Take 1 capsule by mouth Every Night. 8/30/16   Provider, MD Geo     Allergies:  No Known Allergies    Objective     Vital Signs: /65 (BP Location: Right arm, Patient Position: Sitting)   Pulse 79   Temp 98.2 °F (36.8 °C) (Oral)   Resp 18   Ht 167.6 cm (65.98\")   Wt 70.9 kg (156 lb 3.2 oz)   SpO2 98%   BMI 25.22 kg/m²   Physical Exam   Constitutional: He is oriented to person, place, and time. He appears well-developed.   HENT:   Head: Normocephalic.   Eyes: Pupils are equal, round, and reactive to light. Conjunctivae are normal.   Neck: Neck supple. No JVD present.   Cardiovascular: Normal heart sounds and intact distal pulses. Exam reveals no gallop and no friction rub.   No murmur heard.  Irregular irregular, rates in the 80s.  Pacemaker noted.   Pulmonary/Chest: Effort normal and breath sounds normal. No respiratory distress. He has no wheezes. He has no rales. He exhibits no tenderness.   Diminished breath sound bilateral, clear.   Abdominal: Soft. Bowel sounds are normal. He exhibits no distension. There is no tenderness. There is no rebound and no guarding.   Musculoskeletal: Normal range of motion. He exhibits no edema, tenderness or deformity.   Neurological: He is alert and oriented to person, place, and time. He displays normal reflexes. No cranial nerve deficit. He exhibits normal muscle tone.   Skin: Skin is warm and dry. Capillary refill takes 2 to 3 seconds. No rash noted.   Psychiatric: He has a normal mood and " affect. His behavior is normal. Judgment and thought content normal.   Nursing note and vitals reviewed.          Results Reviewed:    Lab Results (last 24 hours)     ** No results found for the last 24 hours. **           Radiology Data:    Imaging Results (Last 24 Hours)     ** No results found for the last 24 hours. **          I have personally reviewed and interpreted the radiology studies and ECG obtained at time of admission.     Assessment / Plan      Assessment & Plan  Active Hospital Problems    Diagnosis   • Atrial fibrillation with RVR (CMS/[facility])   • GERD (gastroesophageal reflux disease)   • CHF (congestive heart failure) (CMS/[facility])   • Acute renal failure superimposed on chronic kidney disease (CMS/[facility])   • Chronic anticoagulation   • Type 2 diabetes mellitus (CMS/[facility])     Plans.     Atrial for with RVR/pacemaker/CAD.  Currently well controlled with Cardizem drip.  We will try to wean off the Cardizem drip now.  Patient recently had a pacemaker placed in February at [facility].  The reason for transfer due to no cardiology.  Patient also wants to establish a car heart doctor here at this hospital.  Continue aspirin, Eliquis, Cardizem.  Hold Lasix due to worsening kidney function.  Hold lisinopril due to renal failure.  Continue Pravachol.  Continue Toprol.  Echocardiogram.  Continue to trend troponin.      Acute on chronic renal failure.  Previous creatinine 2/26/1 is was 1.8.  Creatinine from [place] was 4.3.  Ultrasound the kidneys.    Slow IV hydration.  Nephrology consult.    Reflux.  Protonix.  Zofran.    Diabetes.  Sliding scale.  Hemoglobin A1C 7.4.  Continue Glucotrol.  Hold Glucophage due to renal failure.    History of colitis.  Continue sulfasalazine.    Benign prostate hypertrophy.  Continue Hydrin.    Code Status: full code     I discussed the patient's findings and my recommendations with: patient    Estimated length of stay: 1-3 days.   Eb Garcia MD   07/09/20    18:22             unknown

## 2023-01-10 NOTE — PROGRESS NOTES
[facility] -  Progress Note     LOS: 5 days   Patient Care Team:  Cayetano Kay MD as PCP - General  Haley Lane APRN (Gastroenterology)  Haley Lane APRN (Gastroenterology)  Cayetano Kay MD as Referring Physician (Internal Medicine)  Alex Han MD as Consulting Physician (Otolaryngology)  Shaw Ag MD (Inactive) as Consulting Physician (Dermatology)    Chief Complaint: afib follow up    Subjective     Interval History: rates controlled on tele. Renal function slowly improving. hgb stable although low. IVF infusing. No chest pain, no shortness of breath + octavio leg swelling and pedal edema.      Review of Systems:     Review of Systems   Constitutional: Negative for chills and diaphoresis.   HENT: Negative for congestion.    Respiratory: Negative for chest tightness, shortness of breath and wheezing.    Cardiovascular: Positive for leg swelling. Negative for chest pain and palpitations.   Gastrointestinal: Negative for abdominal distention, abdominal pain, nausea and vomiting.   Genitourinary: Negative for difficulty urinating.   Neurological: Negative for dizziness, syncope and weakness.   Psychiatric/Behavioral: Negative for agitation and behavioral problems.     Objective     Vital Sign Min/Max for last 24 hours  Temp  Min: 94.8 °F (34.9 °C)  Max: 98.4 °F (36.9 °C)   BP  Min: 137/68  Max: 171/77   Pulse  Min: 73  Max: 93   Resp  Min: 16  Max: 18   SpO2  Min: 91 %  Max: 96 %   No data recorded   Weight  Min: 71.7 kg (158 lb 1.6 oz)  Max: 71.7 kg (158 lb 1.6 oz)         07/13/20 1950   Weight: 71.7 kg (158 lb 1.6 oz)       Physical Exam:    Physical Exam   Constitutional: He is oriented to person, place, and time. He appears well-developed and well-nourished. No distress.   HENT:   Head: Normocephalic and atraumatic.   Mouth/Throat: No oropharyngeal exudate.   Neck: Normal range of motion. Neck supple. No JVD present.   Cardiovascular: Normal rate. An irregularly  irregular rhythm present.   Murmur heard.  Pulmonary/Chest: Effort normal and breath sounds normal. No respiratory distress. He has no wheezes. He has no rales.   Abdominal: Soft. Bowel sounds are normal. He exhibits no distension. There is no tenderness.   Musculoskeletal: He exhibits edema.   Neurological: He is alert and oriented to person, place, and time.   Skin: Skin is warm, dry and intact. No rash noted. He is not diaphoretic. No erythema. No pallor.   Psychiatric: He has a normal mood and affect. His behavior is normal.   Vitals reviewed.    Results Review:   Lab Results (last 72 hours)     Procedure Component Value Units Date/Time    Magnesium [[id number]]  (Normal) Collected:  07/14/20 0421    Specimen:  Blood Updated:  07/14/20 0953     Magnesium 1.7 mg/dL     POC Glucose Once [[id number]]  (Abnormal) Collected:  07/14/20 0802    Specimen:  Blood Updated:  07/14/20 0813     Glucose 190 mg/dL      Comment: : [id number] Miguelito AshleyMeter ID: [id number]       Comprehensive Metabolic Panel [[id number]]  (Abnormal) Collected:  07/14/20 0421    Specimen:  Blood Updated:  07/14/20 0515     Glucose 185 mg/dL      BUN 35 mg/dL      Creatinine 3.11 mg/dL      Sodium 139 mmol/L      Potassium 3.2 mmol/L      Comment: Specimen hemolyzed.  Results may be affected.        Chloride 105 mmol/L      CO2 21.0 mmol/L      Calcium 8.8 mg/dL      Total Protein 6.1 g/dL      Albumin 3.60 g/dL      ALT (SGPT) 25 U/L      AST (SGOT) 24 U/L      Alkaline Phosphatase 108 U/L      Total Bilirubin 0.4 mg/dL      eGFR Non African Amer 20 mL/min/1.73      Globulin 2.5 gm/dL      A/G Ratio 1.4 g/dL      BUN/Creatinine Ratio 11.3     Anion Gap 13.0 mmol/L     Narrative:       GFR Normal >60  Chronic Kidney Disease <60  Kidney Failure <15      Digoxin Level [[id number]]  (Normal) Collected:  07/14/20 0421    Specimen:  Blood Updated:  07/14/20 0515     Digoxin 0.90 ng/mL     CBC Auto Differential [[id number]]  (Abnormal) Collected:   07/14/20 0421    Specimen:  Blood Updated:  07/14/20 0500     WBC 6.71 10*3/mm3      RBC 2.35 10*6/mm3      Hemoglobin 7.4 g/dL      Hematocrit 21.3 %      MCV 90.6 fL      MCH 31.5 pg      MCHC 34.7 g/dL      RDW 13.3 %      RDW-SD 43.8 fl      MPV 11.0 fL      Platelets 169 10*3/mm3      Neutrophil % 64.6 %      Lymphocyte % 22.7 %      Monocyte % 8.8 %      Eosinophil % 3.1 %      Basophil % 0.4 %      Immature Grans % 0.4 %      Neutrophils, Absolute 4.33 10*3/mm3      Lymphocytes, Absolute 1.52 10*3/mm3      Monocytes, Absolute 0.59 10*3/mm3      Eosinophils, Absolute 0.21 10*3/mm3      Basophils, Absolute 0.03 10*3/mm3      Immature Grans, Absolute 0.03 10*3/mm3      nRBC 0.0 /100 WBC     Urinalysis With Culture If Indicated - Urine, Clean Catch [[id number]]  (Abnormal) Collected:  07/13/20 1947    Specimen:  Urine, Clean Catch Updated:  07/13/20 2020     Color, UA Yellow     Appearance, UA Clear     pH, UA <=5.0     Specific Gravity, UA 1.009     Glucose,  mg/dL (Trace)     Ketones, UA Negative     Bilirubin, UA Negative     Blood, UA Negative     Protein, UA Trace     Leuk Esterase, UA Negative     Nitrite, UA Negative     Urobilinogen, UA 0.2 E.U./dL    Narrative:       Urine microscopic not indicated.    POC Glucose Once [[id number]]  (Abnormal) Collected:  07/13/20 1721    Specimen:  Blood Updated:  07/13/20 1735     Glucose 65 mg/dL      Comment: : [id number] Matthew YesPlz!Meter ID: [id number]       POC Glucose Once [[id number]]  (Abnormal) Collected:  07/13/20 1700    Specimen:  Blood Updated:  07/13/20 1734     Glucose 61 mg/dL      Comment: : [id number] Matthew [employer] CloudinaMeter ID: [id number]       POC Glucose Once [[id number]]  (Abnormal) Collected:  07/13/20 1151    Specimen:  Blood Updated:  07/13/20 1203     Glucose 252 mg/dL      Comment: : [id number] Castro [employer] CloudinaMeter ID: [id number]       POC Glucose Once [[id number]]  (Abnormal) Collected:  07/13/20 0743    Specimen:  Blood  Updated:  07/13/20 0813     Glucose 212 mg/dL      Comment: : [id number] Matthew CortezMeter ID: [id number]       Comprehensive Metabolic Panel [[id number]]  (Abnormal) Collected:  07/13/20 0229    Specimen:  Blood Updated:  07/13/20 0424     Glucose 210 mg/dL      BUN 36 mg/dL      Creatinine 3.33 mg/dL      Sodium 140 mmol/L      Potassium 3.7 mmol/L      Chloride 105 mmol/L      CO2 22.0 mmol/L      Calcium 8.5 mg/dL      Total Protein 6.3 g/dL      Albumin 3.90 g/dL      ALT (SGPT) 18 U/L      AST (SGOT) 21 U/L      Alkaline Phosphatase 111 U/L      Total Bilirubin 0.3 mg/dL      eGFR Non African Amer 18 mL/min/1.73      Globulin 2.4 gm/dL      A/G Ratio 1.6 g/dL      BUN/Creatinine Ratio 10.8     Anion Gap 13.0 mmol/L     Narrative:       GFR Normal >60  Chronic Kidney Disease <60  Kidney Failure <15      CBC Auto Differential [[id number]]  (Abnormal) Collected:  07/13/20 0229    Specimen:  Blood Updated:  07/13/20 0402     WBC 7.17 10*3/mm3      RBC 2.43 10*6/mm3      Hemoglobin 7.5 g/dL      Hematocrit 22.2 %      MCV 91.4 fL      MCH 30.9 pg      MCHC 33.8 g/dL      RDW 13.5 %      RDW-SD 44.5 fl      MPV 10.7 fL      Platelets 179 10*3/mm3      Neutrophil % 63.5 %      Lymphocyte % 24.8 %      Monocyte % 8.1 %      Eosinophil % 2.6 %      Basophil % 0.4 %      Immature Grans % 0.6 %      Neutrophils, Absolute 4.55 10*3/mm3      Lymphocytes, Absolute 1.78 10*3/mm3      Monocytes, Absolute 0.58 10*3/mm3      Eosinophils, Absolute 0.19 10*3/mm3      Basophils, Absolute 0.03 10*3/mm3      Immature Grans, Absolute 0.04 10*3/mm3      nRBC 0.0 /100 WBC     POC Glucose Once [[id number]]  (Abnormal) Collected:  07/12/20 1932    Specimen:  Blood Updated:  07/12/20 1943     Glucose 249 mg/dL      Comment: : [id number] Byronjerilyn VianneystanraMeter ID: [id number]       POC Glucose Once [[id number]]  (Abnormal) Collected:  07/12/20 1834    Specimen:  Blood Updated:  07/12/20 1848     Glucose 213 mg/dL      Comment:  : [id number] Asim EuraisaMeter ID: [id number]       Magnesium [[id number]]  (Normal) Collected:  07/12/20 1826    Specimen:  Blood Updated:  07/12/20 1846     Magnesium 1.7 mg/dL     POC Glucose Once [[id number]]  (Abnormal) Collected:  07/12/20 1618    Specimen:  Blood Updated:  07/12/20 1631     Glucose 63 mg/dL      Comment: : [id number] Asim EuraisaMeter ID: [id number]       POC Glucose Once [[id number]]  (Abnormal) Collected:  07/12/20 1114    Specimen:  Blood Updated:  07/12/20 1145     Glucose 207 mg/dL      Comment: : [id number] Asim EuraisaMeter ID: [id number]       POC Glucose Once [[id number]]  (Abnormal) Collected:  07/12/20 0729    Specimen:  Blood Updated:  07/12/20 0816     Glucose 167 mg/dL      Comment: : [id number] Asim EuraisaMeter ID: [id number]       Comprehensive Metabolic Panel [[id number]]  (Abnormal) Collected:  07/12/20 0203    Specimen:  Blood Updated:  07/12/20 0343     Glucose 242 mg/dL      BUN 36 mg/dL      Creatinine 3.19 mg/dL      Sodium 139 mmol/L      Potassium 3.2 mmol/L      Chloride 103 mmol/L      CO2 23.0 mmol/L      Calcium 8.3 mg/dL      Total Protein 5.9 g/dL      Albumin 3.70 g/dL      ALT (SGPT) 14 U/L      AST (SGOT) 15 U/L      Alkaline Phosphatase 108 U/L      Total Bilirubin 0.2 mg/dL      eGFR Non African Amer 19 mL/min/1.73      Globulin 2.2 gm/dL      A/G Ratio 1.7 g/dL      BUN/Creatinine Ratio 11.3     Anion Gap 13.0 mmol/L     Narrative:       GFR Normal >60  Chronic Kidney Disease <60  Kidney Failure <15      CBC Auto Differential [[id number]]  (Abnormal) Collected:  07/12/20 0203    Specimen:  Blood Updated:  07/12/20 0328     WBC 7.82 10*3/mm3      RBC 2.39 10*6/mm3      Hemoglobin 7.5 g/dL      Hematocrit 21.8 %      MCV 91.2 fL      MCH 31.4 pg      MCHC 34.4 g/dL      RDW 13.2 %      RDW-SD 44.6 fl      MPV 10.8 fL      Platelets 177 10*3/mm3      Neutrophil % 65.4 %      Lymphocyte % 22.6 %      Monocyte % 8.2 %      Eosinophil % 2.8 %       Basophil % 0.4 %      Immature Grans % 0.6 %      Neutrophils, Absolute 5.11 10*3/mm3      Lymphocytes, Absolute 1.77 10*3/mm3      Monocytes, Absolute 0.64 10*3/mm3      Eosinophils, Absolute 0.22 10*3/mm3      Basophils, Absolute 0.03 10*3/mm3      Immature Grans, Absolute 0.05 10*3/mm3      nRBC 0.0 /100 WBC     Protein Elec + Interp, Serum [[id number]] Collected:  07/11/20 2342    Specimen:  Blood Updated:  07/12/20 0011    POC Glucose Once [[id number]]  (Normal) Collected:  07/11/20 1740    Specimen:  Blood Updated:  07/11/20 1751     Glucose 121 mg/dL      Comment: : [id number] Miguelito AshleyMeter ID: [id number]       POC Glucose Once [[id number]]  (Abnormal) Collected:  07/11/20 1651    Specimen:  Blood Updated:  07/11/20 1702     Glucose 64 mg/dL      Comment: : [id number] Miguelito AshleyMeter ID: [id number]       Eosinophil Smear - Urine, Urine, Clean Catch [[id number]]  (Normal) Collected:  07/10/20 2006    Specimen:  Urine, Clean Catch Updated:  07/11/20 1240     Eosinophil Smear 0 % EOS/100 Cells     POC Glucose Once [[id number]]  (Abnormal) Collected:  07/11/20 1217    Specimen:  Blood Updated:  07/11/20 1229     Glucose 210 mg/dL      Comment: : [id number] Miguelito AshleyMeter ID: [id number]             Medication Review: yes  Current Facility-Administered Medications   Medication Dose Route Frequency Provider Last Rate Last Dose   • allopurinol (ZYLOPRIM) tablet 200 mg  200 mg Oral Daily Jose Enrique Cho MD   200 mg at 07/14/20 0837   • bumetanide (BUMEX) tablet 1 mg  1 mg Oral Daily Jevon Valladares MD   1 mg at 07/14/20 0838   • dextrose (D50W) 25 g/ 50mL Intravenous Solution 25 g  25 g Intravenous Q15 Min PRN Eb Garcia MD       • dextrose (GLUTOSE) oral gel 15 g  15 g Oral Q15 Min PRN Eb Garcia MD       • digoxin (LANOXIN) tablet 125 mcg  125 mcg Oral Daily Nanci Pascual PA-C   125 mcg at 07/13/20 1315   • dilTIAZem CD (CARDIZEM CD) 24 hr capsule 240 mg  240 mg Oral BID  Sravanthi Waldron APRN   240 mg at 07/14/20 0838   • glipizide (GLUCOTROL) tablet 10 mg  10 mg Oral QAM AC Eb Garcia MD   10 mg at 07/14/20 0838   • glucagon (human recombinant) (GLUCAGEN DIAGNOSTIC) injection 1 mg  1 mg Subcutaneous Q15 Min PRN Eb Garcia MD       • hydrALAZINE (APRESOLINE) tablet 50 mg  50 mg Oral Q8H Sravanthi Waldron APRN       • insulin lispro (humaLOG) injection 2-7 Units  2-7 Units Subcutaneous TID AC Eb Garcia MD   2 Units at 07/14/20 0838   • iron sucrose (VENOFER) 200 mg in sodium chloride 0.9 % 100 mL IVPB  200 mg Intravenous Q24H Jevon Valladares MD   200 mg at 07/13/20 1706   • metoprolol succinate XL (TOPROL-XL) 24 hr tablet 100 mg  100 mg Oral BID Sravanthi Waldron APRN   100 mg at 07/14/20 0838   • ondansetron (ZOFRAN) injection 4 mg  4 mg Intravenous Q6H PRN Eb Garcia MD       • pantoprazole (PROTONIX) EC tablet 40 mg  40 mg Oral QAM Eb Garcia MD   40 mg at 07/14/20 0501   • pravastatin (PRAVACHOL) tablet 20 mg  20 mg Oral Nightly Eb Garcia MD   20 mg at 07/13/20 2043   • sodium chloride 0.9 % flush 10 mL  10 mL Intravenous Q12H Eb Garcia MD   10 mL at 07/14/20 0839   • sodium chloride 0.9 % flush 10 mL  10 mL Intravenous PRN Eb Garcia MD   10 mL at 07/10/20 0954   • sulfaSALAzine (AZULFIDINE) tablet 1,000 mg  1,000 mg Oral TID Eb Garcia MD   1,000 mg at 07/14/20 0838   • terazosin (HYTRIN) capsule 10 mg  10 mg Oral Nightly Eb Garcia MD   10 mg at 07/13/20 1707         Assessment/Plan         Assessment  1.  Acute on chronic renal failure: Creatinine improved slightly today 3.11  2.  Anemia: Hemoglobin 7.4  3.  Permanent atrial fibrillation: Controlled heart rates on telemetry with use of beta blocker, ccb, and now new digoxin - dig level stable. Anticoagulation on hold  4.  Chronic anticoagulation: With Eliquis which is on hold due to anemia (however anemia has not improved while holding Eliquis)  5.  Hypertension: now on  hydralazine, home lisinopril on hold  6.  Diabetes mellitus: On oral agents  7.  Pacemaker in situ: Single-lead device  8.  GERD: Stable  9.  Ulcerative colitis: Stable  10.  Mild aortic stenosis: mean gradient of 12 mmHg, valve area of 1 cm², and a peak velocity of 282 cm/s.  11.  Elevated troponin: Type II elevation in the setting of acute renal failure and anemia       Plan    - continue with current rate control regimen  - replace K  - monitor renal function  - lisinopril on hold with JARET increase new hydralazine to 50 mg TID  - Eliquis on hold, OB stool has been ordered -not sent. Anemia no better with eliquis on hold. Denies melena, hematuria. Resume Eliquis if anemia is stable and improves and OB is negative. If we identify bleeding issues that would require him to not be able to tolerate long term anticoagulation, Watchman can be considered at that time. However, he has tolerated Eliquis for a year with stable counts. His Hgb was near 10 a few months ago. ??Transfusion - defer to primary  - patient requested cardiology as he plans to switch providers for outpatient follow ups. He needs a follow up in our office with Dr. Mariano/Maikol CUELLO in 2 months. He also needs follow up in our pacer clinic so that his device can be managed through our office rather than at [facility].  - cardiology will sign off        CUATE Ryan  07/14/20  10:14               done done

## 2023-10-18 NOTE — PLAN OF CARE
Goal Outcome Evaluation:  Plan of Care Reviewed With: patient  Progress: improving  Outcome Summary: VSS. No complaints of pain. Patient did not leave today due to having no ride or keys to his house. Will have a ride tomorrow. Awaiting EGD results as well. Should be able to get those tomorrow. Recieving PO lasix. Fistula in place. AF/ 62-79 on tele. Safety maintained, no falls. Will cont to monitor.   Noted.
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
## (undated) DEVICE — ELECTRD PAD DEFIB A/

## (undated) DEVICE — 3M™ STERI-DRAPE™ INSTRUMENT POUCH 1018: Brand: STERI-DRAPE™

## (undated) DEVICE — NON-STERILE (3.5 X 20CM) LATEX COVER: Brand: TRANSDUCER COVER

## (undated) DEVICE — GLOVE SURG SZ 65 L12IN FNGR THK79MIL GRN LTX FREE

## (undated) DEVICE — TIBURON BRACHIAL ANGIOGRAPHY DRAPE: Brand: CONVERTORS

## (undated) DEVICE — PK CATH CARD 30 CA/4

## (undated) DEVICE — CATH ULTRASND ECHO ACUNAV FOR ACUSON 8F 90CM

## (undated) DEVICE — SINGLE-USE BIOPSY FORCEPS: Brand: RADIAL JAW 4

## (undated) DEVICE — ZINACTIVE USE 2539609 APPLICATOR MEDICATED 10.5 CC SOLUTION HI LT ORNG CHLORAPREP

## (undated) DEVICE — SUTURE ABSORBABLE MONOFILAMENT 3-0 SH 27 IN UD PDS + PDP416H

## (undated) DEVICE — GLOVE SURG SZ 7 L12IN FNGR THK79MIL GRN LTX FREE

## (undated) DEVICE — CANN SMPL SOFTECH BIFLO ETCO2 A/M 7FT

## (undated) DEVICE — CATH F6INF PIG 110CM 6SH: Brand: INFINITI

## (undated) DEVICE — GW AMPLTZ SUPERSTIFF 3MMJTIP .035IN 260CM

## (undated) DEVICE — Device

## (undated) DEVICE — PINNACLE INTRODUCER SHEATH: Brand: PINNACLE

## (undated) DEVICE — SOLUTION IV 500ML 0.9% SOD CHL PH 5 INJ USP VIAFLX PLAS

## (undated) DEVICE — PERCLOSE PROGLIDE™ SUTURE-MEDIATED CLOSURE SYSTEM: Brand: PERCLOSE PROGLIDE™

## (undated) DEVICE — HOLDER PRB CVR 5/8 IN ELAS NEON GRN

## (undated) DEVICE — SOLUTION IV IRRIG POUR BRL 0.9% SODIUM CHL 2F7124

## (undated) DEVICE — Device: Brand: NRG TRANSSEPTAL NEEDLE

## (undated) DEVICE — SUTURE PROL SZ 6-0 L24IN NONABSORBABLE BLU L9.3MM BV-1 3/8 8805H

## (undated) DEVICE — SUTURE VCRL SZ 3-0 L18IN ABSRB UD W/O NDL POLYGLACTIN 910 J110T

## (undated) DEVICE — GEL US 20GM NONIRRITATING OVERWRAPPED FILE PCH TRNSMIT

## (undated) DEVICE — CLIP INT SM WIDE RED TI TRNSVRS GRV CHEVRON SHP W/ PRECIS

## (undated) DEVICE — SOLIDIFIER LIQUI LOC PLUS 2000CC

## (undated) DEVICE — SURGICAL PROCEDURE PACK VASC LOURDES HOSP

## (undated) DEVICE — SOL IRR NACL 0.9PCT BT 1000ML

## (undated) DEVICE — COVER US PRB W15XL120CM W/ GEL RUBBERBAND TAPE STRP FLD GEN

## (undated) DEVICE — SKIN PREP TRAY W/CHG: Brand: MEDLINE INDUSTRIES, INC.

## (undated) DEVICE — SENSR O2 OXIMAX FNGR A/ REPR STRL

## (undated) DEVICE — Device: Brand: RFP-100A CONNECTOR CABLE

## (undated) DEVICE — Device: Brand: MEDEX

## (undated) DEVICE — SUTURE VCRL SZ 4-0 L18IN ABSRB UD VCRL POLYGLACTIN 910 COAT J109T

## (undated) DEVICE — CATH F6 ST+ MP A1 100CM: Brand: SUPER TORQUE

## (undated) DEVICE — BLANKET WRM W40.2XL55.9IN IORT LO BODY + MISTRAL AIR

## (undated) DEVICE — Z INACTIVE USE 2535480 CLIP LIG M BLU TI HRT SHP WIRE HORZ 180 PER BX

## (undated) DEVICE — KT NDL GUIDE STRL 18GA

## (undated) DEVICE — TOWEL,OR,DSP,ST,BLUE,DLX,4/PK,20PK/CS: Brand: MEDLINE

## (undated) DEVICE — ACCESS SHEATH WITH DILATOR: Brand: WATCHMAN® ACCESS SYSTEM

## (undated) DEVICE — CVR CONN CATH SWIFTLINK ACUSON

## (undated) DEVICE — CANN CO2/O2 NASL A/

## (undated) DEVICE — NEEDLE ECHOGENIC 22GA L2IN INSUL W/ 30DEG BVL EXTN SET

## (undated) DEVICE — INTRO SHEATH FASTCATH TRNSEP SL1 .032IN 8F 63CM

## (undated) DEVICE — DRSNG SURESITE WNDW 4X4.5